# Patient Record
Sex: MALE | Race: WHITE | NOT HISPANIC OR LATINO | Employment: FULL TIME | URBAN - METROPOLITAN AREA
[De-identification: names, ages, dates, MRNs, and addresses within clinical notes are randomized per-mention and may not be internally consistent; named-entity substitution may affect disease eponyms.]

---

## 2021-08-10 ENCOUNTER — HOSPITAL ENCOUNTER (EMERGENCY)
Facility: HOSPITAL | Age: 66
Discharge: HOME | End: 2021-08-10
Attending: EMERGENCY MEDICINE | Admitting: EMERGENCY MEDICINE
Payer: OTHER GOVERNMENT

## 2021-08-10 VITALS
RESPIRATION RATE: 16 BRPM | HEART RATE: 76 BPM | OXYGEN SATURATION: 98 % | TEMPERATURE: 97.7 F | SYSTOLIC BLOOD PRESSURE: 122 MMHG | DIASTOLIC BLOOD PRESSURE: 76 MMHG | HEIGHT: 76 IN

## 2021-08-10 DIAGNOSIS — T25.219A: Primary | ICD-10-CM

## 2021-08-10 PROCEDURE — 90471 IMMUNIZATION ADMIN: CPT

## 2021-08-10 PROCEDURE — 90715 TDAP VACCINE 7 YRS/> IM: CPT | Performed by: PHYSICIAN ASSISTANT

## 2021-08-10 PROCEDURE — 99282 EMERGENCY DEPT VISIT SF MDM: CPT

## 2021-08-10 PROCEDURE — 99284 EMERGENCY DEPT VISIT MOD MDM: CPT | Performed by: PHYSICIAN ASSISTANT

## 2021-08-10 RX ORDER — GINSENG 100 MG
1 CAPSULE ORAL ONCE
Status: COMPLETED | OUTPATIENT
Start: 2021-08-10 | End: 2021-08-10

## 2021-08-10 RX ORDER — OXYCODONE HYDROCHLORIDE AND ACETAMINOPHEN 5; 325 MG/1; MG/1
1 TABLET ORAL EVERY 8 HOURS PRN
Qty: 9 TABLET | Refills: 0 | Status: SHIPPED | OUTPATIENT
Start: 2021-08-10 | End: 2021-08-11 | Stop reason: SDUPTHER

## 2021-08-10 RX ADMIN — TETANUS TOXOID, REDUCED DIPHTHERIA TOXOID AND ACELLULAR PERTUSSIS VACCINE, ADSORBED 0.5 ML: 5; 2.5; 8; 8; 2.5 SUSPENSION INTRAMUSCULAR at 22:39

## 2021-08-10 RX ADMIN — BACITRACIN ZINC 1 LARGE APPLICATION: 500 OINTMENT TOPICAL at 22:40

## 2021-08-11 RX ORDER — OXYCODONE HYDROCHLORIDE AND ACETAMINOPHEN 5; 325 MG/1; MG/1
1 TABLET ORAL EVERY 8 HOURS PRN
Qty: 9 TABLET | Refills: 0 | Status: SHIPPED | OUTPATIENT
Start: 2021-08-11 | End: 2021-08-21

## 2021-08-11 RX ORDER — OXYCODONE HYDROCHLORIDE AND ACETAMINOPHEN 5; 325 MG/1; MG/1
1 TABLET ORAL EVERY 8 HOURS PRN
Qty: 9 TABLET | Refills: 0 | Status: CANCELLED | OUTPATIENT
Start: 2021-08-11 | End: 2021-08-21

## 2021-08-11 NOTE — QUICK NOTE
Patient called emergency department  Unable to use paper prescription for percocet in [place] where he lives  Requested prescription be faxed  Unable to fax or send electronically to [facility] in [facility], hence new prescription electronically sent to [facility], [address]  Patient aware of new rx at alternate pharmacy
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
## 2021-09-27 ENCOUNTER — OFFICE VISIT (OUTPATIENT)
Dept: FAMILY MEDICINE CLINIC | Facility: CLINIC | Age: 66
End: 2021-09-27
Payer: OTHER GOVERNMENT

## 2021-09-27 VITALS
WEIGHT: 315 LBS | HEIGHT: 76 IN | OXYGEN SATURATION: 97 % | BODY MASS INDEX: 38.36 KG/M2 | TEMPERATURE: 98 F | RESPIRATION RATE: 20 BRPM | SYSTOLIC BLOOD PRESSURE: 122 MMHG | DIASTOLIC BLOOD PRESSURE: 72 MMHG | HEART RATE: 64 BPM

## 2021-09-27 DIAGNOSIS — G47.33 OSA ON CPAP: ICD-10-CM

## 2021-09-27 DIAGNOSIS — H52.4 PRESBYOPIA: ICD-10-CM

## 2021-09-27 DIAGNOSIS — Z99.89 OSA ON CPAP: ICD-10-CM

## 2021-09-27 DIAGNOSIS — H35.363 DRUSEN (DEGENERATIVE) OF MACULA, BILATERAL: ICD-10-CM

## 2021-09-27 DIAGNOSIS — Z12.12 SCREENING FOR COLORECTAL CANCER: ICD-10-CM

## 2021-09-27 DIAGNOSIS — T31.0 BURN (ANY DEGREE) INVOLVING LESS THAN 10% OF BODY SURFACE: ICD-10-CM

## 2021-09-27 DIAGNOSIS — Z86.010 HISTORY OF COLON POLYPS: ICD-10-CM

## 2021-09-27 DIAGNOSIS — E78.2 MIXED HYPERLIPIDEMIA: ICD-10-CM

## 2021-09-27 DIAGNOSIS — K21.9 GASTRO-ESOPHAGEAL REFLUX DISEASE WITHOUT ESOPHAGITIS: ICD-10-CM

## 2021-09-27 DIAGNOSIS — E66.01 MORBID OBESITY (HCC): ICD-10-CM

## 2021-09-27 DIAGNOSIS — I10 ESSENTIAL HYPERTENSION: Primary | ICD-10-CM

## 2021-09-27 DIAGNOSIS — Z11.59 NEED FOR HEPATITIS C SCREENING TEST: ICD-10-CM

## 2021-09-27 DIAGNOSIS — H11.153 PINGUECULA, BILATERAL: ICD-10-CM

## 2021-09-27 DIAGNOSIS — Z12.11 SCREENING FOR COLORECTAL CANCER: ICD-10-CM

## 2021-09-27 DIAGNOSIS — H52.223 REGULAR ASTIGMATISM, BILATERAL: ICD-10-CM

## 2021-09-27 DIAGNOSIS — H52.13 MYOPIA, BILATERAL: ICD-10-CM

## 2021-09-27 DIAGNOSIS — E78.1 PURE HYPERTRIGLYCERIDEMIA: ICD-10-CM

## 2021-09-27 DIAGNOSIS — T24.391A: ICD-10-CM

## 2021-09-27 DIAGNOSIS — T24.291A PARTIAL THICKNESS BURN OF MULTIPLE SITES OF RIGHT LOWER EXTREMITY, INITIAL ENCOUNTER: ICD-10-CM

## 2021-09-27 DIAGNOSIS — G60.9 HEREDITARY PERIPHERAL NEUROPATHY: ICD-10-CM

## 2021-09-27 DIAGNOSIS — Z11.4 SCREENING FOR HIV (HUMAN IMMUNODEFICIENCY VIRUS): ICD-10-CM

## 2021-09-27 PROBLEM — G62.9 PERIPHERAL NEUROPATHY: Status: ACTIVE | Noted: 2021-09-27

## 2021-09-27 PROCEDURE — 99203 OFFICE O/P NEW LOW 30 MIN: CPT | Performed by: FAMILY MEDICINE

## 2021-09-27 RX ORDER — FAMOTIDINE 20 MG/1
20 TABLET, FILM COATED ORAL 2 TIMES DAILY PRN
Qty: 60 TABLET | Refills: 1 | Status: SHIPPED | OUTPATIENT
Start: 2021-09-27 | End: 2021-11-11 | Stop reason: SDUPTHER

## 2021-09-27 RX ORDER — COLLAGENASE SANTYL 250 [ARB'U]/G
OINTMENT TOPICAL
COMMUNITY
Start: 2021-08-11 | End: 2021-09-27

## 2021-09-27 RX ORDER — DIPHENOXYLATE HYDROCHLORIDE AND ATROPINE SULFATE 2.5; .025 MG/1; MG/1
1 TABLET ORAL DAILY
COMMUNITY
Start: 2021-08-27 | End: 2022-08-27

## 2021-09-27 RX ORDER — LOSARTAN POTASSIUM 25 MG/1
25 TABLET ORAL DAILY
COMMUNITY
Start: 2021-05-22 | End: 2021-11-11

## 2021-09-27 RX ORDER — SIMVASTATIN 20 MG
20 TABLET ORAL
COMMUNITY
Start: 2021-05-22 | End: 2022-05-26 | Stop reason: SDUPTHER

## 2021-09-27 RX ORDER — IBUPROFEN 200 MG
400 TABLET ORAL EVERY 6 HOURS PRN
COMMUNITY

## 2021-09-27 RX ORDER — OXYCODONE HYDROCHLORIDE 10 MG/1
TABLET ORAL
COMMUNITY
Start: 2021-08-26 | End: 2021-09-27

## 2021-09-27 RX ORDER — ATENOLOL 25 MG/1
25 TABLET ORAL DAILY
COMMUNITY
Start: 2021-05-22 | End: 2021-11-11

## 2021-09-27 RX ORDER — ACETAMINOPHEN 500 MG
1000 TABLET ORAL EVERY 8 HOURS PRN
COMMUNITY
End: 2021-09-27

## 2021-09-27 RX ORDER — LOSARTAN POTASSIUM 25 MG/1
TABLET ORAL
COMMUNITY
Start: 2021-08-11 | End: 2021-09-27

## 2021-09-27 RX ORDER — PANTOPRAZOLE SODIUM 40 MG/1
40 GRANULE, DELAYED RELEASE ORAL DAILY
COMMUNITY
End: 2021-09-27

## 2021-09-27 RX ORDER — VITAMIN E 268 MG
400 CAPSULE ORAL DAILY
COMMUNITY
End: 2022-03-25

## 2021-09-27 RX ORDER — ASPIRIN 81 MG/1
81 TABLET, CHEWABLE ORAL DAILY
COMMUNITY
End: 2022-03-25

## 2021-09-27 RX ORDER — TAMSULOSIN HYDROCHLORIDE 0.4 MG/1
CAPSULE ORAL
COMMUNITY
Start: 2021-08-26 | End: 2021-09-27

## 2021-09-27 RX ORDER — GABAPENTIN 100 MG/1
600 CAPSULE ORAL
COMMUNITY
End: 2021-09-27

## 2021-09-27 RX ORDER — PANTOPRAZOLE SODIUM 20 MG/1
20 TABLET, DELAYED RELEASE ORAL
Qty: 30 TABLET | Refills: 0 | Status: SHIPPED | OUTPATIENT
Start: 2021-09-27 | End: 2021-11-11

## 2021-09-27 RX ORDER — ATENOLOL 25 MG/1
TABLET ORAL
COMMUNITY
Start: 2021-08-11 | End: 2021-09-27

## 2021-09-27 NOTE — ASSESSMENT & PLAN NOTE
PPI side effects discussed  D/C Protonix 40mg QD  Wean Protonix 20m pill by mouth daily x 1 week, then 1 pill by mouth every other day x 1 week  While weaning, start Pepcid 20mg twice daily as needed for heartburn  Watch GERD diet    To consider EGD per GI?

## 2021-09-27 NOTE — PATIENT INSTRUCTIONS
Wean Protonix 20m pill by mouth daily x 1 week, then 1 pill by mouth every other day x 1 week  While weaning, start Pepcid 20mg twice daily as needed for heartburn

## 2021-09-27 NOTE — ASSESSMENT & PLAN NOTE
Stable  May benefit from discontinuing beta-blocker in future as it is not a first-line agent and increasing Cozaar? Check blood pressure outside of office  Recommend lifestyle modifications

## 2021-10-01 ENCOUNTER — TELEPHONE (OUTPATIENT)
Dept: FAMILY MEDICINE CLINIC | Facility: CLINIC | Age: 66
End: 2021-10-01

## 2021-10-01 ENCOUNTER — LAB (OUTPATIENT)
Dept: LAB | Facility: CLINIC | Age: 66
End: 2021-10-01
Payer: OTHER GOVERNMENT

## 2021-10-01 DIAGNOSIS — R73.01 IFG (IMPAIRED FASTING GLUCOSE): Primary | ICD-10-CM

## 2021-10-01 DIAGNOSIS — R73.01 IFG (IMPAIRED FASTING GLUCOSE): ICD-10-CM

## 2021-10-01 DIAGNOSIS — G60.9 HEREDITARY PERIPHERAL NEUROPATHY: ICD-10-CM

## 2021-10-01 DIAGNOSIS — Z11.4 SCREENING FOR HIV (HUMAN IMMUNODEFICIENCY VIRUS): ICD-10-CM

## 2021-10-01 DIAGNOSIS — Z11.59 NEED FOR HEPATITIS C SCREENING TEST: ICD-10-CM

## 2021-10-01 DIAGNOSIS — I10 ESSENTIAL HYPERTENSION: ICD-10-CM

## 2021-10-01 DIAGNOSIS — E66.01 MORBID OBESITY (HCC): ICD-10-CM

## 2021-10-01 DIAGNOSIS — E78.1 PURE HYPERTRIGLYCERIDEMIA: ICD-10-CM

## 2021-10-01 DIAGNOSIS — E78.2 MIXED HYPERLIPIDEMIA: ICD-10-CM

## 2021-10-01 LAB
ALBUMIN SERPL BCP-MCNC: 3.6 G/DL (ref 3.5–5)
ALP SERPL-CCNC: 70 U/L (ref 46–116)
ALT SERPL W P-5'-P-CCNC: 19 U/L (ref 12–78)
ANION GAP SERPL CALCULATED.3IONS-SCNC: 8 MMOL/L (ref 4–13)
AST SERPL W P-5'-P-CCNC: 14 U/L (ref 5–45)
BASOPHILS # BLD AUTO: 0.05 THOUSANDS/ΜL (ref 0–0.1)
BASOPHILS NFR BLD AUTO: 1 % (ref 0–1)
BILIRUB SERPL-MCNC: 0.7 MG/DL (ref 0.2–1)
BUN SERPL-MCNC: 19 MG/DL (ref 5–25)
CALCIUM SERPL-MCNC: 8.5 MG/DL (ref 8.3–10.1)
CHLORIDE SERPL-SCNC: 105 MMOL/L (ref 100–108)
CHOLEST SERPL-MCNC: 125 MG/DL (ref 50–200)
CO2 SERPL-SCNC: 27 MMOL/L (ref 21–32)
CREAT SERPL-MCNC: 1.27 MG/DL (ref 0.6–1.3)
EOSINOPHIL # BLD AUTO: 0.23 THOUSAND/ΜL (ref 0–0.61)
EOSINOPHIL NFR BLD AUTO: 4 % (ref 0–6)
ERYTHROCYTE [DISTWIDTH] IN BLOOD BY AUTOMATED COUNT: 13.2 % (ref 11.6–15.1)
EST. AVERAGE GLUCOSE BLD GHB EST-MCNC: 103 MG/DL
GFR SERPL CREATININE-BSD FRML MDRD: 59 ML/MIN/1.73SQ M
GLUCOSE P FAST SERPL-MCNC: 96 MG/DL (ref 65–99)
HBA1C MFR BLD: 5.2 %
HCT VFR BLD AUTO: 41.5 % (ref 36.5–49.3)
HCV AB SER QL: NORMAL
HDLC SERPL-MCNC: 39 MG/DL
HGB BLD-MCNC: 13.6 G/DL (ref 12–17)
IMM GRANULOCYTES # BLD AUTO: 0.02 THOUSAND/UL (ref 0–0.2)
IMM GRANULOCYTES NFR BLD AUTO: 0 % (ref 0–2)
LDLC SERPL CALC-MCNC: 57 MG/DL (ref 0–100)
LDLC SERPL DIRECT ASSAY-MCNC: 64 MG/DL (ref 0–100)
LYMPHOCYTES # BLD AUTO: 2.41 THOUSANDS/ΜL (ref 0.6–4.47)
LYMPHOCYTES NFR BLD AUTO: 39 % (ref 14–44)
MCH RBC QN AUTO: 29.7 PG (ref 26.8–34.3)
MCHC RBC AUTO-ENTMCNC: 32.8 G/DL (ref 31.4–37.4)
MCV RBC AUTO: 91 FL (ref 82–98)
MONOCYTES # BLD AUTO: 0.47 THOUSAND/ΜL (ref 0.17–1.22)
MONOCYTES NFR BLD AUTO: 8 % (ref 4–12)
NEUTROPHILS # BLD AUTO: 3.06 THOUSANDS/ΜL (ref 1.85–7.62)
NEUTS SEG NFR BLD AUTO: 48 % (ref 43–75)
NONHDLC SERPL-MCNC: 86 MG/DL
NRBC BLD AUTO-RTO: 0 /100 WBCS
PLATELET # BLD AUTO: 231 THOUSANDS/UL (ref 149–390)
PMV BLD AUTO: 9.8 FL (ref 8.9–12.7)
POTASSIUM SERPL-SCNC: 4.7 MMOL/L (ref 3.5–5.3)
PROT SERPL-MCNC: 6.7 G/DL (ref 6.4–8.2)
RBC # BLD AUTO: 4.58 MILLION/UL (ref 3.88–5.62)
SODIUM SERPL-SCNC: 140 MMOL/L (ref 136–145)
TRIGL SERPL-MCNC: 144 MG/DL
TSH SERPL DL<=0.05 MIU/L-ACNC: 1.09 UIU/ML (ref 0.36–3.74)
WBC # BLD AUTO: 6.24 THOUSAND/UL (ref 4.31–10.16)

## 2021-10-01 PROCEDURE — 85025 COMPLETE CBC W/AUTO DIFF WBC: CPT

## 2021-10-01 PROCEDURE — 83036 HEMOGLOBIN GLYCOSYLATED A1C: CPT

## 2021-10-01 PROCEDURE — 84443 ASSAY THYROID STIM HORMONE: CPT

## 2021-10-01 PROCEDURE — 36415 COLL VENOUS BLD VENIPUNCTURE: CPT

## 2021-10-01 PROCEDURE — 80053 COMPREHEN METABOLIC PANEL: CPT

## 2021-10-01 PROCEDURE — 86803 HEPATITIS C AB TEST: CPT

## 2021-10-01 PROCEDURE — 83721 ASSAY OF BLOOD LIPOPROTEIN: CPT

## 2021-10-01 PROCEDURE — 87389 HIV-1 AG W/HIV-1&-2 AB AG IA: CPT

## 2021-10-01 PROCEDURE — 80061 LIPID PANEL: CPT

## 2021-10-03 LAB — HIV 1+2 AB+HIV1 P24 AG SERPL QL IA: NORMAL

## 2021-11-11 ENCOUNTER — OFFICE VISIT (OUTPATIENT)
Dept: FAMILY MEDICINE CLINIC | Facility: CLINIC | Age: 66
End: 2021-11-11
Payer: OTHER GOVERNMENT

## 2021-11-11 VITALS
RESPIRATION RATE: 18 BRPM | DIASTOLIC BLOOD PRESSURE: 76 MMHG | TEMPERATURE: 97.7 F | HEART RATE: 62 BPM | OXYGEN SATURATION: 99 % | SYSTOLIC BLOOD PRESSURE: 124 MMHG | BODY MASS INDEX: 38.36 KG/M2 | WEIGHT: 315 LBS | HEIGHT: 76 IN

## 2021-11-11 DIAGNOSIS — E66.01 MORBID OBESITY (HCC): ICD-10-CM

## 2021-11-11 DIAGNOSIS — Z99.89 OSA ON CPAP: ICD-10-CM

## 2021-11-11 DIAGNOSIS — E78.2 MIXED HYPERLIPIDEMIA: ICD-10-CM

## 2021-11-11 DIAGNOSIS — G47.33 OSA ON CPAP: ICD-10-CM

## 2021-11-11 DIAGNOSIS — K21.9 GASTRO-ESOPHAGEAL REFLUX DISEASE WITHOUT ESOPHAGITIS: ICD-10-CM

## 2021-11-11 DIAGNOSIS — I10 PRIMARY HYPERTENSION: ICD-10-CM

## 2021-11-11 DIAGNOSIS — E78.1 PURE HYPERTRIGLYCERIDEMIA: ICD-10-CM

## 2021-11-11 DIAGNOSIS — T24.291D PARTIAL THICKNESS BURN OF MULTIPLE SITES OF RIGHT LOWER EXTREMITY, SUBSEQUENT ENCOUNTER: ICD-10-CM

## 2021-11-11 DIAGNOSIS — Z86.010 HISTORY OF COLON POLYPS: ICD-10-CM

## 2021-11-11 DIAGNOSIS — R73.01 IFG (IMPAIRED FASTING GLUCOSE): Primary | ICD-10-CM

## 2021-11-11 DIAGNOSIS — G60.9 HEREDITARY PERIPHERAL NEUROPATHY: ICD-10-CM

## 2021-11-11 PROCEDURE — 99214 OFFICE O/P EST MOD 30 MIN: CPT | Performed by: FAMILY MEDICINE

## 2021-11-11 RX ORDER — FAMOTIDINE 20 MG/1
20 TABLET, FILM COATED ORAL 2 TIMES DAILY PRN
Qty: 180 TABLET | Refills: 2 | Status: SHIPPED | OUTPATIENT
Start: 2021-11-11

## 2021-11-11 RX ORDER — LOSARTAN POTASSIUM 50 MG/1
50 TABLET ORAL DAILY
Qty: 30 TABLET | Refills: 1 | Status: SHIPPED | OUTPATIENT
Start: 2021-11-11 | End: 2021-12-23 | Stop reason: SDUPTHER

## 2021-12-07 ENCOUNTER — PATIENT MESSAGE (OUTPATIENT)
Dept: FAMILY MEDICINE CLINIC | Facility: CLINIC | Age: 66
End: 2021-12-07

## 2021-12-10 ENCOUNTER — LAB (OUTPATIENT)
Dept: LAB | Facility: CLINIC | Age: 66
End: 2021-12-10
Payer: OTHER GOVERNMENT

## 2021-12-10 DIAGNOSIS — I10 PRIMARY HYPERTENSION: ICD-10-CM

## 2021-12-10 LAB
ALBUMIN SERPL BCP-MCNC: 4.1 G/DL (ref 3.5–5)
ALP SERPL-CCNC: 72 U/L (ref 46–116)
ALT SERPL W P-5'-P-CCNC: 26 U/L (ref 12–78)
ANION GAP SERPL CALCULATED.3IONS-SCNC: 10 MMOL/L (ref 4–13)
AST SERPL W P-5'-P-CCNC: 18 U/L (ref 5–45)
BILIRUB SERPL-MCNC: 0.82 MG/DL (ref 0.2–1)
BUN SERPL-MCNC: 19 MG/DL (ref 5–25)
CALCIUM SERPL-MCNC: 9 MG/DL (ref 8.3–10.1)
CHLORIDE SERPL-SCNC: 102 MMOL/L (ref 100–108)
CO2 SERPL-SCNC: 26 MMOL/L (ref 21–32)
CREAT SERPL-MCNC: 1.14 MG/DL (ref 0.6–1.3)
GFR SERPL CREATININE-BSD FRML MDRD: 68 ML/MIN/1.73SQ M
GLUCOSE SERPL-MCNC: 79 MG/DL (ref 65–140)
POTASSIUM SERPL-SCNC: 4.1 MMOL/L (ref 3.5–5.3)
PROT SERPL-MCNC: 7.5 G/DL (ref 6.4–8.2)
SODIUM SERPL-SCNC: 138 MMOL/L (ref 136–145)

## 2021-12-10 PROCEDURE — 36415 COLL VENOUS BLD VENIPUNCTURE: CPT

## 2021-12-10 PROCEDURE — 80053 COMPREHEN METABOLIC PANEL: CPT

## 2021-12-20 ENCOUNTER — PATIENT MESSAGE (OUTPATIENT)
Dept: FAMILY MEDICINE CLINIC | Facility: CLINIC | Age: 66
End: 2021-12-20

## 2021-12-20 ENCOUNTER — TELEMEDICINE (OUTPATIENT)
Dept: FAMILY MEDICINE CLINIC | Facility: CLINIC | Age: 66
End: 2021-12-20
Payer: OTHER GOVERNMENT

## 2021-12-20 VITALS — HEIGHT: 76 IN | BODY MASS INDEX: 38.36 KG/M2 | WEIGHT: 315 LBS | TEMPERATURE: 99.8 F

## 2021-12-20 DIAGNOSIS — E66.01 MORBID OBESITY (HCC): ICD-10-CM

## 2021-12-20 DIAGNOSIS — B34.9 VIRAL INFECTION, UNSPECIFIED: Primary | ICD-10-CM

## 2021-12-20 DIAGNOSIS — I10 PRIMARY HYPERTENSION: ICD-10-CM

## 2021-12-20 DIAGNOSIS — G47.33 OSA ON CPAP: ICD-10-CM

## 2021-12-20 DIAGNOSIS — Z99.89 OSA ON CPAP: ICD-10-CM

## 2021-12-20 PROCEDURE — 99214 OFFICE O/P EST MOD 30 MIN: CPT | Performed by: FAMILY MEDICINE

## 2021-12-20 RX ORDER — ASCORBIC ACID 250 MG
250 TABLET,CHEWABLE ORAL DAILY
COMMUNITY

## 2021-12-21 ENCOUNTER — HOSPITAL ENCOUNTER (EMERGENCY)
Facility: HOSPITAL | Age: 66
Discharge: HOME | End: 2021-12-21
Attending: EMERGENCY MEDICINE | Admitting: EMERGENCY MEDICINE
Payer: OTHER GOVERNMENT

## 2021-12-21 ENCOUNTER — APPOINTMENT (EMERGENCY)
Dept: RADIOLOGY | Facility: HOSPITAL | Age: 66
End: 2021-12-21
Payer: OTHER GOVERNMENT

## 2021-12-21 ENCOUNTER — APPOINTMENT (EMERGENCY)
Dept: CT IMAGING | Facility: HOSPITAL | Age: 66
End: 2021-12-21
Payer: OTHER GOVERNMENT

## 2021-12-21 VITALS
HEIGHT: 77 IN | OXYGEN SATURATION: 96 % | HEART RATE: 106 BPM | TEMPERATURE: 99 F | DIASTOLIC BLOOD PRESSURE: 62 MMHG | SYSTOLIC BLOOD PRESSURE: 134 MMHG | BODY MASS INDEX: 28.34 KG/M2 | WEIGHT: 240 LBS | RESPIRATION RATE: 20 BRPM

## 2021-12-21 DIAGNOSIS — R50.9 FEVER: Primary | ICD-10-CM

## 2021-12-21 LAB
ALBUMIN SERPL BCP-MCNC: 3.7 G/DL (ref 3.5–5)
ALP SERPL-CCNC: 69 U/L (ref 46–116)
ALT SERPL W P-5'-P-CCNC: 24 U/L (ref 12–78)
ANION GAP SERPL CALCULATED.3IONS-SCNC: 10 MMOL/L (ref 4–13)
APTT PPP: 32 SECONDS (ref 23–37)
AST SERPL W P-5'-P-CCNC: 19 U/L (ref 5–45)
BASOPHILS # BLD AUTO: 0.04 THOUSANDS/ΜL (ref 0–0.1)
BASOPHILS NFR BLD AUTO: 1 % (ref 0–1)
BILIRUB SERPL-MCNC: 0.72 MG/DL (ref 0.2–1)
BUN SERPL-MCNC: 16 MG/DL (ref 5–25)
CALCIUM SERPL-MCNC: 8.7 MG/DL (ref 8.3–10.1)
CARDIAC TROPONIN I PNL SERPL HS: 3 NG/L
CHLORIDE SERPL-SCNC: 104 MMOL/L (ref 100–108)
CO2 SERPL-SCNC: 26 MMOL/L (ref 21–32)
CREAT SERPL-MCNC: 1.23 MG/DL (ref 0.6–1.3)
EOSINOPHIL # BLD AUTO: 0.05 THOUSAND/ΜL (ref 0–0.61)
EOSINOPHIL NFR BLD AUTO: 1 % (ref 0–6)
ERYTHROCYTE [DISTWIDTH] IN BLOOD BY AUTOMATED COUNT: 13.1 % (ref 11.6–15.1)
GFR SERPL CREATININE-BSD FRML MDRD: 61 ML/MIN/1.73SQ M
GLUCOSE SERPL-MCNC: 101 MG/DL (ref 65–140)
HCT VFR BLD AUTO: 43.4 % (ref 36.5–49.3)
HGB BLD-MCNC: 14.7 G/DL (ref 12–17)
IMM GRANULOCYTES # BLD AUTO: 0.04 THOUSAND/UL (ref 0–0.2)
IMM GRANULOCYTES NFR BLD AUTO: 1 % (ref 0–2)
INR PPP: 1.13 (ref 0.84–1.19)
LACTATE SERPL-SCNC: 1.4 MMOL/L (ref 0.5–2)
LYMPHOCYTES # BLD AUTO: 0.85 THOUSANDS/ΜL (ref 0.6–4.47)
LYMPHOCYTES NFR BLD AUTO: 11 % (ref 14–44)
MAGNESIUM SERPL-MCNC: 2.1 MG/DL (ref 1.6–2.6)
MCH RBC QN AUTO: 30.1 PG (ref 26.8–34.3)
MCHC RBC AUTO-ENTMCNC: 33.9 G/DL (ref 31.4–37.4)
MCV RBC AUTO: 89 FL (ref 82–98)
MONOCYTES # BLD AUTO: 1.22 THOUSAND/ΜL (ref 0.17–1.22)
MONOCYTES NFR BLD AUTO: 15 % (ref 4–12)
NEUTROPHILS # BLD AUTO: 5.9 THOUSANDS/ΜL (ref 1.85–7.62)
NEUTS SEG NFR BLD AUTO: 71 % (ref 43–75)
NRBC BLD AUTO-RTO: 0 /100 WBCS
PLATELET # BLD AUTO: 191 THOUSANDS/UL (ref 149–390)
PMV BLD AUTO: 9.2 FL (ref 8.9–12.7)
POTASSIUM SERPL-SCNC: 4.4 MMOL/L (ref 3.5–5.3)
PROCALCITONIN SERPL-MCNC: 0.05 NG/ML
PROT SERPL-MCNC: 7.1 G/DL (ref 6.4–8.2)
PROTHROMBIN TIME: 14 SECONDS (ref 11.6–14.5)
RBC # BLD AUTO: 4.88 MILLION/UL (ref 3.88–5.62)
SODIUM SERPL-SCNC: 140 MMOL/L (ref 136–145)
WBC # BLD AUTO: 8.1 THOUSAND/UL (ref 4.31–10.16)

## 2021-12-21 PROCEDURE — 84484 ASSAY OF TROPONIN QUANT: CPT | Performed by: EMERGENCY MEDICINE

## 2021-12-21 PROCEDURE — 83605 ASSAY OF LACTIC ACID: CPT | Performed by: EMERGENCY MEDICINE

## 2021-12-21 PROCEDURE — 96361 HYDRATE IV INFUSION ADD-ON: CPT

## 2021-12-21 PROCEDURE — 71275 CT ANGIOGRAPHY CHEST: CPT

## 2021-12-21 PROCEDURE — 71045 X-RAY EXAM CHEST 1 VIEW: CPT

## 2021-12-21 PROCEDURE — 96374 THER/PROPH/DIAG INJ IV PUSH: CPT

## 2021-12-21 PROCEDURE — 99285 EMERGENCY DEPT VISIT HI MDM: CPT | Performed by: EMERGENCY MEDICINE

## 2021-12-21 PROCEDURE — 80053 COMPREHEN METABOLIC PANEL: CPT | Performed by: EMERGENCY MEDICINE

## 2021-12-21 PROCEDURE — 84145 PROCALCITONIN (PCT): CPT | Performed by: EMERGENCY MEDICINE

## 2021-12-21 PROCEDURE — 36415 COLL VENOUS BLD VENIPUNCTURE: CPT | Performed by: EMERGENCY MEDICINE

## 2021-12-21 PROCEDURE — 93005 ELECTROCARDIOGRAM TRACING: CPT

## 2021-12-21 PROCEDURE — 83735 ASSAY OF MAGNESIUM: CPT | Performed by: EMERGENCY MEDICINE

## 2021-12-21 PROCEDURE — 87040 BLOOD CULTURE FOR BACTERIA: CPT | Performed by: EMERGENCY MEDICINE

## 2021-12-21 PROCEDURE — 85025 COMPLETE CBC W/AUTO DIFF WBC: CPT | Performed by: EMERGENCY MEDICINE

## 2021-12-21 PROCEDURE — 85730 THROMBOPLASTIN TIME PARTIAL: CPT | Performed by: EMERGENCY MEDICINE

## 2021-12-21 PROCEDURE — 85610 PROTHROMBIN TIME: CPT | Performed by: EMERGENCY MEDICINE

## 2021-12-21 PROCEDURE — 99284 EMERGENCY DEPT VISIT MOD MDM: CPT

## 2021-12-21 RX ORDER — KETOROLAC TROMETHAMINE 30 MG/ML
15 INJECTION, SOLUTION INTRAMUSCULAR; INTRAVENOUS ONCE
Status: COMPLETED | OUTPATIENT
Start: 2021-12-21 | End: 2021-12-21

## 2021-12-21 RX ADMIN — KETOROLAC TROMETHAMINE 15 MG: 30 INJECTION, SOLUTION INTRAMUSCULAR at 08:57

## 2021-12-21 RX ADMIN — IOHEXOL 100 ML: 350 INJECTION, SOLUTION INTRAVENOUS at 10:05

## 2021-12-21 RX ADMIN — SODIUM CHLORIDE 1000 ML: 0.9 INJECTION, SOLUTION INTRAVENOUS at 08:57

## 2021-12-22 ENCOUNTER — TELEPHONE (OUTPATIENT)
Dept: FAMILY MEDICINE CLINIC | Facility: CLINIC | Age: 66
End: 2021-12-22

## 2021-12-22 LAB
ATRIAL RATE: 80 BPM
P AXIS: 55 DEGREES
PR INTERVAL: 152 MS
QRS AXIS: 57 DEGREES
QRSD INTERVAL: 102 MS
QT INTERVAL: 384 MS
QTC INTERVAL: 442 MS
T WAVE AXIS: 53 DEGREES
VENTRICULAR RATE: 80 BPM

## 2021-12-22 PROCEDURE — 93010 ELECTROCARDIOGRAM REPORT: CPT | Performed by: INTERNAL MEDICINE

## 2021-12-23 ENCOUNTER — TELEPHONE (OUTPATIENT)
Dept: FAMILY MEDICINE CLINIC | Facility: CLINIC | Age: 66
End: 2021-12-23

## 2021-12-23 ENCOUNTER — TELEMEDICINE (OUTPATIENT)
Dept: FAMILY MEDICINE CLINIC | Facility: CLINIC | Age: 66
End: 2021-12-23
Payer: OTHER GOVERNMENT

## 2021-12-23 VITALS — BODY MASS INDEX: 37.19 KG/M2 | HEIGHT: 77 IN | WEIGHT: 315 LBS | TEMPERATURE: 98.3 F

## 2021-12-23 DIAGNOSIS — I10 PRIMARY HYPERTENSION: ICD-10-CM

## 2021-12-23 DIAGNOSIS — J10.1 INFLUENZA A: Primary | ICD-10-CM

## 2021-12-23 DIAGNOSIS — T24.391A: ICD-10-CM

## 2021-12-23 DIAGNOSIS — T24.291D PARTIAL THICKNESS BURN OF MULTIPLE SITES OF RIGHT LOWER EXTREMITY, SUBSEQUENT ENCOUNTER: ICD-10-CM

## 2021-12-23 DIAGNOSIS — E66.01 CLASS 2 SEVERE OBESITY WITH SERIOUS COMORBIDITY AND BODY MASS INDEX (BMI) OF 39.0 TO 39.9 IN ADULT, UNSPECIFIED OBESITY TYPE (HCC): ICD-10-CM

## 2021-12-23 DIAGNOSIS — T31.0 BURN (ANY DEGREE) INVOLVING LESS THAN 10% OF BODY SURFACE: ICD-10-CM

## 2021-12-23 PROBLEM — E66.812 CLASS 2 SEVERE OBESITY WITH SERIOUS COMORBIDITY AND BODY MASS INDEX (BMI) OF 39.0 TO 39.9 IN ADULT (HCC): Status: ACTIVE | Noted: 2021-09-27

## 2021-12-23 PROCEDURE — 99214 OFFICE O/P EST MOD 30 MIN: CPT | Performed by: FAMILY MEDICINE

## 2021-12-23 RX ORDER — LOSARTAN POTASSIUM 50 MG/1
50 TABLET ORAL DAILY
Qty: 90 TABLET | Refills: 2 | Status: SHIPPED | OUTPATIENT
Start: 2021-12-23

## 2021-12-26 LAB
BACTERIA BLD CULT: NORMAL
BACTERIA BLD CULT: NORMAL

## 2022-01-03 ENCOUNTER — PATIENT MESSAGE (OUTPATIENT)
Dept: FAMILY MEDICINE CLINIC | Facility: CLINIC | Age: 67
End: 2022-01-03

## 2022-01-03 NOTE — TELEPHONE ENCOUNTER
From: Radha Marking  To: Myriam Barry DO  Sent: 1/3/2022 11:38 AM EST  Subject: Back to work letter after Flu    I thought you were doing a back to work letter for work?     [employer]

## 2022-01-04 ENCOUNTER — TELEMEDICINE (OUTPATIENT)
Dept: NEUROLOGY | Facility: CLINIC | Age: 67
End: 2022-01-04
Payer: OTHER GOVERNMENT

## 2022-01-04 ENCOUNTER — TELEPHONE (OUTPATIENT)
Dept: NEUROLOGY | Facility: CLINIC | Age: 67
End: 2022-01-04

## 2022-01-04 VITALS — TEMPERATURE: 98.6 F | WEIGHT: 240 LBS | BODY MASS INDEX: 28.34 KG/M2 | HEIGHT: 77 IN

## 2022-01-04 DIAGNOSIS — G60.9 HEREDITARY PERIPHERAL NEUROPATHY: ICD-10-CM

## 2022-01-04 PROCEDURE — 99203 OFFICE O/P NEW LOW 30 MIN: CPT | Performed by: PSYCHIATRY & NEUROLOGY
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
## 2022-01-04 NOTE — ASSESSMENT & PLAN NOTE
Dennis Jiménez is a 61 y o  male  was seen today as a new patient in a virtual visit for prior diagnosis of neuropathy  he states this first started 15 years ago and presentation includes bilateral numbness below the knees and bilateral tingling in his hands  Overall presentation has been stable for the last 15 years  He denies any new symptoms  He denies any recent falls or head injuries  Patient moved to this area about 2 years ago, and would like to establish care previously was following up with Neurology in [place]   Work up has included EMG in the past, reported lab workup checked   Previous treatments tried include previously has tried gabapentin unclear if this was helpful   Pt states symptoms have not changed since last visit    Workup:  Lab Results   Component Value Date    HGBA1C 5 2 10/01/2021    MCV 89 12/21/2021     Significant exam findings:   Unable to complete a neuro exam given that this was a virtual visit would like to see the patient in a clinical setting to further do a detailed neurological exam       Impression:  neuropathy in the setting of no clear underlying disease    Differential diagnoses include but not limited to Idiopathic peripheral neuropathy however this point would like to examine patient and get prior records  Plan:  - Differential diagnoses and next steps reviewed with the patient  - EMG/NCS Will defer at this time, await prior records  - No medications recommended at this time  - Follow up in 3 months   · Patient verbalized understanding all questions were addressed  · At this point will hold off on restarting gabapentin as patient denies any prior history of or current symptoms of burning pain    He was told to call our office if he is interested in restarting this medication

## 2022-01-04 NOTE — PROGRESS NOTES
Virtual Regular Visit    Verification of patient location:    Patient is located in the following state in which I hold an active license [place]      Assessment/Plan:    Problem List Items Addressed This Visit        Nervous and Auditory    Peripheral neuropathy     Radha Carver is a 61 y o  male  was seen today as a new patient in a virtual visit for prior diagnosis of neuropathy  he states this first started 15 years ago and presentation includes bilateral numbness below the knees and bilateral tingling in his hands  Overall presentation has been stable for the last 15 years  He denies any new symptoms  He denies any recent falls or head injuries  Patient moved to this area about 2 years ago, and would like to establish care previously was following up with Neurology in [place]   Work up has included EMG in the past, reported lab workup checked   Previous treatments tried include previously has tried gabapentin unclear if this was helpful   Pt states symptoms have not changed since last visit    Workup:  Lab Results   Component Value Date    HGBA1C 5 2 10/01/2021    MCV 89 12/21/2021     Significant exam findings:   Unable to complete a neuro exam given that this was a virtual visit would like to see the patient in a clinical setting to further do a detailed neurological exam       Impression:  neuropathy in the setting of no clear underlying disease    Differential diagnoses include but not limited to Idiopathic peripheral neuropathy however this point would like to examine patient and get prior records  Plan:  - Differential diagnoses and next steps reviewed with the patient  - EMG/NCS Will defer at this time, await prior records  - No medications recommended at this time  - Follow up in 3 months   · Patient verbalized understanding all questions were addressed  · At this point will hold off on restarting gabapentin as patient denies any prior history of or current symptoms of burning pain    He was told to call our office if he is interested in restarting this medication                            Reason for visit is   Chief Complaint   Patient presents with    Virtual Regular Visit        Encounter provider Meche Vasques MD    Provider located at [address] [place] [id number]      Recent Visits  No visits were found meeting these conditions  Showing recent visits within past 7 days and meeting all other requirements  Today's Visits  Date Type Provider Dept   01/04/22 Telephone Fouzia Hart [facility]   01/04/22 668 Gordy Rodriguez MD [facility]   Showing today's visits and meeting all other requirements  Future Appointments  No visits were found meeting these conditions  Showing future appointments within next 150 days and meeting all other requirements       The patient was identified by name and date of birth  Zev Han was informed that this is a telemedicine visit and that the visit is being conducted through [employer] was not connected  He agrees to proceed     My office door was closed  The patient was notified the following individuals were present in the room Dr Vikas Carter  He acknowledged consent and understanding of privacy and security of the video platform  The patient has agreed to participate and understands they can discontinue the visit at any time  Patient is aware this is a billable service  Subjective  Zev Han is a 61 y o  male seen today as a virtual visit as a new patient for further assessment and recommendation for neuropathy  History obtained from patient as well as available medical record review  Initial onset of symptoms was about 15 years ago  He describes symptoms of numbness affecting his bilateral lower extrmeities below the knee  He also reports symptoms in his upper extremities bilaterally in his hands   He denies any burning pain  HE reports numbness in the lower extremities and tingling in the upper ext  He reports symptoms have been the same for about 15 years  He is able walk without assistance  He denies any recent falls  He denies any falls  He feels like he has more trouble at night with his symptoms  Previously he was on Gabapentin he is unsure if it help  Last HBA1c: 5 2,   Previous treatment has included gabapentin, which did not  improve symptoms  Prior evaluation has included Dr Chela Moreno in [address] [place] [address]  in [place]  He has had a EMG which was done 10 years ago  The last time he was seen was 2 years ago by neurology in [place]  Pertinent history  [x] None of the below    []Diabetes   Cancer hx  [] Hx of chemotherapy use (cisplatin, oxaliplatin, taxanes, thalidomide, TNF inhibitors, vincristine)  [] Etoh use  [] HIV hx  [] Hx of critical or sustained illness  [] ESRD  [] Hx of amyloidosis  [] Hypothyroidism  [] Hx of vitamin deficiency  [] Hx of lyme disease  [] Hx of GBS/AIDP/CIDP  [] Heavy metal exposure  [] Medication exposure (amiodarone, cochicine, disulfiram, ethambutol, fluoroquinalones, INH, flagyl, phenytoin, statins)  [] Family history of CMT, Porphyria, Krabbe, metachromic leukodystrophy, mitochrondrial do  [] Environmental exposure such as vibration or cold or hypoxia      Previous workup:    Labs reviewed:  Pertinent labs:  TSH within normal limits  HbA1c within normal limits    Physical activity  - Falls? none  - General activity level?      Pertinent social hx  Illicit Drugs: denies  Alcohol/tobacco: Rare alcohol intake     The following portions of the patient's history were reviewed and updated as appropriate: allergies, current medications, past family history, past medical history, past social history, past surgical history and problem list     Review of systems as documented by the MA was reviewed in full by myself, Donta Muniz MD    HPI     Past Medical History:   Diagnosis Date    Closed fracture of right foot     As a child    Drusen (degenerative) of macula, bilateral 09/27/2021    Gastro-esophageal reflux disease without esophagitis 05/30/2019    History of colon polyps     Hypertension 09/27/2021    IFG (impaired fasting glucose) 10/1/2021    Mixed hyperlipidemia 05/30/2019    Morbid obesity ([facility] [address] ) 09/27/2021    Myopia, bilateral 09/27/2021    JOVANY on CPAP     Peripheral neuropathy 09/27/2021    Pinguecula, bilateral 09/27/2021    Presbyopia 10/11/2016    Pure hypertriglyceridemia 08/19/2019    Radial fracture 1975    Stress Fracture - Right Arm    Regular astigmatism, bilateral 09/27/2021       Past Surgical History:   Procedure Laterality Date    FIBULA FRACTURE SURGERY Right 2009    Hardware - Plate and screws in place    SKIN GRAFT  2021    Right foot     TONSILECTOMY AND ADNOIDECTOMY      TONSILLECTOMY         Current Outpatient Medications   Medication Sig Dispense Refill    Ascorbic Acid (Vitamin C) 250 MG CHEW Chew 250 mg daily Take 2 chews daily       aspirin 81 mg chewable tablet Chew 81 mg daily      Cholecalciferol 125 MCG (5000 UT) TABS Take 5,000 Units by mouth daily        cyanocobalamin (VITAMIN B-12) 1000 MCG tablet Take 1,000 mcg by mouth daily      famotidine (PEPCID) 20 mg tablet Take 1 tablet (20 mg total) by mouth 2 (two) times a day as needed for heartburn 180 tablet 2    ibuprofen (MOTRIN) 200 mg tablet Take 400 mg by mouth every 6 (six) hours as needed        losartan (COZAAR) 50 mg tablet Take 1 tablet (50 mg total) by mouth daily 90 tablet 2    multivitamin (THERAGRAN) TABS Take 1 tablet by mouth daily      simvastatin (ZOCOR) 20 mg tablet Take 20 mg by mouth daily at bedtime       vitamin E, tocopherol, 400 units capsule Take 400 Units by mouth daily         No current facility-administered medications for this visit  No Known Allergies    Review of Systems   Constitutional: Negative  Negative for appetite change and fever     HENT: Negative  Negative for hearing loss, tinnitus, trouble swallowing and voice change  Eyes: Negative  Negative for photophobia and pain  Respiratory: Negative  Negative for shortness of breath  Cardiovascular: Negative  Negative for palpitations  Gastrointestinal: Negative  Negative for nausea and vomiting  Endocrine: Negative  Negative for cold intolerance  Genitourinary: Negative  Negative for dysuria, frequency and urgency  Musculoskeletal: Negative  Negative for myalgias and neck pain  Skin: Negative  Negative for rash  Neurological: Positive for numbness  Negative for dizziness, tremors, seizures, syncope, facial asymmetry, speech difficulty, weakness, light-headedness and headaches  Tingling in both hands   From the knees down no feeling, had EMG done     Hematological: Negative  Does not bruise/bleed easily  Psychiatric/Behavioral: Positive for sleep disturbance  Negative for confusion and hallucinations  Uses a CPAP       Video Exam    Vitals:    01/04/22 1556   Temp: 98 6 °F (37 °C)   Weight: 109 kg (240 lb)   Height: 6' 5" (1 956 m)       Physical Exam  Constitutional:       Appearance: Normal appearance  HENT:      Head: Normocephalic and atraumatic  Nose: Nose normal    Eyes:      Extraocular Movements: Extraocular movements intact  Musculoskeletal:      Cervical back: Normal range of motion  Neurological:      Mental Status: He is alert  Psychiatric:         Mood and Affect: Mood normal           I spent 50 minutes minutes directly with the patient during this visit    [address] verbally agrees to participate in [employer]   Pt is aware that [employer] could be limited without vital signs or the ability to perform a full hands-on physical exam  Tobin Kerns understands he or the provider may request at any time to terminate the video visit and request the patient to seek care or treatment in person

## 2022-01-05 ENCOUNTER — OFFICE VISIT (OUTPATIENT)
Dept: GASTROENTEROLOGY | Facility: AMBULARY SURGERY CENTER | Age: 67
End: 2022-01-05
Payer: OTHER GOVERNMENT

## 2022-01-05 ENCOUNTER — TELEPHONE (OUTPATIENT)
Dept: NEUROLOGY | Facility: CLINIC | Age: 67
End: 2022-01-05

## 2022-01-05 VITALS
HEIGHT: 77 IN | DIASTOLIC BLOOD PRESSURE: 84 MMHG | SYSTOLIC BLOOD PRESSURE: 142 MMHG | WEIGHT: 315 LBS | BODY MASS INDEX: 37.19 KG/M2

## 2022-01-05 DIAGNOSIS — Z12.12 SCREENING FOR COLORECTAL CANCER: ICD-10-CM

## 2022-01-05 DIAGNOSIS — K21.9 GASTRO-ESOPHAGEAL REFLUX DISEASE WITHOUT ESOPHAGITIS: ICD-10-CM

## 2022-01-05 DIAGNOSIS — Z12.11 SCREENING FOR COLORECTAL CANCER: ICD-10-CM

## 2022-01-05 DIAGNOSIS — Z86.010 HISTORY OF COLON POLYPS: ICD-10-CM

## 2022-01-05 PROCEDURE — 99204 OFFICE O/P NEW MOD 45 MIN: CPT | Performed by: INTERNAL MEDICINE

## 2022-01-05 RX ORDER — PREDNISONE 20 MG/1
TABLET ORAL
COMMUNITY
Start: 2022-01-01 | End: 2022-03-25

## 2022-01-05 NOTE — TELEPHONE ENCOUNTER
Called patient to schedule his 3-4 month follow up with Dr Carolyn Jewell after his virtual appointment on 1/4/2022  Patient on the road and will back to schedule

## 2022-01-05 NOTE — LETTER
January 5, 2022     Jennifer Helm DO  [address]  [address] [facility]    Patient: Dennis Jiménez   YOB: 1955   Date of Visit: 1/5/2022       Dear Dr Armin Fish: Thank you for referring Red Grijalva to me for evaluation  Below are my notes for this consultation  If you have questions, please do not hesitate to call me  I look forward to following your patient along with you  Sincerely,        Kalani Caldera MD        CC: No Recipients  Kalani Caldera MD  1/5/2022  5:29 PM  Sign when Signing Visit  Consultation - [facility]  Marlena Kerns 61 y o  male MRN: [id number]  Unit/Bed#:  Encounter: [id number]        Consults    ASSESSMENT/PLAN:     1  Colon cancer screening-average risk, prior colonoscopy with possibly 1 polyp, no family history of colon cancer  No change in bowel habits or hematochezia  - Patient was explained about  the risks and benefits of the procedure  Risks including but not limited to bleeding, infection, perforation were explained in detail  Also explained about less than 100% sensitivity with the exam and other alternatives  -may continue aspirin 81 mg on daily basis  -will schedule average risk screening colonoscopy at this time  2  GERD-chronic symptoms, has been taking Pepcid 20 mg b i d  On regular basis for many years  No recent EGD  I suspect symptoms are likely aggravated in setting of hiatal hernia or decreased LES pressure  Given chronicity of symptoms, would recommend EGD at the same time as colonoscopy to assess for Reyes's esophagus  Avoid the use of NSAIDs  Follow anti-reflux measures        Recommend healthy weight loss, BMI today of 39 25             3  Labs are reviewed and are notable for normal liver enzymes, creatinine 1 23, hemoglobin 14 7, normal platelets of 969    ______________________________________________________________________    Reason for Consult / Sowmya Brown Problem: [unfilled]    HPI: Tena Reynolds is a 61y o  year old male with history of obstructive sleep apnea, on CPAP, hypertension, GERD, hypertriglyceridemia, impaired fasting glucose, hyperlipidemia, presents for colon cancer screening evaluation  Patient reports chronic symptoms of acid reflux for which he has been taking Pepcid 20 mg twice daily  Denies any dysphagia, odynophagia, loss of appetite or early satiety  No hematemesis, coffee-ground emesis or melena  No recent EGD  He does report occasional use of NSAIDs  Patient does report history of colon polyps-reports having had colonoscopy many years ago  No family history of colon cancer  A change in bowel habits, hematochezia, abdominal pain or unintentional weight loss  He does take aspirin 81 mg on daily basis  Review of Systems: The remainder of the review of systems was negative except for the pertinent positives noted in HPI       Historical Information   Past Medical History:   Diagnosis Date    Closed fracture of right foot     As a child    Colon polyp     Drusen (degenerative) of macula, bilateral 09/27/2021    Gastro-esophageal reflux disease without esophagitis 05/30/2019    History of colon polyps     Hypertension 09/27/2021    IFG (impaired fasting glucose) 10/1/2021    Mixed hyperlipidemia 05/30/2019    Morbid obesity ([address] ) 09/27/2021    Myopia, bilateral 09/27/2021    JOVANY on CPAP     Peripheral neuropathy 09/27/2021    Pinguecula, bilateral 09/27/2021    Presbyopia 10/11/2016    Pure hypertriglyceridemia 08/19/2019    Radial fracture 1975    Stress Fracture - Right Arm    Regular astigmatism, bilateral 09/27/2021     Past Surgical History:   Procedure Laterality Date    COLONOSCOPY      FIBULA FRACTURE SURGERY Right 2009    Hardware - Plate and screws in place    SKIN GRAFT  2021    Right foot     TONSILECTOMY AND ADNOIDECTOMY      TONSILLECTOMY       Social History   Social History Substance and Sexual Activity   Alcohol Use Yes    Alcohol/week: 1 0 standard drink    Types: 1 Cans of beer per week     Social History     Substance and Sexual Activity   Drug Use Not Currently    Types: Marijuana    Comment: Last Marijuana Use ; Other unknown drugs while in [facility]     Social History     Tobacco Use   Smoking Status Former Smoker    Packs/day: 1 00    Years: 15 00    Pack years: 15 00    Types: Cigarettes    Start date: 1980   [facility] Quit date: 2005    Years since quittin 0   Smokeless Tobacco Never Used     Family History   Problem Relation Age of Onset    Cancer Mother 76        Type Unknown    COPD Father         Previous smoker    Neuropathy Father     Neuropathy Sister     Neuropathy Sister     Neuropathy Brother     Neuropathy Brother        Meds/Allergies     (Not in a hospital admission)    No current facility-administered medications for this visit  No Known Allergies    Objective     Blood pressure 142/84, height 6' 5" (1 956 m), weight (!) 150 kg (331 lb)  [unfilled]    PHYSICAL EXAM     GEN: well nourished, well developed, no acute distress  HEENT: anicteric, MMM  CV: RRR, no m/r/g  CHEST: CTA b/l, no WRR  ABD: +BS, soft, NT/ND, no hepatosplenomegaly  EXT: no c/c/e  SKIN: no rashes,  NEURO: aaox3    Lab Results:   No visits with results within 1 Day(s) from this visit     Latest known visit with results is:   Admission on 2021, Discharged on 2021   Component Date Value    WBC 2021 8 10     RBC 2021 4 88     Hemoglobin 2021 14 7     Hematocrit 2021 43 4     MCV 2021 89     MCH 2021 30 1     MCHC 2021 33 9     RDW 2021 13 1     MPV 2021 9 2     Platelets  191     nRBC 2021 0     Neutrophils Relative 2021 71     Immat GRANS % 2021 1     Lymphocytes Relative 2021 11*    Monocytes Relative 2021 15*    Eosinophils Relative 12/21/2021 1     Basophils Relative 12/21/2021 1     Neutrophils Absolute 12/21/2021 5 90     Immature Grans Absolute 12/21/2021 0 04     Lymphocytes Absolute 12/21/2021 0 85     Monocytes Absolute 12/21/2021 1 22     Eosinophils Absolute 12/21/2021 0 05     Basophils Absolute 12/21/2021 0 04     Sodium 12/21/2021 140     Potassium 12/21/2021 4 4     Chloride 12/21/2021 104     CO2 12/21/2021 26     ANION GAP 12/21/2021 10     BUN 12/21/2021 16     Creatinine 12/21/2021 1 23     Glucose 12/21/2021 101     Calcium 12/21/2021 8 7     AST 12/21/2021 19     ALT 12/21/2021 24     Alkaline Phosphatase 12/21/2021 69     Total Protein 12/21/2021 7 1     Albumin 12/21/2021 3 7     Total Bilirubin 12/21/2021 0 72     eGFR 12/21/2021 61     LACTIC ACID 12/21/2021 1 4     Procalcitonin 12/21/2021 0 05     Protime 12/21/2021 14 0     INR 12/21/2021 1 13     PTT 12/21/2021 32     Blood Culture 12/21/2021 No Growth After 5 Days   Blood Culture 12/21/2021 No Growth After 5 Days       Magnesium 12/21/2021 2 1     hs TnI 0hr 12/21/2021 3     Ventricular Rate 12/21/2021 80     Atrial Rate 12/21/2021 80     LA Interval 12/21/2021 152     QRSD Interval 12/21/2021 102     QT Interval 12/21/2021 384     QTC Interval 12/21/2021 442     P Axis 12/21/2021 55     QRS Axis 12/21/2021 57     T Wave Axis 12/21/2021 53      Imaging Studies: I have personally reviewed pertinent films in PACS

## 2022-01-05 NOTE — PATIENT INSTRUCTIONS
Scheduled date of EGD/colonoscopy (as of today):4/14/2022  Physician performing EGD/colonoscopy:DR TOM  Location of EGD[facility]colonoscopy[facility]  Desired bowel prep reviewed with patient:MIRALAX/MAG CITRATE PER DR SHAHID  Instructions reviewed with patient by:DANIEL ROOT  Clearances:

## 2022-01-05 NOTE — PROGRESS NOTES
Consultation -  [facility]  Zion Kerns 61 y o  male MRN: [id number]  Unit/Bed#:  Encounter: [id number]        Consults    ASSESSMENT/PLAN:     1  Colon cancer screening-average risk, prior colonoscopy with possibly 1 polyp, no family history of colon cancer  No change in bowel habits or hematochezia  - Patient was explained about  the risks and benefits of the procedure  Risks including but not limited to bleeding, infection, perforation were explained in detail  Also explained about less than 100% sensitivity with the exam and other alternatives  -may continue aspirin 81 mg on daily basis  -will schedule average risk screening colonoscopy at this time  2  GERD-chronic symptoms, has been taking Pepcid 20 mg b i d  On regular basis for many years  No recent EGD  I suspect symptoms are likely aggravated in setting of hiatal hernia or decreased LES pressure  Given chronicity of symptoms, would recommend EGD at the same time as colonoscopy to assess for Reyes's esophagus  Avoid the use of NSAIDs  Follow anti-reflux measures  Recommend healthy weight loss, BMI today of 39 25             3  Labs are reviewed and are notable for normal liver enzymes, creatinine 1 23, hemoglobin 14 7, normal platelets of 591    ______________________________________________________________________    Reason for Consult / Principal Problem: [unfilled]    HPI: Rufino Guerrier is a 61y o  year old male with history of obstructive sleep apnea, on CPAP, hypertension, GERD, hypertriglyceridemia, impaired fasting glucose, hyperlipidemia, presents for colon cancer screening evaluation  Patient reports chronic symptoms of acid reflux for which he has been taking Pepcid 20 mg twice daily  Denies any dysphagia, odynophagia, loss of appetite or early satiety  No hematemesis, coffee-ground emesis or melena  No recent EGD  He does report occasional use of NSAIDs        Patient does report history of colon polyps-reports having had colonoscopy many years ago  No family history of colon cancer  A change in bowel habits, hematochezia, abdominal pain or unintentional weight loss  He does take aspirin 81 mg on daily basis  Review of Systems: The remainder of the review of systems was negative except for the pertinent positives noted in HPI  Historical Information   Past Medical History:   Diagnosis Date    Closed fracture of right foot     As a child    Colon polyp     Drusen (degenerative) of macula, bilateral 2021    Gastro-esophageal reflux disease without esophagitis 2019    History of colon polyps     Hypertension 2021    IFG (impaired fasting glucose) 10/1/2021    Mixed hyperlipidemia 2019    Morbid obesity ([address] ) 2021    Myopia, bilateral 2021    JOVANY on CPAP     Peripheral neuropathy 2021    Pinguecula, bilateral 2021    Presbyopia 10/11/2016    Pure hypertriglyceridemia 2019    Radial fracture 1975    Stress Fracture - Right Arm    Regular astigmatism, bilateral 2021     Past Surgical History:   Procedure Laterality Date    COLONOSCOPY      FIBULA FRACTURE SURGERY Right 2009    Hardware - Plate and screws in place    SKIN GRAFT      Right foot     TONSILECTOMY AND ADNOIDECTOMY      TONSILLECTOMY       Social History   Social History     Substance and Sexual Activity   Alcohol Use Yes    Alcohol/week: 1 0 standard drink    Types: 1 Cans of beer per week     Social History     Substance and Sexual Activity   Drug Use Not Currently    Types: Marijuana    Comment: Last Marijuana Use ;  Other unknown drugs while in [facility]     Social History     Tobacco Use   Smoking Status Former Smoker    Packs/day: 1 00    Years: 15 00    Pack years: 15 00    Types: Cigarettes    Start date: 1980   Mavis Cousin Quit date: 2005    Years since quittin 0   Smokeless Tobacco Never Used     Family History Problem Relation Age of Onset    Cancer Mother 76        Type Unknown    COPD Father         Previous smoker    Neuropathy Father     Neuropathy Sister     Neuropathy Sister     Neuropathy Brother     Neuropathy Brother        Meds/Allergies     (Not in a hospital admission)    No current facility-administered medications for this visit  No Known Allergies    Objective     Blood pressure 142/84, height 6' 5" (1 956 m), weight (!) 150 kg (331 lb)  [unfilled]    PHYSICAL EXAM     GEN: well nourished, well developed, no acute distress  HEENT: anicteric, MMM  CV: RRR, no m/r/g  CHEST: CTA b/l, no WRR  ABD: +BS, soft, NT/ND, no hepatosplenomegaly  EXT: no c/c/e  SKIN: no rashes,  NEURO: aaox3    Lab Results:   No visits with results within 1 Day(s) from this visit     Latest known visit with results is:   Admission on 12/21/2021, Discharged on 12/21/2021   Component Date Value    WBC 12/21/2021 8 10     RBC 12/21/2021 4 88     Hemoglobin 12/21/2021 14 7     Hematocrit 12/21/2021 43 4     MCV 12/21/2021 89     MCH 12/21/2021 30 1     MCHC 12/21/2021 33 9     RDW 12/21/2021 13 1     MPV 12/21/2021 9 2     Platelets 76/78/9035 191     nRBC 12/21/2021 0     Neutrophils Relative 12/21/2021 71     Immat GRANS % 12/21/2021 1     Lymphocytes Relative 12/21/2021 11*    Monocytes Relative 12/21/2021 15*    Eosinophils Relative 12/21/2021 1     Basophils Relative 12/21/2021 1     Neutrophils Absolute 12/21/2021 5 90     Immature Grans Absolute 12/21/2021 0 04     Lymphocytes Absolute 12/21/2021 0 85     Monocytes Absolute 12/21/2021 1 22     Eosinophils Absolute 12/21/2021 0 05     Basophils Absolute 12/21/2021 0 04     Sodium 12/21/2021 140     Potassium 12/21/2021 4 4     Chloride 12/21/2021 104     CO2 12/21/2021 26     ANION GAP 12/21/2021 10     BUN 12/21/2021 16     Creatinine 12/21/2021 1 23     Glucose 12/21/2021 101     Calcium 12/21/2021 8 7     AST 12/21/2021 19     ALT 12/21/2021 24     Alkaline Phosphatase 12/21/2021 69     Total Protein 12/21/2021 7 1     Albumin 12/21/2021 3 7     Total Bilirubin 12/21/2021 0 72     eGFR 12/21/2021 61     LACTIC ACID 12/21/2021 1 4     Procalcitonin 12/21/2021 0 05     Protime 12/21/2021 14 0     INR 12/21/2021 1 13     PTT 12/21/2021 32     Blood Culture 12/21/2021 No Growth After 5 Days   Blood Culture 12/21/2021 No Growth After 5 Days       Magnesium 12/21/2021 2 1     hs TnI 0hr 12/21/2021 3     Ventricular Rate 12/21/2021 80     Atrial Rate 12/21/2021 80     ID Interval 12/21/2021 152     QRSD Interval 12/21/2021 102     QT Interval 12/21/2021 384     QTC Interval 12/21/2021 442     P Axis 12/21/2021 55     QRS Axis 12/21/2021 57     T Wave Axis 12/21/2021 53      Imaging Studies: I have personally reviewed pertinent films in PACS

## 2022-01-26 ENCOUNTER — IMMUNIZATIONS (OUTPATIENT)
Dept: FAMILY MEDICINE CLINIC | Facility: HOSPITAL | Age: 67
End: 2022-01-26

## 2022-01-26 DIAGNOSIS — Z23 ENCOUNTER FOR IMMUNIZATION: Primary | ICD-10-CM

## 2022-01-26 PROCEDURE — 0064A COVID-19 MODERNA VACC 0.25 ML BOOSTER: CPT

## 2022-01-26 PROCEDURE — 91306 COVID-19 MODERNA VACC 0.25 ML BOOSTER: CPT

## 2022-02-09 ENCOUNTER — OFFICE VISIT (OUTPATIENT)
Dept: PODIATRY | Facility: CLINIC | Age: 67
End: 2022-02-09
Payer: OTHER GOVERNMENT

## 2022-02-09 VITALS
HEART RATE: 69 BPM | WEIGHT: 315 LBS | DIASTOLIC BLOOD PRESSURE: 74 MMHG | SYSTOLIC BLOOD PRESSURE: 116 MMHG | BODY MASS INDEX: 39.13 KG/M2

## 2022-02-09 DIAGNOSIS — E66.01 MORBID OBESITY (HCC): ICD-10-CM

## 2022-02-09 DIAGNOSIS — G60.9 HEREDITARY PERIPHERAL NEUROPATHY: ICD-10-CM

## 2022-02-09 DIAGNOSIS — L03.115 CELLULITIS OF FOOT, RIGHT: Primary | ICD-10-CM

## 2022-02-09 PROCEDURE — 99243 OFF/OP CNSLTJ NEW/EST LOW 30: CPT | Performed by: PODIATRIST

## 2022-02-09 RX ORDER — CEPHALEXIN 500 MG/1
500 CAPSULE ORAL EVERY 6 HOURS SCHEDULED
Qty: 28 CAPSULE | Refills: 0 | Status: SHIPPED | OUTPATIENT
Start: 2022-02-09 | End: 2022-02-16
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
## 2022-02-10 ENCOUNTER — TELEPHONE (OUTPATIENT)
Dept: PODIATRY | Facility: CLINIC | Age: 67
End: 2022-02-10

## 2022-02-15 ENCOUNTER — TELEPHONE (OUTPATIENT)
Dept: FAMILY MEDICINE CLINIC | Facility: CLINIC | Age: 67
End: 2022-02-15

## 2022-02-15 ENCOUNTER — TELEPHONE (OUTPATIENT)
Dept: PODIATRY | Facility: CLINIC | Age: 67
End: 2022-02-15

## 2022-02-15 NOTE — TELEPHONE ENCOUNTER
[id number] called to obtain clinicals for patient to support order for diabetic shoes      Clinicals can be faxed over to [phone]

## 2022-02-15 NOTE — TELEPHONE ENCOUNTER
Notes faxed  Pt is not diabetic, he is pre diabetic  Has neuropathy, highlighted section as he can still qualify for shoes for this reason per PCP

## 2022-02-15 NOTE — TELEPHONE ENCOUNTER
Allan Chavez is scheduled at [facility] today  He asked if I could fax over the diabetic shoes and inserts orders to them along with the treatment notes from 2/9/22  I faxed this to [phone]

## 2022-02-16 ENCOUNTER — TELEPHONE (OUTPATIENT)
Dept: GASTROENTEROLOGY | Facility: AMBULARY SURGERY CENTER | Age: 67
End: 2022-02-16

## 2022-02-16 ENCOUNTER — TELEPHONE (OUTPATIENT)
Dept: OTHER | Facility: OTHER | Age: 67
End: 2022-02-16

## 2022-02-17 ENCOUNTER — PATIENT MESSAGE (OUTPATIENT)
Dept: FAMILY MEDICINE CLINIC | Facility: CLINIC | Age: 67
End: 2022-02-17

## 2022-02-23 ENCOUNTER — OFFICE VISIT (OUTPATIENT)
Dept: PODIATRY | Facility: CLINIC | Age: 67
End: 2022-02-23
Payer: OTHER GOVERNMENT

## 2022-02-23 VITALS
BODY MASS INDEX: 37.19 KG/M2 | HEIGHT: 77 IN | SYSTOLIC BLOOD PRESSURE: 121 MMHG | DIASTOLIC BLOOD PRESSURE: 79 MMHG | WEIGHT: 315 LBS | HEART RATE: 89 BPM

## 2022-02-23 DIAGNOSIS — L97.522 ULCER OF LEFT FOOT, WITH FAT LAYER EXPOSED (HCC): Primary | ICD-10-CM

## 2022-02-23 PROCEDURE — 99213 OFFICE O/P EST LOW 20 MIN: CPT | Performed by: PODIATRIST

## 2022-02-23 NOTE — PROGRESS NOTES
This patient was seen on 2/23/2022    My role is Foot , Ankle, and Wound Specialist    SUBJECTIVE    Chief Complaint:  Foot ulcers Left     Patient ID: Cinthya Delatorre is a 61 y o  male  Ksenia Mascorro is here for management of his Left foot wounds  He's been keeping them covered  He is walking / working in a regular pair of sneakers  The following portions of the patient's history were reviewed and updated as appropriate: allergies, current medications, past family history, past medical history, past social history, past surgical history and problem list     Review of Systems   Constitutional: Negative  Respiratory: Negative  Skin: Positive for wound  Neurological: Positive for numbness  OBJECTIVE      /79   Pulse 89   Ht 6' 5" (1 956 m) Comment: verbal  Wt (!) 149 kg (329 lb 6 4 oz)   BMI 39 06 kg/m²     Foot/Ankle Musculoskeletal Exam    General      Neurological: alert    Neurovascular      Neurovascular - Right        Dorsalis pedis: 2+      Posterior tibial: 2+      Neurovascular - Left        Dorsalis pedis: 2+      Posterior tibial: 2+       Physical Exam  Vitals and nursing note reviewed  Constitutional:       General: He is not in acute distress  Appearance: He is obese  He is not ill-appearing, toxic-appearing or diaphoretic  Cardiovascular:      Pulses:           Dorsalis pedis pulses are 2+ on the right side and 2+ on the left side  Posterior tibial pulses are 2+ on the right side and 2+ on the left side  Pulmonary:      Effort: Pulmonary effort is normal       Breath sounds: Normal breath sounds  Feet:      Right foot:      Protective Sensation: 10 sites tested  0 sites sensed  Left foot:      Protective Sensation: 0 sites sensed  Neurological:      Mental Status: He is alert           Wound # 1  Location: left plantar foot  Length 3cmWidth 3 5cm: Depth 0 2cm:   Deepest Tissue Noted in Base: SQ  Probe to Bone?: no  Peripheral Skin Description: intact  Granulation:90 % Fibrotic Tissue: 10% Necrotic Tissue: 0%  Drainage Amount: minimal  Signs of Infection: no  Total debrided 0 square centimeters        Wound # 2  Location: left medial heel  Length 0 5cm: Width 0 5cm: Depth 0 2cm:   Deepest Tissue Noted in Base: SQ  Probe to Bone?: no  Peripheral Skin Description: intact  Granulation: 90% Fibrotic Tissue: 10% Necrotic Tissue: 0%  Drainage Amount: minimal  Signs of Infection: no  Total debrided 0square centimeters        Wound # 3  Location: right 2nd toe  Length 0 1cm: Width 0 1cm: Depth 0 1cm:   Deepest Tissue Noted in Base: dermis  Probe to Bone?: no  Peripheral Skin Description: intact  Granulation: 90% Fibrotic Tissue: 80% Necrotic Tissue: 0%  Drainage Amount: minimal  Signs of Infection: no  Total debrided 0square centimeters         ASSESSMENT     There are no diagnoses linked to this encounter  Problem List Items Addressed This Visit     None              PLAN  Triple abx ointment applied to open areas, covered with gauze and secured with tape  Pt to change dressing daily and return in 2 weeks       Wound dressing technique and frequency described to patient  I am to be called if any signs of infection develop such as erythema, increased wound size or significant change in appearance of wound, odor, pain, increased or change in color of drainage, swelling, fever, chills, nightsweats  I reviewed these signs with the patient  I recommended limiting WB to bathroom priveleges and meal table and to use any prescribed offloading devices in an effort to allow proper rest and healing of the wounded area  I explained that good wound care and compliance are necessary to allow healing and to prevent toe loss or limb loss  Wedge shoe dispensed for pt to wear with each step

## 2022-02-24 ENCOUNTER — PATIENT MESSAGE (OUTPATIENT)
Dept: FAMILY MEDICINE CLINIC | Facility: CLINIC | Age: 67
End: 2022-02-24

## 2022-02-25 NOTE — TELEPHONE ENCOUNTER
Placed call to pt, he will call back with date of appointments and NPI number for all four referrals

## 2022-03-02 ENCOUNTER — TELEPHONE (OUTPATIENT)
Dept: NEUROLOGY | Facility: CLINIC | Age: 67
End: 2022-03-02

## 2022-03-02 ENCOUNTER — TELEPHONE (OUTPATIENT)
Dept: OTHER | Facility: OTHER | Age: 67
End: 2022-03-02

## 2022-03-02 ENCOUNTER — TELEPHONE (OUTPATIENT)
Dept: GASTROENTEROLOGY | Facility: AMBULARY SURGERY CENTER | Age: 67
End: 2022-03-02

## 2022-03-02 NOTE — TELEPHONE ENCOUNTER
Patient called to get NPI number of physician for last office visit (1/4)as he said the PCP office is working on referral for that visit for him  I offered to call his PCP office to give them the info needed  Dr Jero Gonzalez office [phone]    I called PCP office, spoke to [facility] and gave NPI # for Dr Sally Bowers, office visit date of 1/4 and diagnosis code g60 [id number]  [facility] said usually can only backdate referrals 30 days but she will try to submit

## 2022-03-02 NOTE — TELEPHONE ENCOUNTER
Patient called back in following up on NPI  for his PCP  Phone number for PCP office is [phone]

## 2022-03-02 NOTE — TELEPHONE ENCOUNTER
Pilar from PCP office called back - their system does not have Dr Armin Watters in their system  I informed her that Dr Reese Ill cosigned and provided his NPI to her   She is appreciative and call us back with any further issues, etc

## 2022-03-02 NOTE — TELEPHONE ENCOUNTER
Patient called the office back   Podiatry NPI [id number]   First appt 02/09/2022    Sleep Doctor   NPI [id number]  First appt 05/23/2022    Neurology will call us Dr Win Martell     Patient states that he is having a hard time getting the Gastrology he will keep trying

## 2022-03-18 ENCOUNTER — LAB (OUTPATIENT)
Dept: LAB | Facility: CLINIC | Age: 67
End: 2022-03-18
Payer: OTHER GOVERNMENT

## 2022-03-18 DIAGNOSIS — I10 PRIMARY HYPERTENSION: ICD-10-CM

## 2022-03-18 DIAGNOSIS — E78.1 PURE HYPERTRIGLYCERIDEMIA: ICD-10-CM

## 2022-03-18 DIAGNOSIS — E78.2 MIXED HYPERLIPIDEMIA: ICD-10-CM

## 2022-03-18 DIAGNOSIS — E66.01 MORBID OBESITY (HCC): ICD-10-CM

## 2022-03-18 DIAGNOSIS — R73.01 IFG (IMPAIRED FASTING GLUCOSE): ICD-10-CM

## 2022-03-18 LAB
ALBUMIN SERPL BCP-MCNC: 3.9 G/DL (ref 3.5–5)
ALP SERPL-CCNC: 66 U/L (ref 46–116)
ALT SERPL W P-5'-P-CCNC: 25 U/L (ref 12–78)
ANION GAP SERPL CALCULATED.3IONS-SCNC: 11 MMOL/L (ref 4–13)
AST SERPL W P-5'-P-CCNC: 15 U/L (ref 5–45)
BILIRUB SERPL-MCNC: 0.91 MG/DL (ref 0.2–1)
BUN SERPL-MCNC: 18 MG/DL (ref 5–25)
CALCIUM SERPL-MCNC: 8.9 MG/DL (ref 8.3–10.1)
CHLORIDE SERPL-SCNC: 104 MMOL/L (ref 100–108)
CHOLEST SERPL-MCNC: 141 MG/DL
CO2 SERPL-SCNC: 25 MMOL/L (ref 21–32)
CREAT SERPL-MCNC: 1.08 MG/DL (ref 0.6–1.3)
EST. AVERAGE GLUCOSE BLD GHB EST-MCNC: 105 MG/DL
GFR SERPL CREATININE-BSD FRML MDRD: 72 ML/MIN/1.73SQ M
GLUCOSE P FAST SERPL-MCNC: 75 MG/DL (ref 65–99)
HBA1C MFR BLD: 5.3 %
HDLC SERPL-MCNC: 44 MG/DL
LDLC SERPL CALC-MCNC: 74 MG/DL (ref 0–100)
LDLC SERPL DIRECT ASSAY-MCNC: 79 MG/DL (ref 0–100)
NONHDLC SERPL-MCNC: 97 MG/DL
POTASSIUM SERPL-SCNC: 4 MMOL/L (ref 3.5–5.3)
PROT SERPL-MCNC: 7.5 G/DL (ref 6.4–8.2)
SODIUM SERPL-SCNC: 140 MMOL/L (ref 136–145)
TRIGL SERPL-MCNC: 114 MG/DL
TSH SERPL DL<=0.05 MIU/L-ACNC: 1.05 UIU/ML (ref 0.36–3.74)

## 2022-03-18 PROCEDURE — 80061 LIPID PANEL: CPT

## 2022-03-18 PROCEDURE — 36415 COLL VENOUS BLD VENIPUNCTURE: CPT

## 2022-03-18 PROCEDURE — 83036 HEMOGLOBIN GLYCOSYLATED A1C: CPT

## 2022-03-18 PROCEDURE — 83721 ASSAY OF BLOOD LIPOPROTEIN: CPT

## 2022-03-18 PROCEDURE — 84443 ASSAY THYROID STIM HORMONE: CPT

## 2022-03-18 PROCEDURE — 80053 COMPREHEN METABOLIC PANEL: CPT

## 2022-03-23 ENCOUNTER — PROCEDURE VISIT (OUTPATIENT)
Dept: PODIATRY | Facility: CLINIC | Age: 67
End: 2022-03-23
Payer: OTHER GOVERNMENT

## 2022-03-23 VITALS
HEART RATE: 78 BPM | SYSTOLIC BLOOD PRESSURE: 130 MMHG | DIASTOLIC BLOOD PRESSURE: 84 MMHG | BODY MASS INDEX: 38.9 KG/M2 | WEIGHT: 315 LBS

## 2022-03-23 DIAGNOSIS — R73.01 IFG (IMPAIRED FASTING GLUCOSE): Primary | ICD-10-CM

## 2022-03-23 DIAGNOSIS — L97.522 ULCER OF LEFT FOOT, WITH FAT LAYER EXPOSED (HCC): ICD-10-CM

## 2022-03-23 DIAGNOSIS — G60.9 HEREDITARY PERIPHERAL NEUROPATHY: ICD-10-CM

## 2022-03-23 PROCEDURE — 99213 OFFICE O/P EST LOW 20 MIN: CPT | Performed by: PODIATRIST

## 2022-03-23 PROCEDURE — 11042 DBRDMT SUBQ TIS 1ST 20SQCM/<: CPT | Performed by: PODIATRIST

## 2022-03-23 RX ORDER — ATENOLOL 25 MG/1
TABLET ORAL
COMMUNITY
Start: 2022-02-23 | End: 2022-03-25

## 2022-03-23 NOTE — PROGRESS NOTES
This patient was seen on 3/23/2022  My role is Foot , Ankle, and Wound Specialist    SUBJECTIVE    Chief Complaint:  Wound     Patient ID: Ronel Lindsey is a 61 y o  male  Pt arrives for wound care follow up  Presents with dressing intact and denies problems  Pt changing the dressing as ordered  Pt states he is unable to use the wedge shoe due to back pain  Pt reports the right toe is healed  The following portions of the patient's history were reviewed and updated as appropriate: allergies, current medications, past family history, past medical history, past social history, past surgical history and problem list     Review of Systems   Constitutional: Negative  Respiratory: Negative  Skin: Positive for wound  Neurological: Positive for numbness  OBJECTIVE      /84   Pulse 78   Wt (!) 149 kg (328 lb)   BMI 38 90 kg/m²     Foot/Ankle Musculoskeletal Exam    General      Neurological: alert    Neurovascular      Neurovascular - Right        Dorsalis pedis: 2+      Posterior tibial: 2+      Neurovascular - Left        Dorsalis pedis: 2+      Posterior tibial: 2+       Physical Exam  Vitals and nursing note reviewed  Constitutional:       General: He is not in acute distress  Appearance: He is obese  He is not ill-appearing, toxic-appearing or diaphoretic  Cardiovascular:      Pulses:           Dorsalis pedis pulses are 2+ on the right side and 2+ on the left side  Posterior tibial pulses are 2+ on the right side and 2+ on the left side  Pulmonary:      Effort: Pulmonary effort is normal       Breath sounds: Normal breath sounds  Feet:      Right foot:      Protective Sensation: 10 sites tested  0 sites sensed  Left foot:      Protective Sensation: 0 sites sensed  Neurological:      Mental Status: He is alert           Wound # 1  Location: left plantar foot  Length 2cmWidth 3cm: Depth 0 2cm:   Deepest Tissue Noted in Base: SQ  Probe to Bone?: no  Peripheral Skin Description: intact  Granulation:90 % Fibrotic Tissue: 10% Necrotic Tissue: 0%  Drainage Amount: minimal  Signs of Infection: no  Total debrided 6 square centimeters        Wound # 2  Location: left medial heel HEALED  Length 0m: Width 0cm: Depth 0cm:   Deepest Tissue Noted in Base:   Probe to Bone?: no  Peripheral Skin Description: intact  Granulation: 90% Fibrotic Tissue: 10% Necrotic Tissue: 0%  Drainage Amount: minimal  Signs of Infection: no  Total debrided 0square centimeters        Wound # 3  Location: right 2nd toe Pt reports healed  Length 0cm: Width 0cm: Depth 0cm:   Deepest Tissue Noted in Base:   Probe to Bone?: no  Peripheral Skin Description: intact  Granulation: 0% Fibrotic Tissue: 0% Necrotic Tissue: 0%  Drainage Amount: none  Signs of Infection: no  Total debrided 0square centimeters    ASSESSMENT     Diagnoses and all orders for this visit:    IFG (impaired fasting glucose)    Hereditary peripheral neuropathy    Ulcer of left foot, with fat layer exposed ([address] )    Other orders  -     Discontinue: atenolol (TENORMIN) 25 mg tablet;           Problem List Items Addressed This Visit        Endocrine    IFG (impaired fasting glucose) - Primary       Nervous and Auditory    Peripheral neuropathy       Musculoskeletal and Integument    Ulcer of left foot, with fat layer exposed ([address] )              PLAN    DEBRIDEMENT NOTE    A formal timeout including patient identification, laterality and existing allergies using [facility] was conducted  Procedure Performed: Debridement of subcutaneous tissue- >20 sq cm (13696)  Under aseptic technique, the wound was thoroughly explored and bluntly probed for undermining, deep sinus tracts and bone involvement  Sterile instrumentation including scalpel, forceps and curette used to excise the clearly delineated devitalized subcutaneous tissue (fibrotic tissue and necrotic non-viable portions of fat) exposing viable tissue   After debridement, bleeding in the wound bed base was noted indicated viable subcutaneous tissue had been exposed  Bleeding controlled with gentle pressure  Patient tolerated debridement without complications  The wound was dressed  Wound dressing technique and frequency described to patient  I am to be called if any signs of infection develop such as erythema, increased wound size or significant change in appearance of wound, odor, pain, increased or change in color of drainage, swelling, fever, chills, nightsweats  I reviewed these signs with the patient  I recommended limiting WB to bathroom priveleges and meal table and to use any prescribed offloading devices in an effort to allow proper rest and healing of the wounded area  I explained that good wound care and compliance are necessary to allow healing and to prevent toe loss or limb loss  Triple abx ointment applied, dsd using gauze and olga  To be changed daily  Return in 2 weeks
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
## 2022-03-25 ENCOUNTER — OFFICE VISIT (OUTPATIENT)
Dept: FAMILY MEDICINE CLINIC | Facility: CLINIC | Age: 67
End: 2022-03-25
Payer: OTHER GOVERNMENT

## 2022-03-25 ENCOUNTER — TELEPHONE (OUTPATIENT)
Dept: FAMILY MEDICINE CLINIC | Facility: CLINIC | Age: 67
End: 2022-03-25

## 2022-03-25 VITALS
OXYGEN SATURATION: 96 % | HEART RATE: 77 BPM | SYSTOLIC BLOOD PRESSURE: 136 MMHG | WEIGHT: 315 LBS | HEIGHT: 77 IN | DIASTOLIC BLOOD PRESSURE: 76 MMHG | RESPIRATION RATE: 18 BRPM | BODY MASS INDEX: 37.19 KG/M2 | TEMPERATURE: 98.1 F

## 2022-03-25 DIAGNOSIS — G47.33 OSA ON CPAP: ICD-10-CM

## 2022-03-25 DIAGNOSIS — E78.1 PURE HYPERTRIGLYCERIDEMIA: ICD-10-CM

## 2022-03-25 DIAGNOSIS — E78.2 MIXED HYPERLIPIDEMIA: ICD-10-CM

## 2022-03-25 DIAGNOSIS — Z99.89 OSA ON CPAP: ICD-10-CM

## 2022-03-25 DIAGNOSIS — K21.9 GASTRO-ESOPHAGEAL REFLUX DISEASE WITHOUT ESOPHAGITIS: ICD-10-CM

## 2022-03-25 DIAGNOSIS — Z86.010 HISTORY OF COLON POLYPS: ICD-10-CM

## 2022-03-25 DIAGNOSIS — I10 PRIMARY HYPERTENSION: ICD-10-CM

## 2022-03-25 DIAGNOSIS — L97.522 ULCER OF LEFT FOOT, WITH FAT LAYER EXPOSED (HCC): ICD-10-CM

## 2022-03-25 DIAGNOSIS — Z12.5 PROSTATE CANCER SCREENING: ICD-10-CM

## 2022-03-25 DIAGNOSIS — R73.01 IFG (IMPAIRED FASTING GLUCOSE): Primary | ICD-10-CM

## 2022-03-25 DIAGNOSIS — G60.9 HEREDITARY PERIPHERAL NEUROPATHY: ICD-10-CM

## 2022-03-25 DIAGNOSIS — E66.01 CLASS 2 SEVERE OBESITY WITH SERIOUS COMORBIDITY AND BODY MASS INDEX (BMI) OF 38.0 TO 38.9 IN ADULT, UNSPECIFIED OBESITY TYPE (HCC): ICD-10-CM

## 2022-03-25 PROCEDURE — 99214 OFFICE O/P EST MOD 30 MIN: CPT | Performed by: FAMILY MEDICINE

## 2022-03-25 RX ORDER — METFORMIN HYDROCHLORIDE 500 MG/1
1500 TABLET, EXTENDED RELEASE ORAL
Qty: 90 TABLET | Refills: 1 | Status: SHIPPED | OUTPATIENT
Start: 2022-03-25 | End: 2022-05-19 | Stop reason: SDUPTHER

## 2022-03-25 NOTE — ASSESSMENT & PLAN NOTE
Stable  Start and Increase Metformin XR 500mg every 3 days stomach tolerates:  1 pill in AM; 2 pills in AM; 3 pills in AM   Patient declined Nakia  Recommend lifestyle modifications

## 2022-03-25 NOTE — ASSESSMENT & PLAN NOTE
Borderline BP today  Continue increased Losartan 50mg daily  Check blood pressure outside of office  Recommend lifestyle modifications

## 2022-03-25 NOTE — PATIENT INSTRUCTIONS
Increase Metformin XR 500mg every 3 days stomach tolerates:  1 pill in AM; 2 pills in AM; 3 pills in AM

## 2022-04-05 ENCOUNTER — TELEPHONE (OUTPATIENT)
Dept: OTHER | Facility: OTHER | Age: 67
End: 2022-04-05

## 2022-04-05 NOTE — TELEPHONE ENCOUNTER
Patient called and canceled his appointment because conflict of appointment  Patient stated he does not want to reschedule his appointment because he has an appointment with  office in 2 week

## 2022-04-14 ENCOUNTER — ANESTHESIA EVENT (OUTPATIENT)
Dept: GASTROENTEROLOGY | Facility: HOSPITAL | Age: 67
End: 2022-04-14

## 2022-04-14 ENCOUNTER — HOSPITAL ENCOUNTER (OUTPATIENT)
Dept: GASTROENTEROLOGY | Facility: HOSPITAL | Age: 67
Setting detail: OUTPATIENT SURGERY
Discharge: HOME | End: 2022-04-14
Attending: INTERNAL MEDICINE | Admitting: INTERNAL MEDICINE
Payer: OTHER GOVERNMENT

## 2022-04-14 ENCOUNTER — ANESTHESIA (OUTPATIENT)
Dept: GASTROENTEROLOGY | Facility: HOSPITAL | Age: 67
End: 2022-04-14

## 2022-04-14 VITALS
OXYGEN SATURATION: 96 % | TEMPERATURE: 97 F | RESPIRATION RATE: 16 BRPM | DIASTOLIC BLOOD PRESSURE: 85 MMHG | SYSTOLIC BLOOD PRESSURE: 137 MMHG | HEART RATE: 89 BPM

## 2022-04-14 DIAGNOSIS — Z12.12 SCREENING FOR COLORECTAL CANCER: ICD-10-CM

## 2022-04-14 DIAGNOSIS — Z12.11 SCREENING FOR COLORECTAL CANCER: ICD-10-CM

## 2022-04-14 DIAGNOSIS — K21.9 GASTRO-ESOPHAGEAL REFLUX DISEASE WITHOUT ESOPHAGITIS: ICD-10-CM

## 2022-04-14 DIAGNOSIS — Z86.010 HISTORY OF COLON POLYPS: ICD-10-CM

## 2022-04-14 PROCEDURE — 43239 EGD BIOPSY SINGLE/MULTIPLE: CPT | Performed by: INTERNAL MEDICINE

## 2022-04-14 PROCEDURE — 88305 TISSUE EXAM BY PATHOLOGIST: CPT | Performed by: PATHOLOGY

## 2022-04-14 PROCEDURE — 45380 COLONOSCOPY AND BIOPSY: CPT | Performed by: INTERNAL MEDICINE

## 2022-04-14 RX ORDER — SODIUM CHLORIDE, SODIUM LACTATE, POTASSIUM CHLORIDE, CALCIUM CHLORIDE 600; 310; 30; 20 MG/100ML; MG/100ML; MG/100ML; MG/100ML
100 INJECTION, SOLUTION INTRAVENOUS CONTINUOUS
Status: CANCELLED | OUTPATIENT
Start: 2022-04-14

## 2022-04-14 RX ORDER — SODIUM CHLORIDE 9 MG/ML
INJECTION, SOLUTION INTRAVENOUS CONTINUOUS PRN
Status: DISCONTINUED | OUTPATIENT
Start: 2022-04-14 | End: 2022-04-14

## 2022-04-14 RX ORDER — SODIUM CHLORIDE, SODIUM LACTATE, POTASSIUM CHLORIDE, CALCIUM CHLORIDE 600; 310; 30; 20 MG/100ML; MG/100ML; MG/100ML; MG/100ML
INJECTION, SOLUTION INTRAVENOUS CONTINUOUS PRN
Status: DISCONTINUED | OUTPATIENT
Start: 2022-04-14 | End: 2022-04-14

## 2022-04-14 RX ORDER — LIDOCAINE HYDROCHLORIDE 10 MG/ML
INJECTION, SOLUTION EPIDURAL; INFILTRATION; INTRACAUDAL; PERINEURAL AS NEEDED
Status: DISCONTINUED | OUTPATIENT
Start: 2022-04-14 | End: 2022-04-14

## 2022-04-14 RX ORDER — PROPOFOL 10 MG/ML
INJECTION, EMULSION INTRAVENOUS AS NEEDED
Status: DISCONTINUED | OUTPATIENT
Start: 2022-04-14 | End: 2022-04-14

## 2022-04-14 RX ORDER — PROPOFOL 10 MG/ML
INJECTION, EMULSION INTRAVENOUS CONTINUOUS PRN
Status: DISCONTINUED | OUTPATIENT
Start: 2022-04-14 | End: 2022-04-14

## 2022-04-14 RX ADMIN — SODIUM CHLORIDE, SODIUM LACTATE, POTASSIUM CHLORIDE, AND CALCIUM CHLORIDE: .6; .31; .03; .02 INJECTION, SOLUTION INTRAVENOUS at 10:40

## 2022-04-14 RX ADMIN — LIDOCAINE HYDROCHLORIDE 50 MG: 10 INJECTION, SOLUTION EPIDURAL; INFILTRATION; INTRACAUDAL at 10:53

## 2022-04-14 RX ADMIN — PROPOFOL 100 MG: 10 INJECTION, EMULSION INTRAVENOUS at 10:53

## 2022-04-14 RX ADMIN — PROPOFOL 140 MCG/KG/MIN: 10 INJECTION, EMULSION INTRAVENOUS at 10:55

## 2022-04-14 RX ADMIN — Medication 40 MG: at 11:14

## 2022-04-14 NOTE — DISCHARGE INSTRUCTIONS
Esophagitis   WHAT YOU NEED TO KNOW:   Esophagitis is inflammation or irritation of the lining of the esophagus  DISCHARGE INSTRUCTIONS:   Call your local emergency number (911 in the [address] [facility]) for any of the following:   · You have any of the following signs of a heart attack:      ? Squeezing, pressure, or pain in your chest    ? You may  also have any of the following:     § Discomfort or pain in your back, neck, jaw, stomach, or arm    § Shortness of breath    § Nausea or vomiting    § Lightheadedness or a sudden cold sweat      Seek care immediately if:   · You feel like you have food stuck in your throat and you cannot cough it out  Call your doctor if:   · You have new or worsening symptoms, even after treatment  · You have questions or concerns about your condition or care  Medicines:   · Medicines  may be given to fight an infection or to control stomach acid  · Take your medicine as directed  Contact your healthcare provider if you think your medicine is not helping or if you have side effects  Tell him or her if you are allergic to any medicine  Keep a list of the medicines, vitamins, and herbs you take  Include the amounts, and when and why you take them  Bring the list or the pill bottles to follow-up visits  Carry your medicine list with you in case of an emergency  Manage or prevent esophagitis:   · Do not smoke  Nicotine and other chemicals in cigarettes and cigars can irritate and damage your esophagus  Ask your healthcare provider for information if you currently smoke and need help to quit  E-cigarettes or smokeless tobacco still contain nicotine  Talk to your healthcare provider before you use these products  · Do not drink alcohol  Alcohol can irritate your esophagus  Talk to your healthcare provider if you need help to stop drinking  · Limit or do not eat foods that can lead to esophagitis  Foods such as oranges and salsa can irritate your esophagus  Caffeine and chocolate can cause acid reflux  High-fat and fried foods make your stomach digest food more slowly  This increases the amount of stomach acid your esophagus is exposed to  Eat small meals, and drink water with your meals  Soft foods such as yogurt and applesauce may help soothe your throat  Do not eat for at least 3 hours before you go to bed  · Drink more liquid when you take pills  Drink a full glass of water when you take your pills  Ask your healthcare provider if you can take your pills at least an hour before you go to bed  · Prevent acid reflux  Do not bend over unless it is necessary  Acid may back up into your esophagus when you bend over  If possible, keep the head of your bed elevated while you sleep  This will help keep acid from backing up  Manage stress  Stress can make your symptoms worse or cause stomach acid to back up  · Keep batteries and similar objects out of the reach of children  Babies often put items in their mouths to explore them  Button batteries are easy to swallow and can cause serious damage  Keep the battery covers of electronic devices such as remote controls taped closed  Store all batteries and toxic materials where children cannot get to them  Use childproof locks to keep children away from dangerous materials  Follow up with your doctor as directed: Your doctor may refer you to a stomach specialist, allergist, or dietitian  You may need ongoing tests or treatment  Write down your questions so you remember to ask them during your visits  © Copyright [employer] 2022 Information is for End User's use only and may not be sold, redistributed or otherwise used for commercial purposes  All illustrations and images included in CareNotes® are the copyrighted property of [employer]  or Adelia Rowell   The above information is an  only  It is not intended as medical advice for individual conditions or treatments   Talk to your doctor, nurse or pharmacist before following any medical regimen to see if it is safe and effective for you  Colorectal Polyps   WHAT YOU NEED TO KNOW:   Colorectal polyps are small growths of tissue in the lining of the colon and rectum  Most polyps are hyperplastic polyps and are usually benign (noncancerous)  Certain types of polyps, called adenomatous polyps, may turn into cancer  DISCHARGE INSTRUCTIONS:   Follow up with your healthcare provider or gastroenterologist as directed: You may need to return for more tests, such as another colonoscopy  Write down your questions so you remember to ask them during your visits  Reduce your risk for colorectal polyps:   · Eat a variety of healthy foods:  Healthy foods include fruit, vegetables, whole-grain breads, low-fat dairy products, beans, lean meat, and fish  Ask if you need to be on a special diet  · Maintain a healthy weight:  Ask your healthcare provider if you need to lose weight and how much you need to lose  Ask for help with a weight loss program     · Exercise:  Begin to exercise slowly and do more as you get stronger  Talk with your healthcare provider before you start an exercise program      · Limit alcohol:  Your risk for polyps increases the more you drink  · Do not smoke: If you smoke, it is never too late to quit  Ask for information about how to stop  For support and more information:   · Garret Humphries ([facility]) [address], [place] [address]  Phone: [phone]  Web Address: www digestive  niddk nih gov    Contact your healthcare provider or gastroenterologist if:   · You have a fever  · You have chills, a cough, or feel weak and achy  · You have abdominal pain that does not go away or gets worse after you take medicine  · Your abdomen is swollen  · You are losing weight without trying  · You have questions or concerns about your condition or care      Seek care immediately or call 911 if:   · You have sudden shortness of breath  · You have a fast heart rate, fast breathing, or are too dizzy to stand up  · You have severe abdominal pain  · You see blood in your bowel movement  © Copyright [address] Information is for End User's use only and may not be sold, redistributed or otherwise used for commercial purposes  All illustrations and images included in CareNotes® are the copyrighted property of A D A M , Inc  or [facility] Issa Rowell   The above information is an  only  It is not intended as medical advice for individual conditions or treatments  Talk to your doctor, nurse or pharmacist before following any medical regimen to see if it is safe and effective for you  Hemorrhoids   WHAT YOU NEED TO KNOW:   Hemorrhoids are swollen blood vessels inside your rectum (internal hemorrhoids) or on your anus (external hemorrhoids)  Sometimes a hemorrhoid may prolapse  This means it extends out of your anus  DISCHARGE INSTRUCTIONS:   Seek care immediately if:   · You have severe pain in your rectum or around your anus  · You have severe pain in your abdomen and you are vomiting  · You have bleeding from your anus that soaks through your underwear  Contact your healthcare provider if:   · You have frequent and painful bowel movements  · Your hemorrhoid looks or feels more swollen than usual      · You do not have a bowel movement for 2 days or more  · You see or feel tissue coming through your anus  · You have questions or concerns about your condition or care  Medicines: You may  need any of the following:  · Medicine  may be given to decrease pain, swelling, and itching  The medicine may come as a pad, cream, or ointment  · Stool softeners  help treat or prevent constipation  · NSAIDs , such as ibuprofen, help decrease swelling, pain, and fever   NSAIDs can cause stomach bleeding or kidney problems in certain people  If you take blood thinner medicine, always ask your healthcare provider if NSAIDs are safe for you  Always read the medicine label and follow directions  · Take your medicine as directed  Contact your healthcare provider if you think your medicine is not helping or if you have side effects  Tell him or her if you are allergic to any medicine  Keep a list of the medicines, vitamins, and herbs you take  Include the amounts, and when and why you take them  Bring the list or the pill bottles to follow-up visits  Carry your medicine list with you in case of an emergency  Manage your symptoms:   · Apply ice on your anus for 15 to 20 minutes every hour or as directed  Use an ice pack, or put crushed ice in a plastic bag  Cover it with a towel before you apply it to your anus  Ice helps prevent tissue damage and decreases swelling and pain  · Take a sitz bath  Fill a bathtub with 4 to 6 inches of warm water  You may also use a sitz bath pan that fits inside a toilet bowl  Sit in the sitz bath for 15 minutes  Do this 3 times a day, and after each bowel movement  The warm water can help decrease pain and swelling  · Keep your anal area clean  Gently wash the area with warm water daily  Soap may irritate the area  After a bowel movement, wipe with moist towelettes or wet toilet paper  Dry toilet paper can irritate the area  Prevent hemorrhoids:   · Do not strain to have a bowel movement  Do not sit on the toilet too long  These actions can increase pressure on the tissues in your rectum and anus  · Drink plenty of liquids  Liquids can help prevent constipation  Ask how much liquid to drink each day and which liquids are best for you  · Eat a variety of high-fiber foods  Examples include fruits, vegetables, and whole grains  Ask your healthcare provider how much fiber you need each day  You may need to take a fiber supplement  · Exercise as directed    Exercise, such as walking, may make it easier to have a bowel movement  Ask your healthcare provider to help you create an exercise plan  · Do not have anal sex  Anal sex can weaken the skin around your rectum and anus  · Avoid heavy lifting  This can cause straining and increase your risk for another hemorrhoid  Follow up with your doctor as directed:  Write down your questions so you remember to ask them during your visits  © Copyright [address] 2022 Information is for End User's use only and may not be sold, redistributed or otherwise used for commercial purposes  All illustrations and images included in CareNotes® are the copyrighted property of A D A M , Inc  or [facility] [employer]   The above information is an  only  It is not intended as medical advice for individual conditions or treatments  Talk to your doctor, nurse or pharmacist before following any medical regimen to see if it is safe and effective for you  Diverticulosis   WHAT YOU NEED TO KNOW:   Diverticulosis is a condition that causes small pockets called diverticula to form in your intestine  These pockets make it difficult for bowel movements to pass through your digestive system  DISCHARGE INSTRUCTIONS:   Seek care immediately if:   · You have severe pain on the left side of your lower abdomen  · Your bowel movements are bright or dark red  Call your doctor if:   · You have a fever and chills  · You feel dizzy or lightheaded  · You have nausea, or you are vomiting  · You have a change in your bowel movements  · You have questions or concerns about your condition or care  Medicines:   · Medicines  to soften your bowel movements may be given  You may also need medicines to treat symptoms such as bloating and pain  · Take your medicine as directed  Contact your healthcare provider if you think your medicine is not helping or if you have side effects  Tell him or her if you are allergic to any medicine   Keep a list of the medicines, vitamins, and herbs you take  Include the amounts, and when and why you take them  Bring the list or the pill bottles to follow-up visits  Carry your medicine list with you in case of an emergency  Self-care: The goal of treatment is to manage any symptoms you have and prevent other problems such as diverticulitis  Diverticulitis is swelling or infection of the diverticula  Your healthcare provider may recommend any of the following:  · Eat a variety of high-fiber foods  High-fiber foods help you have regular bowel movements  High-fiber foods include cooked beans, fruits, vegetables, and some cereals  Most adults need 25 to 35 grams of fiber each day  Your healthcare provider may recommend that you have more  Ask your healthcare provider how much fiber you need  Increase fiber slowly  You may have abdominal discomfort, bloating, and gas if you add fiber to your diet too quickly  You may need to take a fiber supplement if you are not getting enough fiber from food  · Drink liquids as directed  You may need to drink 2 to 3 liters (8 to 12 cups) of liquids every day  Ask your healthcare provider how much liquid to drink each day and which liquids are best for you  · Apply heat  on your abdomen for 20 to 30 minutes every 2 hours for as many days as directed  Heat helps decrease pain and muscle spasms  Help prevent diverticulitis or other symptoms: The following may help decrease your risk for diverticulitis or symptoms, such as bleeding  Talk to your provider about these or other things you can do to prevent problems that may occur with diverticulosis  · Exercise regularly  Ask your healthcare provider about the best exercise plan for you  Exercise can help you have regular bowel movements  Get 30 minutes of exercise on most days of the week  · Maintain a healthy weight  Ask your healthcare provider what a healthy weight is for you   Ask him or her to help you create a weight loss plan if you are overweight  · Do not smoke  Nicotine and other chemicals in cigarettes increase your risk for diverticulitis  Ask your healthcare provider for information if you currently smoke and need help to quit  E-cigarettes or smokeless tobacco still contain nicotine  Talk to your healthcare provider before you use these products  · Ask your healthcare provider if it is safe to take NSAIDs  NSAIDs may increase your risk of diverticulitis  Follow up with your doctor as directed:  Write down your questions so you remember to ask them during your visits  © Copyright [employer] 2022 Information is for End User's use only and may not be sold, redistributed or otherwise used for commercial purposes  All illustrations and images included in CareNotes® are the copyrighted property of A D A M , Inc  or [facility] Issa Rowell   The above information is an  only  It is not intended as medical advice for individual conditions or treatments  Talk to your doctor, nurse or pharmacist before following any medical regimen to see if it is safe and effective for you

## 2022-04-14 NOTE — ANESTHESIA POSTPROCEDURE EVALUATION
Post-Op Assessment Note    CV Status:  Stable  Pain Score: 0    Pain management: adequate     Mental Status:  Alert and awake   Hydration Status:  Euvolemic   PONV Controlled:  Controlled   Airway Patency:  Patent      Post Op Vitals Reviewed: Yes      Staff: Anesthesiologist, CRNA   Comments: VSS        No complications documented      BP   126/78   Temp     Pulse  76   Resp   20   SpO2   98%

## 2022-04-14 NOTE — ANESTHESIA PREPROCEDURE EVALUATION
Procedure:  COLONOSCOPY  EGD    Relevant Problems   CARDIO   (+) Hypertension   (+) Mixed hyperlipidemia   (+) Pure hypertriglyceridemia      GI/HEPATIC   (+) Gastro-esophageal reflux disease without esophagitis      NEURO/PSYCH   (+) History of colon polyps      PULMONARY   (+) JOVANY on CPAP      Other   (+) Peripheral neuropathy      Class II obesity    Physical Exam    Airway  Comment: Full beard  Mallampati score: II  TM Distance: <3 FB  Neck ROM: full     Dental   Comment: Poor dentition, none loose,     Cardiovascular      Pulmonary      Other Findings        Anesthesia Plan  ASA Score- 2     Anesthesia Type- IV sedation with anesthesia with ASA Monitors  Additional Monitors:   Airway Plan:     Comment: NPO after: 07:30  Plan Factors-Exercise tolerance (METS): <4 METS  Chart reviewed  Patient summary reviewed  Patient is not a current smoker  Induction- intravenous  Postoperative Plan-     Informed Consent- Anesthetic plan and risks discussed with patient  I personally reviewed this patient with the CRNA  Discussed and agreed on the Anesthesia Plan with the CRNA  Alexi Bautista

## 2022-04-14 NOTE — H&P
History and Physical -  [facility]  Lidia Rodrigez 61 y o  male MRN: [id number]    HPI: Lidia Rodrigez is a 61y o  year old male who presents for colon cancer screening and evaluation of GERD  Review of Systems    Historical Information   Past Medical History:   Diagnosis Date    Closed fracture of right foot     As a child    Colon polyp     Drusen (degenerative) of macula, bilateral 2021    Gastro-esophageal reflux disease without esophagitis 2019    GERD (gastroesophageal reflux disease) 2010    History of colon polyps     Hypertension 2021    IFG (impaired fasting glucose) 10/1/2021    Kidney stone 2007    Mixed hyperlipidemia 2019    Morbid obesity ([address] ) 2021    Myopia, bilateral 2021    JOVANY on CPAP     Peripheral neuropathy 2021    Pinguecula, bilateral 2021    Presbyopia 10/11/2016    Pure hypertriglyceridemia 2019    Radial fracture 1975    Stress Fracture - Right Arm    Regular astigmatism, bilateral 2021     Past Surgical History:   Procedure Laterality Date    COLONOSCOPY      FIBULA FRACTURE SURGERY Right 2009    Hardware - Plate and screws in place    FRACTURE SURGERY  2005    SKIN GRAFT      Right foot     TONSILECTOMY AND ADNOIDECTOMY      TONSILLECTOMY       Social History   Social History     Substance and Sexual Activity   Alcohol Use Yes    Alcohol/week: 1 0 standard drink    Types: 1 Cans of beer per week     Social History     Substance and Sexual Activity   Drug Use Not Currently    Types: Marijuana    Comment: Last Marijuana Use ;  Other unknown drugs while in [facility]     Social History     Tobacco Use   Smoking Status Former Smoker    Packs/day: 1 00    Years: 15 00    Pack years: 15 00    Types: Cigarettes    Start date: 1980   Ling Salgado Quit date: 2005    Years since quittin 2   Smokeless Tobacco Never Used     Family History   Problem Relation Age of Onset  Cancer Mother 76        Type Unknown    COPD Father         Previous smoker    Neuropathy Father     Neuropathy Sister     Neuropathy Sister     Neuropathy Brother     Neuropathy Brother        Meds/Allergies     (Not in a hospital admission)      No Known Allergies    Objective     /75   Pulse 75   Temp (!) 96 9 °F (36 1 °C) (Temporal)   Resp 18   SpO2 95%       PHYSICAL EXAM    Gen: NAD  CV: RRR  CHEST: Clear  ABD: soft, NT/ND  EXT: no edema  Neuro: AAO      ASSESSMENT/PLAN:  This is a 61y o  year old male here for colon cancer screening and evaluation of GERD  PLAN:   Procedure:  EGD and colonoscopy

## 2022-04-20 ENCOUNTER — PROCEDURE VISIT (OUTPATIENT)
Dept: PODIATRY | Facility: CLINIC | Age: 67
End: 2022-04-20
Payer: OTHER GOVERNMENT

## 2022-04-20 VITALS
SYSTOLIC BLOOD PRESSURE: 126 MMHG | DIASTOLIC BLOOD PRESSURE: 85 MMHG | HEART RATE: 72 BPM | BODY MASS INDEX: 39.01 KG/M2 | WEIGHT: 315 LBS

## 2022-04-20 DIAGNOSIS — L97.522 ULCER OF LEFT FOOT, WITH FAT LAYER EXPOSED (HCC): Primary | ICD-10-CM

## 2022-04-20 PROCEDURE — 11042 DBRDMT SUBQ TIS 1ST 20SQCM/<: CPT | Performed by: PODIATRIST

## 2022-04-20 PROCEDURE — 99213 OFFICE O/P EST LOW 20 MIN: CPT | Performed by: PODIATRIST

## 2022-04-20 NOTE — PROGRESS NOTES
This patient was seen on 4/20/2022  My role is Foot , Ankle, and Wound Specialist    SUBJECTIVE    Chief Complaint:  Foot ulcer     Patient ID: Willy Whitlock is a 61 y o  male  Pt arrives for wound care follow up  Presents with dressing intact and states he is changing the dressing as ordered  Pt denies problems  The following portions of the patient's history were reviewed and updated as appropriate: allergies, current medications, past family history, past medical history, past social history, past surgical history and problem list     Review of Systems   Constitutional: Negative  Respiratory: Negative  Skin: Positive for wound  Neurological: Positive for numbness  OBJECTIVE      /85   Pulse 72   Wt (!) 149 kg (329 lb)   BMI 39 01 kg/m²     Foot/Ankle Musculoskeletal Exam    General      Neurological: alert    Neurovascular      Neurovascular - Right        Dorsalis pedis: 2+      Posterior tibial: 2+      Neurovascular - Left        Dorsalis pedis: 2+      Posterior tibial: 2+       Physical Exam  Vitals and nursing note reviewed  Constitutional:       General: He is not in acute distress  Appearance: He is obese  He is not ill-appearing, toxic-appearing or diaphoretic  Cardiovascular:      Pulses:           Dorsalis pedis pulses are 2+ on the right side and 2+ on the left side  Posterior tibial pulses are 2+ on the right side and 2+ on the left side  Pulmonary:      Effort: Pulmonary effort is normal       Breath sounds: Normal breath sounds  Feet:      Right foot:      Protective Sensation: 10 sites tested  0 sites sensed  Left foot:      Protective Sensation: 0 sites sensed  Neurological:      Mental Status: He is alert             Wound # 1  Location: left plantar foot  Length 1cm Width 0 5cm: Depth 0 2cm:   Deepest Tissue Noted in Base: SQ  Probe to Bone?: no  Peripheral Skin Description: intact  Granulation:90 % Fibrotic Tissue: 10% Necrotic Tissue: 0%  Drainage Amount: minimal  Signs of Infection: no  Total debrided 6 square centimeters    ASSESSMENT     Diagnoses and all orders for this visit:    Ulcer of left foot, with fat layer exposed ([facility] )         Problem List Items Addressed This Visit        Musculoskeletal and Integument    Ulcer of left foot, with fat layer exposed ([facility] ) - Primary              PLAN    DEBRIDEMENT NOTE    A formal timeout including patient identification, laterality and existing allergies using [facility] protocol was conducted  Procedure Performed: Debridement of subcutaneous tissue- >20 sq cm (55931)  Under aseptic technique, the wound was thoroughly explored and bluntly probed for undermining, deep sinus tracts and bone involvement  Sterile instrumentation including scalpel, forceps and curette used to excise the clearly delineated devitalized subcutaneous tissue (fibrotic tissue and necrotic non-viable portions of fat) exposing viable tissue  After debridement, bleeding in the wound bed base was noted indicated viable subcutaneous tissue had been exposed  Bleeding controlled with gentle pressure  Patient tolerated debridement without complications  The wound was dressed  Wound dressing technique and frequency described to patient  I am to be called if any signs of infection develop such as erythema, increased wound size or significant change in appearance of wound, odor, pain, increased or change in color of drainage, swelling, fever, chills, nightsweats  I reviewed these signs with the patient  I recommended limiting WB to bathroom priveleges and meal table and to use any prescribed offloading devices in an effort to allow proper rest and healing of the wounded area  I explained that good wound care and compliance are necessary to allow healing and to prevent toe loss or limb loss  Silver gel applied, dsd using gauze and tape  To be changed daily using triple abx ointment

## 2022-05-10 DIAGNOSIS — K25.9 GASTRIC EROSION, UNSPECIFIED CHRONICITY: ICD-10-CM

## 2022-05-10 DIAGNOSIS — K20.90 ESOPHAGITIS: Primary | ICD-10-CM

## 2022-05-10 RX ORDER — OMEPRAZOLE 40 MG/1
CAPSULE, DELAYED RELEASE ORAL
Qty: 120 CAPSULE | Refills: 0 | Status: SHIPPED | OUTPATIENT
Start: 2022-05-10 | End: 2022-08-08

## 2022-05-11 ENCOUNTER — TELEPHONE (OUTPATIENT)
Dept: OTHER | Facility: OTHER | Age: 67
End: 2022-05-11

## 2022-05-11 NOTE — TELEPHONE ENCOUNTER
Patient stated he missed a call from the office and is requesting a call back  Patient stated he does not know what this is regarding

## 2022-05-19 DIAGNOSIS — R73.01 IFG (IMPAIRED FASTING GLUCOSE): ICD-10-CM

## 2022-05-19 RX ORDER — METFORMIN HYDROCHLORIDE 500 MG/1
1500 TABLET, EXTENDED RELEASE ORAL
Qty: 90 TABLET | Refills: 0 | Status: SHIPPED | OUTPATIENT
Start: 2022-05-19 | End: 2022-06-23 | Stop reason: SDUPTHER

## 2022-05-19 NOTE — TELEPHONE ENCOUNTER
Medication refill requested: metformin  Last office visit: 03/25/22  Next office visit: 08/09/22  Last refilled: 03/25/22  Labs: Yes, describe: 03/18/22  Ordering Provider: [address] (select pharmacy send RX to): [address] [place] [address], [address] [phone]

## 2022-05-23 ENCOUNTER — OFFICE VISIT (OUTPATIENT)
Dept: SLEEP CENTER | Facility: CLINIC | Age: 67
End: 2022-05-23
Payer: OTHER GOVERNMENT

## 2022-05-23 VITALS
HEART RATE: 72 BPM | WEIGHT: 315 LBS | SYSTOLIC BLOOD PRESSURE: 132 MMHG | BODY MASS INDEX: 37.19 KG/M2 | DIASTOLIC BLOOD PRESSURE: 84 MMHG | HEIGHT: 77 IN | OXYGEN SATURATION: 94 %

## 2022-05-23 DIAGNOSIS — I10 ESSENTIAL HYPERTENSION: ICD-10-CM

## 2022-05-23 DIAGNOSIS — F45.8 BRUXISM: ICD-10-CM

## 2022-05-23 DIAGNOSIS — G47.33 OSA ON CPAP: Primary | ICD-10-CM

## 2022-05-23 DIAGNOSIS — K21.9 GASTROESOPHAGEAL REFLUX DISEASE, UNSPECIFIED WHETHER ESOPHAGITIS PRESENT: ICD-10-CM

## 2022-05-23 DIAGNOSIS — Z99.89 OSA ON CPAP: Primary | ICD-10-CM

## 2022-05-23 DIAGNOSIS — G62.9 PERIPHERAL POLYNEUROPATHY: ICD-10-CM

## 2022-05-23 DIAGNOSIS — E66.9 OBESITY (BMI 30-39.9): ICD-10-CM

## 2022-05-23 DIAGNOSIS — Z99.89 DEPENDENCE ON OTHER ENABLING MACHINES AND DEVICES: ICD-10-CM

## 2022-05-23 DIAGNOSIS — G47.33 OBSTRUCTIVE SLEEP APNEA (ADULT) (PEDIATRIC): ICD-10-CM

## 2022-05-23 PROCEDURE — 99244 OFF/OP CNSLTJ NEW/EST MOD 40: CPT | Performed by: INTERNAL MEDICINE

## 2022-05-23 NOTE — PROGRESS NOTES
Consultation - [address] H[address] Yadira Scisco  61 y o  male  JJL:[birth year]  TPI:[id number]  DOS:5/23/2022    Physician Requesting Consult: Maya Spencer DO             Reason for Consult : At your kind request I saw Kevin Lawton for initial sleep evaluation today  He was diagnosed with obstructive sleep apnea in the past and is using CPAP  He is here to establish care  His studies were done in [place] over 10 years ago and results were not available at the time of this encounter  Compliance data from his machine shows he has a ResMed machine and is using for more than 4 hours 97% of the time  Respiratory event index is 2 5 per hour at pressure of 14 cm H2O    PFSH, Problem List, Medications & Allergies were reviewed in EMR  Thanh Dyer  has a past medical history of Closed fracture of right foot, Diverticulosis, Drusen (degenerative) of macula, bilateral (09/27/2021), Gastro-esophageal reflux disease without esophagitis (05/30/2019), GERD (gastroesophageal reflux disease) (2010), History of colon polyps, Hypertension (09/27/2021), IFG (impaired fasting glucose) (10/01/2021), Internal hemorrhoids, Kidney stone (2007), Mixed hyperlipidemia (05/30/2019), Morbid obesity ([address] ) (09/27/2021), Myopia, bilateral (09/27/2021), JOVANY on CPAP, Peripheral neuropathy (09/27/2021), Pinguecula, bilateral (09/27/2021), Presbyopia (10/11/2016), Pure hypertriglyceridemia (08/19/2019), Radial fracture (1975), and Regular astigmatism, bilateral (09/27/2021)  He has a current medication list which includes the following prescription(s): vitamin c, cholecalciferol, cyanocobalamin, famotidine, ibuprofen, metformin, multivitamin, omeprazole, simvastatin, and losartan  HPI:  He is using CPAP regularly and  benefits from use  At times he may awaken with dry mouth  Patient reports has not been using [employer] to sanitize the machine  He has not noted any foreign particles in the airline    He believes he has machine is around 8years old  Other Complaints: none  Restless Leg Syndrome: reports no suggestive symptoms  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 but also upper extremity   Parasomnia: no features reported    Sleep Routine (on average):   Typical Bedtime:  8:00 p m  Gets OOB:  5:00 a m  TIB:9 hrs  Sleep latency:< 15 minutes Sleep Interruptions:1-2/nite Is unsure of the cause and able to fall back asleep  Awakens: before alarm most days  Upon awakening: feels refreshed   Lorena Agent denies Excessive Daytime Sleepiness    He rated himself at Total score: 2 /24 on the [place] Sleepiness Scale  Habits:  reports that he quit smoking about 17 years ago  His smoking use included cigarettes  He started smoking about 42 years ago  He has a 15 00 pack-year smoking history  He has never used smokeless tobacco  ;   E-Cigarette/Vaping    E-Cigarette Use Never User     ;  reports previous drug use  Drug: Marijuana  ;  reports current alcohol use of about 1 0 standard drink of alcohol per week  ; Caffeine use:limited ; Exercise routine: none   Family History: Negative for sleep disturbance  ROS - reviewed and as attached  Significant for approximately 10 lb intentional weight reduction in the past year  He reported no nasal, respiratory or cardiac symptoms  Andrew العلي EXAM:  /84   Pulse 72   Ht 6' 5" (1 956 m)   Wt (!) 150 kg (329 lb 12 8 oz)   SpO2 94%   BMI 39 11 kg/m²    General: Well groomed male, well appearing, in no apparent distress  Neurological: Alert and orientated;  cooperative; Cranial nerves intact;    Psychiatric: Speech:clear and coherent;  Normal mood, affect & thought   Skin: warm and dry; Color& Hydration good; no facial rashes or lesions   HEENT:  Craniofacial anatomy: slight overjet  Sinuses: non- tender   TMJ: Normal    Eyes: EOM's intact;  conjunctiva/corneas clear   Ears: Externallyappear normal     Nasal Airway: is patent Septum:intact; Mucosa: normal; Turbinates: normal; Rhinorrhea: None   Mouth: Lips: normal posture; Dentition: missing several and  ground down  Mucosa:moist  ; Hard Palate:normal    Oropharryx: crowdedTongue: Mallampati:Class IV, Mobile and MacroglossiaSoft Palate:  redundant  Tonsils: absent  Neck:is thick and excess fatty tissue; Neck Circumference: 21 "; Supple; no abnormal masses; Thyroid:normal  Trachea:central     Lymph: No Cervical or Submandibular Lymhadenopathy  Heart: S1,S2 normal; RRR; no gallop; no murmurs   Lungs: Respiratory Effort:normal  Air entry good bilaterally  No wheezes  No rales  Abdomen: Obese, Soft & non-tender    Extremities: No pedal edema  No clubbing or cyanosis  Musculoskeletal:  Motor normal; Gait:normal        IMPRESSION: Primary/Secondary Sleep Diagnoses (to Medical or Psych conditions) & Comorbidities   1  JOVANY on CPAP  Ambulatory referral to Sleep Medicine    PAP DME Resupply/Reorder   2  Bruxism     3  Essential hypertension     4  Gastroesophageal reflux disease, unspecified whether esophagitis present     5  Peripheral polyneuropathy     6  Obesity (BMI 30-39  9)          PLAN:  He will need to get results of prior studies  I reviewed physiological data with him  Care of equipment, methods to improve comfort using PAP and importance of compliance with therapy were discussed  Instructed not to use ozone based or other unapproved  cleaning devices  Pressure setting:  Continue at current settings  Rx provided to replace  supplies and Care coordinated with DME provider  Discussed strategies for weight reduction  Follow-up is advised in 2-3 months to monitor progress, compliance and to adjust therapy  If results of prior studies are no longer available, he will need re-evaluation to justify replacement of his machine  Thank you for allowing me to participate in the care of this patient  I will keep you apprised of developments           Sincerely,     Authenticated electronically by Alecia Escoto MD   on 49/74/27   Board Certified Specialist     Portions of the record may have been created with voice recognition software  Occasional wrong word or "sound a like" substitutions may have occurred due to the inherent limitations of voice recognition software  There may also be notations and random deletions of words or characters from malfunctioning software  Read the chart carefully and recognize, using context, where substitutions/deletions have occurred

## 2022-05-23 NOTE — PROGRESS NOTES
Review of Systems      Genitourinary none   Cardiology none   Gastrointestinal none   Neurology numbness/tingling of an extremity   Constitutional none   Integumentary none   Psychiatry none   Musculoskeletal leg cramps   Pulmonary none   ENT none   Endocrine none   Hematological none

## 2022-05-23 NOTE — PATIENT INSTRUCTIONS
What is JOVANY? Obstructive sleep apnea is a common and serious sleep disorder that causes you to stop breathing during sleep  The airway repeatedly becomes blocked, limiting the amount of air that reaches your lungs  When this happens, you may snore loudly or making choking noises as you try to breathe  Your brain and body becomes oxygen deprived and you may wake up  This may happen a few times a night, or in more severe cases, several hundred times a night  Sleep apnea can make you wake up in the morning feeling tired or unrefreshed even though you have had a full night of sleep  During the day, you may feel fatigued, have difficulty concentrating or you may even unintentionally fall asleep  This is because your body is waking up numerous times throughout the night, even though you might not be conscious of each awakening  The lack of oxygen your body receives can have negative long-term consequences for your health  This includes:  High blood pressure  Heart disease  Irregular heart rhythms  Stroke  Pre-diabetes and diabetes  Depression    Testing  An objective evaluation of your sleep may be needed before your board certified sleep physician can make a diagnosis  Options include:   In-lab overnight sleep study  This type of sleep study requires you to stay overnight at a sleep center, in a bed that may resemble a hotel room  You will sleep with sensors hooked up to various parts of your body  These sensors record your brain waves, heartbeat, breathing and movement  An overnight sleep study also provides your doctor with the most complete information about your sleep  Learn more about an overnight sleep study  Home sleep apnea test  Some patients with high risk factors for obstructive sleep apnea and no other medical disorders may be candidates for a home sleep apnea test  The testing equipment differs in that it is less complicated than what is used in an overnight sleep study   As such, does not give all the data an in-lab will and if negative, may not mean you do not have the problem  Treatment for sleep apnea  includes using a continuous positive airway pressure (CPAP) machine to keep your airway open during sleep  A mask is placed over your nose and mouth, or just your nose  The mask is hooked to the CPAP machine that blows a gentle stream of air into the mask when you breathe  This helps keep your airway open so you can breathe more regularly  Extra oxygen may be given to you through the machine  You may be given a mouth device  It looks like a mouth guard or dental retainer and stops your tongue and mouth tissues from blocking your throat while you sleep  Surgery may be needed to remove extra tissues that block your mouth, throat, or nose  Manage sleep apnea:   Do not smoke  Nicotine and other chemicals in cigarettes and cigars can cause lung damage  Ask your healthcare provider for information if you currently smoke and need help to quit  E-cigarettes or smokeless tobacco still contain nicotine  Talk to your healthcare provider before you use these products  Do not drink alcohol or take sedative medicine before you go to sleep  Alcohol and sedatives can relax the muscles and tissues around your throat  This can block the airflow to your lungs  Maintain a healthy weight  Excess tissue around your throat may restrict your breathing  Ask your healthcare provider for information if you need to lose weight  Sleep on your side or use pillows designed to prevent sleep apnea  This prevents your tongue or other tissues from blocking your throat  You can also raise the head of your bed  Driving Safety  Refrain from driving when drowsy  Follow up with your healthcare provider as directed:  Write down your questions so you remember to ask them during your visits  Go to AASM website for more information: Sleepeducation  org     What is OJVANY?    Obstructive sleep apnea is a common and serious sleep disorder that causes you to stop breathing during sleep  The airway repeatedly becomes blocked, limiting the amount of air that reaches your lungs  When this happens, you may snore loudly or making choking noises as you try to breathe  Your brain and body becomes oxygen deprived and you may wake up  This may happen a few times a night, or in more severe cases, several hundred times a night  Sleep apnea can make you wake up in the morning feeling tired or unrefreshed even though you have had a full night of sleep  During the day, you may feel fatigued, have difficulty concentrating or you may even unintentionally fall asleep  This is because your body is waking up numerous times throughout the night, even though you might not be conscious of each awakening  The lack of oxygen your body receives can have negative long-term consequences for your health  This includes:  High blood pressure  Heart disease  Irregular heart rhythms  Stroke  Pre-diabetes and diabetes  Depression    Testing  An objective evaluation of your sleep may be needed before your board certified sleep physician can make a diagnosis  Options include:   In-lab overnight sleep study  This type of sleep study requires you to stay overnight at a sleep center, in a bed that may resemble a hotel room  You will sleep with sensors hooked up to various parts of your body  These sensors record your brain waves, heartbeat, breathing and movement  An overnight sleep study also provides your doctor with the most complete information about your sleep  Learn more about an overnight sleep study  Home sleep apnea test  Some patients with high risk factors for obstructive sleep apnea and no other medical disorders may be candidates for a home sleep apnea test  The testing equipment differs in that it is less complicated than what is used in an overnight sleep study   As such, does not give all the data an in-lab will and if negative, may not mean you do not have the problem  Treatment for sleep apnea  includes using a continuous positive airway pressure (CPAP) machine to keep your airway open during sleep  A mask is placed over your nose and mouth, or just your nose  The mask is hooked to the CPAP machine that blows a gentle stream of air into the mask when you breathe  This helps keep your airway open so you can breathe more regularly  Extra oxygen may be given to you through the machine  You may be given a mouth device  It looks like a mouth guard or dental retainer and stops your tongue and mouth tissues from blocking your throat while you sleep  Surgery may be needed to remove extra tissues that block your mouth, throat, or nose  Manage sleep apnea:   Do not smoke  Nicotine and other chemicals in cigarettes and cigars can cause lung damage  Ask your healthcare provider for information if you currently smoke and need help to quit  E-cigarettes or smokeless tobacco still contain nicotine  Talk to your healthcare provider before you use these products  Do not drink alcohol or take sedative medicine before you go to sleep  Alcohol and sedatives can relax the muscles and tissues around your throat  This can block the airflow to your lungs  Maintain a healthy weight  Excess tissue around your throat may restrict your breathing  Ask your healthcare provider for information if you need to lose weight  Sleep on your side or use pillows designed to prevent sleep apnea  This prevents your tongue or other tissues from blocking your throat  You can also raise the head of your bed  Driving Safety  Refrain from driving when drowsy  Follow up with your healthcare provider as directed:  Write down your questions so you remember to ask them during your visits  Go to AAS website for more information: Sleepeducation  org           Nursing Support:  When: Monday through Friday 7A-5PM except holidays  Where: Our direct line is [phone]      If you are having a true emergency please call 911  In the event that the line is busy or it is after hours please leave a voice message and we will return your call  Please speak clearly, leaving your full name, birth date, best number to reach you and the reason for your call  Medication refills: We will need the name of the medication, the dosage, the ordering provider, whether you get a 30 or 90 day refill, and the pharmacy name and address  Medications will be ordered by the provider only  Nurses cannot call in prescriptions  Please allow 7 days for medication refills  Physician requested updates: If your provider requested that you call with an update after starting medication, please be ready to provide us the medication and dosage, what time you take your medication, the time you attempt to fall asleep, time you fall asleep, when you wake up, and what time you get out of bed  Sleep Study Results: We will contact you with sleep study results and/or next steps after the physician has reviewed your testing

## 2022-05-25 ENCOUNTER — PROCEDURE VISIT (OUTPATIENT)
Dept: PODIATRY | Facility: CLINIC | Age: 67
End: 2022-05-25
Payer: OTHER GOVERNMENT

## 2022-05-25 VITALS
HEART RATE: 70 BPM | BODY MASS INDEX: 39.01 KG/M2 | WEIGHT: 315 LBS | DIASTOLIC BLOOD PRESSURE: 82 MMHG | SYSTOLIC BLOOD PRESSURE: 149 MMHG

## 2022-05-25 DIAGNOSIS — L97.522 ULCER OF LEFT FOOT, WITH FAT LAYER EXPOSED (HCC): Primary | ICD-10-CM

## 2022-05-25 PROCEDURE — 99213 OFFICE O/P EST LOW 20 MIN: CPT | Performed by: PODIATRIST

## 2022-05-25 PROCEDURE — 11042 DBRDMT SUBQ TIS 1ST 20SQCM/<: CPT | Performed by: PODIATRIST

## 2022-05-25 NOTE — PROGRESS NOTES
This patient was seen on 5/25/2022  My role is Foot , Ankle, and Wound Specialist    SUBJECTIVE    Chief Complaint:  Foot ulcer     Patient ID: Svetlana Maya is a 74 y o  male  Pt arrives for wound care follow up  Presents with no dressing intact  Pt states he has not been applying a dressing because he believes the wound is healed  The following portions of the patient's history were reviewed and updated as appropriate: allergies, current medications, past family history, past medical history, past social history, past surgical history and problem list     Review of Systems   Constitutional: Negative  Respiratory: Negative  Skin: Positive for wound  Neurological: Positive for numbness  OBJECTIVE      /82   Pulse 70   Wt (!) 149 kg (329 lb) Comment: verbal  BMI 39 01 kg/m²     Foot/Ankle Musculoskeletal Exam    General      Neurological: alert    Neurovascular      Neurovascular - Right        Dorsalis pedis: 2+      Posterior tibial: 2+      Neurovascular - Left        Dorsalis pedis: 2+      Posterior tibial: 2+       Physical Exam  Vitals and nursing note reviewed  Constitutional:       General: He is not in acute distress  Appearance: He is obese  He is not ill-appearing, toxic-appearing or diaphoretic  Cardiovascular:      Pulses:           Dorsalis pedis pulses are 2+ on the right side and 2+ on the left side  Posterior tibial pulses are 2+ on the right side and 2+ on the left side  Pulmonary:      Effort: Pulmonary effort is normal       Breath sounds: Normal breath sounds  Feet:      Right foot:      Protective Sensation: 10 sites tested  0 sites sensed  Left foot:      Protective Sensation: 0 sites sensed  Neurological:      Mental Status: He is alert             Wound # 1  Location: left plantar foot  Length 0 4cm Width 0 2cm: Depth 0 2cm:   Deepest Tissue Noted in Base: SQ  Probe to Bone?: no  Peripheral Skin Description: intact  Granulation:90 % Fibrotic Tissue: 10% Necrotic Tissue: 0%  Drainage Amount: minimal  Signs of Infection: no  Total debrided 0 08 square centimeters  ASSESSMENT     Diagnoses and all orders for this visit:    Ulcer of left foot, with fat layer exposed ([facility] )         Problem List Items Addressed This Visit        Musculoskeletal and Integument    Ulcer of left foot, with fat layer exposed ([facility] ) - Primary              PLAN    DEBRIDEMENT NOTE     A formal timeout including patient identification, laterality and existing allergies using [facility] was conducted      Procedure Performed: Debridement of subcutaneous tissue- >20 sq cm (77047)  Under aseptic technique, the wound was thoroughly explored and bluntly probed for undermining, deep sinus tracts and bone involvement  Sterile instrumentation including scalpel, forceps and curette used to excise the clearly delineated devitalized subcutaneous tissue (fibrotic tissue and necrotic non-viable portions of fat) exposing viable tissue  After debridement, bleeding in the wound bed base was noted indicated viable subcutaneous tissue had been exposed  Bleeding controlled with gentle pressure  Patient tolerated debridement without complications  The wound was dressed          Wound dressing technique and frequency described to patient  I am to be called if any signs of infection develop such as erythema, increased wound size or significant change in appearance of wound, odor, pain, increased or change in color of drainage, swelling, fever, chills, nightsweats  I reviewed these signs with the patient  I recommended limiting WB to bathroom priveleges and meal table and to use any prescribed offloading devices in an effort to allow proper rest and healing of the wounded area   I explained that good wound care and compliance are necessary to allow healing and to prevent toe loss or limb loss       Silver gel applied, dsd using gauze and tape   To be changed daily using triple abx ointment

## 2022-05-26 DIAGNOSIS — E78.2 MIXED HYPERLIPIDEMIA: Primary | ICD-10-CM

## 2022-05-26 NOTE — TELEPHONE ENCOUNTER
Medication refill requested: simvastatin  Last office visit: 3/25/22  Next office visit: 08/09/2022  Last refilled: historical pcp    Labs: No  Ordering Provider: [address] (select pharmacy send RX to):   [facility] [address] [address] [facility] [address]  [address] [address]  Phone: [phone] Fax: [phone]

## 2022-05-27 RX ORDER — SIMVASTATIN 20 MG
20 TABLET ORAL
Qty: 90 TABLET | Refills: 0 | Status: SHIPPED | OUTPATIENT
Start: 2022-05-27

## 2022-05-31 ENCOUNTER — OFFICE VISIT (OUTPATIENT)
Dept: NEUROLOGY | Facility: CLINIC | Age: 67
End: 2022-05-31
Payer: OTHER GOVERNMENT

## 2022-05-31 VITALS
SYSTOLIC BLOOD PRESSURE: 120 MMHG | BODY MASS INDEX: 37.19 KG/M2 | HEART RATE: 67 BPM | WEIGHT: 315 LBS | DIASTOLIC BLOOD PRESSURE: 80 MMHG | HEIGHT: 77 IN

## 2022-05-31 DIAGNOSIS — G60.9 HEREDITARY PERIPHERAL NEUROPATHY: Primary | ICD-10-CM

## 2022-05-31 PROCEDURE — 99214 OFFICE O/P EST MOD 30 MIN: CPT | Performed by: STUDENT IN AN ORGANIZED HEALTH CARE EDUCATION/TRAINING PROGRAM

## 2022-05-31 NOTE — ASSESSMENT & PLAN NOTE
Marin Murry is a 61 y o  male is seen today in the neurology office as a follow up for known diagnosis of neuropathy  He was seen as a new patient in a virtual visit on 1/2022   to review  he states initial onset of symptoms was 15 years ago and presentation includes bilateral numbness below the knees and bilateral tingling in his hands  Overall presentation has been stable for the last 15 years  He denies any new symptoms  He denies any recent falls or head injuries  Patient moved to this area about 2 years ago, and would like to establish care previously was following up with Neurology in [place]   Work up has included EMG in the past, reported lab workup checked   Previous treatments tried include previously has tried gabapentin unclear if this was helpful   Pt states symptoms have not changed since last visit    Workup:  Lab Results   Component Value Date    HGBA1C 5 3 03/18/2022    MCV 89 12/21/2021     Impression:  neuropathy in the setting of no clear underlying disease    Differential diagnoses include but not limited to Idiopathic peripheral neuropathy however this point would like to examine patient and get prior records  Plan:  - Differential diagnoses and next steps reviewed with the patient  - EMG/NCS Will defer at this time, await prior records  - No medications recommended at this time  - Follow up in 1 year   · Patient verbalized understanding all questions were addressed  · At this point will hold off on restarting gabapentin as patient denies any prior history of or current symptoms of burning pain    He was told to call our office if he is interested in restarting this medication

## 2022-05-31 NOTE — PROGRESS NOTES
Patient ID: Sunil Merrill is a 61 y o  male  Assessment/Plan:    Peripheral neuropathy  Sunil Merrill is a 61 y o  male is seen today in the neurology office as a follow up for known diagnosis of neuropathy  He was seen as a new patient in a virtual visit on 1/2022   to review  he states initial onset of symptoms was 15 years ago and presentation includes bilateral numbness below the knees and bilateral tingling in his hands  Overall presentation has been stable for the last 15 years  He denies any new symptoms  He denies any recent falls or head injuries  Patient moved to this area about 2 years ago, and would like to establish care previously was following up with Neurology in [place]   Work up has included EMG in the past, reported lab workup checked   Previous treatments tried include previously has tried gabapentin unclear if this was helpful   Pt states symptoms have not changed since last visit    Workup:  Lab Results   Component Value Date    HGBA1C 5 3 03/18/2022    MCV 89 12/21/2021     Impression:  neuropathy in the setting of no clear underlying disease    Differential diagnoses include but not limited to Idiopathic peripheral neuropathy however this point would like to examine patient and get prior records  Plan:  - Differential diagnoses and next steps reviewed with the patient  - EMG/NCS Will defer at this time, await prior records  - No medications recommended at this time  - Follow up in 1 year   · Patient verbalized understanding all questions were addressed  · At this point will hold off on restarting gabapentin as patient denies any prior history of or current symptoms of burning pain    He was told to call our office if he is interested in restarting this medication           Diagnoses and all orders for this visit:    Hereditary peripheral neuropathy           Subjective:    HPI  Sunil Merrill is a 61 y o  male is seen today in the neurology office as a follow up for known diagnosis of neuropathy  He was seen as a new patient in a virtual visit on 1/2022  To review Initial onset of symptoms was about 15 years ago  He describes symptoms of numbness affecting his bilateral lower extrmeities below the knee  He also reports symptoms in his upper extremities bilaterally in his hands  He denies any burning pain  HE reports numbness in the lower extremities and tingling in the upper ext  He reports symptoms have been the same for about 15 years  He is able walk without assistance  He denies any recent falls  He denies any falls  He feels like he has more trouble at night with his symptoms  Previously he was on Gabapentin he is unsure if it help  Last HBA1c: 5 2,   Previous treatment has included gabapentin, which did not  improve symptoms      Prior evaluation has included Dr Bernice Gibbons in [address] [place] [address]  in [place]  He has had a EMG which was done 10 years ago  The last time he was seen was 2 years ago by neurology in [place]    On today's visit, patient continues to report his symptoms are stable and no new worsening or new symptoms  He continues to endorse mild numbness in his fingers occasionally  He denies any weakness  In his lower extremities, patient reports having numbness which occurs up to his mid legs  He denies any recent falls or trips  He denies any changes to his medical history or medications  Patient rpeorts multiple siblings and his father having similar symptoms  The following portions of the patient's history were reviewed and updated as appropriate: allergies, current medications, past family history, past medical history, past social history, past surgical history and problem list and review of systems reviewed        Objective:    Blood pressure 120/80, pulse 67, height 6' 5" (1 956 m), weight (!) 149 kg (329 lb)  Physical Exam  Eyes:      General: Lids are normal       Extraocular Movements: Extraocular movements intact        Pupils: Pupils are equal, round, and reactive to light  Neurological:      Coordination: Romberg sign positive  Deep Tendon Reflexes:      Reflex Scores:       Tricep reflexes are 1+ on the right side and 1+ on the left side  Bicep reflexes are 1+ on the right side and 1+ on the left side  Psychiatric:         Speech: Speech normal          Neurological Exam  Mental Status  Awake, alert and oriented to person, place and time  Oriented to person, place and time  Speech is normal  Language is fluent with no aphasia  Cranial Nerves  CN II: Visual fields full to confrontation  CN III, IV, VI: Extraocular movements intact bilaterally  Normal lids and orbits bilaterally  Pupils equal round and reactive to light bilaterally  CN V: Facial sensation is normal   CN VII: Full and symmetric facial movement  CN VIII: Hearing is normal   CN XI: Shoulder shrug strength is normal   CN XII: Tongue midline without atrophy or fasciculations  Motor  Normal muscle bulk throughout  No fasciculations present  Normal muscle tone  Strength is 5/5 in all four extremities except as noted  Sensory  Light touch is normal in upper and lower extremities  Lower Extremity:  Vibration: reduced below the knee bilaterally   Pin-Prick and Temperature: Reduced below mid calf   Proprioception: absent at the toes bilaterally      Upper Extremity:  Pin-Prick reduced below the wrists   Reflexes                                            Right                      Left  Biceps                                 1+                         1+  Triceps                                1+                         1+  Patellar                                Tr                         Tr  Achilles                                Tr                         Tr    Coordination  Right: Finger-to-nose normal Left: Finger-to-nose normal     Gait  Casual gait is normal including stance, stride, and arm swing  Romberg is present          ROS:  I reviewed the below ROS and what is mentioned in HPI, the remainder of ROS was negative  Review of Systems   Constitutional: Negative  Negative for appetite change and fever  HENT: Negative  Negative for hearing loss, tinnitus, trouble swallowing and voice change  Eyes: Negative  Negative for photophobia and pain  Respiratory: Negative  Negative for shortness of breath  Cardiovascular: Negative  Negative for palpitations  Gastrointestinal: Negative  Negative for nausea and vomiting  Endocrine: Negative  Negative for cold intolerance  Genitourinary: Negative  Negative for dysuria, frequency and urgency  Musculoskeletal: Negative  Negative for myalgias and neck pain  Skin: Negative  Negative for rash  Neurological: Positive for numbness  Negative for dizziness, tremors, seizures, syncope, facial asymmetry, speech difficulty, weakness, light-headedness and headaches  Hematological: Negative  Does not bruise/bleed easily  Psychiatric/Behavioral: Negative  Negative for confusion, hallucinations and sleep disturbance

## 2022-06-02 ENCOUNTER — TELEPHONE (OUTPATIENT)
Dept: SLEEP CENTER | Facility: CLINIC | Age: 67
End: 2022-06-02

## 2022-06-23 DIAGNOSIS — R73.01 IFG (IMPAIRED FASTING GLUCOSE): ICD-10-CM

## 2022-06-23 RX ORDER — METFORMIN HYDROCHLORIDE 500 MG/1
1500 TABLET, EXTENDED RELEASE ORAL
Qty: 180 TABLET | Refills: 1 | Status: SHIPPED | OUTPATIENT
Start: 2022-06-23

## 2022-06-23 NOTE — TELEPHONE ENCOUNTER
Justus [facility] called and wants to clarify that pt is still taking the metFORMIN (GLUCOPHAGE-XR) 500 mg 24 hr tablet 3x a day  Awaiting call back

## 2022-06-27 NOTE — TELEPHONE ENCOUNTER
[facility] questioning metformin directions "increase as directed" pt not directed to increase looks like pt has not got requested labs at this time

## 2022-07-18 ENCOUNTER — CLINICAL SUPPORT (OUTPATIENT)
Dept: NUTRITION | Facility: HOSPITAL | Age: 67
End: 2022-07-18
Payer: MEDICARE

## 2022-07-18 VITALS — BODY MASS INDEX: 37.19 KG/M2 | WEIGHT: 315 LBS | HEIGHT: 77 IN

## 2022-07-18 DIAGNOSIS — R73.01 IFG (IMPAIRED FASTING GLUCOSE): ICD-10-CM

## 2022-07-18 DIAGNOSIS — E78.1 PURE HYPERTRIGLYCERIDEMIA: ICD-10-CM

## 2022-07-18 DIAGNOSIS — K21.9 GASTRO-ESOPHAGEAL REFLUX DISEASE WITHOUT ESOPHAGITIS: ICD-10-CM

## 2022-07-18 DIAGNOSIS — I10 PRIMARY HYPERTENSION: ICD-10-CM

## 2022-07-18 DIAGNOSIS — E66.01 CLASS 2 SEVERE OBESITY WITH SERIOUS COMORBIDITY AND BODY MASS INDEX (BMI) OF 38.0 TO 38.9 IN ADULT, UNSPECIFIED OBESITY TYPE (HCC): Primary | ICD-10-CM

## 2022-07-18 DIAGNOSIS — E78.2 MIXED HYPERLIPIDEMIA: ICD-10-CM

## 2022-07-18 PROCEDURE — 97802 MEDICAL NUTRITION INDIV IN: CPT

## 2022-07-18 NOTE — PROGRESS NOTES
Initial Nutrition Assessment Form    Patient Name: Geraldine Mckeon    YOB: 1955    Sex: Male   Email: [email or web address]     Assessment Date: 7/18/2022  Start Time: 2:44 Stop Time: 3:44 Total Minutes: 61     Data:  Present at session: self   Parent Concerns: "My doctor is the reason I am here today"   Medical Dx/Reason for Referral: I10 (ICD-10-CM) - Primary hypertension   E78 2 (ICD-10-CM) - Mixed hyperlipidemia   K21 9 (ICD-10-CM) - Gastro-esophageal reflux disease without esophagitis   E66 01, Z68 38 (ICD-10-CM) - Class 2 severe obesity with serious comorbidity and body mass index (BMI) of 38 0 to 38 9 in adult, unspecified obesity type ([facility] )   R73 01 (ICD-10-CM) - IFG (impaired fasting glucose)   E78 1 (ICD-10-CM) - Pure hypertriglyceridemia      Past Medical History:   Diagnosis Date    Closed fracture of right foot     As a child    Diverticulosis     Drusen (degenerative) of macula, bilateral 09/27/2021    Gastro-esophageal reflux disease without esophagitis 05/30/2019    GERD (gastroesophageal reflux disease) 2010    History of colon polyps     Hypertension 09/27/2021    IFG (impaired fasting glucose) 10/01/2021    Internal hemorrhoids     Kidney stone 2007    Mixed hyperlipidemia 05/30/2019    Morbid obesity ([facility] ) 09/27/2021    Myopia, bilateral 09/27/2021    JOVANY on CPAP     Peripheral neuropathy 09/27/2021    Pinguecula, bilateral 09/27/2021    Presbyopia 10/11/2016    Pure hypertriglyceridemia 08/19/2019    Radial fracture 1975    Stress Fracture - Right Arm    Regular astigmatism, bilateral 09/27/2021       Current Outpatient Medications   Medication Sig Dispense Refill    Ascorbic Acid (Vitamin C) 250 MG CHEW Chew 250 mg daily Take 2 chews daily       Cholecalciferol 125 MCG (5000 UT) TABS Take 5,000 Units by mouth daily        cyanocobalamin (VITAMIN B-12) 1000 MCG tablet Take 1,000 mcg by mouth daily      famotidine (PEPCID) 20 mg tablet Take 1 tablet (20 mg total) by mouth 2 (two) times a day as needed for heartburn 180 tablet 2    ibuprofen (MOTRIN) 200 mg tablet Take 400 mg by mouth every 6 (six) hours as needed        losartan (COZAAR) 50 mg tablet Take 1 tablet (50 mg total) by mouth daily 90 tablet 2    metFORMIN (GLUCOPHAGE-XR) 500 mg 24 hr tablet Take 3 tablets (1,500 mg total) by mouth daily with dinner Increase as directed  180 tablet 1    multivitamin (THERAGRAN) TABS Take 1 tablet by mouth daily      omeprazole (PriLOSEC) 40 MG capsule Take 1 capsule (40 mg total) by mouth 2 (two) times a day for 30 days, THEN 1 capsule (40 mg total) daily  120 capsule 0    simvastatin (ZOCOR) 20 mg tablet Take 1 tablet (20 mg total) by mouth daily at bedtime 90 tablet 0     No current facility-administered medications for this visit  Additional Meds/Supplements: Vitamin B12, Vitamin C, MVI, Vitamin D3   Special Learning Needs: None   Height: HC Readings from Last 3 Encounters:   No data found for [facility]       Weight: Wt Readings from Last 3 Encounters:   22 (!) 148 kg (327 lb)   22 (!) 149 kg (329 lb)   22 (!) 149 kg (329 lb)     Body mass index is 38 78 kg/m²  Recent Weight Change: [x]Yes     []No  Amount: 2# weight loss x 2 months  Energy Needs: [place]- [place] Equation:  2395 kcals x 1 2 = 2874 - 500 kcals = 2300 kcals   No Known Allergies    Social History     Substance and Sexual Activity   Alcohol Use Yes    Alcohol/week: 1 0 standard drink    Types: 1 Cans of beer per week       Social History     Tobacco Use   Smoking Status Former Smoker    Packs/day: 1 00    Years: 15 00    Pack years: 15 00    Types: Cigarettes    Start date: 1980   Mavis Cousin Quit date: 2005    Years since quittin 5   Smokeless Tobacco Never Used       Who shops? patient   Who cooks? patient   Cooking Methods? Cooks on stove  Very rarely fries foods  Exercise: Not much, been healing foot up because of blister  Does enjoy walking;  Will walk 1x a week for around 1 mile  Prior Counseling? []Yes     [x]No  When: N/A   Why: N/A        Diet Hx:  Breakfast:    Cheese omlet or eggs with gritz or eggs with guevara and sausage  OR  Cherrios or honey nut cherrios with 1% milk    6:00 a m  Lunch:    Sometimes skip lunch  OR  Egg salad or tuna fish salad (regular villa) with saltines  OR   Soup of the day in the cafeteria  OR  Hot dog 11:30 a m  Dinner:    2 homemade Hamburgers (80-20%) with macaroni salad and baked beans  OR  2 Hot dogs with white buns potato salad and baked beans  OR  1 piece of Steak or pork chop or chicken with salad (hard boiled eggs, croutons, salad dressing and lettuce) or macaroni salad or potato salad  OR  1 5 cups of spaghetti with meat sauce  OR  Ground beef stroganoff  OR  1 5 cups of Chili with ground beef with beans with rice  After 5 p m  Snacks: AM - None  PM - None  HS - None   Fluids: 1-1 2 1 L bottles of water with powdered packets (unsure if sugar free), 1 cup of black coffee on weekends   Takeout/dineout: On weekends; Might go out for breakfast (egg, toast, potatoes, meat, and coffee)  Once in a while will get [employer] (chili or salad with chicken sandwich)        Nutrition Diagnosis:   Overweight/obesity  related to Excess energy intake as evidenced by  BMI more than normative standard for age and sex (obesity-grade II 35-39 9)  Medical Nutrition Therapy Intervention:  []Individualized Meal Plan []Understanding Lab Values   []Basic Pathophysiology of Disease []Food/Medication Interactions   []Food Diary [x]Exercise    Aim to engage in 30-45 minute walks 2x a week  [x]Lifestyle/Behavior Modification Techniques    Discussed the importance of consuming 3 meals a day without skipping meals  Explained the importance of fiber and fluids in a healthy balanced diet and weight management   Discussed food sources rich in fiber such as vegetables and fruits, legumes, whole grains, nuts/seeds and the importance of drinking fluids  Portion Control: Measure portions as often as possible for accurate calorie counting using measuring spoons/cups as able, use hands to estimate portions if kitchen utensils are not available; download [employer] [facility] for calorie tracking; Use Plate Method, half of plate should be non-starchy vegetables; reduce CHO servings to 1/3-1/4 cup  IFG: Reviewed different sources/serving sizes of CHO, and importance of pairing CHO with PRO; Focusing on protein source and vegetable when getting 2nd helping vs carbohydrate  Decreases sugar sweetened beverage consumption  Sodium: Discussed decreasing sodium intake  Don't add salt to food  Trying to consume more meals at home  Reducing processed food consumption  Choose low sodium/no salt added food products  Rinse canned foods to remove excess sodium  Use salt free seasonings to add flavors to foods  Read food labels  []Medication, Mechanism of Action   [x]Label Reading    Discussed importance reading sodium labels, 5-20% rule  []Self Blood Glucose Monitoring   [x]Weight/BMI Goals    Goals: No goal in mind  Would like to see some weight loss  []Other -    Other Notes: Farzana Davis reports mostly cooking at home  Tends to add a little bit of salt to meals  Makes homemade tomato sauce and hamburgers with 80-20% lean ground beef  Farzana Davis reports not reading food labels and was unsure if some of the items he buys is low sodium such as broth  Michellejudy Davis also notes consuming a lot of pasta weekly  Additionally, Farzana Davis consumes water with flavored juice packets but was unaware if they consumed sugar or not  RD discussing trying sugar free options if possible to help decreased amount of added sugars consumed daily  RD also discussed plate method and reviewed reading food labels  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  Stress: Sometimes has increased stress d/t life, not extreme  Sleep: Uses CPAP  Reports sleeping well  Comprehension: []Excellent  []Very Good  [x]Good  []Fair   []Poor    Receptivity: []Excellent  []Very Good  [x]Good  []Fair   []Poor    Expected Compliance: []Excellent  []Very Good  [x]Good  []Fair   []Poor        Goals:  1  Diya Morris will aim to engage in 30-45 minute walks 2x a week  2  Diya Morris will aim to follow the Plate Method to help decrease carbohydrate portion sizes and increase fruit and vegetable consumption  3  Diya Morris will aim to decrease sodium intake through reading food labels and choosing low sodium/no salt added food products  No follow-ups on file    Labs:  CMP  Lab Results   Component Value Date    K 4 0 03/18/2022     03/18/2022    CO2 25 03/18/2022    BUN 18 03/18/2022    CREATININE 1 08 03/18/2022    GLUF 75 03/18/2022    CALCIUM 8 9 03/18/2022    AST 15 03/18/2022    ALT 25 03/18/2022    ALKPHOS 66 03/18/2022    EGFR 72 03/18/2022       BMP  Lab Results   Component Value Date    CALCIUM 8 9 03/18/2022    K 4 0 03/18/2022    CO2 25 03/18/2022     03/18/2022    BUN 18 03/18/2022    CREATININE 1 08 03/18/2022       Lipids  No results found for: CHOL  Lab Results   Component Value Date    HDL 44 03/18/2022    HDL 39 (L) 10/01/2021     Lab Results   Component Value Date    LDLCALC 74 03/18/2022    LDLCALC 57 10/01/2021     Lab Results   Component Value Date    TRIG 114 03/18/2022    TRIG 144 10/01/2021     No results found for: CHOLHDL    Hemoglobin A1C  Lab Results   Component Value Date    HGBA1C 5 3 03/18/2022       Fasting Glucose  Lab Results   Component Value Date    GLUF 75 03/18/2022       Insulin     Thyroid  No results found for: TSH, R8RBKPH, N4MOWVG, THYROIDAB    Hepatic Function Panel  Lab Results   Component Value Date    ALT 25 03/18/2022    AST 15 03/18/2022    ALKPHOS 66 03/18/2022       Celiac Disease Antibody Panel  No results found for: ENDOMYSIAL IGA, GLIADIN IGA, GLIADIN IGG, IGA, TISSUE TRANSGLUT AB, TTG IGA   Iron  No results found for: IRON, TIBC, FERRITIN Vitamins  No results found for: VITAMIN B2   No results found for: NICOTINAMIDE, NICOTINIC ACID   No results found for: VITAMINB6  No results found for: [id number]  No results found for: VITB5  No results found for: [id number]  No results found for: THYROGLB  No results found for: VITAMIN K   No results found for: 25-HYDROXY VIT D   No components found for: [address]ip [address], [address], [address] [address]

## 2022-07-18 NOTE — PATIENT INSTRUCTIONS
Nutrition Goals:  1  Jered Mireles will aim to engage in 30-45 minute walks 2x a week  2  Jered Mireles will aim to follow the Plate Method to help decrease carbohydrate portion sizes and increase fruit and vegetable consumption  3  Jered Mireles will aim to decrease sodium intake through reading food labels and choosing low sodium/no salt added food products  - Try out low fat products/condiments/dressings  - Look for 5-20% rule when reading food labels    Ruben Parsons RD, KEELEYN  Email: [email or web address]

## 2022-08-01 ENCOUNTER — LAB (OUTPATIENT)
Dept: LAB | Facility: CLINIC | Age: 67
End: 2022-08-01
Payer: MEDICARE

## 2022-08-01 DIAGNOSIS — K21.9 GASTRO-ESOPHAGEAL REFLUX DISEASE WITHOUT ESOPHAGITIS: ICD-10-CM

## 2022-08-01 DIAGNOSIS — E66.01 CLASS 2 SEVERE OBESITY WITH SERIOUS COMORBIDITY AND BODY MASS INDEX (BMI) OF 38.0 TO 38.9 IN ADULT, UNSPECIFIED OBESITY TYPE (HCC): ICD-10-CM

## 2022-08-01 DIAGNOSIS — I10 PRIMARY HYPERTENSION: ICD-10-CM

## 2022-08-01 DIAGNOSIS — E78.1 PURE HYPERTRIGLYCERIDEMIA: ICD-10-CM

## 2022-08-01 DIAGNOSIS — R73.01 IFG (IMPAIRED FASTING GLUCOSE): ICD-10-CM

## 2022-08-01 DIAGNOSIS — Z12.5 PROSTATE CANCER SCREENING: ICD-10-CM

## 2022-08-01 DIAGNOSIS — E78.2 MIXED HYPERLIPIDEMIA: ICD-10-CM

## 2022-08-01 LAB
ALBUMIN SERPL BCP-MCNC: 4.3 G/DL (ref 3.5–5)
ALP SERPL-CCNC: 64 U/L (ref 34–104)
ALT SERPL W P-5'-P-CCNC: 16 U/L (ref 7–52)
ANION GAP SERPL CALCULATED.3IONS-SCNC: 8 MMOL/L (ref 4–13)
AST SERPL W P-5'-P-CCNC: 16 U/L (ref 13–39)
BILIRUB SERPL-MCNC: 0.79 MG/DL (ref 0.2–1)
BUN SERPL-MCNC: 22 MG/DL (ref 5–25)
CALCIUM SERPL-MCNC: 9.3 MG/DL (ref 8.4–10.2)
CHLORIDE SERPL-SCNC: 106 MMOL/L (ref 96–108)
CHOLEST SERPL-MCNC: 125 MG/DL
CO2 SERPL-SCNC: 25 MMOL/L (ref 21–32)
CREAT SERPL-MCNC: 1.24 MG/DL (ref 0.6–1.3)
EST. AVERAGE GLUCOSE BLD GHB EST-MCNC: 108 MG/DL
GFR SERPL CREATININE-BSD FRML MDRD: 60 ML/MIN/1.73SQ M
GLUCOSE P FAST SERPL-MCNC: 90 MG/DL (ref 65–99)
HBA1C MFR BLD: 5.4 %
HDLC SERPL-MCNC: 39 MG/DL
LDLC SERPL CALC-MCNC: 50 MG/DL (ref 0–100)
LDLC SERPL DIRECT ASSAY-MCNC: 66 MG/DL (ref 0–100)
MAGNESIUM SERPL-MCNC: 1.9 MG/DL (ref 1.9–2.7)
NONHDLC SERPL-MCNC: 86 MG/DL
POTASSIUM SERPL-SCNC: 4.2 MMOL/L (ref 3.5–5.3)
PROT SERPL-MCNC: 6.9 G/DL (ref 6.4–8.4)
SODIUM SERPL-SCNC: 139 MMOL/L (ref 135–147)
TRIGL SERPL-MCNC: 178 MG/DL
TSH SERPL DL<=0.05 MIU/L-ACNC: 1.55 UIU/ML (ref 0.45–4.5)

## 2022-08-01 PROCEDURE — 36415 COLL VENOUS BLD VENIPUNCTURE: CPT

## 2022-08-01 PROCEDURE — 80061 LIPID PANEL: CPT

## 2022-08-01 PROCEDURE — 82306 VITAMIN D 25 HYDROXY: CPT

## 2022-08-01 PROCEDURE — 83735 ASSAY OF MAGNESIUM: CPT

## 2022-08-01 PROCEDURE — 83721 ASSAY OF BLOOD LIPOPROTEIN: CPT

## 2022-08-01 PROCEDURE — 83036 HEMOGLOBIN GLYCOSYLATED A1C: CPT

## 2022-08-01 PROCEDURE — 80053 COMPREHEN METABOLIC PANEL: CPT

## 2022-08-01 PROCEDURE — G0103 PSA SCREENING: HCPCS

## 2022-08-01 PROCEDURE — 84443 ASSAY THYROID STIM HORMONE: CPT

## 2022-08-02 LAB
25(OH)D3 SERPL-MCNC: 81.4 NG/ML (ref 30–100)
DME PARACHUTE DELIVERY DATE REQUESTED: NORMAL
DME PARACHUTE SUPPLIER NAME: NORMAL
DME PARACHUTE SUPPLIER PHONE: NORMAL
PSA SERPL-MCNC: 0.8 NG/ML (ref 0–4)

## 2022-08-08 DIAGNOSIS — K25.9 GASTRIC EROSION, UNSPECIFIED CHRONICITY: ICD-10-CM

## 2022-08-08 DIAGNOSIS — K20.90 ESOPHAGITIS: ICD-10-CM

## 2022-08-08 RX ORDER — OMEPRAZOLE 40 MG/1
CAPSULE, DELAYED RELEASE ORAL
Qty: 120 CAPSULE | Refills: 0 | Status: CANCELLED | OUTPATIENT
Start: 2022-08-08 | End: 2022-11-06

## 2022-08-09 NOTE — TELEPHONE ENCOUNTER
Patient was last seen 04/14/2022 for a colon/egd procedure patient was told to no longer take the medication     Renny Al PA-C  to Darnell Chakraborty          1:48 PM  Bran Burgos,     I sent over the medication to the [facility] for you  If there is any problems with insurance coverage please let me know  Dr Vangie Neff would like you to take the medication as recommended below:     Omeprazole 40 milligrams 2 times a day for 1 month, THEN 40 milligrams 1 time a day for 2 months, then you can stop      Renny Al PA-C    Last read by Celi Lezama at 12:03 PM on 5/11/2022

## 2022-08-22 ENCOUNTER — OFFICE VISIT (OUTPATIENT)
Dept: FAMILY MEDICINE CLINIC | Facility: CLINIC | Age: 67
End: 2022-08-22
Payer: MEDICARE

## 2022-08-22 VITALS
OXYGEN SATURATION: 96 % | DIASTOLIC BLOOD PRESSURE: 80 MMHG | BODY MASS INDEX: 37.19 KG/M2 | HEART RATE: 69 BPM | TEMPERATURE: 97.1 F | SYSTOLIC BLOOD PRESSURE: 126 MMHG | HEIGHT: 77 IN | WEIGHT: 315 LBS

## 2022-08-22 DIAGNOSIS — Z99.89 OSA ON CPAP: ICD-10-CM

## 2022-08-22 DIAGNOSIS — R73.01 IFG (IMPAIRED FASTING GLUCOSE): ICD-10-CM

## 2022-08-22 DIAGNOSIS — E78.1 PURE HYPERTRIGLYCERIDEMIA: ICD-10-CM

## 2022-08-22 DIAGNOSIS — I10 PRIMARY HYPERTENSION: ICD-10-CM

## 2022-08-22 DIAGNOSIS — E78.2 MIXED HYPERLIPIDEMIA: ICD-10-CM

## 2022-08-22 DIAGNOSIS — E66.01 CLASS 2 SEVERE OBESITY WITH SERIOUS COMORBIDITY AND BODY MASS INDEX (BMI) OF 39.0 TO 39.9 IN ADULT, UNSPECIFIED OBESITY TYPE (HCC): ICD-10-CM

## 2022-08-22 DIAGNOSIS — G47.33 OSA ON CPAP: ICD-10-CM

## 2022-08-22 DIAGNOSIS — G62.9 PERIPHERAL POLYNEUROPATHY: ICD-10-CM

## 2022-08-22 DIAGNOSIS — Z86.010 HISTORY OF COLON POLYPS: ICD-10-CM

## 2022-08-22 DIAGNOSIS — Z23 ENCOUNTER FOR IMMUNIZATION: ICD-10-CM

## 2022-08-22 DIAGNOSIS — Z13.6 SCREENING FOR AAA (ABDOMINAL AORTIC ANEURYSM): ICD-10-CM

## 2022-08-22 DIAGNOSIS — Z00.00 WELCOME TO MEDICARE PREVENTIVE VISIT: Primary | ICD-10-CM

## 2022-08-22 DIAGNOSIS — K21.9 GASTRO-ESOPHAGEAL REFLUX DISEASE WITHOUT ESOPHAGITIS: ICD-10-CM

## 2022-08-22 PROBLEM — T31.0 BURN (ANY DEGREE) INVOLVING LESS THAN 10% OF BODY SURFACE: Status: RESOLVED | Noted: 2021-08-11 | Resolved: 2022-08-22

## 2022-08-22 PROBLEM — T24.391A: Status: RESOLVED | Noted: 2021-08-18 | Resolved: 2022-08-22

## 2022-08-22 PROBLEM — T24.291A: Status: RESOLVED | Noted: 2021-08-11 | Resolved: 2022-08-22

## 2022-08-22 PROCEDURE — G0402 INITIAL PREVENTIVE EXAM: HCPCS | Performed by: FAMILY MEDICINE

## 2022-08-22 PROCEDURE — 90677 PCV20 VACCINE IM: CPT

## 2022-08-22 PROCEDURE — 99214 OFFICE O/P EST MOD 30 MIN: CPT | Performed by: FAMILY MEDICINE

## 2022-08-22 PROCEDURE — G0009 ADMIN PNEUMOCOCCAL VACCINE: HCPCS

## 2022-08-22 RX ORDER — SIMVASTATIN 20 MG
20 TABLET ORAL
Qty: 90 TABLET | Refills: 2 | Status: SHIPPED | OUTPATIENT
Start: 2022-08-22

## 2022-08-22 RX ORDER — LOSARTAN POTASSIUM 50 MG/1
50 TABLET ORAL DAILY
Qty: 90 TABLET | Refills: 2 | Status: SHIPPED | OUTPATIENT
Start: 2022-08-22

## 2022-08-22 RX ORDER — METFORMIN HYDROCHLORIDE 500 MG/1
1500 TABLET, EXTENDED RELEASE ORAL
Qty: 180 TABLET | Refills: 2 | Status: SHIPPED | OUTPATIENT
Start: 2022-08-22

## 2022-08-22 NOTE — PROGRESS NOTES
Assessment/Plan:  Problem List Items Addressed This Visit        Digestive    Gastro-esophageal reflux disease without esophagitis     Stable  Management per GI  Continue Pepcid 20mg BID PRN  Watch GERD diet  Endocrine    IFG (impaired fasting glucose)     Stable  Continue Metformin XR 500mg 3 pills in AM   Patient previously declined [place]  Recommend lifestyle modifications  Relevant Medications    metFORMIN (GLUCOPHAGE-XR) 500 mg 24 hr tablet    Other Relevant Orders    Comprehensive metabolic panel    Hemoglobin A1C       Respiratory    JOVANY on CPAP     Management per Sleep Medicine  Continue CPAP  Cardiovascular and Mediastinum    Hypertension     Stable  Continue increased Losartan 50mg daily  Check blood pressure outside of office  Recommend lifestyle modifications  Relevant Medications    losartan (COZAAR) 50 mg tablet    Other Relevant Orders    Comprehensive metabolic panel       Nervous and Auditory    Peripheral neuropathy     Stables meds  Management per Neuro and Podiatry  Other    Mixed hyperlipidemia     Stable  Recommend lifestyle modifications  Relevant Medications    simvastatin (ZOCOR) 20 mg tablet    Other Relevant Orders    Comprehensive metabolic panel    TSH, 3rd generation with Free T4 reflex    LDL cholesterol, direct    Class 2 severe obesity with serious comorbidity and body mass index (BMI) of 39 0 to 39 9 in adult [facility])     Worsening  Recommend lifestyle modifications  To consider Weight Management in future PRN? History of colon polyps     Colonoscopy is up-to-date  Pure hypertriglyceridemia     Stable  Recommend lifestyle modifications             Relevant Medications    simvastatin (ZOCOR) 20 mg tablet      Other Visit Diagnoses     Welcome to Medicare preventive visit    -  Primary    Encounter for immunization        Relevant Orders    Pneumococcal Conjugate Vaccine 20-valent (Pcv20) (Completed)    Screening for AAA (abdominal aortic aneurysm)        Relevant Orders    US abdominal aorta screening aaa           Return in about 4 months (around 12/22/2022) for 4mo- IFG, HTN, HL, GERD, PN, JOVANY, Obesity, Labs         Future Appointments   Date Time Provider [place]   9/26/2022 12:00 PM [facility] [address]   11/21/2022  2:45 PM Kelli Snyder MD [facility]   1/9/2023  8:40 AM Justen Mart DO [facility]        Subjective:     Karen Garduno is a 74 y o  male who presents today for a follow-up on his chronic medical conditions  HPI:  Chief Complaint   Patient presents with    Medicare Wellness Visit     Vision test normal      Immunizations     PCV 20 okay  -- Above per clinical staff and reviewed  --      HPI      Today:      Return in about 4 months (around 7/25/2022) for WMC/4mo- IFG, HTN, HL, GERD, PN, JOVANY, M Obesity, Labs  Desires PSA and JENA per PCP (after colonoscopy 4/14/22)     Morbid Obesity - Watching diet   No Regular exercise - Previously Walking 20 minutes, 2 times per week  [employer] [employer] during his job        IFG -On Metformin XR 500mg 3 pills QD x 4 months        HTN - On increased Losartan 50mg QD   D/C Atenolol 25mg QD as not 1st line HTN Rx    No other HTN meds previously   No BP check outside office        Hyperlipidemia - On Zocor 20mg QHS   No higher dose or other statin previously        GERD - Management per GI Dr Constance Hamilton  Next appt PRN  s/p EGD 4/14/22  On Pepcid 20mg BID PRN  - using daily  D/C Protonix 40mg QD since 2015 (no lower dose previously), previously on Nexium, but D/C due to clinical formulary change       Peripheral Neuropathy - Management per Neuro Dr Moraima Zazueta - next appt 5/23 and Podiatry Dr Mahi Ho - next appt 4/22   Genetic   Has B/L LE Neuropathy from knees distally and hands   S/p EMG/NCS     Referred to Podiatry and Neurology 9/27/21        Left Foot Ulcer - Management per Podiatry   David Garduno  Next appt PRN?        JOVANY - On CPAP   Management per Sleep Medicine Dr Thomas Do appt 11/22   Last Sleep Study 2016?      H/O Colon Polyps - Management per GI Dr Natalie Amos  Next colonoscopy due 4/14/23      Had 2 Shingrix - med records requested - pharmacy lost records           Reviewed:  Labs 8/1/22, Burn Surgery 3/4/22, Neuro 5/31/22, Sleep Medicine 5/23/22, Podiatry 4/20/22     No Uro           PHQ-2/9 Depression Screening    Little interest or pleasure in doing things: 0 - not at all  Feeling down, depressed, or hopeless: 0 - not at all  PHQ-2 Score: 0  PHQ-2 Interpretation: Negative depression screen             The following portions of the patient's history were reviewed and updated as appropriate: allergies, current medications, past family history, past medical history, past social history, past surgical history and problem list       Review of Systems   Constitutional: Negative for appetite change, chills, diaphoresis, fatigue and fever  Respiratory: Negative for chest tightness and shortness of breath  Cardiovascular: Negative for chest pain  Gastrointestinal: Negative for abdominal pain, blood in stool, diarrhea, nausea and vomiting  Genitourinary: Negative for dysuria          Current Outpatient Medications   Medication Sig Dispense Refill    Ascorbic Acid (Vitamin C) 250 MG CHEW Chew 250 mg daily Take 2 chews daily       Cholecalciferol 125 MCG (5000 UT) TABS Take 5,000 Units by mouth daily        cyanocobalamin (VITAMIN B-12) 1000 MCG tablet Take 1,000 mcg by mouth daily      famotidine (PEPCID) 20 mg tablet Take 1 tablet (20 mg total) by mouth 2 (two) times a day as needed for heartburn 180 tablet 2    ibuprofen (MOTRIN) 200 mg tablet Take 400 mg by mouth every 6 (six) hours as needed        losartan (COZAAR) 50 mg tablet Take 1 tablet (50 mg total) by mouth daily 90 tablet 2    metFORMIN (GLUCOPHAGE-XR) 500 mg 24 hr tablet Take 3 tablets (1,500 mg total) by mouth daily with dinner Increase as directed  180 tablet 2    multivitamin (THERAGRAN) TABS Take 1 tablet by mouth daily      simvastatin (ZOCOR) 20 mg tablet Take 1 tablet (20 mg total) by mouth daily at bedtime 90 tablet 2     No current facility-administered medications for this visit  Objective:  /80   Pulse 69   Temp (!) 97 1 °F (36 2 °C)   Ht 6' 5" (1 956 m)   Wt (!) 150 kg (331 lb 3 2 oz)   SpO2 96%   BMI 39 27 kg/m²    Wt Readings from Last 3 Encounters:   08/22/22 (!) 150 kg (331 lb 3 2 oz)   07/18/22 (!) 148 kg (327 lb)   05/31/22 (!) 149 kg (329 lb)      BP Readings from Last 3 Encounters:   08/22/22 126/80   05/31/22 120/80   05/25/22 149/82          Physical Exam  Vitals and nursing note reviewed  Constitutional:       Appearance: Normal appearance  He is well-developed  He is obese  HENT:      Head: Normocephalic and atraumatic  Eyes:      Conjunctiva/sclera: Conjunctivae normal    Neck:      Thyroid: No thyromegaly  Vascular: No carotid bruit  Cardiovascular:      Rate and Rhythm: Normal rate and regular rhythm  Pulses: Normal pulses  Heart sounds: Normal heart sounds  Pulmonary:      Effort: Pulmonary effort is normal       Breath sounds: Normal breath sounds  Abdominal:      General: Bowel sounds are normal  There is no distension  Palpations: Abdomen is soft  There is no mass  Tenderness: There is no abdominal tenderness  There is no guarding or rebound  Musculoskeletal:         General: No swelling or tenderness  Cervical back: Neck supple  Right lower leg: No edema  Left lower leg: No edema  Neurological:      General: No focal deficit present  Mental Status: He is alert and oriented to person, place, and time  Psychiatric:         Mood and Affect: Mood normal          Behavior: Behavior normal          Thought Content:  Thought content normal          Judgment: Judgment normal          Lab Results:      Lab Results   Component Value Date    WBC 8 10 12/21/2021    HGB 14 7 12/21/2021    HCT 43 4 12/21/2021     12/21/2021    TRIG 178 (H) 08/01/2022    HDL 39 (L) 08/01/2022    LDLDIRECT 66 08/01/2022    ALT 16 08/01/2022    AST 16 08/01/2022    K 4 2 08/01/2022     08/01/2022    CREATININE 1 24 08/01/2022    BUN 22 08/01/2022    CO2 25 08/01/2022    PSA 0 8 08/01/2022    INR 1 13 12/21/2021    GLUF 90 08/01/2022    HGBA1C 5 4 08/01/2022     No results found for: URICACID  Invalid input(s): BASENAME Vitamin D    No results found       POCT Labs

## 2022-08-22 NOTE — PROGRESS NOTES
08/22/22 0910   Financial Resource Strain   How hard is it for you to pay for the very basics like food, housing, medical care, and heating? Not very   Transportation Needs   In the past 12 months, has lack of transportation kept you from medical appointments or from getting medications? no   In the past 12 months, has lack of transportation kept you from meetings, work, or from getting things needed for daily living?  No

## 2022-08-22 NOTE — ASSESSMENT & PLAN NOTE
Stable  Continue increased Losartan 50mg daily  Check blood pressure outside of office  Recommend lifestyle modifications

## 2022-08-22 NOTE — PATIENT INSTRUCTIONS
Medicare Preventive Visit Patient Instructions  Thank you for completing your Welcome to Medicare Visit or Medicare Annual Wellness Visit today  Your next wellness visit will be due in one year (8/23/2023)  The screening/preventive services that you may require over the next 5-10 years are detailed below  Some tests may not apply to you based off risk factors and/or age  Screening tests ordered at today's visit but not completed yet may show as past due  Also, please note that scanned in results may not display below  Preventive Screenings:  Service Recommendations Previous Testing/Comments   Colorectal Cancer Screening  · Colonoscopy    · Fecal Occult Blood Test (FOBT)/Fecal Immunochemical Test (FIT)  · Fecal DNA/Cologuard Test  · Flexible Sigmoidoscopy Age: 39-70 years old   Colonoscopy: every 10 years (May be performed more frequently if at higher risk)  OR  FOBT/FIT: every 1 year  OR  Cologuard: every 3 years  OR  Sigmoidoscopy: every 5 years  Screening may be recommended earlier than age 39 if at higher risk for colorectal cancer  Also, an individualized decision between you and your healthcare provider will decide whether screening between the ages of 74-80 would be appropriate  Colonoscopy: 04/14/2022  FOBT/FIT: Not on file  Cologuard: Not on file  Sigmoidoscopy: Not on file          Prostate Cancer Screening Individualized decision between patient and health care provider in men between ages of 53-78   Medicare will cover every 12 months beginning on the day after your 50th birthday PSA: 0 8 ng/mL           Hepatitis C Screening Once for adults born between 1945 and 1965  More frequently in patients at high risk for Hepatitis C Hep C Antibody: 10/01/2021        Diabetes Screening 1-2 times per year if you're at risk for diabetes or have pre-diabetes Fasting glucose: 90 mg/dL (8/1/2022)  A1C: 5 4 % (8/1/2022)      Cholesterol Screening Once every 5 years if you don't have a lipid disorder   May order more often based on risk factors  Lipid panel: 08/01/2022         Other Preventive Screenings Covered by Medicare:  1  Abdominal Aortic Aneurysm (AAA) Screening: covered once if your at risk  You're considered to be at risk if you have a family history of AAA or a male between the age of 73-68 who smoking at least 100 cigarettes in your lifetime  2  Lung Cancer Screening: covers low dose CT scan once per year if you meet all of the following conditions: (1) Age 50-69; (2) No signs or symptoms of lung cancer; (3) Current smoker or have quit smoking within the last 15 years; (4) You have a tobacco smoking history of at least 20 pack years (packs per day x number of years you smoked); (5) You get a written order from a healthcare provider  3  Glaucoma Screening: covered annually if you're considered high risk: (1) You have diabetes OR (2) Family history of glaucoma OR (3)  aged 48 and older OR (3)  American aged 72 and older  3  Osteoporosis Screening: covered every 2 years if you meet one of the following conditions: (1) Have a vertebral abnormality; (2) On glucocorticoid therapy for more than 3 months; (3) Have primary hyperparathyroidism; (4) On osteoporosis medications and need to assess response to drug therapy  5  HIV Screening: covered annually if you're between the age of 12-76  Also covered annually if you are younger than 13 and older than 72 with risk factors for HIV infection  For pregnant patients, it is covered up to 3 times per pregnancy      Immunizations:  Immunization Recommendations   Influenza Vaccine Annual influenza vaccination during flu season is recommended for all persons aged >= 6 months who do not have contraindications   Pneumococcal Vaccine   * Pneumococcal conjugate vaccine = PCV13 (Prevnar 13), PCV15 (Vaxneuvance), PCV20 (Prevnar 20)  * Pneumococcal polysaccharide vaccine = PPSV23 (Pneumovax) Adults 25-60 years old: 1-3 doses may be recommended based on certain risk factors  Adults 72 years old: 1-2 doses may be recommended based off what pneumonia vaccine you previously received   Hepatitis B Vaccine 3 dose series if at intermediate or high risk (ex: diabetes, end stage renal disease, liver disease)   Tetanus (Td) Vaccine - COST NOT COVERED BY MEDICARE PART B Following completion of primary series, a booster dose should be given every 10 years to maintain immunity against tetanus  Td may also be given as tetanus wound prophylaxis  Tdap Vaccine - COST NOT COVERED BY MEDICARE PART B Recommended at least once for all adults  For pregnant patients, recommended with each pregnancy  Shingles Vaccine (Shingrix) - COST NOT COVERED BY MEDICARE PART B  2 shot series recommended in those aged 48 and above     Health Maintenance Due:      Topic Date Due    Colorectal Cancer Screening  04/13/2025    HIV Screening  Completed    Hepatitis C Screening  Completed     Immunizations Due:      Topic Date Due    COVID-19 Vaccine (4 - Booster for Moderna series) 05/26/2022    Pneumococcal Vaccine: 65+ Years (1 - PCV) Never done    Influenza Vaccine (1) 09/01/2022     Advance Directives   What are advance directives? Advance directives are legal documents that state your wishes and plans for medical care  These plans are made ahead of time in case you lose your ability to make decisions for yourself  Advance directives can apply to any medical decision, such as the treatments you want, and if you want to donate organs  What are the types of advance directives? There are many types of advance directives, and each state has rules about how to use them  You may choose a combination of any of the following:  · Living will: This is a written record of the treatment you want  You can also choose which treatments you do not want, which to limit, and which to stop at a certain time  This includes surgery, medicine, IV fluid, and tube feedings     · Durable power of  for healthcare [place] [facility]): This is a written record that states who you want to make healthcare choices for you when you are unable to make them for yourself  This person, called a proxy, is usually a family member or a friend  You may choose more than 1 proxy  · Do not resuscitate (DNR) order:  A DNR order is used in case your heart stops beating or you stop breathing  It is a request not to have certain forms of treatment, such as CPR  A DNR order may be included in other types of advance directives  · Medical directive: This covers the care that you want if you are in a coma, near death, or unable to make decisions for yourself  You can list the treatments you want for each condition  Treatment may include pain medicine, surgery, blood transfusions, dialysis, IV or tube feedings, and a ventilator (breathing machine)  · Values history: This document has questions about your views, beliefs, and how you feel and think about life  This information can help others choose the care that you would choose  Why are advance directives important? An advance directive helps you control your care  Although spoken wishes may be used, it is better to have your wishes written down  Spoken wishes can be misunderstood, or not followed  Treatments may be given even if you do not want them  An advance directive may make it easier for your family to make difficult choices about your care  Weight Management   Why it is important to manage your weight:  Being overweight increases your risk of health conditions such as heart disease, high blood pressure, type 2 diabetes, and certain types of cancer  It can also increase your risk for osteoarthritis, sleep apnea, and other respiratory problems  Aim for a slow, steady weight loss  Even a small amount of weight loss can lower your risk of health problems  How to lose weight safely:  A safe and healthy way to lose weight is to eat fewer calories and get regular exercise   You can lose up about 1 pound a week by decreasing the number of calories you eat by 500 calories each day  Healthy meal plan for weight management:  A healthy meal plan includes a variety of foods, contains fewer calories, and helps you stay healthy  A healthy meal plan includes the following:  · Eat whole-grain foods more often  A healthy meal plan should contain fiber  Fiber is the part of grains, fruits, and vegetables that is not broken down by your body  Whole-grain foods are healthy and provide extra fiber in your diet  Some examples of whole-grain foods are whole-wheat breads and pastas, oatmeal, brown rice, and bulgur  · Eat a variety of vegetables every day  Include dark, leafy greens such as spinach, kale, camille greens, and mustard greens  Eat yellow and orange vegetables such as carrots, sweet potatoes, and winter squash  · Eat a variety of fruits every day  Choose fresh or canned fruit (canned in its own juice or light syrup) instead of juice  Fruit juice has very little or no fiber  · Eat low-fat dairy foods  Drink fat-free (skim) milk or 1% milk  Eat fat-free yogurt and low-fat cottage cheese  Try low-fat cheeses such as mozzarella and other reduced-fat cheeses  · Choose meat and other protein foods that are low in fat  Choose beans or other legumes such as split peas or lentils  Choose fish, skinless poultry (chicken or turkey), or lean cuts of red meat (beef or pork)  Before you cook meat or poultry, cut off any visible fat  · Use less fat and oil  Try baking foods instead of frying them  Add less fat, such as margarine, sour cream, regular salad dressing and mayonnaise to foods  Eat fewer high-fat foods  Some examples of high-fat foods include french fries, doughnuts, ice cream, and cakes  · Eat fewer sweets  Limit foods and drinks that are high in sugar  This includes candy, cookies, regular soda, and sweetened drinks  Exercise:  Exercise at least 30 minutes per day on most days of the week   Some examples of exercise include walking, biking, dancing, and swimming  You can also fit in more physical activity by taking the stairs instead of the elevator or parking farther away from stores  Ask your healthcare provider about the best exercise plan for you  © Copyright [employer] 2018 Information is for End User's use only and may not be sold, redistributed or otherwise used for commercial purposes  All illustrations and images included in CareNotes® are the copyrighted property of [employer]  or [facility] Preventive Visit Patient Instructions  Thank you for completing your Welcome to Medicare Visit or Medicare Annual Wellness Visit today  Your next wellness visit will be due in one year (8/23/2023)  The screening/preventive services that you may require over the next 5-10 years are detailed below  Some tests may not apply to you based off risk factors and/or age  Screening tests ordered at today's visit but not completed yet may show as past due  Also, please note that scanned in results may not display below  Preventive Screenings:  Service Recommendations Previous Testing/Comments   Colorectal Cancer Screening  · Colonoscopy    · Fecal Occult Blood Test (FOBT)/Fecal Immunochemical Test (FIT)  · Fecal DNA/Cologuard Test  · Flexible Sigmoidoscopy Age: 39-70 years old   Colonoscopy: every 10 years (May be performed more frequently if at higher risk)  OR  FOBT/FIT: every 1 year  OR  Cologuard: every 3 years  OR  Sigmoidoscopy: every 5 years  Screening may be recommended earlier than age 39 if at higher risk for colorectal cancer  Also, an individualized decision between you and your healthcare provider will decide whether screening between the ages of 74-80 would be appropriate   Colonoscopy: 04/14/2022  FOBT/FIT: Not on file  Cologuard: Not on file  Sigmoidoscopy: Not on file    Screening Current     Prostate Cancer Screening Individualized decision between patient and health care provider in men between ages of 53-78   Medicare will cover every 12 months beginning on the day after your 50th birthday PSA: 0 8 ng/mL     Screening Current     Hepatitis C Screening Once for adults born between 1945 and 1965  More frequently in patients at high risk for Hepatitis C Hep C Antibody: 10/01/2021    Screening Current   Diabetes Screening 1-2 times per year if you're at risk for diabetes or have pre-diabetes Fasting glucose: 90 mg/dL (8/1/2022)  A1C: 5 4 % (8/1/2022)  Screening Current   Cholesterol Screening Once every 5 years if you don't have a lipid disorder  May order more often based on risk factors  Lipid panel: 08/01/2022  Screening Not Indicated  History Lipid Disorder      Other Preventive Screenings Covered by Medicare:  6  Abdominal Aortic Aneurysm (AAA) Screening: covered once if your at risk  You're considered to be at risk if you have a family history of AAA or a male between the age of 73-68 who smoking at least 100 cigarettes in your lifetime  7  Lung Cancer Screening: covers low dose CT scan once per year if you meet all of the following conditions: (1) Age 50-69; (2) No signs or symptoms of lung cancer; (3) Current smoker or have quit smoking within the last 15 years; (4) You have a tobacco smoking history of at least 20 pack years (packs per day x number of years you smoked); (5) You get a written order from a healthcare provider  8  Glaucoma Screening: covered annually if you're considered high risk: (1) You have diabetes OR (2) Family history of glaucoma OR (3)  aged 48 and older OR (3)  American aged 72 and older  5  Osteoporosis Screening: covered every 2 years if you meet one of the following conditions: (1) Have a vertebral abnormality; (2) On glucocorticoid therapy for more than 3 months; (3) Have primary hyperparathyroidism; (4) On osteoporosis medications and need to assess response to drug therapy    10  HIV Screening: covered annually if you're between the age of 12-76  Also covered annually if you are younger than 13 and older than 72 with risk factors for HIV infection  For pregnant patients, it is covered up to 3 times per pregnancy  Immunizations:  Immunization Recommendations   Influenza Vaccine Annual influenza vaccination during flu season is recommended for all persons aged >= 6 months who do not have contraindications   Pneumococcal Vaccine   * Pneumococcal conjugate vaccine = PCV13 (Prevnar 13), PCV15 (Vaxneuvance), PCV20 (Prevnar 20)  * Pneumococcal polysaccharide vaccine = PPSV23 (Pneumovax) Adults 25-60 years old: 1-3 doses may be recommended based on certain risk factors  Adults 72 years old: 1-2 doses may be recommended based off what pneumonia vaccine you previously received   Hepatitis B Vaccine 3 dose series if at intermediate or high risk (ex: diabetes, end stage renal disease, liver disease)   Tetanus (Td) Vaccine - COST NOT COVERED BY MEDICARE PART B Following completion of primary series, a booster dose should be given every 10 years to maintain immunity against tetanus  Td may also be given as tetanus wound prophylaxis  Tdap Vaccine - COST NOT COVERED BY MEDICARE PART B Recommended at least once for all adults  For pregnant patients, recommended with each pregnancy  Shingles Vaccine (Shingrix) - COST NOT COVERED BY MEDICARE PART B  2 shot series recommended in those aged 48 and above     Health Maintenance Due:      Topic Date Due    Colorectal Cancer Screening  04/13/2025    HIV Screening  Completed    Hepatitis C Screening  Completed     Immunizations Due:      Topic Date Due    Pneumococcal Vaccine: 65+ Years (1 - PCV) Never done    Influenza Vaccine (1) 09/01/2022     Advance Directives   What are advance directives? Advance directives are legal documents that state your wishes and plans for medical care  These plans are made ahead of time in case you lose your ability to make decisions for yourself   Advance directives can apply to any medical decision, such as the treatments you want, and if you want to donate organs  What are the types of advance directives? There are many types of advance directives, and each state has rules about how to use them  You may choose a combination of any of the following:  · Living will: This is a written record of the treatment you want  You can also choose which treatments you do not want, which to limit, and which to stop at a certain time  This includes surgery, medicine, IV fluid, and tube feedings  · Durable power of  for healthcare [place] [facility]): This is a written record that states who you want to make healthcare choices for you when you are unable to make them for yourself  This person, called a proxy, is usually a family member or a friend  You may choose more than 1 proxy  · Do not resuscitate (DNR) order:  A DNR order is used in case your heart stops beating or you stop breathing  It is a request not to have certain forms of treatment, such as CPR  A DNR order may be included in other types of advance directives  · Medical directive: This covers the care that you want if you are in a coma, near death, or unable to make decisions for yourself  You can list the treatments you want for each condition  Treatment may include pain medicine, surgery, blood transfusions, dialysis, IV or tube feedings, and a ventilator (breathing machine)  · Values history: This document has questions about your views, beliefs, and how you feel and think about life  This information can help others choose the care that you would choose  Why are advance directives important? An advance directive helps you control your care  Although spoken wishes may be used, it is better to have your wishes written down  Spoken wishes can be misunderstood, or not followed  Treatments may be given even if you do not want them   An advance directive may make it easier for your family to make difficult choices about your care  Weight Management   Why it is important to manage your weight:  Being overweight increases your risk of health conditions such as heart disease, high blood pressure, type 2 diabetes, and certain types of cancer  It can also increase your risk for osteoarthritis, sleep apnea, and other respiratory problems  Aim for a slow, steady weight loss  Even a small amount of weight loss can lower your risk of health problems  How to lose weight safely:  A safe and healthy way to lose weight is to eat fewer calories and get regular exercise  You can lose up about 1 pound a week by decreasing the number of calories you eat by 500 calories each day  Healthy meal plan for weight management:  A healthy meal plan includes a variety of foods, contains fewer calories, and helps you stay healthy  A healthy meal plan includes the following:  · Eat whole-grain foods more often  A healthy meal plan should contain fiber  Fiber is the part of grains, fruits, and vegetables that is not broken down by your body  Whole-grain foods are healthy and provide extra fiber in your diet  Some examples of whole-grain foods are whole-wheat breads and pastas, oatmeal, brown rice, and bulgur  · Eat a variety of vegetables every day  Include dark, leafy greens such as spinach, kale, camille greens, and mustard greens  Eat yellow and orange vegetables such as carrots, sweet potatoes, and winter squash  · Eat a variety of fruits every day  Choose fresh or canned fruit (canned in its own juice or light syrup) instead of juice  Fruit juice has very little or no fiber  · Eat low-fat dairy foods  Drink fat-free (skim) milk or 1% milk  Eat fat-free yogurt and low-fat cottage cheese  Try low-fat cheeses such as mozzarella and other reduced-fat cheeses  · Choose meat and other protein foods that are low in fat  Choose beans or other legumes such as split peas or lentils   Choose fish, skinless poultry (chicken or turkey), or lean cuts of red meat (beef or pork)  Before you cook meat or poultry, cut off any visible fat  · Use less fat and oil  Try baking foods instead of frying them  Add less fat, such as margarine, sour cream, regular salad dressing and mayonnaise to foods  Eat fewer high-fat foods  Some examples of high-fat foods include french fries, doughnuts, ice cream, and cakes  · Eat fewer sweets  Limit foods and drinks that are high in sugar  This includes candy, cookies, regular soda, and sweetened drinks  Exercise:  Exercise at least 30 minutes per day on most days of the week  Some examples of exercise include walking, biking, dancing, and swimming  You can also fit in more physical activity by taking the stairs instead of the elevator or parking farther away from stores  Ask your healthcare provider about the best exercise plan for you  © Copyright [employer] 2018 Information is for End User's use only and may not be sold, redistributed or otherwise used for commercial purposes   All illustrations and images included in CareNotes® are the copyrighted property of [employer]  or [address] [place] [address]

## 2022-08-22 NOTE — ASSESSMENT & PLAN NOTE
Stable  Continue Metformin XR 500mg 3 pills in AM   Patient previously declined [address]  Recommend lifestyle modifications

## 2022-08-22 NOTE — PROGRESS NOTES
Patient declines [facility] screening EKG  Assessment and Plan:     Problem List Items Addressed This Visit        Digestive    Gastro-esophageal reflux disease without esophagitis     Stable  Management per GI  Continue Pepcid 20mg BID PRN  Watch GERD diet  Endocrine    IFG (impaired fasting glucose)     Stable  Continue Metformin XR 500mg 3 pills in AM   Patient previously declined [address]  Recommend lifestyle modifications  Relevant Medications    metFORMIN (GLUCOPHAGE-XR) 500 mg 24 hr tablet    Other Relevant Orders    Comprehensive metabolic panel    Hemoglobin A1C       Respiratory    JOVANY on CPAP     Management per Sleep Medicine  Continue CPAP  Cardiovascular and Mediastinum    Hypertension     Stable  Continue increased Losartan 50mg daily  Check blood pressure outside of office  Recommend lifestyle modifications  Relevant Medications    losartan (COZAAR) 50 mg tablet    Other Relevant Orders    Comprehensive metabolic panel       Nervous and Auditory    Peripheral neuropathy     Stables meds  Management per Neuro and Podiatry  Other    Mixed hyperlipidemia     Stable  Recommend lifestyle modifications  Relevant Medications    simvastatin (ZOCOR) 20 mg tablet    Other Relevant Orders    Comprehensive metabolic panel    TSH, 3rd generation with Free T4 reflex    LDL cholesterol, direct    Class 2 severe obesity with serious comorbidity and body mass index (BMI) of 39 0 to 39 9 in adult [facility])     Worsening  Recommend lifestyle modifications  To consider Weight Management in future PRN? History of colon polyps     Colonoscopy is up-to-date  Pure hypertriglyceridemia     Stable  Recommend lifestyle modifications             Relevant Medications    simvastatin (ZOCOR) 20 mg tablet      Other Visit Diagnoses     Welcome to Medicare preventive visit    -  Primary    Encounter for immunization        Relevant Orders Pneumococcal Conjugate Vaccine 20-valent (Pcv20) (Completed)    Screening for AAA (abdominal aortic aneurysm)        Relevant Orders    US abdominal aorta screening aaa           Preventive health issues were discussed with patient, and age appropriate screening tests were ordered as noted in patient's After Visit Summary  Personalized health advice and appropriate referrals for health education or preventive services given if needed, as noted in patient's After Visit Summary  History of Present Illness:     Patient presents for a Medicare Wellness Visit    HPI   Patient Care Team:  Eli Simon DO as PCP - General (Family Medicine)     Review of Systems:     Review of Systems     See other note           Problem List:     Patient Active Problem List   Diagnosis    Hypertension    Mixed hyperlipidemia    Gastro-esophageal reflux disease without esophagitis    Class 2 severe obesity with serious comorbidity and body mass index (BMI) of 39 0 to 39 9 in adult [facility])    Peripheral neuropathy    Drusen (degenerative) of macula, bilateral    Myopia, bilateral    Pinguecula, bilateral    Regular astigmatism, bilateral    Presbyopia    Pure hypertriglyceridemia    JOVANY on CPAP    History of colon polyps    IFG (impaired fasting glucose)    Ulcer of left foot, with fat layer exposed ([address] )      Past Medical and Surgical History:     Past Medical History:   Diagnosis Date    Closed fracture of right foot     As a child    Diverticulosis     Drusen (degenerative) of macula, bilateral 09/27/2021    Gastro-esophageal reflux disease without esophagitis 05/30/2019    GERD (gastroesophageal reflux disease) 2010    History of colon polyps     Hypertension 09/27/2021    IFG (impaired fasting glucose) 10/01/2021    Internal hemorrhoids     Kidney stone 2007    Mixed hyperlipidemia 05/30/2019    Morbid obesity ([address] ) 09/27/2021    Myopia, bilateral 09/27/2021    JOVANY on CPAP     Peripheral neuropathy 2021    Pinguecula, bilateral 2021    Presbyopia 10/11/2016    Pure hypertriglyceridemia 2019    Radial fracture 1975    Stress Fracture - Right Arm    Regular astigmatism, bilateral 2021    Tendonitis     Left shoulder     Past Surgical History:   Procedure Laterality Date    COLONOSCOPY      EGD  2022    Gastric Erosisons, Esophagitis    FIBULA FRACTURE SURGERY Right 2009    Hardware - Plate and screws in place    FRACTURE SURGERY  2005    SKIN GRAFT      Right foot     TONSILECTOMY AND ADNOIDECTOMY      TONSILLECTOMY        Family History:     Family History   Problem Relation Age of Onset    Cancer Mother 76        Type Unknown    COPD Father         Previous smoker    Neuropathy Father     Neuropathy Sister     Neuropathy Sister     Neuropathy Brother     Neuropathy Brother       Social History:     Social History     Socioeconomic History    Marital status: Single     Spouse name: None    Number of children: 0    Years of education: None    Highest education level: None   Occupational History    Occupation:  / [employer]   Tobacco Use    Smoking status: Former Smoker     Packs/day: 1 00     Years: 15 00     Pack years: 15 00     Types: Cigarettes     Start date: 1980     Quit date: 2005     Years since quittin 6    Smokeless tobacco: Never Used   Vaping Use    Vaping Use: Never used   Substance and Sexual Activity    Alcohol use: Yes     Alcohol/week: 1 0 standard drink     Types: 1 Cans of beer per week    Drug use: Not Currently     Types: Marijuana     Comment: Last Marijuana Use ;  Other unknown drugs while in [place] Sexual activity: Not Currently     Partners: Female     Birth control/protection: Condom Male   Other Topics Concern    None   Social History Narrative    Single    Lives alone     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     Social Determinants of Health     Financial Resource Strain: Low Risk  Difficulty of Paying Living Expenses: Not very hard   Food Insecurity: Not on file   Transportation Needs: No Transportation Needs    Lack of Transportation (Medical): No    Lack of Transportation (Non-Medical): No   Physical Activity: Not on file   Stress: Not on file   Social Connections: Not on file   Intimate Partner Violence: Not on file   Housing Stability: Not on file      Medications and Allergies:     Current Outpatient Medications   Medication Sig Dispense Refill    Ascorbic Acid (Vitamin C) 250 MG CHEW Chew 250 mg daily Take 2 chews daily       Cholecalciferol 125 MCG (5000 UT) TABS Take 5,000 Units by mouth daily        cyanocobalamin (VITAMIN B-12) 1000 MCG tablet Take 1,000 mcg by mouth daily      famotidine (PEPCID) 20 mg tablet Take 1 tablet (20 mg total) by mouth 2 (two) times a day as needed for heartburn 180 tablet 2    ibuprofen (MOTRIN) 200 mg tablet Take 400 mg by mouth every 6 (six) hours as needed        losartan (COZAAR) 50 mg tablet Take 1 tablet (50 mg total) by mouth daily 90 tablet 2    metFORMIN (GLUCOPHAGE-XR) 500 mg 24 hr tablet Take 3 tablets (1,500 mg total) by mouth daily with dinner Increase as directed  180 tablet 2    multivitamin (THERAGRAN) TABS Take 1 tablet by mouth daily      simvastatin (ZOCOR) 20 mg tablet Take 1 tablet (20 mg total) by mouth daily at bedtime 90 tablet 2     No current facility-administered medications for this visit       No Known Allergies   Immunizations:     Immunization History   Administered Date(s) Administered    COVID-19 MODERNA VACC 0 25 ML IM BOOSTER 01/26/2022    COVID-19 MODERNA VACC 0 5 ML IM 04/07/2021, 05/06/2021    INFLUENZA 10/22/2020, 10/11/2021    Influenza Quadrivalent 3 years and older 11/12/2013    Influenza Quadrivalent Preservative Free 3 years and older IM 10/10/2018, 11/26/2018, 11/20/2019    Influenza Split 03/03/2008, 10/24/2011    Influenza, seasonal, injectable, preservative free 10/09/2014, 11/29/2016  Pneumococcal Conjugate Vaccine 20-valent (Pcv20), Polysace 08/22/2022    Tdap 08/10/2021    Zoster Vaccine Recombinant 08/19/2019      Health Maintenance:         Topic Date Due    Colorectal Cancer Screening  04/13/2025    HIV Screening  Completed    Hepatitis C Screening  Completed         Topic Date Due    Influenza Vaccine (1) 09/01/2022      Medicare Screening Tests and Risk Assessments:     Farzana Davis is here for his Welcome to Medicare visit  Health Risk Assessment:   Patient rates overall health as good  Patient feels that their physical health rating is slightly better  Patient is very satisfied with their life  Eyesight was rated as same  Hearing was rated as same  Patient feels that their emotional and mental health rating is same  Patients states they are never, rarely angry  Patient states they are never, rarely unusually tired/fatigued  Pain experienced in the last 7 days has been none  Patient states that he has experienced no weight loss or gain in last 6 months  Depression Screening:   PHQ-2 Score: 0      Fall Risk Screening: In the past year, patient has experienced: no history of falling in past year      Home Safety:  Patient does not have trouble with stairs inside or outside of their home  Patient has working smoke alarms and has working carbon monoxide detector  Home safety hazards include: none  Nutrition:   Current diet is Regular  Medications:   Patient is currently taking over-the-counter supplements  OTC medications include: see medication list  Patient is able to manage medications  Activities of Daily Living (ADLs)/Instrumental Activities of Daily Living (IADLs):   Walk and transfer into and out of bed and chair?: Yes  Dress and groom yourself?: Yes    Bathe or shower yourself?: Yes    Feed yourself?  Yes  Do your laundry/housekeeping?: Yes  Manage your money, pay your bills and track your expenses?: Yes  Make your own meals?: Yes    Do your own shopping?: Yes    Previous Hospitalizations:   Any hospitalizations or ED visits within the last 12 months?: No      Advance Care Planning:   Living will: No    Durable POA for healthcare: No    Advanced directive counseling given: Yes    Five wishes given: Yes    Patient declined ACP directive: No      Comments: Living Will, POLST for [address], and Five Wishes given today  Cognitive Screening:   Provider or family/friend/caregiver concerned regarding cognition?: No    PREVENTIVE SCREENINGS      Cardiovascular Screening:    General: Screening Not Indicated and History Lipid Disorder      Diabetes Screening:     General: Screening Current      Colorectal Cancer Screening:     General: Screening Current      Prostate Cancer Screening:    General: Screening Current      Osteoporosis Screening:    General: Screening Not Indicated      Abdominal Aortic Aneurysm (AAA) Screening:    Risk factors include: age between 73-69 yo and tobacco use        General: Risks and Benefits Discussed    Due for: Screening AAA Ultrasound      Lung Cancer Screening:     General: Screening Not Indicated      Hepatitis C Screening:    General: Screening Current    Screening, Brief Intervention, and Referral to Treatment (SBIRT)    Screening  Typical number of drinks in a day: 0  Typical number of drinks in a week: 0  Interpretation: Low risk drinking behavior  Single Item Drug Screening:  How often have you used an illegal drug (including marijuana) or a prescription medication for non-medical reasons in the past year? never    Single Item Drug Screen Score: 0  Interpretation: Negative screen for possible drug use disorder    Other Counseling Topics:   Calcium and vitamin D intake and regular weightbearing exercise        Visual Acuity Screening    Right eye Left eye Both eyes   Without correction:      With correction: 20/30 20/30 20/25        Physical Exam:     /80   Pulse 69   Temp (!) 97 1 °F (36 2 °C)   Ht 6' 5" (1 956 m)   Wt (!) 150 kg (331 lb 3 2 oz)   SpO2 96%   BMI 39 27 kg/m²     Physical Exam     Vitals and nursing note reviewed  Constitutional:       Appearance: Normal appearance  He is well-developed  He is obese  HENT:      Head: Normocephalic and atraumatic  Eyes:      Conjunctiva/sclera: Conjunctivae normal    Neck:      Thyroid: No thyromegaly  Vascular: No carotid bruit  Cardiovascular:      Rate and Rhythm: Normal rate and regular rhythm  Pulses: Normal pulses  Heart sounds: Normal heart sounds  Pulmonary:      Effort: Pulmonary effort is normal       Breath sounds: Normal breath sounds  Abdominal:      General: Bowel sounds are normal  There is no distension  Palpations: Abdomen is soft  There is no mass  Tenderness: There is no abdominal tenderness  There is no guarding or rebound  Musculoskeletal:         General: No swelling or tenderness  Cervical back: Neck supple  Right lower leg: No edema  Left lower leg: No edema  Neurological:      General: No focal deficit present  Mental Status: He is alert and oriented to person, place, and time  Psychiatric:         Mood and Affect: Mood normal          Behavior: Behavior normal          Thought Content:  Thought content normal          Judgment: Judgment normal      Kristina Mckenzie, DO

## 2022-09-26 ENCOUNTER — CLINICAL SUPPORT (OUTPATIENT)
Dept: NUTRITION | Facility: HOSPITAL | Age: 67
End: 2022-09-26
Payer: MEDICARE

## 2022-09-26 VITALS — BODY MASS INDEX: 37.83 KG/M2 | WEIGHT: 315 LBS

## 2022-09-26 DIAGNOSIS — E66.01 CLASS 2 SEVERE OBESITY DUE TO EXCESS CALORIES WITH SERIOUS COMORBIDITY AND BODY MASS INDEX (BMI) OF 39.0 TO 39.9 IN ADULT (HCC): ICD-10-CM

## 2022-09-26 PROCEDURE — 97802 MEDICAL NUTRITION INDIV IN: CPT

## 2022-09-26 NOTE — PROGRESS NOTES
Follow-Up Nutrition Assessment Form    Patient Name: Lg Champagne    YOB: 1955    Sex:  Male      Follow Up Date: 9/26/2022  Start Time: 12:00 Stop Time: 12:30 Total Minutes: 30     Data:  Present at session: self   Parent/Patient Concerns: Desire to lose weight   Medical Dx/Reason for Referral: I10 (ICD-10-CM) - Primary hypertension   E78 2 (ICD-10-CM) - Mixed hyperlipidemia   K21 9 (ICD-10-CM) - Gastro-esophageal reflux disease without esophagitis   E66 01, Z68 38 (ICD-10-CM) - Class 2 severe obesity with serious comorbidity and body mass index (BMI) of 38 0 to 38 9 in adult, unspecified obesity type (HCC)   R73 01 (ICD-10-CM) - IFG (impaired fasting glucose)   E78 1 (ICD-10-CM) - Pure hypertriglyceridemia        Past Medical History:   Diagnosis Date    Closed fracture of right foot     As a child    Diverticulosis     Drusen (degenerative) of macula, bilateral 09/27/2021    Gastro-esophageal reflux disease without esophagitis 05/30/2019    GERD (gastroesophageal reflux disease) 2010    History of colon polyps     Hypertension 09/27/2021    IFG (impaired fasting glucose) 10/01/2021    Internal hemorrhoids     Kidney stone 2007    Mixed hyperlipidemia 05/30/2019    Morbid obesity ([address] ) 09/27/2021    Myopia, bilateral 09/27/2021    JOVANY on CPAP     Peripheral neuropathy 09/27/2021    Pinguecula, bilateral 09/27/2021    Presbyopia 10/11/2016    Pure hypertriglyceridemia 08/19/2019    Radial fracture 1975    Stress Fracture - Right Arm    Regular astigmatism, bilateral 09/27/2021    Tendonitis     Left shoulder       Current Outpatient Medications   Medication Sig Dispense Refill    Ascorbic Acid (Vitamin C) 250 MG CHEW Chew 250 mg daily Take 2 chews daily       Cholecalciferol 125 MCG (5000 UT) TABS Take 5,000 Units by mouth daily        cyanocobalamin (VITAMIN B-12) 1000 MCG tablet Take 1,000 mcg by mouth daily      famotidine (PEPCID) 20 mg tablet Take 1 tablet (20 mg total) by mouth 2 (two) times a day as needed for heartburn 180 tablet 2    ibuprofen (MOTRIN) 200 mg tablet Take 400 mg by mouth every 6 (six) hours as needed        losartan (COZAAR) 50 mg tablet Take 1 tablet (50 mg total) by mouth daily 90 tablet 2    metFORMIN (GLUCOPHAGE-XR) 500 mg 24 hr tablet Take 3 tablets (1,500 mg total) by mouth daily with dinner Increase as directed  180 tablet 2    multivitamin (THERAGRAN) TABS Take 1 tablet by mouth daily      simvastatin (ZOCOR) 20 mg tablet Take 1 tablet (20 mg total) by mouth daily at bedtime 90 tablet 2     No current facility-administered medications for this visit  Additional Meds/Supplements:    Barriers to Learning: None   Labs:    Height: Ht Readings from Last 3 Encounters:   08/22/22 6' 5" (1 956 m)   07/18/22 6' 5" (1 956 m)   05/31/22 6' 5" (1 956 m)      Weight: Wt Readings from Last 10 Encounters:   09/26/22 (!) 145 kg (319 lb)   08/22/22 (!) 150 kg (331 lb 3 2 oz)   07/18/22 (!) 148 kg (327 lb)   05/31/22 (!) 149 kg (329 lb)   05/25/22 (!) 149 kg (329 lb)   05/23/22 (!) 150 kg (329 lb 12 8 oz)   04/20/22 (!) 149 kg (329 lb)   03/25/22 (!) 148 kg (327 lb)   03/23/22 (!) 149 kg (328 lb)   02/23/22 (!) 149 kg (329 lb 6 4 oz)     Estimated body mass index is 37 83 kg/m² as calculated from the following:    Height as of 8/22/22: 6' 5" (1 956 m)  Weight as of this encounter: 145 kg (319 lb)  Wt  Change Since Last Visit: 8 lb loss since July appointment  [x]Yes     []No  Amount:       Energy Needs: 2300 kcal   Pain Screen: Are you having pain now? No         Previous Goals or Goals Achieved:  1  Anusha Garnica will aim to engage in 30-45 minute walks 2x a week  Gets lot of exercise on weekend working outside  Will continue to aim for 2 days a week with changing weather  2  Anusha Garnica will aim to follow the Plate Method to help decrease carbohydrate portion sizes and increase fruit and vegetable consumption  Continue to aim to increase vegetables with meals  More variety of fruit  3  Jemal Hernandez will aim to decrease sodium intake through reading food labels and choosing low sodium/no salt added food products  Ongoing  Initial PES: Overweight related to excess energy intake exceeding energy expenditure as evidenced by BMI more than normative standard for age and sex (obesity-grade II 35-39 9 BMI) Nutrition Diagnosis:          New PES: none No Change           Assessment:       Medical Nutrition Therapy Intervention:  []Individualized Meal Plan []Understanding Lab Values   []Basic Pathophysiology of Disease []Food/Medication Interactions   []Food Diary [x]Exercise   He is doing lots of outdoor work at home on weekends  Goal to add walk or maintain activity for 30-45 minutes twice a week continues  Lifestyle/Behavior Modification Techniques  Discussed positive changes Jemal Hernandez has made, such as, choosing whole wheat bread for all sandwiches, even when out at [employer]  Addition of apples for snack during the day  Increasing vegetables in soups he prepares  Portion control: He is not measuring but has decreased size of portion  Significant reduction in the amount/frequency of pasta meals  Occasionally, has salad before his chicken, gravy and rice, and at times omits the rice  Flavor packets he adds to water are all sugar free as desired  Will skip breakfast at times, continued encouragement to eat three meals daily  He has not tried low fat mayonaise with his sandwiches  Encouraged to try the low fat villa  Also discussed the high sodium content of luncheon meats  He does hard boil eggs for sandwiches which is a low sodium option  Discussed the additional nutrients in fruits and vegetables, more colorful choices, greater the nutrients added in daily intake  Strive to have half plate non starchy vegetables    []Medication, Mechanism of Action   []Label Reading []Self Blood Glucose Monitoring   []Weight/BMI Goals []Other -    Other Notes:        Comprehension: []Excellent  []Very Good  [x]Good  []Fair   []Poor    Receptivity: []Excellent  []Very Good  [x]Good  []Fair   []Poor    Expected Compliance: []Excellent  []Very Good  [x]Good  []Fair   []Poor      Labs:  CMP  Lab Results   Component Value Date    K 4 2 08/01/2022     08/01/2022    CO2 25 08/01/2022    BUN 22 08/01/2022    CREATININE 1 24 08/01/2022    GLUF 90 08/01/2022    CALCIUM 9 3 08/01/2022    AST 16 08/01/2022    ALT 16 08/01/2022    ALKPHOS 64 08/01/2022    EGFR 60 08/01/2022       BMP  Lab Results   Component Value Date    CALCIUM 9 3 08/01/2022    K 4 2 08/01/2022    CO2 25 08/01/2022     08/01/2022    BUN 22 08/01/2022    CREATININE 1 24 08/01/2022       Lipids  No results found for: CHOL  Lab Results   Component Value Date    HDL 39 (L) 08/01/2022    HDL 44 03/18/2022    HDL 39 (L) 10/01/2021     Lab Results   Component Value Date    LDLCALC 50 08/01/2022    LDLCALC 74 03/18/2022    LDLCALC 57 10/01/2021     Lab Results   Component Value Date    TRIG 178 (H) 08/01/2022    TRIG 114 03/18/2022    TRIG 144 10/01/2021     No results found for: CHOLHDL    Hemoglobin A1C  Lab Results   Component Value Date    HGBA1C 5 4 08/01/2022       Fasting Glucose  Lab Results   Component Value Date    GLUF 90 08/01/2022       Insulin     Thyroid  No results found for: TSH, [id number], Z4SOSFN, THYROIDAB    Hepatic Function Panel  Lab Results   Component Value Date    ALT 16 08/01/2022    AST 16 08/01/2022    ALKPHOS 64 08/01/2022       Celiac Disease Antibody Panel  No results found for: ENDOMYSIAL IGA, GLIADIN IGA, GLIADIN IGG, IGA, TISSUE TRANSGLUT AB, TTG IGA   Iron  No results found for: IRON, TIBC, FERRITIN    Vitamins  No results found for: VITAMIN B2   No results found for: NICOTINAMIDE, NICOTINIC ACID   No results found for: VITAMINB6  No results found for: [id number]  No results found for: VITB5  No results found for: F8DJQIOA  No results found for: THYROGLB  No results found for: VITAMIN K   No results found for: 25-HYDROXY VIT D   No components found for: VITAMINE     No follow-ups on file      Felice Luis, MS,RD,LDN  [address] [place] [address]  [address] [id number]

## 2022-11-21 ENCOUNTER — OFFICE VISIT (OUTPATIENT)
Dept: SLEEP CENTER | Facility: CLINIC | Age: 67
End: 2022-11-21

## 2022-11-21 VITALS
DIASTOLIC BLOOD PRESSURE: 90 MMHG | BODY MASS INDEX: 37.19 KG/M2 | WEIGHT: 315 LBS | HEIGHT: 77 IN | SYSTOLIC BLOOD PRESSURE: 120 MMHG

## 2022-11-21 DIAGNOSIS — Z99.89 OSA ON CPAP: Primary | ICD-10-CM

## 2022-11-21 DIAGNOSIS — G62.9 PERIPHERAL POLYNEUROPATHY: ICD-10-CM

## 2022-11-21 DIAGNOSIS — E66.9 OBESITY (BMI 30-39.9): ICD-10-CM

## 2022-11-21 DIAGNOSIS — G47.33 OSA ON CPAP: Primary | ICD-10-CM

## 2022-11-21 DIAGNOSIS — K21.9 GASTROESOPHAGEAL REFLUX DISEASE, UNSPECIFIED WHETHER ESOPHAGITIS PRESENT: ICD-10-CM

## 2022-11-21 DIAGNOSIS — F45.8 BRUXISM: ICD-10-CM

## 2022-11-21 DIAGNOSIS — R68.2 DRY MOUTH: ICD-10-CM

## 2022-11-21 DIAGNOSIS — I10 ESSENTIAL HYPERTENSION: ICD-10-CM

## 2022-11-21 NOTE — PROGRESS NOTES
Follow-Up Note - Sleep Center   Traci Kerns  74 y o  male  SKZ:[id number]  JCN:[id number]  DOS:11/21/2022    CC: I saw this patient for follow-up in clinic today for Sleep disordered breathing, Coexisting Sleep and Medical Problems  He is using a ResMed machine that he believes his around 8years old    His studies were done in [place] over 10 years ago and results were not available at the time of this encounter  PFSH, Problem List, Medications & Allergies were reviewed in EMR  Interval changes: none reported  He  has a past medical history of Closed fracture of right foot, Diverticulosis, Drusen (degenerative) of macula, bilateral (09/27/2021), Gastro-esophageal reflux disease without esophagitis (05/30/2019), GERD (gastroesophageal reflux disease) (2010), History of colon polyps, Hypertension (09/27/2021), IFG (impaired fasting glucose) (10/01/2021), Internal hemorrhoids, Kidney stone (2007), Mixed hyperlipidemia (05/30/2019), Morbid obesity ([address] ) (09/27/2021), Myopia, bilateral (09/27/2021), JOVANY on CPAP, Peripheral neuropathy (09/27/2021), Pinguecula, bilateral (09/27/2021), Presbyopia (10/11/2016), Pure hypertriglyceridemia (08/19/2019), Radial fracture (1975), Regular astigmatism, bilateral (09/27/2021), and Tendonitis  He has a current medication list which includes the following prescription(s): vitamin c, cholecalciferol, cyanocobalamin, famotidine, ibuprofen, losartan, metformin, simvastatin, and multivitamin  PHYSIOLOGICAL DATA REVIEW AND INTERPRETATION:    using PAP > 4 hours/night 97%  Estimated JORY 2 5/hour with pressure of 14cm H2O @90th/95th percentile; Patient has not been using ozone based devices to sanitize the machine  SUBJECTIVE: Regarding use of PAP, Carmell Leventhal reports:   · minimal adverse effects: dry mouth/throat; has not noticed any fibres or foreign material in air line  · He is benefiting from use: sleeping better   · He reports teeth grinding during sleep    Sleep Routine: Blair Gooden reports getting 7-8 hrs sleep  ; he has no difficulty initiating or maintaining sleep   He arises spontaneously and feels refreshed  Blair Gooden denies Excessive Daytime Sleepiness,   He rated himself at Total score: 0 /24 on the [place] Sleepiness Scale  Habits: reports that he quit smoking about 17 years ago  His smoking use included cigarettes  He started smoking about 42 years ago  He has a 15 00 pack-year smoking history  He has never used smokeless tobacco ,  reports current alcohol use of about 1 0 standard drink per week  ,  reports that he does not currently use drugs after having used the following drugs: Marijuana  , Caffeine use:very little ; Exercise routine: regular    ROS: reviewed & as attached  Significant for some intentional weight reduction  He reported no nasal, respiratory or cardiac symptoms  Maite Meza EXAM: /90   Ht 6' 5" (1 956 m)   Wt (!) 145 kg (319 lb)   BMI 37 83 kg/m²     Wt Readings from Last 3 Encounters:   11/21/22 (!) 145 kg (319 lb)   09/26/22 (!) 145 kg (319 lb)   08/22/22 (!) 150 kg (331 lb 3 2 oz)      Patient is well groomed; well appearing  CNS: Alert, orientated, clear & coherent speech  Psych: cooperativeand in no distress  Mental state:appears normal   H&N: EOMI; NC/AT:no facial pressure marks, no rashes  Skin/Extrem: col & hydration normal; no edema  Resp: Respiratory effort is normal  Physical findings otherwise essentially unchanged from previous  IMPRESSION: Problem List Items & Comorbidities Addressed this Visit    1  JOVANY on CPAP  Cpap DME      2  Bruxism        3  Dry mouth        4  Essential hypertension        5  Gastroesophageal reflux disease, unspecified whether esophagitis present        6  Peripheral polyneuropathy        7  Obesity (BMI 30-39  9)            PLAN:  1  I reviewed physiologic data with the patient  2  I discussed treatment options with risks and benefits    3  Treatment with  PAP is medically necessary and Blair Gooden is agreable to continue use  4  Care of equipment, methods to improve comfort using PAP and importance of compliance with therapy were discussed  5  Pressure setting: continue 14 cmH2O     6  The patient's current unit has now reached its 5 yr reasonable, useful life and needs to be replaced  7  Rx provided to replace machine, supplies and Care coordinated with DME provider  8  He will attempt to get results of his prior studies  If results of prior studies are no longer available, he will need a repeat diagnostic study  9  Discussed strategies for weight reduction  10  Follow-up is advised in 2 months after obtaining a replacement machine to monitor progress, compliance and to adjust therapy  Thank you for allowing me to participate in the care of this patient  Sincerely,     Authenticated electronically on 59/35/99   Board Certified Specialist     Portions of the record may have been created with voice recognition software  Occasional wrong word or "sound a like" substitutions may have occurred due to the inherent limitations of voice recognition software  There may also be notations and random deletions of words or characters from malfunctioning software  Read the chart carefully and recognize, using context, where substitutions/deletions have occurred

## 2022-11-21 NOTE — PATIENT INSTRUCTIONS

## 2022-12-06 ENCOUNTER — TELEPHONE (OUTPATIENT)
Dept: SLEEP CENTER | Facility: CLINIC | Age: 67
End: 2022-12-06

## 2022-12-06 NOTE — TELEPHONE ENCOUNTER
Left message for the patient to call back nursing for follow up on sleep study reports and order for machine  Patient seen by Dr Antonette Richardson on 11/21/2022 and Dr Antonette Richardson wrote Mitzy Nunez for replacement CPAP  Per Dr Lj Pablo note   1  He will attempt to get results of his prior studies  If results of prior studies are no longer available, he will need a repeat diagnostic study

## 2022-12-08 NOTE — TELEPHONE ENCOUNTER
Patient left message asking where sleep study results can be sent  Spoke to patient  States he had his sleep study done in [place]  That facility will email the sleep study results to our office  Provided patient with sleep center email address  Offered to schedule DME set up but patient declined  States he will call back to schedule once he completes the process of requesting his old study results  Will also need to reschedule compliance follow up appointment  Patient is currently scheduled 11/27/23

## 2023-01-09 ENCOUNTER — LAB (OUTPATIENT)
Dept: LAB | Facility: CLINIC | Age: 68
End: 2023-01-09

## 2023-01-09 DIAGNOSIS — E78.2 MIXED HYPERLIPIDEMIA: ICD-10-CM

## 2023-01-09 DIAGNOSIS — I10 PRIMARY HYPERTENSION: ICD-10-CM

## 2023-01-09 DIAGNOSIS — R73.01 IFG (IMPAIRED FASTING GLUCOSE): ICD-10-CM

## 2023-01-09 LAB
ALBUMIN SERPL BCP-MCNC: 4.4 G/DL (ref 3.5–5)
ALP SERPL-CCNC: 66 U/L (ref 34–104)
ALT SERPL W P-5'-P-CCNC: 14 U/L (ref 7–52)
ANION GAP SERPL CALCULATED.3IONS-SCNC: 10 MMOL/L (ref 4–13)
AST SERPL W P-5'-P-CCNC: 15 U/L (ref 13–39)
BILIRUB SERPL-MCNC: 1.15 MG/DL (ref 0.2–1)
BUN SERPL-MCNC: 20 MG/DL (ref 5–25)
CALCIUM SERPL-MCNC: 9.5 MG/DL (ref 8.4–10.2)
CHLORIDE SERPL-SCNC: 104 MMOL/L (ref 96–108)
CO2 SERPL-SCNC: 24 MMOL/L (ref 21–32)
CREAT SERPL-MCNC: 1.1 MG/DL (ref 0.6–1.3)
GFR SERPL CREATININE-BSD FRML MDRD: 70 ML/MIN/1.73SQ M
GLUCOSE P FAST SERPL-MCNC: 91 MG/DL (ref 65–99)
LDLC SERPL DIRECT ASSAY-MCNC: 72 MG/DL (ref 0–100)
POTASSIUM SERPL-SCNC: 4.4 MMOL/L (ref 3.5–5.3)
PROT SERPL-MCNC: 6.9 G/DL (ref 6.4–8.4)
SODIUM SERPL-SCNC: 138 MMOL/L (ref 135–147)
TSH SERPL DL<=0.05 MIU/L-ACNC: 1.43 UIU/ML (ref 0.45–4.5)

## 2023-01-10 LAB
EST. AVERAGE GLUCOSE BLD GHB EST-MCNC: 108 MG/DL
HBA1C MFR BLD: 5.4 %

## 2023-01-20 NOTE — PROGRESS NOTES
Assessment/Plan:  Problem List Items Addressed This Visit        Digestive    Gastro-esophageal reflux disease without esophagitis     Stable  Management per GI  Continue Pepcid 20mg BID PRN  Watch GERD diet  Relevant Medications    famotidine (PEPCID) 20 mg tablet       Endocrine    IFG (impaired fasting glucose) - Primary     Stable  Continue Metformin XR 500mg 3 pills in AM   Patient previously declined [address]  Recommend lifestyle modifications  Relevant Medications    metFORMIN (GLUCOPHAGE-XR) 500 mg 24 hr tablet    Other Relevant Orders    Hemoglobin A1C       Respiratory    JOVANY on CPAP     Management per Sleep Medicine  Continue CPAP  Cardiovascular and Mediastinum    Hypertension     Stable  Continue increased Losartan 50mg daily  Check blood pressure outside of office  Recommend lifestyle modifications  Relevant Medications    losartan (COZAAR) 50 mg tablet    Other Relevant Orders    CBC and differential    Comprehensive metabolic panel       Nervous and Auditory    Peripheral neuropathy     Stable s meds  Management per Neuro and Podiatry  Other    Mixed hyperlipidemia     Stable  Recommend lifestyle modifications  Relevant Medications    simvastatin (ZOCOR) 20 mg tablet    Other Relevant Orders    TSH, 3rd generation with Free T4 reflex    LDL cholesterol, direct    Class 2 severe obesity with serious comorbidity and body mass index (BMI) of 37 0 to 37 9 in adult (HCC)     Stable  Recommend lifestyle modifications  To consider Weight Management in future PRN? Relevant Orders    TSH, 3rd generation with Free T4 reflex    History of colon polyps     Colonoscopy is up-to-date  Pure hypertriglyceridemia     Stable  Recommend lifestyle modifications  Relevant Medications    simvastatin (ZOCOR) 20 mg tablet    Drusen (degenerative) of macula, bilateral     Management per Optho             Myopia, bilateral     Management per Optho  Pinguecula, bilateral     Management per Optho  Regular astigmatism, bilateral     Management per Optho  Presbyopia     Management per Optho  Other Visit Diagnoses     Ulcer of both feet, limited to breakdown of skin [facility])        Needs Podiatry appt - patient to call PCP office to help schedule if cannot be seen within 1 week  Continue to keep ulcers clean and dry  Return in about 4 months (around 5/23/2023) for 4mo- JENA, IFG, HTN, HL, GERD, PN, JOVANY, Obesity, Labs  Future Appointments   Date Time Provider [place]   5/30/2023  8:20 AM Sunita Perez DO [facility]   11/27/2023  2:30 PM Steve Crow MD [facility]        Subjective:     Jeannette Cam is a 74 y o  male who presents today for a follow-up on his chronic medical conditions  HPI:  Chief Complaint   Patient presents with   • Follow-up     4mo- IFG, HTN, HL, GERD, PN, JOVANY, Obesity, Labs  • Blister     Pt reports intermittent blisters on his feet for the last few years  Currently on the L foot  Pt keeps them clean and dry  Applies ANTONIO and wraps them gauze  -- Above per clinical staff and reviewed  --      HPI      Today:      Return in about 4 months (around 12/22/2022) for 4mo- IFG, HTN, HL, GERD, PN, JOVANY, Obesity, Labs       Desires PSA and JENA per PCP (after colonoscopy 4/14/22)        Left Great 1st Toe Plantar Ulceration - Symptoms x several weeks  Using hot water and dish soap soaks  He has been wrapping c guaze  No pain as patient has neuropathy           Morbid Obesity - Watching diet - cutting back on portions    No Regular exercise - Previously Walking 20 minutes, 2 times per week  [employer] during his job        IFG -On Metformin XR 500mg 3 pills QD      HTN - On increased Losartan 50mg QD   D/C Atenolol 25mg QD as not 1st line HTN Rx    No other HTN meds previously   No BP check outside office        Hyperlipidemia - On Zocor 20mg QHS   No higher dose or other statin previously        GERD - Management per GI Dr Cliff Parada   Next appt PRN  s/p EGD 4/14/22   On Pepcid 20mg BID PRN  - using once daily  D/C Protonix 40mg QD since 2015 (no lower dose previously), previously on Nexium, but D/C due to clinical formulary change  Not watching GERD diet       Peripheral Neuropathy - Management per Neuro Dr Papa Harris - next appt 5/23 and Podiatry Dr Augie Vergara - next appt 4/22   Genetic   Has B/L LE Neuropathy from knees distally and hands   S/p EMG/NCS      Left Foot Ulcer - Management per Podiatry Dr Augie Vergara   Next appt PRN?        JOVANY - On CPAP   Management per Sleep Medicine Dr Niurka Arnett appt 11/23   Last Sleep Study 2016?      H/O Colon Polyps - Management per GI Dr Cliff Parada  Next colonoscopy due 4/14/25      Had 2 Shingrix - med records requested - pharmacy lost records           Reviewed:  Labs 1/9/23, Burn Surgery 3/4/22, Neuro 5/31/22, Sleep Medicine 11/21/22, Podiatry 4/20/22     No Uro          The following portions of the patient's history were reviewed and updated as appropriate: allergies, current medications, past family history, past medical history, past social history, past surgical history and problem list       Review of Systems   Constitutional: Negative for appetite change, chills, diaphoresis, fatigue and fever  Respiratory: Negative for chest tightness and shortness of breath  Cardiovascular: Negative for chest pain  Gastrointestinal: Negative for abdominal pain, blood in stool, diarrhea, nausea and vomiting  Genitourinary: Negative for dysuria  Skin: Positive for wound (Left plantar great toe)          Current Outpatient Medications   Medication Sig Dispense Refill   • Ascorbic Acid (Vitamin C) 250 MG CHEW Chew 250 mg daily Take 2 chews daily      • Cholecalciferol 125 MCG (5000 UT) TABS Take 5,000 Units by mouth daily       • cyanocobalamin (VITAMIN B-12) 1000 MCG tablet Take 1,000 mcg by mouth daily     • famotidine (PEPCID) 20 mg tablet Take 1 tablet (20 mg total) by mouth 2 (two) times a day as needed for heartburn 180 tablet 2   • ibuprofen (MOTRIN) 200 mg tablet Take 400 mg by mouth every 6 (six) hours as needed       • losartan (COZAAR) 50 mg tablet Take 1 tablet (50 mg total) by mouth daily 90 tablet 2   • metFORMIN (GLUCOPHAGE-XR) 500 mg 24 hr tablet Take 3 tablets (1,500 mg total) by mouth daily with dinner Increase as directed  270 tablet 2   • multivitamin (THERAGRAN) TABS Take 1 tablet by mouth daily     • simvastatin (ZOCOR) 20 mg tablet Take 1 tablet (20 mg total) by mouth daily at bedtime 90 tablet 2     No current facility-administered medications for this visit  Objective:  /68   Pulse 81   Temp 98 8 °F (37 1 °C)   Resp 16   Ht 6' 5" (1 956 m)   Wt (!) 145 kg (320 lb 3 2 oz)   SpO2 97%   BMI 37 97 kg/m²    Wt Readings from Last 3 Encounters:   01/23/23 (!) 145 kg (320 lb 3 2 oz)   11/21/22 (!) 145 kg (319 lb)   09/26/22 (!) 145 kg (319 lb)      BP Readings from Last 3 Encounters:   01/23/23 118/68   11/21/22 120/90   08/22/22 126/80          Physical Exam  Vitals and nursing note reviewed  Constitutional:       Appearance: Normal appearance  He is well-developed  He is obese  HENT:      Head: Normocephalic and atraumatic  Eyes:      Conjunctiva/sclera: Conjunctivae normal    Neck:      Thyroid: No thyromegaly  Vascular: No carotid bruit  Cardiovascular:      Rate and Rhythm: Normal rate and regular rhythm  Pulses: Normal pulses  Dorsalis pedis pulses are 2+ on the right side and 2+ on the left side  Posterior tibial pulses are 2+ on the right side and 2+ on the left side  Heart sounds: Normal heart sounds  Pulmonary:      Effort: Pulmonary effort is normal       Breath sounds: Normal breath sounds  Abdominal:      General: Bowel sounds are normal  There is no distension  Palpations: Abdomen is soft  There is no mass  Tenderness: There is no abdominal tenderness  There is no guarding or rebound  Musculoskeletal:         General: No swelling or tenderness  Cervical back: Neck supple  Right lower leg: No edema  Left lower leg: No edema  Feet:      Right foot:      Skin integrity: Ulcer and skin breakdown present  Toenail Condition: Right toenails are abnormally thick and long  Fungal disease present  Left foot:      Skin integrity: Ulcer, skin breakdown and callus present  Toenail Condition: Left toenails are abnormally thick and long  Fungal disease present  Comments: Left great plantar toe, left 2nd plantar toe c shallow ulceration  No D/C  Occult blood blister left 3rd distal metatarsal       Right dorsal 2nd PIP c shallow ulceration  No discharge  Neurological:      General: No focal deficit present  Mental Status: He is alert and oriented to person, place, and time  Mental status is at baseline  Psychiatric:         Mood and Affect: Mood normal          Lab Results:      Lab Results   Component Value Date    WBC 8 10 12/21/2021    HGB 14 7 12/21/2021    HCT 43 4 12/21/2021     12/21/2021    TRIG 178 (H) 08/01/2022    HDL 39 (L) 08/01/2022    LDLDIRECT 72 01/09/2023    ALT 14 01/09/2023    AST 15 01/09/2023    K 4 4 01/09/2023     01/09/2023    CREATININE 1 10 01/09/2023    BUN 20 01/09/2023    CO2 24 01/09/2023    PSA 0 8 08/01/2022    INR 1 13 12/21/2021    GLUF 91 01/09/2023    HGBA1C 5 4 01/09/2023     No results found for: URICACID  Invalid input(s): BASENAME Vitamin D    No results found  POCT Labs      BMI Counseling: Body mass index is 37 97 kg/m²  The BMI is above normal  Nutrition recommendations include encouraging healthy choices of fruits and vegetables  Exercise recommendations include exercising 3-5 times per week  No pharmacotherapy was ordered  Rationale for BMI follow-up plan is due to patient being overweight or obese

## 2023-01-23 ENCOUNTER — OFFICE VISIT (OUTPATIENT)
Dept: FAMILY MEDICINE CLINIC | Facility: CLINIC | Age: 68
End: 2023-01-23

## 2023-01-23 VITALS
OXYGEN SATURATION: 97 % | TEMPERATURE: 98.8 F | HEART RATE: 81 BPM | BODY MASS INDEX: 37.19 KG/M2 | WEIGHT: 315 LBS | SYSTOLIC BLOOD PRESSURE: 118 MMHG | RESPIRATION RATE: 16 BRPM | DIASTOLIC BLOOD PRESSURE: 68 MMHG | HEIGHT: 77 IN

## 2023-01-23 DIAGNOSIS — G62.9 PERIPHERAL POLYNEUROPATHY: ICD-10-CM

## 2023-01-23 DIAGNOSIS — H52.4 PRESBYOPIA: ICD-10-CM

## 2023-01-23 DIAGNOSIS — L97.521 ULCER OF BOTH FEET, LIMITED TO BREAKDOWN OF SKIN (HCC): ICD-10-CM

## 2023-01-23 DIAGNOSIS — Z86.010 HISTORY OF COLON POLYPS: ICD-10-CM

## 2023-01-23 DIAGNOSIS — H52.223 REGULAR ASTIGMATISM, BILATERAL: ICD-10-CM

## 2023-01-23 DIAGNOSIS — Z99.89 OSA ON CPAP: ICD-10-CM

## 2023-01-23 DIAGNOSIS — H11.153 PINGUECULA, BILATERAL: ICD-10-CM

## 2023-01-23 DIAGNOSIS — E78.1 PURE HYPERTRIGLYCERIDEMIA: ICD-10-CM

## 2023-01-23 DIAGNOSIS — E78.2 MIXED HYPERLIPIDEMIA: ICD-10-CM

## 2023-01-23 DIAGNOSIS — H52.13 MYOPIA, BILATERAL: ICD-10-CM

## 2023-01-23 DIAGNOSIS — K21.9 GASTRO-ESOPHAGEAL REFLUX DISEASE WITHOUT ESOPHAGITIS: ICD-10-CM

## 2023-01-23 DIAGNOSIS — I10 PRIMARY HYPERTENSION: ICD-10-CM

## 2023-01-23 DIAGNOSIS — R73.01 IFG (IMPAIRED FASTING GLUCOSE): Primary | ICD-10-CM

## 2023-01-23 DIAGNOSIS — H35.363 DRUSEN (DEGENERATIVE) OF MACULA, BILATERAL: ICD-10-CM

## 2023-01-23 DIAGNOSIS — L97.511 ULCER OF BOTH FEET, LIMITED TO BREAKDOWN OF SKIN (HCC): ICD-10-CM

## 2023-01-23 DIAGNOSIS — G47.33 OSA ON CPAP: ICD-10-CM

## 2023-01-23 DIAGNOSIS — E66.01 CLASS 2 SEVERE OBESITY DUE TO EXCESS CALORIES WITH SERIOUS COMORBIDITY AND BODY MASS INDEX (BMI) OF 37.0 TO 37.9 IN ADULT (HCC): ICD-10-CM

## 2023-01-23 RX ORDER — FAMOTIDINE 20 MG/1
20 TABLET, FILM COATED ORAL 2 TIMES DAILY PRN
Qty: 180 TABLET | Refills: 2 | Status: SHIPPED | OUTPATIENT
Start: 2023-01-23

## 2023-01-23 RX ORDER — METFORMIN HYDROCHLORIDE 500 MG/1
1500 TABLET, EXTENDED RELEASE ORAL
Qty: 270 TABLET | Refills: 2 | Status: SHIPPED | OUTPATIENT
Start: 2023-01-23

## 2023-01-23 RX ORDER — SIMVASTATIN 20 MG
20 TABLET ORAL
Qty: 90 TABLET | Refills: 2 | Status: SHIPPED | OUTPATIENT
Start: 2023-01-23

## 2023-01-23 RX ORDER — LOSARTAN POTASSIUM 50 MG/1
50 TABLET ORAL DAILY
Qty: 90 TABLET | Refills: 2 | Status: SHIPPED | OUTPATIENT
Start: 2023-01-23

## 2023-01-23 NOTE — ASSESSMENT & PLAN NOTE
Stable  Continue Metformin XR 500mg 3 pills in AM   Patient previously declined Stacie Sandhoff  Recommend lifestyle modifications

## 2023-02-21 ENCOUNTER — TELEPHONE (OUTPATIENT)
Dept: SLEEP CENTER | Facility: CLINIC | Age: 68
End: 2023-02-21

## 2023-02-21 NOTE — TELEPHONE ENCOUNTER
Called the patient to confirm that sleep study was received   Left call back message to get the patient scheduled for DME set up and compliance appointments

## 2023-02-27 ENCOUNTER — OFFICE VISIT (OUTPATIENT)
Dept: PODIATRY | Facility: CLINIC | Age: 68
End: 2023-02-27

## 2023-02-27 VITALS
DIASTOLIC BLOOD PRESSURE: 77 MMHG | HEIGHT: 77 IN | BODY MASS INDEX: 37.19 KG/M2 | WEIGHT: 315 LBS | HEART RATE: 90 BPM | SYSTOLIC BLOOD PRESSURE: 119 MMHG

## 2023-02-27 DIAGNOSIS — E11.42 DIABETIC POLYNEUROPATHY ASSOCIATED WITH TYPE 2 DIABETES MELLITUS (HCC): ICD-10-CM

## 2023-02-27 DIAGNOSIS — L97.521 SKIN ULCER OF LEFT GREAT TOE, LIMITED TO BREAKDOWN OF SKIN (HCC): Primary | ICD-10-CM

## 2023-02-27 DIAGNOSIS — L97.511 SKIN ULCER OF RIGHT GREAT TOE, LIMITED TO BREAKDOWN OF SKIN (HCC): ICD-10-CM

## 2023-02-27 RX ORDER — AMLODIPINE BESYLATE 5 MG/1
TABLET ORAL
COMMUNITY

## 2023-02-27 RX ORDER — ASPIRIN 81 MG/1
TABLET ORAL
COMMUNITY

## 2023-02-27 RX ORDER — NEBIVOLOL 5 MG/1
TABLET ORAL
COMMUNITY

## 2023-02-27 NOTE — TELEPHONE ENCOUNTER
Recalled the patient left second message  to advise that previous sleep study was received and he can be scheduled for DME set up

## 2023-02-27 NOTE — PROGRESS NOTES
Patient, a 79-year-old type II diabetic with known neuropathy presents with ulcerations on each great toe  Patient states that he developed a blister on his left great toe approximately 1 month ago and it burst leading to his current issue today  More recently, he developed a blister on the tip of the right great toe  No pain related as patient is insensate  Patient notes that his blood sugar is under good control  He has been cleaning the sites with soap and water and applying a bandage  He thinks that the ulcers are improving  I personally reviewed an A1c dated 1/9/2023  It was 5 4  I personally reviewed head A1c dated 8/1/2022  It was 5 4  On exam, pedal pulses are palpable  Sensorium is absent on the soles of each foot  Ulceration on the plantar aspect of the left hallux encompasses the majority of the left hallux  It does not probe to bone  There is no evidence of cellulitis  Ulcer on the plantar aspect of the right hallux is much smaller and is at the tip of the toe  It measures 0 5 cm in diameter  No drainage or probing present  No evidence of cellulitis  Each ulcer is to the level of the dermis  Bacitracin dressing applied  Prescribed Bactroban ointment for twice daily application  This patient has been seen by Dr Jimy Martell in the past and he was again referred to Dr Jimy Martell      Diabetic Foot Exam    Diabetic Foot Exam

## 2023-03-22 ENCOUNTER — TELEPHONE (OUTPATIENT)
Dept: SLEEP CENTER | Facility: CLINIC | Age: 68
End: 2023-03-22

## 2023-04-17 PROBLEM — L97.512 RIGHT FOOT ULCER, WITH FAT LAYER EXPOSED (HCC): Status: ACTIVE | Noted: 2023-04-17

## 2023-05-15 ENCOUNTER — OFFICE VISIT (OUTPATIENT)
Dept: PODIATRY | Facility: CLINIC | Age: 68
End: 2023-05-15

## 2023-05-15 VITALS
DIASTOLIC BLOOD PRESSURE: 80 MMHG | SYSTOLIC BLOOD PRESSURE: 122 MMHG | WEIGHT: 315 LBS | HEIGHT: 77 IN | BODY MASS INDEX: 37.19 KG/M2 | HEART RATE: 80 BPM

## 2023-05-15 DIAGNOSIS — L97.522 ULCER OF LEFT FOOT, WITH FAT LAYER EXPOSED (HCC): Primary | ICD-10-CM

## 2023-05-15 NOTE — PROGRESS NOTES
"Date Patient Seen: 5/15/23       Subjective:     Chief Complaint:  Foot ulcers     Patient ID: Dav Frederick is a 74 y o  male  Renuka Hatfield is here with a cc of bilateral hallux ulcers  They had opened spontaneously without injury 3-4 months ago  He's been covering them with bacitracin ointment and DSD daily  He works at a computer daily  He's a borderline diabetic with neuropathy  The following portions of the patient's history were reviewed and updated as appropriate: allergies, current medications, past family history, past medical history, past social history, past surgical history and problem list     Review of Systems   Constitutional: Negative  Respiratory: Negative  Skin: Positive for wound  Neurological: Positive for numbness  Objective:      /80   Pulse 80   Ht 6' 5\" (1 956 m) Comment: verbal  Wt (!) 147 kg (323 lb)   BMI 38 30 kg/m²        Physical Exam  Vitals and nursing note reviewed  Constitutional:       General: He is not in acute distress  Appearance: He is obese  He is not ill-appearing, toxic-appearing or diaphoretic  HENT:      Head: Normocephalic and atraumatic  Pulmonary:      Effort: Pulmonary effort is normal    Skin:     Capillary Refill: Capillary refill takes less than 2 seconds        Comments: Wound # 1  Location: Right hallux plantar  Length 1 0cm: Width 0 8cm: Depth 0 1cm:   Deepest Tissue Noted in Base: SubQ  Probe to Bone?: No  Peripheral Skin Description: Callous  Granulation: 80% Fibrotic Tissue: 20% Necrotic Tissue: 0%  Drainage Amount: Scant  Signs of Infection: None  Total debrided 0 8 square centimeters    Wound # 2  Location: Left plantar hallux  Length 2 5cm: Width 2 0cm: Depth 0 1cm:   Deepest Tissue Noted in Base: SubQ  Probe to Bone?: No  Peripheral Skin Description: Normal Skin  Granulation: 100% Fibrotic Tissue: 0% Necrotic Tissue: 0%  Drainage Amount: Scant  Signs of Infection: None  Total debrided 5 square centimeters " Neurological:      General: No focal deficit present  Mental Status: He is alert  Diagnoses and all orders for this visit:    Ulcer of left foot, with fat layer exposed ([address] )         Assessment:  Diabetic foot ulcers    Plan:  DEBRIDEMENT NOTE    A formal timeout including patient identification, laterality and existing allergies using [facility] was conducted  Procedure Performed: Debridement of subcutaneous tissue- >20 sq cm (29178)  Under aseptic technique, the wound was thoroughly explored and bluntly probed for undermining, deep sinus tracts and bone involvement  Sterile instrumentation including scalpel, forceps and curette used to excise the clearly delineated devitalized subcutaneous tissue (fibrotic tissue and necrotic non-viable portions of fat) exposing viable tissue  After debridement, bleeding in the wound bed base was noted indicated viable subcutaneous tissue had been exposed  Bleeding controlled with gentle pressure  Patient tolerated debridement without complications  The wound was dressed  Wound dressing technique and frequency described to patient  I am to be called if any signs of infection develop such as erythema, increased wound size or significant change in appearance of wound, odor, pain, increased or change in color of drainage, swelling, fever, chills, nightsweats  I reviewed these signs with the patient  I recommended limiting WB to bathroom priveleges and meal table and to use any prescribed offloading devices in an effort to allow proper rest and healing of the wounded area  I explained that good wound care and compliance are necessary to allow healing and to prevent toe loss or limb loss

## 2023-06-02 ENCOUNTER — RA CDI HCC (OUTPATIENT)
Dept: OTHER | Facility: HOSPITAL | Age: 68
End: 2023-06-02

## 2023-06-05 ENCOUNTER — LAB (OUTPATIENT)
Dept: LAB | Facility: CLINIC | Age: 68
End: 2023-06-05
Payer: MEDICARE

## 2023-06-05 DIAGNOSIS — I10 PRIMARY HYPERTENSION: ICD-10-CM

## 2023-06-05 DIAGNOSIS — E78.2 MIXED HYPERLIPIDEMIA: ICD-10-CM

## 2023-06-05 DIAGNOSIS — E66.01 CLASS 2 SEVERE OBESITY DUE TO EXCESS CALORIES WITH SERIOUS COMORBIDITY AND BODY MASS INDEX (BMI) OF 37.0 TO 37.9 IN ADULT (HCC): ICD-10-CM

## 2023-06-05 DIAGNOSIS — R73.01 IFG (IMPAIRED FASTING GLUCOSE): ICD-10-CM

## 2023-06-05 LAB
ALBUMIN SERPL BCP-MCNC: 4.2 G/DL (ref 3.5–5)
ALP SERPL-CCNC: 54 U/L (ref 34–104)
ALT SERPL W P-5'-P-CCNC: 14 U/L (ref 7–52)
ANION GAP SERPL CALCULATED.3IONS-SCNC: 7 MMOL/L (ref 4–13)
AST SERPL W P-5'-P-CCNC: 14 U/L (ref 13–39)
BASOPHILS # BLD AUTO: 0.04 THOUSANDS/ÂΜL (ref 0–0.1)
BASOPHILS NFR BLD AUTO: 1 % (ref 0–1)
BILIRUB SERPL-MCNC: 0.65 MG/DL (ref 0.2–1)
BUN SERPL-MCNC: 19 MG/DL (ref 5–25)
CALCIUM SERPL-MCNC: 9.1 MG/DL (ref 8.4–10.2)
CHLORIDE SERPL-SCNC: 106 MMOL/L (ref 96–108)
CO2 SERPL-SCNC: 26 MMOL/L (ref 21–32)
CREAT SERPL-MCNC: 1.13 MG/DL (ref 0.6–1.3)
EOSINOPHIL # BLD AUTO: 0.2 THOUSAND/ÂΜL (ref 0–0.61)
EOSINOPHIL NFR BLD AUTO: 3 % (ref 0–6)
ERYTHROCYTE [DISTWIDTH] IN BLOOD BY AUTOMATED COUNT: 12.9 % (ref 11.6–15.1)
EST. AVERAGE GLUCOSE BLD GHB EST-MCNC: 105 MG/DL
GFR SERPL CREATININE-BSD FRML MDRD: 67 ML/MIN/1.73SQ M
GLUCOSE P FAST SERPL-MCNC: 96 MG/DL (ref 65–99)
HBA1C MFR BLD: 5.3 %
HCT VFR BLD AUTO: 42.7 % (ref 36.5–49.3)
HGB BLD-MCNC: 14.3 G/DL (ref 12–17)
IMM GRANULOCYTES # BLD AUTO: 0.03 THOUSAND/UL (ref 0–0.2)
IMM GRANULOCYTES NFR BLD AUTO: 0 % (ref 0–2)
LDLC SERPL DIRECT ASSAY-MCNC: 68 MG/DL (ref 0–100)
LYMPHOCYTES # BLD AUTO: 2.31 THOUSANDS/ÂΜL (ref 0.6–4.47)
LYMPHOCYTES NFR BLD AUTO: 33 % (ref 14–44)
MCH RBC QN AUTO: 29.8 PG (ref 26.8–34.3)
MCHC RBC AUTO-ENTMCNC: 33.5 G/DL (ref 31.4–37.4)
MCV RBC AUTO: 89 FL (ref 82–98)
MONOCYTES # BLD AUTO: 0.62 THOUSAND/ÂΜL (ref 0.17–1.22)
MONOCYTES NFR BLD AUTO: 9 % (ref 4–12)
NEUTROPHILS # BLD AUTO: 3.86 THOUSANDS/ÂΜL (ref 1.85–7.62)
NEUTS SEG NFR BLD AUTO: 54 % (ref 43–75)
NRBC BLD AUTO-RTO: 0 /100 WBCS
PLATELET # BLD AUTO: 232 THOUSANDS/UL (ref 149–390)
PMV BLD AUTO: 9.4 FL (ref 8.9–12.7)
POTASSIUM SERPL-SCNC: 4.5 MMOL/L (ref 3.5–5.3)
PROT SERPL-MCNC: 6.8 G/DL (ref 6.4–8.4)
RBC # BLD AUTO: 4.8 MILLION/UL (ref 3.88–5.62)
SODIUM SERPL-SCNC: 139 MMOL/L (ref 135–147)
TSH SERPL DL<=0.05 MIU/L-ACNC: 1.07 UIU/ML (ref 0.45–4.5)
WBC # BLD AUTO: 7.06 THOUSAND/UL (ref 4.31–10.16)

## 2023-06-05 PROCEDURE — 83721 ASSAY OF BLOOD LIPOPROTEIN: CPT

## 2023-06-05 PROCEDURE — 80053 COMPREHEN METABOLIC PANEL: CPT

## 2023-06-05 PROCEDURE — 83036 HEMOGLOBIN GLYCOSYLATED A1C: CPT

## 2023-06-05 PROCEDURE — 36415 COLL VENOUS BLD VENIPUNCTURE: CPT

## 2023-06-05 PROCEDURE — 84443 ASSAY THYROID STIM HORMONE: CPT

## 2023-06-05 PROCEDURE — 85025 COMPLETE CBC W/AUTO DIFF WBC: CPT

## 2023-06-12 ENCOUNTER — OFFICE VISIT (OUTPATIENT)
Dept: FAMILY MEDICINE CLINIC | Facility: CLINIC | Age: 68
End: 2023-06-12
Payer: MEDICARE

## 2023-06-12 VITALS
WEIGHT: 315 LBS | SYSTOLIC BLOOD PRESSURE: 126 MMHG | TEMPERATURE: 97.8 F | BODY MASS INDEX: 37.19 KG/M2 | OXYGEN SATURATION: 96 % | HEIGHT: 77 IN | HEART RATE: 68 BPM | DIASTOLIC BLOOD PRESSURE: 84 MMHG | RESPIRATION RATE: 18 BRPM

## 2023-06-12 DIAGNOSIS — E78.1 PURE HYPERTRIGLYCERIDEMIA: ICD-10-CM

## 2023-06-12 DIAGNOSIS — E78.2 MIXED HYPERLIPIDEMIA: ICD-10-CM

## 2023-06-12 DIAGNOSIS — G47.33 OSA ON CPAP: ICD-10-CM

## 2023-06-12 DIAGNOSIS — Z99.89 OSA ON CPAP: ICD-10-CM

## 2023-06-12 DIAGNOSIS — E66.01 CLASS 2 SEVERE OBESITY DUE TO EXCESS CALORIES WITH SERIOUS COMORBIDITY AND BODY MASS INDEX (BMI) OF 38.0 TO 38.9 IN ADULT (HCC): ICD-10-CM

## 2023-06-12 DIAGNOSIS — Z86.010 HISTORY OF COLON POLYPS: ICD-10-CM

## 2023-06-12 DIAGNOSIS — Z12.5 PROSTATE CANCER SCREENING: ICD-10-CM

## 2023-06-12 DIAGNOSIS — K21.9 GASTRO-ESOPHAGEAL REFLUX DISEASE WITHOUT ESOPHAGITIS: ICD-10-CM

## 2023-06-12 DIAGNOSIS — R73.01 IFG (IMPAIRED FASTING GLUCOSE): Primary | ICD-10-CM

## 2023-06-12 DIAGNOSIS — G62.9 PERIPHERAL POLYNEUROPATHY: ICD-10-CM

## 2023-06-12 DIAGNOSIS — I10 PRIMARY HYPERTENSION: ICD-10-CM

## 2023-06-12 PROCEDURE — 99214 OFFICE O/P EST MOD 30 MIN: CPT | Performed by: FAMILY MEDICINE

## 2023-06-12 NOTE — PATIENT INSTRUCTIONS
To Do:     Call for appt - Peripheral Neuropathy - Management per Neuro Dr Gale Rapp - next appt 5/23

## 2023-06-12 NOTE — PROGRESS NOTES
Assessment/Plan:  Problem List Items Addressed This Visit        Digestive    Gastro-esophageal reflux disease without esophagitis     Stable  Management per GI  Continue Pepcid 20mg BID PRN  Watch GERD diet  Endocrine    IFG (impaired fasting glucose) - Primary     Stable  Continue Metformin XR 500mg 3 pills in AM   Patient previously declined [address]  Recommend lifestyle modifications  Relevant Orders    Comprehensive metabolic panel    Hemoglobin A1C       Respiratory    JOVANY on CPAP     Management per Sleep Medicine  Continue CPAP  Cardiovascular and Mediastinum    Hypertension     Stable  Continue increased Losartan 50mg daily  Check blood pressure outside of office  Recommend lifestyle modifications  Relevant Orders    Comprehensive metabolic panel       Nervous and Auditory    Peripheral neuropathy     Stable s meds  Management per Neuro and Podiatry  Other    Mixed hyperlipidemia     Stable on statin  Recommend lifestyle modifications  Relevant Orders    Comprehensive metabolic panel    TSH, 3rd generation with Free T4 reflex    LDL cholesterol, direct    Class 2 severe obesity with serious comorbidity and body mass index (BMI) of 38 0 to 38 9 in adult (HCC)     Stable  Recommend lifestyle modifications  To consider Weight Management in future PRN? Relevant Orders    TSH, 3rd generation with Free T4 reflex    Vitamin D 25 hydroxy    History of colon polyps     Colonoscopy is up-to-date  Pure hypertriglyceridemia     Stable  Recommend lifestyle modifications  Other Visit Diagnoses     Prostate cancer screening        Relevant Orders    PSA, Total Screen           Return in about 4 months (around 10/12/2023) for AWV / 4mo- IFG, HTN, HL, GERD, PN, JOVANY, Obesity, Labs        Future Appointments   Date Time Provider [place]   7/17/2023  9:00 AM Scott Kumar MD SLEEP [facility] 10/16/2023  8:40 AM Rustam Ashraf, DO [facility]        Subjective:     Brenda Neff is a 79 y o  male who presents today for a follow-up on his chronic medical conditions  HPI:  Chief Complaint   Patient presents with   • Follow-up     4mo- JENA, IFG, HTN, HL, GERD, PN, JOVANY, Obesity, Labs  No new problems or concerns at this time  • HM     Pt had shingrix #2 in [place] at previous PCP, Yovani Santos  -- Above per clinical staff and reviewed  --      HPI      Today:      Return in about 4 months (around 5/23/2023) for 4mo- JENA, IFG, HTN, HL, GERD, PN, JOVANY, Obesity, Labs  4mo OV    Desires PSA and JENA per PCP (after colonoscopy 4/14/22)            Morbid Obesity - Watching diet - cutting back on portions  +Regular exercise - Walking for 20 minutes, 4-5 times per week  Sonya Giordano during his job        IFG -On Metformin XR 500mg 3 pills QD      HTN - On increased Losartan 50mg QD   D/C Atenolol 25mg QD as not 1st line HTN Rx    No other HTN meds previously   No BP check outside office        Hyperlipidemia - On Zocor 20mg QHS   No higher dose or other statin previously        GERD - Management per GI Dr Ana Corrigan appt PRN   s/p EGD 4/14/22   On Pepcid 20mg BID PRN  - using once daily   D/C Protonix 40mg QD since 2015 (no lower dose previously), previously on Nexium, but D/C due to clinical formulary change    Not watching GERD diet       Peripheral Neuropathy - Management per Neuro Dr Duane Guess - next appt 5/23 and Podiatry Dr Herber Morgan - next appt 6/23   Genetic   Has B/L LE Neuropathy from knees distally and hands   S/p EMG/NCS      Left Foot Ulcer - Management per Podiatry Dr Herber Morgan   Next appt PRN - 6/23         JOVANY - On CPAP   Management per Sleep Medicine Dr Karan Baker appt 7/23   Last Sleep Study 2016?      H/O Colon Polyps - Management per GI Dr Whitaker Kidney   Next colonoscopy due 4/14/25      Had 2 Shingrix - med records requested - pharmacy lost records           Reviewed:  Labs 6/5/23, Burn "Surgery 3/4/22, Neuro 5/31/22, Sleep Medicine 11/21/22, Podiatry 5/15/23     No Uro        The following portions of the patient's history were reviewed and updated as appropriate: allergies, current medications, past family history, past medical history, past social history, past surgical history and problem list       Review of Systems   Constitutional: Negative for appetite change, chills, diaphoresis, fatigue and fever  Respiratory: Negative for chest tightness and shortness of breath  Cardiovascular: Negative for chest pain  Gastrointestinal: Negative for abdominal pain, blood in stool, diarrhea, nausea and vomiting  Genitourinary: Negative for dysuria  Nocturia x 0-1        Current Outpatient Medications   Medication Sig Dispense Refill   • Ascorbic Acid (Vitamin C) 250 MG CHEW Chew 250 mg daily Take 2 chews daily      • Cholecalciferol 125 MCG (5000 UT) TABS Take 5,000 Units by mouth daily       • cyanocobalamin (VITAMIN B-12) 1000 MCG tablet Take 1,000 mcg by mouth daily     • famotidine (PEPCID) 20 mg tablet Take 1 tablet (20 mg total) by mouth 2 (two) times a day as needed for heartburn 180 tablet 2   • losartan (COZAAR) 50 mg tablet Take 1 tablet (50 mg total) by mouth daily 90 tablet 2   • metFORMIN (GLUCOPHAGE-XR) 500 mg 24 hr tablet Take 3 tablets (1,500 mg total) by mouth daily with dinner Increase as directed  270 tablet 2   • multivitamin (THERAGRAN) TABS Take 1 tablet by mouth daily     • simvastatin (ZOCOR) 20 mg tablet Take 1 tablet (20 mg total) by mouth daily at bedtime 90 tablet 2     No current facility-administered medications for this visit         Objective:  /84   Pulse 68   Temp 97 8 °F (36 6 °C)   Resp 18   Ht 6' 5\" (1 956 m)   Wt (!) 147 kg (323 lb 6 4 oz)   SpO2 96%   BMI 38 35 kg/m²    Wt Readings from Last 3 Encounters:   06/12/23 (!) 147 kg (323 lb 6 4 oz)   05/15/23 (!) 147 kg (323 lb)   04/17/23 (!) 149 kg (327 lb 9 6 oz)      BP Readings from Last 3 " "Encounters:   06/12/23 126/84   05/15/23 122/80   04/17/23 123/80          Physical Exam  Vitals and nursing note reviewed  Exam conducted with a chaperone present Meghana Farley LPN)  Constitutional:       Appearance: Normal appearance  He is well-developed  He is obese  HENT:      Head: Normocephalic and atraumatic  Eyes:      Conjunctiva/sclera: Conjunctivae normal    Neck:      Thyroid: No thyromegaly  Vascular: No carotid bruit  Cardiovascular:      Rate and Rhythm: Normal rate and regular rhythm  Pulses: Normal pulses  Heart sounds: Normal heart sounds  Pulmonary:      Effort: Pulmonary effort is normal       Breath sounds: Normal breath sounds  Abdominal:      General: Bowel sounds are normal  There is no distension  Palpations: Abdomen is soft  There is no mass  Tenderness: There is no abdominal tenderness  There is no guarding or rebound  Genitourinary:     Prostate: Enlarged (32-56cc)  Not tender and no nodules present  Rectum: Normal    Musculoskeletal:         General: No swelling or tenderness  Cervical back: Neck supple  Right lower leg: No edema  Left lower leg: No edema  Lymphadenopathy:      Cervical: No cervical adenopathy  Neurological:      General: No focal deficit present  Mental Status: He is alert and oriented to person, place, and time     Psychiatric:         Mood and Affect: Mood normal          Lab Results:      Lab Results   Component Value Date    ALT 14 06/05/2023    AST 14 06/05/2023    BUN 19 06/05/2023     06/05/2023    CO2 26 06/05/2023    CREATININE 1 13 06/05/2023    GLUF 96 06/05/2023    HCT 42 7 06/05/2023    HDL 39 (L) 08/01/2022    HGB 14 3 06/05/2023    HGBA1C 5 3 06/05/2023    INR 1 13 12/21/2021    K 4 5 06/05/2023    LDLDIRECT 68 06/05/2023     06/05/2023    PSA 0 8 08/01/2022    TRIG 178 (H) 08/01/2022    WBC 7 06 06/05/2023     No results found for: \"URICACID\"  Invalid input(s): " "\"BASENAME\" Vitamin D    No results found  POCT Labs        Depression Screening and Follow-up Plan: Patient was screened for depression during today's encounter   They screened negative with a PHQ-2 score of 0                     "

## 2023-07-17 ENCOUNTER — OFFICE VISIT (OUTPATIENT)
Dept: SLEEP CENTER | Facility: CLINIC | Age: 68
End: 2023-07-17
Payer: MEDICARE

## 2023-07-17 VITALS
SYSTOLIC BLOOD PRESSURE: 126 MMHG | WEIGHT: 315 LBS | HEART RATE: 69 BPM | HEIGHT: 77 IN | OXYGEN SATURATION: 97 % | DIASTOLIC BLOOD PRESSURE: 80 MMHG | BODY MASS INDEX: 37.19 KG/M2

## 2023-07-17 DIAGNOSIS — G62.9 PERIPHERAL POLYNEUROPATHY: ICD-10-CM

## 2023-07-17 DIAGNOSIS — K21.9 GASTROESOPHAGEAL REFLUX DISEASE, UNSPECIFIED WHETHER ESOPHAGITIS PRESENT: ICD-10-CM

## 2023-07-17 DIAGNOSIS — Z99.89 OSA ON CPAP: Primary | ICD-10-CM

## 2023-07-17 DIAGNOSIS — I10 ESSENTIAL HYPERTENSION: ICD-10-CM

## 2023-07-17 DIAGNOSIS — G47.33 OSA ON CPAP: Primary | ICD-10-CM

## 2023-07-17 DIAGNOSIS — E66.9 OBESITY (BMI 30-39.9): ICD-10-CM

## 2023-07-17 DIAGNOSIS — R68.2 DRY MOUTH: ICD-10-CM

## 2023-07-17 DIAGNOSIS — F45.8 BRUXISM: ICD-10-CM

## 2023-07-17 PROCEDURE — 99214 OFFICE O/P EST MOD 30 MIN: CPT | Performed by: INTERNAL MEDICINE

## 2023-07-17 NOTE — PROGRESS NOTES
Follow-Up Note - [facility]   Amparo Kirkland  79 y.o. male  BLE:[birth year]  JOCELIN:[id number]  DOS:7/17/2023    CC: I saw this patient for follow-up in clinic today for Sleep disordered breathing, Coexisting Sleep and Medical Problems. Interval changes: He got a replacement ResMed machine to 3 months ago. Results of a split study in 2017: The diagnostic portion demonstrated an AHI of 69.9/h. Minimum oxygen saturation was 78% and 27.6% of the study was spent with saturations below 90%. During the therapeutic portion of the study, sleep disordered breathing was adequately remediated with nasal CPAP at 12 cm H2O. PFSH, Problem List, Medications & Allergies were reviewed in EMR. He  has a past medical history of Closed fracture of right foot, Diverticulosis, Drusen (degenerative) of macula, bilateral (09/27/2021), Gastro-esophageal reflux disease without esophagitis (05/30/2019), GERD (gastroesophageal reflux disease) (2010), Hammer toe (15 years), History of colon polyps, Hypertension (09/27/2021), IFG (impaired fasting glucose) (10/01/2021), Internal hemorrhoids, Kidney stone (2007), Mixed hyperlipidemia (05/30/2019), Morbid obesity ([address]) (09/27/2021), Myopia, bilateral (09/27/2021), JOVANY on CPAP, Peripheral neuropathy (09/27/2021), Pinguecula, bilateral (09/27/2021), Presbyopia (10/11/2016), Pure hypertriglyceridemia (08/19/2019), Radial fracture (1975), Regular astigmatism, bilateral (09/27/2021), and Tendonitis. He has a current medication list which includes the following prescription(s): vitamin c, cholecalciferol, cyanocobalamin, famotidine, losartan, metformin, multivitamin, and simvastatin. PHYSIOLOGICAL DATA REVIEW : Using PAP > 4 hours/night 83%. Estimated JORY 3.3/hour with pressure of 14cm H2O ; patient has not been using non FDA approved devices to sanitize the machine.   INTERPRETATION: Compliance is Very good; Pressure setting is:adequate; ;   SUBJECTIVE: With respect to use of PAP, Juana Thornton is experiencing no adverse effects: Dry mouth is improved. Shona Modi He derives benefit. Is satisfied with sleep and daytime function. Sleep Routine: Beltran Madrigal reports getting 7 hrs sleep; he has no difficulty initiating or maintaining sleep . He arises spontaneously and feels refreshed. Beltran Madrigal denies daytime sleepiness,. He rated [himself] at Total score: 0 /24 on the [place] Sleepiness Scale. Other issues: Is not aware of teeth grinding during sleep. .     Habits:[ reports that he quit smoking about 18 years ago. His smoking use included cigarettes and cigars. He started smoking about 43 years ago. He has never used smokeless tobacco.], [ reports current alcohol use of about 1.0 standard drink of alcohol per week.], [ reports that he does not currently use drugs after having used the following drugs: Marijuana.], Caffeine use:limited; Exercise routine: regular. ROS: Significant for few pounds weight gain since his last visit. Acid reflux is controlled. A 10 point review of systems was otherwise negative. EXAM: /80 (BP Location: Left arm, Patient Position: Sitting, Cuff Size: Large)   Pulse 69   Ht 6' 5" (1.956 m)   Wt (!) 148 kg (327 lb)   SpO2 97%   BMI 38.78 kg/m²     Wt Readings from Last 3 Encounters:   07/17/23 (!) 148 kg (327 lb)   06/12/23 (!) 147 kg (323 lb 6.4 oz)   05/15/23 (!) 147 kg (323 lb)      Patient is well groomed; well appearing. CNS: Alert, orientated, clear & coherent speech. Psych: cooperative and in no distress. Mental state: Appears normal.  H&N: EOMI; NC/AT: No facial pressure marks, no rashes. Skin/Extrem: col & hydration normal; no edema  Resp: Respiratory effort is normal  Physical findings otherwise essentially unchanged from previous. IMPRESSION: Problem List Items & Comorbidities Addressed this Visit    1. JOVANY on CPAP  PAP DME Resupply/Reorder      2. Bruxism        3. Dry mouth        4. Essential hypertension        5.  Gastroesophageal reflux disease, unspecified whether esophagitis present        6. Peripheral polyneuropathy        7. Obesity (BMI 30-39. 9)            PLAN:  1. I reviewed results of prior studies and physiologic data with the patient. 2. I discussed treatment options with risks and benefits. 3. Treatment with  PAP is medically necessary and Toure Pleasure is agreable to continue use. 4. Care of equipment, methods to improve comfort using PAP and importance of compliance with therapy were discussed. 5. Pressure setting: continue 14 cmH2O.    6. Rx provided to replace supplies and Care coordinated with DME provider. 7. Discussed strategies for weight reduction. 8. Follow-up is advised in 1 year or sooner if needed to monitor progress, compliance and to adjust therapy. Thank you for allowing me to participate in the care of this patient. Sincerely,     Authenticated electronically on 44/49/38   Board Certified Specialist     Portions of the record may have been created with voice recognition software. Occasional wrong word or "sound a like" substitutions may have occurred due to the inherent limitations of voice recognition software. There may also be notations and random deletions of words or characters from malfunctioning software. Read the chart carefully and recognize, using context, where substitutions/deletions have occurred.

## 2023-07-17 NOTE — PATIENT INSTRUCTIONS

## 2023-07-18 ENCOUNTER — TELEPHONE (OUTPATIENT)
Dept: SLEEP CENTER | Facility: CLINIC | Age: 68
End: 2023-07-18

## 2023-07-20 LAB

## 2023-09-05 NOTE — RESULT ENCOUNTER NOTE
Stable labs - will review with patient at upcoming appointment  Adbry Counseling: I discussed with the patient the risks of tralokinumab including but not limited to eye infection and irritation, cold sores, injection site reactions, worsening of asthma, allergic reactions and increased risk of parasitic infection.  Live vaccines should be avoided while taking tralokinumab. The patient understands that monitoring is required and they must alert us or the primary physician if symptoms of infection or other concerning signs are noted.

## 2023-10-16 ENCOUNTER — OFFICE VISIT (OUTPATIENT)
Dept: FAMILY MEDICINE CLINIC | Facility: CLINIC | Age: 68
End: 2023-10-16
Payer: MEDICARE

## 2023-10-16 ENCOUNTER — LAB (OUTPATIENT)
Dept: LAB | Facility: CLINIC | Age: 68
End: 2023-10-16
Payer: MEDICARE

## 2023-10-16 VITALS
SYSTOLIC BLOOD PRESSURE: 124 MMHG | RESPIRATION RATE: 20 BRPM | HEART RATE: 62 BPM | HEIGHT: 77 IN | BODY MASS INDEX: 37.19 KG/M2 | OXYGEN SATURATION: 97 % | DIASTOLIC BLOOD PRESSURE: 80 MMHG | TEMPERATURE: 97.6 F | WEIGHT: 315 LBS

## 2023-10-16 DIAGNOSIS — Z12.5 PROSTATE CANCER SCREENING: ICD-10-CM

## 2023-10-16 DIAGNOSIS — G47.33 OSA ON CPAP: ICD-10-CM

## 2023-10-16 DIAGNOSIS — R73.01 IFG (IMPAIRED FASTING GLUCOSE): ICD-10-CM

## 2023-10-16 DIAGNOSIS — H52.4 PRESBYOPIA: ICD-10-CM

## 2023-10-16 DIAGNOSIS — H35.363 DRUSEN (DEGENERATIVE) OF MACULA, BILATERAL: ICD-10-CM

## 2023-10-16 DIAGNOSIS — G62.9 PERIPHERAL POLYNEUROPATHY: ICD-10-CM

## 2023-10-16 DIAGNOSIS — K21.9 GASTRO-ESOPHAGEAL REFLUX DISEASE WITHOUT ESOPHAGITIS: ICD-10-CM

## 2023-10-16 DIAGNOSIS — E78.2 MIXED HYPERLIPIDEMIA: ICD-10-CM

## 2023-10-16 DIAGNOSIS — H52.13 MYOPIA, BILATERAL: ICD-10-CM

## 2023-10-16 DIAGNOSIS — E66.01 SEVERE OBESITY (BMI 35.0-39.9) WITH COMORBIDITY (HCC): ICD-10-CM

## 2023-10-16 DIAGNOSIS — Z13.6 SCREENING FOR AAA (ABDOMINAL AORTIC ANEURYSM): ICD-10-CM

## 2023-10-16 DIAGNOSIS — H11.153 PINGUECULA, BILATERAL: ICD-10-CM

## 2023-10-16 DIAGNOSIS — E66.01 CLASS 2 SEVERE OBESITY DUE TO EXCESS CALORIES WITH SERIOUS COMORBIDITY AND BODY MASS INDEX (BMI) OF 38.0 TO 38.9 IN ADULT: ICD-10-CM

## 2023-10-16 DIAGNOSIS — E78.1 PURE HYPERTRIGLYCERIDEMIA: ICD-10-CM

## 2023-10-16 DIAGNOSIS — Z00.00 MEDICARE ANNUAL WELLNESS VISIT, SUBSEQUENT: Primary | ICD-10-CM

## 2023-10-16 DIAGNOSIS — H52.223 REGULAR ASTIGMATISM, BILATERAL: ICD-10-CM

## 2023-10-16 DIAGNOSIS — L97.529 CHRONIC ULCER OF GREAT TOE OF LEFT FOOT, UNSPECIFIED ULCER STAGE (HCC): ICD-10-CM

## 2023-10-16 DIAGNOSIS — I10 PRIMARY HYPERTENSION: ICD-10-CM

## 2023-10-16 DIAGNOSIS — T78.40XA ALLERGIC REACTION, INITIAL ENCOUNTER: ICD-10-CM

## 2023-10-16 DIAGNOSIS — Z86.010 HISTORY OF COLON POLYPS: ICD-10-CM

## 2023-10-16 DIAGNOSIS — E66.9 OBESITY (BMI 35.0-39.9 WITHOUT COMORBIDITY): ICD-10-CM

## 2023-10-16 LAB
25(OH)D3 SERPL-MCNC: 55.5 NG/ML (ref 30–100)
ALBUMIN SERPL BCP-MCNC: 4.3 G/DL (ref 3.5–5)
ALP SERPL-CCNC: 53 U/L (ref 34–104)
ALT SERPL W P-5'-P-CCNC: 12 U/L (ref 7–52)
ANION GAP SERPL CALCULATED.3IONS-SCNC: 7 MMOL/L
AST SERPL W P-5'-P-CCNC: 13 U/L (ref 13–39)
BILIRUB SERPL-MCNC: 0.7 MG/DL (ref 0.2–1)
BUN SERPL-MCNC: 20 MG/DL (ref 5–25)
CALCIUM SERPL-MCNC: 9.3 MG/DL (ref 8.4–10.2)
CHLORIDE SERPL-SCNC: 106 MMOL/L (ref 96–108)
CO2 SERPL-SCNC: 26 MMOL/L (ref 21–32)
CREAT SERPL-MCNC: 1.12 MG/DL (ref 0.6–1.3)
EST. AVERAGE GLUCOSE BLD GHB EST-MCNC: 111 MG/DL
GFR SERPL CREATININE-BSD FRML MDRD: 68 ML/MIN/1.73SQ M
GLUCOSE P FAST SERPL-MCNC: 93 MG/DL (ref 65–99)
HBA1C MFR BLD: 5.5 %
LDLC SERPL DIRECT ASSAY-MCNC: 61 MG/DL (ref 0–100)
POTASSIUM SERPL-SCNC: 4.6 MMOL/L (ref 3.5–5.3)
PROT SERPL-MCNC: 7 G/DL (ref 6.4–8.4)
PSA SERPL-MCNC: 0.59 NG/ML (ref 0–4)
SODIUM SERPL-SCNC: 139 MMOL/L (ref 135–147)
TSH SERPL DL<=0.05 MIU/L-ACNC: 1.24 UIU/ML (ref 0.45–4.5)

## 2023-10-16 PROCEDURE — 80053 COMPREHEN METABOLIC PANEL: CPT

## 2023-10-16 PROCEDURE — 99214 OFFICE O/P EST MOD 30 MIN: CPT | Performed by: FAMILY MEDICINE

## 2023-10-16 PROCEDURE — G0442 ANNUAL ALCOHOL SCREEN 15 MIN: HCPCS | Performed by: FAMILY MEDICINE

## 2023-10-16 PROCEDURE — 84443 ASSAY THYROID STIM HORMONE: CPT

## 2023-10-16 PROCEDURE — 36415 COLL VENOUS BLD VENIPUNCTURE: CPT

## 2023-10-16 PROCEDURE — G0103 PSA SCREENING: HCPCS

## 2023-10-16 PROCEDURE — 83036 HEMOGLOBIN GLYCOSYLATED A1C: CPT

## 2023-10-16 PROCEDURE — 82306 VITAMIN D 25 HYDROXY: CPT

## 2023-10-16 PROCEDURE — 83721 ASSAY OF BLOOD LIPOPROTEIN: CPT

## 2023-10-16 PROCEDURE — G0438 PPPS, INITIAL VISIT: HCPCS | Performed by: FAMILY MEDICINE

## 2023-10-16 RX ORDER — FAMOTIDINE 20 MG/1
20 TABLET, FILM COATED ORAL 2 TIMES DAILY PRN
Qty: 180 TABLET | Refills: 2 | Status: SHIPPED | OUTPATIENT
Start: 2023-10-16

## 2023-10-16 RX ORDER — LOSARTAN POTASSIUM 50 MG/1
50 TABLET ORAL DAILY
Qty: 90 TABLET | Refills: 2 | Status: SHIPPED | OUTPATIENT
Start: 2023-10-16

## 2023-10-16 RX ORDER — SIMVASTATIN 20 MG
20 TABLET ORAL
Qty: 90 TABLET | Refills: 2 | Status: SHIPPED | OUTPATIENT
Start: 2023-10-16

## 2023-10-16 RX ORDER — METFORMIN HYDROCHLORIDE 500 MG/1
1500 TABLET, EXTENDED RELEASE ORAL
Qty: 270 TABLET | Refills: 2 | Status: SHIPPED | OUTPATIENT
Start: 2023-10-16

## 2023-10-16 NOTE — ASSESSMENT & PLAN NOTE
Stable. Continue increased Losartan 50mg daily. Check blood pressure outside of office. Recommend lifestyle modifications.

## 2023-10-16 NOTE — PATIENT INSTRUCTIONS
Advise Claritin/Zyrtec/Allegra/Xyzal, Flonase / Nasacort nasal spray. Avoid decongestants if you have high blood pressure. To Do: Schedule appt - Neuro Dr. David De La Vega - next appt 5/23 - overdue  Schedule appt -  Podiatry Dr. Nishant Canada - next appt 6/23 - overdue. Medicare Preventive Visit Patient Instructions  Thank you for completing your Welcome to Medicare Visit or Medicare Annual Wellness Visit today. Your next wellness visit will be due in one year (10/16/2024). The screening/preventive services that you may require over the next 5-10 years are detailed below. Some tests may not apply to you based off risk factors and/or age. Screening tests ordered at today's visit but not completed yet may show as past due. Also, please note that scanned in results may not display below. Preventive Screenings:  Service Recommendations Previous Testing/Comments   Colorectal Cancer Screening  Colonoscopy    Fecal Occult Blood Test (FOBT)/Fecal Immunochemical Test (FIT)  Fecal DNA/Cologuard Test  Flexible Sigmoidoscopy Age: 43-73 years old   Colonoscopy: every 10 years (May be performed more frequently if at higher risk)  OR  FOBT/FIT: every 1 year  OR  Cologuard: every 3 years  OR  Sigmoidoscopy: every 5 years  Screening may be recommended earlier than age 39 if at higher risk for colorectal cancer. Also, an individualized decision between you and your healthcare provider will decide whether screening between the ages of 77-80 would be appropriate.  Colonoscopy: 04/14/2022  FOBT/FIT: Not on file  Cologuard: Not on file  Sigmoidoscopy: Not on file    Screening Current     Prostate Cancer Screening Individualized decision between patient and health care provider in men between ages of 53-66   Medicare will cover every 12 months beginning on the day after your 50th birthday PSA: 0.8 ng/mL           Hepatitis C Screening Once for adults born between [address] [place] [address]  More frequently in patients at high risk for Hepatitis C Hep C Antibody: 10/01/2021    Screening Current   Diabetes Screening 1-2 times per year if you're at risk for diabetes or have pre-diabetes Fasting glucose: 96 mg/dL (6/5/2023)  A1C: 5.3 % (6/5/2023)  Screening Not Indicated  History Diabetes   Cholesterol Screening Once every 5 years if you don't have a lipid disorder. May order more often based on risk factors. Lipid panel: 08/01/2022  Screening Not Indicated  History Lipid Disorder      Other Preventive Screenings Covered by Medicare:  Abdominal Aortic Aneurysm (AAA) Screening: covered once if your at risk. You're considered to be at risk if you have a family history of AAA or a male between the age of 70-76 who smoking at least 100 cigarettes in your lifetime. Lung Cancer Screening: covers low dose CT scan once per year if you meet all of the following conditions: (1) Age 48-67; (2) No signs or symptoms of lung cancer; (3) Current smoker or have quit smoking within the last 15 years; (4) You have a tobacco smoking history of at least 20 pack years (packs per day x number of years you smoked); (5) You get a written order from a healthcare provider. Glaucoma Screening: covered annually if you're considered high risk: (1) You have diabetes OR (2) Family history of glaucoma OR (3)  aged 48 and older OR (3)  American aged 72 and older  Osteoporosis Screening: covered every 2 years if you meet one of the following conditions: (1) Have a vertebral abnormality; (2) On glucocorticoid therapy for more than 3 months; (3) Have primary hyperparathyroidism; (4) On osteoporosis medications and need to assess response to drug therapy. HIV Screening: covered annually if you're between the age of 14-79. Also covered annually if you are younger than 13 and older than 72 with risk factors for HIV infection. For pregnant patients, it is covered up to 3 times per pregnancy.     Immunizations:  Immunization Recommendations   Influenza Vaccine Annual influenza vaccination during flu season is recommended for all persons aged >= 6 months who do not have contraindications   Pneumococcal Vaccine   * Pneumococcal conjugate vaccine = PCV13 (Prevnar 13), PCV15 (Vaxneuvance), PCV20 (Prevnar 20)  * Pneumococcal polysaccharide vaccine = PPSV23 (Pneumovax) Adults 65-69 yo with certain risk factors or if 69+ yo  If never received any pneumonia vaccine: recommend Prevnar 20 (PCV20)  Give PCV20 if previously received 1 dose of PCV13 or PPSV23   Hepatitis B Vaccine 3 dose series if at intermediate or high risk (ex: diabetes, end stage renal disease, liver disease)   Respiratory syncytial virus (RSV) Vaccine - COVERED BY MEDICARE PART D  * RSVPreF3 (Arexvy) CDC recommends that adults 61years of age and older may receive a single dose of RSV vaccine using shared clinical decision-making (SCDM)   Tetanus (Td) Vaccine - COST NOT COVERED BY MEDICARE PART B Following completion of primary series, a booster dose should be given every 10 years to maintain immunity against tetanus. Td may also be given as tetanus wound prophylaxis. Tdap Vaccine - COST NOT COVERED BY MEDICARE PART B Recommended at least once for all adults. For pregnant patients, recommended with each pregnancy. Shingles Vaccine (Shingrix) - COST NOT COVERED BY MEDICARE PART B  2 shot series recommended in those 19 years and older who have or will have weakened immune systems or those 50 years and older     Health Maintenance Due:      Topic Date Due    Colorectal Cancer Screening  04/13/2025    Hepatitis C Screening  Completed     Immunizations Due:      Topic Date Due    Influenza Vaccine (1) 09/01/2023     Advance Directives   What are advance directives? Advance directives are legal documents that state your wishes and plans for medical care. These plans are made ahead of time in case you lose your ability to make decisions for yourself.  Advance directives can apply to any medical decision, such as the treatments you want, and if you want to donate organs. What are the types of advance directives? There are many types of advance directives, and each state has rules about how to use them. You may choose a combination of any of the following:  Living will: This is a written record of the treatment you want. You can also choose which treatments you do not want, which to limit, and which to stop at a certain time. This includes surgery, medicine, IV fluid, and tube feedings. Durable power of  for healthcare [facility]): This is a written record that states who you want to make healthcare choices for you when you are unable to make them for yourself. This person, called a proxy, is usually a family member or a friend. You may choose more than 1 proxy. Do not resuscitate (DNR) order:  A DNR order is used in case your heart stops beating or you stop breathing. It is a request not to have certain forms of treatment, such as CPR. A DNR order may be included in other types of advance directives. Medical directive: This covers the care that you want if you are in a coma, near death, or unable to make decisions for yourself. You can list the treatments you want for each condition. Treatment may include pain medicine, surgery, blood transfusions, dialysis, IV or tube feedings, and a ventilator (breathing machine). Values history: This document has questions about your views, beliefs, and how you feel and think about life. This information can help others choose the care that you would choose. Why are advance directives important? An advance directive helps you control your care. Although spoken wishes may be used, it is better to have your wishes written down. Spoken wishes can be misunderstood, or not followed. Treatments may be given even if you do not want them. An advance directive may make it easier for your family to make difficult choices about your care.    Weight Management   Why it is important to manage your weight:  Being overweight increases your risk of health conditions such as heart disease, high blood pressure, type 2 diabetes, and certain types of cancer. It can also increase your risk for osteoarthritis, sleep apnea, and other respiratory problems. Aim for a slow, steady weight loss. Even a small amount of weight loss can lower your risk of health problems. How to lose weight safely:  A safe and healthy way to lose weight is to eat fewer calories and get regular exercise. You can lose up about 1 pound a week by decreasing the number of calories you eat by 500 calories each day. Healthy meal plan for weight management:  A healthy meal plan includes a variety of foods, contains fewer calories, and helps you stay healthy. A healthy meal plan includes the following:  Eat whole-grain foods more often. A healthy meal plan should contain fiber. Fiber is the part of grains, fruits, and vegetables that is not broken down by your body. Whole-grain foods are healthy and provide extra fiber in your diet. Some examples of whole-grain foods are whole-wheat breads and pastas, oatmeal, brown rice, and bulgur. Eat a variety of vegetables every day. Include dark, leafy greens such as spinach, kale, camille greens, and mustard greens. Eat yellow and orange vegetables such as carrots, sweet potatoes, and winter squash. Eat a variety of fruits every day. Choose fresh or canned fruit (canned in its own juice or light syrup) instead of juice. Fruit juice has very little or no fiber. Eat low-fat dairy foods. Drink fat-free (skim) milk or 1% milk. Eat fat-free yogurt and low-fat cottage cheese. Try low-fat cheeses such as mozzarella and other reduced-fat cheeses. Choose meat and other protein foods that are low in fat. Choose beans or other legumes such as split peas or lentils. Choose fish, skinless poultry (chicken or turkey), or lean cuts of red meat (beef or pork).  Before you cook meat or poultry, cut off any visible fat.   Use less fat and oil. Try baking foods instead of frying them. Add less fat, such as margarine, sour cream, regular salad dressing and mayonnaise to foods. Eat fewer high-fat foods. Some examples of high-fat foods include french fries, doughnuts, ice cream, and cakes. Eat fewer sweets. Limit foods and drinks that are high in sugar. This includes candy, cookies, regular soda, and sweetened drinks. Exercise:  Exercise at least 30 minutes per day on most days of the week. Some examples of exercise include walking, biking, dancing, and swimming. You can also fit in more physical activity by taking the stairs instead of the elevator or parking farther away from stores. Ask your healthcare provider about the best exercise plan for you. © Copyright [employer] 2018 Information is for End User's use only and may not be sold, redistributed or otherwise used for commercial purposes. All illustrations and images included in CareNotes® are the copyrighted property of [employer] or [address] [place] [address]    Obesity   AMBULATORY CARE:   Obesity  means your body mass index (BMI) is greater than 30. Your healthcare provider will use your age, height, and weight to measure your BMI. The risks of obesity include  many health problems, including injuries or physical disability. Diabetes (high blood sugar level)    High blood pressure or high cholesterol    Heart disease or heart failure    Stroke    Gallbladder or liver disease    Cancer of the colon, breast, prostate, liver, or kidney    Sleep apnea    Arthritis or gout    Screening  is done to check for health conditions before you have signs or symptoms. If you are 28to 79years old, your blood sugar level may be checked every 3 years for signs of prediabetes or diabetes. Your healthcare provider will check your blood pressure at each visit. High blood pressure can lead to a stroke or other problems.  Your provider may check for signs of heart disease, cancer, or other health problems. Seek care immediately if:   You have a severe headache, confusion, or difficulty speaking. You have weakness on one side of your body. You have chest pain, sweating, or shortness of breath. Call your doctor if:   You have symptoms of gallbladder or liver disease, such as pain in your upper abdomen. You have knee or hip pain and discomfort while walking. You have symptoms of diabetes, such as intense hunger and thirst, and frequent urination. You have symptoms of sleep apnea, such as snoring or daytime sleepiness. You have questions or concerns about your condition or care. Treatment for obesity  focuses on helping you lose weight to improve your health. Even a small decrease in BMI can reduce the risk for many health problems. Your healthcare provider will help you set a weight-loss goal.  Lifestyle changes  are the first step in treating obesity. These include making healthy food choices and getting regular physical activity. Your healthcare provider may suggest a weight-loss program that involves coaching, education, and therapy. Medicine  may help you lose weight when it is used with a healthy foods and physical activity. Surgery  can help you lose weight if you have obesity along with other health problems. Several types of weight-loss surgery are available. Ask your healthcare provider for more information. Tips for safe weight loss:   Set small, realistic goals. An example of a small goal is to walk for 20 minutes 5 days a week. Anther goal is to lose 5% of your body weight. Ask for support. Tell friends, family members, and coworkers about your goals. Ask someone to lose weight with you. You may also want to join a weight-loss support group. Identify foods or triggers that may cause you to overeat. Remove tempting high-calorie foods from your home and workplace. Place a bowl of fresh fruit on your kitchen counter.  If stress causes you to eat, find other ways to cope with stress. A counselor or therapist may be able to help you. Track your daily calories and activity. Write down what you eat and drink. Also write down how many minutes of physical activity you do each day. Track your weekly weight. Weigh yourself in the morning, before you eat or drink anything but after you use the bathroom. Use the same scale, in the same place, and in similar clothing each time. Only weigh yourself 1 to 2 times each week, or as directed. You may become discouraged if you weigh yourself every day. Eating changes: You will need to eat 500 to 1,000 fewer calories each day than you currently eat to lose 1 to 2 pounds a week. The following changes will help you cut calories:  Eat smaller portions. Use small plates, no larger than 9 inches in diameter. Fill your plate half full of fruits and vegetables. Measure your food using measuring cups until you know what a serving size looks like. Eat 3 meals and 1 or 2 snacks each day. Plan your meals in advance. Fayrene Callie and eat at home most of the time. Eat slowly. Do not skip meals. Skipping meals can lead to overeating later in the day. This can make it harder for you to lose weight. Talk with a dietitian to help you make a meal plan and schedule that is right for you. Eat fruits and vegetables at every meal.  They are low in calories and high in fiber, which makes you feel full. Do not add butter, margarine, or cream sauce to vegetables. Use herbs to season steamed vegetables. Eat less fat and fewer fried foods. Eat more baked or grilled chicken and fish. These protein sources are lower in calories and fat than red meat. Limit fast food. Dress your salads with olive oil and vinegar instead of bottled dressing. Limit the amount of sugar you eat. Do not drink sugary beverages. Limit alcohol. Activity changes:  Physical activity is good for your body in many ways.  It helps you burn calories and build strong muscles. It decreases stress and depression, and improves your mood. It can also help you sleep better. Talk to your healthcare provider before you begin an exercise program.  Exercise for at least 30 minutes 5 days a week. Start slowly. Set aside time each day for physical activity that you enjoy and that is convenient for you. It is best to do both weight training and an activity that increases your heart rate, such as walking, bicycling, or swimming. Find ways to be more active. Do yard work and housecleaning. Walk up the stairs instead of using elevators. Spend your leisure time going to events that require walking, such as outdoor festivals or fairs. This extra physical activity can help you lose weight and keep it off. Follow up with your doctor as directed: You may need to meet with a dietitian. Write down your questions so you remember to ask them during your visits. © Copyright [employer] 2023 Information is for End User's use only and may not be sold, redistributed or otherwise used for commercial purposes. The above information is an  only. It is not intended as medical advice for individual conditions or treatments. Talk to your doctor, nurse or pharmacist before following any medical regimen to see if it is safe and effective for you. Medicare Preventive Visit Patient Instructions  Thank you for completing your Welcome to Medicare Visit or Medicare Annual Wellness Visit today. Your next wellness visit will be due in one year (10/16/2024). The screening/preventive services that you may require over the next 5-10 years are detailed below. Some tests may not apply to you based off risk factors and/or age. Screening tests ordered at today's visit but not completed yet may show as past due. Also, please note that scanned in results may not display below.   Preventive Screenings:  Service Recommendations Previous Testing/Comments   Colorectal Cancer Screening  Colonoscopy    Fecal Occult Blood Test (FOBT)/Fecal Immunochemical Test (FIT)  Fecal DNA/Cologuard Test  Flexible Sigmoidoscopy Age: 43-73 years old   Colonoscopy: every 10 years (May be performed more frequently if at higher risk)  OR  FOBT/FIT: every 1 year  OR  Cologuard: every 3 years  OR  Sigmoidoscopy: every 5 years  Screening may be recommended earlier than age 39 if at higher risk for colorectal cancer. Also, an individualized decision between you and your healthcare provider will decide whether screening between the ages of 77-80 would be appropriate. Colonoscopy: 04/14/2022  FOBT/FIT: Not on file  Cologuard: Not on file  Sigmoidoscopy: Not on file    Screening Current     Prostate Cancer Screening Individualized decision between patient and health care provider in men between ages of 53-66   Medicare will cover every 12 months beginning on the day after your 50th birthday PSA: 0.8 ng/mL           Hepatitis C Screening Once for adults born between 1945 and 1965  More frequently in patients at high risk for Hepatitis C Hep C Antibody: 10/01/2021    Screening Current   Diabetes Screening 1-2 times per year if you're at risk for diabetes or have pre-diabetes Fasting glucose: 96 mg/dL (6/5/2023)  A1C: 5.3 % (6/5/2023)  Screening Not Indicated  History Diabetes   Cholesterol Screening Once every 5 years if you don't have a lipid disorder. May order more often based on risk factors. Lipid panel: 08/01/2022  Screening Not Indicated  History Lipid Disorder      Other Preventive Screenings Covered by Medicare:  Abdominal Aortic Aneurysm (AAA) Screening: covered once if your at risk. You're considered to be at risk if you have a family history of AAA or a male between the age of 70-76 who smoking at least 100 cigarettes in your lifetime.   Lung Cancer Screening: covers low dose CT scan once per year if you meet all of the following conditions: (1) Age 48-67; (2) No signs or symptoms of lung cancer; (3) Current smoker or have quit smoking within the last 15 years; (4) You have a tobacco smoking history of at least 20 pack years (packs per day x number of years you smoked); (5) You get a written order from a healthcare provider. Glaucoma Screening: covered annually if you're considered high risk: (1) You have diabetes OR (2) Family history of glaucoma OR (3)  aged 48 and older OR (3)  American aged 72 and older  Osteoporosis Screening: covered every 2 years if you meet one of the following conditions: (1) Have a vertebral abnormality; (2) On glucocorticoid therapy for more than 3 months; (3) Have primary hyperparathyroidism; (4) On osteoporosis medications and need to assess response to drug therapy. HIV Screening: covered annually if you're between the age of 14-79. Also covered annually if you are younger than 13 and older than 72 with risk factors for HIV infection. For pregnant patients, it is covered up to 3 times per pregnancy.     Immunizations:  Immunization Recommendations   Influenza Vaccine Annual influenza vaccination during flu season is recommended for all persons aged >= 6 months who do not have contraindications   Pneumococcal Vaccine   * Pneumococcal conjugate vaccine = PCV13 (Prevnar 13), PCV15 (Vaxneuvance), PCV20 (Prevnar 20)  * Pneumococcal polysaccharide vaccine = PPSV23 (Pneumovax) Adults 81-28 yo with certain risk factors or if 69+ yo  If never received any pneumonia vaccine: recommend Prevnar 20 (PCV20)  Give PCV20 if previously received 1 dose of PCV13 or PPSV23   Hepatitis B Vaccine 3 dose series if at intermediate or high risk (ex: diabetes, end stage renal disease, liver disease)   Respiratory syncytial virus (RSV) Vaccine - COVERED BY MEDICARE PART D  * RSVPreF3 (Arexvy) CDC recommends that adults 61years of age and older may receive a single dose of RSV vaccine using shared clinical decision-making (SCDM)   Tetanus (Td) Vaccine - COST NOT COVERED BY MEDICARE PART B Following completion of primary series, a booster dose should be given every 10 years to maintain immunity against tetanus. Td may also be given as tetanus wound prophylaxis. Tdap Vaccine - COST NOT COVERED BY MEDICARE PART B Recommended at least once for all adults. For pregnant patients, recommended with each pregnancy. Shingles Vaccine (Shingrix) - COST NOT COVERED BY MEDICARE PART B  2 shot series recommended in those 19 years and older who have or will have weakened immune systems or those 50 years and older     Health Maintenance Due:      Topic Date Due    Colorectal Cancer Screening  04/13/2025    Hepatitis C Screening  Completed     Immunizations Due:      Topic Date Due    Influenza Vaccine (1) 09/01/2023     Advance Directives   What are advance directives? Advance directives are legal documents that state your wishes and plans for medical care. These plans are made ahead of time in case you lose your ability to make decisions for yourself. Advance directives can apply to any medical decision, such as the treatments you want, and if you want to donate organs. What are the types of advance directives? There are many types of advance directives, and each state has rules about how to use them. You may choose a combination of any of the following:  Living will: This is a written record of the treatment you want. You can also choose which treatments you do not want, which to limit, and which to stop at a certain time. This includes surgery, medicine, IV fluid, and tube feedings. Durable power of  for healthcare [facility]): This is a written record that states who you want to make healthcare choices for you when you are unable to make them for yourself. This person, called a proxy, is usually a family member or a friend. You may choose more than 1 proxy. Do not resuscitate (DNR) order:  A DNR order is used in case your heart stops beating or you stop breathing.  It is a request not to have certain forms of treatment, such as CPR. A DNR order may be included in other types of advance directives. Medical directive: This covers the care that you want if you are in a coma, near death, or unable to make decisions for yourself. You can list the treatments you want for each condition. Treatment may include pain medicine, surgery, blood transfusions, dialysis, IV or tube feedings, and a ventilator (breathing machine). Values history: This document has questions about your views, beliefs, and how you feel and think about life. This information can help others choose the care that you would choose. Why are advance directives important? An advance directive helps you control your care. Although spoken wishes may be used, it is better to have your wishes written down. Spoken wishes can be misunderstood, or not followed. Treatments may be given even if you do not want them. An advance directive may make it easier for your family to make difficult choices about your care. Weight Management   Why it is important to manage your weight:  Being overweight increases your risk of health conditions such as heart disease, high blood pressure, type 2 diabetes, and certain types of cancer. It can also increase your risk for osteoarthritis, sleep apnea, and other respiratory problems. Aim for a slow, steady weight loss. Even a small amount of weight loss can lower your risk of health problems. How to lose weight safely:  A safe and healthy way to lose weight is to eat fewer calories and get regular exercise. You can lose up about 1 pound a week by decreasing the number of calories you eat by 500 calories each day. Healthy meal plan for weight management:  A healthy meal plan includes a variety of foods, contains fewer calories, and helps you stay healthy. A healthy meal plan includes the following:  Eat whole-grain foods more often. A healthy meal plan should contain fiber.  Fiber is the part of grains, fruits, and vegetables that is not broken down by your body. Whole-grain foods are healthy and provide extra fiber in your diet. Some examples of whole-grain foods are whole-wheat breads and pastas, oatmeal, brown rice, and bulgur. Eat a variety of vegetables every day. Include dark, leafy greens such as spinach, kale, camille greens, and mustard greens. Eat yellow and orange vegetables such as carrots, sweet potatoes, and winter squash. Eat a variety of fruits every day. Choose fresh or canned fruit (canned in its own juice or light syrup) instead of juice. Fruit juice has very little or no fiber. Eat low-fat dairy foods. Drink fat-free (skim) milk or 1% milk. Eat fat-free yogurt and low-fat cottage cheese. Try low-fat cheeses such as mozzarella and other reduced-fat cheeses. Choose meat and other protein foods that are low in fat. Choose beans or other legumes such as split peas or lentils. Choose fish, skinless poultry (chicken or turkey), or lean cuts of red meat (beef or pork). Before you cook meat or poultry, cut off any visible fat. Use less fat and oil. Try baking foods instead of frying them. Add less fat, such as margarine, sour cream, regular salad dressing and mayonnaise to foods. Eat fewer high-fat foods. Some examples of high-fat foods include french fries, doughnuts, ice cream, and cakes. Eat fewer sweets. Limit foods and drinks that are high in sugar. This includes candy, cookies, regular soda, and sweetened drinks. Exercise:  Exercise at least 30 minutes per day on most days of the week. Some examples of exercise include walking, biking, dancing, and swimming. You can also fit in more physical activity by taking the stairs instead of the elevator or parking farther away from stores. Ask your healthcare provider about the best exercise plan for you.       © Copyright [employer] 2018 Information is for End User's use only and may not be sold, redistributed or otherwise used for commercial purposes.  All illustrations and images included in CareNotes® are the copyrighted property of [employer] or [address] [place] [address]

## 2023-10-16 NOTE — ASSESSMENT & PLAN NOTE
Stable. Recommend lifestyle modifications. Patient previously declined [place]. To consider Weight Management in future PRN?

## 2023-10-16 NOTE — PROGRESS NOTES
Assessment/Plan:  Problem List Items Addressed This Visit          Digestive    Gastro-esophageal reflux disease without esophagitis     Stable. Management per GI. Continue Pepcid 20mg BID PRN. Watch GERD diet. Relevant Medications    famotidine (PEPCID) 20 mg tablet       Endocrine    IFG (impaired fasting glucose)     Pending labs. Continue Metformin XR 500mg 3 pills in AM.  Patient previously declined Melany Belling. Recommend lifestyle modifications. Relevant Medications    metFORMIN (GLUCOPHAGE-XR) 500 mg 24 hr tablet    Other Relevant Orders    Comprehensive metabolic panel    Hemoglobin A1C       Respiratory    JOVANY on CPAP     Management per Sleep Medicine. Continue CPAP. Cardiovascular and Mediastinum    Hypertension     Stable. Continue increased Losartan 50mg daily. Check blood pressure outside of office. Recommend lifestyle modifications. Relevant Medications    losartan (COZAAR) 50 mg tablet    Other Relevant Orders    Comprehensive metabolic panel       Nervous and Auditory    Peripheral neuropathy     Stable s meds. Management per Neuro and Podiatry - overdue for appts. Other    Mixed hyperlipidemia     Pending labs. Previously Stable on statin. Recommend lifestyle modifications. Relevant Medications    simvastatin (ZOCOR) 20 mg tablet    Other Relevant Orders    Comprehensive metabolic panel    Lipid panel    LDL cholesterol, direct    Class 2 severe obesity with serious comorbidity and body mass index (BMI) of 38.0 to 38.9 in adult      Stable. Recommend lifestyle modifications. Patient previously declined Melany Belling. To consider Weight Management in future PRN? Relevant Orders    TSH, 3rd generation with Free T4 reflex    History of colon polyps     Colonoscopy is up-to-date. Pure hypertriglyceridemia     Pending labs. Recommend lifestyle modifications.            Relevant Medications    simvastatin (ZOCOR) 20 mg tablet    Other Relevant Orders    Comprehensive metabolic panel    Lipid panel    TSH, 3rd generation with Free T4 reflex    LDL cholesterol, direct    Drusen (degenerative) of macula, bilateral     Management per Optho. Myopia, bilateral     Management per Optho. Pinguecula, bilateral     Management per Optho. Regular astigmatism, bilateral     Management per Optho. Presbyopia     Management per Optho. Other Visit Diagnoses       Medicare annual wellness visit, subsequent    -  Primary    Obesity (BMI 35.0-39.9 without comorbidity)        Relevant Orders    TSH, 3rd generation with Free T4 reflex    Severe obesity (BMI 35.0-39. 9) with comorbidity ([address])        Relevant Orders    TSH, 3rd generation with Free T4 reflex    BMI 38.0-38.9,adult        Relevant Orders    TSH, 3rd generation with Free T4 reflex    Screening for AAA (abdominal aortic aneurysm)        Relevant Orders    US abdominal aorta screening aaa    Chronic ulcer of great toe of left foot, unspecified ulcer stage ([address])        Stable. Continue home wound care. F/U Podiatry. Allergic reaction, initial encounter        Advise Claritin/Zyrtec/Allegra/Xyzal, Flonase / Nasacort nasal spray. Avoid decongestants if you have high blood pressure. Return in about 4 months (around 2/16/2024) for 4mo- IFG, HTN, HL, GERD, PN, JOVANY, Obesity, Labs. Future Appointments   Date Time Provider [facility] [address]   10/16/2023  9:55 AM [facility] CHAIR [facility]   2/19/2024  8:40 AM Ryan Tubbs DO [facility]   8/5/2024 10:30 AM Braulio Hernández MD SLEEP [facility]        Subjective:     Jaci Lux is a 79 y.o. male who presents today for a follow-up on his chronic medical conditions. HPI:  Chief Complaint   Patient presents with   • Medicare Wellness Visit     4mo- IFG, HTN, HL, GERD, PN, JOVANY, Obesity, Labs   • Eye Swelling     R eye, started this weekend. Denies pain, itching, discharge. -- Above per clinical staff and reviewed. --      HPI      Today:      Return in about 4 months (around 10/12/2023) for AWV / 4mo- IFG, HTN, HL, GERD, PN, JOVANY, Obesity, Labs     4mo OV    Patient did not complete labs 6/12/23. Plans to retire 12/24. Desires PSA and JENA per PCP (after colonoscopy 4/14/22). Last JENA 6/12/23. Right Inferior Eyelid Swelling - Symptoms x 3 days. It has increased and decreased in size. He thinks CPAP mask (not new) has been causing irritation. Denies itching, pain, D/C. Not OTC treatment. Denies trauma or vision changes. Obesity - Watching diet - cutting back on portions. +Regular exercise - Walking for 20 minutes, 4-5 times per week. Walks during his job. IFG -On Metformin XR 500mg 3 pills QD. HTN - On increased Losartan 50mg QD. D/C Atenolol 25mg QD as not 1st line HTN Rx. No other HTN meds previously. No BP check outside office. Hyperlipidemia - On Zocor 20mg QHS. No higher dose or other statin previously. GERD - Management per GI Dr. Andre Lozoya. Next appt PRN. s/p EGD 4/14/22. On Pepcid 20mg BID PRN  - using once daily. D/C Protonix 40mg QD since 2015 (no lower dose previously), previously on Nexium, but D/C due to clinical formulary change. Not watching GERD diet. Peripheral Neuropathy - Management per Neuro Dr. David De La Vega - next appt 5/23 - overdue and Podiatry Dr. Nishant Canada - next appt 6/23 - overdue. Genetic. Has B/L LE Neuropathy from knees distally and hands. S/p EMG/NCS. Left Foot Ulcer - Management per Podiatry Dr. Nishant Canada. Next appt PRN - 6/23. JOVANY - On CPAP. Management per Sleep Medicine Dr. Adalgisa Winters. Next appt 7/24. Last Sleep Study 2016? H/O Colon Polyps - Management per GI Dr. Andre Lozoya. Next colonoscopy due 4/14/25. Had 2 Shingrix - med records requested - pharmacy lost records.           Reviewed:  Labs 6/5/23, Burn Surgery 3/4/22, Neuro 5/31/22, Sleep Medicine 7/17/23, Podiatry 5/15/23     No Uro. PHQ-2/9 Depression Screening    Little interest or pleasure in doing things: 0 - not at all  Feeling down, depressed, or hopeless: 0 - not at all  PHQ-2 Score: 0  PHQ-2 Interpretation: Negative depression screen           The following portions of the patient's history were reviewed and updated as appropriate: allergies, current medications, past family history, past medical history, past social history, past surgical history and problem list.      Review of Systems   Constitutional:  Negative for appetite change, chills, diaphoresis, fatigue and fever. Respiratory:  Negative for chest tightness and shortness of breath. Cardiovascular:  Negative for chest pain. Gastrointestinal:  Negative for abdominal pain, blood in stool, diarrhea, nausea and vomiting. Genitourinary:  Negative for dysuria. Nocturia x 0-1        Current Outpatient Medications   Medication Sig Dispense Refill   • Ascorbic Acid (Vitamin C) 250 MG CHEW Chew 250 mg daily Take 2 chews daily      • Cholecalciferol 125 MCG (5000 UT) TABS Take 5,000 Units by mouth daily       • cyanocobalamin (VITAMIN B-12) 1000 MCG tablet Take 1,000 mcg by mouth daily     • famotidine (PEPCID) 20 mg tablet Take 1 tablet (20 mg total) by mouth 2 (two) times a day as needed for heartburn 180 tablet 2   • losartan (COZAAR) 50 mg tablet Take 1 tablet (50 mg total) by mouth daily 90 tablet 2   • metFORMIN (GLUCOPHAGE-XR) 500 mg 24 hr tablet Take 3 tablets (1,500 mg total) by mouth daily with dinner Increase as directed. 270 tablet 2   • multivitamin (THERAGRAN) TABS Take 1 tablet by mouth daily     • simvastatin (ZOCOR) 20 mg tablet Take 1 tablet (20 mg total) by mouth daily at bedtime 90 tablet 2     No current facility-administered medications for this visit.        Objective:  /80 (BP Location: Left arm, Patient Position: Sitting, Cuff Size: Standard)   Pulse 62   Temp 97.6 °F (36.4 °C) (Temporal) Resp 20   Ht 6' 5" (1.956 m)   Wt (!) 149 kg (328 lb)   SpO2 97%   BMI 38.90 kg/m²    Wt Readings from Last 3 Encounters:   10/16/23 (!) 149 kg (328 lb)   07/17/23 (!) 148 kg (327 lb)   06/12/23 (!) 147 kg (323 lb 6.4 oz)      BP Readings from Last 3 Encounters:   10/16/23 124/80   07/17/23 126/80   06/12/23 126/84          Physical Exam  Vitals and nursing note reviewed. Constitutional:       Appearance: Normal appearance. He is well-developed. He is obese. HENT:      Head: Normocephalic and atraumatic. Right Ear: Tympanic membrane, ear canal and external ear normal.      Left Ear: Tympanic membrane, ear canal and external ear normal.      Nose: Nose normal.      Right Sinus: No maxillary sinus tenderness or frontal sinus tenderness. Left Sinus: No maxillary sinus tenderness or frontal sinus tenderness. Mouth/Throat:      Mouth: Mucous membranes are moist.      Pharynx: Uvula midline. Tonsils: No tonsillar exudate. Eyes:      General: Lids are everted, no foreign bodies appreciated. Extraocular Movements: Extraocular movements intact. Conjunctiva/sclera: Conjunctivae normal.      Pupils: Pupils are equal, round, and reactive to light. Comments: Right inferior eyelid nontender c moderate edema. No rubor, calor, or dolor. Neck:      Vascular: No carotid bruit. Cardiovascular:      Rate and Rhythm: Normal rate and regular rhythm. Pulses: Normal pulses. Dorsalis pedis pulses are 2+ on the right side and 2+ on the left side. Posterior tibial pulses are 2+ on the right side and 2+ on the left side. Heart sounds: Normal heart sounds. Pulmonary:      Effort: Pulmonary effort is normal.      Breath sounds: Normal breath sounds. Abdominal:      General: Bowel sounds are normal. There is no distension. Palpations: Abdomen is soft. There is no mass. Tenderness: There is no abdominal tenderness. There is no guarding or rebound. Musculoskeletal:         General: No swelling or tenderness. Cervical back: Neck supple. Right lower leg: No edema. Left lower leg: No edema. Right foot: Deformity and prominent metatarsal heads present. Left foot: Deformity and prominent metatarsal heads present. Feet:      Right foot:      Toenail Condition: Right toenails are abnormally thick. Left foot:      Skin integrity: Ulcer (Left plantar great toe x 2, shallow, No D/C) present. Toenail Condition: Left toenails are abnormally thick and long. Lymphadenopathy:      Cervical: No cervical adenopathy. Skin:     Findings: No rash. Neurological:      Mental Status: He is alert and oriented to person, place, and time. Psychiatric:         Mood and Affect: Mood normal.         Behavior: Behavior normal.         Thought Content: Thought content normal.         Judgment: Judgment normal.         Lab Results:      Lab Results   Component Value Date    WBC 7.06 06/05/2023    HGB 14.3 06/05/2023    HCT 42.7 06/05/2023     06/05/2023    TRIG 178 (H) 08/01/2022    HDL 39 (L) 08/01/2022    LDLDIRECT 68 06/05/2023    ALT 14 06/05/2023    AST 14 06/05/2023    K 4.5 06/05/2023     06/05/2023    CREATININE 1.13 06/05/2023    BUN 19 06/05/2023    CO2 26 06/05/2023    PSA 0.8 08/01/2022    INR 1.13 12/21/2021    GLUF 96 06/05/2023    HGBA1C 5.3 06/05/2023     No results found for: "URICACID"  Invalid input(s): "BASENAME" Vitamin D    No results found. POCT Labs        Depression Screening and Follow-up Plan: Patient was screened for depression during today's encounter. They screened negative with a PHQ-2 score of 0.

## 2023-10-16 NOTE — PROGRESS NOTES
Assessment and Plan:     Problem List Items Addressed This Visit        Digestive    Gastro-esophageal reflux disease without esophagitis     Stable. Management per GI. Continue Pepcid 20mg BID PRN. Watch GERD diet. Relevant Medications    famotidine (PEPCID) 20 mg tablet       Endocrine    IFG (impaired fasting glucose)     Pending labs. Continue Metformin XR 500mg 3 pills in AM.  Patient previously declined [address] [facility]. Recommend lifestyle modifications. Relevant Medications    metFORMIN (GLUCOPHAGE-XR) 500 mg 24 hr tablet    Other Relevant Orders    Comprehensive metabolic panel    Hemoglobin A1C       Respiratory    JOVANY on CPAP     Management per Sleep Medicine. Continue CPAP. Cardiovascular and Mediastinum    Hypertension     Stable. Continue increased Losartan 50mg daily. Check blood pressure outside of office. Recommend lifestyle modifications. Relevant Medications    losartan (COZAAR) 50 mg tablet    Other Relevant Orders    Comprehensive metabolic panel       Nervous and Auditory    Peripheral neuropathy     Stable s meds. Management per Neuro and Podiatry - overdue for appts. Other    Mixed hyperlipidemia     Pending labs. Previously Stable on statin. Recommend lifestyle modifications. Relevant Medications    simvastatin (ZOCOR) 20 mg tablet    Other Relevant Orders    Comprehensive metabolic panel    Lipid panel    LDL cholesterol, direct    Class 2 severe obesity with serious comorbidity and body mass index (BMI) of 38.0 to 38.9 in adult      Stable. Recommend lifestyle modifications. Patient previously declined [address] [facility]. To consider Weight Management in future PRN? Relevant Orders    TSH, 3rd generation with Free T4 reflex    History of colon polyps     Colonoscopy is up-to-date. Pure hypertriglyceridemia     Pending labs. Recommend lifestyle modifications.            Relevant Medications    simvastatin (ZOCOR) 20 mg tablet    Other Relevant Orders    Comprehensive metabolic panel    Lipid panel    TSH, 3rd generation with Free T4 reflex    LDL cholesterol, direct    Drusen (degenerative) of macula, bilateral     Management per Optho. Myopia, bilateral     Management per Optho. Pinguecula, bilateral     Management per Optho. Regular astigmatism, bilateral     Management per Optho. Presbyopia     Management per Optho. Other Visit Diagnoses     Medicare annual wellness visit, subsequent    -  Primary    Obesity (BMI 35.0-39.9 without comorbidity)        Relevant Orders    TSH, 3rd generation with Free T4 reflex    Severe obesity (BMI 35.0-39. 9) with comorbidity ([address])        Relevant Orders    TSH, 3rd generation with Free T4 reflex    BMI 38.0-38.9,adult        Relevant Orders    TSH, 3rd generation with Free T4 reflex    Screening for AAA (abdominal aortic aneurysm)        Relevant Orders    US abdominal aorta screening aaa    Chronic ulcer of great toe of left foot, unspecified ulcer stage ([address])        Allergic reaction, initial encounter                Depression Screening and Follow-up Plan: Patient was screened for depression during today's encounter. They screened negative with a PHQ-2 score of 0. Preventive health issues were discussed with patient, and age appropriate screening tests were ordered as noted in patient's After Visit Summary. Personalized health advice and appropriate referrals for health education or preventive services given if needed, as noted in patient's After Visit Summary. History of Present Illness:     Patient presents for a Medicare Wellness Visit    HPI   Patient Care Team:  Nick Crystal DO as PCP - General (Family Medicine)     Review of Systems:     Review of Systems    See other note.          Problem List:     Patient Active Problem List   Diagnosis   • Hypertension   • Mixed hyperlipidemia   • Gastro-esophageal reflux disease without esophagitis   • Class 2 severe obesity with serious comorbidity and body mass index (BMI) of 38.0 to 38.9 in adult    • Peripheral neuropathy   • Drusen (degenerative) of macula, bilateral   • Myopia, bilateral   • Pinguecula, bilateral   • Regular astigmatism, bilateral   • Presbyopia   • Pure hypertriglyceridemia   • JOVANY on CPAP   • History of colon polyps   • IFG (impaired fasting glucose)   • Ulcer of left foot, with fat layer exposed ([address])   • Right foot ulcer, with fat layer exposed [facility])      Past Medical and Surgical History:     Past Medical History:   Diagnosis Date   • Closed fracture of right foot     As a child   • Diverticulosis    • Drusen (degenerative) of macula, bilateral 09/27/2021   • Gastro-esophageal reflux disease without esophagitis 05/30/2019   • GERD (gastroesophageal reflux disease) 2010   • Hammer toe 15 years   • History of colon polyps    • Hypertension 09/27/2021   • IFG (impaired fasting glucose) 10/01/2021   • Internal hemorrhoids    • Kidney stone 2007   • Mixed hyperlipidemia 05/30/2019   • Morbid obesity ([address]) 09/27/2021   • Myopia, bilateral 09/27/2021   • JOVANY on CPAP    • Peripheral neuropathy 09/27/2021   • Pinguecula, bilateral 09/27/2021   • Presbyopia 10/11/2016   • Pure hypertriglyceridemia 08/19/2019   • Radial fracture 1975    Stress Fracture - Right Arm   • Regular astigmatism, bilateral 09/27/2021   • Tendonitis     Left shoulder     Past Surgical History:   Procedure Laterality Date   • COLONOSCOPY     • EGD  04/14/2022    Gastric Erosisons, Esophagitis   • FIBULA FRACTURE SURGERY Right 2009    Hardware - Plate and screws in place   • FRACTURE SURGERY  2005   • SKIN GRAFT  2021    Right foot    • TONSILECTOMY AND ADNOIDECTOMY     • TONSILLECTOMY        Family History:     Family History   Problem Relation Age of Onset   • Cancer Mother 76        Type Unknown   • COPD Father         Previous smoker   • Neuropathy Father    • Neuropathy Sister    • Neuropathy Sister    • Neuropathy Brother    • Neuropathy Brother    • Arthritis Maternal Grandmother    • Arthritis Maternal Aunt       Social History:     Social History     Socioeconomic History   • Marital status: Single     Spouse name: None   • Number of children: 0   • Years of education: None   • Highest education level: None   Occupational History   • 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   Tobacco Use   • Smoking status: Former     Packs/day: 0.00     Years: 20.00     Total pack years: 0.00     Types: Cigarettes, Cigars     Start date: 1980     Quit date: 2005     Years since quittin.8   • Smokeless tobacco: Never   Vaping Use   • Vaping Use: Never used   Substance and Sexual Activity   • Alcohol use: Yes     Alcohol/week: 1.0 standard drink of alcohol     Types: 1 Cans of beer per week   • Drug use: Not Currently     Types: Marijuana     Comment: Last Marijuana Use ; Other unknown drugs while in [place]   • Sexual activity: Not Currently     Partners: Female     Birth control/protection: Condom Male   Other Topics Concern   • None   Social History Narrative    Single    Lives alone     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     Social Determinants of Health     Financial Resource Strain: Low Risk  (10/16/2023)    Overall Financial Resource Strain (CARDIA)    • Difficulty of Paying Living Expenses: Not hard at all   Food Insecurity: No Food Insecurity (10/16/2023)    Hunger Vital Sign    • Worried About Running Out of Food in the Last Year: Never true    • Ran Out of Food in the Last Year: Never true   Transportation Needs: No Transportation Needs (10/16/2023)    PRAPARE - Transportation    • Lack of Transportation (Medical): No    • Lack of Transportation (Non-Medical):  No   Physical Activity: Not on file   Stress: Not on file   Social Connections: Not on file   Intimate Partner Violence: Not on file   Housing Stability: Low Risk  (10/16/2023)    Housing Stability Vital Sign    • Unable to Pay for Housing in the Last Year: No    • Number of Places Lived in the Last Year: 1    • Unstable Housing in the Last Year: No      Medications and Allergies:     Current Outpatient Medications   Medication Sig Dispense Refill   • Ascorbic Acid (Vitamin C) 250 MG CHEW Chew 250 mg daily Take 2 chews daily      • Cholecalciferol 125 MCG (5000 UT) TABS Take 5,000 Units by mouth daily       • cyanocobalamin (VITAMIN B-12) 1000 MCG tablet Take 1,000 mcg by mouth daily     • famotidine (PEPCID) 20 mg tablet Take 1 tablet (20 mg total) by mouth 2 (two) times a day as needed for heartburn 180 tablet 2   • losartan (COZAAR) 50 mg tablet Take 1 tablet (50 mg total) by mouth daily 90 tablet 2   • metFORMIN (GLUCOPHAGE-XR) 500 mg 24 hr tablet Take 3 tablets (1,500 mg total) by mouth daily with dinner Increase as directed. 270 tablet 2   • multivitamin (THERAGRAN) TABS Take 1 tablet by mouth daily     • simvastatin (ZOCOR) 20 mg tablet Take 1 tablet (20 mg total) by mouth daily at bedtime 90 tablet 2     No current facility-administered medications for this visit.      No Known Allergies   Immunizations:     Immunization History   Administered Date(s) Administered   • COVID-19 MODERNA VACC 0.25 ML IM BOOSTER 01/26/2022   • COVID-19 MODERNA VACC 0.5 ML IM 04/07/2021, 05/06/2021, 10/18/2022   • INFLUENZA 10/22/2020, 10/11/2021, 10/18/2022   • Influenza Quadrivalent 3 years and older 11/12/2013   • Influenza Quadrivalent Preservative Free 3 years and older IM 10/10/2018, 11/26/2018, 11/20/2019   • Influenza Split 03/03/2008, 10/24/2011   • Influenza, seasonal, injectable, preservative free 10/09/2014, 11/29/2016   • Pneumococcal Conjugate Vaccine 20-valent (Pcv20), Polysace 08/22/2022   • Tdap 08/10/2021   • Zoster Vaccine Recombinant 08/19/2019      Health Maintenance:         Topic Date Due   • Colorectal Cancer Screening  04/13/2025   • Hepatitis C Screening  Completed         Topic Date Due   • Influenza Vaccine (1) 09/01/2023      Medicare Screening Tests and Risk Assessments:     Anatoliy Perez is here for his Subsequent Wellness visit. Health Risk Assessment:   Patient rates overall health as excellent. Patient feels that their physical health rating is same. Patient is very satisfied with their life. Eyesight was rated as same. Hearing was rated as same. Patient feels that their emotional and mental health rating is same. Patients states they are never, rarely angry. Patient states they are never, rarely unusually tired/fatigued. Pain experienced in the last 7 days has been none. Patient states that he has experienced no weight loss or gain in last 6 months. Depression Screening:   PHQ-2 Score: 0      Fall Risk Screening: In the past year, patient has experienced: no history of falling in past year      Home Safety:  Patient does not have trouble with stairs inside or outside of their home. Patient has working smoke alarms and has working carbon monoxide detector. Home safety hazards include: none. Nutrition:   Current diet is Regular. Medications:   Patient is currently taking over-the-counter supplements. OTC medications include: see medication list. Patient is able to manage medications. Activities of Daily Living (ADLs)/Instrumental Activities of Daily Living (IADLs):   Walk and transfer into and out of bed and chair?: Yes  Dress and groom yourself?: Yes    Bathe or shower yourself?: Yes    Feed yourself?  Yes  Do your laundry/housekeeping?: Yes  Manage your money, pay your bills and track your expenses?: Yes  Make your own meals?: Yes    Do your own shopping?: Yes    Previous Hospitalizations:   Any hospitalizations or ED visits within the last 12 months?: No      Advance Care Planning:   Living will: No    Durable POA for healthcare: No    Advanced directive: No      Cognitive Screening:   Provider or family/friend/caregiver concerned regarding cognition?: No    PREVENTIVE SCREENINGS      Cardiovascular Screening:    General: Screening Not Indicated and History Lipid Disorder    Due for: Lipid Panel      Diabetes Screening:     General: Risks and Benefits Discussed      Colorectal Cancer Screening:     General: Screening Current      Prostate Cancer Screening:    General: Risks and Benefits Discussed    Due for: PSA      Osteoporosis Screening:    General: Screening Not Indicated      Abdominal Aortic Aneurysm (AAA) Screening:    Risk factors include: age between 70-77 yo and tobacco use        General: Risks and Benefits Discussed    Due for: Screening AAA Ultrasound      Lung Cancer Screening:     General: Screening Not Indicated      Hepatitis C Screening:    General: Screening Current    Screening, Brief Intervention, and Referral to Treatment (SBIRT)    Screening  Typical number of drinks in a day: 0  Typical number of drinks in a week: 0  Interpretation: Low risk drinking behavior. AUDIT-C Screenin) How often did you have a drink containing alcohol in the past year? never  2) How many drinks did you have on a typical day when you were drinking in the past year? 0  3) How often did you have 6 or more drinks on one occasion in the past year? never    AUDIT-C Score: 0  Interpretation: Score 0-3 (male): Negative screen for alcohol misuse    Single Item Drug Screening:  How often have you used an illegal drug (including marijuana) or a prescription medication for non-medical reasons in the past year? never    Single Item Drug Screen Score: 0  Interpretation: Negative screen for possible drug use disorder    Time Spent  Time spent screening/evaluating the patient for alcohol misuse: 5 minutes. Annual Depression Screening  Time spent screening and evaluating the patient for depression during today's encounter was 5 minutes. Other Counseling Topics:   Calcium and vitamin D intake and regular weightbearing exercise.      PHQ-2/9 Depression Screening    Little interest or pleasure in doing things: 0 - not at all  Feeling down, depressed, or hopeless: 0 - not at all  PHQ-2 Score: 0  PHQ-2 Interpretation: Negative depression screen           No results found. Physical Exam:     /80 (BP Location: Left arm, Patient Position: Sitting, Cuff Size: Standard)   Pulse 62   Temp 97.6 °F (36.4 °C) (Temporal)   Resp 20   Ht 6' 5" (1.956 m)   Wt (!) 149 kg (328 lb)   SpO2 97%   BMI 38.90 kg/m²     Physical Exam     See other note. LALO Castaneda Counseling: Body mass index is 38.9 kg/m². The BMI is above normal. Nutrition recommendations include 3-5 servings of fruits/vegetables daily. Exercise recommendations include exercising 3-5 times per week.

## 2023-10-16 NOTE — ASSESSMENT & PLAN NOTE
Stable. Management per GI. Continue Pepcid 20mg BID PRN. Watch GERD diet. - Prophylactic Acyclovir, Fluconazole, Bactrim ppx  - Ethanol locks

## 2023-10-16 NOTE — RESULT ENCOUNTER NOTE
Resulted labs are stable. Continue plan of care.     Message sent to patient via [facility] patient portal.

## 2023-11-06 ENCOUNTER — HOSPITAL ENCOUNTER (OUTPATIENT)
Dept: ULTRASOUND IMAGING | Facility: HOSPITAL | Age: 68
Discharge: HOME | End: 2023-11-06
Payer: MEDICARE

## 2023-11-06 DIAGNOSIS — Z13.6 SCREENING FOR AAA (ABDOMINAL AORTIC ANEURYSM): ICD-10-CM

## 2023-11-06 PROCEDURE — 76706 US ABDL AORTA SCREEN AAA: CPT

## 2023-11-13 PROBLEM — I77.811 ABDOMINAL AORTIC ECTASIA (HCC): Status: ACTIVE | Noted: 2023-11-13

## 2023-11-13 NOTE — RESULT ENCOUNTER NOTE
Ultrasound of abdominal aorta is negative for abdominal aortic aneurysm. There mild ectasia or widening of the mid abdominal aorta of 2.6cm. Nurse to see orders to repeat test in 4 years. Message sent to patient via [facility] patient portal.    Nurse to also call patient.

## 2023-12-20 ENCOUNTER — OFFICE VISIT (OUTPATIENT)
Dept: URGENT CARE | Facility: CLINIC | Age: 68
End: 2023-12-20
Payer: MEDICARE

## 2023-12-20 VITALS
WEIGHT: 315 LBS | DIASTOLIC BLOOD PRESSURE: 88 MMHG | HEIGHT: 77 IN | RESPIRATION RATE: 17 BRPM | TEMPERATURE: 99.2 F | HEART RATE: 88 BPM | BODY MASS INDEX: 37.19 KG/M2 | SYSTOLIC BLOOD PRESSURE: 142 MMHG | OXYGEN SATURATION: 98 %

## 2023-12-20 DIAGNOSIS — U07.1 COVID: Primary | ICD-10-CM

## 2023-12-20 DIAGNOSIS — R05.1 ACUTE COUGH: ICD-10-CM

## 2023-12-20 DIAGNOSIS — H60.502 ACUTE OTITIS EXTERNA OF LEFT EAR, UNSPECIFIED TYPE: ICD-10-CM

## 2023-12-20 LAB
SARS-COV-2 AG UPPER RESP QL IA: POSITIVE
VALID CONTROL: ABNORMAL

## 2023-12-20 PROCEDURE — 99213 OFFICE O/P EST LOW 20 MIN: CPT | Performed by: PHYSICIAN ASSISTANT

## 2023-12-20 PROCEDURE — 87811 SARS-COV-2 COVID19 W/OPTIC: CPT | Performed by: PHYSICIAN ASSISTANT

## 2023-12-20 PROCEDURE — G0463 HOSPITAL OUTPT CLINIC VISIT: HCPCS | Performed by: PHYSICIAN ASSISTANT

## 2023-12-20 NOTE — LETTER
December 20, 2023     Patient: Tobin Kerns   YOB: 1955   Date of Visit: 12/20/2023       To Whom it May Concern:    Tobin Kerns was seen in my clinic on 12/20/2023. He tested positive for Covid. He is excused from work 12/20-12/22 and may return beginning 12/23.     If you have any questions or concerns, please don't hesitate to call.         Sincerely,          Hortencia Sanchez PA-C        CC: No Recipients

## 2023-12-20 NOTE — PROGRESS NOTES
[facility]        NAME: Tobin Kerns is a 68 y.o. male  : 1955    MRN: [id number]  DATE: 2023  TIME: 9:12 AM    Assessment and Plan   COVID [U07.1]  1. COVID        2. Acute otitis externa of left ear, unspecified type  neomycin-polymyxin-hydrocortisone (CORTISPORIN) otic solution      3. Acute cough  Poct Covid 19 Rapid Antigen Test          POCT Covid positive   Discussed symptoms consistent with viral illness and recommended supportive care  Ofloxacin for AOE     Patient Instructions       Follow up with PCP in 3-5 days.  Proceed to  ER if symptoms worsen.    Chief Complaint     Chief Complaint   Patient presents with   • Cold Like Symptoms     Pt c/o sore throat, right-sided earache, and runny nose since yesterday and coughing up bright red blood every few hours since last night. No OTC meds taken.          History of Present Illness       Patient is a 69 yo male who presents for evaluation of sore throat and right sided ear pain since yesterday as well as hemoptysis. He reports that since last night he had two episodes of coughing up a moderate amount of bright red blood. None since. He denies any CP or SOB.         Review of Systems   Review of Systems   HENT:  Positive for ear pain.    Respiratory:  Positive for cough. Negative for shortness of breath.    Cardiovascular:  Negative for chest pain.         Current Medications       Current Outpatient Medications:   •  Ascorbic Acid (Vitamin C) 250 MG CHEW, Chew 250 mg daily Take 2 chews daily , Disp: , Rfl:   •  Cholecalciferol 125 MCG (5000 UT) TABS, Take 5,000 Units by mouth daily  , Disp: , Rfl:   •  cyanocobalamin (VITAMIN B-12) 1000 MCG tablet, Take 1,000 mcg by mouth daily, Disp: , Rfl:   •  famotidine (PEPCID) 20 mg tablet, Take 1 tablet (20 mg total) by mouth 2 (two) times a day as needed for heartburn, Disp: 180 tablet, Rfl: 2  •  losartan (COZAAR) 50 mg tablet, Take 1 tablet (50 mg total) by mouth daily, Disp: 90  tablet, Rfl: 2  •  metFORMIN (GLUCOPHAGE-XR) 500 mg 24 hr tablet, Take 3 tablets (1,500 mg total) by mouth daily with dinner Increase as directed., Disp: 270 tablet, Rfl: 2  •  multivitamin (THERAGRAN) TABS, Take 1 tablet by mouth daily, Disp: , Rfl:   •  neomycin-polymyxin-hydrocortisone (CORTISPORIN) otic solution, Administer 4 drops into the left ear every 6 (six) hours for 7 days, Disp: 6 mL, Rfl: 0  •  simvastatin (ZOCOR) 20 mg tablet, Take 1 tablet (20 mg total) by mouth daily at bedtime, Disp: 90 tablet, Rfl: 2    Current Allergies     Allergies as of 12/20/2023   • (No Known Allergies)            The following portions of the patient's history were reviewed and updated as appropriate: allergies, current medications, past family history, past medical history, past social history, past surgical history and problem list.     Past Medical History:   Diagnosis Date   • Abdominal aortic ectasia (HCC) 11/13/2023    Next Abdominal Aortic U/S due 11/13/27   • Closed fracture of right foot     As a child   • Diverticulosis    • Drusen (degenerative) of macula, bilateral 09/27/2021   • Gastro-esophageal reflux disease without esophagitis 05/30/2019   • GERD (gastroesophageal reflux disease) 2010   • Hammer toe 15 years   • History of colon polyps    • Hypertension 09/27/2021   • IFG (impaired fasting glucose) 10/01/2021   • Internal hemorrhoids    • Kidney stone 2007   • Mixed hyperlipidemia 05/30/2019   • Morbid obesity (HCC) 09/27/2021   • Myopia, bilateral 09/27/2021   • JOVANY on CPAP    • Peripheral neuropathy 09/27/2021   • Pinguecula, bilateral 09/27/2021   • Presbyopia 10/11/2016   • Pure hypertriglyceridemia 08/19/2019   • Radial fracture 1975    Stress Fracture - Right Arm   • Regular astigmatism, bilateral 09/27/2021   • Tendonitis     Left shoulder       Past Surgical History:   Procedure Laterality Date   • COLONOSCOPY     • EGD  04/14/2022    Gastric Erosisons, Esophagitis   • FIBULA FRACTURE SURGERY Right  "2009    Hardware - Plate and screws in place   • FRACTURE SURGERY  2005   • SKIN GRAFT  2021    Right foot    • TONSILECTOMY AND ADNOIDECTOMY     • TONSILLECTOMY         Family History   Problem Relation Age of Onset   • Cancer Mother 68        Type Unknown   • COPD Father         Previous smoker   • Neuropathy Father    • Neuropathy Sister    • Neuropathy Sister    • Neuropathy Brother    • Neuropathy Brother    • Arthritis Maternal Grandmother    • Arthritis Maternal Aunt          Medications have been verified.        Objective   /88   Pulse 88   Temp 99.2 °F (37.3 °C)   Resp 17   Ht 6' 5\" (1.956 m)   Wt (!) 146 kg (322 lb 9.6 oz)   SpO2 98%   BMI 38.25 kg/m²        Physical Exam     Physical Exam  Constitutional:       General: He is not in acute distress.     Appearance: Normal appearance. He is not ill-appearing or diaphoretic.   HENT:      Right Ear: Tympanic membrane, ear canal and external ear normal.      Left Ear: Tympanic membrane and external ear normal.      Ears:      Comments: Left EAC swollen and erythematous     Nose: Nose normal.      Mouth/Throat:      Mouth: Mucous membranes are moist.      Pharynx: Oropharynx is clear.   Eyes:      Conjunctiva/sclera: Conjunctivae normal.   Cardiovascular:      Rate and Rhythm: Normal rate and regular rhythm.      Heart sounds: Normal heart sounds.   Pulmonary:      Effort: Pulmonary effort is normal.      Breath sounds: Normal breath sounds.   Skin:     General: Skin is warm and dry.   Neurological:      Mental Status: He is alert.   Psychiatric:         Mood and Affect: Mood normal.         Behavior: Behavior normal.                   "

## 2024-01-05 ENCOUNTER — OFFICE VISIT (OUTPATIENT)
Dept: URGENT CARE | Facility: CLINIC | Age: 69
End: 2024-01-05
Payer: MEDICARE

## 2024-01-05 VITALS
OXYGEN SATURATION: 97 % | TEMPERATURE: 98.5 F | HEART RATE: 62 BPM | RESPIRATION RATE: 18 BRPM | SYSTOLIC BLOOD PRESSURE: 139 MMHG | DIASTOLIC BLOOD PRESSURE: 85 MMHG

## 2024-01-05 DIAGNOSIS — U07.1 COVID: Primary | ICD-10-CM

## 2024-01-05 PROCEDURE — G0463 HOSPITAL OUTPT CLINIC VISIT: HCPCS | Performed by: NURSE PRACTITIONER

## 2024-01-05 PROCEDURE — 99213 OFFICE O/P EST LOW 20 MIN: CPT | Performed by: NURSE PRACTITIONER

## 2024-01-05 NOTE — LETTER
January 5, 2024     Patient: Tobin Kerns   YOB: 1955   Date of Visit: 1/5/2024       To Whom it May Concern:    Tobin Kerns was seen in my clinic on 1/5/2024. He may return to work on 1/5/2024 he tested negative for covid today .    If you have any questions or concerns, please don't hesitate to call.         Sincerely,          LUI Raymundo

## 2024-01-05 NOTE — LETTER
January 5, 2024     Patient: Tobin Kerns   YOB: 1955   Date of Visit: 1/5/2024       To Whom it May Concern:    Tobin Kerns was seen in my clinic on 1/5/2024. He may return to work on 1/5/2024 .    If you have any questions or concerns, please don't hesitate to call.         Sincerely,          LUI Raymundo

## 2024-01-05 NOTE — PROGRESS NOTES
[facility]        NAME: Tobin Kerns is a 68 y.o. male  : 1955    MRN: [id number]  DATE: 2024  TIME: 8:28 AM    Assessment and Plan   COVID [U07.1]  1. COVID          Patient had COVID approximately 3 weeks ago need to test to return to work-POCT rapid was negative in office provided note to return to work    Patient Instructions       Follow up with PCP in 3-5 days.  Proceed to  ER if symptoms worsen.    Chief Complaint     Chief Complaint   Patient presents with   • Covid test     Patient here for repeat covid swab so he can go back to work.          History of Present Illness       Patient is a 68-year-old male arrives for follow-up COVID test so he can return to work.  He was diagnosed in the urgent care setting with COVID a couple weeks ago he reports all symptoms have subsided at this time but his work is requiring a test.  POCT rapid COVID was negative in office        Review of Systems   Review of Systems   Constitutional:  Negative for activity change, appetite change, chills, fatigue and fever.   HENT:  Negative for congestion, rhinorrhea, sinus pressure, sinus pain and sore throat.    Respiratory:  Negative for cough, chest tightness, shortness of breath and wheezing.    Gastrointestinal:  Negative for constipation, diarrhea, nausea and vomiting.   Musculoskeletal:  Negative for myalgias.   Skin:  Negative for color change, pallor and rash.   Neurological:  Negative for dizziness, syncope, weakness, light-headedness and headaches.   Hematological:  Negative for adenopathy.   Psychiatric/Behavioral:  Negative for agitation and confusion.          Current Medications       Current Outpatient Medications:   •  Ascorbic Acid (Vitamin C) 250 MG CHEW, Chew 250 mg daily Take 2 chews daily , Disp: , Rfl:   •  Cholecalciferol 125 MCG (5000 UT) TABS, Take 5,000 Units by mouth daily  , Disp: , Rfl:   •  cyanocobalamin (VITAMIN B-12) 1000 MCG tablet, Take 1,000 mcg by mouth daily,  Patient discharged [facility] at [facility].  Discharge instructions and medications reviewed with patient and she  verbalized understanding.  Patient given wheelchair ride down to front entrance by staff.   Disp: , Rfl:   •  famotidine (PEPCID) 20 mg tablet, Take 1 tablet (20 mg total) by mouth 2 (two) times a day as needed for heartburn, Disp: 180 tablet, Rfl: 2  •  losartan (COZAAR) 50 mg tablet, Take 1 tablet (50 mg total) by mouth daily, Disp: 90 tablet, Rfl: 2  •  metFORMIN (GLUCOPHAGE-XR) 500 mg 24 hr tablet, Take 3 tablets (1,500 mg total) by mouth daily with dinner Increase as directed., Disp: 270 tablet, Rfl: 2  •  multivitamin (THERAGRAN) TABS, Take 1 tablet by mouth daily, Disp: , Rfl:   •  simvastatin (ZOCOR) 20 mg tablet, Take 1 tablet (20 mg total) by mouth daily at bedtime, Disp: 90 tablet, Rfl: 2  •  neomycin-polymyxin-hydrocortisone (CORTISPORIN) otic solution, Administer 4 drops into the left ear every 6 (six) hours for 7 days, Disp: 6 mL, Rfl: 0    Current Allergies     Allergies as of 01/05/2024   • (No Known Allergies)            The following portions of the patient's history were reviewed and updated as appropriate: allergies, current medications, past family history, past medical history, past social history, past surgical history and problem list.     Past Medical History:   Diagnosis Date   • Abdominal aortic ectasia (HCC) 11/13/2023    Next Abdominal Aortic U/S due 11/13/27   • Closed fracture of right foot     As a child   • Diverticulosis    • Drusen (degenerative) of macula, bilateral 09/27/2021   • Gastro-esophageal reflux disease without esophagitis 05/30/2019   • GERD (gastroesophageal reflux disease) 2010   • Hammer toe 15 years   • History of colon polyps    • Hypertension 09/27/2021   • IFG (impaired fasting glucose) 10/01/2021   • Internal hemorrhoids    • Kidney stone 2007   • Mixed hyperlipidemia 05/30/2019   • Morbid obesity (HCC) 09/27/2021   • Myopia, bilateral 09/27/2021   • JOVANY on CPAP    • Peripheral neuropathy 09/27/2021   • Pinguecula, bilateral 09/27/2021   • Presbyopia 10/11/2016   • Pure hypertriglyceridemia 08/19/2019   • Radial fracture 1975    Stress Fracture - Right  Arm   • Regular astigmatism, bilateral 09/27/2021   • Tendonitis     Left shoulder       Past Surgical History:   Procedure Laterality Date   • COLONOSCOPY     • EGD  04/14/2022    Gastric Erosisons, Esophagitis   • FIBULA FRACTURE SURGERY Right 2009    Hardware - Plate and screws in place   • FRACTURE SURGERY  2005   • SKIN GRAFT  2021    Right foot    • TONSILECTOMY AND ADNOIDECTOMY     • TONSILLECTOMY         Family History   Problem Relation Age of Onset   • Cancer Mother 68        Type Unknown   • COPD Father         Previous smoker   • Neuropathy Father    • Neuropathy Sister    • Neuropathy Sister    • Neuropathy Brother    • Neuropathy Brother    • Arthritis Maternal Grandmother    • Arthritis Maternal Aunt          Medications have been verified.        Objective   /85   Pulse 62   Temp 98.5 °F (36.9 °C)   Resp 18   SpO2 97%   No LMP for male patient.       Physical Exam     Physical Exam  Vitals and nursing note reviewed.   Constitutional:       General: He is not in acute distress.     Appearance: Normal appearance. He is not ill-appearing, toxic-appearing or diaphoretic.   HENT:      Head: Normocephalic and atraumatic.      Nose: No congestion or rhinorrhea.      Mouth/Throat:      Mouth: Mucous membranes are moist.   Eyes:      General: No scleral icterus.        Right eye: No discharge.         Left eye: No discharge.      Conjunctiva/sclera: Conjunctivae normal.   Cardiovascular:      Rate and Rhythm: Normal rate and regular rhythm.   Pulmonary:      Effort: Pulmonary effort is normal. No respiratory distress.      Breath sounds: Normal breath sounds.   Musculoskeletal:         General: Normal range of motion.      Cervical back: Normal range of motion.   Skin:     General: Skin is dry.   Neurological:      Mental Status: He is alert and oriented to person, place, and time.   Psychiatric:         Mood and Affect: Mood normal.         Behavior: Behavior normal.         Thought Content:  Thought content normal.         Judgment: Judgment normal.

## 2024-01-17 ENCOUNTER — HOSPITAL ENCOUNTER (EMERGENCY)
Facility: HOSPITAL | Age: 69
Discharge: HOME | End: 2024-01-18
Attending: EMERGENCY MEDICINE
Payer: MEDICARE

## 2024-01-17 ENCOUNTER — APPOINTMENT (EMERGENCY)
Dept: CT IMAGING | Facility: HOSPITAL | Age: 69
End: 2024-01-17
Payer: MEDICARE

## 2024-01-17 DIAGNOSIS — N12 PYELONEPHRITIS: Primary | ICD-10-CM

## 2024-01-17 LAB
ALBUMIN SERPL BCP-MCNC: 4.1 G/DL (ref 3.5–5)
ALP SERPL-CCNC: 62 U/L (ref 34–104)
ALT SERPL W P-5'-P-CCNC: 13 U/L (ref 7–52)
ANION GAP SERPL CALCULATED.3IONS-SCNC: 7 MMOL/L
AST SERPL W P-5'-P-CCNC: 14 U/L (ref 13–39)
BACTERIA UR QL AUTO: ABNORMAL /HPF
BASOPHILS # BLD AUTO: 0.02 THOUSANDS/ÂΜL (ref 0–0.1)
BASOPHILS NFR BLD AUTO: 0 % (ref 0–1)
BILIRUB SERPL-MCNC: 1.33 MG/DL (ref 0.2–1)
BILIRUB UR QL STRIP: NEGATIVE
BUN SERPL-MCNC: 16 MG/DL (ref 5–25)
CALCIUM SERPL-MCNC: 9.4 MG/DL (ref 8.4–10.2)
CHLORIDE SERPL-SCNC: 103 MMOL/L (ref 96–108)
CLARITY UR: ABNORMAL
CO2 SERPL-SCNC: 25 MMOL/L (ref 21–32)
COLOR UR: COLORLESS
CREAT SERPL-MCNC: 1.08 MG/DL (ref 0.6–1.3)
EOSINOPHIL # BLD AUTO: 0.08 THOUSAND/ÂΜL (ref 0–0.61)
EOSINOPHIL NFR BLD AUTO: 1 % (ref 0–6)
ERYTHROCYTE [DISTWIDTH] IN BLOOD BY AUTOMATED COUNT: 13.2 % (ref 11.6–15.1)
GFR SERPL CREATININE-BSD FRML MDRD: 71 ML/MIN/1.73SQ M
GLUCOSE SERPL-MCNC: 99 MG/DL (ref 65–140)
GLUCOSE UR STRIP-MCNC: NEGATIVE MG/DL
HCT VFR BLD AUTO: 38.6 % (ref 36.5–49.3)
HGB BLD-MCNC: 13.5 G/DL (ref 12–17)
HGB UR QL STRIP.AUTO: ABNORMAL
HYALINE CASTS #/AREA URNS LPF: ABNORMAL /LPF
IMM GRANULOCYTES # BLD AUTO: 0.04 THOUSAND/UL (ref 0–0.2)
IMM GRANULOCYTES NFR BLD AUTO: 0 % (ref 0–2)
KETONES UR STRIP-MCNC: NEGATIVE MG/DL
LEUKOCYTE ESTERASE UR QL STRIP: ABNORMAL
LIPASE SERPL-CCNC: 14 U/L (ref 11–82)
LYMPHOCYTES # BLD AUTO: 1.57 THOUSANDS/ÂΜL (ref 0.6–4.47)
LYMPHOCYTES NFR BLD AUTO: 13 % (ref 14–44)
MCH RBC QN AUTO: 30 PG (ref 26.8–34.3)
MCHC RBC AUTO-ENTMCNC: 35 G/DL (ref 31.4–37.4)
MCV RBC AUTO: 86 FL (ref 82–98)
MONOCYTES # BLD AUTO: 0.38 THOUSAND/ÂΜL (ref 0.17–1.22)
MONOCYTES NFR BLD AUTO: 3 % (ref 4–12)
NEUTROPHILS # BLD AUTO: 10 THOUSANDS/ÂΜL (ref 1.85–7.62)
NEUTS SEG NFR BLD AUTO: 83 % (ref 43–75)
NITRITE UR QL STRIP: NEGATIVE
NON-SQ EPI CELLS URNS QL MICRO: ABNORMAL /HPF
NRBC BLD AUTO-RTO: 0 /100 WBCS
PH UR STRIP.AUTO: 6 [PH]
PLATELET # BLD AUTO: 186 THOUSANDS/UL (ref 149–390)
PMV BLD AUTO: 8.7 FL (ref 8.9–12.7)
POTASSIUM SERPL-SCNC: 4.1 MMOL/L (ref 3.5–5.3)
PROT SERPL-MCNC: 6.9 G/DL (ref 6.4–8.4)
PROT UR STRIP-MCNC: NEGATIVE MG/DL
RBC # BLD AUTO: 4.5 MILLION/UL (ref 3.88–5.62)
RBC #/AREA URNS AUTO: ABNORMAL /HPF
SODIUM SERPL-SCNC: 135 MMOL/L (ref 135–147)
SP GR UR STRIP.AUTO: 1 (ref 1–1.03)
UROBILINOGEN UR STRIP-ACNC: <2 MG/DL
WBC # BLD AUTO: 12.09 THOUSAND/UL (ref 4.31–10.16)
WBC #/AREA URNS AUTO: ABNORMAL /HPF

## 2024-01-17 PROCEDURE — 81001 URINALYSIS AUTO W/SCOPE: CPT | Performed by: EMERGENCY MEDICINE

## 2024-01-17 PROCEDURE — 99285 EMERGENCY DEPT VISIT HI MDM: CPT | Performed by: EMERGENCY MEDICINE

## 2024-01-17 PROCEDURE — 83690 ASSAY OF LIPASE: CPT | Performed by: EMERGENCY MEDICINE

## 2024-01-17 PROCEDURE — 99284 EMERGENCY DEPT VISIT MOD MDM: CPT

## 2024-01-17 PROCEDURE — 87186 SC STD MICRODIL/AGAR DIL: CPT | Performed by: EMERGENCY MEDICINE

## 2024-01-17 PROCEDURE — 74177 CT ABD & PELVIS W/CONTRAST: CPT

## 2024-01-17 PROCEDURE — 36415 COLL VENOUS BLD VENIPUNCTURE: CPT | Performed by: EMERGENCY MEDICINE

## 2024-01-17 PROCEDURE — 80053 COMPREHEN METABOLIC PANEL: CPT | Performed by: EMERGENCY MEDICINE

## 2024-01-17 PROCEDURE — 87086 URINE CULTURE/COLONY COUNT: CPT | Performed by: EMERGENCY MEDICINE

## 2024-01-17 PROCEDURE — 87077 CULTURE AEROBIC IDENTIFY: CPT | Performed by: EMERGENCY MEDICINE

## 2024-01-17 PROCEDURE — 85025 COMPLETE CBC W/AUTO DIFF WBC: CPT | Performed by: EMERGENCY MEDICINE

## 2024-01-17 RX ADMIN — IOHEXOL 100 ML: 350 INJECTION, SOLUTION INTRAVENOUS at 22:50

## 2024-01-18 VITALS
DIASTOLIC BLOOD PRESSURE: 75 MMHG | OXYGEN SATURATION: 97 % | SYSTOLIC BLOOD PRESSURE: 131 MMHG | RESPIRATION RATE: 18 BRPM | HEART RATE: 77 BPM | TEMPERATURE: 98.3 F

## 2024-01-18 PROCEDURE — 87040 BLOOD CULTURE FOR BACTERIA: CPT | Performed by: EMERGENCY MEDICINE

## 2024-01-18 PROCEDURE — 96365 THER/PROPH/DIAG IV INF INIT: CPT

## 2024-01-18 PROCEDURE — 36415 COLL VENOUS BLD VENIPUNCTURE: CPT | Performed by: EMERGENCY MEDICINE

## 2024-01-18 RX ORDER — CEFPODOXIME PROXETIL 200 MG/1
200 TABLET, FILM COATED ORAL 2 TIMES DAILY
Qty: 20 TABLET | Refills: 0 | Status: SHIPPED | OUTPATIENT
Start: 2024-01-18 | End: 2024-01-28

## 2024-01-18 RX ORDER — CEFTRIAXONE 2 G/50ML
2000 INJECTION, SOLUTION INTRAVENOUS ONCE
Status: COMPLETED | OUTPATIENT
Start: 2024-01-18 | End: 2024-01-18

## 2024-01-18 RX ADMIN — CEFTRIAXONE 2000 MG: 2 INJECTION, SOLUTION INTRAVENOUS at 00:23

## 2024-01-18 NOTE — ED PROVIDER NOTES
"History  Chief Complaint   Patient presents with    Back Pain     \"I think my kidneys are infected\" c/o urinary freq and penile dc      68-year-old male presents to the emergency room with a chief complaint of \"I think I have a kidney infection.\"    Patient affirms intermittent right flank pain since this afternoon without inciting event.  Patient states it has been waxing and waning and is currently present though mild.     Patient affirms increased urination today but denies any dysuria or hematuria.  Patient states he has had a yellow discharge that he found in his undergarments today.  Patient denies any concerns for sexually transmitted diseases.    Patient affirms a subjective fever stating \"I felt hot\" but did not check his temperature.    Patient affirms a history of prior renal colic previously though he states this was remotely.    Patient denies vomiting.    Patient denies diarrhea.    Patient denies testicular pain or penile discharge.    Patient denies contacts with similar symptoms.      Patient denies any recent use of antibiotics, international travel, or trauma.    Patient notes history of no prior abdominal surgery.      Focused Objective Exam:  Abdomen:  Inspection of an obese abdomen without previous abdominal surgical incisions noted without erythema, rashes or ecchymosis noted.  No abdominal pulsations noted.  Normal bowel sounds with no bruit auscultated.  Soft abdomen.  Palpitation noted no anterior tenderness; tenderness not over McBurney's point.  No masses or pulsatile aorta noted on examination.  No rebound or guarding noted on examination, non-peritoneal exam.    Back:  No rash or signs of herpes zoster.  Right CVA tenderness noted.   Skin:  No ecchymosis of the umbilicus (negative Heath's sign) or flank (negative Mckinnon Mancera's sign). Warm and dry.    Medical Decision Making    Flank pain with associated urinary symptoms representing a broad differential.  Will establish IV access and " make patient NPO considering possibility of surgical intervention required.  Will initiate symptomatic management including intravenous fluids.    Differential is broad and includes significant likelihood of intra-abdominal pathology, including concerns for potential renal pathology.  Less likely biliary pathology based on history and examination.    Will obtain CBC to evaluate for anemia and leukocytosis.  Will obtain CMP to evaluate electrolytes, renal, biliary, and hepatic function.  Will obtain lipase to evaluate for potential pancreatitis.    Will obtain urinalysis to evaluate for possible urinary infectious sources and ketones to evaluate potential hydration state.    Based on patient's age, history, physical exam and presenting complaint, will obtain contrast enhanced CT imaging of patient's abdomen and pelvis to further evaluate for possible intraabdominal pathology.          Prior to Admission Medications   Prescriptions Last Dose Informant Patient Reported? Taking?   Ascorbic Acid (Vitamin C) 250 MG CHEW  Self Yes No   Sig: Chew 250 mg daily Take 2 chews daily    Cholecalciferol 125 MCG (5000 UT) TABS  Self Yes No   Sig: Take 5,000 Units by mouth daily     cyanocobalamin (VITAMIN B-12) 1000 MCG tablet  Self Yes No   Sig: Take 1,000 mcg by mouth daily   famotidine (PEPCID) 20 mg tablet   No No   Sig: Take 1 tablet (20 mg total) by mouth 2 (two) times a day as needed for heartburn   losartan (COZAAR) 50 mg tablet   No No   Sig: Take 1 tablet (50 mg total) by mouth daily   metFORMIN (GLUCOPHAGE-XR) 500 mg 24 hr tablet   No No   Sig: Take 3 tablets (1,500 mg total) by mouth daily with dinner Increase as directed.   multivitamin (THERAGRAN) TABS  Self Yes No   Sig: Take 1 tablet by mouth daily   neomycin-polymyxin-hydrocortisone (CORTISPORIN) otic solution   No No   Sig: Administer 4 drops into the left ear every 6 (six) hours for 7 days   simvastatin (ZOCOR) 20 mg tablet   No No   Sig: Take 1 tablet (20 mg  total) by mouth daily at bedtime      Facility-Administered Medications: None       Past Medical History:   Diagnosis Date    Abdominal aortic ectasia (HCC) 11/13/2023    Next Abdominal Aortic U/S due 11/13/27    Closed fracture of right foot     As a child    Diverticulosis     Drusen (degenerative) of macula, bilateral 09/27/2021    Gastro-esophageal reflux disease without esophagitis 05/30/2019    GERD (gastroesophageal reflux disease) 2010    Hammer toe 15 years    History of colon polyps     Hypertension 09/27/2021    IFG (impaired fasting glucose) 10/01/2021    Internal hemorrhoids     Kidney stone 2007    Mixed hyperlipidemia 05/30/2019    Morbid obesity (HCC) 09/27/2021    Myopia, bilateral 09/27/2021    JOVANY on CPAP     Peripheral neuropathy 09/27/2021    Pinguecula, bilateral 09/27/2021    Presbyopia 10/11/2016    Pure hypertriglyceridemia 08/19/2019    Radial fracture 1975    Stress Fracture - Right Arm    Regular astigmatism, bilateral 09/27/2021    Tendonitis     Left shoulder       Past Surgical History:   Procedure Laterality Date    COLONOSCOPY      EGD  04/14/2022    Gastric Erosisons, Esophagitis    FIBULA FRACTURE SURGERY Right 2009    Hardware - Plate and screws in place    FRACTURE SURGERY  2005    SKIN GRAFT  2021    Right foot     TONSILECTOMY AND ADNOIDECTOMY      TONSILLECTOMY         Family History   Problem Relation Age of Onset    Cancer Mother 68        Type Unknown    COPD Father         Previous smoker    Neuropathy Father     Neuropathy Sister     Neuropathy Sister     Neuropathy Brother     Neuropathy Brother     Arthritis Maternal Grandmother     Arthritis Maternal Aunt      I have reviewed and agree with the history as documented.    E-Cigarette/Vaping    E-Cigarette Use Never User      E-Cigarette/Vaping Substances    Nicotine No     THC No     CBD No     Flavoring No     Other No     Unknown No      Social History     Tobacco Use    Smoking status: Former     Current packs/day:  0.00     Types: Cigarettes, Cigars     Start date: 1980     Quit date: 2005     Years since quittin.0    Smokeless tobacco: Never   Vaping Use    Vaping status: Never Used   Substance Use Topics    Alcohol use: Yes     Alcohol/week: 1.0 standard drink of alcohol     Types: 1 Cans of beer per week    Drug use: Not Currently     Types: Marijuana     Comment: Last Marijuana Use ; Other unknown drugs while in [place]       Review of Systems    Physical Exam  Physical Exam    Vital Signs  ED Triage Vitals   Temperature Pulse Respirations Blood Pressure SpO2   24   98.3 °F (36.8 °C) 78 16 131/78 97 %      Temp Source Heart Rate Source Patient Position - Orthostatic VS BP Location FiO2 (%)   24 0012 24 --   Oral Monitor Lying Left arm       Pain Score       24       2           Vitals:    24   BP: 131/78 131/75   Pulse: 78 77   Patient Position - Orthostatic VS: Lying Lying         Visual Acuity  Visual Acuity      Flowsheet Row Most Recent Value   L Pupil Size (mm) 3   R Pupil Size (mm) 3            ED Medications  Medications   iohexol (OMNIPAQUE) 350 MG/ML injection (MULTI-DOSE) 100 mL (100 mL Intravenous Given 24)   cefTRIAXone (ROCEPHIN) IVPB (premix in dextrose) 2,000 mg 50 mL (0 mg Intravenous Stopped 24 0134)       Diagnostic Studies  Results Reviewed       Procedure Component Value Units Date/Time    Blood culture #1 [[id number]] Collected: 24    Lab Status: In process Specimen: Blood from Hand, Right Updated: 24    Blood culture #2 [[id number]] Collected: 24    Lab Status: In process Specimen: Blood from Hand, Left Updated: 24    Urine Microscopic [[id number]]  (Abnormal) Collected: 24    Lab Status: Final result Specimen: Urine, Clean Catch Updated: 24     RBC, UA 1-2 /hpf       WBC, UA Innumerable /hpf      Epithelial Cells None Seen /hpf      Bacteria, UA Innumerable /hpf      Hyaline Casts, UA 0-3 /lpf     Urine culture [[id number]] Collected: 01/17/24 2218    Lab Status: In process Specimen: Urine, Clean Catch Updated: 01/17/24 2255    UA w Reflex to Microscopic w Reflex to Culture [[id number]]  (Abnormal) Collected: 01/17/24 2218    Lab Status: Final result Specimen: Urine, Clean Catch Updated: 01/17/24 2249     Color, UA Colorless     Clarity, UA Turbid     Specific Gravity, UA 1.003     pH, UA 6.0     Leukocytes, UA Large     Nitrite, UA Negative     Protein, UA Negative mg/dl      Glucose, UA Negative mg/dl      Ketones, UA Negative mg/dl      Urobilinogen, UA <2.0 mg/dl      Bilirubin, UA Negative     Occult Blood, UA Large    CMP [[id number]]  (Abnormal) Collected: 01/17/24 2218    Lab Status: Final result Specimen: Blood from Arm, Right Updated: 01/17/24 2239     Sodium 135 mmol/L      Potassium 4.1 mmol/L      Chloride 103 mmol/L      CO2 25 mmol/L      ANION GAP 7 mmol/L      BUN 16 mg/dL      Creatinine 1.08 mg/dL      Glucose 99 mg/dL      Calcium 9.4 mg/dL      AST 14 U/L      ALT 13 U/L      Alkaline Phosphatase 62 U/L      Total Protein 6.9 g/dL      Albumin 4.1 g/dL      Total Bilirubin 1.33 mg/dL      eGFR 71 ml/min/1.73sq m     Narrative:      National Kidney Disease Foundation guidelines for Chronic Kidney Disease (CKD):     Stage 1 with normal or high GFR (GFR > 90 mL/min/1.73 square meters)    Stage 2 Mild CKD (GFR = 60-89 mL/min/1.73 square meters)    Stage 3A Moderate CKD (GFR = 45-59 mL/min/1.73 square meters)    Stage 3B Moderate CKD (GFR = 30-44 mL/min/1.73 square meters)    Stage 4 Severe CKD (GFR = 15-29 mL/min/1.73 square meters)    Stage 5 End Stage CKD (GFR <15 mL/min/1.73 square meters)  Note: GFR calculation is accurate only with a steady state creatinine    Lipase [[id number]]  (Normal) Collected: 01/17/24 2218    Lab Status: Final result Specimen: Blood  from Arm, Right Updated: 01/17/24 2239     Lipase 14 u/L     CBC and differential [[id number]]  (Abnormal) Collected: 01/17/24 2218    Lab Status: Final result Specimen: Blood from Arm, Right Updated: 01/17/24 2224     WBC 12.09 Thousand/uL      RBC 4.50 Million/uL      Hemoglobin 13.5 g/dL      Hematocrit 38.6 %      MCV 86 fL      MCH 30.0 pg      MCHC 35.0 g/dL      RDW 13.2 %      MPV 8.7 fL      Platelets 186 Thousands/uL      nRBC 0 /100 WBCs      Neutrophils Relative 83 %      Immat GRANS % 0 %      Lymphocytes Relative 13 %      Monocytes Relative 3 %      Eosinophils Relative 1 %      Basophils Relative 0 %      Neutrophils Absolute 10.00 Thousands/µL      Immature Grans Absolute 0.04 Thousand/uL      Lymphocytes Absolute 1.57 Thousands/µL      Monocytes Absolute 0.38 Thousand/µL      Eosinophils Absolute 0.08 Thousand/µL      Basophils Absolute 0.02 Thousands/µL                    CT Abdomen pelvis with contrast    (Results Pending)              Procedures  Procedures         ED Course  ED Course as of 01/18/24 0533   Thu Jan 18, 2024   0125 VRAD IMPRESSION:  1. Visualized lower thorax demonstrates small patchy airspace opacities, especially in the right lung  base, raising the possibility of early/developing pneumonia.  2. Possible/questionable acute segmental colitis.  3. Bladder wall thickening, a nonspecific finding.   0125 Patient CT demonstrating findings that do not demonstrate obstructive urinary process.    CT findings note airspace opacities though patient does not have any clinical symptoms or findings of pneumonia.  Patient will be treated with antibiotics for urinary tract infection which will cover community-acquired pneumonia and I have discussed this with him in detail.    CT findings noting possible segmental colitis though patient also does not have typical symptoms of this, it does note the bladder wall thickening which is consistent with patient's underlying likely urinary tract  infection.      Urine cultures and blood culture sent.    Clinically patient appears well and is tolerating oral intake without difficulty.  Patient feels comfortable with close outpatient follow-up.  I have discussed and emphasized return precautions in detail with the patient the need for continued management with antibiotics that I prescribed to the patient.  Discussed close follow-up with primary care to review results of urine culture and to return to the emergency with any progression or worsening of symptoms.                               SBIRT 22yo+      Flowsheet Row Most Recent Value   Initial Alcohol Screen: US AUDIT-C     1. How often do you have a drink containing alcohol? 0 Filed at: 01/17/2024 2127   2. How many drinks containing alcohol do you have on a typical day you are drinking?  0 Filed at: 01/17/2024 2127   3a. Male UNDER 65: How often do you have five or more drinks on one occasion? 0 Filed at: 01/17/2024 2127   3b. FEMALE Any Age, or MALE 65+: How often do you have 4 or more drinks on one occassion? 0 Filed at: 01/17/2024 2127   Audit-C Score 0 Filed at: 01/17/2024 2127   MAYKEL: How many times in the past year have you...    Used an illegal drug or used a prescription medication for non-medical reasons? Never Filed at: 01/17/2024 2127                      Medical Decision Making  Amount and/or Complexity of Data Reviewed  Labs: ordered.  Radiology: ordered.    Risk  Prescription drug management.             Disposition  Final diagnoses:   Pyelonephritis     Time reflects when diagnosis was documented in both MDM as applicable and the Disposition within this note       Time User Action Codes Description Comment    1/18/2024  1:28 AM Gilbert Chavira Add [N12] Pyelonephritis           ED Disposition       ED Disposition   Discharge    Condition   Stable    Date/Time   Thu Jan 18, 2024 0128    Comment   Tobin Kerns discharge to home/self care.                   Follow-up Information       Follow  up With Specialties Details Why Contact Info Additional Information    Deanne Tinajero DO Family Medicine Schedule an appointment as soon as possible for a visit in 2 days Follow-up and reassessment.  Review of the urine culture. [address] [facility]  [address]  [facility] [address]  [phone]       Critical access hospital Emergency Department Emergency Medicine Go to  If symptoms worsen [address] [facility] [id number] [phone] Critical access hospital Emergency Department, [address], [place], [address]            Discharge Medication List as of 1/18/2024  1:31 AM        START taking these medications    Details   cefpodoxime (VANTIN) 200 mg tablet Take 1 tablet (200 mg total) by mouth 2 (two) times a day for 10 days, Starting Thu 1/18/2024, Until Sun 1/28/2024, Print           CONTINUE these medications which have NOT CHANGED    Details   Ascorbic Acid (Vitamin C) 250 MG CHEW Chew 250 mg daily Take 2 chews daily , Historical Med      Cholecalciferol 125 MCG (5000 UT) TABS Take 5,000 Units by mouth daily  , Historical Med      cyanocobalamin (VITAMIN B-12) 1000 MCG tablet Take 1,000 mcg by mouth daily, Historical Med      famotidine (PEPCID) 20 mg tablet Take 1 tablet (20 mg total) by mouth 2 (two) times a day as needed for heartburn, Starting Mon 10/16/2023, Normal      losartan (COZAAR) 50 mg tablet Take 1 tablet (50 mg total) by mouth daily, Starting Mon 10/16/2023, Normal      metFORMIN (GLUCOPHAGE-XR) 500 mg 24 hr tablet Take 3 tablets (1,500 mg total) by mouth daily with dinner Increase as directed., Starting Mon 10/16/2023, Normal      multivitamin (THERAGRAN) TABS Take 1 tablet by mouth daily, Starting Fri 8/27/2021, Until Wed 1/1/2025, Historical Med      neomycin-polymyxin-hydrocortisone (CORTISPORIN) otic solution Administer 4 drops into the left ear every 6 (six) hours for 7 days, Starting Wed 12/20/2023, Until Wed 12/27/2023, Normal       simvastatin (ZOCOR) 20 mg tablet Take 1 tablet (20 mg total) by mouth daily at bedtime, Starting Mon 10/16/2023, Normal             No discharge procedures on file.    PDMP Review         Value Time User    PDMP Reviewed  Yes 9/27/2021  6:05 PM Deanne Tinajero DO            ED Provider  Electronically Signed by             Gilbert Chavira MD  01/18/24 0533

## 2024-01-20 LAB — BACTERIA UR CULT: ABNORMAL

## 2024-01-23 LAB
BACTERIA BLD CULT: NORMAL
BACTERIA BLD CULT: NORMAL

## 2024-02-05 ENCOUNTER — OFFICE VISIT (OUTPATIENT)
Dept: FAMILY MEDICINE CLINIC | Facility: CLINIC | Age: 69
End: 2024-02-05
Payer: MEDICARE

## 2024-02-05 VITALS
BODY MASS INDEX: 37.86 KG/M2 | SYSTOLIC BLOOD PRESSURE: 102 MMHG | TEMPERATURE: 97.8 F | DIASTOLIC BLOOD PRESSURE: 70 MMHG | HEIGHT: 75 IN | HEART RATE: 81 BPM | OXYGEN SATURATION: 98 % | WEIGHT: 304.5 LBS

## 2024-02-05 DIAGNOSIS — Z76.89 ENCOUNTER TO ESTABLISH CARE: ICD-10-CM

## 2024-02-05 DIAGNOSIS — N39.0 URINARY TRACT INFECTION WITHOUT HEMATURIA, SITE UNSPECIFIED: Primary | ICD-10-CM

## 2024-02-05 DIAGNOSIS — I10 PRIMARY HYPERTENSION: ICD-10-CM

## 2024-02-05 DIAGNOSIS — K21.9 GASTRO-ESOPHAGEAL REFLUX DISEASE WITHOUT ESOPHAGITIS: ICD-10-CM

## 2024-02-05 DIAGNOSIS — E78.2 MIXED HYPERLIPIDEMIA: ICD-10-CM

## 2024-02-05 DIAGNOSIS — E66.01 CLASS 2 SEVERE OBESITY DUE TO EXCESS CALORIES WITH SERIOUS COMORBIDITY AND BODY MASS INDEX (BMI) OF 38.0 TO 38.9 IN ADULT: ICD-10-CM

## 2024-02-05 DIAGNOSIS — R73.01 IFG (IMPAIRED FASTING GLUCOSE): ICD-10-CM

## 2024-02-05 PROCEDURE — 99213 OFFICE O/P EST LOW 20 MIN: CPT | Performed by: STUDENT IN AN ORGANIZED HEALTH CARE EDUCATION/TRAINING PROGRAM

## 2024-02-05 NOTE — PROGRESS NOTES
Name: Tobin Kerns      : 1955      MRN: [id number]  Encounter Provider: Ann Carmona DO  Encounter Date: 2024   Encounter department: [facility] PRIMARY CARE    Assessment & Plan     1. Urinary tract infection without hematuria, site unspecified  -     Ambulatory Referral to Urology; Future    2. Mixed hyperlipidemia  -     CBC and differential; Future; Expected date: 2024  -     Lipid panel; Future; Expected date: 2024    3. Primary hypertension  Assessment & Plan:  BP stable 102/70, continue Losartan 50mg daily    Orders:  -     CBC and differential; Future; Expected date: 2024  -     Comprehensive metabolic panel; Future; Expected date: 2024    4. Class 2 severe obesity due to excess calories with serious comorbidity and body mass index (BMI) of 38.0 to 38.9 in adult   -     CBC and differential; Future; Expected date: 2024  -     Hemoglobin A1C; Future; Expected date: 2024  -     Lipid panel; Future; Expected date: 2024    5. IFG (impaired fasting glucose)  Assessment & Plan:  Continue Metformin 500mg with dinner  Will recheck A1c in 3 mos    Orders:  -     Hemoglobin A1C; Future; Expected date: 2024    6. Gastro-esophageal reflux disease without esophagitis  Assessment & Plan:  Continue Pepcid prn      7. Encounter to establish care        Depression Screening and Follow-up Plan: Patient was screened for depression during today's encounter. They screened negative with a PHQ-2 score of 0.        Subjective      Patient is a 68-year-old male with past medical history of JOVANY on CPAP, hypertension, impaired fasting glucose, and hyperlipidemia.  Patient presents today to establish care as he recently moved closer to this office.  Patient concerns today include recent UTI, Patient report she had never had one before. At the time had urinary frequency without dysuria and hematuria. Seen in ED and patient reports that he completed a  "course of Abx but at this time would like to see a Urologist.    Quit smoking >15 years ago  Rare alcohol use  Lives at home by himself.  Has family in [place], still has a house there.       Review of Systems   Constitutional:  Negative for chills and fever.   HENT:  Negative for congestion and rhinorrhea.    Respiratory:  Negative for cough and shortness of breath.    Cardiovascular:  Negative for chest pain and palpitations.   Gastrointestinal:  Negative for abdominal pain, constipation, diarrhea, nausea and vomiting.   Musculoskeletal:  Negative for back pain.   Neurological:  Negative for dizziness and headaches.       Current Outpatient Medications on File Prior to Visit   Medication Sig    Ascorbic Acid (Vitamin C) 250 MG CHEW Chew 250 mg daily Take 2 chews daily     Cholecalciferol 125 MCG (5000 UT) TABS Take 5,000 Units by mouth daily      cyanocobalamin (VITAMIN B-12) 1000 MCG tablet Take 1,000 mcg by mouth daily    famotidine (PEPCID) 20 mg tablet Take 1 tablet (20 mg total) by mouth 2 (two) times a day as needed for heartburn    losartan (COZAAR) 50 mg tablet Take 1 tablet (50 mg total) by mouth daily    metFORMIN (GLUCOPHAGE-XR) 500 mg 24 hr tablet Take 3 tablets (1,500 mg total) by mouth daily with dinner Increase as directed.    multivitamin (THERAGRAN) TABS Take 1 tablet by mouth daily    neomycin-polymyxin-hydrocortisone (CORTISPORIN) otic solution Administer 4 drops into the left ear every 6 (six) hours for 7 days    simvastatin (ZOCOR) 20 mg tablet Take 1 tablet (20 mg total) by mouth daily at bedtime       Objective     /70 (BP Location: Left arm, Patient Position: Sitting, Cuff Size: Large)   Pulse 81   Temp 97.8 °F (36.6 °C) (Temporal)   Ht 6' 3.25\" (1.911 m)   Wt (!) 138 kg (304 lb 8 oz)   SpO2 98%   BMI 37.81 kg/m²     Physical Exam  Vitals reviewed.   Constitutional:       Appearance: He is obese.   HENT:      Head: Normocephalic and atraumatic.      Mouth/Throat:      Mouth: Mucous " membranes are moist.   Eyes:      Extraocular Movements: Extraocular movements intact.   Cardiovascular:      Rate and Rhythm: Normal rate and regular rhythm.      Heart sounds: Normal heart sounds.   Pulmonary:      Effort: Pulmonary effort is normal.      Breath sounds: Normal breath sounds.   Skin:     Comments: Punctate area of erythema and induration at left posterior scalp   Neurological:      General: No focal deficit present.      Mental Status: He is alert and oriented to person, place, and time.   Psychiatric:         Mood and Affect: Mood normal.         Behavior: Behavior normal.       Ann Carmona, DO

## 2024-03-08 NOTE — PROGRESS NOTES
3/11/2024      No chief complaint on file.        Assessment and Plan    68 y.o. male -- New patient    1. Urinary tract infection   2. BPH with LUTS  3. Prostate cancer screening  - UA today negative for infection or blood  - PVR today 15 mL  - JENA today declined  - Urine culture (1/17/24) showing E. Coli. Treated with antibiotics  - CT abdomen pelvis w contrast (1/17/24) no findings in kidneys, inflammation of bladder  - Discussed conservative measures with hydration, avoidance of bladder irritants, avoidance constipation, daily cranberry, daily probiotics  - Will continue routine prostate cancer screening with PCP. Follow up with urology on an as needed basis  - Call with any questions or concerns in the meantime  - All questions answered; patient understands and agrees with plan       History of Present Illness  Tobin Kerns is a 68 y.o. male new patient here for evaluation of UTI.     Denies seeing urology in the past. Patient was seen in the ER in January for UTI. Was discharged with antibiotics. CT at that time negative. Denies urinary symptoms at baseline. Denies prior UTI or prior  surgical manipulation. Prostate cancer screening up to date, continues with PCP. Denies family history of  malignancies.     Review of Systems   Constitutional:  Negative for activity change, appetite change, chills and fever.   HENT:  Negative for congestion and trouble swallowing.    Respiratory:  Negative for cough and shortness of breath.    Cardiovascular:  Negative for chest pain, palpitations and leg swelling.   Gastrointestinal:  Negative for abdominal pain, constipation, diarrhea, nausea and vomiting.   Genitourinary:  Negative for difficulty urinating, dysuria, flank pain, frequency, hematuria and urgency.   Musculoskeletal:  Negative for back pain and gait problem.   Skin:  Negative for wound.   Allergic/Immunologic: Negative for immunocompromised state.   Neurological:  Negative for dizziness and syncope.  "  Hematological:  Does not bruise/bleed easily.   Psychiatric/Behavioral:  Negative for confusion.    All other systems reviewed and are negative.          AUA SYMPTOM SCORE      Flowsheet Row Most Recent Value   AUA SYMPTOM SCORE    How often have you had a sensation of not emptying your bladder completely after you finished urinating? 0 (P)    How often have you had to urinate again less than two hours after you finished urinating? 0 (P)    How often have you found you stopped and started again several times when you urinate? 0 (P)    How often have you found it difficult to postpone urination? 0 (P)    How often have you had a weak urinary stream? 0 (P)    How often have you had to push or strain to begin urination? 0 (P)    How many times did you most typically get up to urinate from the time you went to bed at night until the time you got up in the morning? 1 (P)    Quality of Life: If you were to spend the rest of your life with your urinary condition just the way it is now, how would you feel about that? 3 (P)    AUA SYMPTOM SCORE 1 (P)              Vitals  Vitals:    03/11/24 0751   BP: 142/86   Pulse: 63   Temp: 97.8 °F (36.6 °C)   TempSrc: Temporal   SpO2: 99%   Weight: (!) 148 kg (327 lb)   Height: 6' 3.25\" (1.911 m)       Physical Exam  Constitutional:       General: He is not in acute distress.     Appearance: Normal appearance. He is not ill-appearing, toxic-appearing or diaphoretic.   HENT:      Head: Normocephalic.      Nose: No congestion.   Eyes:      General: No scleral icterus.        Right eye: No discharge.         Left eye: No discharge.      Conjunctiva/sclera: Conjunctivae normal.      Pupils: Pupils are equal, round, and reactive to light.   Pulmonary:      Effort: Pulmonary effort is normal.   Musculoskeletal:      Cervical back: Normal range of motion.   Skin:     General: Skin is warm and dry.      Coloration: Skin is not jaundiced or pale.      Findings: No bruising, erythema, lesion or " rash.   Neurological:      General: No focal deficit present.      Mental Status: He is alert and oriented to person, place, and time. Mental status is at baseline.      Gait: Gait normal.   Psychiatric:         Mood and Affect: Mood normal.         Behavior: Behavior normal.         Thought Content: Thought content normal.         Judgment: Judgment normal.           Past History  Past Medical History:   Diagnosis Date    Abdominal aortic ectasia (HCC) 2023    Next Abdominal Aortic U/S due 29    Closed fracture of right foot     As a child    Diverticulosis     Drusen (degenerative) of macula, bilateral 2021    Gastro-esophageal reflux disease without esophagitis 2019    GERD (gastroesophageal reflux disease) 2010    Hammer toe 15 years    History of colon polyps     Hypertension 2021    IFG (impaired fasting glucose) 10/01/2021    Internal hemorrhoids     Kidney stone     Mixed hyperlipidemia 2019    Morbid obesity (HCC) 2021    Myopia, bilateral 2021    JOVANY on CPAP     Peripheral neuropathy 2021    Pinguecula, bilateral 2021    Presbyopia 10/11/2016    Pure hypertriglyceridemia 2019    Radial fracture 1975    Stress Fracture - Right Arm    Regular astigmatism, bilateral 2021    Tendonitis     Left shoulder     Social History     Socioeconomic History    Marital status: Single     Spouse name: None    Number of children: 0    Years of education: None    Highest education level: None   Occupational History    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   Tobacco Use    Smoking status: Former     Current packs/day: 0.00     Average packs/day: 1 pack/day for 15.0 years (15.0 ttl pk-yrs)     Types: Cigarettes     Start date: 1980     Quit date: 2005     Years since quittin.2     Passive exposure: Never    Smokeless tobacco: Never   Vaping Use    Vaping status: Never Used   Substance and Sexual Activity    Alcohol use: Yes     Alcohol/week:  1.0 standard drink of alcohol     Types: 1 Cans of beer per week    Drug use: Not Currently     Types: Marijuana     Comment: Last Marijuana Use ; Other unknown drugs while in [place]    Sexual activity: Not Currently     Partners: Female     Birth control/protection: Condom Male   Other Topics Concern    None   Social History Narrative    Single    Lives alone     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     Social Determinants of Health     Financial Resource Strain: Low Risk  (10/16/2023)    Overall Financial Resource Strain (CARDIA)     Difficulty of Paying Living Expenses: Not hard at all   Food Insecurity: No Food Insecurity (10/16/2023)    Hunger Vital Sign     Worried About Running Out of Food in the Last Year: Never true     Ran Out of Food in the Last Year: Never true   Transportation Needs: No Transportation Needs (10/16/2023)    PRAPARE - Transportation     Lack of Transportation (Medical): No     Lack of Transportation (Non-Medical): No   Physical Activity: Not on file   Stress: Not on file   Social Connections: Not on file   Intimate Partner Violence: Not on file   Housing Stability: Low Risk  (10/16/2023)    Housing Stability Vital Sign     Unable to Pay for Housing in the Last Year: No     Number of Places Lived in the Last Year: 1     Unstable Housing in the Last Year: No     Social History     Tobacco Use   Smoking Status Former    Current packs/day: 0.00    Average packs/day: 1 pack/day for 15.0 years (15.0 ttl pk-yrs)    Types: Cigarettes    Start date: 1980    Quit date: 2005    Years since quittin.2    Passive exposure: Never   Smokeless Tobacco Never     Family History   Problem Relation Age of Onset    Cancer Mother 68        Type Unknown    COPD Father         Previous smoker    Neuropathy Father     Neuropathy Sister     Neuropathy Sister     Neuropathy Brother     Neuropathy Brother     Arthritis Maternal Grandmother     Arthritis Maternal Aunt        The following portions of  the patient's history were reviewed and updated as appropriate: allergies, current medications, past medical history, past social history, past surgical history and problem list.    Results  Recent Results (from the past 1 hour(s))   POCT urine dip    Collection Time: 03/11/24  7:48 AM   Result Value Ref Range    LEUKOCYTE ESTERASE,UA trace     NITRITE,UA -     SL AMB POCT UROBILINOGEN 0.2     POCT URINE PROTEIN -      PH,UA 5.0     BLOOD,UA -     SPECIFIC GRAVITY,UA 1.015     KETONES,UA -     BILIRUBIN,UA -     GLUCOSE, UA -      COLOR,UA Yellow     CLARITY,UA Clear    POCT Measure PVR    Collection Time: 03/11/24  7:53 AM   Result Value Ref Range    POST-VOID RESIDUAL VOLUME, ML POC 15 mL   ]  Lab Results   Component Value Date    PSA 0.59 10/16/2023    PSA 0.8 08/01/2022     Lab Results   Component Value Date    CALCIUM 9.4 01/17/2024    K 4.1 01/17/2024    CO2 25 01/17/2024     01/17/2024    BUN 16 01/17/2024    CREATININE 1.08 01/17/2024     Lab Results   Component Value Date    WBC 12.09 (H) 01/17/2024    HGB 13.5 01/17/2024    HCT 38.6 01/17/2024    MCV 86 01/17/2024     01/17/2024       Jennifer Lopez PA-C

## 2024-03-11 ENCOUNTER — CONSULT (OUTPATIENT)
Dept: UROLOGY | Facility: CLINIC | Age: 69
End: 2024-03-11
Payer: MEDICARE

## 2024-03-11 VITALS
OXYGEN SATURATION: 99 % | HEIGHT: 75 IN | HEART RATE: 63 BPM | WEIGHT: 315 LBS | DIASTOLIC BLOOD PRESSURE: 86 MMHG | SYSTOLIC BLOOD PRESSURE: 142 MMHG | TEMPERATURE: 97.8 F | BODY MASS INDEX: 39.17 KG/M2

## 2024-03-11 DIAGNOSIS — N39.0 URINARY TRACT INFECTION WITHOUT HEMATURIA, SITE UNSPECIFIED: Primary | ICD-10-CM

## 2024-03-11 LAB
POST-VOID RESIDUAL VOLUME, ML POC: 15 ML
SL AMB  POCT GLUCOSE, UA: NORMAL
SL AMB LEUKOCYTE ESTERASE,UA: NORMAL
SL AMB POCT BILIRUBIN,UA: NORMAL
SL AMB POCT BLOOD,UA: NORMAL
SL AMB POCT CLARITY,UA: CLEAR
SL AMB POCT COLOR,UA: YELLOW
SL AMB POCT KETONES,UA: NORMAL
SL AMB POCT NITRITE,UA: NORMAL
SL AMB POCT PH,UA: 5
SL AMB POCT SPECIFIC GRAVITY,UA: 1.01
SL AMB POCT URINE PROTEIN: NORMAL
SL AMB POCT UROBILINOGEN: 0.2

## 2024-03-11 PROCEDURE — 81002 URINALYSIS NONAUTO W/O SCOPE: CPT | Performed by: PHYSICIAN ASSISTANT

## 2024-03-11 PROCEDURE — 51798 US URINE CAPACITY MEASURE: CPT | Performed by: PHYSICIAN ASSISTANT

## 2024-03-11 PROCEDURE — 99204 OFFICE O/P NEW MOD 45 MIN: CPT | Performed by: PHYSICIAN ASSISTANT

## 2024-04-30 ENCOUNTER — LAB (OUTPATIENT)
Dept: LAB | Facility: CLINIC | Age: 69
End: 2024-04-30
Payer: MEDICARE

## 2024-04-30 DIAGNOSIS — R73.01 IFG (IMPAIRED FASTING GLUCOSE): ICD-10-CM

## 2024-04-30 DIAGNOSIS — I10 PRIMARY HYPERTENSION: ICD-10-CM

## 2024-04-30 DIAGNOSIS — E66.01 CLASS 2 SEVERE OBESITY DUE TO EXCESS CALORIES WITH SERIOUS COMORBIDITY AND BODY MASS INDEX (BMI) OF 38.0 TO 38.9 IN ADULT (HCC): ICD-10-CM

## 2024-04-30 DIAGNOSIS — E78.2 MIXED HYPERLIPIDEMIA: ICD-10-CM

## 2024-04-30 LAB
BASOPHILS # BLD AUTO: 0.06 THOUSANDS/ÂΜL (ref 0–0.1)
BASOPHILS NFR BLD AUTO: 1 % (ref 0–1)
CHOLEST SERPL-MCNC: 134 MG/DL
EOSINOPHIL # BLD AUTO: 0.17 THOUSAND/ÂΜL (ref 0–0.61)
EOSINOPHIL NFR BLD AUTO: 2 % (ref 0–6)
ERYTHROCYTE [DISTWIDTH] IN BLOOD BY AUTOMATED COUNT: 13.4 % (ref 11.6–15.1)
EST. AVERAGE GLUCOSE BLD GHB EST-MCNC: 105 MG/DL
HBA1C MFR BLD: 5.3 %
HCT VFR BLD AUTO: 45.8 % (ref 36.5–49.3)
HDLC SERPL-MCNC: 43 MG/DL
HGB BLD-MCNC: 14.7 G/DL (ref 12–17)
IMM GRANULOCYTES # BLD AUTO: 0.01 THOUSAND/UL (ref 0–0.2)
IMM GRANULOCYTES NFR BLD AUTO: 0 % (ref 0–2)
LDLC SERPL CALC-MCNC: 60 MG/DL (ref 0–100)
LYMPHOCYTES # BLD AUTO: 2.99 THOUSANDS/ÂΜL (ref 0.6–4.47)
LYMPHOCYTES NFR BLD AUTO: 39 % (ref 14–44)
MCH RBC QN AUTO: 29.3 PG (ref 26.8–34.3)
MCHC RBC AUTO-ENTMCNC: 32.1 G/DL (ref 31.4–37.4)
MCV RBC AUTO: 91 FL (ref 82–98)
MONOCYTES # BLD AUTO: 0.52 THOUSAND/ÂΜL (ref 0.17–1.22)
MONOCYTES NFR BLD AUTO: 7 % (ref 4–12)
NEUTROPHILS # BLD AUTO: 3.92 THOUSANDS/ÂΜL (ref 1.85–7.62)
NEUTS SEG NFR BLD AUTO: 51 % (ref 43–75)
NONHDLC SERPL-MCNC: 91 MG/DL
NRBC BLD AUTO-RTO: 0 /100 WBCS
PLATELET # BLD AUTO: 252 THOUSANDS/UL (ref 149–390)
PMV BLD AUTO: 10.3 FL (ref 8.9–12.7)
RBC # BLD AUTO: 5.01 MILLION/UL (ref 3.88–5.62)
TRIGL SERPL-MCNC: 157 MG/DL
WBC # BLD AUTO: 7.67 THOUSAND/UL (ref 4.31–10.16)

## 2024-04-30 PROCEDURE — 85025 COMPLETE CBC W/AUTO DIFF WBC: CPT

## 2024-04-30 PROCEDURE — 83036 HEMOGLOBIN GLYCOSYLATED A1C: CPT

## 2024-04-30 PROCEDURE — 80061 LIPID PANEL: CPT

## 2024-04-30 PROCEDURE — 80053 COMPREHEN METABOLIC PANEL: CPT

## 2024-04-30 PROCEDURE — 36415 COLL VENOUS BLD VENIPUNCTURE: CPT

## 2024-05-01 NOTE — RESULT ENCOUNTER NOTE
Addressed by PCP Dr. Carmona.      Message sent to patient via [facility] patient portal.     Patient presents for NPlate injection today.     I am an RN. Does the patient have an active anti-cancer treatment plan? (oral, injection, or IV) No.    Pre-Injection Information: Allergies reviewed as required for injection type., Pt states feeling well, no complaints. and Pt denies signs and symptoms of infection.    Refer to MAR (medication administration record) for type of injection and medication given.  Needle Size: 27 g. 1/2\"  Patient tolerated well: Stable and Follow up appointment scheduled    Dr. Nash Palomares is supervising clinician today.

## 2024-05-03 ENCOUNTER — TELEPHONE (OUTPATIENT)
Dept: ADMINISTRATIVE | Facility: OTHER | Age: 69
End: 2024-05-03

## 2024-05-03 NOTE — TELEPHONE ENCOUNTER
05/03/24 10:33 AM    Patient contacted (left message) to bring Advance Directive, POLST, or Living Will document to next scheduled pcp visit.    Thank you.  Tobin Sneed  [facility]

## 2024-05-06 ENCOUNTER — OFFICE VISIT (OUTPATIENT)
Dept: FAMILY MEDICINE CLINIC | Facility: CLINIC | Age: 69
End: 2024-05-06
Payer: MEDICARE

## 2024-05-06 VITALS
DIASTOLIC BLOOD PRESSURE: 70 MMHG | TEMPERATURE: 97.7 F | OXYGEN SATURATION: 98 % | WEIGHT: 315 LBS | SYSTOLIC BLOOD PRESSURE: 122 MMHG | HEIGHT: 75 IN | BODY MASS INDEX: 39.17 KG/M2 | HEART RATE: 68 BPM

## 2024-05-06 DIAGNOSIS — I10 PRIMARY HYPERTENSION: Primary | ICD-10-CM

## 2024-05-06 DIAGNOSIS — R73.01 IFG (IMPAIRED FASTING GLUCOSE): ICD-10-CM

## 2024-05-06 PROCEDURE — G2211 COMPLEX E/M VISIT ADD ON: HCPCS | Performed by: STUDENT IN AN ORGANIZED HEALTH CARE EDUCATION/TRAINING PROGRAM

## 2024-05-06 PROCEDURE — 99213 OFFICE O/P EST LOW 20 MIN: CPT | Performed by: STUDENT IN AN ORGANIZED HEALTH CARE EDUCATION/TRAINING PROGRAM

## 2024-05-06 NOTE — PROGRESS NOTES
"Name: Tobin Kerns      : 1955      MRN: [id number]  Encounter Provider: Ann Carmona DO  Encounter Date: 2024   Encounter department: [facility] PRIMARY CARE    Assessment & Plan     1. Primary hypertension  Assessment & Plan:  BP stable today 122/70, patient to continue losartan 50 mg daily      2. IFG (impaired fasting glucose)  Assessment & Plan:  Has lost 7 pounds since last visit.  Patient to continue metformin 1500 mg daily with dinner.             Subjective      Patient presents today to review labs and for routine follow-up.          Review of Systems   Constitutional:  Negative for chills and fever.   HENT:  Negative for congestion and rhinorrhea.    Respiratory:  Negative for cough and shortness of breath.    Cardiovascular:  Negative for chest pain and palpitations.   Gastrointestinal:  Negative for abdominal pain.   Neurological:  Negative for dizziness and headaches.       Current Outpatient Medications on File Prior to Visit   Medication Sig   • Ascorbic Acid (Vitamin C) 250 MG CHEW Chew 250 mg daily Take 2 chews daily    • Cholecalciferol 125 MCG (5000 UT) TABS Take 5,000 Units by mouth daily     • cyanocobalamin (VITAMIN B-12) 1000 MCG tablet Take 1,000 mcg by mouth daily   • famotidine (PEPCID) 20 mg tablet Take 1 tablet (20 mg total) by mouth 2 (two) times a day as needed for heartburn   • losartan (COZAAR) 50 mg tablet Take 1 tablet (50 mg total) by mouth daily   • metFORMIN (GLUCOPHAGE-XR) 500 mg 24 hr tablet Take 3 tablets (1,500 mg total) by mouth daily with dinner Increase as directed.   • multivitamin (THERAGRAN) TABS Take 1 tablet by mouth daily   • simvastatin (ZOCOR) 20 mg tablet Take 1 tablet (20 mg total) by mouth daily at bedtime       Objective     /70 (BP Location: Left arm, Patient Position: Sitting, Cuff Size: Large)   Pulse 68   Temp 97.7 °F (36.5 °C) (Temporal)   Ht 6' 3.25\" (1.911 m)   Wt (!) 145 kg (320 lb 8 oz)   SpO2 98%   " BMI 39.79 kg/m²     Physical Exam  Vitals reviewed.   Constitutional:       Appearance: He is obese.   HENT:      Head: Normocephalic and atraumatic.      Mouth/Throat:      Mouth: Mucous membranes are moist.      Pharynx: No oropharyngeal exudate or posterior oropharyngeal erythema.   Eyes:      Extraocular Movements: Extraocular movements intact.   Cardiovascular:      Rate and Rhythm: Normal rate and regular rhythm.      Heart sounds: Normal heart sounds.   Pulmonary:      Effort: Pulmonary effort is normal.      Breath sounds: Normal breath sounds.   Neurological:      General: No focal deficit present.      Mental Status: He is alert and oriented to person, place, and time.   Psychiatric:         Mood and Affect: Mood normal.         Behavior: Behavior normal.       Ann Carmona, DO

## 2024-05-08 LAB
ALBUMIN SERPL BCP-MCNC: 4.3 G/DL (ref 3.5–5)
ALP SERPL-CCNC: 52 U/L (ref 34–104)
ALT SERPL W P-5'-P-CCNC: 15 U/L (ref 7–52)
ANION GAP SERPL CALCULATED.3IONS-SCNC: 9 MMOL/L (ref 4–13)
AST SERPL W P-5'-P-CCNC: 20 U/L (ref 13–39)
BILIRUB SERPL-MCNC: 0.77 MG/DL (ref 0.2–1)
BUN SERPL-MCNC: 19 MG/DL (ref 5–25)
CALCIUM SERPL-MCNC: 9.2 MG/DL (ref 8.4–10.2)
CHLORIDE SERPL-SCNC: 104 MMOL/L (ref 96–108)
CO2 SERPL-SCNC: 25 MMOL/L (ref 21–32)
CREAT SERPL-MCNC: 1.06 MG/DL (ref 0.6–1.3)
GFR SERPL CREATININE-BSD FRML MDRD: 72 ML/MIN/1.73SQ M
GLUCOSE P FAST SERPL-MCNC: 87 MG/DL (ref 65–99)
POTASSIUM SERPL-SCNC: 4.6 MMOL/L (ref 3.5–5.3)
PROT SERPL-MCNC: 6.9 G/DL (ref 6.4–8.4)
SODIUM SERPL-SCNC: 138 MMOL/L (ref 135–147)

## 2024-06-28 NOTE — ASSESSMENT & PLAN NOTE
MD Ranjana Taylor, ELINOR; Rc Vázquez RN  Caller: Unspecified (3 days ago,  4:02 PM)  I did refill for 1 month I month am not sure what we are going to do going forward, maybe we can request the neuromuscular team to guide us on this   Pending labs. Continue Metformin XR 500mg 3 pills in AM.  Patient previously declined [id number] [facility]. Recommend lifestyle modifications.

## 2024-07-23 ENCOUNTER — APPOINTMENT (EMERGENCY)
Dept: RADIOLOGY | Facility: HOSPITAL | Age: 69
DRG: 227 | End: 2024-07-23
Payer: OTHER MISCELLANEOUS

## 2024-07-23 ENCOUNTER — HOSPITAL ENCOUNTER (INPATIENT)
Facility: HOSPITAL | Age: 69
LOS: 5 days | Discharge: NURSING, REHAB OR LONG-TERM CARE | DRG: 227 | End: 2024-07-29
Attending: EMERGENCY MEDICINE | Admitting: STUDENT IN AN ORGANIZED HEALTH CARE EDUCATION/TRAINING PROGRAM
Payer: OTHER MISCELLANEOUS

## 2024-07-23 DIAGNOSIS — S76.111A QUADRICEPS MUSCLE RUPTURE, RIGHT, INITIAL ENCOUNTER: ICD-10-CM

## 2024-07-23 DIAGNOSIS — R33.9 URINARY RETENTION: ICD-10-CM

## 2024-07-23 DIAGNOSIS — S83.91XA RIGHT KNEE SPRAIN: Primary | ICD-10-CM

## 2024-07-23 DIAGNOSIS — S76.111A RUPTURE OF RIGHT QUADRICEPS TENDON, INITIAL ENCOUNTER: ICD-10-CM

## 2024-07-23 PROBLEM — S89.90XA KNEE INJURY: Status: ACTIVE | Noted: 2024-07-23

## 2024-07-23 PROBLEM — R26.2 AMBULATORY DYSFUNCTION: Status: ACTIVE | Noted: 2024-07-23

## 2024-07-23 LAB
ANION GAP SERPL CALCULATED.3IONS-SCNC: 10 MMOL/L (ref 4–13)
BASOPHILS # BLD AUTO: 0.07 THOUSANDS/ÂΜL (ref 0–0.1)
BASOPHILS NFR BLD AUTO: 1 % (ref 0–1)
BUN SERPL-MCNC: 17 MG/DL (ref 5–25)
CALCIUM SERPL-MCNC: 9.2 MG/DL (ref 8.4–10.2)
CHLORIDE SERPL-SCNC: 104 MMOL/L (ref 96–108)
CO2 SERPL-SCNC: 21 MMOL/L (ref 21–32)
CREAT SERPL-MCNC: 1.08 MG/DL (ref 0.6–1.3)
EOSINOPHIL # BLD AUTO: 0.1 THOUSAND/ÂΜL (ref 0–0.61)
EOSINOPHIL NFR BLD AUTO: 1 % (ref 0–6)
ERYTHROCYTE [DISTWIDTH] IN BLOOD BY AUTOMATED COUNT: 13.1 % (ref 11.6–15.1)
GFR SERPL CREATININE-BSD FRML MDRD: 70 ML/MIN/1.73SQ M
GLUCOSE SERPL-MCNC: 98 MG/DL (ref 65–140)
HCT VFR BLD AUTO: 40.6 % (ref 36.5–49.3)
HGB BLD-MCNC: 14.2 G/DL (ref 12–17)
IMM GRANULOCYTES # BLD AUTO: 0.04 THOUSAND/UL (ref 0–0.2)
IMM GRANULOCYTES NFR BLD AUTO: 0 % (ref 0–2)
LYMPHOCYTES # BLD AUTO: 2.36 THOUSANDS/ÂΜL (ref 0.6–4.47)
LYMPHOCYTES NFR BLD AUTO: 21 % (ref 14–44)
MCH RBC QN AUTO: 30.2 PG (ref 26.8–34.3)
MCHC RBC AUTO-ENTMCNC: 35 G/DL (ref 31.4–37.4)
MCV RBC AUTO: 86 FL (ref 82–98)
MONOCYTES # BLD AUTO: 0.77 THOUSAND/ÂΜL (ref 0.17–1.22)
MONOCYTES NFR BLD AUTO: 7 % (ref 4–12)
NEUTROPHILS # BLD AUTO: 7.97 THOUSANDS/ÂΜL (ref 1.85–7.62)
NEUTS SEG NFR BLD AUTO: 70 % (ref 43–75)
NRBC BLD AUTO-RTO: 0 /100 WBCS
PLATELET # BLD AUTO: 228 THOUSANDS/UL (ref 149–390)
PMV BLD AUTO: 9 FL (ref 8.9–12.7)
POTASSIUM SERPL-SCNC: 4.6 MMOL/L (ref 3.5–5.3)
RBC # BLD AUTO: 4.7 MILLION/UL (ref 3.88–5.62)
SODIUM SERPL-SCNC: 135 MMOL/L (ref 135–147)
WBC # BLD AUTO: 11.31 THOUSAND/UL (ref 4.31–10.16)

## 2024-07-23 PROCEDURE — 99285 EMERGENCY DEPT VISIT HI MDM: CPT | Performed by: EMERGENCY MEDICINE

## 2024-07-23 PROCEDURE — 73564 X-RAY EXAM KNEE 4 OR MORE: CPT

## 2024-07-23 PROCEDURE — 99223 1ST HOSP IP/OBS HIGH 75: CPT | Performed by: INTERNAL MEDICINE

## 2024-07-23 PROCEDURE — 85025 COMPLETE CBC W/AUTO DIFF WBC: CPT | Performed by: EMERGENCY MEDICINE

## 2024-07-23 PROCEDURE — 73552 X-RAY EXAM OF FEMUR 2/>: CPT

## 2024-07-23 PROCEDURE — 80048 BASIC METABOLIC PNL TOTAL CA: CPT | Performed by: EMERGENCY MEDICINE

## 2024-07-23 PROCEDURE — 36415 COLL VENOUS BLD VENIPUNCTURE: CPT | Performed by: EMERGENCY MEDICINE

## 2024-07-23 PROCEDURE — 99283 EMERGENCY DEPT VISIT LOW MDM: CPT

## 2024-07-23 RX ORDER — LOSARTAN POTASSIUM 50 MG/1
50 TABLET ORAL DAILY
Status: DISCONTINUED | OUTPATIENT
Start: 2024-07-24 | End: 2024-07-29 | Stop reason: HOSPADM

## 2024-07-23 RX ORDER — OXYCODONE HYDROCHLORIDE 5 MG/1
5 TABLET ORAL EVERY 4 HOURS PRN
Status: DISCONTINUED | OUTPATIENT
Start: 2024-07-23 | End: 2024-07-29 | Stop reason: HOSPADM

## 2024-07-23 RX ORDER — FAMOTIDINE 20 MG/1
20 TABLET, FILM COATED ORAL 2 TIMES DAILY PRN
Status: DISCONTINUED | OUTPATIENT
Start: 2024-07-23 | End: 2024-07-29 | Stop reason: HOSPADM

## 2024-07-23 RX ORDER — ACETAMINOPHEN 325 MG/1
650 TABLET ORAL EVERY 6 HOURS PRN
Status: DISCONTINUED | OUTPATIENT
Start: 2024-07-23 | End: 2024-07-29 | Stop reason: HOSPADM

## 2024-07-23 RX ORDER — PRAVASTATIN SODIUM 40 MG
40 TABLET ORAL
Status: DISCONTINUED | OUTPATIENT
Start: 2024-07-24 | End: 2024-07-29 | Stop reason: HOSPADM

## 2024-07-23 RX ORDER — HEPARIN SODIUM 5000 [USP'U]/ML
5000 INJECTION, SOLUTION INTRAVENOUS; SUBCUTANEOUS EVERY 8 HOURS SCHEDULED
Status: DISCONTINUED | OUTPATIENT
Start: 2024-07-24 | End: 2024-07-24

## 2024-07-24 ENCOUNTER — APPOINTMENT (INPATIENT)
Dept: MRI IMAGING | Facility: HOSPITAL | Age: 69
DRG: 227 | End: 2024-07-24
Payer: OTHER MISCELLANEOUS

## 2024-07-24 LAB
ANION GAP SERPL CALCULATED.3IONS-SCNC: 9 MMOL/L (ref 4–13)
BASOPHILS # BLD AUTO: 0.05 THOUSANDS/ÂΜL (ref 0–0.1)
BASOPHILS NFR BLD AUTO: 1 % (ref 0–1)
BUN SERPL-MCNC: 17 MG/DL (ref 5–25)
CALCIUM SERPL-MCNC: 9.3 MG/DL (ref 8.4–10.2)
CHLORIDE SERPL-SCNC: 105 MMOL/L (ref 96–108)
CO2 SERPL-SCNC: 23 MMOL/L (ref 21–32)
CREAT SERPL-MCNC: 1.09 MG/DL (ref 0.6–1.3)
EOSINOPHIL # BLD AUTO: 0.12 THOUSAND/ÂΜL (ref 0–0.61)
EOSINOPHIL NFR BLD AUTO: 1 % (ref 0–6)
ERYTHROCYTE [DISTWIDTH] IN BLOOD BY AUTOMATED COUNT: 13.2 % (ref 11.6–15.1)
GFR SERPL CREATININE-BSD FRML MDRD: 69 ML/MIN/1.73SQ M
GLUCOSE SERPL-MCNC: 104 MG/DL (ref 65–140)
HCT VFR BLD AUTO: 41.1 % (ref 36.5–49.3)
HGB BLD-MCNC: 14.1 G/DL (ref 12–17)
IMM GRANULOCYTES # BLD AUTO: 0.03 THOUSAND/UL (ref 0–0.2)
IMM GRANULOCYTES NFR BLD AUTO: 0 % (ref 0–2)
LYMPHOCYTES # BLD AUTO: 2.56 THOUSANDS/ÂΜL (ref 0.6–4.47)
LYMPHOCYTES NFR BLD AUTO: 26 % (ref 14–44)
MCH RBC QN AUTO: 29.9 PG (ref 26.8–34.3)
MCHC RBC AUTO-ENTMCNC: 34.3 G/DL (ref 31.4–37.4)
MCV RBC AUTO: 87 FL (ref 82–98)
MONOCYTES # BLD AUTO: 1.06 THOUSAND/ÂΜL (ref 0.17–1.22)
MONOCYTES NFR BLD AUTO: 11 % (ref 4–12)
NEUTROPHILS # BLD AUTO: 6.23 THOUSANDS/ÂΜL (ref 1.85–7.62)
NEUTS SEG NFR BLD AUTO: 61 % (ref 43–75)
NRBC BLD AUTO-RTO: 0 /100 WBCS
PLATELET # BLD AUTO: 231 THOUSANDS/UL (ref 149–390)
PMV BLD AUTO: 9.1 FL (ref 8.9–12.7)
POTASSIUM SERPL-SCNC: 3.9 MMOL/L (ref 3.5–5.3)
RBC # BLD AUTO: 4.72 MILLION/UL (ref 3.88–5.62)
SODIUM SERPL-SCNC: 137 MMOL/L (ref 135–147)
WBC # BLD AUTO: 10.05 THOUSAND/UL (ref 4.31–10.16)

## 2024-07-24 PROCEDURE — 99222 1ST HOSP IP/OBS MODERATE 55: CPT | Performed by: ORTHOPAEDIC SURGERY

## 2024-07-24 PROCEDURE — 85025 COMPLETE CBC W/AUTO DIFF WBC: CPT | Performed by: INTERNAL MEDICINE

## 2024-07-24 PROCEDURE — 73721 MRI JNT OF LWR EXTRE W/O DYE: CPT

## 2024-07-24 PROCEDURE — 80048 BASIC METABOLIC PNL TOTAL CA: CPT | Performed by: INTERNAL MEDICINE

## 2024-07-24 PROCEDURE — 99233 SBSQ HOSP IP/OBS HIGH 50: CPT | Performed by: STUDENT IN AN ORGANIZED HEALTH CARE EDUCATION/TRAINING PROGRAM

## 2024-07-24 RX ORDER — NYSTATIN 100000 [USP'U]/G
POWDER TOPICAL 2 TIMES DAILY
Status: DISCONTINUED | OUTPATIENT
Start: 2024-07-24 | End: 2024-07-29 | Stop reason: HOSPADM

## 2024-07-24 RX ORDER — DOCUSATE SODIUM 100 MG/1
100 CAPSULE, LIQUID FILLED ORAL 2 TIMES DAILY
Status: DISCONTINUED | OUTPATIENT
Start: 2024-07-24 | End: 2024-07-29 | Stop reason: HOSPADM

## 2024-07-24 RX ADMIN — HEPARIN SODIUM 5000 UNITS: 5000 INJECTION INTRAVENOUS; SUBCUTANEOUS at 21:11

## 2024-07-24 RX ADMIN — DOCUSATE SODIUM 100 MG: 100 CAPSULE, LIQUID FILLED ORAL at 15:57

## 2024-07-24 RX ADMIN — HEPARIN SODIUM 5000 UNITS: 5000 INJECTION INTRAVENOUS; SUBCUTANEOUS at 05:24

## 2024-07-24 RX ADMIN — PRAVASTATIN SODIUM 40 MG: 20 TABLET ORAL at 16:23

## 2024-07-24 RX ADMIN — HEPARIN SODIUM 5000 UNITS: 5000 INJECTION INTRAVENOUS; SUBCUTANEOUS at 15:57

## 2024-07-24 RX ADMIN — LOSARTAN POTASSIUM 50 MG: 50 TABLET, FILM COATED ORAL at 08:31

## 2024-07-24 RX ADMIN — NYSTATIN: 100000 POWDER TOPICAL at 15:57

## 2024-07-24 NOTE — PLAN OF CARE
Problem: MUSCULOSKELETAL - ADULT  Goal: Maintain or return mobility to safest level of function  Description: INTERVENTIONS:  - Assess patient's ability to carry out ADLs; assess patient's baseline for ADL function and identify physical deficits which impact ability to perform ADLs (bathing, care of mouth/teeth, toileting, grooming, dressing, etc.)  - Assess/evaluate cause of self-care deficits   - Assess range of motion  - Assess patient's mobility  - Assess patient's need for assistive devices and provide as appropriate  - Encourage maximum independence but intervene and supervise when necessary  - Involve family in performance of ADLs  - Assess for home care needs following discharge   - Consider OT consult to assist with ADL evaluation and planning for discharge  - Provide patient education as appropriate  Outcome: Progressing  Goal: Maintain proper alignment of affected body part  Description: INTERVENTIONS:  - Support, maintain and protect limb and body alignment  - Provide patient/ family with appropriate education  Outcome: Progressing     Problem: PAIN - ADULT  Goal: Verbalizes/displays adequate comfort level or baseline comfort level  Description: Interventions:  - Encourage patient to monitor pain and request assistance  - Assess pain using appropriate pain scale  - Administer analgesics based on type and severity of pain and evaluate response  - Implement non-pharmacological measures as appropriate and evaluate response  - Consider cultural and social influences on pain and pain management  - Notify physician/advanced practitioner if interventions unsuccessful or patient reports new pain  Outcome: Progressing

## 2024-07-24 NOTE — CONSULTS
Orthopedics   Tobin Kerns 69 y.o. male MRN: [id number]  Unit/Bed#: -[facility]      Chief Complaint:   Right knee pain    HPI:  69 y.o. male with Right knee pain after a trip and fall downstairs at work. Patient felt the Right knee give away and was unable to ambulate afterwards. Patient arrived via EMS and was evaluated in the ED. Patient adtmis he fell again in the ED because the knee immobilizer was not functioning appropriately. Patient lives at home alone and does not use assistive device for ambulation. Denies history or diabetes, smoking or significant cardiovascular pathology.     Review Of Systems:   Skin: Normal  Neuro: See HPI  Musculoskeletal: See HPI  14 point review of systems negative except as stated above     Past Medical History:   Past Medical History:   Diagnosis Date    Abdominal aortic ectasia (HCC) 11/13/2023    Next Abdominal Aortic U/S due 11/13/27    Closed fracture of right foot     As a child    Diverticulosis     Drusen (degenerative) of macula, bilateral 09/27/2021    Gastro-esophageal reflux disease without esophagitis 05/30/2019    GERD (gastroesophageal reflux disease) 2010    Hammer toe 15 years    History of colon polyps     Hypertension 09/27/2021    IFG (impaired fasting glucose) 10/01/2021    Internal hemorrhoids     Kidney stone 2007    Mixed hyperlipidemia 05/30/2019    Morbid obesity (HCC) 09/27/2021    Myopia, bilateral 09/27/2021    JOVANY on CPAP     Peripheral neuropathy 09/27/2021    Pinguecula, bilateral 09/27/2021    Presbyopia 10/11/2016    Pure hypertriglyceridemia 08/19/2019    Radial fracture 1975    Stress Fracture - Right Arm    Regular astigmatism, bilateral 09/27/2021    Tendonitis     Left shoulder       Past Surgical History:   Past Surgical History:   Procedure Laterality Date    COLONOSCOPY      EGD  04/14/2022    Gastric Erosisons, Esophagitis    FIBULA FRACTURE SURGERY Right 2009    Hardware - Plate and screws in place    FRACTURE SURGERY  2005    SKIN  GRAFT      Right foot     TONSILECTOMY AND ADNOIDECTOMY      TONSILLECTOMY         Family History:  Family history reviewed and non-contributory  Family History   Problem Relation Age of Onset    Cancer Mother 68        Type Unknown    COPD Father         Previous smoker    Neuropathy Father     Neuropathy Sister     Neuropathy Sister     Neuropathy Brother     Neuropathy Brother     Arthritis Maternal Grandmother     Arthritis Maternal Aunt        Social History:  Social History     Socioeconomic History    Marital status: Single     Spouse name: None    Number of children: 0    Years of education: None    Highest education level: None   Occupational History    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   Tobacco Use    Smoking status: Former     Current packs/day: 0.00     Average packs/day: 1 pack/day for 15.0 years (15.0 ttl pk-yrs)     Types: Cigarettes     Start date: 1980     Quit date: 2005     Years since quittin.5     Passive exposure: Never    Smokeless tobacco: Never   Vaping Use    Vaping status: Never Used   Substance and Sexual Activity    Alcohol use: Yes     Alcohol/week: 1.0 standard drink of alcohol     Types: 1 Cans of beer per week    Drug use: Not Currently     Types: Marijuana     Comment: Last Marijuana Use ; Other unknown drugs while in [place]    Sexual activity: Not Currently     Partners: Female     Birth control/protection: Condom Male   Other Topics Concern    None   Social History Narrative    Single    Lives alone     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     Social Determinants of Health     Financial Resource Strain: Low Risk  (10/16/2023)    Overall Financial Resource Strain (CARDIA)     Difficulty of Paying Living Expenses: Not hard at all   Food Insecurity: No Food Insecurity (2024)    Hunger Vital Sign     Worried About Running Out of Food in the Last Year: Never true     Ran Out of Food in the Last Year: Never true   Transportation Needs: No Transportation Needs  (7/24/2024)    PRAPARE - Transportation     Lack of Transportation (Medical): No     Lack of Transportation (Non-Medical): No   Physical Activity: Not on file   Stress: Not on file   Social Connections: Unknown (6/18/2024)    Received from [employer]     How often do you feel lonely or isolated from those around you? (Adult - for ages 18 years and over): Not on file   Intimate Partner Violence: Not on file   Housing Stability: Low Risk  (7/24/2024)    Housing Stability Vital Sign     Unable to Pay for Housing in the Last Year: No     Number of Times Moved in the Last Year: 1     Homeless in the Last Year: No       Allergies:   No Known Allergies        Labs:  0   Lab Value Date/Time    HCT 41.1 07/24/2024 0518    HCT 40.6 07/23/2024 2017    HCT 45.8 04/30/2024 0702    HGB 14.1 07/24/2024 0518    HGB 14.2 07/23/2024 2017    HGB 14.7 04/30/2024 0702    PT 13.9 08/21/2021 0358    INR 1.13 12/21/2021 0857    INR 1.1 08/21/2021 0358    WBC 10.05 07/24/2024 0518    WBC 11.31 (H) 07/23/2024 2017    WBC 7.67 04/30/2024 0702       Meds:    Current Facility-Administered Medications:     acetaminophen (TYLENOL) tablet 650 mg, 650 mg, Oral, Q6H PRN, Emmanuel Graf MD    famotidine (PEPCID) tablet 20 mg, 20 mg, Oral, BID PRN, Emmanuel Graf MD    heparin (porcine) subcutaneous injection 5,000 Units, 5,000 Units, Subcutaneous, Q8H PEREZ, Emmanuel Graf MD, 5,000 Units at 07/24/24 0524    losartan (COZAAR) tablet 50 mg, 50 mg, Oral, Daily, Emmanuel Graf MD, 50 mg at 07/24/24 0831    oxyCODONE (ROXICODONE) IR tablet 5 mg, 5 mg, Oral, Q4H PRN, Emmanuel Graf MD    oxyCODONE (ROXICODONE) split tablet 2.5 mg, 2.5 mg, Oral, Q4H PRN, Emmanuel Graf MD    pravastatin (PRAVACHOL) tablet 40 mg, 40 mg, Oral, Daily With Dinner, Emmanuel Graf MD    Blood Culture:   Lab Results   Component Value Date    BLOODCX No Growth After 5 Days. 01/18/2024    BLOODCX No Growth After 5 Days. 01/18/2024       Wound Culture:   No results found  "for: \"WOUNDCULT\"    Ins and Outs:  I/O last 24 hours:  In: 240 [P.O.:240]  Out: -           Physical Exam:   /84   Pulse 84   Temp 98.6 °F (37 °C)   Resp 18   Ht 6' 5\" (1.956 m)   Wt (!) 145 kg (319 lb 10.7 oz)   SpO2 96%   BMI 37.91 kg/m²   Gen: No acute distress, resting comfortably in bed  HEENT: Eyes clear, moist mucus membranes, hearing intact  Respiratory: No audible wheezing or stridor  Cardiovascular: Well Perfused peripherally, 2+ distal pulse  Abdomen: nondistended, no peritoneal signs    Musculoskeletal:   Right knee  Skin without evidence of integrity disruption  Obvious visual and palpable defect at superior pole of patella at quad insertion.  Unable to perform straight leg raise   SILT s/s/sp/dp/t.   Motor intact ankle dorsi/plantar flexion, EHL/FHL  2+ DP/PT pulse  Musculature is soft and compressible, no pain with passive stretch  Leg lengths equal    Tertiary: no tenderness over all other joints/long bones as except already stated.    Radiology:   I personally reviewed the films.  X-rays taken 07/23/2024 of Right knee demonstrate possible patella baja. No obvious fracture.     MRI pending     _*_*_*_*_*_*_*_*_*_*_*_*_*_*_*_*_*_*_*_*_*_*_*_*_*_*_*_*_*_*_*_*_*_*_*_*_*_*_*_*_*    Assessment:  69 y.o.male Right quad tendon injury, MRI ordered for evaluation of tear      Plan:   WBAT RLE with knee immobilizer intact at all times. Avoid knee range of motion. Use of walker for ambulation.  OR tomorrow with Dr Foster for Right knee quad tendon repair. Informed consent obtained. Risks discussed including but not limited to infection, injury to surrounding structures, failure of repair, need for subsequent procedures, anesthesia complications, bleeding complications, DVT, PE, loss of life or limb, stiffness, persistent pain, weakness, numbness and tingling over incision.   NPO midnight tonight and hold all anticoagulation  PreOp clearance with SLIM  Ancef on hold OR a.m.  Post op PT/OT " eval  Pain control per primary team  DVT ppx per primary team  Dispo: Will follow post operatively.       America Ambrose PA-C

## 2024-07-24 NOTE — CASE MANAGEMENT
Case Management Assessment & Discharge Planning Note    Patient name Tobin Emmanuelsco  Location /-01 MRN [id number]  : 1955 Date 2024       Current Admission Date: 2024  Current Admission Diagnosis:Ambulatory dysfunction   Patient Active Problem List    Diagnosis Date Noted Date Diagnosed    Ambulatory dysfunction 2024     Abdominal aortic ectasia (HCC) 2023     Right foot ulcer, with fat layer exposed (HCC) 2023     Ulcer of left foot, with fat layer exposed (HCC) 2022     IFG (impaired fasting glucose) 10/01/2021     Hypertension 2021     Class 2 severe obesity with serious comorbidity and body mass index (BMI) of 38.0 to 38.9 in adult (HCC) 2021     Peripheral neuropathy 2021     Drusen (degenerative) of macula, bilateral 2021     Myopia, bilateral 2021     Pinguecula, bilateral 2021     Regular astigmatism, bilateral 2021     JOVANY on CPAP      History of colon polyps      Pure hypertriglyceridemia 2019     Mixed hyperlipidemia 2019     Gastro-esophageal reflux disease without esophagitis 2019     Presbyopia 10/11/2016       LOS (days): 0  Geometric Mean LOS (GMLOS) (days): 2.2  Days to GMLOS:2     OBJECTIVE:    Risk of Unplanned Readmission Score: 6.8         Current admission status: Inpatient       Preferred Pharmacy:   [id number] - [place], [place] - [address]  [address]  [facility] [address]  Phone: [phone] Fax: [phone]    [id number] - [place] - [address]  [address] [facility] [address]  Phone: [phone] Fax: [phone]    [id number] - [place] - [address]  [address]  [place] [address]  Phone: [phone] Fax: [phone]    Primary Care Provider: Ann Carmona DO    Primary Insurance: MEDICARE  Secondary Insurance:  FOR  LIFE    ASSESSMENT:  Active Health Care Proxies    There are no active Health Care Proxies on file.                 Readmission Root Cause  30 Day Readmission: No    Patient Information  Admitted from:: Home  Mental Status: Alert  During Assessment patient was accompanied by: Not accompanied during assessment  Assessment information provided by:: Patient  Primary Caregiver: Self  Support Systems: Family members  What city do you live in?: [place]  Home entry access options. Select all that apply.: Stairs  Number of steps to enter home.: 4  Type of Current Residence: Trailer Home  Living Arrangements: Lives Alone  Is patient a ?: No    Activities of Daily Living Prior to Admission  Functional Status: Independent  Completes ADLs independently?: Yes  Ambulates independently?: Yes  Does patient use assisted devices?: Yes  Assisted Devices (DME) used: CPAP, Walker  DME Company Name (respiratory supplies):  (pt could not remember name)  Does patient currently own DME?: Yes  What DME does the patient currently own?: Walker  Does patient have a history of Outpatient Therapy (PT/OT)?: No  Does the patient have a history of Short-Term Rehab?: No  Does patient have a history of HHC?: No  Does patient currently have HHC?: No         Patient Information Continued  Income Source: Employed  Does patient have prescription coverage?: Yes  Does patient receive dialysis treatments?: No  Does patient have a history of substance abuse?: No  Does patient have a history of Mental Health Diagnosis?: No    PHQ 2/9 Screening   Reviewed PHQ 2/9 Depression Screening Score?: No    Means of Transportation  Means of Transport to Appts:: Drives Self      Social Determinants of Health (SDOH)      Flowsheet Row Most Recent Value   Housing Stability    In the last 12 months, was there a time when you were not able to pay the mortgage or rent on time? N   In the past 12 months, how many times have you moved where you were living? 1  [pt has  home in [place]]   At any time in the past 12 months, were you homeless or living in a shelter (including now)? N   Transportation Needs    In the past 12 months, has lack of transportation kept you from medical appointments or from getting medications? no   In the past 12 months, has lack of transportation kept you from meetings, work, or from getting things needed for daily living? No   Food Insecurity    Within the past 12 months, you worried that your food would run out before you got the money to buy more. Never true   Within the past 12 months, the food you bought just didn't last and you didn't have money to get more. Never true   Utilities    In the past 12 months has the electric, gas, oil, or water company threatened to shut off services in your home? No            DISCHARGE DETAILS:    Discharge planning discussed with:: Patient at bedside  Freedom of Choice: Yes  Comments - Freedom of Choice: CM discussed freedom of choice as it pertains to dishcarge planning. Patient undecided on what he may need on discharge. CM will send referrals for hhc and rehab incase pt is agreeable to either after surgery.  CM contacted family/caregiver?: No- see comments (AxO)  Were Treatment Team discharge recommendations reviewed with patient/caregiver?: Yes  Did patient/caregiver verbalize understanding of patient care needs?: Yes  Were patient/caregiver advised of the risks associated with not following Treatment Team discharge recommendations?: Yes         Requested Home Health Care         Is the patient interested in HHC at discharge?: No    DME Referral Provided  Referral made for DME?: No    Other Referral/Resources/Interventions Provided:  Referral Comments: SNF and HHC sent    Would you like to participate in our [facility] service program?  : No - Declined    Treatment Team Recommendation: Other (TBD based on surgery)     Transport at Discharge : Other (Comment) (TBD based on surgery)

## 2024-07-24 NOTE — PLAN OF CARE
Problem: PAIN - ADULT  Goal: Verbalizes/displays adequate comfort level or baseline comfort level  Description: Interventions:  - Encourage patient to monitor pain and request assistance  - Assess pain using appropriate pain scale  - Administer analgesics based on type and severity of pain and evaluate response  - Implement non-pharmacological measures as appropriate and evaluate response  - Consider cultural and social influences on pain and pain management  - Notify physician/advanced practitioner if interventions unsuccessful or patient reports new pain  Outcome: Progressing     Problem: INFECTION - ADULT  Goal: Absence or prevention of progression during hospitalization  Description: INTERVENTIONS:  - Assess and monitor for signs and symptoms of infection  - Monitor lab/diagnostic results  - Monitor all insertion sites, i.e. indwelling lines, tubes, and drains  - Monitor endotracheal if appropriate and nasal secretions for changes in amount and color  - [place] appropriate cooling/warming therapies per order  - Administer medications as ordered  - Instruct and encourage patient and family to use good hand hygiene technique  - Identify and instruct in appropriate isolation precautions for identified infection/condition  Outcome: Progressing     Problem: SAFETY ADULT  Goal: Maintains/Returns to pre admission functional level  Description: INTERVENTIONS:  -  Assess patient's ability to carry out ADLs; assess patient's baseline for ADL function and identify physical deficits which impact ability to perform ADLs (bathing, care of mouth/teeth, toileting, grooming, dressing, etc.)  - Assess/evaluate cause of self-care deficits   - Assess range of motion  - Assess patient's mobility; develop plan if impaired  - Assess patient's need for assistive devices and provide as appropriate  - Encourage maximum independence but intervene and supervise when necessary  - Involve family in performance of ADLs  - Assess for home  care needs following discharge   - Consider OT consult to assist with ADL evaluation and planning for discharge  - Provide patient education as appropriate  Outcome: Progressing     Problem: SAFETY ADULT  Goal: Patient will remain free of falls  Description: INTERVENTIONS:  - Educate patient/family on patient safety including physical limitations  - Instruct patient to call for assistance with activity   - Consult OT/PT to assist with strengthening/mobility   - Keep Call bell within reach  - Keep bed low and locked with side rails adjusted as appropriate  - Keep care items and personal belongings within reach  - Initiate and maintain comfort rounds  - Make Fall Risk Sign visible to staff  - Offer Toileting every 2 Hours, in advance of need  - Initiate/Maintain bed alarm  - Obtain necessary fall risk management equipment:   - Apply yellow socks and bracelet for high fall risk patients  - Consider moving patient to room near nurses station  Outcome: Progressing

## 2024-07-24 NOTE — ED NOTES
Pt while trialling the crutches stood up from the bed unassisted but while trying to take a step his leg gave out and patient was assisted to the ground, pt only complaining of knee pain, no Loc, no head strike, pt not c/o of back pain only his right knee when he moves. Pt was assisted back to bed with staff using the colbert. Pt stable and c/o no pain at this time.     Teri Muniz RN  07/23/24 2002       Teri Muniz RN  07/23/24 [id number]

## 2024-07-24 NOTE — PHYSICAL THERAPY NOTE
Physical Therapy Cancellation Note    PT order received. Chart review performed. At this time, PT evaluation cancelled secondary to pt pending ortho consult. PT to continue to follow and evaluate when appropriate.     07/24/24 0700   PT Last Visit   PT Visit Date 07/24/24   Note Type   Note type Evaluation;Cancelled Session   Cancel Reasons Medical status       Amirah Stephens, PT, DPT

## 2024-07-24 NOTE — H&P
[facility]  H&P  Name: Tobin Kerns 69 y.o. male I MRN: [id number]  Unit/Bed#: ED 19 I Date of Admission: 7/23/2024   Date of Service: 7/23/2024 I Hospital Day: 0      Assessment & Plan   * Ambulatory dysfunction  Assessment & Plan  69-year-old male who presented with right knee injury.  Reports right knee gave out earlier today and fell on his right knee  X-ray without any acute abnormalities  Reports inability to ambulate secondary to knee pain  PT/OT eval  Will consult Ortho may need MRI to rule out quad tear  Follow-up with CM for possible placement  Pain control    Class 2 severe obesity with serious comorbidity and body mass index (BMI) of 38.0 to 38.9 in adult ([facility])  Assessment & Plan   Diet and exercise counseling provided    Gastro-esophageal reflux disease without esophagitis  Assessment & Plan  Continue Pepcid    Mixed hyperlipidemia  Assessment & Plan  Continue statin    Hypertension  Assessment & Plan  BP controlled  Continue losartan           VTE Prophylaxis: Heparin  / sequential compression device   Code Status: full code  POLST: There is no POLST form on file for this patient (pre-hospital)  Discussion with family: pt    Anticipated Length of Stay:  Patient will be admitted on an Observation basis with an anticipated length of stay of  < 2 midnights.   Justification for Hospital Stay: Ambulatory dysfunction    Total Time for Visit, including Counseling / Coordination of Care: 60 minutes.  Greater than 50% of this total time spent on direct patient counseling and coordination of care.    Chief Complaint:   Knee pain    History of Present Illness:    Tobin Kerns is a 69 y.o. male with past medical history significant hypertension initially presented with right knee pain.  Patient reports earlier today he was walking his right knee gave out subsequently falling on his right knee.  He reports inability to ambulate since.  He denies any other acute complaints.  Denies  fevers, chills, abdominal pain, nausea, vomiting, diarrhea, constipation or any other complaints.    Review of Systems:    Review of Systems   Constitutional:  Negative for appetite change, chills, diaphoresis, fatigue, fever and unexpected weight change.   HENT:  Negative for congestion, rhinorrhea and sore throat.    Eyes:  Negative for photophobia and visual disturbance.   Respiratory:  Negative for cough, shortness of breath and wheezing.    Cardiovascular:  Negative for chest pain, palpitations and leg swelling.   Gastrointestinal:  Negative for abdominal pain, anal bleeding, blood in stool, constipation, diarrhea, nausea and vomiting.   Genitourinary:  Negative for decreased urine volume, difficulty urinating, dysuria, flank pain, frequency, hematuria and urgency.   Musculoskeletal:  Positive for gait problem. Negative for arthralgias, back pain, joint swelling and myalgias.   Skin:  Negative for color change and rash.   Neurological:  Negative for dizziness, seizures, facial asymmetry, speech difficulty, numbness and headaches.   Psychiatric/Behavioral:  Negative for agitation, confusion and decreased concentration. The patient is not nervous/anxious.        Past Medical and Surgical History:     Past Medical History:   Diagnosis Date    Abdominal aortic ectasia (HCC) 11/13/2023    Next Abdominal Aortic U/S due 11/13/27    Closed fracture of right foot     As a child    Diverticulosis     Drusen (degenerative) of macula, bilateral 09/27/2021    Gastro-esophageal reflux disease without esophagitis 05/30/2019    GERD (gastroesophageal reflux disease) 2010    Hammer toe 15 years    History of colon polyps     Hypertension 09/27/2021    IFG (impaired fasting glucose) 10/01/2021    Internal hemorrhoids     Kidney stone 2007    Mixed hyperlipidemia 05/30/2019    Morbid obesity (HCC) 09/27/2021    Myopia, bilateral 09/27/2021    JOVANY on CPAP     Peripheral neuropathy 09/27/2021    Pinguecula, bilateral 09/27/2021     Presbyopia 10/11/2016    Pure hypertriglyceridemia 08/19/2019    Radial fracture 1975    Stress Fracture - Right Arm    Regular astigmatism, bilateral 09/27/2021    Tendonitis     Left shoulder       Past Surgical History:   Procedure Laterality Date    COLONOSCOPY      EGD  04/14/2022    Gastric Erosisons, Esophagitis    FIBULA FRACTURE SURGERY Right 2009    Hardware - Plate and screws in place    FRACTURE SURGERY  2005    SKIN GRAFT  2021    Right foot     TONSILECTOMY AND ADNOIDECTOMY      TONSILLECTOMY         Meds/Allergies:    Prior to Admission medications    Medication Sig Start Date End Date Taking? Authorizing Provider   Ascorbic Acid (Vitamin C) 250 MG CHEW Chew 250 mg daily Take 2 chews daily     Historical Provider, MD   Cholecalciferol 125 MCG (5000 UT) TABS Take 5,000 Units by mouth daily      Historical Provider, MD   cyanocobalamin (VITAMIN B-12) 1000 MCG tablet Take 1,000 mcg by mouth daily    Historical Provider, MD   famotidine (PEPCID) 20 mg tablet Take 1 tablet (20 mg total) by mouth 2 (two) times a day as needed for heartburn 10/16/23   Deanne Tinajero DO   losartan (COZAAR) 50 mg tablet Take 1 tablet (50 mg total) by mouth daily 10/16/23   Deanne Tinajero DO   metFORMIN (GLUCOPHAGE-XR) 500 mg 24 hr tablet Take 3 tablets (1,500 mg total) by mouth daily with dinner Increase as directed. 10/16/23   Deanne Tinajero DO   multivitamin (THERAGRAN) TABS Take 1 tablet by mouth daily 8/27/21 1/1/25  Historical Provider, MD   simvastatin (ZOCOR) 20 mg tablet Take 1 tablet (20 mg total) by mouth daily at bedtime 10/16/23   Deanne Tinajero DO     I have reviewed home medications with patient personally.    Allergies: No Known Allergies    Social History:     Marital Status: Single   O  Patient Pre-hospital Living Situation: home  Patient Pre-hospital Level of Mobility: independent  Patient Pre-hospital Diet Restrictions: none  Substance Use History:   Social History     Substance and Sexual  "Activity   Alcohol Use Yes    Alcohol/week: 1.0 standard drink of alcohol    Types: 1 Cans of beer per week     Social History     Tobacco Use   Smoking Status Former    Current packs/day: 0.00    Average packs/day: 1 pack/day for 15.0 years (15.0 ttl pk-yrs)    Types: Cigarettes    Start date: 1980    Quit date: 2005    Years since quittin.5    Passive exposure: Never   Smokeless Tobacco Never     Social History     Substance and Sexual Activity   Drug Use Not Currently    Types: Marijuana    Comment: Last Marijuana Use ; Other unknown drugs while in [place]       Family History:    Family History   Problem Relation Age of Onset    Cancer Mother 68        Type Unknown    COPD Father         Previous smoker    Neuropathy Father     Neuropathy Sister     Neuropathy Sister     Neuropathy Brother     Neuropathy Brother     Arthritis Maternal Grandmother     Arthritis Maternal Aunt        Physical Exam:     Vitals:   Blood Pressure: 135/79 (24)  Pulse: 67 (24)  Temperature: 97.5 °F (36.4 °C) (24)  Temp Source: Oral (24)  Respirations: 17 (24)  Height: 6' 4\" (193 cm) (24)  Weight - Scale: (!) 145 kg (319 lb 10.7 oz) (24)  SpO2: 94 % (24)    Physical Exam  Constitutional:       General: He is not in acute distress.     Appearance: He is well-developed. He is not diaphoretic.   HENT:      Head: Normocephalic and atraumatic.      Nose: Nose normal.      Mouth/Throat:      Pharynx: No oropharyngeal exudate.   Eyes:      General: No scleral icterus.     Conjunctiva/sclera: Conjunctivae normal.   Cardiovascular:      Rate and Rhythm: Normal rate and regular rhythm.      Heart sounds: Normal heart sounds. No murmur heard.     No friction rub. No gallop.   Pulmonary:      Effort: Pulmonary effort is normal. No respiratory distress.      Breath sounds: Normal breath sounds. No wheezing or rales.   Chest:      Chest wall: No " tenderness.   Abdominal:      General: Bowel sounds are normal. There is no distension.      Palpations: Abdomen is soft.      Tenderness: There is no abdominal tenderness. There is no guarding.   Musculoskeletal:         General: No tenderness, deformity or edema. Normal range of motion.      Cervical back: Normal range of motion and neck supple.   Skin:     General: Skin is warm and dry.      Findings: No erythema.   Neurological:      Mental Status: He is alert. Mental status is at baseline.   Psychiatric:         Mood and Affect: Mood and affect normal.         (   Additional Data:     Lab Results: I have personally reviewed pertinent reports.                                Imaging: I have personally reviewed pertinent reports.      XR knee 4+ vw right injury    (Results Pending)   XR femur 2 views RIGHT    (Results Pending)       EKG, Pathology, and Other Studies Reviewed on Admission:   EKG: reviewed    [facility] / [facility] Records Reviewed: Yes     ** Please Note: This note has been constructed using a voice recognition system. **

## 2024-07-24 NOTE — PROGRESS NOTES
[facility]  Progress Note  Name: Tobin Kerns I  MRN: [id number]  Unit/Bed#: MS 330Ben I Date of Admission: 7/23/2024   Date of Service: 7/24/2024 I Hospital Day: 0    Assessment & Plan   * Ambulatory dysfunction  Assessment & Plan  69-year-old male who presented with right knee injury.  Reports right knee gave out earlier today and fell on his right knee  X-ray does not appear to show any acute abnormalities however official read pending  Reports inability to ambulate secondary to knee pain  Concern for right quad tendon injury.  Ortho has evaluated patient and ordered right knee MRI and tentatively scheduled for OR tomorrow morning for right knee quad tendon repair.  RCRI 0   Patient is medically optimized.  Further care per ortho recommendations     Class 2 severe obesity with serious comorbidity and body mass index (BMI) of 38.0 to 38.9 in adult (HCC)  Assessment & Plan   Diet and exercise counseling provided    Gastro-esophageal reflux disease without esophagitis  Assessment & Plan  Continue Pepcid    Mixed hyperlipidemia  Assessment & Plan  Continue statin    Hypertension  Assessment & Plan  BP controlled  Continue losartan               VTE Pharmacologic Prophylaxis: VTE Score: 6 Moderate Risk (Score 3-4) - Pharmacological DVT Prophylaxis Ordered: heparin.    Mobility:   Basic Mobility Inpatient Raw Score: 12  -HLM Goal: 4: Move to chair/commode  -HLM Achieved: 4: Move to chair/commode      Patient Centered Rounds: I performed bedside rounds with nursing staff today.   Discussions with Specialists or Other Care Team Provider: Orthopedic team    Total Time Spent on Date of Encounter in care of patient: 35 mins. This time was spent on one or more of the following: performing physical exam; counseling and coordination of care; obtaining or reviewing history; documenting in the medical record; reviewing/ordering tests, medications or procedures; communicating with other healthcare  professionals and discussing with patient's family/caregivers.    Current Length of Stay: 0 day(s)  Current Patient Status: Inpatient   Certification Statement: The patient will continue to require additional inpatient hospital stay due to plan or OR tomorrow  Discharge Plan: Anticipate discharge in 24-48 hrs to discharge location to be determined pending rehab evaluations.    Code Status: Level 1 - Full Code    Subjective:   Seen during am rounds.  Patient noted with right knee immobilizer.  Reports that he is able to move his right lower extremity with hip and ankles however unable to bend his right knee due to feeling lack of support.  Currently denies any significant pain when resting.  No other events reported.    Objective:     Vitals:   Temp (24hrs), Av.2 °F (36.8 °C), Min:97.5 °F (36.4 °C), Max:98.6 °F (37 °C)    Temp:  [97.5 °F (36.4 °C)-98.6 °F (37 °C)] 98.6 °F (37 °C)  HR:  [66-93] 93  Resp:  [17-19] 19  BP: (115-145)/(78-93) 117/78  SpO2:  [94 %-98 %] 94 %  Body mass index is 37.91 kg/m².     Input and Output Summary (last 24 hours):     Intake/Output Summary (Last 24 hours) at 2024 1733  Last data filed at 2024 1710  Gross per 24 hour   Intake 240 ml   Output 1000 ml   Net -760 ml       Physical Exam:   Physical Exam  Constitutional:       General: He is not in acute distress.     Appearance: Normal appearance. He is not ill-appearing, toxic-appearing or diaphoretic.   HENT:      Head: Normocephalic and atraumatic.   Eyes:      Pupils: Pupils are equal, round, and reactive to light.   Cardiovascular:      Rate and Rhythm: Normal rate.      Pulses: Normal pulses.   Pulmonary:      Effort: Pulmonary effort is normal. No respiratory distress.      Breath sounds: Normal breath sounds. No wheezing.   Abdominal:      General: Bowel sounds are normal. There is no distension.      Palpations: Abdomen is soft.      Tenderness: There is no abdominal tenderness.   Musculoskeletal:      Right lower  leg: No edema.      Left lower leg: No edema.      Comments: Right lower extremity noted in a knee immobilizer.   Neurological:      Mental Status: He is alert and oriented to person, place, and time.   Psychiatric:         Mood and Affect: Mood normal.         Behavior: Behavior normal.          Additional Data:     Labs:  Results from last 7 days   Lab Units 07/24/24  0518   WBC Thousand/uL 10.05   HEMOGLOBIN g/dL 14.1   HEMATOCRIT % 41.1   PLATELETS Thousands/uL 231   SEGS PCT % 61   LYMPHO PCT % 26   MONO PCT % 11   EOS PCT % 1     Results from last 7 days   Lab Units 07/24/24  0518   SODIUM mmol/L 137   POTASSIUM mmol/L 3.9   CHLORIDE mmol/L 105   CO2 mmol/L 23   BUN mg/dL 17   CREATININE mg/dL 1.09   ANION GAP mmol/L 9   CALCIUM mg/dL 9.3   GLUCOSE RANDOM mg/dL 104                       Lines/Drains:  Invasive Devices       Peripheral Intravenous Line  Duration             Peripheral IV 07/23/24 Right Antecubital <1 day                          Imaging: Reviewed radiology reports from this admission including: xray(s)    Recent Cultures (last 7 days):         Last 24 Hours Medication List:   Current Facility-Administered Medications   Medication Dose Route Frequency Provider Last Rate    acetaminophen  650 mg Oral Q6H PRN Emmanuel Graf MD      docusate sodium  100 mg Oral BID Justin ADKINS MD      famotidine  20 mg Oral BID PRN Emmanuel Graf MD      heparin (porcine)  5,000 Units Subcutaneous Q8H [facility] Emmanuel Graf MD      losartan  50 mg Oral Daily Emmanuel Graf MD      nystatin   Topical BID Justin ADKINS MD      oxyCODONE  5 mg Oral Q4H PRN Emmanuel Graf MD      oxyCODONE  2.5 mg Oral Q4H PRN Emmanuel Graf MD      pravastatin  40 mg Oral Daily With Dinner Emmanuel Graf MD          Today, Patient Was Seen By: Justin Barr MD    **Please Note: This note may have been constructed using a voice recognition system.**

## 2024-07-24 NOTE — ASSESSMENT & PLAN NOTE
69-year-old male who presented with right knee injury.  Reports right knee gave out earlier today and fell on his right knee  X-ray does not appear to show any acute abnormalities however official read pending  Reports inability to ambulate secondary to knee pain  Concern for right quad tendon injury.  Ortho has evaluated patient and ordered right knee MRI and tentatively scheduled for OR tomorrow morning for right knee quad tendon repair.  RCRI 0   Patient is medically optimized.  Further care per ortho recommendations

## 2024-07-24 NOTE — ASSESSMENT & PLAN NOTE
69-year-old male who presented with right knee injury.  Reports right knee gave out earlier today and fell on his right knee  X-ray does not appear to show any acute abnormalities however official read pending  Reports inability to ambulate secondary to knee pain  PT/OT eval  Will consult Ortho may need MRI to rule out quad tear  Follow-up with CM for possible placement  Pain control

## 2024-07-24 NOTE — ED PROVIDER NOTES
History  Chief Complaint   Patient presents with    Knee Pain     Patient arrived via EMS from the [facility] with complaints of right knee. Per patient and EMS, Patient was walking down the stairs and at the last step his right knee gave out. Patient denies H/S, LOC. Patient is on B/T. Per Patient he caught himself on the rail and has a small cut to the right thumb bleeding controlled/ stopped prior to arrival. Patient reprots no pain to knee unless manipulated, no notable deformity. Patient reports having neuropathy bilaterally from the knee down.      70 y/o male presents to the ED for R knee pain s/p fall. He states that he was walking down stairs when his R knee gave out and he fell. States that he did catch himself with the railing and did not hit his head. Denies any thinners or aspirin. States that he fell onto his bilateral knees. C/o R knee pain since. No hx of injury to this area. Unable to ambulate since. Denies any other injury. No thinners.       History provided by:  Patient      Prior to Admission Medications   Prescriptions Last Dose Informant Patient Reported? Taking?   Ascorbic Acid (Vitamin C) 250 MG CHEW 7/22/2024 Self Yes No   Sig: Chew 250 mg daily Take 2 chews daily    Cholecalciferol 125 MCG (5000 UT) TABS 7/22/2024 Self Yes No   Sig: Take 5,000 Units by mouth daily     cyanocobalamin (VITAMIN B-12) 1000 MCG tablet 7/22/2024 Self Yes No   Sig: Take 1,000 mcg by mouth daily   famotidine (PEPCID) 20 mg tablet 7/22/2024 Self No Yes   Sig: Take 1 tablet (20 mg total) by mouth 2 (two) times a day as needed for heartburn   losartan (COZAAR) 50 mg tablet 7/22/2024 Self No Yes   Sig: Take 1 tablet (50 mg total) by mouth daily   metFORMIN (GLUCOPHAGE-XR) 500 mg 24 hr tablet 7/22/2024 Self No No   Sig: Take 3 tablets (1,500 mg total) by mouth daily with dinner Increase as directed.   multivitamin (THERAGRAN) TABS 7/22/2024 Self Yes No   Sig: Take 1 tablet by mouth daily   simvastatin (ZOCOR) 20 mg  tablet 7/22/2024 Self No No   Sig: Take 1 tablet (20 mg total) by mouth daily at bedtime      Facility-Administered Medications: None       Past Medical History:   Diagnosis Date    Abdominal aortic ectasia (HCC) 11/13/2023    Next Abdominal Aortic U/S due 11/13/27    Closed fracture of right foot     As a child    Diverticulosis     Drusen (degenerative) of macula, bilateral 09/27/2021    Gastro-esophageal reflux disease without esophagitis 05/30/2019    GERD (gastroesophageal reflux disease) 2010    Hammer toe 15 years    History of colon polyps     Hypertension 09/27/2021    IFG (impaired fasting glucose) 10/01/2021    Internal hemorrhoids     Kidney stone 2007    Mixed hyperlipidemia 05/30/2019    Morbid obesity (HCC) 09/27/2021    Myopia, bilateral 09/27/2021    JOVANY on CPAP     Peripheral neuropathy 09/27/2021    Pinguecula, bilateral 09/27/2021    Presbyopia 10/11/2016    Pure hypertriglyceridemia 08/19/2019    Radial fracture 1975    Stress Fracture - Right Arm    Regular astigmatism, bilateral 09/27/2021    Tendonitis     Left shoulder       Past Surgical History:   Procedure Laterality Date    COLONOSCOPY      EGD  04/14/2022    Gastric Erosisons, Esophagitis    FIBULA FRACTURE SURGERY Right 2009    Hardware - Plate and screws in place    FRACTURE SURGERY  2005    SKIN GRAFT  2021    Right foot     TONSILECTOMY AND ADNOIDECTOMY      TONSILLECTOMY         Family History   Problem Relation Age of Onset    Cancer Mother 68        Type Unknown    COPD Father         Previous smoker    Neuropathy Father     Neuropathy Sister     Neuropathy Sister     Neuropathy Brother     Neuropathy Brother     Arthritis Maternal Grandmother     Arthritis Maternal Aunt      I have reviewed and agree with the history as documented.    E-Cigarette/Vaping    E-Cigarette Use Never User      E-Cigarette/Vaping Substances    Nicotine No     THC No     CBD No     Flavoring No     Other No     Unknown No      Social History     Tobacco  Use    Smoking status: Former     Current packs/day: 0.00     Average packs/day: 1 pack/day for 15.0 years (15.0 ttl pk-yrs)     Types: Cigarettes     Start date: 1980     Quit date: 2005     Years since quittin.5     Passive exposure: Never    Smokeless tobacco: Never   Vaping Use    Vaping status: Never Used   Substance Use Topics    Alcohol use: Yes     Alcohol/week: 1.0 standard drink of alcohol     Types: 1 Cans of beer per week    Drug use: Not Currently     Types: Marijuana     Comment: Last Marijuana Use ; Other unknown drugs while in [place]       Review of Systems   Constitutional:  Negative for chills and fever.   HENT:  Negative for congestion, ear pain and sore throat.    Eyes:  Negative for pain and visual disturbance.   Respiratory:  Negative for cough, shortness of breath and wheezing.    Cardiovascular:  Negative for chest pain and leg swelling.   Gastrointestinal:  Negative for abdominal pain, diarrhea, nausea and vomiting.   Genitourinary:  Negative for dysuria, frequency, hematuria and urgency.   Musculoskeletal:  Negative for neck pain and neck stiffness.   Skin:  Negative for rash and wound.   Neurological:  Negative for weakness, numbness and headaches.   Psychiatric/Behavioral:  Negative for agitation and confusion.    All other systems reviewed and are negative.      Physical Exam  Physical Exam  Vitals and nursing note reviewed.   Constitutional:       Appearance: He is well-developed.   HENT:      Head: Normocephalic and atraumatic.   Eyes:      Pupils: Pupils are equal, round, and reactive to light.   Cardiovascular:      Rate and Rhythm: Normal rate and regular rhythm.   Pulmonary:      Effort: Pulmonary effort is normal.      Breath sounds: Normal breath sounds.   Abdominal:      General: Bowel sounds are normal.      Palpations: Abdomen is soft.   Musculoskeletal:      Cervical back: Normal range of motion and neck supple.      Comments: Tenderness to the superior R  knee with defect. No ecchymosis. NV itnact distally. Decreased ROM 2/2 pain. Unable to raise leg of the bed.    Skin:     General: Skin is warm and dry.   Neurological:      General: No focal deficit present.      Mental Status: He is alert and oriented to person, place, and time.      Comments: No focal deficits         Vital Signs  ED Triage Vitals [07/23/24 1816]   Temperature Pulse Respirations Blood Pressure SpO2   97.5 °F (36.4 °C) 71 18 141/93 94 %      Temp Source Heart Rate Source Patient Position - Orthostatic VS BP Location FiO2 (%)   Oral Monitor Sitting Right arm --      Pain Score       3           Vitals:    07/23/24 1900 07/24/24 0249 07/24/24 0719 07/24/24 1525   BP: 135/79 145/83 115/84 117/78   Pulse: 67 75 84 93   Patient Position - Orthostatic VS: Sitting Lying  Sitting         Visual Acuity  Visual Acuity      Flowsheet Row Most Recent Value   L Pupil Size (mm) 3   R Pupil Size (mm) 3   L Pupil Shape Round   R Pupil Shape Round            ED Medications  Medications   heparin (porcine) subcutaneous injection 5,000 Units (5,000 Units Subcutaneous Given 7/24/24 1557)   acetaminophen (TYLENOL) tablet 650 mg (has no administration in time range)   oxyCODONE (ROXICODONE) split tablet 2.5 mg (has no administration in time range)   oxyCODONE (ROXICODONE) IR tablet 5 mg (has no administration in time range)   famotidine (PEPCID) tablet 20 mg (has no administration in time range)   losartan (COZAAR) tablet 50 mg (50 mg Oral Given 7/24/24 0831)   pravastatin (PRAVACHOL) tablet 40 mg (has no administration in time range)   docusate sodium (COLACE) capsule 100 mg (100 mg Oral Given 7/24/24 1557)   nystatin (MYCOSTATIN) powder ( Topical Given 7/24/24 1557)       Diagnostic Studies  Results Reviewed       Procedure Component Value Units Date/Time    Basic metabolic panel [[id number]] Collected: 07/24/24 0518    Lab Status: Final result Specimen: Blood from Arm, Left Updated: 07/24/24 0538     Sodium 137  mmol/L      Potassium 3.9 mmol/L      Chloride 105 mmol/L      CO2 23 mmol/L      ANION GAP 9 mmol/L      BUN 17 mg/dL      Creatinine 1.09 mg/dL      Glucose 104 mg/dL      Calcium 9.3 mg/dL      eGFR 69 ml/min/1.73sq m     Narrative:      National Kidney Disease Foundation guidelines for Chronic Kidney Disease (CKD):     Stage 1 with normal or high GFR (GFR > 90 mL/min/1.73 square meters)    Stage 2 Mild CKD (GFR = 60-89 mL/min/1.73 square meters)    Stage 3A Moderate CKD (GFR = 45-59 mL/min/1.73 square meters)    Stage 3B Moderate CKD (GFR = 30-44 mL/min/1.73 square meters)    Stage 4 Severe CKD (GFR = 15-29 mL/min/1.73 square meters)    Stage 5 End Stage CKD (GFR <15 mL/min/1.73 square meters)  Note: GFR calculation is accurate only with a steady state creatinine    CBC and differential [[id number]] Collected: 07/24/24 0518    Lab Status: Final result Specimen: Blood from Arm, Left Updated: 07/24/24 0525     WBC 10.05 Thousand/uL      RBC 4.72 Million/uL      Hemoglobin 14.1 g/dL      Hematocrit 41.1 %      MCV 87 fL      MCH 29.9 pg      MCHC 34.3 g/dL      RDW 13.2 %      MPV 9.1 fL      Platelets 231 Thousands/uL      nRBC 0 /100 WBCs      Segmented % 61 %      Immature Grans % 0 %      Lymphocytes % 26 %      Monocytes % 11 %      Eosinophils Relative 1 %      Basophils Relative 1 %      Absolute Neutrophils 6.23 Thousands/µL      Absolute Immature Grans 0.03 Thousand/uL      Absolute Lymphocytes 2.56 Thousands/µL      Absolute Monocytes 1.06 Thousand/µL      Eosinophils Absolute 0.12 Thousand/µL      Basophils Absolute 0.05 Thousands/µL     Basic metabolic panel [[id number]] Collected: 07/23/24 2017    Lab Status: Final result Specimen: Blood from Arm, Right Updated: 07/23/24 2041     Sodium 135 mmol/L      Potassium 4.6 mmol/L      Chloride 104 mmol/L      CO2 21 mmol/L      ANION GAP 10 mmol/L      BUN 17 mg/dL      Creatinine 1.08 mg/dL      Glucose 98 mg/dL      Calcium 9.2 mg/dL      eGFR 70  ml/min/1.73sq m     Narrative:      National Kidney Disease Foundation guidelines for Chronic Kidney Disease (CKD):     Stage 1 with normal or high GFR (GFR > 90 mL/min/1.73 square meters)    Stage 2 Mild CKD (GFR = 60-89 mL/min/1.73 square meters)    Stage 3A Moderate CKD (GFR = 45-59 mL/min/1.73 square meters)    Stage 3B Moderate CKD (GFR = 30-44 mL/min/1.73 square meters)    Stage 4 Severe CKD (GFR = 15-29 mL/min/1.73 square meters)    Stage 5 End Stage CKD (GFR <15 mL/min/1.73 square meters)  Note: GFR calculation is accurate only with a steady state creatinine    CBC and differential [[id number]]  (Abnormal) Collected: 07/23/24 2017    Lab Status: Final result Specimen: Blood from Arm, Right Updated: 07/23/24 2025     WBC 11.31 Thousand/uL      RBC 4.70 Million/uL      Hemoglobin 14.2 g/dL      Hematocrit 40.6 %      MCV 86 fL      MCH 30.2 pg      MCHC 35.0 g/dL      RDW 13.1 %      MPV 9.0 fL      Platelets 228 Thousands/uL      nRBC 0 /100 WBCs      Segmented % 70 %      Immature Grans % 0 %      Lymphocytes % 21 %      Monocytes % 7 %      Eosinophils Relative 1 %      Basophils Relative 1 %      Absolute Neutrophils 7.97 Thousands/µL      Absolute Immature Grans 0.04 Thousand/uL      Absolute Lymphocytes 2.36 Thousands/µL      Absolute Monocytes 0.77 Thousand/µL      Eosinophils Absolute 0.10 Thousand/µL      Basophils Absolute 0.07 Thousands/µL                    XR knee 4+ vw right injury   Final Result by Cyndee Grover MD (07/24 0414)      No acute osseous abnormality.         Computerized Assisted Algorithm (CAA) may have been used to analyze all applicable images.         Workstation performed: [id number]         XR femur 2 views RIGHT   Final Result by Cyndee Grover MD (07/24 0414)      No acute osseous abnormality.         Computerized Assisted Algorithm (CAA) may have been used to analyze all applicable images.         Workstation performed: [id number]         MRI inpatient order    (Results  Pending)              Procedures  Procedures         ED Course  ED Course as of 07/24/24 1607   Tue Jul 23, 2024 2009 Attempted to place knee immobilizer and ambulate with crutches but patient fell. Witnessed by staff, did not hit head or loc. Only c/o R knee pain. Will admit.                  Identification of Seniors at Risk      Flowsheet Row Most Recent Value   (ISAR) Identification of Seniors at Risk    Before the illness or injury that brought you to the Emergency, did you need someone to help you on a regular basis? 0 Filed at: 07/23/2024 1818   In the last 24 hours, have you needed more help than usual? 0 Filed at: 07/23/2024 1818   Have you been hospitalized for one or more nights during the past 6 months? 0 Filed at: 07/23/2024 1818   In general, do you see well? 0 Filed at: 07/23/2024 1818   In general, do you have serious problems with your memory? 0 Filed at: 07/23/2024 1818   Do you take more than three different medications every day? 1 Filed at: 07/23/2024 1818   ISAR Score 1 Filed at: 07/23/2024 1818                        SBIRT 22yo+      Flowsheet Row Most Recent Value   Initial Alcohol Screen: US AUDIT-C     1. How often do you have a drink containing alcohol? 1 Filed at: 07/23/2024 1819   2. How many drinks containing alcohol do you have on a typical day you are drinking?  1 Filed at: 07/23/2024 1819   3b. FEMALE Any Age, or MALE 65+: How often do you have 4 or more drinks on one occassion? 0 Filed at: 07/23/2024 1819   Audit-C Score 2 Filed at: 07/23/2024 1819   MAYKEL: How many times in the past year have you...    Used an illegal drug or used a prescription medication for non-medical reasons? Never Filed at: 07/23/2024 1819                      Medical Decision Making  68 y/o male with R knee pain- will get xrays, give crutches and knee immobilizer. Attempted ambulation with crutches but patient fell. No new injury on re-eval. No other complaints. No head strike or LOC. Will admit .    Amount  and/or Complexity of Data Reviewed  Labs: ordered.  Radiology: ordered.    Risk  Decision regarding hospitalization.                 Disposition  Final diagnoses:   Right knee sprain   Quadriceps muscle rupture, right, initial encounter     Time reflects when diagnosis was documented in both MDM as applicable and the Disposition within this note       Time User Action Codes Description Comment    7/23/2024  8:08 PM Anastasiia Maza Add [S83.91XA] Right knee sprain     7/23/2024  8:09 PM Anastasiia Maza Add [S76.111A] Quadriceps muscle rupture, right, initial encounter     7/24/2024 11:28 AM [employer] sow Add [S76.111A] Rupture of right quadriceps tendon, initial encounter           ED Disposition       ED Disposition   Admit    Condition   Stable    Date/Time   Tue Jul 23, 2024 2008    Comment   Case was discussed with ARNULFO and the patient's admission status was agreed to be Admission Status: observation status to the service of Dr. Graf .               Follow-up Information    None         Current Discharge Medication List        CONTINUE these medications which have NOT CHANGED    Details   famotidine (PEPCID) 20 mg tablet Take 1 tablet (20 mg total) by mouth 2 (two) times a day as needed for heartburn  Qty: 180 tablet, Refills: 2    Associated Diagnoses: Gastro-esophageal reflux disease without esophagitis      losartan (COZAAR) 50 mg tablet Take 1 tablet (50 mg total) by mouth daily  Qty: 90 tablet, Refills: 2    Associated Diagnoses: Primary hypertension      Ascorbic Acid (Vitamin C) 250 MG CHEW Chew 250 mg daily Take 2 chews daily       Cholecalciferol 125 MCG (5000 UT) TABS Take 5,000 Units by mouth daily        cyanocobalamin (VITAMIN B-12) 1000 MCG tablet Take 1,000 mcg by mouth daily      metFORMIN (GLUCOPHAGE-XR) 500 mg 24 hr tablet Take 3 tablets (1,500 mg total) by mouth daily with dinner Increase as directed.  Qty: 270 tablet, Refills: 2    Associated Diagnoses: IFG (impaired fasting glucose)       multivitamin (THERAGRAN) TABS Take 1 tablet by mouth daily      simvastatin (ZOCOR) 20 mg tablet Take 1 tablet (20 mg total) by mouth daily at bedtime  Qty: 90 tablet, Refills: 2    Associated Diagnoses: Mixed hyperlipidemia             No discharge procedures on file.    PDMP Review         Value Time User    PDMP Reviewed  Yes 9/27/2021  6:05 PM Deanne Tinajero DO            ED Provider  Electronically Signed by             Anastasiia Maza DO  07/24/24 [id number]

## 2024-07-25 ENCOUNTER — ANESTHESIA EVENT (INPATIENT)
Dept: PERIOP | Facility: HOSPITAL | Age: 69
DRG: 227 | End: 2024-07-25
Payer: OTHER MISCELLANEOUS

## 2024-07-25 ENCOUNTER — ANESTHESIA (INPATIENT)
Dept: PERIOP | Facility: HOSPITAL | Age: 69
DRG: 227 | End: 2024-07-25
Payer: OTHER MISCELLANEOUS

## 2024-07-25 PROBLEM — S76.111A QUADRICEPS MUSCLE RUPTURE, RIGHT, INITIAL ENCOUNTER: Status: ACTIVE | Noted: 2024-07-25

## 2024-07-25 LAB — GLUCOSE SERPL-MCNC: 102 MG/DL (ref 65–140)

## 2024-07-25 PROCEDURE — C1713 ANCHOR/SCREW BN/BN,TIS/BN: HCPCS | Performed by: ORTHOPAEDIC SURGERY

## 2024-07-25 PROCEDURE — 0LQL0ZZ REPAIR RIGHT UPPER LEG TENDON, OPEN APPROACH: ICD-10-PCS | Performed by: ORTHOPAEDIC SURGERY

## 2024-07-25 PROCEDURE — 27385 REPAIR OF THIGH MUSCLE: CPT | Performed by: ORTHOPAEDIC SURGERY

## 2024-07-25 PROCEDURE — 99233 SBSQ HOSP IP/OBS HIGH 50: CPT | Performed by: STUDENT IN AN ORGANIZED HEALTH CARE EDUCATION/TRAINING PROGRAM

## 2024-07-25 PROCEDURE — 82948 REAGENT STRIP/BLOOD GLUCOSE: CPT

## 2024-07-25 PROCEDURE — 27385 REPAIR OF THIGH MUSCLE: CPT | Performed by: PHYSICIAN ASSISTANT

## 2024-07-25 DEVICE — CORKSCREW FT, BC, SUTURETAPE, 4.75MM
Type: IMPLANTABLE DEVICE | Site: KNEE | Status: FUNCTIONAL
Brand: ARTHREX®

## 2024-07-25 DEVICE — IMPLSYS 2NDRY FIXATN BIOSWVLK 4.75X19.1
Type: IMPLANTABLE DEVICE | Site: KNEE | Status: FUNCTIONAL
Brand: ARTHREX®

## 2024-07-25 RX ORDER — KETOROLAC TROMETHAMINE 30 MG/ML
INJECTION, SOLUTION INTRAMUSCULAR; INTRAVENOUS AS NEEDED
Status: DISCONTINUED | OUTPATIENT
Start: 2024-07-25 | End: 2024-07-25

## 2024-07-25 RX ORDER — HYDROMORPHONE HCL/PF 1 MG/ML
0.5 SYRINGE (ML) INJECTION
Status: DISCONTINUED | OUTPATIENT
Start: 2024-07-25 | End: 2024-07-25 | Stop reason: HOSPADM

## 2024-07-25 RX ORDER — GLYCOPYRROLATE 0.2 MG/ML
INJECTION INTRAMUSCULAR; INTRAVENOUS AS NEEDED
Status: DISCONTINUED | OUTPATIENT
Start: 2024-07-25 | End: 2024-07-25

## 2024-07-25 RX ORDER — FENTANYL CITRATE/PF 50 MCG/ML
50 SYRINGE (ML) INJECTION
Status: DISCONTINUED | OUTPATIENT
Start: 2024-07-25 | End: 2024-07-25 | Stop reason: HOSPADM

## 2024-07-25 RX ORDER — FENTANYL CITRATE 50 UG/ML
INJECTION, SOLUTION INTRAMUSCULAR; INTRAVENOUS AS NEEDED
Status: DISCONTINUED | OUTPATIENT
Start: 2024-07-25 | End: 2024-07-25

## 2024-07-25 RX ORDER — SODIUM CHLORIDE, SODIUM LACTATE, POTASSIUM CHLORIDE, CALCIUM CHLORIDE 600; 310; 30; 20 MG/100ML; MG/100ML; MG/100ML; MG/100ML
INJECTION, SOLUTION INTRAVENOUS CONTINUOUS PRN
Status: DISCONTINUED | OUTPATIENT
Start: 2024-07-25 | End: 2024-07-25

## 2024-07-25 RX ORDER — MIDAZOLAM HYDROCHLORIDE 2 MG/2ML
INJECTION, SOLUTION INTRAMUSCULAR; INTRAVENOUS AS NEEDED
Status: DISCONTINUED | OUTPATIENT
Start: 2024-07-25 | End: 2024-07-25

## 2024-07-25 RX ORDER — NEOSTIGMINE METHYLSULFATE 1 MG/ML
INJECTION INTRAVENOUS AS NEEDED
Status: DISCONTINUED | OUTPATIENT
Start: 2024-07-25 | End: 2024-07-25

## 2024-07-25 RX ORDER — ROCURONIUM BROMIDE 10 MG/ML
INJECTION, SOLUTION INTRAVENOUS AS NEEDED
Status: DISCONTINUED | OUTPATIENT
Start: 2024-07-25 | End: 2024-07-25

## 2024-07-25 RX ORDER — ONDANSETRON 2 MG/ML
4 INJECTION INTRAMUSCULAR; INTRAVENOUS ONCE AS NEEDED
Status: DISCONTINUED | OUTPATIENT
Start: 2024-07-25 | End: 2024-07-25 | Stop reason: HOSPADM

## 2024-07-25 RX ORDER — BUPIVACAINE HYDROCHLORIDE 5 MG/ML
INJECTION, SOLUTION EPIDURAL; INTRACAUDAL
Status: COMPLETED | OUTPATIENT
Start: 2024-07-25 | End: 2024-07-25

## 2024-07-25 RX ORDER — PROPOFOL 10 MG/ML
INJECTION, EMULSION INTRAVENOUS CONTINUOUS PRN
Status: DISCONTINUED | OUTPATIENT
Start: 2024-07-25 | End: 2024-07-25

## 2024-07-25 RX ORDER — ONDANSETRON 2 MG/ML
INJECTION INTRAMUSCULAR; INTRAVENOUS AS NEEDED
Status: DISCONTINUED | OUTPATIENT
Start: 2024-07-25 | End: 2024-07-25

## 2024-07-25 RX ORDER — MAGNESIUM HYDROXIDE 1200 MG/15ML
LIQUID ORAL AS NEEDED
Status: DISCONTINUED | OUTPATIENT
Start: 2024-07-25 | End: 2024-07-25 | Stop reason: HOSPADM

## 2024-07-25 RX ORDER — PROPOFOL 10 MG/ML
INJECTION, EMULSION INTRAVENOUS AS NEEDED
Status: DISCONTINUED | OUTPATIENT
Start: 2024-07-25 | End: 2024-07-25

## 2024-07-25 RX ADMIN — BUPIVACAINE HYDROCHLORIDE 30 ML: 5 INJECTION, SOLUTION EPIDURAL; INTRACAUDAL at 14:55

## 2024-07-25 RX ADMIN — FENTANYL CITRATE 100 MCG: 0.05 INJECTION, SOLUTION INTRAMUSCULAR; INTRAVENOUS at 15:47

## 2024-07-25 RX ADMIN — KETOROLAC TROMETHAMINE 30 MG: 30 INJECTION, SOLUTION INTRAMUSCULAR; INTRAVENOUS at 17:32

## 2024-07-25 RX ADMIN — FENTANYL CITRATE 50 MCG: 0.05 INJECTION, SOLUTION INTRAMUSCULAR; INTRAVENOUS at 17:30

## 2024-07-25 RX ADMIN — CEFAZOLIN 3000 MG: 1 INJECTION, POWDER, FOR SOLUTION INTRAMUSCULAR; INTRAVENOUS at 15:53

## 2024-07-25 RX ADMIN — FENTANYL CITRATE 25 MCG: 0.05 INJECTION, SOLUTION INTRAMUSCULAR; INTRAVENOUS at 17:06

## 2024-07-25 RX ADMIN — CEFAZOLIN 3000 MG: 1 INJECTION, POWDER, FOR SOLUTION INTRAMUSCULAR; INTRAVENOUS at 21:46

## 2024-07-25 RX ADMIN — ROCURONIUM BROMIDE 20 MG: 10 INJECTION, SOLUTION INTRAVENOUS at 16:46

## 2024-07-25 RX ADMIN — ONDANSETRON 4 MG: 2 INJECTION INTRAMUSCULAR; INTRAVENOUS at 17:34

## 2024-07-25 RX ADMIN — PROPOFOL 200 MG: 10 INJECTION, EMULSION INTRAVENOUS at 15:47

## 2024-07-25 RX ADMIN — SODIUM CHLORIDE, SODIUM LACTATE, POTASSIUM CHLORIDE, AND CALCIUM CHLORIDE: .6; .31; .03; .02 INJECTION, SOLUTION INTRAVENOUS at 14:30

## 2024-07-25 RX ADMIN — NEOSTIGMINE METHYLSULFATE 5 MG: 1 INJECTION INTRAVENOUS at 17:34

## 2024-07-25 RX ADMIN — ASPIRIN 81 MG: 81 TABLET, COATED ORAL at 21:45

## 2024-07-25 RX ADMIN — SODIUM CHLORIDE, SODIUM LACTATE, POTASSIUM CHLORIDE, AND CALCIUM CHLORIDE: .6; .31; .03; .02 INJECTION, SOLUTION INTRAVENOUS at 16:46

## 2024-07-25 RX ADMIN — GLYCOPYRROLATE 0.8 MCG: 0.2 INJECTION INTRAMUSCULAR; INTRAVENOUS at 17:34

## 2024-07-25 RX ADMIN — PROPOFOL 30 MCG/KG/MIN: 10 INJECTION, EMULSION INTRAVENOUS at 15:55

## 2024-07-25 RX ADMIN — FENTANYL CITRATE 25 MCG: 0.05 INJECTION, SOLUTION INTRAMUSCULAR; INTRAVENOUS at 17:15

## 2024-07-25 RX ADMIN — MIDAZOLAM HYDROCHLORIDE 1 MG: 1 INJECTION, SOLUTION INTRAMUSCULAR; INTRAVENOUS at 15:45

## 2024-07-25 RX ADMIN — ROCURONIUM BROMIDE 50 MG: 10 INJECTION, SOLUTION INTRAVENOUS at 15:49

## 2024-07-25 NOTE — ANESTHESIA PROCEDURE NOTES
Peripheral Block    Patient location during procedure: holding area  Start time: 7/25/2024 2:53 PM  Reason for block: at surgeon's request and post-op pain management  Staffing  Performed by: Efrem Heard DO  Authorized by: Efrem Heard DO    Preanesthetic Checklist  Completed: patient identified, IV checked, site marked, risks and benefits discussed, surgical consent, monitors and equipment checked, pre-op evaluation and timeout performed  Peripheral Block  Patient position: supine  Prep: ChloraPrep  Patient monitoring: frequent blood pressure checks, continuous pulse oximetry and heart rate  Laterality: right  Injection technique: single-shot  Procedures: ultrasound guided, Ultrasound guidance required for the procedure to increase accuracy and safety of medication placement and decrease risk of complications.  Ultrasound permanent image saved  bupivacaine (PF) (MARCAINE) 0.5 % injection 20 mL - Perineural   30 mL - 7/25/2024 2:55:00 PM  Needle  Needle type: Stimuplex   Needle gauge: 22 G  Needle length: 4 in  Needle localization: anatomical landmarks, ultrasound guidance and nerve stimulator  Assessment  Injection assessment: incremental injection, frequent aspiration, injected with ease, negative aspiration, negative for heart rate change, no paresthesia on injection, no symptoms of intraneural/intravenous injection and needle tip visualized at all times  Paresthesia pain: none  Post-procedure:  site cleaned  patient tolerated the procedure well with no immediate complications  Additional Notes  US guided femoral nerve block.  Obtained quadriceps response at > 0.5mA and 0.1 ms.  Local injected without complication, 30 mL 0.5% bupi with 1:400k epi  [id number]  Efrem Heard DO

## 2024-07-25 NOTE — ASSESSMENT & PLAN NOTE
69-year-old male who presented with right knee injury.  Reports right knee gave out earlier today and fell on his right knee  X-ray does not appear to show any acute abnormalities however official read pending  Reports inability to ambulate secondary to knee pain  Right knee MRI report noted for complete tear of quadriceps insertion.  Plan for OR today as per Orthopedic team.   RCRI 0   Patient is medically optimized.  Further care per ortho recommendations

## 2024-07-25 NOTE — ASSESSMENT & PLAN NOTE
MRI report noted.  Plan for OR today per Orthopedic team  Further care per Ortho recommendations.

## 2024-07-25 NOTE — PROGRESS NOTES
[facility]  Progress Note  Name: Tobin Kerns I  MRN: [id number]  Unit/Bed#: -[facility] I Date of Admission: 7/23/2024   Date of Service: 7/25/2024 I Hospital Day: 1    Assessment & Plan   * Ambulatory dysfunction  Assessment & Plan  69-year-old male who presented with right knee injury.  Reports right knee gave out earlier today and fell on his right knee  X-ray does not appear to show any acute abnormalities however official read pending  Reports inability to ambulate secondary to knee pain  Right knee MRI report noted for complete tear of quadriceps insertion.  Plan for OR today as per Orthopedic team.   RCRI 0   Patient is medically optimized.  Further care per ortho recommendations     Quadriceps muscle rupture, right, initial encounter  Assessment & Plan  MRI report noted.  Plan for OR today per Orthopedic team  Further care per Ortho recommendations.     Class 2 severe obesity with serious comorbidity and body mass index (BMI) of 38.0 to 38.9 in adult (HCC)  Assessment & Plan   Diet and exercise counseling provided    Gastro-esophageal reflux disease without esophagitis  Assessment & Plan  Continue Pepcid    Mixed hyperlipidemia  Assessment & Plan  Continue statin    Hypertension  Assessment & Plan  BP controlled  Continue losartan               VTE Pharmacologic Prophylaxis: VTE Score: 6 Moderate Risk (Score 3-4) - Pharmacological DVT Prophylaxis Ordered: heparin. Heparin on hold  for OR today.    Mobility:   Basic Mobility Inpatient Raw Score: 12  -HLM Goal: 4: Move to chair/commode  -HLM Achieved: 4: Move to chair/commode      Patient Centered Rounds: I performed bedside rounds with nursing staff today.   Discussions with Specialists or Other Care Team Provider: Orthopedic team recommendations appreciated.     Education and Discussions with Family / Patient: Patient declined call to .     Total Time Spent on Date of Encounter in care of patient: 25 mins. This  time was spent on one or more of the following: performing physical exam; counseling and coordination of care; obtaining or reviewing history; documenting in the medical record; reviewing/ordering tests, medications or procedures; communicating with other healthcare professionals and discussing with patient's family/caregivers.    Current Length of Stay: 1 day(s)  Current Patient Status: Inpatient   Certification Statement: The patient will continue to require additional inpatient hospital stay due to plan for OR today per orthopedics team.  Discharge Plan: Anticipate discharge in 48 hrs to discharge location to be determined pending rehab evaluations.    Code Status: Level 1 - Full Code    Subjective:   Seen during a.m. rounds.  Patient appears comfortable not in distress.  Awaiting for OR.  Denies any new complaints.    Objective:     Vitals:   Temp (24hrs), Av.6 °F (37 °C), Min:98.5 °F (36.9 °C), Max:98.8 °F (37.1 °C)    Temp:  [98.5 °F (36.9 °C)-98.8 °F (37.1 °C)] 98.8 °F (37.1 °C)  HR:  [89-93] 89  Resp:  [18-19] 18  BP: (117-130)/(78-85) 130/85  SpO2:  [94 %] 94 %  Body mass index is 37.91 kg/m².     Input and Output Summary (last 24 hours):     Intake/Output Summary (Last 24 hours) at 2024 1153  Last data filed at 2024 1148  Gross per 24 hour   Intake 240 ml   Output 2300 ml   Net -2060 ml       Physical Exam:   Physical Exam  Constitutional:       General: He is not in acute distress.     Appearance: Normal appearance. He is not ill-appearing, toxic-appearing or diaphoretic.   HENT:      Head: Normocephalic and atraumatic.   Eyes:      Pupils: Pupils are equal, round, and reactive to light.   Cardiovascular:      Rate and Rhythm: Normal rate.      Pulses: Normal pulses.   Pulmonary:      Effort: Pulmonary effort is normal. No respiratory distress.      Breath sounds: Normal breath sounds. No wheezing.   Abdominal:      General: Bowel sounds are normal. There is no distension.      Palpations:  Abdomen is soft.      Tenderness: There is no abdominal tenderness.   Musculoskeletal:      Right lower leg: No edema.      Left lower leg: No edema.      Comments: Right lower extremity noted in a knee immobilizer.   Neurological:      Mental Status: He is alert and oriented to person, place, and time.   Psychiatric:         Mood and Affect: Mood normal.         Behavior: Behavior normal.          Additional Data:     Labs:  Results from last 7 days   Lab Units 07/24/24  0518   WBC Thousand/uL 10.05   HEMOGLOBIN g/dL 14.1   HEMATOCRIT % 41.1   PLATELETS Thousands/uL 231   SEGS PCT % 61   LYMPHO PCT % 26   MONO PCT % 11   EOS PCT % 1     Results from last 7 days   Lab Units 07/24/24  0518   SODIUM mmol/L 137   POTASSIUM mmol/L 3.9   CHLORIDE mmol/L 105   CO2 mmol/L 23   BUN mg/dL 17   CREATININE mg/dL 1.09   ANION GAP mmol/L 9   CALCIUM mg/dL 9.3   GLUCOSE RANDOM mg/dL 104                       Lines/Drains:  Invasive Devices       Peripheral Intravenous Line  Duration             Peripheral IV 07/23/24 Right Antecubital 1 day                          Imaging: Reviewed radiology reports from this admission including: Right LE Mri report noted.     Recent Cultures (last 7 days):         Last 24 Hours Medication List:   Current Facility-Administered Medications   Medication Dose Route Frequency Provider Last Rate    acetaminophen  650 mg Oral Q6H PRN Emmanuel Graf MD      docusate sodium  100 mg Oral BID Justin ADKINS MD      famotidine  20 mg Oral BID PRN Emmanuel Graf MD      losartan  50 mg Oral Daily Emmanuel Graf MD      nystatin   Topical BID Justin ADKINS MD      oxyCODONE  5 mg Oral Q4H PRN Emmanuel Graf MD      oxyCODONE  2.5 mg Oral Q4H PRN Emmanuel Graf MD      pravastatin  40 mg Oral Daily With Dinner Emmanuel Graf MD          Today, Patient Was Seen By: Justin Barr MD    **Please Note: This note may have been constructed using a voice recognition system.**

## 2024-07-25 NOTE — OP NOTE
OPERATIVE REPORT  PATIENT NAME: Tobin Kerns    :  1955  MRN: [id number]  Pt Location: [facility] OR ROOM 04    SURGERY DATE: 2024    Surgeons and Role:     * Fletcher Foster,  - Primary     * Deny Leiva PA-C - Assisting     * America Ambrose PA-C - Assisting    Preop Diagnosis:  Rupture of right quadriceps tendon, initial encounter [S76.111A]    Post-Op Diagnosis Codes:     * Rupture of right quadriceps tendon, initial encounter [S76.111A]    Procedure(s):  Right - REPAIR TENDON QUADRICEPS- Right    Specimen(s):  * No specimens in log *    Estimated Blood Loss:   Minimal, tourniquet used    Drains:  * No LDAs found *    Anesthesia Type:   General w/ femoral nerve block    Operative Indications:  Rupture of right quadriceps tendon, initial encounter [S76.111A]      Operative Findings:  Complete rupture of right quadriceps tendon      Complications:   None    Procedure and Technique:    Antibiotics: 3 g Ancef  Implants: Arthrex 4.75 mm double loaded BioComposite corkscrew anchor x 3  Tourniquet time: 83 minutes    The patient was seen in the preoperative holding area and the appropriate site of surgery was confirmed the patient was marked.  Upon bringing patient back to Room he was placed supine on the operating table.  General anesthesia was provided.  Right lower extremity was prepped and draped in usual sterile fashion.  Preoperative timeout once again confirmed the site of surgery and procedure.    10 blade was used to make a longitudinal midline incision extending just proximal to the palpable quadriceps defect to the distal aspect of the patella with the knee in flexion.  Full-thickness skin flaps were developed and hematoma was immediately encountered upon entering the quadriceps defect.  Surgical site was copiously irrigated.  A fresh 15 blade was used to sharply debride degenerative distal 5 mm quadriceps tendon.  Remaining degenerative soft tissue from the superior patella was also debrided.   Full-thickness medial and lateral retinacular tears were noted and freshened with a 15 blade.  Curette and rongeur were used to remove remaining soft tissue from the superior patella and lightly decorticate the superior wound for appropriate healing.  Separate 1 Vicryl sutures were placed in figure-of-eight fashion both the medial and lateral retinacula but were not yet tied.  Appropriate site of suture anchor fixation points were marked with the Bovie.  Central anchor was drilled for a 2.6 mm double loaded all suture fiber tack anchor.  Due to hard bone, upon impacting the anchor, anchor did not reina and hold in place.  A second triple loaded all suture anchor was attempted to be inserted with the same results.  At this time decision was made to place a 4.75 mm corkscrew anchor.  A starting hole was made with a spade tip drill followed by appropriate tapping of the hole.  [place] was inserted with appropriate bite.  This process was repeated with 2 more anchors, 1 medial, and 1 lateral in the superior patella.  Upon fixation of all anchors sutures were passed to the quadricep tendon.  This began with the lateral anchor.  1 suture limb was placed in running locking [place] fashion proximally for 4 throws, across, and distally along the lateral border of the quadriceps tendon.  The second suture from this anchor was placed in Maxwell-Fei fashion in the distal quadriceps tendon with the corresponding suture limb being placed in simple fashion.  This process was repeated with the remaining 2 anchors, using 1 and running locking [place] fashion up and down the tendon with the other being placed in a Maxwell-Fei fashion with his corresponding suture in simple fashion.  With the knee in full extension all 3 [place] sutures were tied to secure fixation to the patella.  This was followed by tying of Maxwell-Fei sutures to further secure the repair.  Knee was then flexed to approximately 20 degrees without any gapping at the  repair site.  Previously placed retinacular sutures were appropriately tied.  Further retinacular sutures were placed with 1 Vicryl suture.  Surgical site was once again copiously irrigated.  Skin was closed in a layered fashion with 2-0 Vicryl and staples.  A sterile dressing was applied and tourniquet was released.  The patient was placed into a T ROM brace locked in full extension.  He tolerated the procedure well and was transferred to PACU without complication.       I was present for the entire procedure. A qualified resident physician was not available, and A physician assistant was required during the procedure for tissue retraction and handling, dissection and suturing.    Postoperatively the patient will be weightbearing as tolerated right lower extremity with knee brace locked in full extension and walker assistance.  Patient is instructed to take aspirin 81 mg twice daily for DVT prophylaxis for 4 weeks postoperatively.  He will receive Ancef 3 g every 8 hours x 2 doses postoperatively.  He will work with physical occupational therapy while in the hospital.  I will see him in the office in 2 weeks for staple removal and reevaluation.    Patient Disposition:  PACU         SIGNATURE: Fletcher Foster DO  DATE: July 25, 2024  TIME: 5:35 PM

## 2024-07-25 NOTE — ANESTHESIA PREPROCEDURE EVALUATION
Procedure:  REPAIR TENDON QUADRICEPS- Right (Right: Thigh)    Relevant Problems   CARDIO   (+) Abdominal aortic ectasia (HCC)   (+) Hypertension   (+) Mixed hyperlipidemia   (+) Pure hypertriglyceridemia      GI/HEPATIC   (+) Gastro-esophageal reflux disease without esophagitis      MUSCULOSKELETAL   (+) Quadriceps muscle rupture, right, initial encounter      PULMONARY   (+) JOVANY on CPAP      Neurology/Sleep   (+) Peripheral neuropathy      Care Coordination   (+) Ambulatory dysfunction      Eye   (+) Drusen (degenerative) of macula, bilateral   (+) Myopia, bilateral   (+) Pinguecula, bilateral   (+) Presbyopia   (+) Regular astigmatism, bilateral      Other   (+) Class 2 severe obesity with serious comorbidity and body mass index (BMI) of 38.0 to 38.9 in adult (HCC)    Radiographic hepatic steatosis    BMI 37.91    Physical Exam    Airway    Mallampati score: II  TM Distance: >3 FB  Neck ROM: full     Dental       Cardiovascular  Cardiovascular exam normal    Pulmonary  Pulmonary exam normal     Other Findings        Anesthesia Plan  ASA Score- 3     Anesthesia Type- general with ASA Monitors.         Additional Monitors:     Airway Plan: ETT.    Comment: Block requested by surgeon, recommend femoral nerve block. .       Plan Factors-Exercise tolerance (METS): >4 METS.    Chart reviewed. EKG reviewed. Imaging results reviewed. Existing labs reviewed. Patient summary reviewed.    Patient is not a current smoker.              Induction- intravenous.    Postoperative Plan-     Perioperative Resuscitation Plan - Level 1 - Full Code.       Informed Consent- Anesthetic plan and risks discussed with patient.  I personally reviewed this patient with the CRNA. Discussed and agreed on the Anesthesia Plan with the CRNA..

## 2024-07-25 NOTE — ANESTHESIA POSTPROCEDURE EVALUATION
Post-Op Assessment Note    CV Status:  Stable  Pain Score: 0    Pain management: adequate       Mental Status:  Alert and awake   Hydration Status:  Euvolemic   PONV Controlled:  Controlled   Airway Patency:  Patent and adequate  Two or more mitigation strategies used for obstructive sleep apnea   Post Op Vitals Reviewed: Yes    No anethesia notable event occurred.    Staff: JOVANNY               /88 (07/25/24 1748)    Temp 97.8 °F (36.6 °C) (07/25/24 1748)    Pulse 85 (07/25/24 1748)   Resp (!) 26 (07/25/24 1748)    SpO2 (!) 89 % (07/25/24 1748)

## 2024-07-26 LAB
BACTERIA UR QL AUTO: ABNORMAL /HPF
BILIRUB UR QL STRIP: NEGATIVE
CK SERPL-CCNC: 226 U/L (ref 39–308)
CLARITY UR: ABNORMAL
COLOR UR: YELLOW
GLUCOSE UR STRIP-MCNC: NEGATIVE MG/DL
HGB UR QL STRIP.AUTO: ABNORMAL
KETONES UR STRIP-MCNC: ABNORMAL MG/DL
LEUKOCYTE ESTERASE UR QL STRIP: ABNORMAL
NITRITE UR QL STRIP: POSITIVE
NON-SQ EPI CELLS URNS QL MICRO: ABNORMAL /HPF
PH UR STRIP.AUTO: 6.5 [PH]
PROT UR STRIP-MCNC: ABNORMAL MG/DL
RBC #/AREA URNS AUTO: ABNORMAL /HPF
SP GR UR STRIP.AUTO: 1.02 (ref 1–1.03)
UROBILINOGEN UR STRIP-ACNC: <2 MG/DL
WBC #/AREA URNS AUTO: ABNORMAL /HPF

## 2024-07-26 PROCEDURE — 97163 PT EVAL HIGH COMPLEX 45 MIN: CPT

## 2024-07-26 PROCEDURE — 99232 SBSQ HOSP IP/OBS MODERATE 35: CPT | Performed by: STUDENT IN AN ORGANIZED HEALTH CARE EDUCATION/TRAINING PROGRAM

## 2024-07-26 PROCEDURE — 87086 URINE CULTURE/COLONY COUNT: CPT

## 2024-07-26 PROCEDURE — 99024 POSTOP FOLLOW-UP VISIT: CPT | Performed by: ORTHOPAEDIC SURGERY

## 2024-07-26 PROCEDURE — 82550 ASSAY OF CK (CPK): CPT

## 2024-07-26 PROCEDURE — 97167 OT EVAL HIGH COMPLEX 60 MIN: CPT

## 2024-07-26 PROCEDURE — 81001 URINALYSIS AUTO W/SCOPE: CPT

## 2024-07-26 RX ADMIN — ASPIRIN 81 MG: 81 TABLET, COATED ORAL at 10:42

## 2024-07-26 RX ADMIN — NYSTATIN: 100000 POWDER TOPICAL at 10:42

## 2024-07-26 RX ADMIN — OXYCODONE HYDROCHLORIDE 5 MG: 5 TABLET ORAL at 16:51

## 2024-07-26 RX ADMIN — LOSARTAN POTASSIUM 50 MG: 50 TABLET, FILM COATED ORAL at 10:42

## 2024-07-26 RX ADMIN — PRAVASTATIN SODIUM 40 MG: 20 TABLET ORAL at 16:53

## 2024-07-26 RX ADMIN — DOCUSATE SODIUM 100 MG: 100 CAPSULE, LIQUID FILLED ORAL at 17:40

## 2024-07-26 RX ADMIN — ASPIRIN 81 MG: 81 TABLET, COATED ORAL at 21:25

## 2024-07-26 RX ADMIN — DOCUSATE SODIUM 100 MG: 100 CAPSULE, LIQUID FILLED ORAL at 10:42

## 2024-07-26 RX ADMIN — SODIUM CHLORIDE 1000 ML: 0.9 INJECTION, SOLUTION INTRAVENOUS at 01:55

## 2024-07-26 RX ADMIN — NYSTATIN: 100000 POWDER TOPICAL at 21:27

## 2024-07-26 RX ADMIN — CEFAZOLIN 3000 MG: 1 INJECTION, POWDER, FOR SOLUTION INTRAMUSCULAR; INTRAVENOUS at 05:27

## 2024-07-26 NOTE — PROGRESS NOTES
[facility]  Progress Note  Name: Tobin Kerns I  MRN: [id number]  Unit/Bed#: [facility] 330[facility] I Date of Admission: 7/23/2024   Date of Service: 7/26/2024 I Hospital Day: 2    Assessment & Plan   * Ambulatory dysfunction  Assessment & Plan  69-year-old male who presented with right knee injury.  Reports right knee gave out earlier today and fell on his right knee  X-ray does not appear to show any acute abnormalities however official read pending  Reports inability to ambulate secondary to knee pain  Right knee MRI report noted for complete tear of quadriceps insertion.    POD #1 s/p right quadricep tendon repair.  Orthopedics recommended weightbearing as tolerated right lower extremity with right knee in a brace locked in full extension.  Orthopedics recommended discharge on aspirin 81 mg twice daily for 4 weeks for DVT prophylaxis.  Further care per orthopedics team's recommendation.  Awaiting PT OT eval.    Quadriceps muscle rupture, right, initial encounter  Assessment & Plan  POD #1 status post right quad tendon repair.  Orthopedics recommended weightbearing as tolerated right lower extremity with right knee in a brace locked in full extension.  Orthopedics recommended discharge on aspirin 81 mg twice daily for 4 weeks for DVT prophylaxis.  Further care per orthopedics team's recommendation.  Awaiting PT OT eval.    Class 2 severe obesity with serious comorbidity and body mass index (BMI) of 38.0 to 38.9 in adult (HCC)  Assessment & Plan   Diet and exercise counseling provided    Gastro-esophageal reflux disease without esophagitis  Assessment & Plan  Continue Pepcid    Mixed hyperlipidemia  Assessment & Plan  Continue statin    Hypertension  Assessment & Plan  BP controlled  Continue losartan               VTE Pharmacologic Prophylaxis: VTE Score: 6  on ASA 81 mg bid    Mobility:   Basic Mobility Inpatient Raw Score: 10  -HLM Goal: 4: Move to chair/commode  -HLM Achieved: 6: Walk 10  steps or more      Patient Centered Rounds: I performed bedside rounds with nursing staff today.   Discussions with Specialists or Other Care Team Provider: Orthopedic    Total Time Spent on Date of Encounter in care of patient: 25 mins. This time was spent on one or more of the following: performing physical exam; counseling and coordination of care; obtaining or reviewing history; documenting in the medical record; reviewing/ordering tests, medications or procedures; communicating with other healthcare professionals and discussing with patient's family/caregivers.    Current Length of Stay: 2 day(s)  Current Patient Status: Inpatient   Certification Statement: The patient will continue to require additional inpatient hospital stay due to awaiting further input from PT OT eval  Discharge Plan: Anticipate discharge in 24-48 hrs to discharge location to be determined pending rehab evaluations.    Code Status: Level 1 - Full Code    Subjective:   Seen during a.m. rounds.  Patient appears comfortable on discharge.  Currently denies chest pain, fever, chills, nausea, vomiting, denies any other new complaints.  No other events reported.    Objective:     Vitals:   Temp (24hrs), Av.4 °F (36.9 °C), Min:97.8 °F (36.6 °C), Max:99.5 °F (37.5 °C)    Temp:  [97.8 °F (36.6 °C)-99.5 °F (37.5 °C)] 99.5 °F (37.5 °C)  HR:  [] 90  Resp:  [18-20] 18  BP: (136-163)/(72-92) 144/83  SpO2:  [91 %-98 %] 97 %  Body mass index is 37.91 kg/m².     Input and Output Summary (last 24 hours):     Intake/Output Summary (Last 24 hours) at 2024 1729  Last data filed at 2024 1333  Gross per 24 hour   Intake 780 ml   Output 300 ml   Net 480 ml       Physical Exam:   Physical Exam  Constitutional:       General: He is not in acute distress.     Appearance: Normal appearance. He is not ill-appearing, toxic-appearing or diaphoretic.   HENT:      Head: Normocephalic and atraumatic.   Eyes:      Pupils: Pupils are equal, round, and  reactive to light.   Cardiovascular:      Rate and Rhythm: Normal rate.      Pulses: Normal pulses.   Pulmonary:      Effort: Pulmonary effort is normal. No respiratory distress.      Breath sounds: Normal breath sounds. No wheezing.   Abdominal:      General: Bowel sounds are normal. There is no distension.      Palpations: Abdomen is soft.      Tenderness: There is no abdominal tenderness.   Musculoskeletal:      Right lower leg: No edema.      Left lower leg: No edema.      Comments: Right lower extremity noted in a knee brace   Neurological:      Mental Status: He is alert and oriented to person, place, and time.   Psychiatric:         Mood and Affect: Mood normal.         Behavior: Behavior normal.          Additional Data:     Labs:  Results from last 7 days   Lab Units 07/24/24  0518   WBC Thousand/uL 10.05   HEMOGLOBIN g/dL 14.1   HEMATOCRIT % 41.1   PLATELETS Thousands/uL 231   SEGS PCT % 61   LYMPHO PCT % 26   MONO PCT % 11   EOS PCT % 1     Results from last 7 days   Lab Units 07/24/24  0518   SODIUM mmol/L 137   POTASSIUM mmol/L 3.9   CHLORIDE mmol/L 105   CO2 mmol/L 23   BUN mg/dL 17   CREATININE mg/dL 1.09   ANION GAP mmol/L 9   CALCIUM mg/dL 9.3   GLUCOSE RANDOM mg/dL 104         Results from last 7 days   Lab Units 07/25/24  1320   POC GLUCOSE mg/dl 102               Lines/Drains:  Invasive Devices       Peripheral Intravenous Line  Duration             Peripheral IV 07/25/24 Left Hand 1 day                            Recent Cultures (last 7 days):         Last 24 Hours Medication List:   Current Facility-Administered Medications   Medication Dose Route Frequency Provider Last Rate    acetaminophen  650 mg Oral Q6H PRN Deny Leiva PA-C      aspirin  81 mg Oral BID Deny Leiva PA-C      docusate sodium  100 mg Oral BID Deny Leiva PA-C      famotidine  20 mg Oral BID PRN Deny Leiva PA-C      losartan  50 mg Oral Daily Deny Leiva PA-C      nystatin   Topical BID Deny MATTHEWS  RYLAND Leiva      oxyCODONE  5 mg Oral Q4H PRN Deny Leiva PA-C      oxyCODONE  2.5 mg Oral Q4H PRN Deny Leiva PA-C      pravastatin  40 mg Oral Daily With Dinner Deny Leiva PA-C          Today, Patient Was Seen By: Justin Barr MD    **Please Note: This note may have been constructed using a voice recognition system.**

## 2024-07-26 NOTE — ASSESSMENT & PLAN NOTE
POD #1 status post right quad tendon repair.  Orthopedics recommended weightbearing as tolerated right lower extremity with right knee in a brace locked in full extension.  Orthopedics recommended discharge on aspirin 81 mg twice daily for 4 weeks for DVT prophylaxis.  Further care per orthopedics team's recommendation.  Awaiting PT OT eval.

## 2024-07-26 NOTE — PLAN OF CARE
Problem: OCCUPATIONAL THERAPY ADULT  Goal: Performs self-care activities at highest level of function for planned discharge setting.  See evaluation for individualized goals.  Description: Treatment Interventions: ADL retraining, Functional transfer training, UE strengthening/ROM, Endurance training, Patient/family training, Equipment evaluation/education, Compensatory technique education, Continued evaluation, Cardiac education, Energy conservation, Activityengagement          See flowsheet documentation for full assessment, interventions and recommendations.   Note: Limitation: Decreased ADL status, Decreased high-level ADLs, Decreased self-care trans, Decreased UE strength, Decreased Safe judgement during ADL, Decreased endurance  Prognosis: Good  Assessment: Pt is a 69 y.o. male seen for OT evaluation s/p admit to [facility] on 7/23/2024 w/ Ambulatory dysfunction. Comorbidities affecting pt's functional performance at time of assessment include:HTN, neuropathy, obesity, and GERD, R quadriceps muscle rupture, cathy Drusen of macula, JOVANY, ulcer of left foot, R foot ulcer, abd aortic ectasia, and presbyopia  . Orders placed for OT evaluation and treatment.  Performed at least two patient identifiers during session including name and wristband. Personal factors affecting pt at time of IE include:steps to enter environment, limited home support, difficulty performing ADLS, difficulty performing IADLS , limited insight into deficits, compliance, decreased initiation and engagement , financial barriers, health management , and environment. Prior to admission, pt reports Ind with ADLs, Ind with IADLs, and (+) driving.  Upon evaluation: Pt requires min A with UB ADLs, max A with LB ADLs, min A of 2 with xfers and mod A of 2 with functional mobility 2* the following deficits impacting occupational performance: weakness, decreased strength, decreased dynamic sit/ stand balance, decreased activity tolerance,  decreased standing tolerance time for self care and functional mobility, decreased safety awareness, increased pain, environmental deficits, decreased coping skills, decreased mobilty, and requiring external assistance to complete transitional movements. Pt to benefit from continued skilled OT tx while in the hospital to address deficits as defined above and maximize level of functional independence w ADL's and functional mobility. Occupational Performance areas to address include: bathing/shower, toilet hygiene, dressing, socialization, health maintenance, functional mobility, community mobility, clothing management, cleaning, meal prep, money management, and household maintenance. From OT standpoint, recommendation at time of d/c would be Level 1 (Max Resource Intensity).     Rehab Resource Intensity Level, OT: I (Maximum Resource Intensity)

## 2024-07-26 NOTE — ASSESSMENT & PLAN NOTE
69-year-old male who presented with right knee injury.  Reports right knee gave out earlier today and fell on his right knee  X-ray does not appear to show any acute abnormalities however official read pending  Reports inability to ambulate secondary to knee pain  Right knee MRI report noted for complete tear of quadriceps insertion.    POD #1 s/p right quadricep tendon repair.  Orthopedics recommended weightbearing as tolerated right lower extremity with right knee in a brace locked in full extension.  Orthopedics recommended discharge on aspirin 81 mg twice daily for 4 weeks for DVT prophylaxis.  Further care per orthopedics team's recommendation.  Awaiting PT OT eval.

## 2024-07-26 NOTE — PLAN OF CARE
Problem: Potential for Falls  Goal: Patient will remain free of falls  Description: INTERVENTIONS:  - Educate patient/family on patient safety including physical limitations  - Instruct patient to call for assistance with activity   - Consult OT/PT to assist with strengthening/mobility   - Keep Call bell within reach  - Keep bed low and locked with side rails adjusted as appropriate  - Keep care items and personal belongings within reach  - Initiate and maintain comfort rounds  - Make Fall Risk Sign visible to staff  - Offer Toileting every  Hours, in advance of need  - Initiate/Maintain alarm  - Obtain necessary fall risk management equipment:   - Apply yellow socks and bracelet for high fall risk patients  - Consider moving patient to room near nurses station  Outcome: Progressing     Problem: PAIN - ADULT  Goal: Verbalizes/displays adequate comfort level or baseline comfort level  Description: Interventions:  - Encourage patient to monitor pain and request assistance  - Assess pain using appropriate pain scale  - Administer analgesics based on type and severity of pain and evaluate response  - Implement non-pharmacological measures as appropriate and evaluate response  - Consider cultural and social influences on pain and pain management  - Notify physician/advanced practitioner if interventions unsuccessful or patient reports new pain  Outcome: Progressing     Problem: INFECTION - ADULT  Goal: Absence or prevention of progression during hospitalization  Description: INTERVENTIONS:  - Assess and monitor for signs and symptoms of infection  - Monitor lab/diagnostic results  - Monitor all insertion sites, i.e. indwelling lines, tubes, and drains  - Monitor endotracheal if appropriate and nasal secretions for changes in amount and color  - [place] appropriate cooling/warming therapies per order  - Administer medications as ordered  - Instruct and encourage patient and family to use good hand hygiene technique  -  Identify and instruct in appropriate isolation precautions for identified infection/condition  Outcome: Progressing  Goal: Absence of fever/infection during neutropenic period  Description: INTERVENTIONS:  - Monitor WBC    Outcome: Progressing     Problem: SAFETY ADULT  Goal: Patient will remain free of falls  Description: INTERVENTIONS:  - Educate patient/family on patient safety including physical limitations  - Instruct patient to call for assistance with activity   - Consult OT/PT to assist with strengthening/mobility   - Keep Call bell within reach  - Keep bed low and locked with side rails adjusted as appropriate  - Keep care items and personal belongings within reach  - Initiate and maintain comfort rounds  - Make Fall Risk Sign visible to staff  - Offer Toileting every  Hours, in advance of need  - Initiate/Maintain alarm  - Obtain necessary fall risk management equipment:   - Apply yellow socks and bracelet for high fall risk patients  - Consider moving patient to room near nurses station  Outcome: Progressing  Goal: Maintain or return to baseline ADL function  Description: INTERVENTIONS:  -  Assess patient's ability to carry out ADLs; assess patient's baseline for ADL function and identify physical deficits which impact ability to perform ADLs (bathing, care of mouth/teeth, toileting, grooming, dressing, etc.)  - Assess/evaluate cause of self-care deficits   - Assess range of motion  - Assess patient's mobility; develop plan if impaired  - Assess patient's need for assistive devices and provide as appropriate  - Encourage maximum independence but intervene and supervise when necessary  - Involve family in performance of ADLs  - Assess for home care needs following discharge   - Consider OT consult to assist with ADL evaluation and planning for discharge  - Provide patient education as appropriate  Outcome: Progressing  Goal: Maintains/Returns to pre admission functional level  Description:  INTERVENTIONS:  - Perform AM-PAC 6 Click Basic Mobility/ Daily Activity assessment daily.  - Set and communicate daily mobility goal to care team and patient/family/caregiver.   - Collaborate with rehabilitation services on mobility goals if consulted  - Perform Range of Motion  times a day.  - Reposition patient every  hours.  - Dangle patient  times a day  - Stand patient  times a day  - Ambulate patient  times a day  - Out of bed to chair  times a day   - Out of bed for meals  times a day  - Out of bed for toileting  - Record patient progress and toleration of activity level   Outcome: Progressing     Problem: MUSCULOSKELETAL - ADULT  Goal: Maintain or return mobility to safest level of function  Description: INTERVENTIONS:  - Assess patient's ability to carry out ADLs; assess patient's baseline for ADL function and identify physical deficits which impact ability to perform ADLs (bathing, care of mouth/teeth, toileting, grooming, dressing, etc.)  - Assess/evaluate cause of self-care deficits   - Assess range of motion  - Assess patient's mobility  - Assess patient's need for assistive devices and provide as appropriate  - Encourage maximum independence but intervene and supervise when necessary  - Involve family in performance of ADLs  - Assess for home care needs following discharge   - Consider OT consult to assist with ADL evaluation and planning for discharge  - Provide patient education as appropriate  Outcome: Progressing  Goal: Maintain proper alignment of affected body part  Description: INTERVENTIONS:  - Support, maintain and protect limb and body alignment  - Provide patient/ family with appropriate education  Outcome: Progressing     Problem: Prexisting or High Potential for Compromised Skin Integrity  Goal: Skin integrity is maintained or improved  Description: INTERVENTIONS:  - Identify patients at risk for skin breakdown  - Assess and monitor skin integrity  - Assess and monitor nutrition and  hydration status  - Monitor labs   - Assess for incontinence   - Turn and reposition patient  - Assist with mobility/ambulation  - Relieve pressure over bony prominences  - Avoid friction and shearing  - Provide appropriate hygiene as needed including keeping skin clean and dry  - Evaluate need for skin moisturizer/barrier cream  - Collaborate with interdisciplinary team   - Patient/family teaching  - Consider wound care consult   Outcome: Progressing

## 2024-07-26 NOTE — PROGRESS NOTES
"Progress Note - Orthopedics   Tobin PATE Scisco 69 y.o. male MRN: [id number]  Unit/Bed#: -01      Subjective:    69 y.o.male POD#1 s/p Right knee quad tendon repair, resting in bed and in no acute distress. Today, patient reports his pain is well controlled. He has maintained the TROM brace without complications. He offers no additional questions or complaints.     Labs:  0   Lab Value Date/Time    HCT 41.1 07/24/2024 0518    HCT 40.6 07/23/2024 2017    HCT 45.8 04/30/2024 0702    HGB 14.1 07/24/2024 0518    HGB 14.2 07/23/2024 2017    HGB 14.7 04/30/2024 0702    PT 13.9 08/21/2021 0358    INR 1.13 12/21/2021 0857    INR 1.1 08/21/2021 0358    WBC 10.05 07/24/2024 0518    WBC 11.31 (H) 07/23/2024 2017    WBC 7.67 04/30/2024 0702       Meds:    Current Facility-Administered Medications:     acetaminophen (TYLENOL) tablet 650 mg, 650 mg, Oral, Q6H PRN, Deny Leiva PA-C    aspirin (ECOTRIN LOW STRENGTH) EC tablet 81 mg, 81 mg, Oral, BID, Deny Leiva PA-C, 81 mg at 07/26/24 1042    docusate sodium (COLACE) capsule 100 mg, 100 mg, Oral, BID, Deny Leiva PA-C, 100 mg at 07/26/24 1042    famotidine (PEPCID) tablet 20 mg, 20 mg, Oral, BID PRN, Deny Leiva PA-C    losartan (COZAAR) tablet 50 mg, 50 mg, Oral, Daily, Deny Leiva PA-C, 50 mg at 07/26/24 1042    nystatin (MYCOSTATIN) powder, , Topical, BID, Deny Leiva PA-C, Given at 07/26/24 1042    oxyCODONE (ROXICODONE) IR tablet 5 mg, 5 mg, Oral, Q4H PRN, Deny Leiva PA-C    oxyCODONE (ROXICODONE) split tablet 2.5 mg, 2.5 mg, Oral, Q4H PRN, Deny Leiva PA-C    pravastatin (PRAVACHOL) tablet 40 mg, 40 mg, Oral, Daily With Dinner, Deny Leiva PA-C, 40 mg at 07/24/24 1623    Blood Culture:   Lab Results   Component Value Date    BLOODCX No Growth After 5 Days. 01/18/2024    BLOODCX No Growth After 5 Days. 01/18/2024       Wound Culture:   No results found for: \"WOUNDCULT\"    Ins and Outs:  I/O last 24 hours:  In: 1540 [P.O.:240; " I.V.:1300]  Out: 900 [Urine:850; Blood:50]          Physical:  Vitals:    07/26/24 1041   BP: 151/76   Pulse: 83   Resp:    Temp:    SpO2: 94%     Musculoskeletal:  Right knee  Dressing clean, dry and intact without evidence of strike through  TROM brace intact  Calf soft, compressible and nontender  Sensation intact to saphenous, sural, tibial, superficial peroneal nerve, and deep peroneal  Motor intact to +FHL/EHL, +ankle dorsi/plantar flexion  2+ DP pulse  Digits warm and well perfused  Capillary refill < 2 seconds    Assessment:    69 y.o.male POD#1 s/p Right knee quad tendon repair      Plan:  WBAT RLE, TROM brace intact at all times and locked in full extension. Use of walker for safety  PT/OT- up and out of bed. Gait training.   Pain control  DVT ppx- recommendation of 4 weeks of DVT prophylaxis, ASA 81mg twice daily  Dispo: Will follow     America Ambrose PA-C

## 2024-07-26 NOTE — OCCUPATIONAL THERAPY NOTE
Occupational Therapy Evaluation      Tobin RIO Saumya    7/26/2024    Principal Problem:    Ambulatory dysfunction  Active Problems:    Hypertension    Mixed hyperlipidemia    Gastro-esophageal reflux disease without esophagitis    Class 2 severe obesity with serious comorbidity and body mass index (BMI) of 38.0 to 38.9 in adult (HCC)    Quadriceps muscle rupture, right, initial encounter      Past Medical History:   Diagnosis Date    Abdominal aortic ectasia (HCC) 11/13/2023    Next Abdominal Aortic U/S due 11/13/27    Closed fracture of right foot     As a child    Diverticulosis     Drusen (degenerative) of macula, bilateral 09/27/2021    Gastro-esophageal reflux disease without esophagitis 05/30/2019    GERD (gastroesophageal reflux disease) 2010    Hammer toe 15 years    History of colon polyps     Hypertension 09/27/2021    IFG (impaired fasting glucose) 10/01/2021    Internal hemorrhoids     Kidney stone 2007    Mixed hyperlipidemia 05/30/2019    Morbid obesity (HCC) 09/27/2021    Myopia, bilateral 09/27/2021    JOVANY on CPAP     Peripheral neuropathy 09/27/2021    Pinguecula, bilateral 09/27/2021    Presbyopia 10/11/2016    Pure hypertriglyceridemia 08/19/2019    Radial fracture 1975    Stress Fracture - Right Arm    Regular astigmatism, bilateral 09/27/2021    Tendonitis     Left shoulder       Past Surgical History:   Procedure Laterality Date    COLONOSCOPY      EGD  04/14/2022    Gastric Erosisons, Esophagitis    FIBULA FRACTURE SURGERY Right 2009    Hardware - Plate and screws in place    FRACTURE SURGERY  2005    QUADRICEPS TENDON REPAIR Right 7/25/2024    Procedure: REPAIR TENDON QUADRICEPS- Right;  Surgeon: Fletcher Foster DO;  Location: [facility];  Service: Orthopedics    SKIN GRAFT  2021    Right foot     TONSILECTOMY AND ADNOIDECTOMY      TONSILLECTOMY         07/26/24 0943   OT Last Visit   OT Visit Date 07/26/24   Note Type   Note type Evaluation   Pain Assessment   Pain Assessment Tool 0-10  "  Pain Score 1   Pain Location/Orientation Orientation: Right;Location: Knee   Pain Onset/Description Onset: Ongoing   Hospital Pain Intervention(s) Repositioned;Ambulation/increased activity   Restrictions/Precautions   Weight Bearing Precautions Per Order Yes   RLE Weight Bearing Per Order WBAT   Braces or Orthoses Knee immobilizer  (\"IN TROM BRACE AT ALL TIMES.    \")   Other Precautions Chair Alarm;Bed Alarm;WBS;Fall Risk;Pain;O2  (1L via O2)   Home Living   Type of Home Mobile home   Home Layout One level;Performs ADLs on one level;Able to live on main level with bedroom/bathroom;Stairs to enter with rails  (3+1 CRISTAL)   Bathroom Shower/Tub Tub/shower unit   Bathroom Toilet Standard   Bathroom Equipment Other (Comment);Hand-held shower  (suction grab bar)   Bathroom Accessibility Accessible   Home Equipment Walker   Additional Comments Pt ambulates without an AD.   Prior Function   Level of [place] Independent with ADLs;Independent with functional mobility;Independent with IADLS   Lives With Alone   Receives Help From Family  (sister and LEYDA (both); brother ([place]))   IADLs Independent with driving;Independent with meal prep;Independent with medication management  (cleaning and laundry with Ind)   Falls in the last 6 months 1 to 4  (2 falls)   Vocational Full time employment  ([employer] - )   Lifestyle   Autonomy Pt reports PLOF was Ind with ADLs, IADLs, and (+) driving   Reciprocal Relationships sister and LEYDA (both); brother ([place])   ADL   Eating Assistance 6  Modified independent   Eating Deficit Increased time to complete   Grooming Assistance 6  Modified Independent   Grooming Deficit Increased time to complete   UB Bathing Assistance 4  Minimal Assistance   UB Bathing Deficit Increased time to complete;Supervision/safety;Verbal cueing;Steadying;Setup   LB Bathing Assistance 2  Maximal Assistance   LB Bathing Deficit Increased time to complete;Supervision/safety;Verbal cueing;Steadying;Setup   UB " Dressing Assistance 4  Minimal Assistance   UB Dressing Deficit Increased time to complete;Supervision/safety;Verbal cueing;Steadying;Setup   LB Dressing Assistance 2  Maximal Assistance   LB Dressing Deficit Increased time to complete;Supervision/safety;Verbal cueing;Steadying;Setup   Toileting Assistance  4  Minimal Assistance   Toileting Deficit Increased time to complete;Supervison/safety;Verbal cueing;Steadying;Setup   Functional Assistance 2  Maximal Assistance   Functional Deficit Increased time to complete;Supervision/safety;Verbal cueing;Steadying;Setup   Bed Mobility   Supine to Sit 4  Minimal assistance   Additional items Assist x 1;HOB elevated;Bedrails;Increased time required;Verbal cues;LE management   Transfers   Sit to Stand 3  Moderate assistance   Additional items Assist x 2;Increased time required;Verbal cues   Stand to Sit 3  Moderate assistance   Additional items Assist x 2;Increased time required;Verbal cues   Functional Mobility   Functional Mobility 3  Moderate assistance   Additional Comments A of 2   Additional items Rolling walker   Balance   Static Sitting Good   Dynamic Sitting Fair +   Static Standing Poor   Dynamic Standing Poor -   Ambulatory Poor -   Activity Tolerance   Activity Tolerance Patient limited by fatigue   Medical Staff Made Aware Co-evaluation performed with PT Amirah secondary to complex medical condition of patient, possible A of 2 required to achieve and maintain transitional movements, and pt's regression of functional status from baseline. PT/OT goals were addressed separately.   Nurse Made Aware LA Rudolph made aware of session outcomes   RUE Assessment   RUE Assessment   (ROM WFL/ MMT 4/5)   LUE Assessment   LUE Assessment   (ROM WFL/ MMT 4/5)   Hand Function   Gross Motor Coordination Functional   Fine Motor Coordination Functional   Sensation   Light Touch No apparent deficits   Sharp/Dull No apparent deficits   Stereognosis No apparent deficits    Proprioception   Proprioception No apparent deficits   Vision-Basic Assessment   Current Vision No visual deficits   Vision - Complex Assessment   Ocular Range of Motion Intact   Psychosocial   Psychosocial (WDL) WDL   Perception   Inattention/Neglect Appears intact   Cognition   Overall Cognitive Status WFL   Arousal/Participation Alert;Cooperative   Attention Within functional limits   Orientation Level Oriented X4   Memory Within functional limits   Following Commands Follows all commands and directions without difficulty   Comments Pt is agreeable to OT Eval   Assessment   Limitation Decreased ADL status;Decreased high-level ADLs;Decreased self-care trans;Decreased UE strength;Decreased Safe judgement during ADL;Decreased endurance   Prognosis Good   Assessment Pt is a 69 y.o. male seen for OT evaluation s/p admit to [facility] on 7/23/2024 w/ Ambulatory dysfunction. Comorbidities affecting pt's functional performance at time of assessment include:HTN, neuropathy, obesity, and GERD, R quadriceps muscle rupture, cathy Drusen of macula, JOVANY, ulcer of left foot, R foot ulcer, abd aortic ectasia, and presbyopia  . Orders placed for OT evaluation and treatment.  Performed at least two patient identifiers during session including name and wristband. Personal factors affecting pt at time of IE include:steps to enter environment, limited home support, difficulty performing ADLS, difficulty performing IADLS , limited insight into deficits, compliance, decreased initiation and engagement , financial barriers, health management , and environment. Prior to admission, pt reports Ind with ADLs, Ind with IADLs, and (+) driving.  Upon evaluation: Pt requires min A with UB ADLs, max A with LB ADLs, min A of 2 with xfers and mod A of 2 with functional mobility 2* the following deficits impacting occupational performance: weakness, decreased strength, decreased dynamic sit/ stand balance, decreased activity tolerance,  decreased standing tolerance time for self care and functional mobility, decreased safety awareness, increased pain, environmental deficits, decreased coping skills, decreased mobilty, and requiring external assistance to complete transitional movements. Pt to benefit from continued skilled OT tx while in the hospital to address deficits as defined above and maximize level of functional independence w ADL's and functional mobility. Occupational Performance areas to address include: bathing/shower, toilet hygiene, dressing, socialization, health maintenance, functional mobility, community mobility, clothing management, cleaning, meal prep, money management, and household maintenance. From OT standpoint, recommendation at time of d/c would be Level 1 (Maximum Resource Intensity).   Plan   Treatment Interventions ADL retraining;Functional transfer training;UE strengthening/ROM;Endurance training;Patient/family training;Equipment evaluation/education;Compensatory technique education;Continued evaluation;Cardiac education;Energy conservation;Activityengagement   Goal Expiration Date 07/26/24   OT Frequency 3-5x/wk   Discharge Recommendation   Rehab Resource Intensity Level, OT I (Maximum Resource Intensity)   AM-PAC Daily Activity Inpatient   Lower Body Dressing 2   Bathing 2   Toileting 3   Upper Body Dressing 3   Grooming 4   Eating 4   Daily Activity Raw Score 18   Daily Activity Standardized Score (Calc for Raw Score >=11) 38.66   AM-PAC Applied Cognition Inpatient   Following a Speech/Presentation 4   Understanding Ordinary Conversation 4   Taking Medications 4   Remembering Where Things Are Placed or Put Away 4   Remembering List of 4-5 Errands 4   Taking Care of Complicated Tasks 4   Applied Cognition Raw Score 24   Applied Cognition Standardized Score 62.21     Occupational therapy Goals: In 5-7 days    Patient will verbalize and demonstrate use of energy conservation/ deep breathing technique and work  simplification skills during functional activity with no verbal cues.    Patient will verbalize and demonstrate good body mechanics and joint protection techniques during ADLs/ IADLs with no verbal cues     Patient will increase OOB/ sitting tolerance to 4-6 hours per day for increased participation in self care and leisure tasks with no s/s of exertion.    Patient will identify s/s of exertion during ADL and functional mobility with no verbal cues.     Patient will verbalize/ demonstrate compensatory strategies to recover from exertion with no verbal cues.     Patient will increase standing tolerance time to 13-15 minutes with No UE support to complete sink level ADLs at modified independent level.    Patient will increase sitting tolerance at edge of bed to 30-45 minutes to complete UB ADLs at modified independent level.     Patient will demonstrate ability to safely perform LB ADLS with MI with long handled adaptive dressing equipment.    Patient/ Family will demonstrate competency with UE Home Exercise Program.       Luann Jose MS OTR/L

## 2024-07-26 NOTE — [facility] ADMISSION
[facility]  met with patient at bedside: introduced self, explained role, reviewed [facility] program, services offered, acute rehab criteria, review of referral by [facility] Medical Director, insurance authorization process, three [facility] locations and anticipated rehab length of stay. [facility] Rehab folder left with patient; all questions answered. Patient's only first choice is [facility]. Patient  made aware ARC Reviewer will communicate with their Care Manager who will keep patient updated on referral status.

## 2024-07-26 NOTE — PHYSICAL THERAPY NOTE
Physical Therapy Evaluation     Patient's Name: Tobin Emmanuelbeatriz    Admitting Diagnosis  Knee pain [M25.569]  Right knee sprain [S83.91XA]  Quadriceps muscle rupture, right, initial encounter [S76.111A]    Problem List  Patient Active Problem List   Diagnosis    Hypertension    Mixed hyperlipidemia    Gastro-esophageal reflux disease without esophagitis    Class 2 severe obesity with serious comorbidity and body mass index (BMI) of 38.0 to 38.9 in adult (HCC)    Peripheral neuropathy    Drusen (degenerative) of macula, bilateral    Myopia, bilateral    Pinguecula, bilateral    Regular astigmatism, bilateral    Presbyopia    Pure hypertriglyceridemia    JOVANY on CPAP    History of colon polyps    IFG (impaired fasting glucose)    Ulcer of left foot, with fat layer exposed (HCC)    Right foot ulcer, with fat layer exposed (formerly [facility])    Abdominal aortic ectasia (formerly [facility])    Ambulatory dysfunction    Quadriceps muscle rupture, right, initial encounter     Past Medical History  Past Medical History:   Diagnosis Date    Abdominal aortic ectasia (HCC) 11/13/2023    Next Abdominal Aortic U/S due 11/13/27    Closed fracture of right foot     As a child    Diverticulosis     Drusen (degenerative) of macula, bilateral 09/27/2021    Gastro-esophageal reflux disease without esophagitis 05/30/2019    GERD (gastroesophageal reflux disease) 2010    Hammer toe 15 years    History of colon polyps     Hypertension 09/27/2021    IFG (impaired fasting glucose) 10/01/2021    Internal hemorrhoids     Kidney stone 2007    Mixed hyperlipidemia 05/30/2019    Morbid obesity (HCC) 09/27/2021    Myopia, bilateral 09/27/2021    JOVANY on CPAP     Peripheral neuropathy 09/27/2021    Pinguecula, bilateral 09/27/2021    Presbyopia 10/11/2016    Pure hypertriglyceridemia 08/19/2019    Radial fracture 1975    Stress Fracture - Right Arm    Regular astigmatism, bilateral 09/27/2021    Tendonitis     Left shoulder     Past Surgical History  Past Surgical History:    Procedure Laterality Date    COLONOSCOPY      EGD  04/14/2022    Gastric Erosisons, Esophagitis    FIBULA FRACTURE SURGERY Right 2009    Hardware - Plate and screws in place    FRACTURE SURGERY  2005    QUADRICEPS TENDON REPAIR Right 7/25/2024    Procedure: REPAIR TENDON QUADRICEPS- Right;  Surgeon: Fletcher Foster DO;  Location: [facility];  Service: Orthopedics    SKIN GRAFT  2021    Right foot     TONSILECTOMY AND ADNOIDECTOMY      TONSILLECTOMY        07/26/24 1002   PT Last Visit   PT Visit Date 07/26/24   Note Type   Note type Evaluation   Pain Assessment   Pain Assessment Tool 0-10   Pain Score 1   Pain Location/Orientation Orientation: Right;Location: Knee   Pain Onset/Description Onset: Ongoing   Hospital Pain Intervention(s) Repositioned;Ambulation/increased activity   Restrictions/Precautions   Weight Bearing Precautions Per Order Yes   RLE Weight Bearing Per Order WBAT  (per ortho)   Braces or Orthoses Knee brace  (TROM-locked in extension at all times per ortho)   Other Precautions Chair Alarm;Bed Alarm;Fall Risk;Pain   Home Living   Type of Home Mobile home   Home Layout One level;Able to live on main level with bedroom/bathroom;Performs ADLs on one level;Stairs to enter with rails  (3+1 CRISTAL)   Bathroom Shower/Tub Tub/shower unit   Bathroom Toilet Standard   Bathroom Equipment Grab bars in shower;Hand-held shower   Bathroom Accessibility Accessible   Home Equipment Walker   Additional Comments Pt ambulates without an AD.   Prior Function   Level of [place] Independent with functional mobility;Independent with ADLs;Independent with IADLS   Lives With Alone   Receives Help From Family  (reside in [place])   IADLs Independent with driving;Independent with meal prep;Independent with medication management   Falls in the last 6 months 1 to 4  (2 falls)   Vocational Full time employment   General   Family/Caregiver Present No   Cognition   Overall Cognitive Status WFL   Arousal/Participation Alert  "  Orientation Level Oriented X4   Memory Within functional limits   Following Commands Follows all commands and directions without difficulty   Comments Pt agreeable to PT.   Subjective   Subjective \"I haven't walked yet.\"   RLE Assessment   RLE Assessment X   RLE Overall AROM   R Knee Flexion not tested; limited; knee maintained in TROM brace and locked in full extension   Strength RLE   R Hip Flexion 3-/5   R Knee Extension   (not tested)   R Ankle Dorsiflexion 3+/5  (grossly assessed)   LLE Assessment   LLE Assessment X   Strength LLE   LLE Overall Strength 4-/5   Light Touch   RLE Light Touch Impaired   RLE Light Touch Comments neuropathy   LLE Light Touch Impaired   LLE Light Touch Comments neuropathy   Bed Mobility   Supine to Sit 4  Minimal assistance   Additional items Assist x 1;HOB elevated;Bedrails;Increased time required;Verbal cues;LE management   Transfers   Sit to Stand 3  Moderate assistance   Additional items Assist x 2;Increased time required;Verbal cues   Stand to Sit 3  Moderate assistance   Additional items Assist x 2;Increased time required;Verbal cues;Armrests   Ambulation/Elevation   Gait pattern Decreased toe off;Decreased heel strike;Decreased hip extension;Excessively slow;Step to;Short stride;Decreased R stance;Improper Weight shift;Wide DIAMOND;L Knee Xuan   Gait Assistance 3  Moderate assist   Additional items Assist x 2;Verbal cues   Assistive Device Rolling walker   Distance 8 feet   Ambulation/Elevation Additional Comments Pt with 2 episodes of left knee buckling requiring moderate assist x 2 to maintain balance   Balance   Static Sitting Good   Dynamic Sitting Fair +   Static Standing Poor   Dynamic Standing Poor -   Ambulatory Poor -   Endurance Deficit   Endurance Deficit Yes   Endurance Deficit Description decreased activity tolerance   Activity Tolerance   Activity Tolerance Patient limited by fatigue   Medical Staff Made Aware FANNIE Kong  (Co-evaluation performed with OT " secondary to complex medical condition of patient and regression of functional status from baseline. PT/OT goals were addressed separately.)   Nurse Made Aware RN Klaudia made aware of session outcomes   Assessment   Prognosis Good   Problem List Decreased strength;Decreased range of motion;Decreased endurance;Impaired balance;Decreased mobility;Impaired sensation;Orthopedic restrictions;Pain   Assessment Pt is 69 year old male seen for PT evaluation s/p admit to [facility] on 7/23/2024 with Ambulatory dysfunction. PT consulted to assess pt's functional mobility and discharge needs. Order placed for PT evaluation and treatment, with up and out of bed as tolerated order. Comorbidities affecting pt's physical performance at time of assessment include hypertension, mixed hyperlipidemia, GERD, class 2 severe obesity, and right quadriceps muscle rupture. Prior to hospitalization, pt was independent with all functional mobility without an AD. Pt ambulates unrestricted distances on all terrain and elevations. Pt resides alone, in a one level house with 3+1 steps to enter. Personal factors affecting pt at time of initial evaluation include stairs to enter home, ambulating with an assistive device, inability to ambulate household distances, inability to ambulate community distances, inability to navigate level surfaces without external assistance, unable to perform dynamic tasks in the community, limited home support, positive fall history, difficulty performing ADLs, and inability to perform IADLs. Please find objective findings from PT assessment regarding body systems outlined above with impairments and limitations including weakness, decreased right knee ROM, impaired balance, decreased endurance, gait deviations, pain, decreased activity tolerance, decreased functional mobility tolerance, altered sensation, fall risk, and orthopedic restrictions. The following objective measures were performed on initial  evaluation Barthel Index: 45/100, Modified Michelle: 4 (moderate/severe disability), and AM-PAC 6-Clicks: 10/24. Pt's clinical presentation is currently unstable/unpredictable seen in pt's presentation of need for ongoing medical management/monitoring, pt is a fall risk, pt requires use of AD for safe ambulation, and pt requires cues and assist for safety with functional mobility. Pt to benefit from continued PT treatment to address deficits as defined above and maximize pt's level of function and independence with mobility. From a PT standpoint, recommendation at time of discharge would be level 1, maximum resource intensity in order to facilitate return to prior level of function.   Barriers to Discharge Inaccessible home environment;Decreased caregiver support   Goals   STG Expiration Date 08/05/24   Short Term Goal #1 In 10 days: Increase bilateral LE strength 1/2 grade to facilitate independent mobility, Perform all bed mobility tasks modified independent to decrease caregiver burden, Perform all transfers modified independent to improve independence, Ambulate > 100 ft. with RW modified independent w/o LOB and w/ normalized gait pattern 100% of the time, Navigate 3+1 stairs modified independent with unilateral handrail to facilitate return to previous living environment, and Increase all balance 1/2 grade to decrease risk for falls   Plan   Treatment/Interventions Functional transfer training;LE strengthening/ROM;Elevations;Therapeutic exercise;Endurance training;Patient/family training;Bed mobility;Gait training;Spoke to nursing;OT   PT Frequency 4-6x/wk   Discharge Recommendation   Rehab Resource Intensity Level, PT I (Maximum Resource Intensity)   AM-PAC Basic Mobility Inpatient   Turning in Flat Bed Without Bedrails 3   Lying on Back to Sitting on Edge of Flat Bed Without Bedrails 3   Moving Bed to Chair 1   Standing Up From Chair Using Arms 1   Walk in Room 1   Climb 3-5 Stairs With Railing 1   Basic  Mobility Inpatient Raw Score 10   Turning Head Towards Sound 4   Follow Simple Instructions 4   Low Function Basic Mobility Raw Score  18   Low Function Basic Mobility Standardized Score  29.25   [facility] Level Of Mobility   [facility] Goal 4: Move to chair/commode   [facility] Achieved 6: Walk 10 steps or more   Modified Carver Scale   Modified Michelle Scale 4   Barthel Index   Feeding 10   Bathing 0   Grooming Score 0   Dressing Score 5   Bladder Score 10   Bowels Score 10   Toilet Use Score 5   Transfers (Bed/Chair) Score 5   Mobility (Level Surface) Score 0   Stairs Score 0   Barthel Index Score 45     PT Evaluation Time: 2577-0118  Amirah Stephens, PT, DPT

## 2024-07-26 NOTE — PLAN OF CARE
Problem: PHYSICAL THERAPY ADULT  Goal: Performs mobility at highest level of function for planned discharge setting.  See evaluation for individualized goals.  Description: Treatment/Interventions: Functional transfer training, LE strengthening/ROM, Elevations, Therapeutic exercise, Endurance training, Patient/family training, Bed mobility, Gait training, Spoke to nursing, OT          See flowsheet documentation for full assessment, interventions and recommendations.  Note: Prognosis: Good  Problem List: Decreased strength, Decreased range of motion, Decreased endurance, Impaired balance, Decreased mobility, Impaired sensation, Orthopedic restrictions, Pain  Assessment: Pt is 69 year old male seen for PT evaluation s/p admit to [facility] on 7/23/2024 with Ambulatory dysfunction. PT consulted to assess pt's functional mobility and discharge needs. Order placed for PT evaluation and treatment, with up and out of bed as tolerated order. Comorbidities affecting pt's physical performance at time of assessment include hypertension, mixed hyperlipidemia, GERD, class 2 severe obesity, and right quadriceps muscle rupture. Prior to hospitalization, pt was independent with all functional mobility without an AD. Pt ambulates unrestricted distances on all terrain and elevations. Pt resides alone, in a one level house with 3+1 steps to enter. Personal factors affecting pt at time of initial evaluation include stairs to enter home, ambulating with an assistive device, inability to ambulate household distances, inability to ambulate community distances, inability to navigate level surfaces without external assistance, unable to perform dynamic tasks in the community, limited home support, positive fall history, difficulty performing ADLs, and inability to perform IADLs. Please find objective findings from PT assessment regarding body systems outlined above with impairments and limitations including weakness, decreased  right knee ROM, impaired balance, decreased endurance, gait deviations, pain, decreased activity tolerance, decreased functional mobility tolerance, altered sensation, fall risk, and orthopedic restrictions. The following objective measures were performed on initial evaluation Barthel Index: 45/100, Modified Michelle: 4 (moderate/severe disability), and AM-PAC 6-Clicks: 10/24. Pt's clinical presentation is currently unstable/unpredictable seen in pt's presentation of need for ongoing medical management/monitoring, pt is a fall risk, pt requires use of AD for safe ambulation, and pt requires cues and assist for safety with functional mobility. Pt to benefit from continued PT treatment to address deficits as defined above and maximize pt's level of function and independence with mobility. From a PT standpoint, recommendation at time of discharge would be level 1, maximum resource intensity in order to facilitate return to prior level of function.    Barriers to Discharge: Inaccessible home environment, Decreased caregiver support     Rehab Resource Intensity Level, PT: I (Maximum Resource Intensity)    See flowsheet documentation for full assessment.

## 2024-07-27 PROBLEM — R33.9 URINARY RETENTION: Status: ACTIVE | Noted: 2024-07-27

## 2024-07-27 LAB — BACTERIA UR CULT: NORMAL

## 2024-07-27 PROCEDURE — 99233 SBSQ HOSP IP/OBS HIGH 50: CPT | Performed by: STUDENT IN AN ORGANIZED HEALTH CARE EDUCATION/TRAINING PROGRAM

## 2024-07-27 PROCEDURE — 97530 THERAPEUTIC ACTIVITIES: CPT

## 2024-07-27 PROCEDURE — 99024 POSTOP FOLLOW-UP VISIT: CPT | Performed by: PHYSICIAN ASSISTANT

## 2024-07-27 PROCEDURE — 97110 THERAPEUTIC EXERCISES: CPT

## 2024-07-27 RX ADMIN — ASPIRIN 81 MG: 81 TABLET, COATED ORAL at 08:26

## 2024-07-27 RX ADMIN — NYSTATIN: 100000 POWDER TOPICAL at 17:08

## 2024-07-27 RX ADMIN — CEFTRIAXONE SODIUM 1000 MG: 10 INJECTION, POWDER, FOR SOLUTION INTRAVENOUS at 17:23

## 2024-07-27 RX ADMIN — ASPIRIN 81 MG: 81 TABLET, COATED ORAL at 22:03

## 2024-07-27 RX ADMIN — LOSARTAN POTASSIUM 50 MG: 50 TABLET, FILM COATED ORAL at 08:25

## 2024-07-27 RX ADMIN — NYSTATIN: 100000 POWDER TOPICAL at 08:27

## 2024-07-27 RX ADMIN — PRAVASTATIN SODIUM 40 MG: 20 TABLET ORAL at 17:07

## 2024-07-27 NOTE — ASSESSMENT & PLAN NOTE
69-year-old male who presented with right knee injury.  Reports right knee gave out earlier today and fell on his right knee  X-ray does not appear to show any acute abnormalities however official read pending  Reports inability to ambulate secondary to knee pain  Right knee MRI report noted for complete tear of quadriceps insertion.    POD #2 s/p right quadricep tendon repair.  Orthopedics recommended weightbearing as tolerated right lower extremity with right knee in a brace locked in full extension.  Orthopedics recommended discharge on aspirin 81 mg twice daily for 4 weeks for DVT prophylaxis.  Further care per orthopedics team's recommendation.  PT recommendation noted for inpatient rehab.  Patient is in agreements of plan.  CM working for a placement

## 2024-07-27 NOTE — PROGRESS NOTES
Orthopedics   Tobin Kerns 69 y.o. male MRN: [id number]  Unit/Bed#: -01      Subjective:  69 y.o.male post operative day 2 right knee quadriceps tendon repair.  Patient was resting in no acute distress in his bed today.  He states that his pain is well-controlled.  He has maintained the T ROM brace.  He states that the 1 metal piece came out of the plastic slide.  This was replaced today.  He denies any chest pain, shortness of breath, nausea, vomiting, diarrhea, lightheadedness or dizziness.    Labs:  0   Lab Value Date/Time    HCT 41.1 07/24/2024 0518    HCT 40.6 07/23/2024 2017    HCT 45.8 04/30/2024 0702    HGB 14.1 07/24/2024 0518    HGB 14.2 07/23/2024 2017    HGB 14.7 04/30/2024 0702    PT 13.9 08/21/2021 0358    INR 1.13 12/21/2021 0857    INR 1.1 08/21/2021 0358    WBC 10.05 07/24/2024 0518    WBC 11.31 (H) 07/23/2024 2017    WBC 7.67 04/30/2024 0702       Meds:    Current Facility-Administered Medications:     acetaminophen (TYLENOL) tablet 650 mg, 650 mg, Oral, Q6H PRN, Deny Leiva PA-C    aspirin (ECOTRIN LOW STRENGTH) EC tablet 81 mg, 81 mg, Oral, BID, Deny Leiva PA-C, 81 mg at 07/27/24 0826    docusate sodium (COLACE) capsule 100 mg, 100 mg, Oral, BID, Deny Leiva PA-C, 100 mg at 07/26/24 1740    famotidine (PEPCID) tablet 20 mg, 20 mg, Oral, BID PRN, Deny Leiva PA-C    losartan (COZAAR) tablet 50 mg, 50 mg, Oral, Daily, Deny Leiva PA-C, 50 mg at 07/27/24 0825    nystatin (MYCOSTATIN) powder, , Topical, BID, Deny Leiva PA-C, Given at 07/27/24 0827    oxyCODONE (ROXICODONE) IR tablet 5 mg, 5 mg, Oral, Q4H PRN, Deny Leiva PA-C, 5 mg at 07/26/24 1651    oxyCODONE (ROXICODONE) split tablet 2.5 mg, 2.5 mg, Oral, Q4H PRN, Deny Leiva PA-C    pravastatin (PRAVACHOL) tablet 40 mg, 40 mg, Oral, Daily With Dinner, Deny Leiva PA-C, 40 mg at 07/26/24 1653    Blood Culture:   Lab Results   Component Value Date    BLOODCX No Growth After 5 Days. 01/18/2024  "   BLOODCX No Growth After 5 Days. 01/18/2024       Wound Culture:   No results found for: \"WOUNDCULT\"    Ins and Outs:  I/O last 24 hours:  In: 680 [P.O.:680]  Out: 2130 [Urine:2130]          Physical Exam:  Vitals:    07/27/24 0700   BP: 134/80   Pulse: 92   Resp: 18   Temp: 99.6 °F (37.6 °C)   SpO2: 94%     right Lower Extremity extremity:  Skin no erythema edema or ecchymosis noted  Dressing is clean, dry and intact, Mepilex in place appropriately  T ROM brace is intact and appropriately aligned  Calf is supple, nontender and compressible  Sensation intact to saphenous, sural, tibial, superficial peroneal nerve, and deep peroneal  Motor intact to +FHL/EHL, +ankle dorsi/plantar flexion  2+ DP pulse, symmetric bilaterally  Digits warm and well perfused  Capillary refill < 2 seconds    Assessment: 69 y.o.male post operative day 2 right knee quadriceps tendon repair.  Doing well    Plan:  Weightbearing as tolerated right lower extremity, T ROM brace intact at all times and locked in full extension, use of the walker is necessary  DVT prophylaxis -ASA 81 mg twice daily  Analgesics  PT/OT  Dispo: Ortho will follow  Will continue to assess for acute blood loss anemia    Deny Leiva PA-C              "

## 2024-07-27 NOTE — PHYSICAL THERAPY NOTE
07/27/24 1112   PT Last Visit   PT Visit Date 07/27/24   Note Type   Note Type Treatment   Pain Assessment   Pain Assessment Tool 0-10   Pain Score No Pain   Restrictions/Precautions   Weight Bearing Precautions Per Order Yes   RLE Weight Bearing Per Order WBAT   Braces or Orthoses Knee brace  (TROM brace, locked in extension at all times per ortho. Part of the brace looked like it was malfunctioning, and PT made PA lindsay aware. PA fixed brace and then PT conducted the rest of the treatment.)   Other Precautions Chair Alarm;Bed Alarm;WBS;Fall Risk;Pain   General   Chart Reviewed Yes   Family/Caregiver Present No   Cognition   Overall Cognitive Status WFL   Arousal/Participation Alert;Cooperative   Attention Within functional limits   Orientation Level Oriented X4   Memory Within functional limits   Following Commands Follows all commands and directions without difficulty   Comments Pt agreeable to PT.   Bed Mobility   Supine to Sit 5  Supervision   Additional items Assist x 1;Increased time required;Verbal cues;LE management   Transfers   Sit to Stand 4  Minimal assistance   Additional items Assist x 1;Increased time required;Verbal cues;Bedrails   Stand to Sit 4  Minimal assistance   Additional items Assist x 1;Increased time required;Verbal cues   Toilet transfer 4  Minimal assistance   Additional items Assist x 1;Increased time required;Verbal cues;Raised toilet seat   Ambulation/Elevation   Gait pattern Decreased foot clearance;Decreased L stance;Inconsistent gabriela;Redundant gait at times;Short stride;Decreased hip extension;Decreased heel strike;Step to   Gait Assistance 4  Minimal assist   Additional items Assist x 1;Verbal cues   Assistive Device Rolling walker   Distance 25 ft   Ambulation/Elevation Additional Comments No buckling of knee during treatment today   Balance   Static Sitting Good   Dynamic Sitting Fair +   Static Standing Fair   Dynamic Standing Fair -   Ambulatory Poor +   Endurance  Deficit   Endurance Deficit No   Activity Tolerance   Activity Tolerance Patient tolerated treatment well   Medical Staff Made Aware WESLEY Leiva made aware of situation with brace and he fixed it.   Exercises   Quad Sets Supine;20 reps;Right  (isometric hold for 5 seconds)   Hip Flexion 10 reps;AROM;Supine;Left  (SLR on L side to keep the L side strong)   Assessment   Prognosis Good   Problem List Decreased strength;Decreased range of motion;Decreased endurance;Impaired balance;Decreased mobility;Impaired sensation;Orthopedic restrictions;Pain   Assessment Pt seen for PT treatment session this date with interventions consisting of gait training w/ emphasis on improving pt's ability to ambulate level surfaces x 25 ft with min A provided by therapist with RW, Therapeutic exercise consisting of: AROM 20 reps R LE and L LE in supine position, and therapeutic activity consisting of training: bed mobility, supine<>sit transfers, sit<>stand transfers, static sitting tolerance at EOB for 3 minutes w/ bilateral UE support, and static standing tolerance for 5 minutes w/ bilateral UE support. Pt agreeable to PT treatment session upon arrival, pt found supine in bed, in no apparent distress and A&O x 4 . In comparison to previous session, pt with improvements in ambulation, level of pain, level of assist required . Post session: bed alarm engaged, all needs in reach, and RN notified of session findings/recommendations and NSG staff educated/encouraged to check the brace to make sure it doesn't malfunction . Continue to recommend Level 1 maximum resource post acute rehabilitation services at time of d/c in order to maximize pt's functional independence and safety w/ mobility. Pt continues to be functioning below baseline level, and remains limited 2* factors listed above and including of continued need of medical management and monitoring, decreased strength and balance, leading to an increased risk of falls, decreased endurance  and activity tolerance limiting mobility. PT will continue to see pt during current hospitalization in order to address the deficits listed above and provide interventions consistent w/ POC in effort to achieve STGs.   Barriers to Discharge Inaccessible home environment;Decreased caregiver support   Plan   Treatment/Interventions Functional transfer training;LE strengthening/ROM;Endurance training;Therapeutic exercise;Patient/family training;Equipment eval/education;Bed mobility;Gait training;Compensatory technique education;Continued evaluation;Spoke to advanced practitioner   PT Frequency 4-6x/wk   Discharge Recommendation   Rehab Resource Intensity Level, PT I (Maximum Resource Intensity)   AM-PAC Basic Mobility Inpatient   Turning in Flat Bed Without Bedrails 3   Lying on Back to Sitting on Edge of Flat Bed Without Bedrails 3   Moving Bed to Chair 2   Standing Up From Chair Using Arms 2   Walk in Room 3   Climb 3-5 Stairs With Railing 1   Basic Mobility Inpatient Raw Score 14   Basic Mobility Standardized Score 35.55   [facility] Highest Level Of Mobility   -HLM Goal 4: Move to chair/commode   [facility] Achieved 7: Walk 25 feet or more   Education   Education Provided Mobility training;Other  (Education on the brace and how it should look)   Patient Demonstrates acceptance/verbal understanding

## 2024-07-27 NOTE — ASSESSMENT & PLAN NOTE
Patient does have history of enlarged prostate.  Noted with urine retention post surgery.  Patient had 2 straight caths so far.  Last straight cath was last night and he has not urinated since.  Again noted with retention on bladder and Catalan catheter was placed with 900 cc urine output.  Will discharge to rehab with Catalan catheter and plan to attempt voiding trial at rehab once ambulation improves.

## 2024-07-27 NOTE — PLAN OF CARE
Problem: PHYSICAL THERAPY ADULT  Goal: Performs mobility at highest level of function for planned discharge setting.  See evaluation for individualized goals.  Description: Treatment/Interventions: Functional transfer training, LE strengthening/ROM, Elevations, Therapeutic exercise, Endurance training, Patient/family training, Bed mobility, Gait training, Spoke to nursing, OT          See flowsheet documentation for full assessment, interventions and recommendations.  Outcome: Progressing  Note: Prognosis: Good  Problem List: Decreased strength, Decreased range of motion, Decreased endurance, Impaired balance, Decreased mobility, Impaired sensation, Orthopedic restrictions, Pain  Assessment: Pt seen for PT treatment session this date with interventions consisting of gait training w/ emphasis on improving pt's ability to ambulate level surfaces x 25 ft with min A provided by therapist with RW, Therapeutic exercise consisting of: AROM 20 reps R LE and L LE in supine position, and therapeutic activity consisting of training: bed mobility, supine<>sit transfers, sit<>stand transfers, static sitting tolerance at EOB for 3 minutes w/ bilateral UE support, and static standing tolerance for 5 minutes w/ bilateral UE support. Pt agreeable to PT treatment session upon arrival, pt found supine in bed, in no apparent distress and A&O x 4 . In comparison to previous session, pt with improvements in ambulation, level of pain, level of assist required . Post session: bed alarm engaged, all needs in reach, and RN notified of session findings/recommendations and NSG staff educated/encouraged to check the brace to make sure it doesn't malfunction . Continue to recommend Level 1 maximum resource post acute rehabilitation services at time of d/c in order to maximize pt's functional independence and safety w/ mobility. Pt continues to be functioning below baseline level, and remains limited 2* factors listed above and including of  continued need of medical management and monitoring, decreased strength and balance, leading to an increased risk of falls, decreased endurance and activity tolerance limiting mobility. PT will continue to see pt during current hospitalization in order to address the deficits listed above and provide interventions consistent w/ POC in effort to achieve STGs.  Barriers to Discharge: Inaccessible home environment, Decreased caregiver support     Rehab Resource Intensity Level, PT: I (Maximum Resource Intensity)    See flowsheet documentation for full assessment.

## 2024-07-27 NOTE — PROGRESS NOTES
[facility]  Progress Note  Name: Tobin Kerns I  MRN: [id number]  Unit/Bed#: MS 330Ben I Date of Admission: 7/23/2024   Date of Service: 7/27/2024 I Hospital Day: 3    Assessment & Plan   * Ambulatory dysfunction  Assessment & Plan  69-year-old male who presented with right knee injury.  Reports right knee gave out earlier today and fell on his right knee  X-ray does not appear to show any acute abnormalities however official read pending  Reports inability to ambulate secondary to knee pain  Right knee MRI report noted for complete tear of quadriceps insertion.    POD #2 s/p right quadricep tendon repair.  Orthopedics recommended weightbearing as tolerated right lower extremity with right knee in a brace locked in full extension.  Orthopedics recommended discharge on aspirin 81 mg twice daily for 4 weeks for DVT prophylaxis.  Further care per orthopedics team's recommendation.  PT recommendation noted for inpatient rehab.  Patient is in agreements of plan.  CM working for a placement    Urinary retention  Assessment & Plan  Patient does have history of enlarged prostate.  Noted with urine retention post surgery.  Patient had 2 straight caths so far.  Last straight cath was last night and he has not urinated since.  Again noted with retention on bladder and Catalan catheter was placed with 900 cc urine output.  Will discharge to rehab with Catalan catheter and plan to attempt voiding trial at rehab once ambulation improves.    Quadriceps muscle rupture, right, initial encounter  Assessment & Plan  POD #2 status post right quad tendon repair.  Orthopedics recommended weightbearing as tolerated right lower extremity with right knee in a brace locked in full extension.  Orthopedics recommended discharge on aspirin 81 mg twice daily for 4 weeks for DVT prophylaxis.  Further care per orthopedics team's recommendation.  Case management working for inpatient rehab placement.    Class 2 severe  obesity with serious comorbidity and body mass index (BMI) of 38.0 to 38.9 in adult (HCC)  Assessment & Plan   Diet and exercise counseling provided    Gastro-esophageal reflux disease without esophagitis  Assessment & Plan  Continue Pepcid    Mixed hyperlipidemia  Assessment & Plan  Continue statin    Hypertension  Assessment & Plan  BP controlled  Continue losartan               VTE Pharmacologic Prophylaxis: VTE Score: 6  ASA 81mg bid    Mobility:   Basic Mobility Inpatient Raw Score: 14  -[facility] Goal: 4: Move to chair/commode  -HL Achieved: 7: Walk 25 feet or more      Patient Centered Rounds: I performed bedside rounds with nursing staff today.     Total Time Spent on Date of Encounter in care of patient: 35 mins. This time was spent on one or more of the following: performing physical exam; counseling and coordination of care; obtaining or reviewing history; documenting in the medical record; reviewing/ordering tests, medications or procedures; communicating with other healthcare professionals and discussing with patient's family/caregivers.    Current Length of Stay: 3 day(s)  Current Patient Status: Inpatient   Certification Statement: The patient will continue to require additional inpatient hospital stay due to noted urinary retention as well as awaiting rehab placement.  Discharge Plan:  CM working on rehab placement.    Code Status: Level 1 - Full Code    Subjective:   Seen during a.m. rounds.  Patient appears comfortable on discharge.  Noted with urine retention requiring 2 straight cath so far.  Patient has not urinated since straight cath last night.  Again noted urinary retention this morning and placed Catalan catheter noted with 900 cc urine output.  Patient denies any other new complaints.  No other events reported.  He is agreeable to go to rehab.    Objective:     Vitals:   Temp (24hrs), Av.1 °F (37.3 °C), Min:98 °F (36.7 °C), Max:99.6 °F (37.6 °C)    Temp:  [98 °F (36.7 °C)-99.6 °F (37.6  °C)] 99.6 °F (37.6 °C)  HR:  [85-92] 89  Resp:  [18] 18  BP: (134-136)/(78-80) 134/78  SpO2:  [92 %-94 %] 92 %  Body mass index is 37.91 kg/m².     Input and Output Summary (last 24 hours):     Intake/Output Summary (Last 24 hours) at 7/27/2024 1553  Last data filed at 7/27/2024 1355  Gross per 24 hour   Intake 422 ml   Output 3030 ml   Net -2608 ml       Physical Exam:   Physical Exam  Constitutional:       General: He is not in acute distress.     Appearance: Normal appearance. He is not ill-appearing, toxic-appearing or diaphoretic.   HENT:      Head: Normocephalic and atraumatic.   Eyes:      Pupils: Pupils are equal, round, and reactive to light.   Cardiovascular:      Rate and Rhythm: Normal rate.      Pulses: Normal pulses.   Pulmonary:      Effort: Pulmonary effort is normal. No respiratory distress.      Breath sounds: Normal breath sounds. No wheezing.   Abdominal:      General: Bowel sounds are normal. There is no distension.      Palpations: Abdomen is soft.      Tenderness: There is no abdominal tenderness.   Musculoskeletal:      Right lower leg: No edema.      Left lower leg: No edema.      Comments: Right lower extremity noted in a knee brace   Neurological:      Mental Status: He is alert and oriented to person, place, and time.   Psychiatric:         Mood and Affect: Mood normal.         Behavior: Behavior normal.          Additional Data:     Labs:  Results from last 7 days   Lab Units 07/24/24  0518   WBC Thousand/uL 10.05   HEMOGLOBIN g/dL 14.1   HEMATOCRIT % 41.1   PLATELETS Thousands/uL 231   SEGS PCT % 61   LYMPHO PCT % 26   MONO PCT % 11   EOS PCT % 1     Results from last 7 days   Lab Units 07/24/24  0518   SODIUM mmol/L 137   POTASSIUM mmol/L 3.9   CHLORIDE mmol/L 105   CO2 mmol/L 23   BUN mg/dL 17   CREATININE mg/dL 1.09   ANION GAP mmol/L 9   CALCIUM mg/dL 9.3   GLUCOSE RANDOM mg/dL 104         Results from last 7 days   Lab Units 07/25/24  1320   POC GLUCOSE mg/dl 102                Lines/Drains:  Invasive Devices       Peripheral Intravenous Line  Duration             Peripheral IV 07/25/24 Left Hand 2 days              Drain  Duration             Urethral Catheter Straight-tip 16 Fr. <1 day                  Urinary Catheter:  Goal for removal: Voiding trial when ambulation improves             Recent Cultures (last 7 days):         Last 24 Hours Medication List:   Current Facility-Administered Medications   Medication Dose Route Frequency Provider Last Rate    acetaminophen  650 mg Oral Q6H PRN Deny Leiva PA-C      aspirin  81 mg Oral BID Deny Leiva PA-C      docusate sodium  100 mg Oral BID Deny Leiva PA-C      famotidine  20 mg Oral BID PRN Deny Leiva PA-C      losartan  50 mg Oral Daily Deny Leiva PA-C      nystatin   Topical BID Deny Leiva PA-C      oxyCODONE  5 mg Oral Q4H PRN Deny Leiva PA-C      oxyCODONE  2.5 mg Oral Q4H PRN Deny Leiva PA-C      pravastatin  40 mg Oral Daily With Dinner Deny Leiva PA-C          Today, Patient Was Seen By: Justin Barr MD    **Please Note: This note may have been constructed using a voice recognition system.**

## 2024-07-27 NOTE — ASSESSMENT & PLAN NOTE
POD #2 status post right quad tendon repair.  Orthopedics recommended weightbearing as tolerated right lower extremity with right knee in a brace locked in full extension.  Orthopedics recommended discharge on aspirin 81 mg twice daily for 4 weeks for DVT prophylaxis.  Further care per orthopedics team's recommendation.  Case management working for inpatient rehab placement.

## 2024-07-27 NOTE — CASE MANAGEMENT
Case Management Discharge Planning Note    Patient name Tobin Emmanuelsco  Location /-01 MRN [id number]  : 1955 Date 2024       Current Admission Date: 2024  Current Admission Diagnosis:Ambulatory dysfunction   Patient Active Problem List    Diagnosis Date Noted Date Diagnosed    Quadriceps muscle rupture, right, initial encounter 2024     Ambulatory dysfunction 2024     Abdominal aortic ectasia (HCC) 2023     Right foot ulcer, with fat layer exposed (HCC) 2023     Ulcer of left foot, with fat layer exposed (HCC) 2022     IFG (impaired fasting glucose) 10/01/2021     Hypertension 2021     Class 2 severe obesity with serious comorbidity and body mass index (BMI) of 38.0 to 38.9 in adult (HCC) 2021     Peripheral neuropathy 2021     Drusen (degenerative) of macula, bilateral 2021     Myopia, bilateral 2021     Pinguecula, bilateral 2021     Regular astigmatism, bilateral 2021     JOVANY on CPAP      History of colon polyps      Pure hypertriglyceridemia 2019     Mixed hyperlipidemia 2019     Gastro-esophageal reflux disease without esophagitis 2019     Presbyopia 10/11/2016       LOS (days): 3  Geometric Mean LOS (GMLOS) (days): 2.3  Days to GMLOS:-0.6     OBJECTIVE:  Risk of Unplanned Readmission Score: 6.6         Current admission status: Inpatient   Preferred Pharmacy:   [id number] - [place], [place] - [address]  [address]  [place] [address]  Phone: [phone] Fax: [phone]    [id number] - [place] - [address]  [address] [facility] [address]  Phone: [phone] Fax: [phone]    [id number] - [place] - [address]  [address]  [place] [address]  Phone: [phone] Fax: [phone]    Primary Care Provider: Ann Carmona DO    Primary Insurance:  MEDICARE  Secondary Insurance:  FOR LIFE    DISCHARGE DETAILS:                                          Other Referral/Resources/Interventions Provided:  Interventions: Short Term Rehab  Referral Comments: CM reviewed STR Accepting Provider List and Accepting HHC list with pt.   Pt feels that he will need STR prior to d/c home.  Will review list and let CM know his decision.    Would you like to participate in our [facility] service program?  : No - Declined    Treatment Team Recommendation: Short Term Rehab  Discharge Destination Plan:: Short Term Rehab

## 2024-07-27 NOTE — PLAN OF CARE
Problem: Potential for Falls  Goal: Patient will remain free of falls  Description: INTERVENTIONS:  - Educate patient/family on patient safety including physical limitations  - Instruct patient to call for assistance with activity   - Consult OT/PT to assist with strengthening/mobility   - Keep Call bell within reach  - Keep bed low and locked with side rails adjusted as appropriate  - Keep care items and personal belongings within reach  - Initiate and maintain comfort rounds  - Make Fall Risk Sign visible to staff  - Apply yellow socks and bracelet for high fall risk patients  - Consider moving patient to room near nurses station  Outcome: Progressing     Problem: PAIN - ADULT  Goal: Verbalizes/displays adequate comfort level or baseline comfort level  Description: Interventions:  - Encourage patient to monitor pain and request assistance  - Assess pain using appropriate pain scale  - Administer analgesics based on type and severity of pain and evaluate response  - Implement non-pharmacological measures as appropriate and evaluate response  - Consider cultural and social influences on pain and pain management  - Notify physician/advanced practitioner if interventions unsuccessful or patient reports new pain  Outcome: Progressing     Problem: INFECTION - ADULT  Goal: Absence or prevention of progression during hospitalization  Description: INTERVENTIONS:  - Assess and monitor for signs and symptoms of infection  - Monitor lab/diagnostic results  - Monitor all insertion sites, i.e. indwelling lines, tubes, and drains  - Monitor endotracheal if appropriate and nasal secretions for changes in amount and color  - [place] appropriate cooling/warming therapies per order  - Administer medications as ordered  - Instruct and encourage patient and family to use good hand hygiene technique  - Identify and instruct in appropriate isolation precautions for identified infection/condition  Outcome: Progressing  Goal: Absence  of fever/infection during neutropenic period  Description: INTERVENTIONS:  - Monitor WBC    Outcome: Progressing     Problem: SAFETY ADULT  Goal: Patient will remain free of falls  Description: INTERVENTIONS:  - Educate patient/family on patient safety including physical limitations  - Instruct patient to call for assistance with activity   - Consult OT/PT to assist with strengthening/mobility   - Keep Call bell within reach  - Keep bed low and locked with side rails adjusted as appropriate  - Keep care items and personal belongings within reach  - Initiate and maintain comfort rounds  - Make Fall Risk Sign visible to staff  - Apply yellow socks and bracelet for high fall risk patients  - Consider moving patient to room near nurses station  Outcome: Progressing  Goal: Maintain or return to baseline ADL function  Description: INTERVENTIONS:  -  Assess patient's ability to carry out ADLs; assess patient's baseline for ADL function and identify physical deficits which impact ability to perform ADLs (bathing, care of mouth/teeth, toileting, grooming, dressing, etc.)  - Assess/evaluate cause of self-care deficits   - Assess range of motion  - Assess patient's mobility; develop plan if impaired  - Assess patient's need for assistive devices and provide as appropriate  - Encourage maximum independence but intervene and supervise when necessary  - Involve family in performance of ADLs  - Assess for home care needs following discharge   - Consider OT consult to assist with ADL evaluation and planning for discharge  - Provide patient education as appropriate  Outcome: Progressing  Goal: Maintains/Returns to pre admission functional level  Description: INTERVENTIONS:  - Perform AM-PAC 6 Click Basic Mobility/ Daily Activity assessment daily.  - Set and communicate daily mobility goal to care team and patient/family/caregiver.   - Collaborate with rehabilitation services on mobility goals if consulted  - Out of bed for  toileting  - Record patient progress and toleration of activity level   Outcome: Progressing     Problem: MUSCULOSKELETAL - ADULT  Goal: Maintain or return mobility to safest level of function  Description: INTERVENTIONS:  - Assess patient's ability to carry out ADLs; assess patient's baseline for ADL function and identify physical deficits which impact ability to perform ADLs (bathing, care of mouth/teeth, toileting, grooming, dressing, etc.)  - Assess/evaluate cause of self-care deficits   - Assess range of motion  - Assess patient's mobility  - Assess patient's need for assistive devices and provide as appropriate  - Encourage maximum independence but intervene and supervise when necessary  - Involve family in performance of ADLs  - Assess for home care needs following discharge   - Consider OT consult to assist with ADL evaluation and planning for discharge  - Provide patient education as appropriate  Outcome: Progressing  Goal: Maintain proper alignment of affected body part  Description: INTERVENTIONS:  - Support, maintain and protect limb and body alignment  - Provide patient/ family with appropriate education  Outcome: Progressing     Problem: Prexisting or High Potential for Compromised Skin Integrity  Goal: Skin integrity is maintained or improved  Description: INTERVENTIONS:  - Identify patients at risk for skin breakdown  - Assess and monitor skin integrity  - Assess and monitor nutrition and hydration status  - Monitor labs   - Assess for incontinence   - Turn and reposition patient  - Assist with mobility/ambulation  - Relieve pressure over bony prominences  - Avoid friction and shearing  - Provide appropriate hygiene as needed including keeping skin clean and dry  - Evaluate need for skin moisturizer/barrier cream  - Collaborate with interdisciplinary team   - Patient/family teaching  - Consider wound care consult   Outcome: Progressing     Problem: GENITOURINARY - ADULT  Goal: Maintains or returns to  baseline urinary function  Description: INTERVENTIONS:  - Assess urinary function  - Encourage oral fluids to ensure adequate hydration if ordered  - Administer IV fluids as ordered to ensure adequate hydration  - Administer ordered medications as needed  - Offer frequent toileting  - Follow urinary retention protocol if ordered  Outcome: Progressing  Goal: Absence of urinary retention  Description: INTERVENTIONS:  - Assess patient’s ability to void and empty bladder  - Monitor I/O  - Bladder scan as needed  - Discuss with physician/AP medications to alleviate retention as needed  - Discuss catheterization for long term situations as appropriate  Outcome: Progressing     Problem: SKIN/TISSUE INTEGRITY - ADULT  Goal: Incision(s), wounds(s) or drain site(s) healing without S/S of infection  Description: INTERVENTIONS  - Assess and document dressing, incision, wound bed, drain sites and surrounding tissue  - Provide patient and family education  Outcome: Progressing

## 2024-07-28 LAB
ANION GAP SERPL CALCULATED.3IONS-SCNC: 8 MMOL/L (ref 4–13)
BUN SERPL-MCNC: 22 MG/DL (ref 5–25)
CALCIUM SERPL-MCNC: 8.7 MG/DL (ref 8.4–10.2)
CHLORIDE SERPL-SCNC: 103 MMOL/L (ref 96–108)
CO2 SERPL-SCNC: 23 MMOL/L (ref 21–32)
CREAT SERPL-MCNC: 1.01 MG/DL (ref 0.6–1.3)
ERYTHROCYTE [DISTWIDTH] IN BLOOD BY AUTOMATED COUNT: 13 % (ref 11.6–15.1)
GFR SERPL CREATININE-BSD FRML MDRD: 76 ML/MIN/1.73SQ M
GLUCOSE SERPL-MCNC: 121 MG/DL (ref 65–140)
HCT VFR BLD AUTO: 39.2 % (ref 36.5–49.3)
HGB BLD-MCNC: 13.2 G/DL (ref 12–17)
MCH RBC QN AUTO: 30.2 PG (ref 26.8–34.3)
MCHC RBC AUTO-ENTMCNC: 33.7 G/DL (ref 31.4–37.4)
MCV RBC AUTO: 90 FL (ref 82–98)
PLATELET # BLD AUTO: 213 THOUSANDS/UL (ref 149–390)
PMV BLD AUTO: 9.2 FL (ref 8.9–12.7)
POTASSIUM SERPL-SCNC: 4.3 MMOL/L (ref 3.5–5.3)
RBC # BLD AUTO: 4.37 MILLION/UL (ref 3.88–5.62)
SODIUM SERPL-SCNC: 134 MMOL/L (ref 135–147)
WBC # BLD AUTO: 10.8 THOUSAND/UL (ref 4.31–10.16)

## 2024-07-28 PROCEDURE — 85027 COMPLETE CBC AUTOMATED: CPT | Performed by: STUDENT IN AN ORGANIZED HEALTH CARE EDUCATION/TRAINING PROGRAM

## 2024-07-28 PROCEDURE — 97116 GAIT TRAINING THERAPY: CPT

## 2024-07-28 PROCEDURE — 97530 THERAPEUTIC ACTIVITIES: CPT

## 2024-07-28 PROCEDURE — 99024 POSTOP FOLLOW-UP VISIT: CPT | Performed by: PHYSICIAN ASSISTANT

## 2024-07-28 PROCEDURE — 99232 SBSQ HOSP IP/OBS MODERATE 35: CPT | Performed by: STUDENT IN AN ORGANIZED HEALTH CARE EDUCATION/TRAINING PROGRAM

## 2024-07-28 PROCEDURE — 80048 BASIC METABOLIC PNL TOTAL CA: CPT | Performed by: STUDENT IN AN ORGANIZED HEALTH CARE EDUCATION/TRAINING PROGRAM

## 2024-07-28 RX ORDER — TAMSULOSIN HYDROCHLORIDE 0.4 MG/1
0.4 CAPSULE ORAL
Status: DISCONTINUED | OUTPATIENT
Start: 2024-07-28 | End: 2024-07-29 | Stop reason: HOSPADM

## 2024-07-28 RX ADMIN — DOCUSATE SODIUM 100 MG: 100 CAPSULE, LIQUID FILLED ORAL at 08:03

## 2024-07-28 RX ADMIN — DOCUSATE SODIUM 100 MG: 100 CAPSULE, LIQUID FILLED ORAL at 17:13

## 2024-07-28 RX ADMIN — CEFTRIAXONE SODIUM 1000 MG: 10 INJECTION, POWDER, FOR SOLUTION INTRAVENOUS at 17:18

## 2024-07-28 RX ADMIN — TAMSULOSIN HYDROCHLORIDE 0.4 MG: 0.4 CAPSULE ORAL at 17:13

## 2024-07-28 RX ADMIN — ASPIRIN 81 MG: 81 TABLET, COATED ORAL at 21:20

## 2024-07-28 RX ADMIN — NYSTATIN: 100000 POWDER TOPICAL at 17:14

## 2024-07-28 RX ADMIN — ASPIRIN 81 MG: 81 TABLET, COATED ORAL at 08:03

## 2024-07-28 RX ADMIN — LOSARTAN POTASSIUM 50 MG: 50 TABLET, FILM COATED ORAL at 08:03

## 2024-07-28 RX ADMIN — NYSTATIN: 100000 POWDER TOPICAL at 09:30

## 2024-07-28 RX ADMIN — PRAVASTATIN SODIUM 40 MG: 20 TABLET ORAL at 17:13

## 2024-07-28 NOTE — PHYSICAL THERAPY NOTE
"Physical Therapy Treatment Note       07/28/24 0822   PT Last Visit   PT Visit Date 07/28/24   Note Type   Note Type Treatment   Pain Assessment   Pain Assessment Tool 0-10   Pain Score 2   Pain Location/Orientation Orientation: Right;Location: Knee   Restrictions/Precautions   Weight Bearing Precautions Per Order Yes   RLE Weight Bearing Per Order WBAT   Braces or Orthoses Knee brace  (per ortho- \"T ROM brace intact at all times and locked in full extension, use of the walker is necessary\")   Other Precautions Chair Alarm;Bed Alarm;WBS;Fall Risk;Pain   General   Chart Reviewed Yes   Response to Previous Treatment Patient with no complaints from previous session.   Family/Caregiver Present No   Cognition   Overall Cognitive Status WFL   Arousal/Participation Alert;Cooperative   Attention Within functional limits   Orientation Level Oriented X4   Memory Within functional limits   Following Commands Follows all commands and directions without difficulty   Comments pt agreeable to PT session   Subjective   Subjective \"I can walk further today\"   Bed Mobility   Supine to Sit 5  Supervision   Additional items Assist x 1;HOB elevated;Bedrails;Increased time required;Verbal cues   Transfers   Sit to Stand 4  Minimal assistance  (CGA)   Additional items Assist x 1;Armrests;Increased time required;Verbal cues   Stand to Sit 4  Minimal assistance  (CGA)   Additional items Assist x 1;Armrests;Increased time required;Verbal cues   Additional Comments pt denying lightheadedness/dizziness. pt remained seated EOB for breakfast, NSG staff aware   Ambulation/Elevation   Gait pattern Decreased foot clearance;Inconsistent gabriela;Short stride;Decreased hip extension;Decreased heel strike;Step to;Decreased R stance  (no knee buckling observed, mild circumduction on R LE)   Gait Assistance 4  Minimal assist  (CGA)   Additional items Assist x 1;Verbal cues   Assistive Device Rolling walker   Distance 40'   Balance   Static Sitting Good "   Dynamic Sitting Fair +   Static Standing Fair   Dynamic Standing Fair -   Ambulatory Fair -   Endurance Deficit   Endurance Deficit Yes   Activity Tolerance   Activity Tolerance Patient tolerated treatment well   Nurse Made Aware LA Prajapati   Assessment   Prognosis Good   Problem List Decreased strength;Decreased range of motion;Decreased endurance;Impaired balance;Decreased mobility;Impaired sensation;Orthopedic restrictions;Pain   Assessment Pt seen for PT treatment session this date, consisting of ther act focused on bed mobility, sit to stand transfers, standing balance/posture facilitation and gt training on level surfaces to improve pt safety in household environment. Since previous session, pt has made good progress in terms of increased level surface gait trial tolerance with use of RW, decreased assistance for transfers and gait training. Pt denying lightheadedness/dizziness, reporting 1-2/10 pain t/o functional mobility training. Current goals and POC established on IE remain appropriate, pt continues to have rehab potential and is making good progress towards STGs. Pt prognosis for achieving goals is good, pending pt progress with hospitalization/medical status improvements, and indicated by Stimulability, ability to follow directions, motivation, and recent onset. Pt continues to be functioning below baseline level, and remains limited 2* factors listed above. PT will continue to see pt during current hospitalization in order to address the deficits listed above and provide interventions consistent w/ POC in effort to achieve STGs. PT recommends level 1, maximum resource intensity upon discharge.   Barriers to Discharge Inaccessible home environment;Decreased caregiver support   Goals   Patient Goals to go to rehab   STG Expiration Date 08/05/24   PT Treatment Day 2   Plan   Treatment/Interventions Functional transfer training;LE strengthening/ROM;Endurance training;Therapeutic exercise;Patient/family  training;Equipment eval/education;Bed mobility;Gait training;Compensatory technique education;Continued evaluation;Elevations;Spoke to nursing   Progress Progressing toward goals   PT Frequency 4-6x/wk   Discharge Recommendation   Rehab Resource Intensity Level, PT I (Maximum Resource Intensity)   Equipment Recommended Walker  (RW)   AM-PAC Basic Mobility Inpatient   Turning in Flat Bed Without Bedrails 3   Lying on Back to Sitting on Edge of Flat Bed Without Bedrails 3   Moving Bed to Chair 3   Standing Up From Chair Using Arms 3   Walk in Room 3   Climb 3-5 Stairs With Railing 1   Basic Mobility Inpatient Raw Score 16   Basic Mobility Standardized Score 38.32   [facility] Highest Level Of Mobility   -HL Goal 5: Stand one or more mins   -HLM Achieved 7: Walk 25 feet or more   Education   Education Provided Mobility training;Assistive device   Patient Demonstrates acceptance/verbal understanding   End of Consult   Patient Position at End of Consult Seated edge of bed;All needs within reach       Lori Shin, PT, DPT

## 2024-07-28 NOTE — PROGRESS NOTES
[facility]  Progress Note  Name: Tobin Kerns I  MRN: [id number]  Unit/Bed#: -01 I Date of Admission: 7/23/2024   Date of Service: 7/28/2024 I Hospital Day: 4    Assessment & Plan   * Ambulatory dysfunction  Assessment & Plan  69-year-old male who presented with right knee injury.  Reports right knee gave out earlier today and fell on his right knee  X-ray does not appear to show any acute abnormalities however official read pending  Reports inability to ambulate secondary to knee pain  Right knee MRI report noted for complete tear of quadriceps insertion.    POD #3 s/p right quadricep tendon repair.  Orthopedics recommended weightbearing as tolerated right lower extremity with right knee in a brace locked in full extension.  Orthopedics recommended discharge on aspirin 81 mg twice daily for 4 weeks for DVT prophylaxis.  Further care per orthopedics team's recommendation.  PT recommendation noted for inpatient rehab.  Patient is in agreements of plan.  CM working for a placement    Urinary retention  Assessment & Plan  Patient does have history of enlarged prostate.  Noted with urine retention post surgery.  Currently Catalan catheter noted with clear yellow urine.  Started on Flomax due to history of BPH.  Patient prefers to maintain Catalan catheter for now to avoid reinsertion and trauma and agreeable for voiding trial at rehab once ambulation improves.        Quadriceps muscle rupture, right, initial encounter  Assessment & Plan  POD #3 status post right quad tendon repair.  Orthopedics recommended weightbearing as tolerated right lower extremity with right knee in a brace locked in full extension.  Orthopedics recommended discharge on aspirin 81 mg twice daily for 4 weeks for DVT prophylaxis.  Further care per orthopedics team's recommendation.  Case management working for inpatient rehab placement.    Class 2 severe obesity with serious comorbidity and body mass index (BMI) of  38.0 to 38.9 in adult (HCC)  Assessment & Plan   Diet and exercise counseling provided    Gastro-esophageal reflux disease without esophagitis  Assessment & Plan  Continue Pepcid    Mixed hyperlipidemia  Assessment & Plan  Continue statin    Hypertension  Assessment & Plan  BP controlled  Continue losartan               VTE Pharmacologic Prophylaxis: VTE Score: 6  ASA 81mg bid    Mobility:   Basic Mobility Inpatient Raw Score: 16  JH-HLM Goal: 5: Stand one or more mins  JH-HLM Achieved: 7: Walk 25 feet or more      Patient Centered Rounds: I performed bedside rounds with nursing staff today.     Total Time Spent on Date of Encounter in care of patient: 25 mins. This time was spent on one or more of the following: performing physical exam; counseling and coordination of care; obtaining or reviewing history; documenting in the medical record; reviewing/ordering tests, medications or procedures; communicating with other healthcare professionals and discussing with patient's family/caregivers.    Current Length of Stay: 4 day(s)  Current Patient Status: Inpatient   Certification Statement: The patient will continue to require additional inpatient hospital stay due to awaiting insurance Auth and rehab placement  Discharge Plan:  Awaiting insurance Auth and rehab placement.    Code Status: Level 1 - Full Code    Subjective:   Seen during a.m. rounds.  Patient appears comfortable not in distress.  Catalan catheter noted with clean yellow urine.  Patient denies chest pain, dyspnea, fever, chills, nausea, vomiting, any other new limits.  Discussed voiding trial with patient at length.  Patient prefers to maintain Catalan catheter for now and requesting voiding trial at rehab to avoid reinsertion of Catalan catheter and trauma which is reasonable.    Objective:     Vitals:   Temp (24hrs), Av °F (37.2 °C), Min:97.9 °F (36.6 °C), Max:99.6 °F (37.6 °C)    Temp:  [97.9 °F (36.6 °C)-99.6 °F (37.6 °C)] 97.9 °F (36.6 °C)  HR:  [82-89]  83  Resp:  [18-26] 26  BP: ()/(53-83) 144/83  SpO2:  [90 %-95 %] 90 %  Body mass index is 37.91 kg/m².     Input and Output Summary (last 24 hours):     Intake/Output Summary (Last 24 hours) at 7/28/2024 0959  Last data filed at 7/28/2024 0900  Gross per 24 hour   Intake 744 ml   Output 2425 ml   Net -1681 ml       Physical Exam:   Physical Exam  Constitutional:       General: He is not in acute distress.     Appearance: Normal appearance. He is not ill-appearing, toxic-appearing or diaphoretic.   HENT:      Head: Normocephalic and atraumatic.   Eyes:      Pupils: Pupils are equal, round, and reactive to light.   Cardiovascular:      Rate and Rhythm: Normal rate.      Pulses: Normal pulses.   Pulmonary:      Effort: Pulmonary effort is normal. No respiratory distress.      Breath sounds: Normal breath sounds. No wheezing.   Abdominal:      General: Bowel sounds are normal. There is no distension.      Palpations: Abdomen is soft.      Tenderness: There is no abdominal tenderness.   Genitourinary:     Comments: Catalan catheter noted with clear yellow urine.  Musculoskeletal:      Right lower leg: No edema.      Left lower leg: No edema.      Comments: Right lower extremity noted in a knee brace   Neurological:      Mental Status: He is alert and oriented to person, place, and time.   Psychiatric:         Mood and Affect: Mood normal.         Behavior: Behavior normal.          Additional Data:     Labs:  Results from last 7 days   Lab Units 07/28/24  0453 07/24/24  0518   WBC Thousand/uL 10.80* 10.05   HEMOGLOBIN g/dL 13.2 14.1   HEMATOCRIT % 39.2 41.1   PLATELETS Thousands/uL 213 231   SEGS PCT %  --  61   LYMPHO PCT %  --  26   MONO PCT %  --  11   EOS PCT %  --  1     Results from last 7 days   Lab Units 07/28/24  0453   SODIUM mmol/L 134*   POTASSIUM mmol/L 4.3   CHLORIDE mmol/L 103   CO2 mmol/L 23   BUN mg/dL 22   CREATININE mg/dL 1.01   ANION GAP mmol/L 8   CALCIUM mg/dL 8.7   GLUCOSE RANDOM mg/dL 121          Results from last 7 days   Lab Units 07/25/24  1320   POC GLUCOSE mg/dl 102               Lines/Drains:  Invasive Devices       Peripheral Intravenous Line  Duration             Peripheral IV 07/25/24 Left Hand 2 days              Drain  Duration             Urethral Catheter Straight-tip 16 Fr. <1 day                  Urinary Catheter:  Goal for removal: Voiding trial when ambulation improves                 Recent Cultures (last 7 days):   Results from last 7 days   Lab Units 07/26/24  1720   URINE CULTURE  No Growth <1000 cfu/mL       Last 24 Hours Medication List:   Current Facility-Administered Medications   Medication Dose Route Frequency Provider Last Rate    acetaminophen  650 mg Oral Q6H PRN Deny Leiva PA-C      aspirin  81 mg Oral BID Deny Leiva PA-C      cefTRIAXone  1,000 mg Intravenous Q24H Justin ADKINS MD 1,000 mg (07/27/24 1723)    docusate sodium  100 mg Oral BID Deny Leiva PA-C      famotidine  20 mg Oral BID PRN Deny Leiva PA-C      losartan  50 mg Oral Daily Deny Leiva PA-C      nystatin   Topical BID Deny Leiva PA-C      oxyCODONE  5 mg Oral Q4H PRN Deny Leiva PA-C      oxyCODONE  2.5 mg Oral Q4H PRN Deny Leiva PA-C      pravastatin  40 mg Oral Daily With Dinner Deny Leiva PA-C      tamsulosin  0.4 mg Oral Daily With Dinner Justin ADKINS MD          Today, Patient Was Seen By: Justin Barr MD    **Please Note: This note may have been constructed using a voice recognition system.**

## 2024-07-28 NOTE — PLAN OF CARE
Problem: Potential for Falls  Goal: Patient will remain free of falls  Description: INTERVENTIONS:  - Educate patient/family on patient safety including physical limitations  - Instruct patient to call for assistance with activity   - Consult OT/PT to assist with strengthening/mobility   - Keep Call bell within reach  - Keep bed low and locked with side rails adjusted as appropriate  - Keep care items and personal belongings within reach  - Initiate and maintain comfort rounds  - Make Fall Risk Sign visible to staff  - Apply yellow socks and bracelet for high fall risk patients  - Consider moving patient to room near nurses station  Outcome: Progressing     Problem: PAIN - ADULT  Goal: Verbalizes/displays adequate comfort level or baseline comfort level  Description: Interventions:  - Encourage patient to monitor pain and request assistance  - Assess pain using appropriate pain scale  - Administer analgesics based on type and severity of pain and evaluate response  - Implement non-pharmacological measures as appropriate and evaluate response  - Consider cultural and social influences on pain and pain management  - Notify physician/advanced practitioner if interventions unsuccessful or patient reports new pain  Outcome: Progressing     Problem: INFECTION - ADULT  Goal: Absence or prevention of progression during hospitalization  Description: INTERVENTIONS:  - Assess and monitor for signs and symptoms of infection  - Monitor lab/diagnostic results  - Monitor all insertion sites, i.e. indwelling lines, tubes, and drains  - Monitor endotracheal if appropriate and nasal secretions for changes in amount and color  - [place] appropriate cooling/warming therapies per order  - Administer medications as ordered  - Instruct and encourage patient and family to use good hand hygiene technique  - Identify and instruct in appropriate isolation precautions for identified infection/condition  Outcome: Progressing  Goal: Absence  of fever/infection during neutropenic period  Description: INTERVENTIONS:  - Monitor WBC    Outcome: Progressing     Problem: SAFETY ADULT  Goal: Patient will remain free of falls  Description: INTERVENTIONS:  - Educate patient/family on patient safety including physical limitations  - Instruct patient to call for assistance with activity   - Consult OT/PT to assist with strengthening/mobility   - Keep Call bell within reach  - Keep bed low and locked with side rails adjusted as appropriate  - Keep care items and personal belongings within reach  - Initiate and maintain comfort rounds  - Make Fall Risk Sign visible to staff  - Consider moving patient to room near nurses station  Outcome: Progressing  Goal: Maintain or return to baseline ADL function  Description: INTERVENTIONS:  -  Assess patient's ability to carry out ADLs; assess patient's baseline for ADL function and identify physical deficits which impact ability to perform ADLs (bathing, care of mouth/teeth, toileting, grooming, dressing, etc.)  - Assess/evaluate cause of self-care deficits   - Assess range of motion  - Assess patient's mobility; develop plan if impaired  - Assess patient's need for assistive devices and provide as appropriate  - Encourage maximum independence but intervene and supervise when necessary  - Involve family in performance of ADLs  - Assess for home care needs following discharge   - Consider OT consult to assist with ADL evaluation and planning for discharge  - Provide patient education as appropriate  Outcome: Progressing  Goal: Maintains/Returns to pre admission functional level  Description: INTERVENTIONS:  - Perform AM-PAC 6 Click Basic Mobility/ Daily Activity assessment daily.  - Set and communicate daily mobility goal to care team and patient/family/caregiver.   - Collaborate with rehabilitation services on mobility goals if consulted  - Out of bed for toileting  - Record patient progress and toleration of activity level    Outcome: Progressing     Problem: MUSCULOSKELETAL - ADULT  Goal: Maintain or return mobility to safest level of function  Description: INTERVENTIONS:  - Assess patient's ability to carry out ADLs; assess patient's baseline for ADL function and identify physical deficits which impact ability to perform ADLs (bathing, care of mouth/teeth, toileting, grooming, dressing, etc.)  - Assess/evaluate cause of self-care deficits   - Assess range of motion  - Assess patient's mobility  - Assess patient's need for assistive devices and provide as appropriate  - Encourage maximum independence but intervene and supervise when necessary  - Involve family in performance of ADLs  - Assess for home care needs following discharge   - Consider OT consult to assist with ADL evaluation and planning for discharge  - Provide patient education as appropriate  Outcome: Progressing  Goal: Maintain proper alignment of affected body part  Description: INTERVENTIONS:  - Support, maintain and protect limb and body alignment  - Provide patient/ family with appropriate education  Outcome: Progressing     Problem: Prexisting or High Potential for Compromised Skin Integrity  Goal: Skin integrity is maintained or improved  Description: INTERVENTIONS:  - Identify patients at risk for skin breakdown  - Assess and monitor skin integrity  - Assess and monitor nutrition and hydration status  - Monitor labs   - Assess for incontinence   - Turn and reposition patient  - Assist with mobility/ambulation  - Relieve pressure over bony prominences  - Avoid friction and shearing  - Provide appropriate hygiene as needed including keeping skin clean and dry  - Evaluate need for skin moisturizer/barrier cream  - Collaborate with interdisciplinary team   - Patient/family teaching  - Consider wound care consult   Outcome: Progressing     Problem: GENITOURINARY - ADULT  Goal: Maintains or returns to baseline urinary function  Description: INTERVENTIONS:  - Assess  urinary function  - Encourage oral fluids to ensure adequate hydration if ordered  - Administer IV fluids as ordered to ensure adequate hydration  - Administer ordered medications as needed  - Offer frequent toileting  - Follow urinary retention protocol if ordered  Outcome: Progressing  Goal: Absence of urinary retention  Description: INTERVENTIONS:  - Assess patient’s ability to void and empty bladder  - Monitor I/O  - Bladder scan as needed  - Discuss with physician/AP medications to alleviate retention as needed  - Discuss catheterization for long term situations as appropriate  Outcome: Progressing     Problem: SKIN/TISSUE INTEGRITY - ADULT  Goal: Incision(s), wounds(s) or drain site(s) healing without S/S of infection  Description: INTERVENTIONS  - Assess and document dressing, incision, wound bed, drain sites and surrounding tissue  - Provide patient and family education  Outcome: Progressing

## 2024-07-28 NOTE — PLAN OF CARE
Problem: PHYSICAL THERAPY ADULT  Goal: Performs mobility at highest level of function for planned discharge setting.  See evaluation for individualized goals.  Description: Treatment/Interventions: Functional transfer training, LE strengthening/ROM, Elevations, Therapeutic exercise, Endurance training, Patient/family training, Bed mobility, Gait training, Spoke to nursing, OT          See flowsheet documentation for full assessment, interventions and recommendations.  Outcome: Progressing  Note: Prognosis: Good  Problem List: Decreased strength, Decreased range of motion, Decreased endurance, Impaired balance, Decreased mobility, Impaired sensation, Orthopedic restrictions, Pain  Assessment: Pt seen for PT treatment session this date, consisting of ther act focused on bed mobility, sit to stand transfers, standing balance/posture facilitation and gt training on level surfaces to improve pt safety in household environment. Since previous session, pt has made good progress in terms of increased level surface gait trial tolerance with use of RW, decreased assistance for transfers and gait training. Pt denying lightheadedness/dizziness, reporting 1-2/10 pain t/o functional mobility training. Current goals and POC established on IE remain appropriate, pt continues to have rehab potential and is making good progress towards STGs. Pt prognosis for achieving goals is good, pending pt progress with hospitalization/medical status improvements, and indicated by Stimulability, ability to follow directions, motivation, and recent onset. Pt continues to be functioning below baseline level, and remains limited 2* factors listed above. PT will continue to see pt during current hospitalization in order to address the deficits listed above and provide interventions consistent w/ POC in effort to achieve STGs. PT recommends level 1, maximum resource intensity upon discharge.  Barriers to Discharge: Inaccessible home environment,  Decreased caregiver support     Rehab Resource Intensity Level, PT: I (Maximum Resource Intensity)    See flowsheet documentation for full assessment.

## 2024-07-28 NOTE — ASSESSMENT & PLAN NOTE
POD #3 status post right quad tendon repair.  Orthopedics recommended weightbearing as tolerated right lower extremity with right knee in a brace locked in full extension.  Orthopedics recommended discharge on aspirin 81 mg twice daily for 4 weeks for DVT prophylaxis.  Further care per orthopedics team's recommendation.  Case management working for inpatient rehab placement.

## 2024-07-28 NOTE — PROGRESS NOTES
Orthopedics   Tobin Kerns 69 y.o. male MRN: [id number]  Unit/Bed#: -01      Subjective:  69 y.o.male post operative day 3 right knee quadriceps tendon repair.  Patient was resting in no acute distress in his bed today.  He states that his pain is well-controlled.  He has maintained the TROM brace.  He was able to work with therapy and walked out of his room and back to his been with minimal discomfort today.  He denies any chest pain, shortness of breath, nausea, vomiting, diarrhea, lightheadedness or dizziness.    Labs:  0   Lab Value Date/Time    HCT 39.2 07/28/2024 0453    HCT 41.1 07/24/2024 0518    HCT 40.6 07/23/2024 2017    HGB 13.2 07/28/2024 0453    HGB 14.1 07/24/2024 0518    HGB 14.2 07/23/2024 2017    PT 13.9 08/21/2021 0358    INR 1.13 12/21/2021 0857    INR 1.1 08/21/2021 0358    WBC 10.80 (H) 07/28/2024 0453    WBC 10.05 07/24/2024 0518    WBC 11.31 (H) 07/23/2024 2017       Meds:    Current Facility-Administered Medications:     acetaminophen (TYLENOL) tablet 650 mg, 650 mg, Oral, Q6H PRN, Deny Leiva PA-C    aspirin (ECOTRIN LOW STRENGTH) EC tablet 81 mg, 81 mg, Oral, BID, Deny Leiva PA-C, 81 mg at 07/28/24 0803    cefTRIAXone (ROCEPHIN) 1,000 mg in dextrose 5 % 50 mL IVPB, 1,000 mg, Intravenous, Q24H, Justin ADKINS MD, Last Rate: 100 mL/hr at 07/27/24 1723, 1,000 mg at 07/27/24 1723    docusate sodium (COLACE) capsule 100 mg, 100 mg, Oral, BID, Deny Leiva PA-C, 100 mg at 07/28/24 0803    famotidine (PEPCID) tablet 20 mg, 20 mg, Oral, BID PRN, Deny Leiva PA-C    losartan (COZAAR) tablet 50 mg, 50 mg, Oral, Daily, Deny Leiva PA-C, 50 mg at 07/28/24 0803    nystatin (MYCOSTATIN) powder, , Topical, BID, Deny Leiva PA-C, Given at 07/28/24 0930    oxyCODONE (ROXICODONE) IR tablet 5 mg, 5 mg, Oral, Q4H PRN, Deny Leiva PA-C, 5 mg at 07/26/24 1651    oxyCODONE (ROXICODONE) split tablet 2.5 mg, 2.5 mg, Oral, Q4H PRN, Deny Leiva PA-C    pravastatin  "(PRAVACHOL) tablet 40 mg, 40 mg, Oral, Daily With Dinner, Deny Leiva PA-C, 40 mg at 07/27/24 1707    tamsulosin (FLOMAX) capsule 0.4 mg, 0.4 mg, Oral, Daily With Dinner, Justin ADKINS MD    Blood Culture:   Lab Results   Component Value Date    BLOODCX No Growth After 5 Days. 01/18/2024    BLOODCX No Growth After 5 Days. 01/18/2024       Wound Culture:   No results found for: \"WOUNDCULT\"    Ins and Outs:  I/O last 24 hours:  In: 944 [P.O.:944]  Out: 2425 [Urine:2425]          Physical Exam:  Vitals:    07/28/24 0807   BP: 144/83   Pulse: 83   Resp:    Temp:    SpO2: 90%     right Lower Extremity extremity:  Skin no erythema edema or ecchymosis noted  Mepilex in place, small amount of drainage noted on the Mepilex in the middle  TROM brace is intact and appropriately aligned  Calf is supple, nontender and compressible  Sensation intact to saphenous, sural, tibial, superficial peroneal nerve, and deep peroneal  Motor intact to +FHL/EHL, +ankle dorsi/plantar flexion  2+ DP pulse, symmetric bilaterally  Digits warm and well perfused  Capillary refill < 2 seconds    Assessment: 69 y.o.male post operative day 3 right knee quadriceps tendon repair.  Doing well    Plan:  Weightbearing as tolerated right lower extremity, T ROM brace intact at all times and locked in full extension, use of the walker is necessary  DVT prophylaxis -ASA 81 mg twice daily  Analgesics  PT/OT  Dispo: Ortho will follow  Will continue to assess for acute blood loss anemia    Deny Leiva PA-C              "

## 2024-07-28 NOTE — CASE MANAGEMENT
Case Management Discharge Planning Note    Patient name Tobin Emmanuelsco  Location /-01 MRN [id number]  : 1955 Date 2024       Current Admission Date: 2024  Current Admission Diagnosis:Ambulatory dysfunction   Patient Active Problem List    Diagnosis Date Noted Date Diagnosed    Urinary retention 2024     Quadriceps muscle rupture, right, initial encounter 2024     Ambulatory dysfunction 2024     Abdominal aortic ectasia (HCC) 2023     Right foot ulcer, with fat layer exposed (HCC) 2023     Ulcer of left foot, with fat layer exposed (HCC) 2022     IFG (impaired fasting glucose) 10/01/2021     Hypertension 2021     Class 2 severe obesity with serious comorbidity and body mass index (BMI) of 38.0 to 38.9 in adult (HCC) 2021     Peripheral neuropathy 2021     Drusen (degenerative) of macula, bilateral 2021     Myopia, bilateral 2021     Pinguecula, bilateral 2021     Regular astigmatism, bilateral 2021     JOVANY on CPAP      History of colon polyps      Pure hypertriglyceridemia 2019     Mixed hyperlipidemia 2019     Gastro-esophageal reflux disease without esophagitis 2019     Presbyopia 10/11/2016       LOS (days): 4  Geometric Mean LOS (GMLOS) (days): 2.3  Days to GMLOS:-1.6     OBJECTIVE:  Risk of Unplanned Readmission Score: 6.87         Current admission status: Inpatient   Preferred Pharmacy:   [id number] - [place], [place] - [address]  [address]  [facility] [address]  Phone: [phone] Fax: [phone]    [id number] [address]  [address] [facility] [address]  Phone: [phone] Fax: [phone]    [id number] - [place] - [address]  [address]  [place] [address]  Phone: [phone] Fax: [phone]    Primary Care Provider: Ann Carmona,  DO    Primary Insurance: MEDICARE  Secondary Insurance:  FOR LIFE    DISCHARGE DETAILS:    Discharge planning discussed with:: patient  Freedom of Choice: Yes  Comments - Freedom of Choice: CM reviewed accepting provider list with pt and he has chosen [facility] at [place].  Galina from [facility] can accept today, but [facility] wants to wait for urine culture, so is asking for CM to arrange transport for tomorrow am.  [facility] Melara requested for 11:00.  Pt will bring his own C-pap machine with him to [facility].  IMM reviewed and signec by pt.  Copy to pt and copy to MR for scanning  CM contacted family/caregiver?: No- see comments (pt will update his family himself)  Were Treatment Team discharge recommendations reviewed with patient/caregiver?: Yes  Did patient/caregiver verbalize understanding of patient care needs?: Yes  Were patient/caregiver advised of the risks associated with not following Treatment Team discharge recommendations?: Yes         Requested Home Health Care         Is the patient interested in HHC at discharge?: No    DME Referral Provided  Referral made for DME?: No    Other Referral/Resources/Interventions Provided:  Interventions: Short Term Rehab  Referral Comments: [facility] accepted and reserved per pt request    Would you like to participate in our [facility] service program?  : No - Declined    Treatment Team Recommendation: Short Term Rehab  Discharge Destination Plan:: Short Term Rehab  Transport at Discharge : Fernando melara  Dispatcher Contacted: Yes  Number/Name of Dispatcher: Roundtrip     ETA of Transport (Date): 07/29/24  ETA of Transport (Time): 1100              IMM Given (Date):: 07/28/24  IMM Given to:: Patient

## 2024-07-28 NOTE — PLAN OF CARE
Problem: Potential for Falls  Goal: Patient will remain free of falls  Description: INTERVENTIONS:  - Educate patient/family on patient safety including physical limitations  - Instruct patient to call for assistance with activity   - Consult OT/PT to assist with strengthening/mobility   - Keep Call bell within reach  - Keep bed low and locked with side rails adjusted as appropriate  - Keep care items and personal belongings within reach  - Initiate and maintain comfort rounds  - Make Fall Risk Sign visible to staff  - Offer Toileting every 2 Hours, in advance of need  - Initiate/Maintain bed and chair alarm  - Obtain necessary fall risk management equipment: non skid socks   - Apply yellow socks and bracelet for high fall risk patients  - Consider moving patient to room near nurses station  Outcome: Progressing     Problem: PAIN - ADULT  Goal: Verbalizes/displays adequate comfort level or baseline comfort level  Description: Interventions:  - Encourage patient to monitor pain and request assistance  - Assess pain using appropriate pain scale  - Administer analgesics based on type and severity of pain and evaluate response  - Implement non-pharmacological measures as appropriate and evaluate response  - Consider cultural and social influences on pain and pain management  - Notify physician/advanced practitioner if interventions unsuccessful or patient reports new pain  Outcome: Progressing     Problem: INFECTION - ADULT  Goal: Absence or prevention of progression during hospitalization  Description: INTERVENTIONS:  - Assess and monitor for signs and symptoms of infection  - Monitor lab/diagnostic results  - Monitor all insertion sites, i.e. indwelling lines, tubes, and drains  - Monitor endotracheal if appropriate and nasal secretions for changes in amount and color  - [place] appropriate cooling/warming therapies per order  - Administer medications as ordered  - Instruct and encourage patient and family to use  good hand hygiene technique  - Identify and instruct in appropriate isolation precautions for identified infection/condition  Outcome: Progressing  Goal: Absence of fever/infection during neutropenic period  Description: INTERVENTIONS:  - Monitor WBC    Outcome: Progressing

## 2024-07-28 NOTE — ASSESSMENT & PLAN NOTE
Patient does have history of enlarged prostate.  Noted with urine retention post surgery.  Currently Catalan catheter noted with clear yellow urine.  Started on Flomax due to history of BPH.  Patient prefers to maintain Catalan catheter for now to avoid reinsertion and trauma and agreeable for voiding trial at rehab once ambulation improves.

## 2024-07-28 NOTE — ASSESSMENT & PLAN NOTE
69-year-old male who presented with right knee injury.  Reports right knee gave out earlier today and fell on his right knee  X-ray does not appear to show any acute abnormalities however official read pending  Reports inability to ambulate secondary to knee pain  Right knee MRI report noted for complete tear of quadriceps insertion.    POD #3 s/p right quadricep tendon repair.  Orthopedics recommended weightbearing as tolerated right lower extremity with right knee in a brace locked in full extension.  Orthopedics recommended discharge on aspirin 81 mg twice daily for 4 weeks for DVT prophylaxis.  Further care per orthopedics team's recommendation.  PT recommendation noted for inpatient rehab.  Patient is in agreements of plan.  CM working for a placement

## 2024-07-28 NOTE — ASSESSMENT & PLAN NOTE
BP controlled  Continue losartan   normal/clear to auscultation bilaterally/no wheezes/no rales/no rhonchi

## 2024-07-29 VITALS
DIASTOLIC BLOOD PRESSURE: 77 MMHG | HEIGHT: 77 IN | SYSTOLIC BLOOD PRESSURE: 130 MMHG | HEART RATE: 76 BPM | BODY MASS INDEX: 37.19 KG/M2 | TEMPERATURE: 97.9 F | OXYGEN SATURATION: 96 % | WEIGHT: 315 LBS | RESPIRATION RATE: 18 BRPM

## 2024-07-29 PROCEDURE — 97530 THERAPEUTIC ACTIVITIES: CPT

## 2024-07-29 PROCEDURE — 97116 GAIT TRAINING THERAPY: CPT

## 2024-07-29 PROCEDURE — 99239 HOSP IP/OBS DSCHRG MGMT >30: CPT | Performed by: STUDENT IN AN ORGANIZED HEALTH CARE EDUCATION/TRAINING PROGRAM

## 2024-07-29 PROCEDURE — 99024 POSTOP FOLLOW-UP VISIT: CPT | Performed by: PHYSICIAN ASSISTANT

## 2024-07-29 RX ORDER — OXYCODONE HYDROCHLORIDE 5 MG/1
2.5 TABLET ORAL EVERY 4 HOURS PRN
Qty: 10 TABLET | Refills: 0 | Status: SHIPPED | OUTPATIENT
Start: 2024-07-29 | End: 2024-08-08

## 2024-07-29 RX ORDER — TAMSULOSIN HYDROCHLORIDE 0.4 MG/1
0.4 CAPSULE ORAL
Start: 2024-07-29

## 2024-07-29 RX ORDER — DOCUSATE SODIUM 100 MG/1
100 CAPSULE, LIQUID FILLED ORAL 2 TIMES DAILY PRN
Start: 2024-07-29

## 2024-07-29 RX ORDER — CEFPODOXIME PROXETIL 200 MG/1
200 TABLET, FILM COATED ORAL 2 TIMES DAILY
Start: 2024-07-29 | End: 2024-07-31

## 2024-07-29 RX ORDER — NYSTATIN 100000 [USP'U]/G
POWDER TOPICAL 2 TIMES DAILY
Start: 2024-07-29

## 2024-07-29 RX ORDER — ACETAMINOPHEN 325 MG/1
650 TABLET ORAL EVERY 6 HOURS PRN
Start: 2024-07-29

## 2024-07-29 RX ORDER — OXYCODONE HYDROCHLORIDE 5 MG/1
2.5 TABLET ORAL EVERY 4 HOURS PRN
Qty: 10 TABLET | Refills: 0 | Status: SHIPPED | OUTPATIENT
Start: 2024-07-29 | End: 2024-07-29

## 2024-07-29 RX ADMIN — NYSTATIN: 100000 POWDER TOPICAL at 09:38

## 2024-07-29 RX ADMIN — LOSARTAN POTASSIUM 50 MG: 50 TABLET, FILM COATED ORAL at 09:38

## 2024-07-29 RX ADMIN — ASPIRIN 81 MG: 81 TABLET, COATED ORAL at 09:38

## 2024-07-29 NOTE — PLAN OF CARE
Problem: PHYSICAL THERAPY ADULT  Goal: Performs mobility at highest level of function for planned discharge setting.  See evaluation for individualized goals.  Description: Treatment/Interventions: Functional transfer training, LE strengthening/ROM, Elevations, Therapeutic exercise, Endurance training, Patient/family training, Bed mobility, Gait training, OT  Equipment Recommended: Walker (COREY)       See flowsheet documentation for full assessment, interventions and recommendations.  Outcome: Progressing  Note: Prognosis: Good  Problem List: Decreased strength, Decreased range of motion, Decreased endurance, Impaired balance, Decreased mobility, Orthopedic restrictions, Pain  Assessment: Chart reviewed. Patient was received supine in bed in NAD and agreeable to PT session. Today's PT treatment session consisted of therapeutic activity for facilitation of transitional movements and safe performance of correct technique for bed mobility and sit to stand transfers, therapeutic exercise to increase lower extremity muscle strength, and gait training to promote safe and functional ambulation on level surfaces. In comparison to the previous session the patient has made progress as evident by ability to ambulate an increased distance. When ambulating pt exhibits decreased gabriela, step to pattern, and decreased right stance time. No evidence of knee buckling noted during ambulation. Pt tolerated therapeutic exercise well without complaints. Pt educated on skin checks of his right lower extremity where TROM brace is located; pt verbalized understanding. Overall, patient tolerated today's session well and continues to be making progress towards achieving his STG's. Patient's prognosis for achieving their STG's is good as evident by pt's motivation. PT intervention continues to be appropriate as the patient continues to be limited by pain, decreased right knee ROM, decreased lower extremity strength, impaired balance,  decreased endurance, gait deviations, and decreased functional mobility. Continue to recommend level 1, maximum resource intensity. PT to continue to see patient in order to address the deficits listed above and provide interventions consistent with the POC in order to achieve STG's and optimize the patient's independence with functional mobility.    Barriers to Discharge: Inaccessible home environment, Decreased caregiver support     Rehab Resource Intensity Level, PT: I (Maximum Resource Intensity)    See flowsheet documentation for full assessment.

## 2024-07-29 NOTE — PROGRESS NOTES
Progress Note - Orthopedics   Tobin Emmanuelsco 69 y.o. male MRN: [id number]  Unit/Bed#: -01      Subjective:    69 y.o.male POD#4 s/p Right knee quad tendon repair, resting comfortably in bed and in no acute distress. Today, patient reports he is feeling good since surgery. He has maintained the TROM brace since surgery. He offers no complaints or concerns at this time. Denies any complications.     Labs:  0   Lab Value Date/Time    HCT 39.2 07/28/2024 0453    HCT 41.1 07/24/2024 0518    HCT 40.6 07/23/2024 2017    HGB 13.2 07/28/2024 0453    HGB 14.1 07/24/2024 0518    HGB 14.2 07/23/2024 2017    PT 13.9 08/21/2021 0358    INR 1.13 12/21/2021 0857    INR 1.1 08/21/2021 0358    WBC 10.80 (H) 07/28/2024 0453    WBC 10.05 07/24/2024 0518    WBC 11.31 (H) 07/23/2024 2017       Meds:    Current Facility-Administered Medications:     acetaminophen (TYLENOL) tablet 650 mg, 650 mg, Oral, Q6H PRN, Deny Leiva PA-C    aspirin (ECOTRIN LOW STRENGTH) EC tablet 81 mg, 81 mg, Oral, BID, Deny Leiva PA-C, 81 mg at 07/29/24 0938    cefTRIAXone (ROCEPHIN) 1,000 mg in dextrose 5 % 50 mL IVPB, 1,000 mg, Intravenous, Q24H, Justin ADKINS MD, Last Rate: 100 mL/hr at 07/28/24 1718, 1,000 mg at 07/28/24 1718    docusate sodium (COLACE) capsule 100 mg, 100 mg, Oral, BID, Deny Leiva PA-C, 100 mg at 07/28/24 1713    famotidine (PEPCID) tablet 20 mg, 20 mg, Oral, BID PRN, Deny Leiva PA-C    losartan (COZAAR) tablet 50 mg, 50 mg, Oral, Daily, Deny Leiva PA-C, 50 mg at 07/29/24 0938    nystatin (MYCOSTATIN) powder, , Topical, BID, Deny Leiva PA-C, Given at 07/29/24 0938    oxyCODONE (ROXICODONE) IR tablet 5 mg, 5 mg, Oral, Q4H PRN, Deny Leiva PA-C, 5 mg at 07/26/24 1651    oxyCODONE (ROXICODONE) split tablet 2.5 mg, 2.5 mg, Oral, Q4H PRN, Deny Leiva PA-C    pravastatin (PRAVACHOL) tablet 40 mg, 40 mg, Oral, Daily With Dinner, Deny Leiva PA-C, 40 mg at 07/28/24 1713    tamsulosin  "(FLOMAX) capsule 0.4 mg, 0.4 mg, Oral, Daily With Dinner, Justin ADKINS MD, 0.4 mg at 07/28/24 1713    Blood Culture:   Lab Results   Component Value Date    BLOODCX No Growth After 5 Days. 01/18/2024    BLOODCX No Growth After 5 Days. 01/18/2024       Wound Culture:   No results found for: \"WOUNDCULT\"    Ins and Outs:  I/O last 24 hours:  In: 840 [P.O.:840]  Out: 1975 [Urine:1975]          Physical:  Vitals:    07/29/24 0726   BP: 130/77   Pulse: 76   Resp: 18   Temp: 97.9 °F (36.6 °C)   SpO2: 96%     Musculoskeletal:  Right knee  Dressing dry and intact with [id number] area of serosanguinous strike through. No active drainage noted  TTP over surgical incision  Calf compressible and soft,   Sensation intact to pressure saphenous, sural, tibial, superficial peroneal nerve, and deep peroneal  Motor intact to +FHL/EHL, +ankle dorsi/plantar flexion  2+ DP pulse  Digits warm and well perfused  Capillary refill < 2 seconds    Assessment:    69 y.o.male POD#4 s/p Right knee quad tendon repair.     Plan:  WBAT RLE, TROM intact at all times and locked in full extension. Use of walker for ambulation.  PT/OT- up and out of bed  Pain control  DVT ppx- Aspirin 81mg twice daily for 4 weeks post operatively  Dispo: Once patient medically stable, he will follow in office with Dr Foster 2 weeks post operatively. Will assess surgical incision and consider staple removal at that time. Maintain TROM at all times and locked in extension. No range of motion of the knee until seen in office. No further orthopaedic intervention at this time.     America Ambrose PA-C      "

## 2024-07-29 NOTE — DISCHARGE INSTR - AVS FIRST PAGE
Discharge Instructions - Orthopedics  Tobin RIO Kerns 69 y.o. male MRN: [id number]  Unit/Bed#: -Sahra    Weight Bearing Status:                                           Weightbearing as tolerated Right lower extremity with TROM brace intact at all times and locked in full extension  NO RANGE OF MOTION OF THE KNEE    DVT prophylaxis:  Recommend at least 4 weeks of DVT prophylaxis. Aspirin 81 mg twice daily    Pain:  Continue analgesics as directed    Dressing Instructions:   Please keep clean, dry and intact for 7 days post operatively. When showering, recommend using shower chair in order to maintain knee extension. When sitting in the shower, may remove the brace and allow warm water to run over the surgical dressing as it is waterproof. If dressing becomes loose or saturated, please remove immediately. Once dressing is removed, can allow warm soapy water to run over surgical incision. PLACE BRACE ON KNEE BEFORE STANDING UP FROM SHOWER CHAIR. No scrubbing, no soaking the knee. Do not remove sutures/staples or steri strips. No application of ointments or creams over or near the incision.       Appt Instructions:   If you do not have your appointment, please call the clinic at [phone]  Otherwise follow up as scheduled.    Contact the office sooner if you experience any increased numbness/tingling in the extremities.

## 2024-07-29 NOTE — PHYSICAL THERAPY NOTE
"Physical Therapy Treatment Note    Patient Name: Tobin Kerns    Diagnosis: Knee pain [M25.569]  Right knee sprain [S83.91XA]  Quadriceps muscle rupture, right, initial encounter [S76.111A]     07/29/24 0744   PT Last Visit   PT Visit Date 07/29/24   Note Type   Note Type Treatment   Pain Assessment   Pain Assessment Tool 0-10   Pain Score 1   Pain Location/Orientation Orientation: Right;Location: Knee   Pain Onset/Description Onset: Ongoing   Hospital Pain Intervention(s) Repositioned;Ambulation/increased activity   Restrictions/Precautions   Weight Bearing Precautions Per Order Yes   RLE Weight Bearing Per Order WBAT  (per ortho)   Braces or Orthoses Knee brace  (TROM; locked in extension at all times)   Other Precautions Chair Alarm;Bed Alarm;Fall Risk;Pain   General   Chart Reviewed Yes   Response to Previous Treatment Patient with no complaints from previous session.   Family/Caregiver Present No   Cognition   Overall Cognitive Status WFL   Arousal/Participation Alert;Responsive;Cooperative   Attention Within functional limits   Orientation Level Oriented X4   Memory Within functional limits   Following Commands Follows all commands and directions without difficulty   Comments Pt agreeable to PT.   Subjective   Subjective \"I was able to walk a little more yesterday.\"   Bed Mobility   Supine to Sit 5  Supervision   Additional items Assist x 1;HOB elevated;Bedrails;Increased time required;Verbal cues   Transfers   Sit to Stand 4  Minimal assistance  (CG assist)   Additional items Assist x 1;Increased time required;Verbal cues   Stand to Sit 4  Minimal assistance  (CG assist)   Additional items Armrests;Increased time required;Verbal cues   Ambulation/Elevation   Gait pattern Decreased toe off;Decreased heel strike;Decreased hip extension;Excessively slow;Step to;Decreased R stance   Gait Assistance 4  Minimal assist   Additional items Assist x 1;Verbal cues   Assistive Device Rolling walker   Distance 60 feet "   Ambulation/Elevation Additional Comments No evidence of knee buckling this session   Balance   Static Sitting Good   Dynamic Sitting Fair +   Static Standing Fair -   Dynamic Standing Poor +   Ambulatory Poor +   Endurance Deficit   Endurance Deficit Yes   Endurance Deficit Description decreased activity tolerance   Activity Tolerance   Activity Tolerance Patient tolerated treatment well   Exercises   Quad Sets Sitting;20 reps;AROM;Left  (L LE only)   Hip Abduction Sitting;20 reps;AAROM;Right;AROM;Left  (long sitting)   Ankle Pumps Sitting;20 reps;AROM;Bilateral   Assessment   Prognosis Good   Problem List Decreased strength;Decreased range of motion;Decreased endurance;Impaired balance;Decreased mobility;Orthopedic restrictions;Pain   Assessment Chart reviewed. Patient was received supine in bed in NAD and agreeable to PT session. Today's PT treatment session consisted of therapeutic activity for facilitation of transitional movements and safe performance of correct technique for bed mobility and sit to stand transfers, therapeutic exercise to increase lower extremity muscle strength, and gait training to promote safe and functional ambulation on level surfaces. In comparison to the previous session the patient has made progress as evident by ability to ambulate an increased distance. When ambulating pt exhibits decreased gabriela, step to pattern, and decreased right stance time. No evidence of knee buckling noted during ambulation. Pt tolerated therapeutic exercise well without complaints. Pt educated on skin checks of his right lower extremity where TROM brace is located; pt verbalized understanding. Overall, patient tolerated today's session well and continues to be making progress towards achieving his STG's. Patient's prognosis for achieving their STG's is good as evident by pt's motivation. PT intervention continues to be appropriate as the patient continues to be limited by pain, decreased right knee ROM,  decreased lower extremity strength, impaired balance, decreased endurance, gait deviations, and decreased functional mobility. Continue to recommend level 1, maximum resource intensity. PT to continue to see patient in order to address the deficits listed above and provide interventions consistent with the POC in order to achieve STG's and optimize the patient's independence with functional mobility.   Barriers to Discharge Inaccessible home environment;Decreased caregiver support   Goals   STG Expiration Date 08/05/24   PT Treatment Day 3   Plan   Treatment/Interventions Functional transfer training;LE strengthening/ROM;Elevations;Therapeutic exercise;Endurance training;Patient/family training;Bed mobility;Gait training;OT   Progress Progressing toward goals   PT Frequency 4-6x/wk   Discharge Recommendation   Rehab Resource Intensity Level, PT I (Maximum Resource Intensity)   AM-PAC Basic Mobility Inpatient   Turning in Flat Bed Without Bedrails 3   Lying on Back to Sitting on Edge of Flat Bed Without Bedrails 3   Moving Bed to Chair 3   Standing Up From Chair Using Arms 3   Walk in Room 3   Climb 3-5 Stairs With Railing 1   Basic Mobility Inpatient Raw Score 16   Basic Mobility Standardized Score 38.32   [facility] Highest Level Of Mobility   -HL Goal 5: Stand one or more mins   -HLM Achieved 7: Walk 25 feet or more   Education   Education Provided Mobility training;Home exercise program;Assistive device;Other  (TROM brace)   Patient Demonstrates acceptance/verbal understanding;Reinforcement needed   End of Consult   Patient Position at End of Consult Bedside chair;Bed/Chair alarm activated;All needs within reach     Amirah Stephens, PT, DPT    Time of PT treatment session: 0744-0808  24 minutes

## 2024-07-29 NOTE — PROGRESS NOTES
PHYSICAL MEDICINE AND REHABILITATION   PREADMISSION ASSESSMENT     Projected IGC and Rehabilitation Diagnoses:  Impairment of mobility, safety and Activities of Daily Living (ADLs) due to Other Disabling Impairments:  13  Other Disabling Impairments  Etiologic: Rupture of right quadriceps tendon  Date of Onset: 7/25/24   Date of surgery: 7/25/24 - Right quadriceps tendon repair    PATIENT INFORMATION  Name: Tobin Kerns Phone #: [phone] (home) [phone] (work)  Address: [address] [place] [address] [address] [place] [address]  YOB: 1955 Age: 69 y.o. #   Marital Status: Single  Ethnicity:   Employment Status: currently employed  Extended Emergency Contact Information  Primary Emergency Contact: Pinky Amanda  Mobile Phone: [phone]  Relation: Sister  Advance Directive: Level 1 - Full Code (no advanced directives on file)    INSURANCE/COVERAGE:     Primary Payor: MEDICARE / Plan: MEDICARE A AND B / Product Type: Medicare A & B Fee for Service /   Secondary Payer:  for Life  Member ID: [id number]    Payer Contact:  Payer Contact:   Contact Phone:  Contact Phone: [phone]        MEDICARE #: [id number]   Medicare Days: 60/30/60  Medical Record #: [id number]    REFERRAL SOURCE:   Referring provider: Justin ADKINS MD  Referring facility: [facility]  Room: /MS [facility]  PCP: Ann Carmona DO PCP phone number: [phone]    MEDICAL INFORMATION  HPI: ***        Past Medical History:   Past Surgical History:   Allergies:     Past Medical History:   Diagnosis Date    Abdominal aortic ectasia (HCC) 11/13/2023    Next Abdominal Aortic U/S due 11/13/27    Closed fracture of right foot     As a child    Diverticulosis     Drusen (degenerative) of macula, bilateral 09/27/2021    Gastro-esophageal reflux disease without esophagitis 05/30/2019    GERD (gastroesophageal reflux disease) 2010    Hammer toe 15 years    History of colon polyps      Hypertension 09/27/2021    IFG (impaired fasting glucose) 10/01/2021    Internal hemorrhoids     Kidney stone 2007    Mixed hyperlipidemia 05/30/2019    Morbid obesity (HCC) 09/27/2021    Myopia, bilateral 09/27/2021    JOVANY on CPAP     Peripheral neuropathy 09/27/2021    Pinguecula, bilateral 09/27/2021    Presbyopia 10/11/2016    Pure hypertriglyceridemia 08/19/2019    Radial fracture 1975    Stress Fracture - Right Arm    Regular astigmatism, bilateral 09/27/2021    Tendonitis     Left shoulder    Past Surgical History:   Procedure Laterality Date    COLONOSCOPY      EGD  04/14/2022    Gastric Erosisons, Esophagitis    FIBULA FRACTURE SURGERY Right 2009    Hardware - Plate and screws in place    FRACTURE SURGERY  2005    QUADRICEPS TENDON REPAIR Right 7/25/2024    Procedure: REPAIR TENDON QUADRICEPS- Right;  Surgeon: Fletcher Foster DO;  Location: [facility];  Service: Orthopedics    SKIN GRAFT  2021    Right foot     TONSILECTOMY AND ADNOIDECTOMY      TONSILLECTOMY       No Known Allergies      Medical/functional conditions requiring inpatient rehabilitation: ***    Risk for medical/clinical complications: ***    Comorbidities/Surgeries in the last 100 days: {ARC Comorbidities:82811}    CURRENT VITAL SIGNS:   Temp:  [97.9 °F (36.6 °C)-99.6 °F (37.6 °C)] 97.9 °F (36.6 °C)  HR:  [76-83] 76  Resp:  [18-30] 18  BP: (129-133)/(74-80) 130/77   Intake/Output Summary (Last 24 hours) at 7/29/2024 1143  Last data filed at 7/29/2024 0821  Gross per 24 hour   Intake 540 ml   Output 1150 ml   Net -610 ml        LABORATORY RESULTS:      Lab Results   Component Value Date    HGB 13.2 07/28/2024    HCT 39.2 07/28/2024    WBC 10.80 (H) 07/28/2024     Lab Results   Component Value Date    BUN 22 07/28/2024    BUN 22 08/24/2021    K 4.3 07/28/2024    K 4.2 08/24/2021     07/28/2024     08/24/2021    CREATININE 1.01 07/28/2024    CREATININE 1.05 08/24/2021     Lab Results   Component Value Date    PROTIME 14.0  12/21/2021    INR 1.13 12/21/2021    INR 1.1 08/21/2021        DIAGNOSTIC STUDIES:  MRI knee right  wo contrast    Result Date: 7/24/2024  Impression: Complete tear quadriceps insertion retracted by 2 cm. Grade 1 strain vastus medialis and lateralis. Intrasubstance degeneration posterior horn medial meniscus without discrete tear by MRI criteria Grade 4 chondrosis patellar apex extending to the medial patellar facet. Workstation performed: [id number]     XR femur 2 views RIGHT    Result Date: 7/24/2024  Impression: No acute osseous abnormality. Computerized Assisted Algorithm (CAA) may have been used to analyze all applicable images. Workstation performed: [id number]     XR knee 4+ vw right injury    Result Date: 7/24/2024  Impression: No acute osseous abnormality. Computerized Assisted Algorithm (CAA) may have been used to analyze all applicable images. Workstation performed: [id number]       PRECAUTIONS/SPECIAL NEEDS:  {ARC PRE Precautions:30143}    MEDICATIONS:     Current Facility-Administered Medications:     acetaminophen (TYLENOL) tablet 650 mg, 650 mg, Oral, Q6H PRN, Deny Leiva PA-C    aspirin (ECOTRIN LOW STRENGTH) EC tablet 81 mg, 81 mg, Oral, BID, Deny Leiva PA-C, 81 mg at 07/29/24 0938    cefTRIAXone (ROCEPHIN) 1,000 mg in dextrose 5 % 50 mL IVPB, 1,000 mg, Intravenous, Q24H, Justin ADKINS MD, Last Rate: 100 mL/hr at 07/28/24 1718, 1,000 mg at 07/28/24 1718    docusate sodium (COLACE) capsule 100 mg, 100 mg, Oral, BID, Deny Leiva PA-C, 100 mg at 07/28/24 1713    famotidine (PEPCID) tablet 20 mg, 20 mg, Oral, BID PRN, Deny Leiva PA-C    losartan (COZAAR) tablet 50 mg, 50 mg, Oral, Daily, Deny Leiva PA-C, 50 mg at 07/29/24 0938    nystatin (MYCOSTATIN) powder, , Topical, BID, Deny Leiva PA-C, Given at 07/29/24 0938    oxyCODONE (ROXICODONE) IR tablet 5 mg, 5 mg, Oral, Q4H PRN, Deny Leiva PA-C, 5 mg at 07/26/24 1651    oxyCODONE (ROXICODONE) split tablet 2.5 mg,  "2.5 mg, Oral, Q4H PRN, Deny Leiva PA-C    pravastatin (PRAVACHOL) tablet 40 mg, 40 mg, Oral, Daily With Dinner, Deny Leiva PA-C, 40 mg at 24 1713    tamsulosin (FLOMAX) capsule 0.4 mg, 0.4 mg, Oral, Daily With Dinner, Justin ADKINS MD, 0.4 mg at 24 1713    SKIN INTEGRITY:   {Exam; skin:66220::\"no rashes\",\"no erythema\",\"no peripheral edema\"}    PRIOR LEVEL OF FUNCTION:  He lives in a(n) {[facility] Home Type:99281}  Tobin Kerns is {ARC Marital Status:53195} and {places; lives with:5711::\"lives with their family\"}.  {ARC PRE PRIOR FUNCTION:20564}    FALLS IN THE LAST 6 MONTHS: ***    HOME ENVIRONMENT:  The living area: {Rehab home environment / accessibility:61087}  There are {STEPS:} to enter the home.    The patient {JORY WILL_WILL NOT:02448} have 24 hour {[facility] Supervision/physical assistance:93012::\"supervision\"} available upon discharge.    PREVIOUS DME:  Equipment in home (previous DME): {ARC Home Equipment:33809}    FUNCTIONAL STATUS:***  Physical Therapy Occupational Therapy Speech Therapy          CARE SCORES:  Self Care:  Eating: {ARC Pre Admission Screenin}  Oral hygiene: {ARC Pre Admission Screenin}  Toilet hygiene: {ARC Pre Admission Screenin}  Shower/bathing self: {ARC Pre Admission Screenin}  Upper body dressing: {ARC Pre Admission Screenin}  Lower body dressing: {ARC Pre Admission Screenin}  Putting on/taking off footwear: {ARC Pre Admission Screenin}  Transfers:  Roll left and right: {ARC Pre Admission Screenin}  Sit to lying: {ARC Pre Admission Screenin}  Lying to sitting on side of bed: {ARC Pre Admission Screenin}  Sit to stand: {ARC Pre Admission Screenin}  Chair/bed to chair transfer: {ARC Pre Admission Screenin}  Toilet transfer: {ARC Pre Admission Screenin}  Mobility:  Walk 10 ft: {ARC Pre Admission Screenin}  Walk 50 ft with two turns: {ARC Pre Admission " Screenin}  Walk 150ft: {ARC Pre Admission Screenin}    CURRENT GAP IN FUNCTION  Prior to Admission: {Functional Status:05727:::1}    Expected functional outcomes: It is expected that with skilled acute rehabilitation services the patient will progress to {Assistance levels:06510} for self care and {Assistance levels:71650} for mobility     Estimated length of stay: {ARC Length Of Stay:10558}    Anticipated Post-Discharge Disposition/Treatment  Disposition: {ARC Disposition:44301}  Outpatient Services: {ARC Post-Discharge Rehab:60987}    BARRIERS TO DISCHARGE  {ARC Barrier:73466}    INTERVENTIONS FOR DISCHARGE  {ARC Intervention:09669}    REQUIRED THERAPY:  {ARC Required Therapy:64883}    REQUIRED FUNCTIONAL AND MEDICAL MANAGEMENT FOR INPATIENT REHABILITATION:  {ARC Current Medical Problems:01397}    RECOMMENDED LEVEL OF CARE: ***

## 2024-07-29 NOTE — DISCHARGE SUMMARY
[facility]  Discharge- Tobin PATE Saumya 1955, 69 y.o. male MRN: [id number]  Unit/Bed#: [facility] Encounter: [id number]  Primary Care Provider: Ann Carmona DO   Date and time admitted to hospital: 7/23/2024  6:12 PM    * Ambulatory dysfunction  Assessment & Plan  69-year-old male who presented with right knee injury.  Reports right knee gave out earlier today and fell on his right knee  X-ray does not appear to show any acute abnormalities however official read pending  Reports inability to ambulate secondary to knee pain  Right knee MRI report noted for complete tear of quadriceps insertion.    POD #4 s/p right quadricep tendon repair.  Orthopedics recommended weightbearing as tolerated right lower extremity with right knee in a brace locked in full extension.  Keep TROM at all times and davin in extension.   No range of motion of right knee until seen in the office by orthopedics.  Orthopedics recommended discharge on aspirin 81 mg twice daily for 4 weeks for DVT prophylaxis.  Further care per orthopedics team's recommendation.  PT recommendation noted for inpatient rehab.  Follow-up with Dr. Foster in 2 weeks.   Patient is in agreements of plan.  Current hemodynamic stable for discharge to [facility]    Urinary retention  Assessment & Plan  Patient does have history of enlarged prostate.  Noted with urine retention post surgery.  Currently Catalan catheter noted with clear yellow urine.  Started on Flomax due to history of BPH.  UA was positive for UTI however urine culture without growth.   Continue with IV IV ceftriaxone , discharged with 2 days of p.o. Vantin.  Patient prefers to maintain Catalan catheter for now to avoid reinsertion and trauma and agreeable for voiding trial at rehab once ambulation improves.        Quadriceps muscle rupture, right, initial encounter  Assessment & Plan  POD #4 status post right quad tendon repair.  Orthopedics recommended  weightbearing as tolerated right lower extremity with right knee in a brace locked in full extension.  Orthopedics recommended discharge on aspirin 81 mg twice daily for 4 weeks for DVT prophylaxis.  Further care per orthopedics team's recommendation.  Case management working for inpatient rehab placement.    Class 2 severe obesity with serious comorbidity and body mass index (BMI) of 38.0 to 38.9 in adult (HCC)  Assessment & Plan   Diet and exercise counseling provided    Gastro-esophageal reflux disease without esophagitis  Assessment & Plan  Continue Pepcid    Mixed hyperlipidemia  Assessment & Plan  Continue statin    Hypertension  Assessment & Plan  BP controlled  Continue losartan      Medical Problems       Resolved Problems  Date Reviewed: 7/29/2024   None       Discharging Physician / Practitioner: Justin Barr MD  PCP: Ann Carmona DO  Admission Date:   Admission Orders (From admission, onward)       Ordered        07/24/24 1317  INPATIENT ADMISSION  Once            07/23/24 2008  Place in Observation  Once                          Discharge Date: 07/29/24    Consultations During Hospital Stay:  Orthopedics    Procedures Performed:   Right knee quad tendon repair.    Reason for Admission: Right knee quad tendon tear.    Hospital Course:   Tobin Kerns is a 69 y.o. male patient with past medical history of obesity, GERD, hyperlipidemia, hypertension, who originally presented to the hospital on 7/23/2024 due to complaining of right knee pain.  Patient reported earlier he was walking and suddenly felt right knee giving out and subsequently end up having a fall on his right knee patient reports that post fall he was not able to flex or ambulate right knee.  On further evaluation noted with complete tear of right quadricep tendon  as noted on MRI report as above.  Patient had right quad tendon repair on  07/25/2024.  Orthopedics recommending to keep right knee in brace in full extension locked  "locked in full extension.  Weightbearing as tolerated to right lower extremity.  Not to flex right knee until seen by orthopedics in the office 2 weeks postoperatively.  Continue aspirin 81 mg twice daily for a month for DVT prophylaxis.  Course was complicated by urinary retention in the settings of BPH and was treated with insertion of Catalan catheter and started on Flomax.  He was noted with finding concerning of urinary tract infection was started on IV ceftriaxone however urine culture without growth.  Being discharged on p.o. Vantin to complete antibiotic course for UTI.  Patient preferred to keep Catalan catheter for now and requesting to try voiding trial at rehab once ambulation improves.  Currently he is hemodynamically stable for discharge to [facility].  No other events reported.  Refer to earlier notes for further clarification.      Please see above list of diagnoses and related plan for additional information.     Condition at Discharge: good    Discharge Day Visit / Exam:   Subjective: Seen during a.m. rounds.  Patient appears comfortable on discharge.  Currently denies chest pain, dyspnea, fever, chills, nausea, vomiting, any other new complaints.  Tolerated antibiotics well.  Eager to be discharged from the hospital.    Vitals: Blood Pressure: 130/77 (07/29/24 0726)  Pulse: 76 (07/29/24 0726)  Temperature: 97.9 °F (36.6 °C) (07/29/24 0726)  Temp Source: Oral (07/28/24 2119)  Respirations: 18 (07/29/24 0726)  Height: 6' 5\" (195.6 cm) (07/24/24 0249)  Weight - Scale: (!) 145 kg (319 lb 10.7 oz) (07/24/24 0249)  SpO2: 96 % (07/29/24 0726)  Exam:   Physical Exam  Constitutional:       General: He is not in acute distress.     Appearance: Normal appearance. He is not ill-appearing, toxic-appearing or diaphoretic.   HENT:      Head: Normocephalic and atraumatic.   Eyes:      Pupils: Pupils are equal, round, and reactive to light.   Cardiovascular:      Rate and Rhythm: Normal rate.      Pulses: " Normal pulses.   Pulmonary:      Effort: Pulmonary effort is normal. No respiratory distress.      Breath sounds: Normal breath sounds. No wheezing.   Abdominal:      General: Bowel sounds are normal. There is no distension.      Palpations: Abdomen is soft.      Tenderness: There is no abdominal tenderness.   Genitourinary:     Comments: Catalan catheter noted with clear yellow urine.  Musculoskeletal:      Right lower leg: No edema.      Left lower leg: No edema.      Comments: Right lower extremity noted in a knee brace   Neurological:      Mental Status: He is alert and oriented to person, place, and time.   Psychiatric:         Mood and Affect: Mood normal.         Behavior: Behavior normal.          Discharge instructions/Information to patient and family:   See after visit summary for information provided to patient and family.      Provisions for Follow-Up Care:  See after visit summary for information related to follow-up care and any pertinent home health orders.      Mobility at time of Discharge:   Basic Mobility Inpatient Raw Score: 16  JH-HLM Goal: 5: Stand one or more mins  JH-HLM Achieved: 2: Bed activities/Dependent transfer       Disposition:   Rehab at [facility]    Planned Readmission:      Discharge Statement:  I spent 35 minutes discharging the patient. This time was spent on the day of discharge. I had direct contact with the patient on the day of discharge. Greater than 50% of the total time was spent examining patient, answering all patient questions, arranging and discussing plan of care with patient as well as directly providing post-discharge instructions.  Additional time then spent on discharge activities.    Discharge Medications:  See after visit summary for reconciled discharge medications provided to patient and/or family.      **Please Note: This note may have been constructed using a voice recognition system**

## 2024-07-31 ENCOUNTER — TRANSITIONAL CARE MANAGEMENT (OUTPATIENT)
Dept: FAMILY MEDICINE CLINIC | Facility: CLINIC | Age: 69
End: 2024-07-31

## 2024-07-31 ENCOUNTER — TELEPHONE (OUTPATIENT)
Age: 69
End: 2024-07-31

## 2024-07-31 NOTE — TELEPHONE ENCOUNTER
Chey with [facility] calling for follow/tov they can do am catheter removal and have pt seen office for pm bladder scan,orders can be faxed [phone] attn:Chey  her cell# [phone]

## 2024-08-01 NOTE — TELEPHONE ENCOUNTER
Received incoming call from Chey who reports patient is ambulating with a walker.  Patient scheduled 8/15/2024 at 230 pm for PVR at the [place] office.  Facility will remove guzman around 900 am the same day.

## 2024-08-01 NOTE — TELEPHONE ENCOUNTER
Voicemail left for Chey stating patient is s/p right quad tendon repair on 7/25/2024 would like to arrange void trial when patient's ambulation improves.  Asked Chey to contact the office to assess patient's mobility.

## 2024-08-09 ENCOUNTER — OFFICE VISIT (OUTPATIENT)
Dept: OBGYN CLINIC | Facility: CLINIC | Age: 69
End: 2024-08-09

## 2024-08-09 VITALS
WEIGHT: 303 LBS | SYSTOLIC BLOOD PRESSURE: 99 MMHG | DIASTOLIC BLOOD PRESSURE: 59 MMHG | HEIGHT: 77 IN | HEART RATE: 111 BPM | BODY MASS INDEX: 35.78 KG/M2

## 2024-08-09 DIAGNOSIS — S76.111D QUADRICEPS TENDON RUPTURE, RIGHT, SUBSEQUENT ENCOUNTER: ICD-10-CM

## 2024-08-09 DIAGNOSIS — Z98.890 S/P MUSCULOSKELETAL SYSTEM SURGERY: Primary | ICD-10-CM

## 2024-08-09 PROCEDURE — 99024 POSTOP FOLLOW-UP VISIT: CPT | Performed by: ORTHOPAEDIC SURGERY

## 2024-08-09 NOTE — PROGRESS NOTES
Patient Name:  Tobin Kerns  MRN:  [id number]    Assessment & Plan     1. S/P musculoskeletal system surgery  2. Quadriceps tendon rupture, right, subsequent encounter      Approximately 2 weeks s/p Right quadriceps tendon repair performed on 7/25/24.   The patient's staples were removed today. They were advised the steri-strips can remain in place until they fall off and was instructed not to soak or vigorously scrub right lower extremity.   The patient is advised to continue walking with the TROM locked in extension with the use of the walker.   The patient may begin physical therapy with max of 30 degrees over the next 2 weeks and max of 60 degrees for 2 weeks.    They were advised to continue with baby aspirin for 2 more weeks for DVT prophylaxis.   The patient may resume working on 8/19/24. A note was provided.  I will see the patient back in approximately 4 weeks for re-evaluation.       History of the Present Illness   Tobin Kerns is a 69 y.o. male approximately 2 weeks s/p Right quadriceps tendon repair performed on 7/25/24. The patient is doing well overall. He is not having pain in the operative leg. He is experiencing sciatica symptoms into the left side. He is using Tylenol as needed for symptom management. He is getting around ok with the use of a walker for ambulation. The patient continues to use low dose aspirin twice per day for DVT prophylaxis.   The patient currently resides at [facility] and plans to be discharged on the 13th.       Review of Systems     Review of Systems   Constitutional:  Negative for appetite change and unexpected weight change.   HENT:  Negative for congestion and trouble swallowing.    Eyes:  Negative for visual disturbance.   Respiratory:  Negative for cough and shortness of breath.    Cardiovascular:  Negative for chest pain and palpitations.   Gastrointestinal:  Negative for nausea and vomiting.   Endocrine: Negative for cold intolerance and heat  "intolerance.   Musculoskeletal:  Negative for joint swelling and myalgias.   Skin:  Negative for rash.   Neurological:  Negative for numbness.       Physical Exam     BP 99/59   Pulse (!) 111   Ht 6' 5\" (1.956 m)   Wt (!) 137 kg (303 lb)   BMI 35.93 kg/m²     Right Knee  Surgical incision is intact well healing, without signs of infection  Range of motion not assessed   There is normal postoperative  effusion.   The patient is able to actively fire his quadriceps.   The patient is able to perform a straight leg raise.  Calf soft, compressible and nontender   The patient is neurovascular intact distally.      Data Review     I have personally reviewed pertinent films in PACS, and my interpretation follows.    No new images    Social History     Tobacco Use    Smoking status: Former     Current packs/day: 0.00     Average packs/day: 1 pack/day for 15.0 years (15.0 ttl pk-yrs)     Types: Cigarettes     Start date: 1980     Quit date: 2005     Years since quittin.6     Passive exposure: Never    Smokeless tobacco: Never   Vaping Use    Vaping status: Never Used   Substance Use Topics    Alcohol use: Yes     Alcohol/week: 1.0 standard drink of alcohol     Types: 1 Cans of beer per week    Drug use: Not Currently     Types: Marijuana     Comment: Last Marijuana Use ; Other unknown drugs while in [place]           Procedures  None performed.     Fletcher Foster DO   Scribe Attestation      I,:  Zuleyka Fleming am acting as a scribe while in the presence of the attending physician.:       I,:  Fletcher Foster DO personally performed the services described in this documentation    as scribed in my presence.:             "

## 2024-08-09 NOTE — LETTER
August 9, 2024     Patient: Tobin Kerns  YOB: 1955  Date of Visit: 8/9/2024      To Whom it May Concern:    Tobin Kerns is under my professional care. Tobin was seen in my office on 8/9/2024. Tobin may return to work on 8/19/24. Please allow to use walker and brace on right leg. Please allow to sit while working.     If you have any questions or concerns, please don't hesitate to call.         Sincerely,          Fletcher Foster,         CC: No Recipients

## 2024-08-13 ENCOUNTER — TELEPHONE (OUTPATIENT)
Age: 69
End: 2024-08-13

## 2024-08-13 NOTE — TELEPHONE ENCOUNTER
Caller: Self    Doctor: Cristian    Reason for call: Patient wants last work note to also state approximately how long until he is able to drive again. Please let patient know when ready      Call back#: [phone]

## 2024-08-14 NOTE — TELEPHONE ENCOUNTER
Caller: Self    Doctor: Cristian    Reason for call: Patient will be out of town during next postop (9/9), wants to know if that can be a virtual visit so he does not have to cancel. Also, requesting a referral for physical therapy so he can do while he is away    Call back#: [phone]

## 2024-08-15 ENCOUNTER — PROCEDURE VISIT (OUTPATIENT)
Dept: UROLOGY | Facility: CLINIC | Age: 69
End: 2024-08-15
Payer: MEDICARE

## 2024-08-15 VITALS
HEIGHT: 77 IN | DIASTOLIC BLOOD PRESSURE: 62 MMHG | TEMPERATURE: 98 F | OXYGEN SATURATION: 92 % | HEART RATE: 87 BPM | SYSTOLIC BLOOD PRESSURE: 112 MMHG | BODY MASS INDEX: 35.78 KG/M2 | WEIGHT: 303 LBS

## 2024-08-15 DIAGNOSIS — R33.9 URINARY RETENTION: ICD-10-CM

## 2024-08-15 DIAGNOSIS — N39.0 URINARY TRACT INFECTION WITHOUT HEMATURIA, SITE UNSPECIFIED: Primary | ICD-10-CM

## 2024-08-15 LAB — POST-VOID RESIDUAL VOLUME, ML POC: 203 ML

## 2024-08-15 PROCEDURE — 51798 US URINE CAPACITY MEASURE: CPT

## 2024-08-15 PROCEDURE — 99024 POSTOP FOLLOW-UP VISIT: CPT

## 2024-08-15 RX ORDER — TAMSULOSIN HYDROCHLORIDE 0.4 MG/1
0.4 CAPSULE ORAL
Qty: 90 CAPSULE | Refills: 3 | Status: SHIPPED | OUTPATIENT
Start: 2024-08-15

## 2024-08-16 NOTE — PROGRESS NOTES
"8/15/2024    Tobin PATE [facility]  1955  [id number]    Diagnosis  Chief Complaint    Urinary Retention         Patient presents for TOV managed by our office    Plan  Patient to have PVR measured this afternoon post guzman cath removal at facility earlier this morning.     Procedure Guzman removal/voiding trial    Guzman catheter removed at facility earlier this morning. Patient denies any pain/discomfort.     Patient presented this afternoon, states he maintained adequate hydration throughout the day. Patient states able to void. Patient voided while in office. Bladder ultrasound performed and PVR measured 203 ml.    No results found for this or any previous visit (from the past 4 hour(s)).        Vitals:    08/15/24 1441   BP: 112/62   Pulse: 87   Temp: 98 °F (36.7 °C)   TempSrc: Temporal   SpO2: 92%   Weight: (!) 137 kg (303 lb)   Height: 6' 5\" (1.956 m)     Although patient is noted to have 203 ml of urine within bladder he denies experiencing abd pain/discomfort, no abd distention noted upon palpation. Patient states he is going to [place] to stay with his sister until he is able to completely get around independently. Therefore he is totally against receiving guzman cath reinsertion. This nurse made Jennifer MARCELINO aware of conversation had with patient. Jennifer MARCELINO has placed orders for patient to continue taking Flomax and follow up with urologist as needed while in [place]. Patient is strongly educated on ER precautions, immediately getting in contact with office or local urologist for any s/s of urinary retention, maintaining adequate hydration, and double voiding each time he uses the bathroom. Patient verbalizes full understanding to teachings.         Frieda Hung LPN        "

## 2024-08-19 NOTE — TELEPHONE ENCOUNTER
Caller: Patient     Doctor: Cristian    Reason for call: Please fax PT referral to [phone]    Call back#: [phone]

## 2024-08-20 DIAGNOSIS — Z98.890 S/P MUSCULOSKELETAL SYSTEM SURGERY: Primary | ICD-10-CM

## 2024-08-20 NOTE — TELEPHONE ENCOUNTER
Patient gave incorrect Fax number please re fax to this number [phone]  Thank you  Please advise patient when completed

## 2024-08-21 ENCOUNTER — TELEPHONE (OUTPATIENT)
Dept: ADMINISTRATIVE | Facility: OTHER | Age: 69
End: 2024-08-21

## 2024-08-21 NOTE — TELEPHONE ENCOUNTER
08/21/24 10:21 AM    Patient contacted to bring Advance Directive, POLST, or Living Will document to next scheduled pcp visit.[facility] Department spoke with patient/ caregiver.    Thank you.  Erick Andrews MA  [facility]

## 2024-09-09 ENCOUNTER — OFFICE VISIT (OUTPATIENT)
Dept: OBGYN CLINIC | Facility: CLINIC | Age: 69
End: 2024-09-09

## 2024-09-09 VITALS
HEIGHT: 77 IN | BODY MASS INDEX: 35.93 KG/M2 | DIASTOLIC BLOOD PRESSURE: 80 MMHG | HEART RATE: 70 BPM | SYSTOLIC BLOOD PRESSURE: 123 MMHG

## 2024-09-09 DIAGNOSIS — Z98.890 S/P MUSCULOSKELETAL SYSTEM SURGERY: Primary | ICD-10-CM

## 2024-09-09 PROCEDURE — 99024 POSTOP FOLLOW-UP VISIT: CPT | Performed by: ORTHOPAEDIC SURGERY

## 2024-09-09 NOTE — PROGRESS NOTES
"Patient Name:  Tobin Kerns  MRN:  [id number]    Assessment & Plan     1. S/P musculoskeletal system surgery        Approximately 6 weeks s/p Right quadriceps tendon repair performed on 7/25/24.   Overall, the patient is doing well today  Strongly advised the patient to begin physical therapy  Continue TROM brace locked in extension while ambulating  Avoid active leg extension exercises, work on quad sets and straight leg raises at home  Limit to 90 degrees flexion for the next 2 weeks. Progress as tolerated after 2 weeks  Patient was provided written physical therapy protocol in the office today  Follow up  6 weeks      History of the Present Illness   Tobin Kerns is a 69 y.o. male approximately 6 weeks s/p Right quadriceps tendon repair performed on 7/25/24. The patient has not begun physical therapy yet. He is ambulating with a walker. He admits he has not bent his knee at all, and has been complaint wearing his brace.       Review of Systems     Review of Systems   Constitutional:  Negative for appetite change and unexpected weight change.   HENT:  Negative for congestion and trouble swallowing.    Eyes:  Negative for visual disturbance.   Respiratory:  Negative for cough and shortness of breath.    Cardiovascular:  Negative for chest pain and palpitations.   Gastrointestinal:  Negative for nausea and vomiting.   Endocrine: Negative for cold intolerance and heat intolerance.   Musculoskeletal:  Negative for joint swelling and myalgias.   Skin:  Negative for rash.   Neurological:  Negative for numbness.       Physical Exam     /80   Pulse 70   Ht 6' 5\" (1.956 m)   BMI 35.93 kg/m²     Right Knee  Surgical incision is intact well healing, without signs of infection  Range of motion 0-75 degrees  There is normal postoperative  effusion.   The patient is minimally able to actively fire his quadriceps.   The patient is able to perform a straight leg raise with brace in place.  Calf soft, compressible " and nontender   The patient is neurovascular intact distally.      Data Review     I have personally reviewed pertinent films in PACS, and my interpretation follows.    No new images    Social History     Tobacco Use    Smoking status: Former     Current packs/day: 0.00     Average packs/day: 1 pack/day for 15.0 years (15.0 ttl pk-yrs)     Types: Cigarettes     Start date: 1980     Quit date: 2005     Years since quittin.7     Passive exposure: Never    Smokeless tobacco: Never   Vaping Use    Vaping status: Never Used   Substance Use Topics    Alcohol use: Yes     Alcohol/week: 1.0 standard drink of alcohol     Types: 1 Cans of beer per week    Drug use: Not Currently     Types: Marijuana     Comment: Last Marijuana Use ; Other unknown drugs while in [place]           Procedures  None performed.     Amirah Chavez   Scribe Attestation      I,:  Amirah Chavez am acting as a scribe while in the presence of the attending physician.:       I,:  Fletcher Foster, DO personally performed the services described in this documentation    as scribed in my presence.:

## 2024-09-19 DIAGNOSIS — K21.9 GASTRO-ESOPHAGEAL REFLUX DISEASE WITHOUT ESOPHAGITIS: ICD-10-CM

## 2024-09-20 RX ORDER — FAMOTIDINE 20 MG/1
20 TABLET, FILM COATED ORAL 2 TIMES DAILY PRN
Qty: 180 TABLET | Refills: 1 | Status: SHIPPED | OUTPATIENT
Start: 2024-09-20

## 2024-09-25 ENCOUNTER — TELEPHONE (OUTPATIENT)
Dept: OBGYN CLINIC | Facility: HOSPITAL | Age: 69
End: 2024-09-25

## 2024-09-25 NOTE — TELEPHONE ENCOUNTER
Cristian     Pt is requesting an update on the [employer] message. Paperwork was uploaded by the pt for WC/Doctor approval for visits and PT over 45 days.     Please advise when completed so pt can attend PT     Phone:   [phone]

## 2024-09-26 NOTE — TELEPHONE ENCOUNTER
I tried calling patient yesterday had to leave a message that we need the actual form for the doctor to fill out patient didn't upload the right form to his chart

## 2024-09-27 DIAGNOSIS — E78.2 MIXED HYPERLIPIDEMIA: ICD-10-CM

## 2024-09-30 RX ORDER — SIMVASTATIN 20 MG
20 TABLET ORAL
Qty: 90 TABLET | Refills: 0 | Status: SHIPPED | OUTPATIENT
Start: 2024-09-30

## 2024-10-10 ENCOUNTER — EVALUATION (OUTPATIENT)
Dept: PHYSICAL THERAPY | Facility: CLINIC | Age: 69
End: 2024-10-10
Payer: OTHER MISCELLANEOUS

## 2024-10-10 DIAGNOSIS — M25.561 ACUTE PAIN OF RIGHT KNEE: ICD-10-CM

## 2024-10-10 DIAGNOSIS — Z98.890 S/P MUSCULOSKELETAL SYSTEM SURGERY: Primary | ICD-10-CM

## 2024-10-10 PROCEDURE — 97112 NEUROMUSCULAR REEDUCATION: CPT | Performed by: PHYSICAL THERAPIST

## 2024-10-10 PROCEDURE — 97140 MANUAL THERAPY 1/> REGIONS: CPT | Performed by: PHYSICAL THERAPIST

## 2024-10-10 PROCEDURE — 97110 THERAPEUTIC EXERCISES: CPT | Performed by: PHYSICAL THERAPIST

## 2024-10-10 PROCEDURE — 97161 PT EVAL LOW COMPLEX 20 MIN: CPT | Performed by: PHYSICAL THERAPIST

## 2024-10-10 NOTE — PROGRESS NOTES
PT Evaluation     Today's date: 10/10/2024  Patient name: Tobin Kerns  : 1955  MRN: [id number]  Referring provider: America Ambrose PA-C  Dx:   Encounter Diagnosis     ICD-10-CM    1. S/P musculoskeletal system surgery  Z98.890 Ambulatory Referral to Physical Therapy      2. Acute pain of right knee  M25.561           Start Time: 1800  Stop Time: 1845  Total time in clinic (min): 45 minutes    Assessment  Impairments: abnormal or restricted ROM, activity intolerance, impaired physical strength, lacks appropriate home exercise program, pain with function and weight-bearing intolerance  Functional limitations: self-care/ADLs, household activities, ambulation, and stair negotiation.    Assessment details: Pt is a 68 y/o male presenting to physical therapy s/p R quad tendon repair on . Upon examination, pt presents with impairments of decreased strength, decreased ROM, and increased pain of pt's R knee resulting in limitations of self-care/ADLs, household activities, ambulation, and stair negotiation. Pt plan of care will focus on improving strength and ROM of pt's R knee and hip as well as decreasing pain and improving tolerance to functional activities. Pt would benefit from skilled physical therapy to facilitate a return to his prior level of function.   Understanding of Dx/Px/POC: good     Prognosis: good    Goals  STG - 4 weeks  1. Pt will be able to ambulate with no AD and brace to facilitate a return to his prior level of function  2. Pt's FOTO score will improve by 15 points or better to facilitate a return to pt's prior level of function.     LTG - 8 weeks  1. Pt will demonstrate WNL AROM of his R knee in all planes to facilitate a return to his prior level of function  2. Pt will demonstrate 4+/5 gross strength or better of his R knee and hip in all planes to facilitate a return to his prior level of function      Plan  Patient would benefit from: PT eval and skilled physical therapy  Planned  modality interventions: cryotherapy, thermotherapy: hydrocollator packs and unattended electrical stimulation    Planned therapy interventions: activity modification, ADL training, balance, behavior modification, balance/weight bearing training, flexibility, functional ROM exercises, gait training, graded exercise, home exercise program, IASTM, joint mobilization, kinesiology taping, manual therapy, massage, Miller taping, nerve gliding, neuromuscular re-education, self care, patient/caregiver education, stretching, therapeutic activities, strengthening and therapeutic exercise    Frequency: 2x week  Duration in weeks: 8  Plan of Care beginning date: 10/10/2024  Plan of Care expiration date: 2024        Subjective Evaluation    History of Present Illness  Mechanism of injury: Pt is a 68 y/o male presenting to physical therapy s/p R quad tendon repair on . On , pt reports he walking down stairs when his R knee quad tendon tore and caused his R knee buckle. Pt reports going to the hospital that day where it was revealed her tore his R quad and had surgery on the . Pt reports going to inpatient rehab for a little two weeks. Pt reports he is still in the TROM c a walker and was advised to walk with the brace locked in extension. Pt reports he will have buckling of his R knee at times when walking. Pt reports he is doing stairs one step at a time. Pt reports he has been bending his R knee a little bit over this time period.   Patient Goals  Patient goals for therapy: decreased pain, increased motion, return to work and increased strength    Pain  Current pain ratin  At best pain ratin  At worst pain rating: 3  Location: R knee  Quality: dull ache  Relieving factors: rest  Aggravating factors: stair climbing, walking and standing  Progression: improved    Treatments  Previous treatment: medication  Current treatment: medication and physical therapy        Objective     Tenderness     Right Knee  "  Tenderness in the lateral joint line and medial joint line.     Active Range of Motion     Right Knee   Flexion: 100 degrees   Extension: -3 degrees     Mobility   Patellar Mobility:     Right Knee   Hypomobile: medial, lateral, superior and inferior     Strength/Myotome Testing     Right Hip   Planes of Motion   Flexion: 4-  Extension: 4-  Abduction: 4-  Adduction: 4-  External rotation: 4-  Internal rotation: 4-    Right Knee   Flexion: 3  Extension: 3    General Comments:      Knee Comments  Pt currently ambulating with brace locked in extension and using RW             Precautions: R quad tendon repair on 7/25    POC expires Unit limit Auth Expiration date PT/OT + Visit Limit?   12/5 N/A N/A BOMN                 Visit/Unit Tracking  AUTH Status:  Date 10/10              N/A Used 1               Remaining                  FOTO      Manuals 10/10            PROM R knee LAYTON                                                   Neuro Re-Ed             Education on POC, diagnosis, and HEP 5'            Quad set 1x10 5\"                                                                             Ther Ex             SLR c brace 4x5            Sidelying hip abduction c brace 4x5            Prone hip extension c brace 4x5                                                                             Ther Activity                                       Gait Training                                       Modalities                                            "

## 2024-10-15 ENCOUNTER — OFFICE VISIT (OUTPATIENT)
Dept: PHYSICAL THERAPY | Facility: CLINIC | Age: 69
End: 2024-10-15
Payer: OTHER MISCELLANEOUS

## 2024-10-15 DIAGNOSIS — Z98.890 S/P MUSCULOSKELETAL SYSTEM SURGERY: Primary | ICD-10-CM

## 2024-10-15 DIAGNOSIS — M25.561 ACUTE PAIN OF RIGHT KNEE: ICD-10-CM

## 2024-10-15 PROCEDURE — 97110 THERAPEUTIC EXERCISES: CPT | Performed by: PHYSICAL THERAPIST

## 2024-10-15 PROCEDURE — 97140 MANUAL THERAPY 1/> REGIONS: CPT | Performed by: PHYSICAL THERAPIST

## 2024-10-15 PROCEDURE — 97112 NEUROMUSCULAR REEDUCATION: CPT | Performed by: PHYSICAL THERAPIST

## 2024-10-15 PROCEDURE — 97530 THERAPEUTIC ACTIVITIES: CPT | Performed by: PHYSICAL THERAPIST

## 2024-10-15 NOTE — PROGRESS NOTES
"Daily Note     Today's date: 10/15/2024  Patient name: Tobin Kerns  : 1955  MRN: [id number]  Referring provider: America Ambrose PA-C  Dx:   Encounter Diagnosis     ICD-10-CM    1. S/P musculoskeletal system surgery  Z98.890       2. Acute pain of right knee  M25.561           Start Time: 1715  Stop Time: 1800  Total time in clinic (min): 45 minutes    Subjective: Pt reports doing well after his initial evaluation with improved motion of his R knee.       Objective: See treatment diary below      Assessment: Tolerated treatment well. Patient demonstrated fatigue post treatment, exhibited good technique with therapeutic exercises, and would benefit from continued PT. Pt was able to progress today with inclusion of step ups and mini squats into pt's exercise program. Pt required min verbal cues to facilitate performance of proper technique. Assess pt response to treatment at next visit.      Plan: Continue per plan of care.      Precautions: R quad tendon repair on     POC expires Unit limit Auth Expiration date PT/OT + Visit Limit?    N/A N/A BOMN                 Visit/Unit Tracking  AUTH Status:  Date 10/10 10/15             N/A Used 1 2              Remaining                  FOTO      Manuals 10/10 10/15           PROM R knee LAYTON LAYTON                                                  Neuro Re-Ed             Pt education 5' 5'           Quad set 1x10 5\" 2x10 5\"           Hvy bnd TKE  2x10 5\" purple           SLR c brace  3x10                                                  Ther Ex                          Sidelying hip abduction c brace 4x5 2x10           Prone hip extension c brace 4x5 2x10           Upright bike for LE ROM  5'           Heel slides  2x10 5\"           Heel raises  2x10                                     Ther Activity             Step ups  2x5 6\"           Mini squats  2x10           Gait Training                                       Modalities                                     "

## 2024-10-17 ENCOUNTER — OFFICE VISIT (OUTPATIENT)
Dept: PHYSICAL THERAPY | Facility: CLINIC | Age: 69
End: 2024-10-17
Payer: OTHER MISCELLANEOUS

## 2024-10-17 DIAGNOSIS — M25.561 ACUTE PAIN OF RIGHT KNEE: ICD-10-CM

## 2024-10-17 DIAGNOSIS — Z98.890 S/P MUSCULOSKELETAL SYSTEM SURGERY: Primary | ICD-10-CM

## 2024-10-17 PROCEDURE — 97112 NEUROMUSCULAR REEDUCATION: CPT | Performed by: PHYSICAL THERAPIST

## 2024-10-17 PROCEDURE — 97110 THERAPEUTIC EXERCISES: CPT | Performed by: PHYSICAL THERAPIST

## 2024-10-17 PROCEDURE — 97140 MANUAL THERAPY 1/> REGIONS: CPT | Performed by: PHYSICAL THERAPIST

## 2024-10-17 NOTE — PROGRESS NOTES
"Daily Note     Today's date: 10/17/2024  Patient name: Tobin Kerns  : 1955  MRN: [id number]  Referring provider: America Ambrose PA-C  Dx:   Encounter Diagnosis     ICD-10-CM    1. S/P musculoskeletal system surgery  Z98.890       2. Acute pain of right knee  M25.561                      Subjective: Patient stated no significant pain prior to treatment session.       Objective: See treatment diary below      Assessment: Patient demonstrated quad lag with SLR flexion exercise; demonstrated one loss of stability with step up activity but able to regain stability independently.       Plan: Continue per plan of care.      Precautions: R quad tendon repair on     POC expires Unit limit Auth Expiration date PT/OT + Visit Limit?    N/A N/A BOMN                 Visit/Unit Tracking  AUTH Status:  Date 10/10 10/15 10/17            N/A Used 1 2 3             Remaining                  FOTO      Manuals 10/10 10/15 10/17          PROM R knee LAYTON LAYTON KK                                                 Neuro Re-Ed             Pt education 5' 5' 5'          Quad set 1x10 5\" 2x10 5\" 2x10 5\"          Hvy bnd TKE  2x10 5\" purple 2x10 5\" purple          SLR c brace  3x10 2x10                                                 Ther Ex                          Sidelying hip abduction c brace 4x5 2x10 2x10          Prone hip extension c brace 4x5 2x10 2x10          Upright bike for LE ROM  5' 5'          Heel slides  2x10 5\" 2x10 5\"          Heel raises  2x10 2x10                                    Ther Activity             Step ups  2x5 6\" 6\" 2x10          Mini squats  2x10 2x10          Gait Training                                       Modalities                                            "

## 2024-10-21 ENCOUNTER — OFFICE VISIT (OUTPATIENT)
Dept: OBGYN CLINIC | Facility: CLINIC | Age: 69
End: 2024-10-21

## 2024-10-21 VITALS
SYSTOLIC BLOOD PRESSURE: 162 MMHG | HEART RATE: 66 BPM | HEIGHT: 77 IN | WEIGHT: 313.4 LBS | DIASTOLIC BLOOD PRESSURE: 97 MMHG | BODY MASS INDEX: 37.01 KG/M2

## 2024-10-21 DIAGNOSIS — Z98.890 S/P MUSCULOSKELETAL SYSTEM SURGERY: Primary | ICD-10-CM

## 2024-10-21 PROCEDURE — 99024 POSTOP FOLLOW-UP VISIT: CPT | Performed by: ORTHOPAEDIC SURGERY

## 2024-10-21 NOTE — PROGRESS NOTES
"Patient Name:  Tobin Kerns  MRN:  [id number]    Assessment & Plan     1. S/P musculoskeletal system surgery          Approximately 3 months s/p Right quadriceps tendon repair performed on 7/25/24.   Overall, the patient is doing well today though he has residual weakness  Continue physical therapy and home exercises  May unlock and wean from brace as strength and motion allow with physical therapy  OTC analgesics as needed for symptom management  Follow up 6-8 weeks for reevaluation        History of the Present Illness   Tobin Kerns is a 69 y.o. male approximately 3 months s/p Right quadriceps tendon repair performed on 7/25/24. Today the patient reports he is doing well overall. He presents in his knee brace today. The patient continues physical therapy as prescribed and does his home exercises. He is not having any pain today. Tylenol is used for symptom management.       Review of Systems     Review of Systems   Constitutional:  Negative for appetite change and unexpected weight change.   HENT:  Negative for congestion and trouble swallowing.    Eyes:  Negative for visual disturbance.   Respiratory:  Negative for cough and shortness of breath.    Cardiovascular:  Negative for chest pain and palpitations.   Gastrointestinal:  Negative for nausea and vomiting.   Endocrine: Negative for cold intolerance and heat intolerance.   Musculoskeletal:  Negative for joint swelling and myalgias.   Skin:  Negative for rash.   Neurological:  Negative for numbness.       Physical Exam     /97   Pulse 66   Ht 6' 5\" (1.956 m)   Wt (!) 142 kg (313 lb 6.4 oz)   BMI 37.16 kg/m²     Right Knee  Surgical incision is intact well healing, without signs of infection  Range of motion 0 to 105 degrees   There is normal postoperative  effusion.   The patient is able to perform a straight leg raise with brace in place, unable to perform straight leg raise without brace.  Calf soft, compressible and nontender   The patient is " neurovascular intact distally.      Data Review     I have personally reviewed pertinent films in PACS, and my interpretation follows.    No new images    Social History     Tobacco Use    Smoking status: Former     Current packs/day: 0.00     Average packs/day: 1 pack/day for 15.0 years (15.0 ttl pk-yrs)     Types: Cigarettes     Start date: 1980     Quit date: 2005     Years since quittin.8     Passive exposure: Never    Smokeless tobacco: Never   Vaping Use    Vaping status: Never Used   Substance Use Topics    Alcohol use: Yes     Alcohol/week: 1.0 standard drink of alcohol     Types: 1 Cans of beer per week    Drug use: Not Currently     Types: Marijuana     Comment: Last Marijuana Use ; Other unknown drugs while in [place]           Procedures  None performed.     Amirah Chavez   Scribe Attestation      I,:  Amirah Chavez am acting as a scribe while in the presence of the attending physician.:       I,:  Fletcher Foster, DO personally performed the services described in this documentation    as scribed in my presence.:

## 2024-10-22 ENCOUNTER — OFFICE VISIT (OUTPATIENT)
Dept: PHYSICAL THERAPY | Facility: CLINIC | Age: 69
End: 2024-10-22
Payer: OTHER MISCELLANEOUS

## 2024-10-22 DIAGNOSIS — Z98.890 S/P MUSCULOSKELETAL SYSTEM SURGERY: Primary | ICD-10-CM

## 2024-10-22 DIAGNOSIS — M25.561 ACUTE PAIN OF RIGHT KNEE: ICD-10-CM

## 2024-10-22 PROCEDURE — 97530 THERAPEUTIC ACTIVITIES: CPT

## 2024-10-22 PROCEDURE — 97110 THERAPEUTIC EXERCISES: CPT

## 2024-10-22 PROCEDURE — 97112 NEUROMUSCULAR REEDUCATION: CPT

## 2024-10-22 NOTE — PROGRESS NOTES
"Daily Note     Today's date: 10/22/2024  Patient name: Tobin Kerns  : 1955  MRN: [id number]  Referring provider: America Ambrose PA-C  Dx:   Encounter Diagnosis     ICD-10-CM    1. S/P musculoskeletal system surgery  Z98.890       2. Acute pain of right knee  M25.561                      Subjective: Pt reports he is still doing well, he saw Dr. Foster who is pleased with his progress thus far.       Objective: See treatment diary below      Assessment: Tolerated treatment well. Pt demonstrates good effort throughout session. Minimal cueing given to facilitate proper exercise performance and technique. He demonstrates quad lag with brace on when performing SLRs into flexion. As fatigue increases in his R quads his knee bounces into extension with step ups. Increased resistance for TKE this visit. Patient demonstrated fatigue post treatment, exhibited good technique with therapeutic exercises, and would benefit from continued PT      Plan: Continue per plan of care.      Precautions: R quad tendon repair on     POC expires Unit limit Auth Expiration date PT/OT + Visit Limit?    N/A N/A BOMN                 Visit/Unit Tracking  AUTH Status:  Date 10/10 10/15 10/17 10/22           N/A Used 1 2 3 4            Remaining                  FOTO      Manuals 10/10 10/15 10/17 10/22         PROM R knee LAYTON LAYTON KK                                                 Neuro Re-Ed             Pt education 5' 5' 5' 5'         Quad set 1x10 5\" 2x10 5\" 2x10 5\" 2x10 5\"         Hvy bnd TKE  2x10 5\" purple 2x10 5\" purple 2x10 5\" grn         SLR c brace  3x10 2x10 2x10                                                Ther Ex                          Sidelying hip abduction c brace 4x5 2x10 2x10 2x10         Prone hip extension c brace 4x5 2x10 2x10 2x10         Upright bike for LE ROM  5' 5' 5'         Heel slides  2x10 5\" 2x10 5\" 2x10 5\"         Heel raises  2x10 2x10 2x10                                   Ther Activity     " "        Step ups  2x5 6\" 6\" 2x10 6\" 2x10         Mini squats  2x10 2x10 2x10          Gait Training                                       Modalities                                              "

## 2024-10-23 ENCOUNTER — OFFICE VISIT (OUTPATIENT)
Dept: URGENT CARE | Facility: CLINIC | Age: 69
End: 2024-10-23
Payer: MEDICARE

## 2024-10-23 VITALS
RESPIRATION RATE: 18 BRPM | TEMPERATURE: 98.1 F | SYSTOLIC BLOOD PRESSURE: 134 MMHG | OXYGEN SATURATION: 97 % | HEART RATE: 51 BPM | DIASTOLIC BLOOD PRESSURE: 82 MMHG

## 2024-10-23 DIAGNOSIS — L97.512 RIGHT FOOT ULCER, WITH FAT LAYER EXPOSED (HCC): Primary | ICD-10-CM

## 2024-10-23 PROCEDURE — G0463 HOSPITAL OUTPT CLINIC VISIT: HCPCS | Performed by: PHYSICIAN ASSISTANT

## 2024-10-23 PROCEDURE — 99214 OFFICE O/P EST MOD 30 MIN: CPT | Performed by: PHYSICIAN ASSISTANT

## 2024-10-23 RX ORDER — CEPHALEXIN 500 MG/1
500 CAPSULE ORAL EVERY 8 HOURS SCHEDULED
Qty: 21 CAPSULE | Refills: 0 | Status: SHIPPED | OUTPATIENT
Start: 2024-10-23 | End: 2024-10-30

## 2024-10-23 RX ORDER — CEPHALEXIN 500 MG/1
500 CAPSULE ORAL EVERY 8 HOURS SCHEDULED
Qty: 21 CAPSULE | Refills: 0 | Status: SHIPPED | OUTPATIENT
Start: 2024-10-23 | End: 2024-10-23

## 2024-10-23 NOTE — PROGRESS NOTES
[facility]        NAME: Tobin Kerns is a 69 y.o. male  : 1955    MRN: [id number]  DATE: 2024  TIME: 6:21 PM      Assessment and Plan     Right foot ulcer, with fat layer exposed (HCC) [L97.512]  1. Right foot ulcer, with fat layer exposed (HCC)  cephalexin (KEFLEX) 500 mg capsule    DISCONTINUED: cephalexin (KEFLEX) 500 mg capsule        Note:   Patient to continue his wound care regimen   Only slight SQ tissue exposed in each ulcer   Rx Keflex in case there is an infection/cellulitis starting since the second tow was red this morning   Bandaged wounds in office with bacitracin and bandages   Patient to follow up with wound care, podiatry, or the ER if any worsening/persisting wounds     Patient Instructions   There are no Patient Instructions on file for this visit.     Follow up with primary care provider.   Go to ER if symptoms worsen.    Chief Complaint     Chief Complaint   Patient presents with    Blister     Pt states he has a blister on his right foot 2nd and 3rd toe for 1 week. Pt has been applying betadine, gauze, antispetic and bandaid          History of Present Illness     Patient presents with ulcers on his right second and third toes x 1 week. He states he was wearing new shoes and he has neuropathy, so he didn't feel them forming. He has been cleaning them with betadine, antiseptic spray, applying bacitracin, and bandages. He is concerned because the second toe was red this morning, but states the color improved throughout the day.         Review of Systems     Review of Systems   Constitutional:  Negative for chills, fatigue and fever.   HENT:  Negative for congestion, ear pain, postnasal drip, rhinorrhea, sinus pressure, sinus pain, sneezing and sore throat.    Eyes:  Negative for pain and visual disturbance.   Respiratory:  Negative for cough and shortness of breath.    Cardiovascular:  Negative for chest pain and palpitations.   Gastrointestinal:  Negative for  abdominal pain, diarrhea, nausea and vomiting.   Genitourinary:  Negative for dysuria and hematuria.   Musculoskeletal:  Negative for arthralgias, back pain and myalgias.   Skin:  Positive for wound. Negative for rash.   Neurological:  Negative for dizziness, seizures, syncope, numbness and headaches.   All other systems reviewed and are negative.        Current Medications       Current Outpatient Medications:     Ascorbic Acid (Vitamin C) 250 MG CHEW, Chew 250 mg daily Take 2 chews daily , Disp: , Rfl:     aspirin (ECOTRIN LOW STRENGTH) 81 mg EC tablet, Take 1 tablet (81 mg total) by mouth 2 (two) times a day, Disp: , Rfl:     cephalexin (KEFLEX) 500 mg capsule, Take 1 capsule (500 mg total) by mouth every 8 (eight) hours for 7 days, Disp: 21 capsule, Rfl: 0    Cholecalciferol 125 MCG (5000 UT) TABS, Take 5,000 Units by mouth daily  , Disp: , Rfl:     cyanocobalamin (VITAMIN B-12) 1000 MCG tablet, Take 1,000 mcg by mouth daily, Disp: , Rfl:     famotidine (PEPCID) 20 mg tablet, Take 1 tablet (20 mg total) by mouth 2 (two) times a day as needed for heartburn, Disp: 180 tablet, Rfl: 1    losartan (COZAAR) 50 mg tablet, Take 1 tablet (50 mg total) by mouth daily, Disp: 90 tablet, Rfl: 2    metFORMIN (GLUCOPHAGE-XR) 500 mg 24 hr tablet, Take 3 tablets (1,500 mg total) by mouth daily with dinner Increase as directed., Disp: 270 tablet, Rfl: 2    multivitamin (THERAGRAN) TABS, Take 1 tablet by mouth daily, Disp: , Rfl:     simvastatin (ZOCOR) 20 mg tablet, TAKE 1 TABLET BY MOUTH DAILY AT BEDTIME, Disp: 90 tablet, Rfl: 0    tamsulosin (FLOMAX) 0.4 mg, Take 1 capsule (0.4 mg total) by mouth daily with dinner, Disp: 90 capsule, Rfl: 3    acetaminophen (TYLENOL) 325 mg tablet, Take 2 tablets (650 mg total) by mouth every 6 (six) hours as needed for mild pain, headaches or fever (Patient not taking: Reported on 10/23/2024), Disp: , Rfl:     Current Allergies     Allergies as of 10/23/2024    (No Known Allergies)               The following portions of the patient's history were reviewed and updated as appropriate: allergies, current medications, past family history, past medical history, past social history, past surgical history, and problem list.     Past Medical History:   Diagnosis Date    Abdominal aortic ectasia (HCC) 11/13/2023    Next Abdominal Aortic U/S due 11/13/27    Closed fracture of right foot     As a child    Diverticulosis     Drusen (degenerative) of macula, bilateral 09/27/2021    Gastro-esophageal reflux disease without esophagitis 05/30/2019    GERD (gastroesophageal reflux disease) 2010    Hammer toe 15 years    History of colon polyps     Hypertension 09/27/2021    IFG (impaired fasting glucose) 10/01/2021    Internal hemorrhoids     Kidney stone 2007    Mixed hyperlipidemia 05/30/2019    Morbid obesity (HCC) 09/27/2021    Myopia, bilateral 09/27/2021    JOVANY on CPAP     Peripheral neuropathy 09/27/2021    Pinguecula, bilateral 09/27/2021    Presbyopia 10/11/2016    Pure hypertriglyceridemia 08/19/2019    Radial fracture 1975    Stress Fracture - Right Arm    Regular astigmatism, bilateral 09/27/2021    Tendonitis     Left shoulder       Past Surgical History:   Procedure Laterality Date    COLONOSCOPY      EGD  04/14/2022    Gastric Erosisons, Esophagitis    FIBULA FRACTURE SURGERY Right 2009    Hardware - Plate and screws in place    FRACTURE SURGERY  2005    KNEE SURGERY      QUADRICEPS TENDON REPAIR Right 07/25/2024    Procedure: REPAIR TENDON QUADRICEPS- Right;  Surgeon: Fletcher Foster DO;  Location: [facility];  Service: Orthopedics    SKIN GRAFT  2021    Right foot     TONSILECTOMY AND ADNOIDECTOMY      TONSILLECTOMY         Family History   Problem Relation Age of Onset    Cancer Mother 68        Type Unknown    COPD Father         Previous smoker    Neuropathy Father     Neuropathy Sister     Neuropathy Sister     Neuropathy Brother     Neuropathy Brother     Arthritis Maternal Grandmother      Arthritis Maternal Aunt          Medications have been verified.        Objective     /82   Pulse (!) 51   Temp 98.1 °F (36.7 °C)   Resp 18   SpO2 97%   No LMP for male patient.         Physical Exam     Physical Exam  Vitals and nursing note reviewed.   Constitutional:       Appearance: Normal appearance. He is obese.   HENT:      Head: Normocephalic and atraumatic.   Cardiovascular:      Rate and Rhythm: Normal rate and regular rhythm.      Heart sounds: Normal heart sounds.   Pulmonary:      Effort: Pulmonary effort is normal.      Breath sounds: Normal breath sounds.   Skin:     General: Skin is warm.      Findings: Lesion present.      Comments: 1cm diameter ulcer on DIP of second and third right toes. Depth is a few millimeters each with slight SQ tissue exposed at the base. Minimal erythema directly around the wounds. Very scant drainage present.    Neurological:      General: No focal deficit present.      Mental Status: He is alert and oriented to person, place, and time.   Psychiatric:         Mood and Affect: Mood normal.         Behavior: Behavior normal.

## 2024-10-24 ENCOUNTER — OFFICE VISIT (OUTPATIENT)
Dept: PHYSICAL THERAPY | Facility: CLINIC | Age: 69
End: 2024-10-24
Payer: OTHER MISCELLANEOUS

## 2024-10-24 DIAGNOSIS — M25.561 ACUTE PAIN OF RIGHT KNEE: ICD-10-CM

## 2024-10-24 DIAGNOSIS — Z98.890 S/P MUSCULOSKELETAL SYSTEM SURGERY: Primary | ICD-10-CM

## 2024-10-24 PROCEDURE — 97110 THERAPEUTIC EXERCISES: CPT | Performed by: PHYSICAL THERAPIST

## 2024-10-24 PROCEDURE — 97530 THERAPEUTIC ACTIVITIES: CPT | Performed by: PHYSICAL THERAPIST

## 2024-10-24 PROCEDURE — 97140 MANUAL THERAPY 1/> REGIONS: CPT | Performed by: PHYSICAL THERAPIST

## 2024-10-24 PROCEDURE — 97112 NEUROMUSCULAR REEDUCATION: CPT | Performed by: PHYSICAL THERAPIST

## 2024-10-24 NOTE — PROGRESS NOTES
"Daily Note     Today's date: 10/24/2024  Patient name: Tobin Kerns  : 1955  MRN: [id number]  Referring provider: America Ambrose PA-C  Dx:   Encounter Diagnosis     ICD-10-CM    1. S/P musculoskeletal system surgery  Z98.890       2. Acute pain of right knee  M25.561           Start Time: 1716  Stop Time: 1800  Total time in clinic (min): 44 minutes    Subjective: Pt reports he continues to have improvements in his motion and strength of his R knee.       Objective: See treatment diary below      Assessment: Tolerated treatment well. Patient demonstrated fatigue post treatment, exhibited good technique with therapeutic exercises, and would benefit from continued PT. Pt was able to progress today by increasing sets for his SLR exercises. Pt required min verbal cues to facilitate performance of proper technique. Assess pt response to treatment at next visit.      Plan: Continue per plan of care.      Precautions: R quad tendon repair on     POC expires Unit limit Auth Expiration date PT/OT + Visit Limit?    N/A N/A BOMN                 Visit/Unit Tracking  AUTH Status:  Date 10/10 10/15 10/17 10/22 10/24          N/A Used 1 2 3 4 5           Remaining                  FOTO, do NV      Manuals 10/10 10/15 10/17 10/22 10/24        PROM R knee LAYTON LAYTON KK  LAYTON                                               Neuro Re-Ed             Pt education 5' 5' 5' 5' 5'        Quad set 1x10 5\" 2x10 5\" 2x10 5\" 2x10 5\" 2x10 5\"        Hvy bnd TKE  2x10 5\" purple 2x10 5\" purple 2x10 5\" grn 2x10 5\" grn        SLR c brace  3x10 2x10 2x10 3x10                                               Ther Ex                          Sidelying hip abduction c brace 4x5 2x10 2x10 2x10 3x10        Prone hip extension c brace 4x5 2x10 2x10 2x10 3x10        Upright bike for LE ROM  5' 5' 5' 5'        Heel slides  2x10 5\" 2x10 5\" 2x10 5\" 2x10 5\"        Heel raises  2x10 2x10 2x10 3x10                                  Ther Activity           " "  Step ups  2x5 6\" 6\" 2x10 6\" 2x10 6\" 2x10        Mini squats  2x10 2x10 2x10  3x10        Gait Training                                       Modalities                                                "

## 2024-10-29 ENCOUNTER — OFFICE VISIT (OUTPATIENT)
Dept: PHYSICAL THERAPY | Facility: CLINIC | Age: 69
End: 2024-10-29
Payer: OTHER MISCELLANEOUS

## 2024-10-29 DIAGNOSIS — Z98.890 S/P MUSCULOSKELETAL SYSTEM SURGERY: Primary | ICD-10-CM

## 2024-10-29 DIAGNOSIS — M25.561 ACUTE PAIN OF RIGHT KNEE: ICD-10-CM

## 2024-10-29 PROCEDURE — 97112 NEUROMUSCULAR REEDUCATION: CPT

## 2024-10-29 PROCEDURE — 97110 THERAPEUTIC EXERCISES: CPT

## 2024-10-29 PROCEDURE — 97530 THERAPEUTIC ACTIVITIES: CPT

## 2024-10-29 NOTE — PROGRESS NOTES
"Daily Note     Today's date: 10/29/2024  Patient name: Tobin Kerns  : 1955  MRN: [id number]  Referring provider: America Ambrose PA-C  Dx:   Encounter Diagnosis     ICD-10-CM    1. S/P musculoskeletal system surgery  Z98.890       2. Acute pain of right knee  M25.561           Start Time: 1715  Stop Time: 1804  Total time in clinic (min): 49 minutes    Subjective: Pt reports he is doing alright, no new complaints.       Objective: See treatment diary below      Assessment: Tolerated treatment well. Pt demonstrates good effort throughout session. Minimal cueing given to facilitate proper exercise performance and technique. He demonstrates good tolerance for progressions made this session. He still lacks control with TKE going from about 15 degrees of knee flexion to 0 degrees of extension. Added leg press today without bouncing into hyperextension. Continue to progress. Patient demonstrated fatigue post treatment, exhibited good technique with therapeutic exercises, and would benefit from continued PT      Plan: Continue per plan of care.      Precautions: R quad tendon repair on     POC expires Unit limit Auth Expiration date PT/OT + Visit Limit?    N/A N/A BOMN                 Visit/Unit Tracking  AUTH Status:  Date 10/10 10/15 10/17 10/22 10/24 10/29         N/A Used 1 2 3 4 5 6          Remaining                  FOTO, do NV      Manuals 10/10 10/15 10/17 10/22 10/24 10/29       PROM R knee LAYTON LAYTON KK  LAYTON                                               Neuro Re-Ed             Pt education 5' 5' 5' 5' 5' 5'       Quad set 1x10 5\" 2x10 5\" 2x10 5\" 2x10 5\" 2x10 5\" 3x10 5\"       Hvy bnd TKE  2x10 5\" purple 2x10 5\" purple 2x10 5\" grn 2x10 5\" grn 2x10 5\" grn       SLR c brace  3x10 2x10 2x10 3x10 3x10       LAQ      2x10                                 Ther Ex                          Sidelying hip abduction c brace 4x5 2x10 2x10 2x10 3x10 3x10       Prone hip extension c brace 4x5 2x10 2x10 2x10 3x10 " "3x10       Upright bike for LE ROM  5' 5' 5' 5' 7' L6       Heel slides  2x10 5\" 2x10 5\" 2x10 5\" 2x10 5\" 3x10 5\"       Heel raises  2x10 2x10 2x10 3x10 3x10       SL leg press      1x10 65#, 2x10 75#                    Ther Activity             Step ups  2x5 6\" 6\" 2x10 6\" 2x10 6\" 2x10 6\" 2x10       Mini squats  2x10 2x10 2x10  3x10 3x10       Gait Training                                       Modalities                                                  "

## 2024-10-31 ENCOUNTER — OFFICE VISIT (OUTPATIENT)
Dept: PHYSICAL THERAPY | Facility: CLINIC | Age: 69
End: 2024-10-31
Payer: OTHER MISCELLANEOUS

## 2024-10-31 DIAGNOSIS — M25.561 ACUTE PAIN OF RIGHT KNEE: ICD-10-CM

## 2024-10-31 DIAGNOSIS — Z98.890 S/P MUSCULOSKELETAL SYSTEM SURGERY: Primary | ICD-10-CM

## 2024-10-31 PROCEDURE — 97530 THERAPEUTIC ACTIVITIES: CPT

## 2024-10-31 PROCEDURE — 97112 NEUROMUSCULAR REEDUCATION: CPT

## 2024-10-31 PROCEDURE — 97110 THERAPEUTIC EXERCISES: CPT

## 2024-10-31 NOTE — PROGRESS NOTES
"Daily Note     Today's date: 10/31/2024  Patient name: Tobin Kerns  : 1955  MRN: [id number]  Referring provider: America Ambrose PA-C  Dx:   Encounter Diagnosis     ICD-10-CM    1. S/P musculoskeletal system surgery  Z98.890       2. Acute pain of right knee  M25.561           Start Time: 1700  Stop Time: 1745  Total time in clinic (min): 45 minutes    Subjective: Pt reports he is still doing good with his leg but it still has limited control in extension.       Objective: See treatment diary below      Assessment: Tolerated treatment well. Pt demonstrates good effort throughout session. Minimal cueing given to facilitate proper exercise performance and technique. He tolerates increased reps and increased weight for leg press today. He continues with lack of control in the last 10-0 degrees of CKC knee extension. Patient demonstrated fatigue post treatment, exhibited good technique with therapeutic exercises, and would benefit from continued PT      Plan: Continue per plan of care.      Precautions: R quad tendon repair on     POC expires Unit limit Auth Expiration date PT/OT + Visit Limit?   / N/A N/A BOMN                 Visit/Unit Tracking  AUTH Status:  Date 10/10 10/15 10/17 10/22 10/24 10/29 10/31        N/A Used 1 2 3 4 5 6 7         Remaining                  FOTO, done 10/31      Manuals 10/10 10/15 10/17 10/22 10/24 10/29 10/31      PROM R knee LAYTON LAYTON KK  LAYTON                                               Neuro Re-Ed             Pt education 5' 5' 5' 5' 5' 5' 5'      Quad set 1x10 5\" 2x10 5\" 2x10 5\" 2x10 5\" 2x10 5\" 3x10 5\" 3x10 5\"      Hvy bnd TKE  2x10 5\" purple 2x10 5\" purple 2x10 5\" grn 2x10 5\" grn 2x10 5\" grn 3x10 5\" grn      SLR c brace  3x10 2x10 2x10 3x10 3x10 3x10      LAQ      2x10 3x10                                Ther Ex                          Sidelying hip abduction c brace 4x5 2x10 2x10 2x10 3x10 3x10 3x10      Prone hip extension c brace 4x5 2x10 2x10 2x10 3x10 3x10 3x10   " "   Upright bike for LE ROM  5' 5' 5' 5' 7' L6 8' L7      Heel slides  2x10 5\" 2x10 5\" 2x10 5\" 2x10 5\" 3x10 5\" 3x10 5\"      Heel raises  2x10 2x10 2x10 3x10 3x10 3x10      SL leg press      1x10 65#, 2x10 75# 1x10 75#, 2x10 85#                   Ther Activity             Step ups  2x5 6\" 6\" 2x10 6\" 2x10 6\" 2x10 6\" 2x10 6\" 3x10      Mini squats  2x10 2x10 2x10  3x10 3x10 3x10      Gait Training                                       Modalities                                                    "

## 2024-11-05 ENCOUNTER — OFFICE VISIT (OUTPATIENT)
Dept: PHYSICAL THERAPY | Facility: CLINIC | Age: 69
End: 2024-11-05
Payer: MEDICARE

## 2024-11-05 DIAGNOSIS — M25.561 ACUTE PAIN OF RIGHT KNEE: ICD-10-CM

## 2024-11-05 DIAGNOSIS — Z98.890 S/P MUSCULOSKELETAL SYSTEM SURGERY: Primary | ICD-10-CM

## 2024-11-05 PROCEDURE — 97530 THERAPEUTIC ACTIVITIES: CPT

## 2024-11-05 PROCEDURE — 97110 THERAPEUTIC EXERCISES: CPT

## 2024-11-05 PROCEDURE — 97112 NEUROMUSCULAR REEDUCATION: CPT

## 2024-11-05 NOTE — PROGRESS NOTES
"Daily Note     Today's date: 2024  Patient name: Tobin Kerns  : 1955  MRN: [id number]  Referring provider: America Ambrose PA-C  Dx:   Encounter Diagnosis     ICD-10-CM    1. S/P musculoskeletal system surgery  Z98.890       2. Acute pain of right knee  M25.561           Start Time: 1715  Stop Time: 1800  Total time in clinic (min): 45 minutes    Subjective: Pt reports no new complaints.       Objective: See treatment diary below      Assessment: Tolerated treatment well. Pt demonstrates good effort throughout session. Minimal cueing given to facilitate proper exercise performance and technique. Significant quad lag still present with supine SLRs with brace being used. Cueing for TKE emphasized throughout whole session. May attempt russian stim with quad sets NV to improve R quad strength further.  Swelling in his knee persists and is more apparent when he takes his brace off, it leaves indents in his leg and swelling collects in his foot. He reports it improves every night when he lays down. Patient demonstrated fatigue post treatment, exhibited good technique with therapeutic exercises, and would benefit from continued PT      Plan: Continue per plan of care.      Precautions: R quad tendon repair on     POC expires Unit limit Auth Expiration date PT/OT + Visit Limit?    N/A N/A BOMN                 Visit/Unit Tracking  AUTH Status:  Date 10/10 10/15 10/17 10/22 10/24 10/29 10/31 11/5       N/A Used 1 2 3 4 5 6 7 8        Remaining                  FOTO, done 10/31      Manuals 10/10 10/15 10/17 10/22 10/24 10/29 10/31 11     PROM R knee LAYTON LAYTON KK  LAYTON                                               Neuro Re-Ed             Pt education 5' 5' 5' 5' 5' 5' 5' 5'     Quad set 1x10 5\" 2x10 5\" 2x10 5\" 2x10 5\" 2x10 5\" 3x10 5\" 3x10 5\" 3x10 5\" C russain nv    Hvy bnd TKE  2x10 5\" purple 2x10 5\" purple 2x10 5\" grn 2x10 5\" grn 2x10 5\" grn 3x10 5\" grn 3x10 5\" grn     SLR c brace  3x10 2x10 2x10 3x10 " "3x10 3x10 3x10     LAQ      2x10 3x10 2x10 AA end range                               Ther Ex                          Sidelying hip abduction c brace 4x5 2x10 2x10 2x10 3x10 3x10 3x10 3x10     Prone hip extension c brace 4x5 2x10 2x10 2x10 3x10 3x10 3x10 3x10     Upright bike for LE ROM  5' 5' 5' 5' 7' L6 8' L7 8' L8-9     Heel slides  2x10 5\" 2x10 5\" 2x10 5\" 2x10 5\" 3x10 5\" 3x10 5\" 3x10 5\"     Heel raises  2x10 2x10 2x10 3x10 3x10 3x10 3x10     SL leg press      1x10 65#, 2x10 75# 1x10 75#, 2x10 85# 3x10 85#                  Ther Activity             Step ups  2x5 6\" 6\" 2x10 6\" 2x10 6\" 2x10 6\" 2x10 6\" 3x10 6\" 3x10     Mini squats  2x10 2x10 2x10  3x10 3x10 3x10 3x10     Gait Training                                       Modalities             [place] citizen stim c quad sets                                         "

## 2024-11-07 ENCOUNTER — OFFICE VISIT (OUTPATIENT)
Dept: PHYSICAL THERAPY | Facility: CLINIC | Age: 69
End: 2024-11-07
Payer: MEDICARE

## 2024-11-07 DIAGNOSIS — M25.561 ACUTE PAIN OF RIGHT KNEE: ICD-10-CM

## 2024-11-07 DIAGNOSIS — Z98.890 S/P MUSCULOSKELETAL SYSTEM SURGERY: Primary | ICD-10-CM

## 2024-11-07 PROCEDURE — 97112 NEUROMUSCULAR REEDUCATION: CPT

## 2024-11-07 PROCEDURE — 97530 THERAPEUTIC ACTIVITIES: CPT

## 2024-11-07 PROCEDURE — 97110 THERAPEUTIC EXERCISES: CPT

## 2024-11-07 NOTE — PROGRESS NOTES
"Daily Note     Today's date: 2024  Patient name: Tobin Kerns  : 1955  MRN: [id number]  Referring provider: America Ambrose PA-C  Dx:   Encounter Diagnosis     ICD-10-CM    1. S/P musculoskeletal system surgery  Z98.890       2. Acute pain of right knee  M25.561           Start Time: 1715  Stop Time: 1800  Total time in clinic (min): 45 minutes    Subjective: Pt reports no new complaints.       Objective: See treatment diary below      Assessment: Tolerated treatment well. Pt demonstrates good effort throughout session. Minimal cueing given to facilitate proper exercise performance and technique. He tolerates russain stim for his R quads well without complaints. Improvement in quad recruitment with AA LAQ today. Patient demonstrated fatigue post treatment, exhibited good technique with therapeutic exercises, and would benefit from continued PT      Plan: Continue per plan of care.      Precautions: R quad tendon repair on     POC expires Unit limit Auth Expiration date PT/OT + Visit Limit?    N/A N/A BOMN                 Visit/Unit Tracking  AUTH Status:  Date 10/10 10/15 10/17 10/22 10/24 10/29 10/31 11/5 11/7      N/A Used 1 2 3 4 5 6 7 8 9       Remaining                  FOTO, done 10/31      Manuals 10/10 10/15 10/17 10/22 10/24 10/29 10/31 11/5 11/7    PROM R knee LAYTON LAYTON KK  LAYTON                                               Neuro Re-Ed             Pt education 5' 5' 5' 5' 5' 5' 5' 5' 3'    Quad set 1x10 5\" 2x10 5\" 2x10 5\" 2x10 5\" 2x10 5\" 3x10 5\" 3x10 5\" 3x10 5\" C russain towel under knee    Hvy bnd TKE  2x10 5\" purple 2x10 5\" purple 2x10 5\" grn 2x10 5\" grn 2x10 5\" grn 3x10 5\" grn 3x10 5\" grn 3x10 5\" grn    SLR c brace  3x10 2x10 2x10 3x10 3x10 3x10 3x10 3x10    LAQ      2x10 3x10 2x10 AA end range 2x10 AA end range                              Ther Ex                          Sidelying hip abduction c brace 4x5 2x10 2x10 2x10 3x10 3x10 3x10 3x10 3x10    Prone hip extension c brace 4x5 " "2x10 2x10 2x10 3x10 3x10 3x10 3x10 3x10    Upright bike for LE ROM  5' 5' 5' 5' 7' L6 8' L7 8' L8-9 8' L8-9    Heel slides  2x10 5\" 2x10 5\" 2x10 5\" 2x10 5\" 3x10 5\" 3x10 5\" 3x10 5\" 3x10 5\"    Heel raises  2x10 2x10 2x10 3x10 3x10 3x10 3x10 3x10    SL leg press      1x10 65#, 2x10 75# 1x10 75#, 2x10 85# 3x10 85# 3x10 85#                 Ther Activity             Step ups  2x5 6\" 6\" 2x10 6\" 2x10 6\" 2x10 6\" 2x10 6\" 3x10 6\" 3x10 6\" 3x10    Mini squats  2x10 2x10 2x10  3x10 3x10 3x10 3x10 3x10    Gait Training                                       Modalities             [place]ian stim c quad sets         10' 10/30, intensity 86                                  "

## 2024-11-12 ENCOUNTER — APPOINTMENT (OUTPATIENT)
Dept: PHYSICAL THERAPY | Facility: CLINIC | Age: 69
End: 2024-11-12
Payer: OTHER MISCELLANEOUS

## 2024-11-14 ENCOUNTER — APPOINTMENT (OUTPATIENT)
Dept: PHYSICAL THERAPY | Facility: CLINIC | Age: 69
End: 2024-11-14
Payer: OTHER MISCELLANEOUS

## 2024-11-19 ENCOUNTER — EVALUATION (OUTPATIENT)
Dept: PHYSICAL THERAPY | Facility: CLINIC | Age: 69
End: 2024-11-19
Payer: OTHER MISCELLANEOUS

## 2024-11-19 DIAGNOSIS — S76.111A RUPTURE OF QUADRICEPS TENDON, RIGHT, INITIAL ENCOUNTER: Primary | ICD-10-CM

## 2024-11-19 PROCEDURE — 97530 THERAPEUTIC ACTIVITIES: CPT | Performed by: PHYSICAL THERAPIST

## 2024-11-19 PROCEDURE — 97112 NEUROMUSCULAR REEDUCATION: CPT | Performed by: PHYSICAL THERAPIST

## 2024-11-19 PROCEDURE — 97110 THERAPEUTIC EXERCISES: CPT | Performed by: PHYSICAL THERAPIST

## 2024-11-21 ENCOUNTER — OFFICE VISIT (OUTPATIENT)
Dept: PHYSICAL THERAPY | Facility: CLINIC | Age: 69
End: 2024-11-21
Payer: OTHER MISCELLANEOUS

## 2024-11-21 DIAGNOSIS — S76.111A RUPTURE OF QUADRICEPS TENDON, RIGHT, INITIAL ENCOUNTER: Primary | ICD-10-CM

## 2024-11-21 PROCEDURE — 97110 THERAPEUTIC EXERCISES: CPT

## 2024-11-21 PROCEDURE — 97112 NEUROMUSCULAR REEDUCATION: CPT

## 2024-11-21 PROCEDURE — 97530 THERAPEUTIC ACTIVITIES: CPT

## 2024-11-21 NOTE — PROGRESS NOTES
"Daily Note     Today's date: 2024  Patient name: Tobin Kerns  : 1955  MRN: [id number]  Referring provider: America Ambrose PA-C  Dx:   Encounter Diagnosis     ICD-10-CM    1. Rupture of quadriceps tendon, right, initial encounter  S76.111A           Start Time: 1755  Stop Time: 1845  Total time in clinic (min): 50 minutes    Subjective: Pt reports no new complaints.       Objective: See treatment diary below      Assessment: Tolerated treatment well. Pt demonstrates good effort throughout session. Minimal cueing given to facilitate proper exercise performance and technique. He demonstrates continued tolerance for program including NMES for R quad strengthening. Slightly more knee control observed today. Patient demonstrated fatigue post treatment, exhibited good technique with therapeutic exercises, and would benefit from continued PT      Plan: Continue per plan of care.      Precautions: R quad tendon repair on     POC expires 1 Month Re-Eval Auth Expiration date PT/OT + Visit Limit?    N/A BOMN                 Visit/Unit Tracking  AUTH Status:  Date 10/10 10/15 10/17 10/22 10/24 10/29 10/31 11/5 11/7 11/19 11/21    N/A Used 1 2 3 4 5 6 7 8 9 10 11     Remaining                  FOTO, done 10/31      Manuals 11/21   10/22 10/24 10/29 10/31 11/5 11/7 11/19   PROM R knee     LAYTON                                               Neuro Re-Ed             Pt education    5' 5' 5' 5' 5' 3' 3'   Quad set C russain towel under knee   2x10 5\" 2x10 5\" 3x10 5\" 3x10 5\" 3x10 5\" C russain towel under knee C russain towel under knee   Hvy bnd TKE 3x10 5\" grn   2x10 5\" grn 2x10 5\" grn 2x10 5\" grn 3x10 5\" grn 3x10 5\" grn 3x10 5\" grn 3x10 5\" grn   SLR c brace 3x10   2x10 3x10 3x10 3x10 3x10 3x10 3x10   LAQ      2x10 3x10 2x10 AA end range 2x10 AA end range NV                             Ther Ex                          Sidelying hip abduction c brace 3x10   2x10 3x10 3x10 3x10 3x10 3x10 3x10   Prone hip " "extension c brace 3x10   2x10 3x10 3x10 3x10 3x10 3x10 3x10   Upright bike for LE ROM 8' L8-9   5' 5' 7' L6 8' L7 8' L8-9 8' L8-9 8' L8-9   Heel slides 3x10 5\"   2x10 5\" 2x10 5\" 3x10 5\" 3x10 5\" 3x10 5\" 3x10 5\" 3x10 5\"   Heel raises 3x10   2x10 3x10 3x10 3x10 3x10 3x10 3x10   SL leg press 3x10 115#     1x10 65#, 2x10 75# 1x10 75#, 2x10 85# 3x10 85# 3x10 85# 3x10 115#                Ther Activity             Step ups 2x10 6\"   6\" 2x10 6\" 2x10 6\" 2x10 6\" 3x10 6\" 3x10 6\" 3x10 2x10 6\"   Mini squats 3x10   2x10  3x10 3x10 3x10 3x10 3x10 3x10   Gait Training                                       Modalities             [place] stim c quad sets 8' 10/10 intensity 89        10' 10/30, intensity 86 8' 10/10 intensity 72                       "

## 2024-11-26 ENCOUNTER — OFFICE VISIT (OUTPATIENT)
Dept: PHYSICAL THERAPY | Facility: CLINIC | Age: 69
End: 2024-11-26
Payer: OTHER MISCELLANEOUS

## 2024-11-26 DIAGNOSIS — S76.111A RUPTURE OF QUADRICEPS TENDON, RIGHT, INITIAL ENCOUNTER: Primary | ICD-10-CM

## 2024-11-26 DIAGNOSIS — Z98.890 S/P MUSCULOSKELETAL SYSTEM SURGERY: ICD-10-CM

## 2024-11-26 DIAGNOSIS — M25.561 ACUTE PAIN OF RIGHT KNEE: ICD-10-CM

## 2024-11-26 PROCEDURE — 97110 THERAPEUTIC EXERCISES: CPT

## 2024-11-26 PROCEDURE — 97112 NEUROMUSCULAR REEDUCATION: CPT

## 2024-11-26 PROCEDURE — 97530 THERAPEUTIC ACTIVITIES: CPT

## 2024-11-26 NOTE — PROGRESS NOTES
"Daily Note     Today's date: 2024  Patient name: Tobin Kerns  : 1955  MRN: [id number]  Referring provider: America Ambrose PA-C  Dx:   Encounter Diagnosis     ICD-10-CM    1. Rupture of quadriceps tendon, right, initial encounter  S76.111A       2. S/P musculoskeletal system surgery  Z98.890       3. Acute pain of right knee  M25.561           Start Time: 1720  Stop Time: 1810  Total time in clinic (min): 50 minutes    Subjective: Pt reports no new complaints today.       Objective: See treatment diary below      Assessment: Tolerated treatment well. Pt demonstrates good effort throughout session. Minimal cueing given to facilitate proper exercise performance and technique. No complaints verbalized during session today. Continue to progress as quad strength and control improves. Patient demonstrated fatigue post treatment, exhibited good technique with therapeutic exercises, and would benefit from continued PT      Plan: Continue per plan of care.      Precautions: R quad tendon repair on     POC expires 1 Month Re-Eval Auth Expiration date PT/OT + Visit Limit?    N/A BOMN                 Visit/Unit Tracking  AUTH Status:  Date 10/10 10/15 10/17 10/22 10/24 10/29 10/31 11/5 11/7 11/19 11/21 11/26   N/A Used 1 2 3 4 5 6 7 8 9 10 11 12    Remaining                  FOTO, done 10/31      Manuals 11/21 11/26  10/22 10/24 10/29 10/31 11/5 11/7 11/19   PROM R knee     LAYTON                                               Neuro Re-Ed             Pt education    5' 5' 5' 5' 5' 3' 3'   Quad set C russain towel under knee C russain towel under knee  2x10 5\" 2x10 5\" 3x10 5\" 3x10 5\" 3x10 5\" C russain towel under knee C russain towel under knee   Hvy bnd TKE 3x10 5\" grn 3x10 5\" grn  2x10 5\" grn 2x10 5\" grn 2x10 5\" grn 3x10 5\" grn 3x10 5\" grn 3x10 5\" grn 3x10 5\" grn   SLR c brace 3x10 3x10  2x10 3x10 3x10 3x10 3x10 3x10 3x10   LAQ      2x10 3x10 2x10 AA end range 2x10 AA end range NV                     " "        Ther Ex                          Sidelying hip abduction c brace 3x10 3x10  2x10 3x10 3x10 3x10 3x10 3x10 3x10   Prone hip extension c brace 3x10 3x10  2x10 3x10 3x10 3x10 3x10 3x10 3x10   Upright bike for LE ROM 8' L8-9 10' L8-9  5' 5' 7' L6 8' L7 8' L8-9 8' L8-9 8' L8-9   Heel slides 3x10 5\" 3x10 5\"  2x10 5\" 2x10 5\" 3x10 5\" 3x10 5\" 3x10 5\" 3x10 5\" 3x10 5\"   Heel raises 3x10 3x10  2x10 3x10 3x10 3x10 3x10 3x10 3x10   SL leg press 3x10 115# 3x10 115#     1x10 65#, 2x10 75# 1x10 75#, 2x10 85# 3x10 85# 3x10 85# 3x10 115#                Ther Activity             Lateral step ups  nv           Step ups 2x10 6\" 2x10 6\"  6\" 2x10 6\" 2x10 6\" 2x10 6\" 3x10 6\" 3x10 6\" 3x10 2x10 6\"   Mini squats 3x10 3x10  2x10  3x10 3x10 3x10 3x10 3x10 3x10   Gait Training                                       Modalities             [place] stim c quad sets 8' 10/10 intensity 89 8' 10/10 intensity 89       10' 10/30, intensity 86 8' 10/10 intensity 72                         "

## 2024-11-29 DIAGNOSIS — R73.01 IFG (IMPAIRED FASTING GLUCOSE): ICD-10-CM

## 2024-11-29 RX ORDER — METFORMIN HYDROCHLORIDE 500 MG/1
1500 TABLET, EXTENDED RELEASE ORAL
Qty: 270 TABLET | Refills: 1 | Status: SHIPPED | OUTPATIENT
Start: 2024-11-29 | End: 2024-12-02 | Stop reason: SDUPTHER

## 2024-12-02 ENCOUNTER — EVALUATION (OUTPATIENT)
Dept: PHYSICAL THERAPY | Facility: CLINIC | Age: 69
End: 2024-12-02
Payer: OTHER MISCELLANEOUS

## 2024-12-02 DIAGNOSIS — R73.01 IFG (IMPAIRED FASTING GLUCOSE): ICD-10-CM

## 2024-12-02 DIAGNOSIS — S76.111A RUPTURE OF QUADRICEPS TENDON, RIGHT, INITIAL ENCOUNTER: Primary | ICD-10-CM

## 2024-12-02 PROCEDURE — 97530 THERAPEUTIC ACTIVITIES: CPT | Performed by: PHYSICAL THERAPIST

## 2024-12-02 PROCEDURE — 97110 THERAPEUTIC EXERCISES: CPT | Performed by: PHYSICAL THERAPIST

## 2024-12-02 PROCEDURE — 97112 NEUROMUSCULAR REEDUCATION: CPT | Performed by: PHYSICAL THERAPIST

## 2024-12-02 RX ORDER — METFORMIN HYDROCHLORIDE 500 MG/1
1500 TABLET, EXTENDED RELEASE ORAL
Qty: 270 TABLET | Refills: 1 | Status: SHIPPED | OUTPATIENT
Start: 2024-12-02 | End: 2024-12-04 | Stop reason: SDUPTHER

## 2024-12-02 NOTE — PROGRESS NOTES
PT Re-Evaluation     Today's date: 2024  Patient name: Tobin Kerns  : 1955  MRN: [id number]  Referring provider: America Ambrose PA-C  Dx:   Encounter Diagnosis     ICD-10-CM    1. Rupture of quadriceps tendon, right, initial encounter  S76.111A             Start Time: 1645  Stop Time: 1730  Total time in clinic (min): 45 minutes    Assessment  Impairments: abnormal or restricted ROM, activity intolerance, impaired physical strength, lacks appropriate home exercise program, pain with function and weight-bearing intolerance  Functional limitations: self-care/ADLs, household activities, ambulation, and stair negotiation.    Assessment details: Pt is a 70 y/o male presenting to physical therapy s/p R quad tendon repair on . Upon re-examination, pt has improved strength and ROM of pt's R knee and hip as well as decreased pain of the same regions. Pt's FOTO score has improved, demonstrating an improved ability to perform functional activities. However, pt continues to have deficits in strength and ROM of pt's R knee and hip as well as pain and difficulty with functional activities. Pt plan of care will focus on improving the previously mentioned deficits and limitations. Pt would benefit from skilled physical therapy to facilitate a return to his prior level of function.           Understanding of Dx/Px/POC: good     Prognosis: good    Goals  STG - 4 weeks  1. Pt will be able to ambulate with no AD and brace to facilitate a return to his prior level of function Ongoing  2. Pt's FOTO score will improve by 15 points or better to facilitate a return to pt's prior level of function. Ongoing     LTG - 8 weeks  1. Pt will demonstrate WNL AROM of his R knee in all planes to facilitate a return to his prior level of function MET  2. Pt will demonstrate 4+/5 gross strength or better of his R knee and hip in all planes to facilitate a return to his prior level of function Ongoing      Plan  Patient would  benefit from: PT eval and skilled physical therapy  Planned modality interventions: cryotherapy, thermotherapy: hydrocollator packs and unattended electrical stimulation    Planned therapy interventions: activity modification, ADL training, balance, behavior modification, balance/weight bearing training, flexibility, functional ROM exercises, gait training, graded exercise, home exercise program, IASTM, joint mobilization, kinesiology taping, manual therapy, massage, Miller taping, nerve gliding, neuromuscular re-education, self care, patient/caregiver education, stretching, therapeutic activities, strengthening and therapeutic exercise    Frequency: 2x week  Duration in weeks: 8  Plan of Care beginning date: 2024  Plan of Care expiration date: 2025        Subjective Evaluation    History of Present Illness  Mechanism of injury: Pt is a 68 y/o male presenting to physical therapy s/p R quad tendon repair on . Pt reports having improvements in strength of his R quad since starting therapy. Pt reports also having improvements in the bending of his R knee. Pt reports he is also now walking better with less use of his RW. Pt reports he also has been walking with his brace unlocked. However, pt reports he continues to have significant weakness of his R quad due to him being unable to fully extend out his R knee. Pt reports he also has some hitching still going on in his R knee when he is walking. Pt reports he continues to have difficulty going up and down steps due to the weakness of his R quad. Pt reports he has overall improved since starting therapy.   Patient Goals  Patient goals for therapy: decreased pain, increased motion, return to work and increased strength    Pain  Current pain ratin  At best pain ratin  At worst pain ratin  Location: R knee  Quality: dull ache  Relieving factors: rest  Aggravating factors: stair climbing, walking and standing  Progression:  "improved    Treatments  Previous treatment: medication  Current treatment: medication and physical therapy        Objective     Palpation     Additional Palpation Details  Significant indentation of pt's L quad when relaxed just superior to the R patella    Tenderness     Right Knee   Tenderness in the lateral joint line and medial joint line.     Active Range of Motion     Right Knee   Flexion: 130 degrees   Extension: 0 degrees     Mobility   Patellar Mobility:     Right Knee   WFL: medial, lateral, superior and inferior    Strength/Myotome Testing     Right Hip   Planes of Motion   Flexion: 4  Extension: 4  Abduction: 4  Adduction: 4  External rotation: 4  Internal rotation: 4    Right Knee   Flexion: 3+  Extension: 3+  Quadriceps contraction: fair    Additional Strength Details  Significant quad leg in LAQ and SLR positions    General Comments:      Knee Comments  Pt currently ambulating with brace and using RW             Precautions: R quad tendon repair on 7/25    POC expires 1 Month Re-Eval Auth Expiration date PT/OT + Visit Limit?   1/27 1/2 N/A BOMN                 Visit/Unit Tracking  AUTH Status:  Date 12/2    10/24 10/29 10/31 11/5 11/7 11/19 11/21 11/26   N/A Used 13    5 6 7 8 9 10 11 12    Remaining                  FOTO, done 10/31      Manuals 11/21 11/26 12/2 11/19   PROM R knee                                                    Neuro Re-Ed             Pt education   4'       3'   Quad set C russain towel under knee C russain towel under knee C russain towel under knee       C russain towel under knee   Hvy bnd TKE 3x10 5\" grn 3x10 5\" grn 3x10 5\" grn       3x10 5\" grn   SLR c brace 3x10 3x10 3x10       3x10   LAQ   3x10       NV                             Ther Ex                          Sidelying hip abduction c brace 3x10 3x10 3x10       3x10   Prone hip extension c brace 3x10 3x10 3x10       3x10   Upright bike for LE ROM 8' L8-9 10' L8-9 10' L8-9       8' L8-9   Heel slides 3x10 5\" 3x10 " "5\"        3x10 5\"   Heel raises 3x10 3x10 3x10       3x10   SL leg press 3x10 115# 3x10 115#  3x10 125#       3x10 115#                Ther Activity             Lateral step ups  nv           Step ups 2x10 6\" 2x10 6\" 2x10 6\"       2x10 6\"   Mini squats 3x10 3x10 3x10       3x10   Gait Training                                       Modalities             [place] stim c quad sets 8' 10/10 intensity 89 8' 10/10 intensity 89 8' 10/10 intensity 83.0       8' 10/10 intensity 72                         "

## 2024-12-02 NOTE — TELEPHONE ENCOUNTER
I have not seen this patient since 10/16/23.  Medications have been filled in my name 9/19/24, 9/27/24, 11/29/24.      He has a new PCP Dr. Carmona.      Forwarded to PCP for medical management and to office management for trouble shooting .

## 2024-12-02 NOTE — TELEPHONE ENCOUNTER
Pharmacy called about metFORMIN (GLUCOPHAGE-XR) 500 mg 24 hr tablet . They need more specifics about the directions  Take 3 tablets (1,500 mg total) by mouth daily with dinner Increase as directed.

## 2024-12-04 RX ORDER — METFORMIN HYDROCHLORIDE 500 MG/1
1500 TABLET, EXTENDED RELEASE ORAL
Qty: 270 TABLET | Refills: 0 | Status: SHIPPED | OUTPATIENT
Start: 2024-12-04

## 2024-12-04 NOTE — TELEPHONE ENCOUNTER
[facility] pharmacy in [place]  called requesting a new prescription     Reason for call:   [x] Refill   [] Prior Auth  [] Other:     Office:   [x] PCP/Provider -   [] Specialty/Provider -     Medication: metFORMIN (GLUCOPHAGE-XR) 500 mg 24 hr tablet    Dose/Frequency: Take 3 tablets (1,500 mg total) by mouth daily with dinner,     Quantity: 270 tablet    Pharmacy: [id number] - [address]     Does the patient have enough for 3 days?   [x] Yes   [] No - Send as HP to POD

## 2024-12-05 ENCOUNTER — OFFICE VISIT (OUTPATIENT)
Dept: PHYSICAL THERAPY | Facility: CLINIC | Age: 69
End: 2024-12-05
Payer: OTHER MISCELLANEOUS

## 2024-12-05 DIAGNOSIS — S76.111A RUPTURE OF QUADRICEPS TENDON, RIGHT, INITIAL ENCOUNTER: Primary | ICD-10-CM

## 2024-12-05 PROCEDURE — 97530 THERAPEUTIC ACTIVITIES: CPT

## 2024-12-05 PROCEDURE — 97112 NEUROMUSCULAR REEDUCATION: CPT

## 2024-12-05 PROCEDURE — 97110 THERAPEUTIC EXERCISES: CPT

## 2024-12-05 NOTE — PROGRESS NOTES
"Daily Note     Today's date: 2024  Patient name: Tobin Kerns  : 1955  MRN: [id number]  Referring provider: America Ambrose PA-C  Dx:   Encounter Diagnosis     ICD-10-CM    1. Rupture of quadriceps tendon, right, initial encounter  S76.111A           Start Time: 1715  Stop Time: 1805  Total time in clinic (min): 50 minutes    Subjective: Pt reports no new complaints this session.       Objective: See treatment diary below      Assessment: Tolerated treatment well. Pt demonstrates good effort throughout session. Minimal cueing given to facilitate proper exercise performance and technique. He still presents with a quad lag when performing SLRs in his brace locked. Continue to progress strengthening as tolerated. Patient demonstrated fatigue post treatment, exhibited good technique with therapeutic exercises, and would benefit from continued PT      Plan: Continue per plan of care.      Precautions: R quad tendon repair on     POC expires 1 Month Re-Eval Auth Expiration date PT/OT + Visit Limit?    N/A BOMN                 Visit/Unit Tracking  AUTH Status:  Date 12/2 12/5   10/24 10/29 10/31 11/5 11/7 11/19 11/21 11/26   N/A Used 13 14   5 6 7 8 9 10 11 12    Remaining                  FOTO, done 10/31      Manuals    PROM R knee                                                    Neuro Re-Ed             Pt education   4'       3'   Quad set C russain towel under knee C russain towel under knee C russain towel under knee C russain towel under knee      C russain towel under knee   Hvy bnd TKE 3x10 5\" grn 3x10 5\" grn 3x10 5\" grn 3x10 5\" grn      3x10 5\" grn   SLR c brace 3x10 3x10 3x10 3x10      3x10   LAQ   3x10 3x10      NV                             Ther Ex                          Sidelying hip abduction c brace 3x10 3x10 3x10 3x10      3x10   Prone hip extension c brace 3x10 3x10 3x10 3x10      3x10   Upright bike for LE ROM 8' L8-9 10' L8-9 10' L8-9 10' " "L8-9      8' L8-9   Heel slides 3x10 5\" 3x10 5\"  3x10 5\"      3x10 5\"   Heel raises 3x10 3x10 3x10 3x10      3x10   SL leg press 3x10 115# 3x10 115#  3x10 125# 3x10 125#      3x10 115#                Ther Activity             Lateral step ups  nv           Step ups 2x10 6\" 2x10 6\" 2x10 6\" 2x10 6\"      2x10 6\"   Mini squats 3x10 3x10 3x10 3x10      3x10   Gait Training                                       Modalities             [place] stim c quad sets 8' 10/10 intensity 89 8' 10/10 intensity 89 8' 10/10 intensity 83.0 8' 10/10 intensity 83.0      8' 10/10 intensity 72                         "

## 2024-12-06 DIAGNOSIS — S76.111D QUADRICEPS TENDON RUPTURE, RIGHT, SUBSEQUENT ENCOUNTER: Primary | ICD-10-CM

## 2024-12-10 ENCOUNTER — OFFICE VISIT (OUTPATIENT)
Dept: PHYSICAL THERAPY | Facility: CLINIC | Age: 69
End: 2024-12-10
Payer: OTHER MISCELLANEOUS

## 2024-12-10 DIAGNOSIS — S76.111A RUPTURE OF QUADRICEPS TENDON, RIGHT, INITIAL ENCOUNTER: Primary | ICD-10-CM

## 2024-12-10 PROCEDURE — 97112 NEUROMUSCULAR REEDUCATION: CPT | Performed by: PHYSICAL THERAPIST

## 2024-12-10 PROCEDURE — 97530 THERAPEUTIC ACTIVITIES: CPT | Performed by: PHYSICAL THERAPIST

## 2024-12-10 PROCEDURE — 97110 THERAPEUTIC EXERCISES: CPT | Performed by: PHYSICAL THERAPIST

## 2024-12-10 NOTE — PROGRESS NOTES
"Daily Note     Today's date: 12/10/2024  Patient name: Tobin Kerns  : 1955  MRN: [id number]  Referring provider: America Ambrose PA-C  Dx:   Encounter Diagnosis     ICD-10-CM    1. Rupture of quadriceps tendon, right, initial encounter  S76.111A           Start Time: 1715  Stop Time: 1802  Total time in clinic (min): 47 minutes    Subjective: Pt reports he suffered a fall last week when his R knee buckled when he was shoveling snow.       Objective: See treatment diary below      Assessment: Tolerated treatment well. Patient demonstrated fatigue post treatment, exhibited good technique with therapeutic exercises, and would benefit from continued PT. Pt required use of his contralateral LE during performance of his LAQs secondary to weakness of his R quad. Pt required min verbal cues to facilitate performance of proper technique. Assess pt response to treatment at next visit.      Plan: Continue per plan of care.      Precautions: R quad tendon repair on     POC expires 1 Month Re-Eval Auth Expiration date PT/OT + Visit Limit?    N/A BOMN                 Visit/Unit Tracking  AUTH Status:  Date 12/2 12/5   10/24 10/29 10/31 11/5 11/7 11/19 11/21 11/26   N/A Used 13 14   5 6 7 8 9 10 11 12    Remaining                  FOTO, done 10/31 do NV      Manuals 11/21 11/26 12/2 12/5 12/10     11/19   PROM R knee                                                    Neuro Re-Ed             Pt education   4'  4'     3'   Quad set C russain towel under knee C russain towel under knee C russain towel under knee C russain towel under knee C russain towel under knee     C russain towel under knee   Hvy bnd TKE 3x10 5\" grn 3x10 5\" grn 3x10 5\" grn 3x10 5\" grn 3x10 5\" grn     3x10 5\" grn   SLR c brace 3x10 3x10 3x10 3x10 3x10     3x10   LAQ AA   3x10 3x10 3x10      NV                             Ther Ex                          Sidelying hip abduction c brace 3x10 3x10 3x10 3x10 3x10     3x10   Prone hip extension " "c brace 3x10 3x10 3x10 3x10 3x10     3x10   Upright bike for LE ROM 8' L8-9 10' L8-9 10' L8-9 10' L8-9 10' L8-9     8' L8-9   Heel slides 3x10 5\" 3x10 5\"  3x10 5\"      3x10 5\"   Heel raises 3x10 3x10 3x10 3x10 3x10     3x10   SL leg press 3x10 115# 3x10 115#  3x10 125# 3x10 125# 3x10 125#     3x10 115#                Ther Activity             Lateral step ups  nv           Step ups 2x10 6\" 2x10 6\" 2x10 6\" 2x10 6\" 2x10 6\"     2x10 6\"   Mini squats 3x10 3x10 3x10 3x10 3x10     3x10   Gait Training                                       Modalities             [place] stim c quad sets 8' 10/10 intensity 89 8' 10/10 intensity 89 8' 10/10 intensity 83.0 8' 10/10 intensity 83.0 8' 10/10 intensity 100.0     8' 10/10 intensity 72                           "

## 2024-12-12 ENCOUNTER — OFFICE VISIT (OUTPATIENT)
Dept: PHYSICAL THERAPY | Facility: CLINIC | Age: 69
End: 2024-12-12
Payer: OTHER MISCELLANEOUS

## 2024-12-12 DIAGNOSIS — S76.111A RUPTURE OF QUADRICEPS TENDON, RIGHT, INITIAL ENCOUNTER: Primary | ICD-10-CM

## 2024-12-12 PROCEDURE — 97112 NEUROMUSCULAR REEDUCATION: CPT

## 2024-12-12 PROCEDURE — 97530 THERAPEUTIC ACTIVITIES: CPT

## 2024-12-12 PROCEDURE — 97110 THERAPEUTIC EXERCISES: CPT

## 2024-12-12 NOTE — PROGRESS NOTES
"Daily Note     Today's date: 2024  Patient name: Tobin Kerns  : 1955  MRN: [id number]  Referring provider: America Ambrose PA-C  Dx:   Encounter Diagnosis     ICD-10-CM    1. Rupture of quadriceps tendon, right, initial encounter  S76.111A           Start Time: 1715  Stop Time: 1804  Total time in clinic (min): 49 minutes    Subjective: Pt reports no new complaints.       Objective: See treatment diary below      Assessment: Tolerated treatment well. Pt demonstrates good effort throughout session. Minimal cueing given to facilitate proper exercise performance and technique. He tolerates AA LAQ without complaints. Increased weight on leg press this session. Patient demonstrated fatigue post treatment, exhibited good technique with therapeutic exercises, and would benefit from continued PT      Plan: Continue per plan of care.      Precautions: R quad tendon repair on     POC expires 1 Month Re-Eval Auth Expiration date PT/OT + Visit Limit?    N/A BOMN                 Visit/Unit Tracking  AUTH Status:  Date 12/2 12/5 12/12  10/24 10/29 10/31 11/5 11/7 11/19 11/21 11/26   N/A Used 13 14 15  5 6 7 8 9 10 11 12    Remaining                  FOTO, done 10/31 do NV      Manuals 11/21 11/26 12/2 12/5 12/10 12/12    11/19   PROM R knee                                                    Neuro Re-Ed             Pt education   4'  4' 4'    3'   Quad set C russain towel under knee C russain towel under knee C russain towel under knee C russain towel under knee C russain towel under knee C russain towel under knee    C russain towel under knee   Hvy bnd TKE 3x10 5\" grn 3x10 5\" grn 3x10 5\" grn 3x10 5\" grn 3x10 5\" grn 3x10 5\" grn    3x10 5\" grn   SLR c brace 3x10 3x10 3x10 3x10 3x10 3x10    3x10   LAQ AA   3x10 3x10 3x10  3x10    NV                             Ther Ex                          Sidelying hip abduction c brace 3x10 3x10 3x10 3x10 3x10 3x10    3x10   Prone hip extension c brace 3x10 3x10 " "3x10 3x10 3x10 3x10    3x10   Upright bike for LE ROM 8' L8-9 10' L8-9 10' L8-9 10' L8-9 10' L8-9 10' L8-9    8' L8-9   Heel slides 3x10 5\" 3x10 5\"  3x10 5\"      3x10 5\"   Heel raises 3x10 3x10 3x10 3x10 3x10 3x10    3x10   SL leg press 3x10 115# 3x10 115#  3x10 125# 3x10 125# 3x10 125# 3x10 135#    3x10 115#                Ther Activity             Lateral step ups  nv           Step ups 2x10 6\" 2x10 6\" 2x10 6\" 2x10 6\" 2x10 6\" 2x10 6\"    2x10 6\"   Mini squats 3x10 3x10 3x10 3x10 3x10 3x10    3x10   Gait Training                                       Modalities             Citizen of [place] stim c quad sets 8' 10/10 intensity 89 8' 10/10 intensity 89 8' 10/10 intensity 83.0 8' 10/10 intensity 83.0 8' 10/10 intensity 100.0     8' 10/10 intensity 72                             "

## 2024-12-13 DIAGNOSIS — S76.111D TENDON RUPTURE, TRAUMATIC. QUADRICEPS, RIGHT, SUBSEQUENT ENCOUNTER: Primary | ICD-10-CM

## 2024-12-16 ENCOUNTER — OFFICE VISIT (OUTPATIENT)
Dept: OBGYN CLINIC | Facility: CLINIC | Age: 69
End: 2024-12-16
Payer: OTHER MISCELLANEOUS

## 2024-12-16 VITALS
HEART RATE: 70 BPM | SYSTOLIC BLOOD PRESSURE: 143 MMHG | HEIGHT: 77 IN | WEIGHT: 313 LBS | DIASTOLIC BLOOD PRESSURE: 89 MMHG | BODY MASS INDEX: 36.96 KG/M2

## 2024-12-16 DIAGNOSIS — Z98.890 S/P MUSCULOSKELETAL SYSTEM SURGERY: Primary | ICD-10-CM

## 2024-12-16 DIAGNOSIS — S76.111D TENDON RUPTURE, TRAUMATIC. QUADRICEPS, RIGHT, SUBSEQUENT ENCOUNTER: ICD-10-CM

## 2024-12-16 PROCEDURE — 99213 OFFICE O/P EST LOW 20 MIN: CPT | Performed by: ORTHOPAEDIC SURGERY

## 2024-12-16 NOTE — PROGRESS NOTES
Patient Name:  Tobin Kerns  MRN:  [id number]    Assessment & Plan     1. S/P musculoskeletal system surgery  -     MRI knee right  wo contrast; Future; Expected date: 12/16/2024  2. Tendon rupture, traumatic. quadriceps, right, subsequent encounter  -     MRI knee right  wo contrast; Future; Expected date: 12/16/2024        Approximately 4.5 months s/p Right quadriceps tendon repair performed on 7/25/24, concern for re-tear.   Patient to be weightbearing as tolerated to RLE (Right Lower Extremity)  ROM as tolerated to RLE (Right Lower Extremity)  Discussed with patient an MRI is warranted due to the patient's continued inability to perform straight leg raise as well as palpable defect at quadriceps insertion which are concerning for retear of quadriceps tendon.  We discussed the possibility of need for revision surgery including graft augmentation pending results of MRI.  Patient met with ERIKA Waters, to see if there is something that can be done about his broken brace.  Patient to continue physical therapy for strength and mobility training  Patient to continue at home analgesic regimen with Tylenol   Patient to follow up upon completion of MRI          History of the Present Illness   Tobin Kerns is a 69 y.o. male approximately 4.5 months s/p Right quadriceps tendon repair performed on 7/25/24. The patient is doing well overall. He continues to attend physical therapy twice per wee. He is making appropriate progress. He is able to flex to 130 degrees. They are working on terminal knee extension. He continues to use his TROM brace. One of the clips on his TROM brace did break. He continues to walk with a walker. He is using baby aspirin for DVT prophylaxis. He is using acetaminophen as needed for symptoms. He has concerns about not being able to perform terminal knee extension from a seated position.       Review of Systems     Review of Systems   Constitutional:  Negative for appetite change and  "unexpected weight change.   HENT:  Negative for congestion and trouble swallowing.    Eyes:  Negative for visual disturbance.   Respiratory:  Negative for cough and shortness of breath.    Cardiovascular:  Negative for chest pain and palpitations.   Gastrointestinal:  Negative for nausea and vomiting.   Endocrine: Negative for cold intolerance and heat intolerance.   Musculoskeletal:  Negative for joint swelling and myalgias.   Skin:  Negative for rash.   Neurological:  Negative for numbness.       Physical Exam     /89   Pulse 70   Ht 6' 5\" (1.956 m)   Wt (!) 142 kg (313 lb)   BMI 37.12 kg/m²     Right Knee  Surgical incision is intact, well healed without signs of infection  Palpable defect at superior patella and quadriceps tendon insertion  Range of motion 0 to 120 degrees   There is trace effusion.   The patient is unable to perform a straight leg raise without brace with approximately 20 degree lag.  Calf soft, compressible and nontender   Pitting edema present in bilateral lower extremities.   The patient is neurovascular intact distally.      Data Review     I have personally reviewed pertinent films in PACS, and my interpretation follows.    No new images    Social History     Tobacco Use    Smoking status: Former     Current packs/day: 0.00     Average packs/day: 1 pack/day for 15.0 years (15.0 ttl pk-yrs)     Types: Cigarettes     Start date: 1980     Quit date: 2005     Years since quittin.9     Passive exposure: Never    Smokeless tobacco: Never   Vaping Use    Vaping status: Never Used   Substance Use Topics    Alcohol use: Yes     Alcohol/week: 1.0 standard drink of alcohol     Types: 1 Cans of beer per week    Drug use: Not Currently     Types: Marijuana     Comment: Last Marijuana Use ; Other unknown drugs while in [place]           Procedures  None performed.     Fletcher Foster, DO   Scribe Attestation      I,:  Zuleyka Fleming am acting as a scribe while in the presence of " the attending physician.:       I,:  Fletcher Foster, DO personally performed the services described in this documentation    as scribed in my presence.:

## 2024-12-16 NOTE — PATIENT INSTRUCTIONS
Patient was instructed to call their  to schedule MRI study. Call the office once you have the MRI date so you can schedule a follow-up appointment to review the results with your doctor.  If study is performed anywhere outside of [facility], ask for a copy of the MRI study on a disc and the report for the doctors review.

## 2024-12-17 ENCOUNTER — OFFICE VISIT (OUTPATIENT)
Dept: PHYSICAL THERAPY | Facility: CLINIC | Age: 69
End: 2024-12-17
Payer: OTHER MISCELLANEOUS

## 2024-12-17 DIAGNOSIS — S76.111A RUPTURE OF QUADRICEPS TENDON, RIGHT, INITIAL ENCOUNTER: Primary | ICD-10-CM

## 2024-12-17 PROCEDURE — 97530 THERAPEUTIC ACTIVITIES: CPT

## 2024-12-17 PROCEDURE — 97110 THERAPEUTIC EXERCISES: CPT

## 2024-12-17 PROCEDURE — 97112 NEUROMUSCULAR REEDUCATION: CPT

## 2024-12-17 NOTE — PROGRESS NOTES
"Daily Note     Today's date: 2024  Patient name: Tobin Kerns  : 1955  MRN: [id number]  Referring provider: America Ambrose PA-C  Dx:   Encounter Diagnosis     ICD-10-CM    1. Rupture of quadriceps tendon, right, initial encounter  S76.111A           Start Time: 1715  Stop Time: 1805  Total time in clinic (min): 50 minutes    Subjective: Pt reports Dr. Foster ordered an MRI to see if his tendon is intact      Objective: See treatment diary below      Assessment: Tolerated treatment well. Pt demonstrates good effort throughout session. Minimal cueing given to facilitate proper exercise performance and technique. He continues to tolerate program as outlined below.  Patient demonstrated fatigue post treatment, exhibited good technique with therapeutic exercises, and would benefit from continued PT      Plan: Continue per plan of care.      Precautions: R quad tendon repair on     POC expires 1 Month Re-Eval Auth Expiration date PT/OT + Visit Limit?    N/A BOMN                 Visit/Unit Tracking  AUTH Status:  Date            N/A Used 13 14 15 16            Remaining                  FOTO, done 10/31 do NV      Manuals 11/21 11/26 12/2 12/5 12/10 12/12 12/17   11/19   PROM R knee                                                    Neuro Re-Ed             Pt education   4'  4' 4' 4'   3'   Quad set C russain towel under knee C russain towel under knee C russain towel under knee C russain towel under knee C russain towel under knee C russain towel under knee C russain towel under knee   C russain towel under knee   Hvy bnd TKE 3x10 5\" grn 3x10 5\" grn 3x10 5\" grn 3x10 5\" grn 3x10 5\" grn 3x10 5\" grn 3x10 5\" grn   3x10 5\" grn   SLR c brace 3x10 3x10 3x10 3x10 3x10 3x10 3x10   3x10   LAQ AA   3x10 3x10 3x10  3x10 3x10   NV                             Ther Ex                          Sidelying hip abduction c brace 3x10 3x10 3x10 3x10 3x10 3x10 3x10   3x10   Prone hip " "extension c brace 3x10 3x10 3x10 3x10 3x10 3x10 3x10   3x10   Upright bike for LE ROM 8' L8-9 10' L8-9 10' L8-9 10' L8-9 10' L8-9 10' L8-9 10' L8-9   8' L8-9   Heel slides 3x10 5\" 3x10 5\"  3x10 5\"      3x10 5\"   Heel raises 3x10 3x10 3x10 3x10 3x10 3x10 3x10   3x10   SL leg press 3x10 115# 3x10 115#  3x10 125# 3x10 125# 3x10 125# 3x10 135# 3x10 135#    3x10 115#                Ther Activity             Lateral step ups  nv           Step ups 2x10 6\" 2x10 6\" 2x10 6\" 2x10 6\" 2x10 6\" 2x10 6\" 2x10 6\"   2x10 6\"   Mini squats 3x10 3x10 3x10 3x10 3x10 3x10 3x10   3x10   Gait Training                                       Modalities             [place] stim c quad sets 8' 10/10 intensity 89 8' 10/10 intensity 89 8' 10/10 intensity 83.0 8' 10/10 intensity 83.0 8' 10/10 intensity 100.0  8' 10/10 intensity 100.0   8' 10/10 intensity 72                               "

## 2024-12-18 DIAGNOSIS — S76.111D QUADRICEPS TENDON RUPTURE, RIGHT, SUBSEQUENT ENCOUNTER: Primary | ICD-10-CM

## 2024-12-19 ENCOUNTER — OFFICE VISIT (OUTPATIENT)
Dept: PHYSICAL THERAPY | Facility: CLINIC | Age: 69
End: 2024-12-19
Payer: OTHER MISCELLANEOUS

## 2024-12-19 DIAGNOSIS — S76.111A RUPTURE OF QUADRICEPS TENDON, RIGHT, INITIAL ENCOUNTER: Primary | ICD-10-CM

## 2024-12-19 PROCEDURE — 97112 NEUROMUSCULAR REEDUCATION: CPT

## 2024-12-19 PROCEDURE — 97530 THERAPEUTIC ACTIVITIES: CPT

## 2024-12-19 PROCEDURE — 97110 THERAPEUTIC EXERCISES: CPT

## 2024-12-19 NOTE — PROGRESS NOTES
"Daily Note     Today's date: 2024  Patient name: Tobin Kerns  : 1955  MRN: [id number]  Referring provider: America Ambrose PA-C  Dx:   Encounter Diagnosis     ICD-10-CM    1. Rupture of quadriceps tendon, right, initial encounter  S76.111A           Start Time: 1700  Stop Time: 1750  Total time in clinic (min): 50 minutes    Subjective: Pt reports feeling about the same.      Objective: See treatment diary below      Assessment: Tolerated treatment well. Patient demonstrated fatigue post treatment, exhibited good technique with therapeutic exercises, and would benefit from continued PT. Continued good effort with exercises but unable to maintain terminal knee extension. Increased repetitions for leg raising exercises and pt continued to increase repetitions for leg raises and other exercises.      Plan: Continue per plan of care.  Progress treatment as tolerated.       Precautions: R quad tendon repair on     POC expires 1 Month Re-Eval Auth Expiration date PT/OT + Visit Limit?    N/A BOMN                 Visit/Unit Tracking  AUTH Status:  Date           N/A Used 13 14 15 16 17           Remaining                  FOTO, done 10/31 do NV      Manuals 11/21 11/26 12/2 12/5 12/10 12/12 12/17 12/19  11/19   PROM R knee                                                    Neuro Re-Ed             Pt education   4'  4' 4' 4' 4'  3'   Quad set C russain towel under knee C russain towel under knee C russain towel under knee C russain towel under knee C russain towel under knee C russain towel under knee C russain towel under knee C russain towel under knee  C russain towel under knee   Hvy bnd TKE 3x10 5\" grn 3x10 5\" grn 3x10 5\" grn 3x10 5\" grn 3x10 5\" grn 3x10 5\" grn 3x10 5\" grn 4x10 5\" grn  3x10 5\" grn   SLR c brace 3x10 3x10 3x10 3x10 3x10 3x10 3x10 4x10  3x10   LAQ AA   3x10 3x10 3x10  3x10 3x10 3x10  NV                             Ther Ex                        " "  Sidelying hip abduction c brace 3x10 3x10 3x10 3x10 3x10 3x10 3x10 4x10  3x10   Prone hip extension c brace 3x10 3x10 3x10 3x10 3x10 3x10 3x10 4x10  3x10   Upright bike for LE ROM 8' L8-9 10' L8-9 10' L8-9 10' L8-9 10' L8-9 10' L8-9 10' L8-9 10' L8-9  8' L8-9   Heel slides 3x10 5\" 3x10 5\"  3x10 5\"      3x10 5\"   Heel raises 3x10 3x10 3x10 3x10 3x10 3x10 3x10 4x10  3x10   SL leg press 3x10 115# 3x10 115#  3x10 125# 3x10 125# 3x10 125# 3x10 135# 3x10 135#  4x10 135#  3x10 115#                Ther Activity             Lateral step ups  nv           Step ups 2x10 6\" 2x10 6\" 2x10 6\" 2x10 6\" 2x10 6\" 2x10 6\" 2x10 6\" 4x10 6\"  2x10 6\"   Mini squats 3x10 3x10 3x10 3x10 3x10 3x10 3x10 4x10  3x10   Gait Training                                       Modalities             [place]cu[place] stim c quad sets 8' 10/10 intensity 89 8' 10/10 intensity 89 8' 10/10 intensity 83.0 8' 10/10 intensity 83.0 8' 10/10 intensity 100.0  8' 10/10 intensity 100.0 8' 10/10 intensity 100.0  8' 10/10 intensity 72                                 "

## 2024-12-23 ENCOUNTER — OFFICE VISIT (OUTPATIENT)
Dept: PHYSICAL THERAPY | Facility: CLINIC | Age: 69
End: 2024-12-23
Payer: OTHER MISCELLANEOUS

## 2024-12-23 DIAGNOSIS — S76.111A RUPTURE OF QUADRICEPS TENDON, RIGHT, INITIAL ENCOUNTER: Primary | ICD-10-CM

## 2024-12-23 PROCEDURE — 97110 THERAPEUTIC EXERCISES: CPT

## 2024-12-23 PROCEDURE — 97530 THERAPEUTIC ACTIVITIES: CPT

## 2024-12-23 PROCEDURE — 97112 NEUROMUSCULAR REEDUCATION: CPT

## 2024-12-23 NOTE — PROGRESS NOTES
"Daily Note     Today's date: 2024  Patient name: Tobin Kerns  : 1955  MRN: [id number]  Referring provider: America Ambrose PA-C  Dx:   Encounter Diagnosis     ICD-10-CM    1. Rupture of quadriceps tendon, right, initial encounter  S76.111A                      Subjective: Pt offers no new complaints to start session today.       Objective: See treatment diary below      Assessment: Tolerated treatment well. Patient demonstrated fatigue post treatment, exhibited good technique with therapeutic exercises, and would benefit from continued PT. Began on the bike as an active warmup. He was able to perform all exercises as charted with no complaints. Pt demonstrated a good understanding of his current program being appropriately challenged.       Plan: Continue per plan of care.  Progress treatment as tolerated.       Precautions: R quad tendon repair on     POC expires 1 Month Re-Eval Auth Expiration date PT/OT + Visit Limit?    N/A BOMN                 Visit/Unit Tracking  AUTH Status:  Date          N/A Used 13 14 15 16 17 18          Remaining                  FOTO, done 10/31 do NV      Manuals 11/21 11/26 12/2 12/5 12/10 12/12 12/17 12/19 12/23    PROM R knee                                                    Neuro Re-Ed             Pt education   4'  4' 4' 4' 4' 4'    Quad set C russain towel under knee C russain towel under knee C russain towel under knee C russain towel under knee C russain towel under knee C russain towel under knee C russain towel under knee C russain towel under knee C russain towel under knee    Hvy bnd TKE 3x10 5\" grn 3x10 5\" grn 3x10 5\" grn 3x10 5\" grn 3x10 5\" grn 3x10 5\" grn 3x10 5\" grn 4x10 5\" grn 4x10 5\" grn    SLR c brace 3x10 3x10 3x10 3x10 3x10 3x10 3x10 4x10 4x10    LAQ AA   3x10 3x10 3x10  3x10 3x10 3x10 3x10                              Ther Ex                          Sidelying hip abduction c brace 3x10 3x10 3x10 3x10 " "3x10 3x10 3x10 4x10 4x10    Prone hip extension c brace 3x10 3x10 3x10 3x10 3x10 3x10 3x10 4x10 4x10    Upright bike for LE ROM 8' L8-9 10' L8-9 10' L8-9 10' L8-9 10' L8-9 10' L8-9 10' L8-9 10' L8-9 10' L8-9    Heel slides 3x10 5\" 3x10 5\"  3x10 5\"         Heel raises 3x10 3x10 3x10 3x10 3x10 3x10 3x10 4x10 4x10    SL leg press 3x10 115# 3x10 115#  3x10 125# 3x10 125# 3x10 125# 3x10 135# 3x10 135#  4x10 135# 4x10 135#                 Ther Activity             Lateral step ups  nv           Step ups 2x10 6\" 2x10 6\" 2x10 6\" 2x10 6\" 2x10 6\" 2x10 6\" 2x10 6\" 4x10 6\" 4x10 6\"    Mini squats 3x10 3x10 3x10 3x10 3x10 3x10 3x10 4x10 4x10    Gait Training                                       Modalities             [place] stim c quad sets 8' 10/10 intensity 89 8' 10/10 intensity 89 8' 10/10 intensity 83.0 8' 10/10 intensity 83.0 8' 10/10 intensity 100.0  8' 10/10 intensity 100.0 8' 10/10 intensity 100.0 8' 10/10 intensity 100.0                                  "

## 2024-12-24 ENCOUNTER — APPOINTMENT (OUTPATIENT)
Dept: PHYSICAL THERAPY | Facility: CLINIC | Age: 69
End: 2024-12-24
Payer: OTHER MISCELLANEOUS

## 2024-12-26 ENCOUNTER — OFFICE VISIT (OUTPATIENT)
Dept: PHYSICAL THERAPY | Facility: CLINIC | Age: 69
End: 2024-12-26
Payer: OTHER MISCELLANEOUS

## 2024-12-26 DIAGNOSIS — S76.111A RUPTURE OF QUADRICEPS TENDON, RIGHT, INITIAL ENCOUNTER: Primary | ICD-10-CM

## 2024-12-26 PROCEDURE — 97112 NEUROMUSCULAR REEDUCATION: CPT

## 2024-12-26 PROCEDURE — 97110 THERAPEUTIC EXERCISES: CPT

## 2024-12-26 PROCEDURE — 97530 THERAPEUTIC ACTIVITIES: CPT

## 2024-12-26 NOTE — PROGRESS NOTES
"Daily Note     Today's date: 2024  Patient name: Tobin Kerns  : 1955  MRN: [id number]  Referring provider: America Ambrose PA-C  Dx:   Encounter Diagnosis     ICD-10-CM    1. Rupture of quadriceps tendon, right, initial encounter  S76.111A                      Subjective: Patient offers no new complaints.       Objective: See treatment diary below      Assessment: Cont to demonstrate quad weakness and sig quad lag. Patient would benefit from cont PT.       Plan: Continue per plan of care.      Precautions: R quad tendon repair on     POC expires 1 Month Re-Eval Auth Expiration date PT/OT + Visit Limit?    N/A BOMN                 Visit/Unit Tracking  AUTH Status:  Date         N/A Used 13 14 15 16 17 18 19         Remaining                  FOTO, done 10/31 do NV      Manuals 11/21 11/26 12/2 12/5 12/10 12/12 12/17 12/19 12/23 12/26   PROM R knee                                                    Neuro Re-Ed             Pt education   4'  4' 4' 4' 4' 4' 4'   Quad set C russain towel under knee C russain towel under knee C russain towel under knee C russain towel under knee C russain towel under knee C russain towel under knee C russain towel under knee C russain towel under knee C russain towel under knee C russain towel under knee   Hvy bnd TKE 3x10 5\" grn 3x10 5\" grn 3x10 5\" grn 3x10 5\" grn 3x10 5\" grn 3x10 5\" grn 3x10 5\" grn 4x10 5\" grn 4x10 5\" grn 4x10 5\" grn   SLR c brace 3x10 3x10 3x10 3x10 3x10 3x10 3x10 4x10 4x10 4x10   LAQ AA   3x10 3x10 3x10  3x10 3x10 3x10 3x10 4x10                             Ther Ex                          Sidelying hip abduction c brace 3x10 3x10 3x10 3x10 3x10 3x10 3x10 4x10 4x10 4x10   Prone hip extension c brace 3x10 3x10 3x10 3x10 3x10 3x10 3x10 4x10 4x10 4x10   Upright bike for LE ROM 8' L8-9 10' L8-9 10' L8-9 10' L8-9 10' L8-9 10' L8-9 10' L8-9 10' L8-9 10' L8-9 10' L8-9   Heel slides 3x10 5\" 3x10 5\"  3x10 5\"   " "      Heel raises 3x10 3x10 3x10 3x10 3x10 3x10 3x10 4x10 4x10 4x10   SL leg press 3x10 115# 3x10 115#  3x10 125# 3x10 125# 3x10 125# 3x10 135# 3x10 135#  4x10 135# 4x10 135# 4x10 135#                 Ther Activity             Lateral step ups  nv           Step ups 2x10 6\" 2x10 6\" 2x10 6\" 2x10 6\" 2x10 6\" 2x10 6\" 2x10 6\" 4x10 6\" 4x10 6\" 4x10 6\"    Mini squats 3x10 3x10 3x10 3x10 3x10 3x10 3x10 4x10 4x10 4x10   Gait Training                                       Modalities             Swed[place] stim c quad sets 8' 10/10 intensity 89 8' 10/10 intensity 89 8' 10/10 intensity 83.0 8' 10/10 intensity 83.0 8' 10/10 intensity 100.0  8' 10/10 intensity 100.0 8' 10/10 intensity 100.0 8' 10/10 intensity 100.0 8' 10/10 intensity 100.0                                   "

## 2024-12-30 ENCOUNTER — OFFICE VISIT (OUTPATIENT)
Dept: PHYSICAL THERAPY | Facility: CLINIC | Age: 69
End: 2024-12-30
Payer: OTHER MISCELLANEOUS

## 2024-12-30 ENCOUNTER — OFFICE VISIT (OUTPATIENT)
Dept: FAMILY MEDICINE CLINIC | Facility: CLINIC | Age: 69
End: 2024-12-30
Payer: MEDICARE

## 2024-12-30 VITALS
DIASTOLIC BLOOD PRESSURE: 78 MMHG | SYSTOLIC BLOOD PRESSURE: 128 MMHG | WEIGHT: 307 LBS | HEART RATE: 60 BPM | RESPIRATION RATE: 18 BRPM | HEIGHT: 77 IN | OXYGEN SATURATION: 99 % | BODY MASS INDEX: 36.25 KG/M2

## 2024-12-30 DIAGNOSIS — Z13.29 THYROID DISORDER SCREEN: ICD-10-CM

## 2024-12-30 DIAGNOSIS — Z12.5 PROSTATE CANCER SCREENING: ICD-10-CM

## 2024-12-30 DIAGNOSIS — Z00.00 MEDICARE ANNUAL WELLNESS VISIT, SUBSEQUENT: ICD-10-CM

## 2024-12-30 DIAGNOSIS — I10 PRIMARY HYPERTENSION: ICD-10-CM

## 2024-12-30 DIAGNOSIS — S76.111A RUPTURE OF QUADRICEPS TENDON, RIGHT, INITIAL ENCOUNTER: Primary | ICD-10-CM

## 2024-12-30 DIAGNOSIS — I77.811 ABDOMINAL AORTIC ECTASIA (HCC): Primary | ICD-10-CM

## 2024-12-30 DIAGNOSIS — R73.01 IFG (IMPAIRED FASTING GLUCOSE): ICD-10-CM

## 2024-12-30 DIAGNOSIS — B35.1 ONYCHOMYCOSIS: ICD-10-CM

## 2024-12-30 DIAGNOSIS — E78.2 MIXED HYPERLIPIDEMIA: ICD-10-CM

## 2024-12-30 PROBLEM — E11.42 DIABETIC POLYNEUROPATHY ASSOCIATED WITH TYPE 2 DIABETES MELLITUS (HCC): Status: RESOLVED | Noted: 2024-12-30 | Resolved: 2024-12-30

## 2024-12-30 PROBLEM — E11.42 DIABETIC POLYNEUROPATHY ASSOCIATED WITH TYPE 2 DIABETES MELLITUS (HCC): Status: ACTIVE | Noted: 2024-12-30

## 2024-12-30 PROCEDURE — G0439 PPPS, SUBSEQ VISIT: HCPCS | Performed by: STUDENT IN AN ORGANIZED HEALTH CARE EDUCATION/TRAINING PROGRAM

## 2024-12-30 PROCEDURE — 97110 THERAPEUTIC EXERCISES: CPT

## 2024-12-30 PROCEDURE — 99214 OFFICE O/P EST MOD 30 MIN: CPT | Performed by: STUDENT IN AN ORGANIZED HEALTH CARE EDUCATION/TRAINING PROGRAM

## 2024-12-30 PROCEDURE — 97112 NEUROMUSCULAR REEDUCATION: CPT

## 2024-12-30 PROCEDURE — 97530 THERAPEUTIC ACTIVITIES: CPT

## 2024-12-30 RX ORDER — TERBINAFINE HYDROCHLORIDE 250 MG/1
250 TABLET ORAL DAILY
Qty: 30 TABLET | Refills: 2 | Status: SHIPPED | OUTPATIENT
Start: 2024-12-30 | End: 2025-03-30

## 2024-12-30 NOTE — ASSESSMENT & PLAN NOTE
Medicare annual wellness visit completed, patient up-to-date on colorectal cancer screening, prostate cancer screening has been ordered we will follow-up pending results.

## 2024-12-30 NOTE — ASSESSMENT & PLAN NOTE
Stable, patient to continue metformin 1500 mg daily.  Orders:  •  Hemoglobin A1C; Future  •  Comprehensive metabolic panel; Future  •  CBC and Platelet; Future

## 2024-12-30 NOTE — ASSESSMENT & PLAN NOTE
Right great toe fungal infection, will refer to podiatry.  Will start terbinafine 250 mg daily, will check liver enzymes monthly.  Orders:  •  Ambulatory Referral to Podiatry; Future  •  terbinafine (LamISIL) 250 mg tablet; Take 1 tablet (250 mg total) by mouth daily

## 2024-12-30 NOTE — PROGRESS NOTES
"Daily Note     Today's date: 2024  Patient name: Tobin Kerns  : 1955  MRN: [id number]  Referring provider: America Ambrose PA-C  Dx:   Encounter Diagnosis     ICD-10-CM    1. Rupture of quadriceps tendon, right, initial encounter  S76.111A           Start Time: 0800  Stop Time: 0900  Total time in clinic (min): 60 minutes    Subjective: Pt reports he is doing ok today.      Objective: See treatment diary below      Assessment: Tolerated treatment well. Pt demonstrates good effort throughout session. Minimal cueing given to facilitate proper exercise performance and technique. He continues to tolerate progressed reps and increased weight for leg press. One instance of instability noted when stepping up onto the 6\" step in the first couple reps. Patient demonstrated fatigue post treatment, exhibited good technique with therapeutic exercises, and would benefit from continued PT      Plan: Continue per plan of care.      Precautions: R quad tendon repair on     POC expires 1 Month Re-Eval Auth Expiration date PT/OT + Visit Limit?    N/A BOMN                 Visit/Unit Tracking  AUTH Status:  Date        N/A Used 13 14 15 16 17 18 19 20        Remaining                  FOTO, done 10/31 do NV      Manuals 12/30    12/10 12/12 12/17 12/19 12/23 12/26   PROM R knee                                                    Neuro Re-Ed             Pt education     4' 4' 4' 4' 4' 4'   Quad set C russain towel under knee    C russain towel under knee C russain towel under knee C russain towel under knee C russain towel under knee C russain towel under knee C russain towel under knee   Hvy bnd TKE 3x10 5\" grn    3x10 5\" grn 3x10 5\" grn 3x10 5\" grn 4x10 5\" grn 4x10 5\" grn 4x10 5\" grn   SLR c brace 4x10    3x10 3x10 3x10 4x10 4x10 4x10   LAQ AA 4x10    3x10  3x10 3x10 3x10 3x10 4x10                             Ther Ex                          Sidelying hip " "abduction c brace 4x10    3x10 3x10 3x10 4x10 4x10 4x10   Prone hip extension c brace 4x10    3x10 3x10 3x10 4x10 4x10 4x10   Upright bike for LE ROM 8' L8-9    10' L8-9 10' L8-9 10' L8-9 10' L8-9 10' L8-9 10' L8-9   Heel slides             Heel raises 4x10    3x10 3x10 3x10 4x10 4x10 4x10   SL leg press 3x10 135#    3x10 125# 3x10 135# 3x10 135#  4x10 135# 4x10 135# 4x10 135#                 Ther Activity             Lateral step ups             Step ups 4x10 6\"    2x10 6\" 2x10 6\" 2x10 6\" 4x10 6\" 4x10 6\" 4x10 6\"    Mini squats 4x10    3x10 3x10 3x10 4x10 4x10 4x10   Gait Training                                       Modalities             Spanish stim c quad sets 8' 10/10 intensity 100    8' 10/10 intensity 100.0  8' 10/10 intensity 100.0 8' 10/10 intensity 100.0 8' 10/10 intensity 100.0 8' 10/10 intensity 100.0                                     "

## 2024-12-30 NOTE — ASSESSMENT & PLAN NOTE
Stable, patient will continue Zocor 20 mg daily.  Orders:  •  Lipid panel; Future  •  CBC and Platelet; Future

## 2024-12-30 NOTE — PROGRESS NOTES
Name: Tobin Kerns      : 1955      MRN: [id number]  Encounter Provider: Ann Carmona DO  Encounter Date: 2024   Encounter department: [facility] PR[facility]    Assessment & Plan  Abdominal aortic ectasia (HCC)  Stable, patient to continue baby aspirin and Zocor.       Onychomycosis  Right great toe fungal infection, will refer to podiatry.  Will start terbinafine 250 mg daily, will check liver enzymes monthly.  Orders:  •  Ambulatory Referral to Podiatry; Future  •  terbinafine (LamISIL) 250 mg tablet; Take 1 tablet (250 mg total) by mouth daily    Medicare annual wellness visit, subsequent  Medicare annual wellness visit completed, patient up-to-date on colorectal cancer screening, prostate cancer screening has been ordered we will follow-up pending results.       IFG (impaired fasting glucose)  Stable, patient to continue metformin 1500 mg daily.  Orders:  •  Hemoglobin A1C; Future  •  Comprehensive metabolic panel; Future  •  CBC and Platelet; Future    Mixed hyperlipidemia  Stable, patient will continue Zocor 20 mg daily.  Orders:  •  Lipid panel; Future  •  CBC and Platelet; Future    Primary hypertension    Orders:  •  Comprehensive metabolic panel; Future  •  CBC and Platelet; Future    Thyroid disorder screen    Orders:  •  TSH, 3rd generation with Free T4 reflex; Future    Prostate cancer screening    Orders:  •  PSA, Total Screen; Future       Preventive health issues were discussed with patient, and age appropriate screening tests were ordered as noted in patient's After Visit Summary. Personalized health advice and appropriate referrals for health education or preventive services given if needed, as noted in patient's After Visit Summary.    History of Present Illness     Patient presents today for AWV  States that he has nail fungus right toes  States that he is unsure how long his toenails have been this way but he thought they were normal  Reports that he  doesn't have great feeling in feet so is unsure if he bumped his toes           Patient Care Team:  Ann Carmona DO as PCP - General (Family Medicine)  Ann Carmona DO (Family Medicine)    Review of Systems   Constitutional:  Negative for chills and fever.   HENT:  Negative for congestion and rhinorrhea.    Respiratory:  Negative for cough and shortness of breath.    Cardiovascular:  Positive for leg swelling. Negative for chest pain and palpitations.   Gastrointestinal:  Negative for abdominal pain.   Skin:         Toenail discoloration    Neurological:  Negative for dizziness and headaches.         Medical History Reviewed by provider this encounter:  Tobacco  Allergies  Meds  Problems  Med Hx  Surg Hx  Fam Hx       Annual Wellness Visit Questionnaire   Tobin is here for his Subsequent Wellness visit.     Health Risk Assessment:   Patient rates overall health as excellent. Patient feels that their physical health rating is much better. Patient is very satisfied with their life. Eyesight was rated as same. Hearing was rated as same. Patient feels that their emotional and mental health rating is much better. Patients states they are never, rarely angry. Patient states they are never, rarely unusually tired/fatigued. Pain experienced in the last 7 days has been some. Patient's pain rating has been 1/10. Patient states that he has experienced weight loss or gain in last 6 months.     Depression Screening:   PHQ-2 Score: 0      Fall Risk Screening:   In the past year, patient has experienced: history of falling in past year    Number of falls: 1  Injured during fall?: No    Feels unsteady when standing or walking?: No    Worried about falling?: No      Home Safety:  Patient does not have trouble with stairs inside or outside of their home. Patient has working smoke alarms and has no working carbon monoxide detector. Home safety hazards include: none.     Nutrition:   Current diet is Regular.      Medications:   Patient is not currently taking any over-the-counter supplements. Patient is able to manage medications.     Activities of Daily Living (ADLs)/Instrumental Activities of Daily Living (IADLs):   Walk and transfer into and out of bed and chair?: Yes  Dress and groom yourself?: Yes    Bathe or shower yourself?: Yes    Feed yourself? Yes  Do your laundry/housekeeping?: Yes  Manage your money, pay your bills and track your expenses?: Yes  Make your own meals?: Yes    Do your own shopping?: Yes    Previous Hospitalizations:   Any hospitalizations or ED visits within the last 12 months?: Yes    How many hospitalizations have you had in the last year?: 1-2    Advance Care Planning:   Living will: No      PREVENTIVE SCREENINGS      Cardiovascular Screening:    General: History Lipid Disorder and Screening Current      Diabetes Screening:     General: Screening Current      Colorectal Cancer Screening:     General: Screening Current      Prostate Cancer Screening:    General: Risks and Benefits Discussed    Due for: PSA      Osteoporosis Screening:    General: Screening Not Indicated      Abdominal Aortic Aneurysm (AAA) Screening:    Risk factors include: age between 65-76 yo and tobacco use        Lung Cancer Screening:     General: Screening Not Indicated      Hepatitis C Screening:    General: Screening Current    Screening, Brief Intervention, and Referral to Treatment (SBIRT)    Screening  Typical number of drinks in a day: 0  Typical number of drinks in a week: 0  Interpretation: Low risk drinking behavior.    Single Item Drug Screening:  How often have you used an illegal drug (including marijuana) or a prescription medication for non-medical reasons in the past year? never    Single Item Drug Screen Score: 0  Interpretation: Negative screen for possible drug use disorder    Social Drivers of Health     Financial Resource Strain: Low Risk  (10/16/2023)    Overall Financial Resource Strain ([facility])     "• Difficulty of Paying Living Expenses: Not hard at all   Food Insecurity: No Food Insecurity (12/30/2024)    Hunger Vital Sign    • Worried About Running Out of Food in the Last Year: Never true    • Ran Out of Food in the Last Year: Never true   Transportation Needs: No Transportation Needs (12/30/2024)    PRAPARE - Transportation    • Lack of Transportation (Medical): No    • Lack of Transportation (Non-Medical): No   Housing Stability: Low Risk  (12/30/2024)    Housing Stability Vital Sign    • Unable to Pay for Housing in the Last Year: No    • Number of Times Moved in the Last Year: 1    • Homeless in the Last Year: No   Utilities: Not At Risk (12/30/2024)    [facility] Utilities    • Threatened with loss of utilities: No     No results found.    Objective   /78 (BP Location: Left arm, Patient Position: Sitting, Cuff Size: Large)   Pulse 60   Resp 18   Ht 6' 5\" (1.956 m)   Wt (!) 139 kg (307 lb)   SpO2 99%   BMI 36.40 kg/m²     Physical Exam  Vitals reviewed.   Constitutional:       Appearance: He is obese.   HENT:      Head: Normocephalic and atraumatic.   Eyes:      Extraocular Movements: Extraocular movements intact.   Cardiovascular:      Rate and Rhythm: Normal rate and regular rhythm.      Heart sounds: Normal heart sounds.   Pulmonary:      Effort: Pulmonary effort is normal.      Breath sounds: Normal breath sounds.   Musculoskeletal:      Right lower leg: Edema present.      Left lower leg: Edema present.   Skin:     Comments: Thickened yellow toenails on right foot   Neurological:      General: No focal deficit present.      Mental Status: He is alert and oriented to person, place, and time.   Psychiatric:         Mood and Affect: Mood normal.         Behavior: Behavior normal.             "

## 2024-12-30 NOTE — PATIENT INSTRUCTIONS
Medicare Preventive Visit Patient Instructions  Thank you for completing your Welcome to Medicare Visit or Medicare Annual Wellness Visit today. Your next wellness visit will be due in one year (12/31/2025).  The screening/preventive services that you may require over the next 5-10 years are detailed below. Some tests may not apply to you based off risk factors and/or age. Screening tests ordered at today's visit but not completed yet may show as past due. Also, please note that scanned in results may not display below.  Preventive Screenings:  Service Recommendations Previous Testing/Comments   Colorectal Cancer Screening  Colonoscopy    Fecal Occult Blood Test (FOBT)/Fecal Immunochemical Test (FIT)  Fecal DNA/Cologuard Test  Flexible Sigmoidoscopy Age: 45-75 years old   Colonoscopy: every 10 years (May be performed more frequently if at higher risk)  OR  FOBT/FIT: every 1 year  OR  Cologuard: every 3 years  OR  Sigmoidoscopy: every 5 years  Screening may be recommended earlier than age 45 if at higher risk for colorectal cancer. Also, an individualized decision between you and your healthcare provider will decide whether screening between the ages of 76-85 would be appropriate. Colonoscopy: 04/14/2022  FOBT/FIT: Not on file  Cologuard: Not on file  Sigmoidoscopy: Not on file    Screening Current     Prostate Cancer Screening Individualized decision between patient and health care provider in men between ages of 55-69   Medicare will cover every 12 months beginning on the day after your 50th birthday PSA: 0.59 ng/mL           Hepatitis C Screening Once for adults born between 1945 and 1965  More frequently in patients at high risk for Hepatitis C Hep C Antibody: 10/01/2021    Screening Current   Diabetes Screening 1-2 times per year if you're at risk for diabetes or have pre-diabetes Fasting glucose: 87 mg/dL (4/30/2024)  A1C: 5.3 % (4/30/2024)  Screening Current   Cholesterol Screening Once every 5 years if you  don't have a lipid disorder. May order more often based on risk factors. Lipid panel: 04/30/2024  Screening Not Indicated  History Lipid Disorder      Other Preventive Screenings Covered by Medicare:  Abdominal Aortic Aneurysm (AAA) Screening: covered once if your at risk. You're considered to be at risk if you have a family history of AAA or a male between the age of 65-75 who smoking at least 100 cigarettes in your lifetime.  Lung Cancer Screening: covers low dose CT scan once per year if you meet all of the following conditions: (1) Age 55-77; (2) No signs or symptoms of lung cancer; (3) Current smoker or have quit smoking within the last 15 years; (4) You have a tobacco smoking history of at least 20 pack years (packs per day x number of years you smoked); (5) You get a written order from a healthcare provider.  Glaucoma Screening: covered annually if you're considered high risk: (1) You have diabetes OR (2) Family history of glaucoma OR (3)  aged 50 and older OR (4)  American aged 65 and older  Osteoporosis Screening: covered every 2 years if you meet one of the following conditions: (1) Have a vertebral abnormality; (2) On glucocorticoid therapy for more than 3 months; (3) Have primary hyperparathyroidism; (4) On osteoporosis medications and need to assess response to drug therapy.  HIV Screening: covered annually if you're between the age of 15-65. Also covered annually if you are younger than 15 and older than 65 with risk factors for HIV infection. For pregnant patients, it is covered up to 3 times per pregnancy.    Immunizations:  Immunization Recommendations   Influenza Vaccine Annual influenza vaccination during flu season is recommended for all persons aged >= 6 months who do not have contraindications   Pneumococcal Vaccine   * Pneumococcal conjugate vaccine = PCV13 (Prevnar 13), PCV15 (Vaxneuvance), PCV20 (Prevnar 20)  * Pneumococcal polysaccharide vaccine = PPSV23 (Pneumovax)  Adults 19-65 yo with certain risk factors or if 65+ yo  If never received any pneumonia vaccine: recommend Prevnar 20 (PCV20)  Give PCV20 if previously received 1 dose of PCV13 or PPSV23   Hepatitis B Vaccine 3 dose series if at intermediate or high risk (ex: diabetes, end stage renal disease, liver disease)   Respiratory syncytial virus (RSV) Vaccine - COVERED BY MEDICARE PART D  * RSVPreF3 (Arexvy) CDC recommends that adults 60 years of age and older may receive a single dose of RSV vaccine using shared clinical decision-making (SCDM)   Tetanus (Td) Vaccine - COST NOT COVERED BY MEDICARE PART B Following completion of primary series, a booster dose should be given every 10 years to maintain immunity against tetanus. Td may also be given as tetanus wound prophylaxis.   Tdap Vaccine - COST NOT COVERED BY MEDICARE PART B Recommended at least once for all adults. For pregnant patients, recommended with each pregnancy.   Shingles Vaccine (Shingrix) - COST NOT COVERED BY MEDICARE PART B  2 shot series recommended in those 19 years and older who have or will have weakened immune systems or those 50 years and older     Health Maintenance Due:      Topic Date Due   • Colorectal Cancer Screening  04/13/2025   • Hepatitis C Screening  Completed     Immunizations Due:      Topic Date Due   • Influenza Vaccine (1) 09/01/2024   • COVID-19 Vaccine (6 - 2024-25 season) 09/01/2024     Advance Directives   What are advance directives?  Advance directives are legal documents that state your wishes and plans for medical care. These plans are made ahead of time in case you lose your ability to make decisions for yourself. Advance directives can apply to any medical decision, such as the treatments you want, and if you want to donate organs.   What are the types of advance directives?  There are many types of advance directives, and each state has rules about how to use them. You may choose a combination of any of the  following:  Living will:  This is a written record of the treatment you want. You can also choose which treatments you do not want, which to limit, and which to stop at a certain time. This includes surgery, medicine, IV fluid, and tube feedings.   Durable power of  for healthcare (DPAHC):  This is a written record that states who you want to make healthcare choices for you when you are unable to make them for yourself. This person, called a proxy, is usually a family member or a friend. You may choose more than 1 proxy.  Do not resuscitate (DNR) order:  A DNR order is used in case your heart stops beating or you stop breathing. It is a request not to have certain forms of treatment, such as CPR. A DNR order may be included in other types of advance directives.  Medical directive:  This covers the care that you want if you are in a coma, near death, or unable to make decisions for yourself. You can list the treatments you want for each condition. Treatment may include pain medicine, surgery, blood transfusions, dialysis, IV or tube feedings, and a ventilator (breathing machine).  Values history:  This document has questions about your views, beliefs, and how you feel and think about life. This information can help others choose the care that you would choose.  Why are advance directives important?  An advance directive helps you control your care. Although spoken wishes may be used, it is better to have your wishes written down. Spoken wishes can be misunderstood, or not followed. Treatments may be given even if you do not want them. An advance directive may make it easier for your family to make difficult choices about your care.   Fall Prevention    Fall prevention  includes ways to make your home and other areas safer. It also includes ways you can move more carefully to prevent a fall. Health conditions that cause changes in your blood pressure, vision, or muscle strength and coordination may increase  your risk for falls. Medicines may also increase your risk for falls if they make you dizzy, weak, or sleepy.   Fall prevention tips:   Stand or sit up slowly.    Use assistive devices as directed.    Wear shoes that fit well and have soles that .    Wear a personal alarm.    Stay active.    Manage your medical conditions.    Home Safety Tips:  Add items to prevent falls in the bathroom.    Keep paths clear.    Install bright lights in your home.    Keep items you use often on shelves within reach.    Paint or place reflective tape on the edges of your stairs.    Weight Management   Why it is important to manage your weight:  Being overweight increases your risk of health conditions such as heart disease, high blood pressure, type 2 diabetes, and certain types of cancer. It can also increase your risk for osteoarthritis, sleep apnea, and other respiratory problems. Aim for a slow, steady weight loss. Even a small amount of weight loss can lower your risk of health problems.  How to lose weight safely:  A safe and healthy way to lose weight is to eat fewer calories and get regular exercise. You can lose up about 1 pound a week by decreasing the number of calories you eat by 500 calories each day.   Healthy meal plan for weight management:  A healthy meal plan includes a variety of foods, contains fewer calories, and helps you stay healthy. A healthy meal plan includes the following:  Eat whole-grain foods more often.  A healthy meal plan should contain fiber. Fiber is the part of grains, fruits, and vegetables that is not broken down by your body. Whole-grain foods are healthy and provide extra fiber in your diet. Some examples of whole-grain foods are whole-wheat breads and pastas, oatmeal, brown rice, and bulgur.  Eat a variety of vegetables every day.  Include dark, leafy greens such as spinach, kale, camille greens, and mustard greens. Eat yellow and orange vegetables such as carrots, sweet potatoes, and  winter squash.   Eat a variety of fruits every day.  Choose fresh or canned fruit (canned in its own juice or light syrup) instead of juice. Fruit juice has very little or no fiber.  Eat low-fat dairy foods.  Drink fat-free (skim) milk or 1% milk. Eat fat-free yogurt and low-fat cottage cheese. Try low-fat cheeses such as mozzarella and other reduced-fat cheeses.  Choose meat and other protein foods that are low in fat.  Choose beans or other legumes such as split peas or lentils. Choose fish, skinless poultry (chicken or turkey), or lean cuts of red meat (beef or pork). Before you cook meat or poultry, cut off any visible fat.   Use less fat and oil.  Try baking foods instead of frying them. Add less fat, such as margarine, sour cream, regular salad dressing and mayonnaise to foods. Eat fewer high-fat foods. Some examples of high-fat foods include french fries, doughnuts, ice cream, and cakes.  Eat fewer sweets.  Limit foods and drinks that are high in sugar. This includes candy, cookies, regular soda, and sweetened drinks.  Exercise:  Exercise at least 30 minutes per day on most days of the week. Some examples of exercise include walking, biking, dancing, and swimming. You can also fit in more physical activity by taking the stairs instead of the elevator or parking farther away from stores. Ask your healthcare provider about the best exercise plan for you.      © Copyright [employer] 2018 Information is for End User's use only and may not be sold, redistributed or otherwise used for commercial purposes. All illustrations and images included in CareNotes® are the copyrighted property of [employer] Inc[employer] or [employer]

## 2024-12-31 ENCOUNTER — APPOINTMENT (OUTPATIENT)
Dept: PHYSICAL THERAPY | Facility: CLINIC | Age: 69
End: 2024-12-31
Payer: OTHER MISCELLANEOUS

## 2025-01-02 ENCOUNTER — EVALUATION (OUTPATIENT)
Dept: PHYSICAL THERAPY | Facility: CLINIC | Age: 70
End: 2025-01-02
Payer: OTHER MISCELLANEOUS

## 2025-01-02 DIAGNOSIS — S76.111A RUPTURE OF QUADRICEPS TENDON, RIGHT, INITIAL ENCOUNTER: Primary | ICD-10-CM

## 2025-01-02 PROCEDURE — 97112 NEUROMUSCULAR REEDUCATION: CPT | Performed by: PHYSICAL THERAPIST

## 2025-01-02 PROCEDURE — 97110 THERAPEUTIC EXERCISES: CPT | Performed by: PHYSICAL THERAPIST

## 2025-01-02 PROCEDURE — 97530 THERAPEUTIC ACTIVITIES: CPT | Performed by: PHYSICAL THERAPIST

## 2025-01-02 NOTE — PROGRESS NOTES
PT Re-Evaluation     Today's date: 2025  Patient name: Tobin Kerns  : 1955  MRN: [id number]  Referring provider: America Ambrose PA-C  Dx:   Encounter Diagnosis     ICD-10-CM    1. Rupture of quadriceps tendon, right, initial encounter  S76.111A             Start Time: 1715  Stop Time: 1800  Total time in clinic (min): 45 minutes    Assessment  Impairments: abnormal or restricted ROM, activity intolerance, impaired physical strength, lacks appropriate home exercise program, pain with function and weight-bearing intolerance  Functional limitations: self-care/ADLs, household activities, ambulation, and stair negotiation.    Assessment details: Pt is a 68 y/o male presenting to physical therapy s/p R quad tendon repair on . Upon re-examination, pt has improved strength and ROM of pt's R knee and hip as well as decreased pain of the same regions. Pt's FOTO score has improved, demonstrating an improved ability to perform functional activities. However, pt continues to have deficits in strength and ROM of pt's R knee and hip as well as pain and difficulty with functional activities. Pt plan of care will focus on improving the previously mentioned deficits and limitations. Pt would benefit from skilled physical therapy to facilitate a return to his prior level of function.           Understanding of Dx/Px/POC: good     Prognosis: good    Goals  STG - 4 weeks  1. Pt will be able to ambulate with no AD and brace to facilitate a return to his prior level of function Ongoing  2. Pt's FOTO score will improve by 15 points or better to facilitate a return to pt's prior level of function. Ongoing     LTG - 8 weeks  1. Pt will demonstrate WNL AROM of his R knee in all planes to facilitate a return to his prior level of function MET  2. Pt will demonstrate 4+/5 gross strength or better of his R knee and hip in all planes to facilitate a return to his prior level of function Ongoing      Plan  Patient would  benefit from: PT eval and skilled physical therapy  Planned modality interventions: cryotherapy, thermotherapy: hydrocollator packs and unattended electrical stimulation    Planned therapy interventions: activity modification, ADL training, balance, behavior modification, balance/weight bearing training, flexibility, functional ROM exercises, gait training, graded exercise, home exercise program, IASTM, joint mobilization, kinesiology taping, manual therapy, massage, Miller taping, nerve gliding, neuromuscular re-education, self care, patient/caregiver education, stretching, therapeutic activities, strengthening and therapeutic exercise    Frequency: 2x week  Duration in weeks: 8  Plan of Care beginning date: 2025  Plan of Care expiration date: 2025        Subjective Evaluation    History of Present Illness  Mechanism of injury: Pt is a 70 y/o male presenting to physical therapy s/p R quad tendon repair on . Pt reports having improvements in strength of his R quad since starting therapy. Pt reports also having improvements in the bending of his R knee and squatting. Pt reports he continues to be able to walk with the brace unlocked and less of his walker. However, pt reports he continues to have significant weakness of his R quad due to him being unable to fully extend out his R knee or perform a SLR without a quad lag. Pt reports he will still have pain in his R knee with twisting. Pt reports he also has some hitching still going on in his R knee when he is walking. Pt reports he continues to have difficulty going up and down steps due to the weakness of his R quad. Pt reports he has overall improved since starting therapy.   Patient Goals  Patient goals for therapy: decreased pain, increased motion, return to work and increased strength    Pain  Current pain ratin  At best pain ratin  At worst pain ratin  Location: R knee  Quality: dull ache  Relieving factors: rest  Aggravating  "factors: stair climbing, walking and standing  Progression: improved    Treatments  Previous treatment: medication  Current treatment: medication and physical therapy        Objective     Palpation     Additional Palpation Details  Significant indentation of pt's L quad when relaxed just superior to the R patella    Tenderness     Right Knee   Tenderness in the lateral joint line and medial joint line.     Active Range of Motion     Right Knee   Flexion: 133 degrees   Extension: 0 degrees     Mobility   Patellar Mobility:     Right Knee   WFL: medial, lateral, superior and inferior    Strength/Myotome Testing     Right Hip   Planes of Motion   Flexion: 4  Extension: 4  Abduction: 4  Adduction: 4  External rotation: 4  Internal rotation: 4    Right Knee   Flexion: 4-  Extension: 3+  Quadriceps contraction: fair    Additional Strength Details  Significant quad leg in LAQ and SLR positions of his RLE    General Comments:      Knee Comments  Pt currently ambulating with brace and using RW             Precautions: R quad tendon repair on 7/25    POC expires 1 Month Re-Eval Auth Expiration date PT/OT + Visit Limit?   2/27 2/2 N/A BOMN                 Visit/Unit Tracking  AUTH Status:  Date 1/2 FOTO              N/A Used 22               Remaining                  FOTO, done 1/2      Manuals 12/30 1/2   12/10 12/12 12/17 12/19 12/23 12/26   PROM R knee                                                    Neuro Re-Ed             Pt education     4' 4' 4' 4' 4' 4'   Quad set C russain towel under knee C russain towel under knee   C russain towel under knee C russain towel under knee C russain towel under knee C russain towel under knee C russain towel under knee C russain towel under knee   Hvy bnd TKE 3x10 5\" grn 3x10 5\" blue   3x10 5\" grn 3x10 5\" grn 3x10 5\" grn 4x10 5\" grn 4x10 5\" grn 4x10 5\" grn   SLR c brace 4x10 4x10   3x10 3x10 3x10 4x10 4x10 4x10   LAQ AA 4x10 4x10   3x10  3x10 3x10 3x10 3x10 4x10                       " "      Ther Ex                          Sidelying hip abduction c brace 4x10 4x10   3x10 3x10 3x10 4x10 4x10 4x10   Prone hip extension c brace 4x10 4x10   3x10 3x10 3x10 4x10 4x10 4x10   Upright bike for LE ROM 8' L8-9 8' L8-9   10' L8-9 10' L8-9 10' L8-9 10' L8-9 10' L8-9 10' L8-9   Heel slides             Heel raises 4x10 4x10   3x10 3x10 3x10 4x10 4x10 4x10   SL leg press 3x10 135# 3x10 165#   3x10 125# 3x10 135# 3x10 135#  4x10 135# 4x10 135# 4x10 135#                 Ther Activity             Lateral step ups             Step ups 4x10 6\" 4x10 6\"   2x10 6\" 2x10 6\" 2x10 6\" 4x10 6\" 4x10 6\" 4x10 6\"    Mini squats 4x10 4x10   3x10 3x10 3x10 4x10 4x10 4x10   Gait Training                                       Modalities             Vincentian stim c quad sets 8' 10/10 intensity 100    8' 10/10 intensity 100.0  8' 10/10 intensity 100.0 8' 10/10 intensity 100.0 8' 10/10 intensity 100.0 8' 10/10 intensity 100.0                           "

## 2025-01-04 NOTE — PATIENT INSTRUCTIONS
To Do:     Call for appt - Peripheral Neuropathy - Neuro Dr Zay Fuller - next appt 5/23   Call for appt - Foot Blisters, Ulcers, and Peripheral Neuropathy - Podiatry Dr Robins Bending
mild

## 2025-01-07 ENCOUNTER — OFFICE VISIT (OUTPATIENT)
Dept: PHYSICAL THERAPY | Facility: CLINIC | Age: 70
End: 2025-01-07
Payer: OTHER MISCELLANEOUS

## 2025-01-07 DIAGNOSIS — S76.111A RUPTURE OF QUADRICEPS TENDON, RIGHT, INITIAL ENCOUNTER: Primary | ICD-10-CM

## 2025-01-07 PROCEDURE — 97530 THERAPEUTIC ACTIVITIES: CPT

## 2025-01-07 PROCEDURE — 97110 THERAPEUTIC EXERCISES: CPT

## 2025-01-07 PROCEDURE — 97112 NEUROMUSCULAR REEDUCATION: CPT

## 2025-01-07 NOTE — PROGRESS NOTES
"Daily Note     Today's date: 2025  Patient name: Tobin Kerns  : 1955  MRN: [id number]  Referring provider: America Ambrose PA-C  Dx:   Encounter Diagnosis     ICD-10-CM    1. Rupture of quadriceps tendon, right, initial encounter  S76.111A           Start Time: 0800  Stop Time: 0850  Total time in clinic (min): 50 minutes    Subjective: Pt reports no new complaints.       Objective: See treatment diary below      Assessment: Tolerated treatment well. Pt demonstrates good effort throughout session. Minimal cueing given to facilitate proper exercise performance and technique. Visible muscular fasciculations present with NMES. He demonstrates good recall of current program this session. Patient demonstrated fatigue post treatment, exhibited good technique with therapeutic exercises, and would benefit from continued PT      Plan: Continue per plan of care.      Precautions: R quad tendon repair on     POC expires 1 Month Re-Eval Auth Expiration date PT/OT + Visit Limit?    N/A BOMN                 Visit/Unit Tracking  AUTH Status:  Date  FOTO              N/A Used               Remaining                  FOTO, done       Manuals 12/30 1/2 1/7  12/10 12/12 12/17 12/19 12/23 12/26   PROM R knee                                                    Neuro Re-Ed             Pt education     4' 4' 4' 4' 4' 4'   Quad set C russain towel under knee C russain towel under knee C russain towel under knee  C russain towel under knee C russain towel under knee C russain towel under knee C russain towel under knee C russain towel under knee C russain towel under knee   Hvy bnd TKE 3x10 5\" grn 3x10 5\" blue 3x10 5\" blue  3x10 5\" grn 3x10 5\" grn 3x10 5\" grn 4x10 5\" grn 4x10 5\" grn 4x10 5\" grn   SLR c brace 4x10 4x10 4x10  3x10 3x10 3x10 4x10 4x10 4x10   LAQ AA 4x10 4x10 4x10  3x10  3x10 3x10 3x10 3x10 4x10                             Ther Ex                          Sidelying hip abduction c brace " "4x10 4x10 4x10  3x10 3x10 3x10 4x10 4x10 4x10   Prone hip extension c brace 4x10 4x10 4x10  3x10 3x10 3x10 4x10 4x10 4x10   Upright bike for LE ROM 8' L8-9 8' L8-9 8' L8-9  10' L8-9 10' L8-9 10' L8-9 10' L8-9 10' L8-9 10' L8-9   Heel slides             Heel raises 4x10 4x10 4x10  3x10 3x10 3x10 4x10 4x10 4x10   SL leg press 3x10 135# 3x10 165# 4x10 165#  3x10 125# 3x10 135# 3x10 135#  4x10 135# 4x10 135# 4x10 135#                 Ther Activity             Lateral step ups             Step ups 4x10 6\" 4x10 6\" 4x10 6\"  2x10 6\" 2x10 6\" 2x10 6\" 4x10 6\" 4x10 6\" 4x10 6\"    Mini squats 4x10 4x10 4x10  3x10 3x10 3x10 4x10 4x10 4x10   Gait Training                                       Modalities             Lebanese stim c quad sets 8' 10/10 intensity 100 8' 10/10 intensity 100 8' 10/10 intensity 100  8' 10/10 intensity 100.0  8' 10/10 intensity 100.0 8' 10/10 intensity 100.0 8' 10/10 intensity 100.0 8' 10/10 intensity 100.0                           "

## 2025-01-09 ENCOUNTER — APPOINTMENT (OUTPATIENT)
Dept: PHYSICAL THERAPY | Facility: CLINIC | Age: 70
End: 2025-01-09
Payer: OTHER MISCELLANEOUS

## 2025-01-10 ENCOUNTER — OFFICE VISIT (OUTPATIENT)
Dept: PHYSICAL THERAPY | Facility: CLINIC | Age: 70
End: 2025-01-10
Payer: OTHER MISCELLANEOUS

## 2025-01-10 DIAGNOSIS — S76.111A RUPTURE OF QUADRICEPS TENDON, RIGHT, INITIAL ENCOUNTER: Primary | ICD-10-CM

## 2025-01-10 PROCEDURE — 97112 NEUROMUSCULAR REEDUCATION: CPT

## 2025-01-10 PROCEDURE — 97110 THERAPEUTIC EXERCISES: CPT

## 2025-01-10 PROCEDURE — 97530 THERAPEUTIC ACTIVITIES: CPT

## 2025-01-10 NOTE — PROGRESS NOTES
"Daily Note     Today's date: 1/10/2025  Patient name: Tobin Kerns  : 1955  MRN: [id number]  Referring provider: America Ambrose PA-C  Dx:   Encounter Diagnosis     ICD-10-CM    1. Rupture of quadriceps tendon, right, initial encounter  S76.111A           Start Time: 0800  Stop Time: 0850  Total time in clinic (min): 50 minutes    Subjective: Pt reports no new complaints.       Objective: See treatment diary below      Assessment: Tolerated treatment well. Pt demonstrates good effort throughout session. Minimal cueing given to facilitate proper exercise performance and technique. Added an extra strap for support on his brace since the plastic clasp has broken and wont hold to assist proper form for SLRs this session. He continues to tolerate strengthening program with challenge reported during step ups. Patient demonstrated fatigue post treatment, exhibited good technique with therapeutic exercises, and would benefit from continued PT      Plan: Continue per plan of care.      Precautions: R quad tendon repair on     POC expires 1 Month Re-Eval Auth Expiration date PT/OT + Visit Limit?    N/A BOMN                 Visit/Unit Tracking  AUTH Status:  Date  FOTO 1/7 1/10            N/A Used  24             Remaining                  FOTO, done       Manuals 12/30 1/2 1/7 1/10     12/23 12/26   PROM R knee                                                    Neuro Re-Ed             Pt education         4' 4'   Quad set C russain towel under knee C russain towel under knee C russain towel under knee C russain towel under knee     C russain towel under knee C russain towel under knee   Hvy bnd TKE 3x10 5\" grn 3x10 5\" blue 3x10 5\" blue 3x10 5\" blue     4x10 5\" grn 4x10 5\" grn   SLR c brace 4x10 4x10 4x10 4x10 c extra strap on brace     4x10 4x10   LAQ AA 4x10 4x10 4x10 4x10     3x10 4x10                             Ther Ex                          Sidelying hip abduction c brace 4x10 4x10 " "4x10 4x10     4x10 4x10   Prone hip extension c brace 4x10 4x10 4x10 4x10     4x10 4x10   Upright bike for LE ROM 8' L8-9 8' L8-9 8' L8-9 8' L8-9     10' L8-9 10' L8-9   Heel slides             Heel raises 4x10 4x10 4x10 4x10     4x10 4x10   SL leg press 3x10 135# 3x10 165# 4x10 165# 4x10 165#     4x10 135# 4x10 135#                 Ther Activity             Lateral step ups             Step ups 4x10 6\" 4x10 6\" 4x10 6\" 4x10 6\"     4x10 6\" 4x10 6\"    Mini squats 4x10 4x10 4x10 4x10     4x10 4x10   Gait Training                                       Modalities             Salvadorean stim c quad sets 8' 10/10 intensity 100 8' 10/10 intensity 100 8' 10/10 intensity 100 8' 10/10 intensity 92     8' 10/10 intensity 100.0 8' 10/10 intensity 100.0                             "

## 2025-01-13 ENCOUNTER — HOSPITAL ENCOUNTER (OUTPATIENT)
Dept: MRI IMAGING | Facility: CLINIC | Age: 70
Discharge: HOME | End: 2025-01-13
Payer: OTHER MISCELLANEOUS

## 2025-01-13 DIAGNOSIS — Z98.890 S/P MUSCULOSKELETAL SYSTEM SURGERY: ICD-10-CM

## 2025-01-13 DIAGNOSIS — S76.111D TENDON RUPTURE, TRAUMATIC. QUADRICEPS, RIGHT, SUBSEQUENT ENCOUNTER: ICD-10-CM

## 2025-01-13 PROCEDURE — 73721 MRI JNT OF LWR EXTRE W/O DYE: CPT

## 2025-01-14 ENCOUNTER — APPOINTMENT (OUTPATIENT)
Dept: PHYSICAL THERAPY | Facility: CLINIC | Age: 70
End: 2025-01-14
Payer: OTHER MISCELLANEOUS

## 2025-01-15 ENCOUNTER — OFFICE VISIT (OUTPATIENT)
Dept: PHYSICAL THERAPY | Facility: CLINIC | Age: 70
End: 2025-01-15
Payer: OTHER MISCELLANEOUS

## 2025-01-15 ENCOUNTER — IMMUNIZATIONS (OUTPATIENT)
Dept: FAMILY MEDICINE CLINIC | Facility: CLINIC | Age: 70
End: 2025-01-15
Payer: MEDICARE

## 2025-01-15 DIAGNOSIS — S76.111A RUPTURE OF QUADRICEPS TENDON, RIGHT, INITIAL ENCOUNTER: Primary | ICD-10-CM

## 2025-01-15 DIAGNOSIS — Z23 NEED FOR COVID-19 VACCINE: ICD-10-CM

## 2025-01-15 DIAGNOSIS — Z23 ENCOUNTER FOR IMMUNIZATION: Primary | ICD-10-CM

## 2025-01-15 PROCEDURE — 97110 THERAPEUTIC EXERCISES: CPT

## 2025-01-15 PROCEDURE — 90480 ADMN SARSCOV2 VAC 1/ONLY CMP: CPT

## 2025-01-15 PROCEDURE — G0008 ADMIN INFLUENZA VIRUS VAC: HCPCS

## 2025-01-15 PROCEDURE — 97112 NEUROMUSCULAR REEDUCATION: CPT

## 2025-01-15 PROCEDURE — 90662 IIV NO PRSV INCREASED AG IM: CPT

## 2025-01-15 PROCEDURE — 97530 THERAPEUTIC ACTIVITIES: CPT

## 2025-01-15 PROCEDURE — 91320 SARSCV2 VAC 30MCG TRS-SUC IM: CPT

## 2025-01-15 NOTE — PROGRESS NOTES
"Daily Note     Today's date: 1/15/2025  Patient name: Tobin Kerns  : 1955  MRN: [id number]  Referring provider: America Ambrose PA-C  Dx:   Encounter Diagnosis     ICD-10-CM    1. Rupture of quadriceps tendon, right, initial encounter  S76.111A           Start Time: 0800  Stop Time: 0850  Total time in clinic (min): 50 minutes    Subjective: Pt reports if his quad tendon re-tore he doesn't want to start over from the beginning again.       Objective: See treatment diary below      Assessment: Tolerated treatment well. Pt demonstrates good effort throughout session. Minimal cueing given to facilitate proper exercise performance and technique. He continues to tolerate current program with good challenge. Some instability noted with step ups continues. Continue to progress as tolerated. Patient demonstrated fatigue post treatment, exhibited good technique with therapeutic exercises, and would benefit from continued PT      Plan: Continue per plan of care.      Precautions: R quad tendon repair on     POC expires 1 Month Re-Eval Auth Expiration date PT/OT + Visit Limit?    N/A BOMN                 Visit/Unit Tracking  AUTH Status:  Date  FOTO 1/7 1/10 1/15           N/A Used 22 23 24 25            Remaining                  FOTO, done       Manuals 12/30 1/2 1/7 1/10 1/15    12/23 12/26   PROM R knee                                                    Neuro Re-Ed             Pt education         4' 4'   Quad set C russain towel under knee C russain towel under knee C russain towel under knee C russain towel under knee C russain towel under knee    C russain towel under knee C russain towel under knee   Hvy bnd TKE 3x10 5\" grn 3x10 5\" blue 3x10 5\" blue 3x10 5\" blue 3x10 5\" blue    4x10 5\" grn 4x10 5\" grn   SLR c brace 4x10 4x10 4x10 4x10 c extra strap on brace 4x10 c extra strap on brace    4x10 4x10   LAQ AA 4x10 4x10 4x10 4x10 4x10    3x10 4x10                             Ther Ex           " "               Sidelying hip abduction c brace 4x10 4x10 4x10 4x10 4x10    4x10 4x10   Prone hip extension c brace 4x10 4x10 4x10 4x10 4x10    4x10 4x10   Upright bike for LE ROM 8' L8-9 8' L8-9 8' L8-9 8' L8-9 8' L8-9    10' L8-9 10' L8-9   Heel slides             Heel raises 4x10 4x10 4x10 4x10 4x10    4x10 4x10   SL leg press 3x10 135# 3x10 165# 4x10 165# 4x10 165# 4x10 165#    4x10 135# 4x10 135#                 Ther Activity             Lateral step ups             Step ups 4x10 6\" 4x10 6\" 4x10 6\" 4x10 6\" 4x10 6\"    4x10 6\" 4x10 6\"    Mini squats 4x10 4x10 4x10 4x10 4x10    4x10 4x10   Gait Training                                       Modalities             Latvian stim c quad sets 8' 10/10 intensity 100 8' 10/10 intensity 100 8' 10/10 intensity 100 8' 10/10 intensity 92 8' 10/10 intensity 100    8' 10/10 intensity 100.0 8' 10/10 intensity 100.0                               "

## 2025-01-16 ENCOUNTER — APPOINTMENT (OUTPATIENT)
Dept: PHYSICAL THERAPY | Facility: CLINIC | Age: 70
End: 2025-01-16
Payer: OTHER MISCELLANEOUS

## 2025-01-17 ENCOUNTER — OFFICE VISIT (OUTPATIENT)
Dept: PHYSICAL THERAPY | Facility: CLINIC | Age: 70
End: 2025-01-17
Payer: OTHER MISCELLANEOUS

## 2025-01-17 DIAGNOSIS — S76.111A RUPTURE OF QUADRICEPS TENDON, RIGHT, INITIAL ENCOUNTER: Primary | ICD-10-CM

## 2025-01-17 PROCEDURE — 97110 THERAPEUTIC EXERCISES: CPT

## 2025-01-17 PROCEDURE — 97530 THERAPEUTIC ACTIVITIES: CPT

## 2025-01-17 PROCEDURE — 97112 NEUROMUSCULAR REEDUCATION: CPT

## 2025-01-17 NOTE — PROGRESS NOTES
"Daily Note     Today's date: 2025  Patient name: Tobin Kerns  : 1955  MRN: [id number]  Referring provider: America Ambrose PA-C  Dx:   Encounter Diagnosis     ICD-10-CM    1. Rupture of quadriceps tendon, right, initial encounter  S76.111A           Start Time: 0800  Stop Time: 0850  Total time in clinic (min): 50 minutes    Subjective: Pt reports no new complaints.       Objective: See treatment diary below      Assessment: Tolerated treatment well. Pt demonstrates good effort throughout session. Minimal cueing given to facilitate proper exercise performance and technique. He continues to tolerate program as outlined below. Limited quad control with end range extension in CKC. Patient demonstrated fatigue post treatment, exhibited good technique with therapeutic exercises, and would benefit from continued PT      Plan: Continue per plan of care.      Precautions: R quad tendon repair on     POC expires 1 Month Re-Eval Auth Expiration date PT/OT + Visit Limit?    N/A BOMN                 Visit/Unit Tracking  AUTH Status:  Date  FOTO 1/7 1/10 1/15 1/17          N/A Used            Remaining                  FOTO, done       Manuals 12/30 1/2 1/7 1/10 1/15 1/17   12/23 12/26   PROM R knee                                                    Neuro Re-Ed             Pt education         4' 4'   Quad set C russain towel under knee C russain towel under knee C russain towel under knee C russain towel under knee C russain towel under knee C russain towel under knee   C russain towel under knee C russain towel under knee   Hvy bnd TKE 3x10 5\" grn 3x10 5\" blue 3x10 5\" blue 3x10 5\" blue 3x10 5\" blue 3x10 5\" blue   4x10 5\" grn 4x10 5\" grn   SLR c brace 4x10 4x10 4x10 4x10 c extra strap on brace 4x10 c extra strap on brace 4x10 c extra strap on brace   4x10 4x10   LAQ AA 4x10 4x10 4x10 4x10 4x10 4x10   3x10 4x10                             Ther Ex                          Sidelying " "hip abduction c brace 4x10 4x10 4x10 4x10 4x10 4x10   4x10 4x10   Prone hip extension c brace 4x10 4x10 4x10 4x10 4x10 4x10   4x10 4x10   Upright bike for LE ROM 8' L8-9 8' L8-9 8' L8-9 8' L8-9 8' L8-9 8' L8-9   10' L8-9 10' L8-9   Heel slides             Heel raises 4x10 4x10 4x10 4x10 4x10 4x10   4x10 4x10   SL leg press 3x10 135# 3x10 165# 4x10 165# 4x10 165# 4x10 165# 4x10 165#   4x10 135# 4x10 135#                 Ther Activity             Lateral step ups             Step ups 4x10 6\" 4x10 6\" 4x10 6\" 4x10 6\" 4x10 6\" 4x10 6\"   4x10 6\" 4x10 6\"    Mini squats 4x10 4x10 4x10 4x10 4x10 4x10   4x10 4x10   Gait Training                                       Modalities             Burmese stim c quad sets 8' 10/10 intensity 100 8' 10/10 intensity 100 8' 10/10 intensity 100 8' 10/10 intensity 92 8' 10/10 intensity 100 8' 10/10 intensity 100   8' 10/10 intensity 100.0 8' 10/10 intensity 100.0                                 "

## 2025-01-20 ENCOUNTER — OFFICE VISIT (OUTPATIENT)
Dept: PHYSICAL THERAPY | Facility: CLINIC | Age: 70
End: 2025-01-20
Payer: OTHER MISCELLANEOUS

## 2025-01-20 DIAGNOSIS — S76.111A RUPTURE OF QUADRICEPS TENDON, RIGHT, INITIAL ENCOUNTER: Primary | ICD-10-CM

## 2025-01-20 PROCEDURE — 97530 THERAPEUTIC ACTIVITIES: CPT

## 2025-01-20 PROCEDURE — 97112 NEUROMUSCULAR REEDUCATION: CPT

## 2025-01-20 PROCEDURE — 97110 THERAPEUTIC EXERCISES: CPT

## 2025-01-20 NOTE — PROGRESS NOTES
"Daily Note     Today's date: 2025  Patient name: Tobin Kerns  : 1955  MRN: [id number]  Referring provider: America Ambrose PA-C  Dx:   Encounter Diagnosis     ICD-10-CM    1. Rupture of quadriceps tendon, right, initial encounter  S76.111A           Start Time: 1120  Stop Time: 1220  Total time in clinic (min): 60 minutes    Subjective: Pt reports no new complaints. He is wondering about his MRI results.       Objective: See treatment diary below      Assessment: Tolerated treatment well. Pt demonstrates good effort throughout session. Minimal cueing given to facilitate proper exercise performance and technique. He tolerates current program with good challenge due to persisting quad weakness and control. Encouraged call out to Dr. Foster today to follow up regarding MRI results. Patient demonstrated fatigue post treatment, exhibited good technique with therapeutic exercises, and would benefit from continued PT      Plan: Continue per plan of care.      Precautions: R quad tendon repair on     POC expires 1 Month Re-Eval Auth Expiration date PT/OT + Visit Limit?    N/A BOMN                 Visit/Unit Tracking  AUTH Status:  Date  FOTO 1/7 1/10 1/15 1/17 1/20         N/A Used 22 23 24 25 26 27          Remaining                  FOTO, done       Manuals 12/30 1/2 1/7 1/10 1/15 1/17 1/20  12/23 12/26   PROM R knee                                                    Neuro Re-Ed             Pt education         4' 4'   Quad set C russain towel under knee C russain towel under knee C russain towel under knee C russain towel under knee C russain towel under knee C russain towel under knee C russain towel under knee  C russain towel under knee C russain towel under knee   Hvy bnd TKE 3x10 5\" grn 3x10 5\" blue 3x10 5\" blue 3x10 5\" blue 3x10 5\" blue 3x10 5\" blue 3x10 5\" blue  4x10 5\" grn 4x10 5\" grn   SLR c brace 4x10 4x10 4x10 4x10 c extra strap on brace 4x10 c extra strap on brace 4x10 c " "extra strap on brace 4x10 c extra strap on brace  4x10 4x10   LAQ AA 4x10 4x10 4x10 4x10 4x10 4x10 4x10  3x10 4x10                             Ther Ex                          Sidelying hip abduction c brace 4x10 4x10 4x10 4x10 4x10 4x10 4x10  4x10 4x10   Prone hip extension c brace 4x10 4x10 4x10 4x10 4x10 4x10 4x10  4x10 4x10   Upright bike for LE ROM 8' L8-9 8' L8-9 8' L8-9 8' L8-9 8' L8-9 8' L8-9 8' L8-9  10' L8-9 10' L8-9   Heel slides             Heel raises 4x10 4x10 4x10 4x10 4x10 4x10 4x10  4x10 4x10   SL leg press 3x10 135# 3x10 165# 4x10 165# 4x10 165# 4x10 165# 4x10 165# 4x10 165#  4x10 135# 4x10 135#                 Ther Activity             Lateral step ups             Step ups 4x10 6\" 4x10 6\" 4x10 6\" 4x10 6\" 4x10 6\" 4x10 6\" 4x10 6\"  4x10 6\" 4x10 6\"    Mini squats 4x10 4x10 4x10 4x10 4x10 4x10 4x10  4x10 4x10   Gait Training                                       Modalities             Guyanese stim c quad sets 8' 10/10 intensity 100 8' 10/10 intensity 100 8' 10/10 intensity 100 8' 10/10 intensity 92 8' 10/10 intensity 100 8' 10/10 intensity 100 8' 10/10 intensity 100  8' 10/10 intensity 100.0 8' 10/10 intensity 100.0                                   "

## 2025-01-21 ENCOUNTER — APPOINTMENT (OUTPATIENT)
Dept: PHYSICAL THERAPY | Facility: CLINIC | Age: 70
End: 2025-01-21
Payer: OTHER MISCELLANEOUS

## 2025-01-22 ENCOUNTER — APPOINTMENT (OUTPATIENT)
Dept: PHYSICAL THERAPY | Facility: CLINIC | Age: 70
End: 2025-01-22
Payer: OTHER MISCELLANEOUS

## 2025-01-23 ENCOUNTER — APPOINTMENT (OUTPATIENT)
Dept: PHYSICAL THERAPY | Facility: CLINIC | Age: 70
End: 2025-01-23
Payer: OTHER MISCELLANEOUS

## 2025-01-24 ENCOUNTER — OFFICE VISIT (OUTPATIENT)
Dept: PHYSICAL THERAPY | Facility: CLINIC | Age: 70
End: 2025-01-24
Payer: OTHER MISCELLANEOUS

## 2025-01-24 ENCOUNTER — TELEPHONE (OUTPATIENT)
Age: 70
End: 2025-01-24

## 2025-01-24 ENCOUNTER — APPOINTMENT (OUTPATIENT)
Dept: PHYSICAL THERAPY | Facility: CLINIC | Age: 70
End: 2025-01-24
Payer: OTHER MISCELLANEOUS

## 2025-01-24 DIAGNOSIS — S76.111A RUPTURE OF QUADRICEPS TENDON, RIGHT, INITIAL ENCOUNTER: Primary | ICD-10-CM

## 2025-01-24 PROCEDURE — 97110 THERAPEUTIC EXERCISES: CPT

## 2025-01-24 PROCEDURE — 97112 NEUROMUSCULAR REEDUCATION: CPT

## 2025-01-24 PROCEDURE — 97530 THERAPEUTIC ACTIVITIES: CPT

## 2025-01-24 NOTE — TELEPHONE ENCOUNTER
Caller: Patient    Doctor: Cristian    Reason for call: Patient is asking if his parts for his brace for right leg are in. Please advise patient.     Patient is calling asking about MRI results and he stated if he does not need sx he does not want a follow up appt. Please advise.      Call back#: [phone]

## 2025-01-24 NOTE — PROGRESS NOTES
"Daily Note     Today's date: 2025  Patient name: Tobin Kerns  : 1955  MRN: [id number]  Referring provider: America Ambrose PA-C  Dx:   Encounter Diagnosis     ICD-10-CM    1. Rupture of quadriceps tendon, right, initial encounter  S76.111A           Start Time: 0800  Stop Time: 0850  Total time in clinic (min): 50 minutes    Subjective: Pt reports no new complaints.       Objective: See treatment diary below      Assessment: Tolerated treatment well. Pt demonstrates good effort throughout session. Minimal cueing given to facilitate proper exercise performance and technique. He reports he is walking more without his walker at home, denies having a SPC. Instability due to inadequate quad control still present. Patient demonstrated fatigue post treatment, exhibited good technique with therapeutic exercises, and would benefit from continued PT      Plan: Continue per plan of care.      Precautions: R quad tendon repair on     POC expires 1 Month Re-Eval Auth Expiration date PT/OT + Visit Limit?    22 N/A BOMN                 Visit/Unit Tracking  AUTH Status:  Date  FOTO 1/7 1/10 1/15 1/17 1/20 1/24        N/A Used 22 23 24 25 26 27 28         Remaining                  FOTO, done       Manuals 12/30 1/2 1/7 1/10 1/15 1/17 1/20 1/24     PROM R knee                                                    Neuro Re-Ed             Pt education             Quad set C russain towel under knee C russain towel under knee C russain towel under knee C russain towel under knee C russain towel under knee C russain towel under knee C russain towel under knee C russain towel under knee     Hvy bnd TKE 3x10 5\" grn 3x10 5\" blue 3x10 5\" blue 3x10 5\" blue 3x10 5\" blue 3x10 5\" blue 3x10 5\" blue 3x10 5\" blue     SLR c brace 4x10 4x10 4x10 4x10 c extra strap on brace 4x10 c extra strap on brace 4x10 c extra strap on brace 4x10 c extra strap on brace 4x10 c extra strap on brace     LAQ AA 4x10 4x10 4x10 4x10 4x10 " "4x10 4x10 4x10                               Ther Ex                          Sidelying hip abduction c brace 4x10 4x10 4x10 4x10 4x10 4x10 4x10 4x10     Prone hip extension c brace 4x10 4x10 4x10 4x10 4x10 4x10 4x10 4x10     Upright bike for LE ROM 8' L8-9 8' L8-9 8' L8-9 8' L8-9 8' L8-9 8' L8-9 8' L8-9 8' L8-9     Heel slides             Heel raises 4x10 4x10 4x10 4x10 4x10 4x10 4x10 4x10     SL leg press 3x10 135# 3x10 165# 4x10 165# 4x10 165# 4x10 165# 4x10 165# 4x10 165# 4x10 165#                  Ther Activity             Lateral step ups             Step ups 4x10 6\" 4x10 6\" 4x10 6\" 4x10 6\" 4x10 6\" 4x10 6\" 4x10 6\" 4x10 6\"     Mini squats 4x10 4x10 4x10 4x10 4x10 4x10 4x10 4x10     Gait Training                                       Modalities             [place]oran stim c quad sets 8' 10/10 intensity 100 8' 10/10 intensity 100 8' 10/10 intensity 100 8' 10/10 intensity 92 8' 10/10 intensity 100 8' 10/10 intensity 100 8' 10/10 intensity 100 8' 10/10 intensity 100                                       "

## 2025-01-27 ENCOUNTER — OFFICE VISIT (OUTPATIENT)
Dept: PHYSICAL THERAPY | Facility: CLINIC | Age: 70
End: 2025-01-27
Payer: OTHER MISCELLANEOUS

## 2025-01-27 DIAGNOSIS — S76.111A RUPTURE OF QUADRICEPS TENDON, RIGHT, INITIAL ENCOUNTER: Primary | ICD-10-CM

## 2025-01-27 PROCEDURE — 97110 THERAPEUTIC EXERCISES: CPT

## 2025-01-27 PROCEDURE — 97112 NEUROMUSCULAR REEDUCATION: CPT

## 2025-01-27 PROCEDURE — 97014 ELECTRIC STIMULATION THERAPY: CPT

## 2025-01-27 PROCEDURE — 97530 THERAPEUTIC ACTIVITIES: CPT

## 2025-01-27 NOTE — PROGRESS NOTES
"Daily Note     Today's date: 2025  Patient name: Tobin Kerns  : 1955  MRN: [id number]  Referring provider: America Ambrose PA-C  Dx:   Encounter Diagnosis     ICD-10-CM    1. Rupture of quadriceps tendon, right, initial encounter  S76.111A                      Subjective: Patient offers no new complaints.       Objective: See treatment diary below      Assessment: Cont quad lag present. Utilizes forward flexion during step ups. Difficulty with quad control at end range ext. Performs all exercises with min cues and no pain. Patient would benefit from cont PT to maximize function and strength.       Plan: Continue per plan of care.      Precautions: R quad tendon repair on     POC expires 1 Month Re-Eval Auth Expiration date PT/OT + Visit Limit?    N/A BOMN                 Visit/Unit Tracking  AUTH Status:  Date  FOTO 1/7 1/10 1/15 1/17 1/20 1/24        N/A Used 22 23 24 25 26 27 28         Remaining                  FOTO, done       Manuals 12/30 1/2 1/7 1/10 1/15 1/17 1/20 1/24     PROM R knee                                                    Neuro Re-Ed             Pt education             Quad set C russain towel under knee C russain towel under knee C russain towel under knee C russain towel under knee C russain towel under knee C russain towel under knee C russain towel under knee C russain towel under knee     Hvy bnd TKE 3x10 5\" grn 3x10 5\" blue 3x10 5\" blue 3x10 5\" blue 3x10 5\" blue 3x10 5\" blue 3x10 5\" blue 3x10 5\" blue     SLR c brace 4x10 4x10 4x10 4x10 c extra strap on brace 4x10 c extra strap on brace 4x10 c extra strap on brace 4x10 c extra strap on brace 4x10 c extra strap on brace     LAQ AA 4x10 4x10 4x10 4x10 4x10 4x10 4x10 4x10                               Ther Ex                          Sidelying hip abduction c brace 4x10 4x10 4x10 4x10 4x10 4x10 4x10 4x10     Prone hip extension c brace 4x10 4x10 4x10 4x10 4x10 4x10 4x10 4x10     Upright bike for LE ROM 8' " "L8-9 8' L8-9 8' L8-9 8' L8-9 8' L8-9 8' L8-9 8' L8-9 8' L8-9     Heel slides             Heel raises 4x10 4x10 4x10 4x10 4x10 4x10 4x10 4x10     SL leg press 3x10 135# 3x10 165# 4x10 165# 4x10 165# 4x10 165# 4x10 165# 4x10 165# 4x10 165#                  Ther Activity             Lateral step ups             Step ups 4x10 6\" 4x10 6\" 4x10 6\" 4x10 6\" 4x10 6\" 4x10 6\" 4x10 6\" 4x10 6\"     Mini squats 4x10 4x10 4x10 4x10 4x10 4x10 4x10 4x10     Gait Training                                       Modalities             Kosovan stim c quad sets 8' 10/10 intensity 100 8' 10/10 intensity 100 8' 10/10 intensity 100 8' 10/10 intensity 92 8' 10/10 intensity 100 8' 10/10 intensity 100 8' 10/10 intensity 100 8' 10/10 intensity 100                                         "

## 2025-01-28 ENCOUNTER — APPOINTMENT (OUTPATIENT)
Dept: PHYSICAL THERAPY | Facility: CLINIC | Age: 70
End: 2025-01-28
Payer: OTHER MISCELLANEOUS

## 2025-01-29 ENCOUNTER — OFFICE VISIT (OUTPATIENT)
Dept: PHYSICAL THERAPY | Facility: CLINIC | Age: 70
End: 2025-01-29
Payer: OTHER MISCELLANEOUS

## 2025-01-29 ENCOUNTER — APPOINTMENT (OUTPATIENT)
Dept: PHYSICAL THERAPY | Facility: CLINIC | Age: 70
End: 2025-01-29
Payer: OTHER MISCELLANEOUS

## 2025-01-29 DIAGNOSIS — S76.111A RUPTURE OF QUADRICEPS TENDON, RIGHT, INITIAL ENCOUNTER: Primary | ICD-10-CM

## 2025-01-29 PROBLEM — Z00.00 MEDICARE ANNUAL WELLNESS VISIT, SUBSEQUENT: Status: RESOLVED | Noted: 2024-12-30 | Resolved: 2025-01-29

## 2025-01-29 PROCEDURE — 97110 THERAPEUTIC EXERCISES: CPT

## 2025-01-29 PROCEDURE — 97530 THERAPEUTIC ACTIVITIES: CPT

## 2025-01-29 PROCEDURE — 97112 NEUROMUSCULAR REEDUCATION: CPT

## 2025-01-29 NOTE — PROGRESS NOTES
"Daily Note     Today's date: 2025  Patient name: Tobin Kerns  : 1955  MRN: [id number]  Referring provider: America Ambrose PA-C  Dx:   Encounter Diagnosis     ICD-10-CM    1. Rupture of quadriceps tendon, right, initial encounter  S76.111A           Start Time: 0800  Stop Time: 0900  Total time in clinic (min): 60 minutes    Subjective: Pt reports no new complaints.       Objective: See treatment diary below      Assessment: Tolerated treatment well. Pt demonstrates good effort throughout session. Minimal cueing given to facilitate proper exercise performance and technique. He reports no increase in sx during session today. Quad lag continues to be present, and he uses his walker and a hand on the bar for step ups today. Patient demonstrated fatigue post treatment, exhibited good technique with therapeutic exercises, and would benefit from continued PT      Plan: Continue per plan of care.      Precautions: R quad tendon repair on     POC expires 1 Month Re-Eval Auth Expiration date PT/OT + Visit Limit?    N/A BOMN                 Visit/Unit Tracking  AUTH Status:  Date  FOTO 1/7 1/10 1/15 1/17 1/20 1/24 1/29       N/A Used 22 23 24 25 26 27 28 29        Remaining                  FOTO, done       Manuals 12/30 1/2 1/7 1/10 1/15 1/17 1/20 1/24 1/29    PROM R knee                                                    Neuro Re-Ed             Pt education             Quad set C russain towel under knee C russain towel under knee C russain towel under knee C russain towel under knee C russain towel under knee C russain towel under knee C russain towel under knee C russain towel under knee C russain towel under knee    Hvy bnd TKE 3x10 5\" grn 3x10 5\" blue 3x10 5\" blue 3x10 5\" blue 3x10 5\" blue 3x10 5\" blue 3x10 5\" blue 3x10 5\" blue 3x10 5\" blue    SLR c brace 4x10 4x10 4x10 4x10 c extra strap on brace 4x10 c extra strap on brace 4x10 c extra strap on brace 4x10 c extra strap on brace 4x10 " "c extra strap on brace 4x10 c extra strap on brace    LAQ AA 4x10 4x10 4x10 4x10 4x10 4x10 4x10 4x10 4x10                              Ther Ex                          Sidelying hip abduction c brace 4x10 4x10 4x10 4x10 4x10 4x10 4x10 4x10 4x10    Prone hip extension c brace 4x10 4x10 4x10 4x10 4x10 4x10 4x10 4x10 4x10    Upright bike for LE ROM 8' L8-9 8' L8-9 8' L8-9 8' L8-9 8' L8-9 8' L8-9 8' L8-9 8' L8-9 8' L8-9    Heel slides             Heel raises 4x10 4x10 4x10 4x10 4x10 4x10 4x10 4x10 4x10    SL leg press 3x10 135# 3x10 165# 4x10 165# 4x10 165# 4x10 165# 4x10 165# 4x10 165# 4x10 165# 4x10 165#                 Ther Activity             Lateral step ups             Step ups 4x10 6\" 4x10 6\" 4x10 6\" 4x10 6\" 4x10 6\" 4x10 6\" 4x10 6\" 4x10 6\" 4x10 6\"    Mini squats 4x10 4x10 4x10 4x10 4x10 4x10 4x10 4x10 4x10    Gait Training                                       Modalities             English stim c quad sets 8' 10/10 intensity 100 8' 10/10 intensity 100 8' 10/10 intensity 100 8' 10/10 intensity 92 8' 10/10 intensity 100 8' 10/10 intensity 100 8' 10/10 intensity 100 8' 10/10 intensity 100 8' 10/10 intensity 100                                          "

## 2025-01-30 ENCOUNTER — APPOINTMENT (OUTPATIENT)
Dept: PHYSICAL THERAPY | Facility: CLINIC | Age: 70
End: 2025-01-30
Payer: OTHER MISCELLANEOUS

## 2025-01-31 ENCOUNTER — APPOINTMENT (OUTPATIENT)
Dept: PHYSICAL THERAPY | Facility: CLINIC | Age: 70
End: 2025-01-31
Payer: OTHER MISCELLANEOUS

## 2025-02-03 ENCOUNTER — EVALUATION (OUTPATIENT)
Dept: PHYSICAL THERAPY | Facility: CLINIC | Age: 70
End: 2025-02-03
Payer: OTHER MISCELLANEOUS

## 2025-02-03 DIAGNOSIS — S76.111A RUPTURE OF QUADRICEPS TENDON, RIGHT, INITIAL ENCOUNTER: Primary | ICD-10-CM

## 2025-02-03 PROCEDURE — 97112 NEUROMUSCULAR REEDUCATION: CPT | Performed by: PHYSICAL THERAPIST

## 2025-02-03 PROCEDURE — 97530 THERAPEUTIC ACTIVITIES: CPT | Performed by: PHYSICAL THERAPIST

## 2025-02-03 PROCEDURE — 97110 THERAPEUTIC EXERCISES: CPT | Performed by: PHYSICAL THERAPIST

## 2025-02-03 NOTE — PROGRESS NOTES
PT Re-Evaluation     Today's date: 2/3/2025  Patient name: Tobin Kerns  : 1955  MRN: [id number]  Referring provider: America Ambrose PA-C  Dx:   Encounter Diagnosis     ICD-10-CM    1. Rupture of quadriceps tendon, right, initial encounter  S76.111A             Start Time: 0800  Stop Time: 0845  Total time in clinic (min): 45 minutes    Assessment  Impairments: abnormal or restricted ROM, activity intolerance, impaired physical strength, lacks appropriate home exercise program, pain with function and weight-bearing intolerance  Functional limitations: self-care/ADLs, household activities, ambulation, and stair negotiation.    Assessment details: Pt is a 70 y/o male presenting to physical therapy s/p R quad tendon repair on . Upon re-examination, pt has improved strength and ROM of pt's R knee and hip as well as decreased pain of the same regions. Pt's FOTO score has improved, demonstrating an improved ability to perform functional activities. However, pt continues to have deficits in strength and ROM of pt's R knee and hip as well as pain and difficulty with functional activities. Pt plan of care will focus on improving the previously mentioned deficits and limitations. Pt would benefit from skilled physical therapy to facilitate a return to his prior level of function.           Understanding of Dx/Px/POC: good     Prognosis: good    Goals  STG - 4 weeks  1. Pt will be able to ambulate with no AD and brace to facilitate a return to his prior level of function Ongoing  2. Pt's FOTO score will improve by 15 points or better to facilitate a return to pt's prior level of function. MET     LTG - 8 weeks  1. Pt will demonstrate WNL AROM of his R knee in all planes to facilitate a return to his prior level of function MET  2. Pt will demonstrate 4+/5 gross strength or better of his R knee and hip in all planes to facilitate a return to his prior level of function Ongoing      Plan  Patient would benefit  from: PT eval and skilled physical therapy  Planned modality interventions: cryotherapy, thermotherapy: hydrocollator packs and unattended electrical stimulation    Planned therapy interventions: activity modification, ADL training, balance, behavior modification, balance/weight bearing training, flexibility, functional ROM exercises, gait training, graded exercise, home exercise program, IASTM, joint mobilization, kinesiology taping, manual therapy, massage, Miller taping, nerve gliding, neuromuscular re-education, self care, patient/caregiver education, stretching, therapeutic activities, strengthening and therapeutic exercise    Frequency: 2x week  Duration in weeks: 8  Plan of Care beginning date: 2/3/2025  Plan of Care expiration date: 3/31/2025        Subjective Evaluation    History of Present Illness  Mechanism of injury: Pt is a 70 y/o male presenting to physical therapy s/p R quad tendon repair on . Pt reports he did receive his MRI and is awaiting to discuss the results with Dr. Foster. Pt reports he overall continues to have improved motion of his R knee as well as improved hip and knee strength. Pt reports he has been able to walk around his home with no AD. Pt reports also trying to wean the brace when he is at home. However, pt reports he continues to be unable to kick his RLE out straight as well as maintain a SLR even with use of the brace. Pt reports continues to have weakness in his RLE when going up and down stairs. Pt reports he has overall improved since starting therapy.   Patient Goals  Patient goals for therapy: decreased pain, increased motion, return to work and increased strength    Pain  Current pain ratin  At best pain ratin  At worst pain ratin  Location: R knee  Quality: dull ache  Relieving factors: rest  Aggravating factors: stair climbing, walking and standing  Progression: improved    Treatments  Previous treatment: medication  Current treatment: medication  "and physical therapy        Objective     Palpation     Additional Palpation Details  Significant indentation of pt's L quad when relaxed just superior to the R patella    Tenderness     Right Knee   Tenderness in the lateral joint line and medial joint line.     Active Range of Motion     Right Knee   Flexion: 133 degrees   Extension: 0 degrees     Mobility   Patellar Mobility:     Right Knee   WFL: medial, lateral, superior and inferior    Strength/Myotome Testing     Right Hip   Planes of Motion   Flexion: 4  Extension: 4  Abduction: 4  Adduction: 4+  External rotation: 4  Internal rotation: 4+    Right Knee   Flexion: 4  Extension: 3+  Quadriceps contraction: fair    Additional Strength Details  Significant quad leg in LAQ and SLR positions of his RLE    General Comments:      Knee Comments  Pt currently ambulating with brace and using RW             Precautions: R quad tendon repair on 7/25    POC expires 1 Month Re-Eval Auth Expiration date PT/OT + Visit Limit?   3/31 3/2 N/A BOMN                 Visit/Unit Tracking  AUTH Status:  Date 1/2 FOTO 1/7 1/10 1/15 1/17 1/20 1/24 1/27 1/29 2/3     N/A Used 22 23 24 25 26 27 28 29 30 31      Remaining                  FOTO, done 1/2      Manuals 12/30 1/2 1/7 1/10 1/15 1/17 1/20 1/24 1/29 2/3   PROM R knee                                                    Neuro Re-Ed             Pt education             Quad set C russain towel under knee C russain towel under knee C russain towel under knee C russain towel under knee C russain towel under knee C russain towel under knee C russain towel under knee C russain towel under knee C russain towel under knee C russain towel under knee   Hvy bnd TKE 3x10 5\" grn 3x10 5\" blue 3x10 5\" blue 3x10 5\" blue 3x10 5\" blue 3x10 5\" blue 3x10 5\" blue 3x10 5\" blue 3x10 5\" blue 3x10 5\" blue   SLR c brace 4x10 4x10 4x10 4x10 c extra strap on brace 4x10 c extra strap on brace 4x10 c extra strap on brace 4x10 c extra strap on brace 4x10 c " "extra strap on brace 4x10 c extra strap on brace 4x10 c extra strap on brace   LAQ AA 4x10 4x10 4x10 4x10 4x10 4x10 4x10 4x10 4x10 4x10                             Ther Ex                          Sidelying hip abduction c brace 4x10 4x10 4x10 4x10 4x10 4x10 4x10 4x10 4x10 4x10   Prone hip extension c brace 4x10 4x10 4x10 4x10 4x10 4x10 4x10 4x10 4x10 4x10   Upright bike for LE ROM 8' L8-9 8' L8-9 8' L8-9 8' L8-9 8' L8-9 8' L8-9 8' L8-9 8' L8-9 8' L8-9 8' L8-9   Heel slides             Heel raises 4x10 4x10 4x10 4x10 4x10 4x10 4x10 4x10 4x10 4x10   SL leg press 3x10 135# 3x10 165# 4x10 165# 4x10 165# 4x10 165# 4x10 165# 4x10 165# 4x10 165# 4x10 165# 4x10 165#                Ther Activity             Lateral step ups             Step ups 4x10 6\" 4x10 6\" 4x10 6\" 4x10 6\" 4x10 6\" 4x10 6\" 4x10 6\" 4x10 6\" 4x10 6\" 4x10 6\"   Mini squats 4x10 4x10 4x10 4x10 4x10 4x10 4x10 4x10 4x10 4x10   Gait Training                                       Modalities             Indian stim c quad sets 8' 10/10 intensity 100 8' 10/10 intensity 100 8' 10/10 intensity 100 8' 10/10 intensity 92 8' 10/10 intensity 100 8' 10/10 intensity 100 8' 10/10 intensity 100 8' 10/10 intensity 100 8' 10/10 intensity 100 8' 10/10 intensity 100                             "

## 2025-02-05 ENCOUNTER — OFFICE VISIT (OUTPATIENT)
Dept: PHYSICAL THERAPY | Facility: CLINIC | Age: 70
End: 2025-02-05
Payer: OTHER MISCELLANEOUS

## 2025-02-05 DIAGNOSIS — S76.111A RUPTURE OF QUADRICEPS TENDON, RIGHT, INITIAL ENCOUNTER: Primary | ICD-10-CM

## 2025-02-05 PROCEDURE — 97110 THERAPEUTIC EXERCISES: CPT

## 2025-02-05 PROCEDURE — 97530 THERAPEUTIC ACTIVITIES: CPT

## 2025-02-05 PROCEDURE — 97112 NEUROMUSCULAR REEDUCATION: CPT

## 2025-02-05 NOTE — PROGRESS NOTES
"Daily Note     Today's date: 2025  Patient name: Tobin Kerns  : 1955  MRN: [id number]  Referring provider: America Ambrose PA-C  Dx:   Encounter Diagnosis     ICD-10-CM    1. Rupture of quadriceps tendon, right, initial encounter  S76.111A                      Subjective: Patient offers no new complaints.       Objective: See treatment diary below      Assessment: Patient performs charted program with no pain. Quad lag remains. Scheduled f/u with surgeon in march. Patient would benefit from cont PT.       Plan: Continue per plan of care.      Precautions: R quad tendon repair on     POC expires 1 Month Re-Eval Auth Expiration date PT/OT + Visit Limit?   3/31 3 N/A BOMN                 Visit/Unit Tracking  AUTH Status:  Date  FOTO 1/7 1/10 1/15 1/17 1/20 1/24 1/27 1/29 2/3 2/5    N/A Used 22 23 24 25 26 27 28 29 30 31 32     Remaining                  FOTO, done       Manuals 2/5  1/7 1/10 1/15 1/17 1/20 1/24 1/29 2/3   PROM R knee                                                    Neuro Re-Ed             Pt education             Quad set C russain towel under knee  C russain towel under knee C russain towel under knee C russain towel under knee C russain towel under knee C russain towel under knee C russain towel under knee C russain towel under knee C russain towel under knee   Hvy bnd TKE 3x10 5\" blue  3x10 5\" blue 3x10 5\" blue 3x10 5\" blue 3x10 5\" blue 3x10 5\" blue 3x10 5\" blue 3x10 5\" blue 3x10 5\" blue   SLR c brace 4x10 c extra strap on brace  4x10 4x10 c extra strap on brace 4x10 c extra strap on brace 4x10 c extra strap on brace 4x10 c extra strap on brace 4x10 c extra strap on brace 4x10 c extra strap on brace 4x10 c extra strap on brace   LAQ AA 4x10  4x10 4x10 4x10 4x10 4x10 4x10 4x10 4x10                             Ther Ex                          Sidelying hip abduction c brace 4x10  4x10 4x10 4x10 4x10 4x10 4x10 4x10 4x10   Prone hip extension c brace 4x10   4x10 4x10 4x10 " "4x10 4x10 4x10 4x10 4x10   Upright bike for LE ROM 8' L8-9  8' L8-9 8' L8-9 8' L8-9 8' L8-9 8' L8-9 8' L8-9 8' L8-9 8' L8-9   Heel slides             Heel raises 4x10  4x10 4x10 4x10 4x10 4x10 4x10 4x10 4x10   SL leg press 4x10 165#  4x10 165# 4x10 165# 4x10 165# 4x10 165# 4x10 165# 4x10 165# 4x10 165# 4x10 165#                Ther Activity             Lateral step ups             Step ups 4x10 6\"   4x10 6\" 4x10 6\" 4x10 6\" 4x10 6\" 4x10 6\" 4x10 6\" 4x10 6\" 4x10 6\"   Mini squats 4x10  4x10 4x10 4x10 4x10 4x10 4x10 4x10 4x10   Gait Training                                       Modalities             [place] stim c quad sets 8' 10/10 intensity 94  8' 10/10 intensity 100 8' 10/10 intensity 92 8' 10/10 intensity 100 8' 10/10 intensity 100 8' 10/10 intensity 100 8' 10/10 intensity 100 8' 10/10 intensity 100 8' 10/10 intensity 100                             "

## 2025-02-10 ENCOUNTER — OFFICE VISIT (OUTPATIENT)
Dept: PHYSICAL THERAPY | Facility: CLINIC | Age: 70
End: 2025-02-10
Payer: OTHER MISCELLANEOUS

## 2025-02-10 DIAGNOSIS — S76.111A RUPTURE OF QUADRICEPS TENDON, RIGHT, INITIAL ENCOUNTER: Primary | ICD-10-CM

## 2025-02-10 PROCEDURE — 97530 THERAPEUTIC ACTIVITIES: CPT

## 2025-02-10 PROCEDURE — 97112 NEUROMUSCULAR REEDUCATION: CPT

## 2025-02-10 PROCEDURE — 97110 THERAPEUTIC EXERCISES: CPT

## 2025-02-10 NOTE — PROGRESS NOTES
"Daily Note     Today's date: 2/10/2025  Patient name: Tobin Kerns  : 1955  MRN: [id number]  Referring provider: America Ambrose PA-C  Dx:   Encounter Diagnosis     ICD-10-CM    1. Rupture of quadriceps tendon, right, initial encounter  S76.111A                      Subjective: Patient offers no new complaints.       Objective: See treatment diary below      Assessment: Tolerated treatment well. Patient cont to require brace to reduce quad lag. Sig increase in quad lag with no brace even with e-stim. Good recall of outlined program, challenged with form due to lack of quad strength and control but performs with no pain. Patient demonstrated fatigue post treatment and would benefit from continued PT      Plan: Progress treatment as tolerated.       Precautions: R quad tendon repair on     POC expires 1 Month Re-Eval Auth Expiration date PT/OT + Visit Limit?   3/31 3 N/A BOMN                 Visit/Unit Tracking  AUTH Status:  Date  FOTO 1/7 1/10 1/15 1/17 1/20 1/24 1/27 1/29 2/3 2/5 2/10   N/A Used 22 23 24 25 26 27 28 29 30 31 32 33    Remaining                  FOTO, done       Manuals 2/5 2/10  1/10 1/15 1/17 1/20 1/24 1/29 2/3   PROM R knee                                                    Neuro Re-Ed             Pt education             Quad set C russain towel under knee C russain towel under knee  C russain towel under knee C russain towel under knee C russain towel under knee C russain towel under knee C russain towel under knee C russain towel under knee C russain towel under knee   Hvy bnd TKE 3x10 5\" blue 3x10 5\" blue  3x10 5\" blue 3x10 5\" blue 3x10 5\" blue 3x10 5\" blue 3x10 5\" blue 3x10 5\" blue 3x10 5\" blue   SLR c brace 4x10 c extra strap on brace 4x10 c extra strap on brace  4x10 c extra strap on brace 4x10 c extra strap on brace 4x10 c extra strap on brace 4x10 c extra strap on brace 4x10 c extra strap on brace 4x10 c extra strap on brace 4x10 c extra strap on brace   LAQ AA 4x10 " "4x10  4x10 4x10 4x10 4x10 4x10 4x10 4x10                             Ther Ex                          Sidelying hip abduction c brace 4x10 4x10  4x10 4x10 4x10 4x10 4x10 4x10 4x10   Prone hip extension c brace 4x10  4x10  4x10 4x10 4x10 4x10 4x10 4x10 4x10   Upright bike for LE ROM 8' L8-9 8'  L8-9  8' L8-9 8' L8-9 8' L8-9 8' L8-9 8' L8-9 8' L8-9 8' L8-9   Heel slides             Heel raises 4x10 4x10  4x10 4x10 4x10 4x10 4x10 4x10 4x10   SL leg press 4x10 165# 4x10  165#  4x10 165# 4x10 165# 4x10 165# 4x10 165# 4x10 165# 4x10 165# 4x10 165#                Ther Activity             Lateral step ups             Step ups 4x10 6\"  4x10 6\"  4x10 6\" 4x10 6\" 4x10 6\" 4x10 6\" 4x10 6\" 4x10 6\" 4x10 6\"   Mini squats 4x10 4x10  4x10 4x10 4x10 4x10 4x10 4x10 4x10   Gait Training                                       Modalities             [place] stim c quad sets 8' 10/10 intensity 94 8' 10/10  Intensity 100  8' 10/10 intensity 92 8' 10/10 intensity 100 8' 10/10 intensity 100 8' 10/10 intensity 100 8' 10/10 intensity 100 8' 10/10 intensity 100 8' 10/10 intensity 100                               "

## 2025-02-12 ENCOUNTER — OFFICE VISIT (OUTPATIENT)
Dept: PHYSICAL THERAPY | Facility: CLINIC | Age: 70
End: 2025-02-12
Payer: OTHER MISCELLANEOUS

## 2025-02-12 DIAGNOSIS — S76.111A RUPTURE OF QUADRICEPS TENDON, RIGHT, INITIAL ENCOUNTER: Primary | ICD-10-CM

## 2025-02-12 PROCEDURE — 97530 THERAPEUTIC ACTIVITIES: CPT | Performed by: PHYSICAL THERAPIST

## 2025-02-12 PROCEDURE — 97112 NEUROMUSCULAR REEDUCATION: CPT | Performed by: PHYSICAL THERAPIST

## 2025-02-12 PROCEDURE — 97110 THERAPEUTIC EXERCISES: CPT | Performed by: PHYSICAL THERAPIST

## 2025-02-12 NOTE — PROGRESS NOTES
"Daily Note     Today's date: 2025  Patient name: Tobin Kerns  : 1955  MRN: [id number]  Referring provider: America Ambrose PA-C  Dx:   Encounter Diagnosis     ICD-10-CM    1. Rupture of quadriceps tendon, right, initial encounter  S76.111A           Start Time: 0800  Stop Time: 0845  Total time in clinic (min): 45 minutes    Subjective: Pt reports doing well with on pain of his R knee, however, he continues to be unable to straighten his R knee.       Objective: See treatment diary below      Assessment: Tolerated treatment well. Patient demonstrated fatigue post treatment, exhibited good technique with therapeutic exercises, and would benefit from continued PT. Pt was able to progress today with inclusion of AA SAQ into pt's exercise program. Pt required min verbal cues to facilitate performance of proper technique. Assess pt response to treatment at next visit.      Plan: Continue per plan of care.      Precautions: R quad tendon repair on     POC expires 1 Month Re-Eval Auth Expiration date PT/OT + Visit Limit?   3/31 3/ N/A BOMN                 Visit/Unit Tracking  AUTH Status:  Date 2/12       1/27 1/29 2/3 2/5 2/10   N/A Used 34       29 30 31 32 33    Remaining                  FOTO, done 1      Manuals 2/5 2/10 2/12      1/29 2/3   PROM R knee                                                    Neuro Re-Ed             Pt education             Quad set C russain towel under knee C russain towel under knee C russain towel under knee      C russain towel under knee C russain towel under knee   Hvy bnd TKE 3x10 5\" blue 3x10 5\" blue 3x10 5\" blue      3x10 5\" blue 3x10 5\" blue   SLR c brace 4x10 c extra strap on brace 4x10 c extra strap on brace 4x10 c extra strap on brace      4x10 c extra strap on brace 4x10 c extra strap on brace   LAQ AA 4x10 4x10 4x10      4x10 4x10   SAQ AA   2x10                       Ther Ex                          Sidelying hip abduction c brace 4x10 4x10 4x10      " "4x10 4x10   Prone hip extension c brace 4x10  4x10 4x10      4x10 4x10   Upright bike for LE ROM 8' L8-9 8'  L8-9 8' L8-9      8' L8-9 8' L8-9   Heel slides             Heel raises 4x10 4x10 4x10      4x10 4x10   SL leg press 4x10 165# 4x10  165# 4x10 165#      4x10 165# 4x10 165#                Ther Activity             Lateral step ups             Step ups 4x10 6\"  4x10 6\" 4x10 6\"      4x10 6\" 4x10 6\"   Mini squats 4x10 4x10 4x10      4x10 4x10   Gait Training                                       Modalities             Thai stim c quad sets 8' 10/10 intensity 94 8' 10/10  Intensity 100 8' 10/10  Intensity 100      8' 10/10 intensity 100 8' 10/10 intensity 100                                 "

## 2025-02-17 ENCOUNTER — OFFICE VISIT (OUTPATIENT)
Dept: PHYSICAL THERAPY | Facility: CLINIC | Age: 70
End: 2025-02-17
Payer: OTHER MISCELLANEOUS

## 2025-02-17 ENCOUNTER — APPOINTMENT (OUTPATIENT)
Dept: LAB | Facility: CLINIC | Age: 70
End: 2025-02-17
Payer: MEDICARE

## 2025-02-17 DIAGNOSIS — R73.01 IFG (IMPAIRED FASTING GLUCOSE): ICD-10-CM

## 2025-02-17 DIAGNOSIS — I10 PRIMARY HYPERTENSION: ICD-10-CM

## 2025-02-17 DIAGNOSIS — E78.2 MIXED HYPERLIPIDEMIA: ICD-10-CM

## 2025-02-17 DIAGNOSIS — Z12.5 PROSTATE CANCER SCREENING: ICD-10-CM

## 2025-02-17 DIAGNOSIS — S76.111A RUPTURE OF QUADRICEPS TENDON, RIGHT, INITIAL ENCOUNTER: Primary | ICD-10-CM

## 2025-02-17 DIAGNOSIS — Z13.29 THYROID DISORDER SCREEN: ICD-10-CM

## 2025-02-17 LAB
ALBUMIN SERPL BCG-MCNC: 4.3 G/DL (ref 3.5–5)
ALP SERPL-CCNC: 61 U/L (ref 34–104)
ALT SERPL W P-5'-P-CCNC: 11 U/L (ref 7–52)
ANION GAP SERPL CALCULATED.3IONS-SCNC: 8 MMOL/L (ref 4–13)
AST SERPL W P-5'-P-CCNC: 13 U/L (ref 13–39)
BILIRUB SERPL-MCNC: 0.69 MG/DL (ref 0.2–1)
BUN SERPL-MCNC: 18 MG/DL (ref 5–25)
CALCIUM SERPL-MCNC: 9.2 MG/DL (ref 8.4–10.2)
CHLORIDE SERPL-SCNC: 108 MMOL/L (ref 96–108)
CHOLEST SERPL-MCNC: 131 MG/DL (ref ?–200)
CO2 SERPL-SCNC: 24 MMOL/L (ref 21–32)
CREAT SERPL-MCNC: 1.11 MG/DL (ref 0.6–1.3)
ERYTHROCYTE [DISTWIDTH] IN BLOOD BY AUTOMATED COUNT: 13.5 % (ref 11.6–15.1)
GFR SERPL CREATININE-BSD FRML MDRD: 68 ML/MIN/1.73SQ M
GLUCOSE P FAST SERPL-MCNC: 81 MG/DL (ref 65–99)
HCT VFR BLD AUTO: 42.6 % (ref 36.5–49.3)
HDLC SERPL-MCNC: 49 MG/DL
HGB BLD-MCNC: 13.7 G/DL (ref 12–17)
LDLC SERPL CALC-MCNC: 61 MG/DL (ref 0–100)
MCH RBC QN AUTO: 29.5 PG (ref 26.8–34.3)
MCHC RBC AUTO-ENTMCNC: 32.2 G/DL (ref 31.4–37.4)
MCV RBC AUTO: 92 FL (ref 82–98)
NONHDLC SERPL-MCNC: 82 MG/DL
PLATELET # BLD AUTO: 257 THOUSANDS/UL (ref 149–390)
PMV BLD AUTO: 10.3 FL (ref 8.9–12.7)
POTASSIUM SERPL-SCNC: 4.4 MMOL/L (ref 3.5–5.3)
PROT SERPL-MCNC: 7 G/DL (ref 6.4–8.4)
PSA SERPL-MCNC: 0.69 NG/ML (ref 0–4)
RBC # BLD AUTO: 4.65 MILLION/UL (ref 3.88–5.62)
SODIUM SERPL-SCNC: 140 MMOL/L (ref 135–147)
TRIGL SERPL-MCNC: 105 MG/DL (ref ?–150)
TSH SERPL DL<=0.05 MIU/L-ACNC: 1.03 UIU/ML (ref 0.45–4.5)
WBC # BLD AUTO: 8.63 THOUSAND/UL (ref 4.31–10.16)

## 2025-02-17 PROCEDURE — 84443 ASSAY THYROID STIM HORMONE: CPT

## 2025-02-17 PROCEDURE — G0103 PSA SCREENING: HCPCS

## 2025-02-17 PROCEDURE — 85027 COMPLETE CBC AUTOMATED: CPT

## 2025-02-17 PROCEDURE — 97110 THERAPEUTIC EXERCISES: CPT

## 2025-02-17 PROCEDURE — 97530 THERAPEUTIC ACTIVITIES: CPT

## 2025-02-17 PROCEDURE — 80061 LIPID PANEL: CPT

## 2025-02-17 PROCEDURE — 83036 HEMOGLOBIN GLYCOSYLATED A1C: CPT

## 2025-02-17 PROCEDURE — 36415 COLL VENOUS BLD VENIPUNCTURE: CPT

## 2025-02-17 PROCEDURE — 80053 COMPREHEN METABOLIC PANEL: CPT

## 2025-02-17 PROCEDURE — 97112 NEUROMUSCULAR REEDUCATION: CPT

## 2025-02-17 NOTE — PROGRESS NOTES
"Daily Note     Today's date: 2025  Patient name: Tobin Kerns  : 1955  MRN: [id number]  Referring provider: America Ambrose PA-C  Dx:   Encounter Diagnosis     ICD-10-CM    1. Rupture of quadriceps tendon, right, initial encounter  S76.111A           Start Time: 0754  Stop Time: 0854  Total time in clinic (min): 60 minutes    Subjective: Presents to therapy noting no changes since previous session, stating he feels he is able to move around pretty well around the house without his brace on.       Objective: See treatment diary below      Assessment: Patient able to complete full program without adverse response, continuing to focus on quad activation with the use of russian current. Good effort put forth throughout with appropriate muscle fatigue upon leaving clinic.  Patient would benefit from continued PT      Plan: Continue per plan of care.      Precautions: R quad tendon repair on     POC expires 1 Month Re-Eval Auth Expiration date PT/OT + Visit Limit?   3/31 3/ N/A BOMN                 Visit/Unit Tracking  AUTH Status:  Date  2/3 2/5 2/10   N/A Used 34 35      29 30 31 32 33    Remaining                  FOTO, done       Manuals 2/5 2/10 2/12 2/17     1/29 2/3   PROM R knee                                                    Neuro Re-Ed             Pt education             Quad set C russain towel under knee C russain towel under knee C russain towel under knee C russain towel under knee     C russain towel under knee C russain towel under knee   Hvy bnd TKE 3x10 5\" blue 3x10 5\" blue 3x10 5\" blue 3x10  5''  blue     3x10 5\" blue 3x10 5\" blue   SLR c brace 4x10 c extra strap on brace 4x10 c extra strap on brace 4x10 c extra strap on brace 4x10 c extra strap on brace     4x10 c extra strap on brace 4x10 c extra strap on brace   LAQ AA 4x10 4x10 4x10 4x10     4x10 4x10   SAQ AA   2x10 2x10                      Ther Ex                          Sidelying hip abduction " "c brace 4x10 4x10 4x10 4x10     4x10 4x10   Prone hip extension c brace 4x10  4x10 4x10 4x10     4x10 4x10   Upright bike for LE ROM 8' L8-9 8'  L8-9 8' L8-9 8'  L8-9     8' L8-9 8' L8-9   Heel slides             Heel raises 4x10 4x10 4x10 4x10     4x10 4x10   SL leg press 4x10 165# 4x10  165# 4x10 165# 4x10  165#     4x10 165# 4x10 165#                Ther Activity             Lateral step ups             Step ups 4x10 6\"  4x10 6\" 4x10 6\" 4x10  6''     4x10 6\" 4x10 6\"   Mini squats 4x10 4x10 4x10 4x10     4x10 4x10   Gait Training                                       Modalities             [place] stim c quad sets 8' 10/10 intensity 94 8' 10/10  Intensity 100 8' 10/10  Intensity 100 8'  10/10  Intensity 100     8' 10/10 intensity 100 8' 10/10 intensity 100                                   "

## 2025-02-18 ENCOUNTER — RESULTS FOLLOW-UP (OUTPATIENT)
Dept: FAMILY MEDICINE CLINIC | Facility: CLINIC | Age: 70
End: 2025-02-18

## 2025-02-18 LAB
EST. AVERAGE GLUCOSE BLD GHB EST-MCNC: 108 MG/DL
HBA1C MFR BLD: 5.4 %

## 2025-02-18 RX ORDER — LOSARTAN POTASSIUM 50 MG/1
50 TABLET ORAL DAILY
Qty: 90 TABLET | Refills: 1 | Status: SHIPPED | OUTPATIENT
Start: 2025-02-18

## 2025-02-18 RX ORDER — SIMVASTATIN 20 MG
20 TABLET ORAL
Qty: 90 TABLET | Refills: 1 | Status: SHIPPED | OUTPATIENT
Start: 2025-02-18

## 2025-02-19 ENCOUNTER — OFFICE VISIT (OUTPATIENT)
Dept: PHYSICAL THERAPY | Facility: CLINIC | Age: 70
End: 2025-02-19
Payer: OTHER MISCELLANEOUS

## 2025-02-19 DIAGNOSIS — S76.111A RUPTURE OF QUADRICEPS TENDON, RIGHT, INITIAL ENCOUNTER: Primary | ICD-10-CM

## 2025-02-19 PROCEDURE — 97112 NEUROMUSCULAR REEDUCATION: CPT

## 2025-02-19 PROCEDURE — 97110 THERAPEUTIC EXERCISES: CPT

## 2025-02-19 PROCEDURE — 97530 THERAPEUTIC ACTIVITIES: CPT

## 2025-02-19 NOTE — PROGRESS NOTES
"Daily Note     Today's date: 2025  Patient name: Tobin Kerns  : 1955  MRN: [id number]  Referring provider: America Ambrose PA-C  Dx:   Encounter Diagnosis     ICD-10-CM    1. Rupture of quadriceps tendon, right, initial encounter  S76.111A                      Subjective: Presents to therapy noting no new complaints.       Objective: See treatment diary below      Assessment: Patient able to complete full program without adverse response, continuing to focus on quad activation with the use of russian current. Pt demonstrated good compliance with his exercises but still needed A in order to perform SAQ/LAQ.  Patient would benefit from continued PT      Plan: Continue per plan of care.      Precautions: R quad tendon repair on     POC expires 1 Month Re-Eval Auth Expiration date PT/OT + Visit Limit?   3/31 3/2 N/A BOMN                 Visit/Unit Tracking  AUTH Status:  Date 2/12 2/17 2/19     1/27 1/29 2/3 2/5 2/10   N/A Used 34 35 36     29 30 31 32 33    Remaining                  FOTO, done       Manuals 2/5 2/10 2/12 2/17 2/19    1/29 2/3   PROM R knee                                                    Neuro Re-Ed             Pt education             Quad set C russain towel under knee C russain towel under knee C russain towel under knee C russain towel under knee C russain towel under knee    C russain towel under knee C russain towel under knee   Hvy bnd TKE 3x10 5\" blue 3x10 5\" blue 3x10 5\" blue 3x10  5''  blue 3x10  5''  blue    3x10 5\" blue 3x10 5\" blue   SLR c brace 4x10 c extra strap on brace 4x10 c extra strap on brace 4x10 c extra strap on brace 4x10 c extra strap on brace 4x10 c extra strap on brace    4x10 c extra strap on brace 4x10 c extra strap on brace   LAQ AA 4x10 4x10 4x10 4x10 4x10    4x10 4x10   SAQ AA   2x10 2x10 2x10                     Ther Ex                          Sidelying hip abduction c brace 4x10 4x10 4x10 4x10 4x10    4x10 4x10   Prone hip extension c brace " "4x10  4x10 4x10 4x10 4x10    4x10 4x10   Upright bike for LE ROM 8' L8-9 8'  L8-9 8' L8-9 8'  L8-9 8' L 8-9    8' L8-9 8' L8-9   Heel slides             Heel raises 4x10 4x10 4x10 4x10 4x10    4x10 4x10   SL leg press 4x10 165# 4x10  165# 4x10 165# 4x10  165# 4x10 165#    4x10 165# 4x10 165#                Ther Activity             Lateral step ups             Step ups 4x10 6\"  4x10 6\" 4x10 6\" 4x10  6'' 4x10 6\"    4x10 6\" 4x10 6\"   Mini squats 4x10 4x10 4x10 4x10 4x10    4x10 4x10   Gait Training                                       Modalities             [place] stim c quad sets 8' 10/10 intensity 94 8' 10/10  Intensity 100 8' 10/10  Intensity 100 8'  10/10  Intensity 100 8'  10/10  Intensity 100    8' 10/10 intensity 100 8' 10/10 intensity 100                                   "

## 2025-02-24 ENCOUNTER — OFFICE VISIT (OUTPATIENT)
Dept: PHYSICAL THERAPY | Facility: CLINIC | Age: 70
End: 2025-02-24
Payer: OTHER MISCELLANEOUS

## 2025-02-24 DIAGNOSIS — S76.111A RUPTURE OF QUADRICEPS TENDON, RIGHT, INITIAL ENCOUNTER: Primary | ICD-10-CM

## 2025-02-24 PROCEDURE — 97110 THERAPEUTIC EXERCISES: CPT

## 2025-02-24 PROCEDURE — 97530 THERAPEUTIC ACTIVITIES: CPT

## 2025-02-24 PROCEDURE — 97112 NEUROMUSCULAR REEDUCATION: CPT

## 2025-02-24 NOTE — PROGRESS NOTES
"Daily Note     Today's date: 2025  Patient name: Tobin Kerns  : 1955  MRN: [id number]  Referring provider: America Ambrose PA-C  Dx:   Encounter Diagnosis     ICD-10-CM    1. Rupture of quadriceps tendon, right, initial encounter  S76.111A                      Subjective: Patient offers no new complaints.       Objective: See treatment diary below      Assessment: Patient cont with quad lag. Good activation of vastus lateralis and medialis with E-stim. Cues for equal weight distribution with squats. B UE support for step ups to reduce compensations. Patient would benefit from cont to PT to improve functional mobility.       Plan: Progress treatment as tolerated.       Precautions: R quad tendon repair on     POC expires 1 Month Re-Eval Auth Expiration date PT/OT + Visit Limit?   3/31 3/2 N/A BOMN                 Visit/Unit Tracking  AUTH Status:  Date 2/12 2/17 2/19 2/24    1/27 1/29 2/3 2/5 2/10   N/A Used 34 35 36 37    29 30 31 32 33    Remaining                  FOTO, done       Manuals 2/5 2/10 2/12 2/17 2/19 2/24   1/29 2/3   PROM R knee                                                    Neuro Re-Ed             Pt education             Quad set C russain towel under knee C russain towel under knee C russain towel under knee C russain towel under knee C russain towel under knee C russian towel under knee   C russain towel under knee C russain towel under knee   Hvy bnd TKE 3x10 5\" blue 3x10 5\" blue 3x10 5\" blue 3x10  5''  blue 3x10  5''  blue 3x10 5\" blue   3x10 5\" blue 3x10 5\" blue   SLR c brace 4x10 c extra strap on brace 4x10 c extra strap on brace 4x10 c extra strap on brace 4x10 c extra strap on brace 4x10 c extra strap on brace 4x10 c extra strap on brace   4x10 c extra strap on brace 4x10 c extra strap on brace   LAQ AA 4x10 4x10 4x10 4x10 4x10 4x10   4x10 4x10   SAQ AA   2x10 2x10 2x10 2x10                    Ther Ex                          Sidelying hip abduction c brace 4x10 " "4x10 4x10 4x10 4x10 4x10   4x10 4x10   Prone hip extension c brace 4x10  4x10 4x10 4x10 4x10 4x10   4x10 4x10   Upright bike for LE ROM 8' L8-9 8'  L8-9 8' L8-9 8'  L8-9 8' L 8-9 8' L 8-9   8' L8-9 8' L8-9   Heel slides             Heel raises 4x10 4x10 4x10 4x10 4x10 4x10   4x10 4x10   SL leg press 4x10 165# 4x10  165# 4x10 165# 4x10  165# 4x10 165# 4x10   165#   4x10 165# 4x10 165#                Ther Activity             Lateral step ups             Step ups 4x10 6\"  4x10 6\" 4x10 6\" 4x10  6'' 4x10 6\" 4x10 6\"   4x10 6\" 4x10 6\"   Mini squats 4x10 4x10 4x10 4x10 4x10 4x10   4x10 4x10   Gait Training                                       Modalities             [place] stim c quad sets 8' 10/10 intensity 94 8' 10/10  Intensity 100 8' 10/10  Intensity 100 8'  10/10  Intensity 100 8'  10/10  Intensity 100    8' 10/10 intensity 100 8' 10/10 intensity 100                                     "

## 2025-02-26 ENCOUNTER — OFFICE VISIT (OUTPATIENT)
Dept: PHYSICAL THERAPY | Facility: CLINIC | Age: 70
End: 2025-02-26
Payer: OTHER MISCELLANEOUS

## 2025-02-26 DIAGNOSIS — S76.111A RUPTURE OF QUADRICEPS TENDON, RIGHT, INITIAL ENCOUNTER: Primary | ICD-10-CM

## 2025-02-26 PROCEDURE — 97530 THERAPEUTIC ACTIVITIES: CPT

## 2025-02-26 PROCEDURE — 97112 NEUROMUSCULAR REEDUCATION: CPT

## 2025-02-26 PROCEDURE — 97110 THERAPEUTIC EXERCISES: CPT

## 2025-02-26 NOTE — PROGRESS NOTES
"Daily Note     Today's date: 2025  Patient name: Tobin Kerns  : 1955  MRN: [id number]  Referring provider: America Ambrose PA-C  Dx:   Encounter Diagnosis     ICD-10-CM    1. Rupture of quadriceps tendon, right, initial encounter  S76.111A                      Subjective: Patient states he does not use the brace at home because there is furniture within reach.       Objective: See treatment diary below      Assessment: Cont with current treatment POC with good tolerance and min discomfort during session. He lacks quad activation mainly in rectus femorus. Visible vastus lateralis and medialis activation. Patient would benefit from cont PT.       Plan: Progress treatment as tolerated.       Precautions: R quad tendon repair on     POC expires 1 Month Re-Eval Auth Expiration date PT/OT + Visit Limit?   3/31 3/2 N/A BOMN                 Visit/Unit Tracking  AUTH Status:  Date 2/12 2/17 2/19 2/24 2/26   1/27 1/29 2/3 2/5 2/10   N/A Used 34 35 36 37 38   29 30 31 32 33    Remaining                  FOTO, done       Manuals 2/5 2/10 2/12 2/17 2/19 2/24 2/26  1/29 2/3   PROM R knee                                                    Neuro Re-Ed             Pt education             Quad set C russain towel under knee C russain towel under knee C russain towel under knee C russain towel under knee C russain towel under knee C russian towel under knee C russian towel under knee  C russain towel under knee C russain towel under knee   Hvy bnd TKE 3x10 5\" blue 3x10 5\" blue 3x10 5\" blue 3x10  5''  blue 3x10  5''  blue 3x10 5\" blue 3x10 5\" blue   3x10 5\" blue 3x10 5\" blue   SLR c brace 4x10 c extra strap on brace 4x10 c extra strap on brace 4x10 c extra strap on brace 4x10 c extra strap on brace 4x10 c extra strap on brace 4x10 c extra strap on brace 4x10   C extra strap on brace  4x10 c extra strap on brace 4x10 c extra strap on brace   LAQ AA 4x10 4x10 4x10 4x10 4x10 4x10 4x10  4x10 4x10   SAQ AA   2x10 " "2x10 2x10 2x10 2x10                   Ther Ex                          Sidelying hip abduction c brace 4x10 4x10 4x10 4x10 4x10 4x10 4x10  4x10 4x10   Prone hip extension c brace 4x10  4x10 4x10 4x10 4x10 4x10 4x10  4x10 4x10   Upright bike for LE ROM 8' L8-9 8'  L8-9 8' L8-9 8'  L8-9 8' L 8-9 8' L 8-9 10' L8-9  8' L8-9 8' L8-9   Heel slides             Heel raises 4x10 4x10 4x10 4x10 4x10 4x10 4x10  4x10 4x10   SL leg press 4x10 165# 4x10  165# 4x10 165# 4x10  165# 4x10 165# 4x10   165# 4x10   165#  4x10 165# 4x10 165#                Ther Activity             Lateral step ups             Step ups 4x10 6\"  4x10 6\" 4x10 6\" 4x10  6'' 4x10 6\" 4x10 6\" 4x10 6\"  4x10 6\" 4x10 6\"   Mini squats 4x10 4x10 4x10 4x10 4x10 4x10 4x10   4x10 4x10   Gait Training                                       Modalities             [place] stim c quad sets 8' 10/10 intensity 94 8' 10/10  Intensity 100 8' 10/10  Intensity 100 8'  10/10  Intensity 100 8'  10/10  Intensity 100 8' 10/10 intensity 100 8' 10/10 intensity 100  8' 10/10 intensity 100 8' 10/10 intensity 100                                       "

## 2025-03-03 ENCOUNTER — PREP FOR PROCEDURE (OUTPATIENT)
Dept: OBGYN CLINIC | Facility: CLINIC | Age: 70
End: 2025-03-03

## 2025-03-03 ENCOUNTER — HOSPITAL ENCOUNTER (OUTPATIENT)
Dept: RADIOLOGY | Facility: HOSPITAL | Age: 70
Discharge: HOME | End: 2025-03-03
Payer: MEDICARE

## 2025-03-03 ENCOUNTER — APPOINTMENT (OUTPATIENT)
Dept: LAB | Facility: HOSPITAL | Age: 70
End: 2025-03-03
Payer: MEDICARE

## 2025-03-03 ENCOUNTER — OFFICE VISIT (OUTPATIENT)
Dept: LAB | Facility: HOSPITAL | Age: 70
End: 2025-03-03
Payer: MEDICARE

## 2025-03-03 ENCOUNTER — EVALUATION (OUTPATIENT)
Dept: PHYSICAL THERAPY | Facility: CLINIC | Age: 70
End: 2025-03-03
Payer: OTHER MISCELLANEOUS

## 2025-03-03 ENCOUNTER — OFFICE VISIT (OUTPATIENT)
Dept: OBGYN CLINIC | Facility: CLINIC | Age: 70
End: 2025-03-03
Payer: OTHER MISCELLANEOUS

## 2025-03-03 VITALS — HEIGHT: 77 IN | WEIGHT: 307 LBS | BODY MASS INDEX: 36.25 KG/M2

## 2025-03-03 DIAGNOSIS — S76.111D QUADRICEPS TENDON RUPTURE, RIGHT, SUBSEQUENT ENCOUNTER: ICD-10-CM

## 2025-03-03 DIAGNOSIS — S76.111D QUADRICEPS TENDON RUPTURE, RIGHT, SUBSEQUENT ENCOUNTER: Primary | ICD-10-CM

## 2025-03-03 DIAGNOSIS — R73.01 IFG (IMPAIRED FASTING GLUCOSE): ICD-10-CM

## 2025-03-03 DIAGNOSIS — S76.111A RUPTURE OF QUADRICEPS TENDON, RIGHT, INITIAL ENCOUNTER: Primary | ICD-10-CM

## 2025-03-03 LAB
ALBUMIN SERPL BCG-MCNC: 4.3 G/DL (ref 3.5–5)
ALP SERPL-CCNC: 59 U/L (ref 34–104)
ALT SERPL W P-5'-P-CCNC: 10 U/L (ref 7–52)
ANION GAP SERPL CALCULATED.3IONS-SCNC: 9 MMOL/L (ref 4–13)
AST SERPL W P-5'-P-CCNC: 14 U/L (ref 13–39)
ATRIAL RATE: 63 BPM
BILIRUB SERPL-MCNC: 0.93 MG/DL (ref 0.2–1)
BUN SERPL-MCNC: 17 MG/DL (ref 5–25)
CALCIUM SERPL-MCNC: 9.2 MG/DL (ref 8.4–10.2)
CHLORIDE SERPL-SCNC: 105 MMOL/L (ref 96–108)
CO2 SERPL-SCNC: 25 MMOL/L (ref 21–32)
CREAT SERPL-MCNC: 1.01 MG/DL (ref 0.6–1.3)
ERYTHROCYTE [DISTWIDTH] IN BLOOD BY AUTOMATED COUNT: 13 % (ref 11.6–15.1)
GFR SERPL CREATININE-BSD FRML MDRD: 76 ML/MIN/1.73SQ M
GLUCOSE P FAST SERPL-MCNC: 89 MG/DL (ref 65–99)
HCT VFR BLD AUTO: 40.4 % (ref 36.5–49.3)
HGB BLD-MCNC: 14 G/DL (ref 12–17)
MCH RBC QN AUTO: 29.8 PG (ref 26.8–34.3)
MCHC RBC AUTO-ENTMCNC: 34.7 G/DL (ref 31.4–37.4)
MCV RBC AUTO: 86 FL (ref 82–98)
P AXIS: 37 DEGREES
PLATELET # BLD AUTO: 237 THOUSANDS/UL (ref 149–390)
PMV BLD AUTO: 9.3 FL (ref 8.9–12.7)
POTASSIUM SERPL-SCNC: 4 MMOL/L (ref 3.5–5.3)
PR INTERVAL: 148 MS
PROT SERPL-MCNC: 6.8 G/DL (ref 6.4–8.4)
QRS AXIS: 15 DEGREES
QRSD INTERVAL: 102 MS
QT INTERVAL: 452 MS
QTC INTERVAL: 463 MS
RBC # BLD AUTO: 4.7 MILLION/UL (ref 3.88–5.62)
SODIUM SERPL-SCNC: 139 MMOL/L (ref 135–147)
T WAVE AXIS: 9 DEGREES
VENTRICULAR RATE: 63 BPM
WBC # BLD AUTO: 9.86 THOUSAND/UL (ref 4.31–10.16)

## 2025-03-03 PROCEDURE — 85027 COMPLETE CBC AUTOMATED: CPT

## 2025-03-03 PROCEDURE — 80053 COMPREHEN METABOLIC PANEL: CPT

## 2025-03-03 PROCEDURE — 93005 ELECTROCARDIOGRAM TRACING: CPT

## 2025-03-03 PROCEDURE — 71046 X-RAY EXAM CHEST 2 VIEWS: CPT

## 2025-03-03 PROCEDURE — 97110 THERAPEUTIC EXERCISES: CPT | Performed by: PHYSICAL MEDICINE & REHABILITATION

## 2025-03-03 PROCEDURE — 93010 ELECTROCARDIOGRAM REPORT: CPT | Performed by: INTERNAL MEDICINE

## 2025-03-03 PROCEDURE — 99214 OFFICE O/P EST MOD 30 MIN: CPT | Performed by: ORTHOPAEDIC SURGERY

## 2025-03-03 PROCEDURE — 97530 THERAPEUTIC ACTIVITIES: CPT | Performed by: PHYSICAL MEDICINE & REHABILITATION

## 2025-03-03 PROCEDURE — 97112 NEUROMUSCULAR REEDUCATION: CPT | Performed by: PHYSICAL MEDICINE & REHABILITATION

## 2025-03-03 PROCEDURE — 36415 COLL VENOUS BLD VENIPUNCTURE: CPT

## 2025-03-03 RX ORDER — METFORMIN HYDROCHLORIDE 500 MG/1
1500 TABLET, EXTENDED RELEASE ORAL
Qty: 270 TABLET | Refills: 1 | Status: ON HOLD | OUTPATIENT
Start: 2025-03-03

## 2025-03-03 RX ORDER — CHLORHEXIDINE GLUCONATE 40 MG/ML
SOLUTION TOPICAL DAILY PRN
Status: CANCELLED | OUTPATIENT
Start: 2025-03-03

## 2025-03-03 RX ORDER — CHLORHEXIDINE GLUCONATE ORAL RINSE 1.2 MG/ML
15 SOLUTION DENTAL ONCE
Status: CANCELLED | OUTPATIENT
Start: 2025-03-03 | End: 2025-03-03

## 2025-03-03 NOTE — H&P (VIEW-ONLY)
Name: Tobin Kerns      : 1955      MRN: [id number]  Encounter Provider: Fletcher Foster DO  Encounter Date: 3/3/2025   Encounter department: [facility] [place]  :  Assessment & Plan  Quadriceps tendon rupture, right, subsequent encounter             Approximately 8 months s/p right quadriceps tendon repair performed on 2024 with recurrent high-grade tear with extensor lag on exam.  MRI reviewed in the office today  Treatment options were discussed including operative and nonoperative management  Nonoperative management by means of bracing, ambulating with walker, attending outpatient physical therapy. Discussed limitations in strength gains and SLR and overall function with nonoperative management and concern for subsequent falls and injury.  Due to the nature of his injury, operative management was discussed in the form of right quadriceps tendon revision repair, possible VY advancement, possible allograft augmentation.  Discussed casting after the procedure for postoperative immobilization.  Due to the patient's age, need for immobilization postoperatively, baseline activity level, BMI, medical comorbidities, living alone without any assistance at home, we discussed his need for postoperative inpatient rehabilitation.    Risks of surgery, including but not limited to, infection, nerve and blood vessel injury, postoperative stiffness, persistent pain, persistent extensor lag, inability to repair, retear, DVT/PE, posttraumatic degenerative changes, anesthesia complications, and need for subsequent surgery were discussed in detail.  The patient understands and is elected to proceed forward surgical invention.  Patient will have clearance from PCP, CXR, EKG, and labs prior to operative management  Plan for operative management on 3/6/2025 vs 3/13/2025 pending clearance.  Follow up post operatively      History of the Present Illness   History of Present Illness   HPI  "Tobin Kerns is a 69 y.o. male approximately 8 months s/p right quadriceps tendon repair performed on 7/5/2024. Today, the patient reports no pain in his knee. He is having difficulty raising his leg. He continues to wear TROM brace and ambulates with walker. He admits to having one fall after his surgery that was a few months ago at this point.  He is at a standstill with physical therapy in regards to strength and ability to straighten his leg.  He does notice that the defect over his knee seems to have gotten more noticeable.         Review of Systems     Review of Systems   Constitutional:  Negative for chills and fever.   HENT:  Negative for ear pain and sore throat.    Eyes:  Negative for pain and visual disturbance.   Respiratory:  Negative for cough and shortness of breath.    Cardiovascular:  Negative for chest pain and palpitations.   Gastrointestinal:  Negative for abdominal pain and vomiting.   Genitourinary:  Negative for dysuria and hematuria.   Musculoskeletal:  Negative for arthralgias and back pain.   Skin:  Negative for color change and rash.   Neurological:  Negative for seizures and syncope.   All other systems reviewed and are negative.      Physical Exam   Objective   Ht 6' 5\" (1.956 m)   Wt (!) 139 kg (307 lb)   BMI 36.40 kg/m²        Right Knee  Range of motion from 0 to 130.    Previous incision well-healed  Palpable defect at quadriceps tendon proximal to patella  There is no effusion.    There is no tenderness over the knee.    The patient displays a 45 degree lag extension lag with attempted straight leg raise.  Varus stress testing reveals no instability at 0 and 30 degrees   Valgus stress testing reveals no instability at 0 and 30 degrees  The patient is neurovascular intact distally.      Data Review     I have personally reviewed pertinent films in PACS, and my interpretation follows.  MRI taken 1/13/2025 of Right knee independently reviewed and demonstrate prior quad tendon " repair with high grade recurrent tear with at least 2.5 cm retraction.     Social History     Tobacco Use    Smoking status: Former     Current packs/day: 0.00     Average packs/day: 1 pack/day for 15.0 years (15.0 ttl pk-yrs)     Types: Cigarettes     Start date: 1980     Quit date: 2005     Years since quittin.1     Passive exposure: Never    Smokeless tobacco: Never   Vaping Use    Vaping status: Never Used   Substance Use Topics    Alcohol use: Yes     Alcohol/week: 1.0 standard drink of alcohol     Types: 1 Cans of beer per week    Drug use: Not Currently     Types: Marijuana     Comment: Last Marijuana Use ; Other unknown drugs while in [place]           Procedures  None performed.    Amirah Chavez   Scribe Attestation      I,:  Amirah Chavez am acting as a scribe while in the presence of the attending physician.:       I,:  Fletcher Foster,  personally performed the services described in this documentation    as scribed in my presence.:

## 2025-03-03 NOTE — PROGRESS NOTES
Name: Tobin Kerns      : 1955      MRN: [id number]  Encounter Provider: Fletcher Foster DO  Encounter Date: 3/3/2025   Encounter department: [facility] [place]  :  Assessment & Plan  Quadriceps tendon rupture, right, subsequent encounter             Approximately 8 months s/p right quadriceps tendon repair performed on 2024 with recurrent high-grade tear with extensor lag on exam.  MRI reviewed in the office today  Treatment options were discussed including operative and nonoperative management  Nonoperative management by means of bracing, ambulating with walker, attending outpatient physical therapy. Discussed limitations in strength gains and SLR and overall function with nonoperative management and concern for subsequent falls and injury.  Due to the nature of his injury, operative management was discussed in the form of right quadriceps tendon revision repair, possible VY advancement, possible allograft augmentation.  Discussed casting after the procedure for postoperative immobilization.  Due to the patient's age, need for immobilization postoperatively, baseline activity level, BMI, medical comorbidities, living alone without any assistance at home, we discussed his need for postoperative inpatient rehabilitation.    Risks of surgery, including but not limited to, infection, nerve and blood vessel injury, postoperative stiffness, persistent pain, persistent extensor lag, inability to repair, retear, DVT/PE, posttraumatic degenerative changes, anesthesia complications, and need for subsequent surgery were discussed in detail.  The patient understands and is elected to proceed forward surgical invention.  Patient will have clearance from PCP, CXR, EKG, and labs prior to operative management  Plan for operative management on 3/6/2025 vs 3/13/2025 pending clearance.  Follow up post operatively      History of the Present Illness   History of Present Illness   HPI  "Tobin Kerns is a 69 y.o. male approximately 8 months s/p right quadriceps tendon repair performed on 7/5/2024. Today, the patient reports no pain in his knee. He is having difficulty raising his leg. He continues to wear TROM brace and ambulates with walker. He admits to having one fall after his surgery that was a few months ago at this point.  He is at a standstill with physical therapy in regards to strength and ability to straighten his leg.  He does notice that the defect over his knee seems to have gotten more noticeable.         Review of Systems     Review of Systems   Constitutional:  Negative for chills and fever.   HENT:  Negative for ear pain and sore throat.    Eyes:  Negative for pain and visual disturbance.   Respiratory:  Negative for cough and shortness of breath.    Cardiovascular:  Negative for chest pain and palpitations.   Gastrointestinal:  Negative for abdominal pain and vomiting.   Genitourinary:  Negative for dysuria and hematuria.   Musculoskeletal:  Negative for arthralgias and back pain.   Skin:  Negative for color change and rash.   Neurological:  Negative for seizures and syncope.   All other systems reviewed and are negative.      Physical Exam   Objective   Ht 6' 5\" (1.956 m)   Wt (!) 139 kg (307 lb)   BMI 36.40 kg/m²        Right Knee  Range of motion from 0 to 130.    Previous incision well-healed  Palpable defect at quadriceps tendon proximal to patella  There is no effusion.    There is no tenderness over the knee.    The patient displays a 45 degree lag extension lag with attempted straight leg raise.  Varus stress testing reveals no instability at 0 and 30 degrees   Valgus stress testing reveals no instability at 0 and 30 degrees  The patient is neurovascular intact distally.      Data Review     I have personally reviewed pertinent films in PACS, and my interpretation follows.  MRI taken 1/13/2025 of Right knee independently reviewed and demonstrate prior quad tendon " repair with high grade recurrent tear with at least 2.5 cm retraction.     Social History     Tobacco Use    Smoking status: Former     Current packs/day: 0.00     Average packs/day: 1 pack/day for 15.0 years (15.0 ttl pk-yrs)     Types: Cigarettes     Start date: 1980     Quit date: 2005     Years since quittin.1     Passive exposure: Never    Smokeless tobacco: Never   Vaping Use    Vaping status: Never Used   Substance Use Topics    Alcohol use: Yes     Alcohol/week: 1.0 standard drink of alcohol     Types: 1 Cans of beer per week    Drug use: Not Currently     Types: Marijuana     Comment: Last Marijuana Use ; Other unknown drugs while in [place]           Procedures  None performed.    Amirah Chavez   Scribe Attestation      I,:  Amirah Chavez am acting as a scribe while in the presence of the attending physician.:       I,:  Fletcher Foster,  personally performed the services described in this documentation    as scribed in my presence.:

## 2025-03-03 NOTE — PROGRESS NOTES
PT Re-Evaluation     Today's date: 3/3/2025  Patient name: Tobin Kerns  : 1955  MRN: [id number]  Referring provider: America Ambrose PA-C  Dx:   Encounter Diagnosis     ICD-10-CM    1. Rupture of quadriceps tendon, right, initial encounter  S76.111A                        Assessment  Impairments: abnormal or restricted ROM, activity intolerance, impaired physical strength, lacks appropriate home exercise program, pain with function and weight-bearing intolerance  Functional limitations: self-care/ADLs, household activities, ambulation, and stair negotiation.    Assessment details: Patient has participated consistently in skilled outpatient physical therapy since injury and over this time has made improvements in objective and subjective measures. Despite improvements patient continues with decreased RLE strength and control, which impacts overall functional activity tolerance and ability to safely return to PLOF. Patient would benefit from continued skilled intervention.           Understanding of Dx/Px/POC: good     Prognosis: good    Goals  STG - 4 weeks  1. Pt will be able to ambulate with no AD and brace to facilitate a return to his prior level of function Ongoing  2. Pt's FOTO score will improve by 15 points or better to facilitate a return to pt's prior level of function. MET     LTG - 8 weeks  1. Pt will demonstrate WNL AROM of his R knee in all planes to facilitate a return to his prior level of function MET  2. Pt will demonstrate 4+/5 gross strength or better of his R knee and hip in all planes to facilitate a return to his prior level of function Ongoing      Plan  Patient would benefit from: PT eval and skilled physical therapy  Planned modality interventions: cryotherapy, thermotherapy: hydrocollator packs and unattended electrical stimulation    Planned therapy interventions: activity modification, ADL training, balance, behavior modification, balance/weight bearing training, flexibility,  "functional ROM exercises, gait training, graded exercise, home exercise program, IASTM, joint mobilization, kinesiology taping, manual therapy, massage, Miller taping, nerve gliding, neuromuscular re-education, self care, patient/caregiver education, stretching, therapeutic activities, strengthening and therapeutic exercise    Frequency: 2x week  Duration in weeks: 6  Plan of Care beginning date: 3/3/2025  Plan of Care expiration date: 2025      Subjective Evaluation    History of Present Illness  Mechanism of injury: 3/3/25 RE: Patient offers no new complaints to begin session. Some improvement in ambulation, rising to stand. Has been able to increase time out of brace within home. No AD use in home, able to furniture walk if needed. Continued strength deficits. F/u with MD today.     Pt is a 68 y/o male presenting to physical therapy s/p R quad tendon repair on . Pt reports he did receive his MRI and is awaiting to discuss the results with Dr. Foster. Pt reports he overall continues to have improved motion of his R knee as well as improved hip and knee strength. Pt reports he has been able to walk around his home with no AD. Pt reports also trying to wean the brace when he is at home. However, pt reports he continues to be unable to kick his RLE out straight as well as maintain a SLR even with use of the brace. Pt reports continues to have weakness in his RLE when going up and down stairs. Pt reports he has overall improved since starting therapy.   Patient Goals  Patient goals for therapy: decreased pain, increased motion, return to work and increased strength    Pain  Current pain ratin  At best pain ratin  At worst pain ratin (\"sometimes it twinges\")  Location: R knee  Quality: dull ache  Relieving factors: rest  Aggravating factors: stair climbing, walking and standing  Progression: improved    Treatments  Previous treatment: medication  Current treatment: medication and physical " "therapy      Objective     Palpation     Additional Palpation Details  Significant indentation of pt's L quad when relaxed just superior to the R patella- persistent at 3/3 RE    Tenderness     Right Knee   No tenderness in the lateral joint line and medial joint line.     Active Range of Motion     Right Knee   Flexion: 133 degrees   Extension: 0 degrees     Mobility   Patellar Mobility:     Right Knee   WFL: medial, lateral, superior and inferior    Strength/Myotome Testing     Right Hip   Planes of Motion   Flexion: 4  Extension: 4  Abduction: 4  Adduction: 4+  External rotation: 4  Internal rotation: 4+    Right Knee   Flexion: 4  Extension: 3+  Quadriceps contraction: fair    Additional Strength Details  Significant quad leg in LAQ and SLR positions of his RLE  3/3: able to achieve -45 deg ext in LAQ today    General Comments:      Knee Comments  Pt currently ambulating with brace and using RW, no AD use within home   Increased posterior chain activation to achieve knee extension during functional tasks  Steps: quad avoidance pattern  Gait: w/ RW and brace open, decreased end range extension control       Precautions: R quad tendon repair on 7/25    POC expires 1 Month Re-Eval Auth Expiration date PT/OT + Visit Limit?   3/31 3/2 N/A BOMN                 Visit/Unit Tracking  AUTH Status:  Date 2/12 2/17 2/19 2/24 2/26 3/3  1/27 1/29 2/3 2/5 2/10   N/A Used 34 35 36 37 38 39  29 30 31 32 33    Remaining                  FOTO, done 1/2      Manuals 2/5 2/10 2/12 2/17 2/19 2/24 2/26 3/3  2/3   PROM R knee                                                    Neuro Re-Ed             Pt education             Quad set C russain towel under knee C russain towel under knee C russain towel under knee C russain towel under knee C russain towel under knee C russian towel under knee C russian towel under knee Russian 10' total, 10\"on/10\" off, Ch2 1/2 roll at ankle, towel under knee  C russain towel under knee   Hvy bnd TKE " "3x10 5\" blue 3x10 5\" blue 3x10 5\" blue 3x10  5''  blue 3x10  5''  blue 3x10 5\" blue 3x10 5\" blue  4x10x5\" Blue  3x10 5\" blue   SLR c brace 4x10 c extra strap on brace 4x10 c extra strap on brace 4x10 c extra strap on brace 4x10 c extra strap on brace 4x10 c extra strap on brace 4x10 c extra strap on brace 4x10   C extra strap on brace 4x10  4x10 c extra strap on brace   LAQ AA 4x10 4x10 4x10 4x10 4x10 4x10 4x10 4x10  4x10   SAQ AA   2x10 2x10 2x10 2x10 2x10 2x10                  Ther Ex        3/3                  Sidelying hip abduction c brace 4x10 4x10 4x10 4x10 4x10 4x10 4x10 4x10  4x10   Prone hip extension c brace 4x10  4x10 4x10 4x10 4x10 4x10 4x10 4x10  4x10   Upright bike for LE ROM 8' L8-9 8'  L8-9 8' L8-9 8'  L8-9 8' L 8-9 8' L 8-9 10' L8-9 10' L8-9  8' L8-9   Heel slides             Heel raises 4x10 4x10 4x10 4x10 4x10 4x10 4x10 4x10  4x10   SL leg press 4x10 165# 4x10  165# 4x10 165# 4x10  165# 4x10 165# 4x10   165# 4x10   165# 4x10 165#  4x10 165#                Ther Activity        3/3     Lateral step ups             Step ups 4x10 6\"  4x10 6\" 4x10 6\" 4x10  6'' 4x10 6\" 4x10 6\" 4x10 6\" 4x10, 6\"  4x10 6\"   Mini squats 4x10 4x10 4x10 4x10 4x10 4x10 4x10  4x10  4x10   Gait Training                                       Modalities             [place] stim c quad sets 8' 10/10 intensity 94 8' 10/10  Intensity 100 8' 10/10  Intensity 100 8'  10/10  Intensity 100 8'  10/10  Intensity 100 8' 10/10 intensity 100 8' 10/10 intensity 100 As above  8' 10/10 intensity 100                           "

## 2025-03-04 DIAGNOSIS — B35.1 ONYCHOMYCOSIS: ICD-10-CM

## 2025-03-04 PROBLEM — E66.9 OBESITY (BMI 35.0-39.9 WITHOUT COMORBIDITY): Status: ACTIVE | Noted: 2025-03-04

## 2025-03-04 PROBLEM — L97.512 RIGHT FOOT ULCER, WITH FAT LAYER EXPOSED (HCC): Status: RESOLVED | Noted: 2023-04-17 | Resolved: 2025-03-04

## 2025-03-04 PROBLEM — S76.111D: Status: ACTIVE | Noted: 2024-07-25

## 2025-03-04 NOTE — PROGRESS NOTES
Internal Medicine Pre-Operative Evaluation:     Reason for Visit: Pre-operative Evaluation for Risk Stratification and Optimization    Patient ID: Tobin Kerns is a 69 y.o. male.     Case: [id number] Date/Time: 03/06/25 1110   Procedure: REPAIR TENDON QUADRICEPS- Right knee Revision quad tendon repair (Right: Thigh)   Anesthesia type: General   Diagnosis: Quadriceps tendon rupture, right, subsequent encounter [S76.111D]   Pre-op diagnosis: Quadriceps tendon rupture, right, subsequent encounter [S76.111D]   Location: [facility] ROOM 05 / [facility]   Surgeons: Fletcher Foster DO         Recommendations to Proceed withSurgery    Patient is considered to be Low risk for Medium risk procedure.     After evaluation and discussion with patient with emphasis that all surgery has some degree of inherent risk it is acknowledged by patient this risk is Acceptable.    Patient is optimized and may proceed with planned procedure.     Assessment    Pre-operative Medical Evaluation for planned surgery  Recommendations as listed in PLAN section below  Contact surgical nurse  navigator with any questions regarding preoperative plan or schedule.      Assessment & Plan  Quadriceps muscle rupture, right, subsequent encounter  Electing to undergo surgical procedure as stated above    Obesity (BMI 35.0-39.9 without comorbidity)  Has recently lost about 25 lbs  Continues to work on same  Preoperative clearance    JOVANY on CPAP    Hypertension, unspecified type  Stable   Refer to PAT instructions regarding medication administration the morning of surgery             Plan:     1. Further preoperative workup as follows:   - none no further testing required may proceed with surgery    2. Preoperative Medication Management Review performed by PAT nursing  YES    3. Patient requires further consultation with:   No Consults Required    4. Discharge Planning / Barriers to Discharge  patient is not comfortable with  discharge directly to home        Subjective:           History of Present Illness:     Tobin Kerns is a 69 y.o. male who presents to the office today for a preoperative consultation at the request of surgeon. They are electing to undergo planned procedure with an understanding that all surgery has inherent risk. Today they present for preoperative risk assessment and recommendations for optimization in preparation for surgery.    Pt seen in center for perioperative medicine for upcoming proposed surgery.     Pt meets presurgical lab. His EKG was completed, he has chronic T wave abnormalities from back in 2021, his exercise capacity is acceptable.     Pt has had some urinary retention post operatively, he required guzman catheter in July.     He went to an urgent center yesterday for left ankle pain, I don't have the records available however he states they were negative and it was felt that he sprained his ankle, however he has minimal pain today. He does have a resolving wound on the plantar surface of his left foot that he has painted in betadine and a dressing in place.       Tobin Kerns has an IN HOSPITAL cardiac risk of RCI RISK CLASS I (0 risk factors, risk of major cardiac compl. appr. 0.5%) based on RCRI calculator    Cardiac Risk Estimation: per the Revised Cardiac Risk Index (Circ. 100:1043, 1999),         Pre-op Exam    Previous history of bleeding disorders or clots?: No  Previous Anesthesia reaction?: No  Prolonged steroid use in the last 6 months?: No    Assessment of Cardiac Risk:   - Unstable or severe angina or MI in the last 6 weeks or history of stent placement in the last year?: No   - Decompensated heart failure (e.g. New onset heart failure, NYHA  Class IV heart failure, or worsening existing heart failure)?: No  - Significant arrhythmias such as high grade AV block, symptomatic ventricular arrhythmia, newly recognized ventricular tachycardia, supraventricular tachycardia with resting  "heart rate >100, or symptomatic bradycardia?: No  - Severe heart valve disease including aortic stenosis or symptomatic mitral stenosis?: No      Pre-operative Risk Factors:  Elevated-risk surgery: No    History of cerebrovascular disease: No    History of ischemic heart disease: No  Pre-operative treatment with insulin: No  Pre-operative creatinine >2 mg/dL: No    History of congestive heart failure: No    D[facility] Activity Status Index (DASI):   DASI Total Score: 18.95  METs: 5.1        ROS: No TIA's or unusual headaches, no dysphagia.  No prolonged cough. No dyspnea or chest pain on exertion.  No abdominal pain, change in bowel habits, black or bloody stools.  No urinary tract or BPH symptoms.  Positive reported pain in arthritic joint. Positive difficulty with gait. No skin rashes or issues.      Objective:    /79 (BP Location: Left arm, Patient Position: Sitting, Cuff Size: Standard)   Pulse 74   Ht 6' 5\" (1.956 m)   Wt (!) 140 kg (308 lb)   SpO2 99%   BMI 36.52 kg/m²       General Appearance: no distress, conversive  HEENT: PERRLA, conjuctiva normal; oropharynx clear; mucous membranes moist;   Neck:  Supple, no lymphadenopathy or thyromegaly  Lungs: breath sounds normal, normal respiratory effort, no retractions, expiratory effort normal  CV: normal heart sounds S1/S2, PMI normal   ABD: soft non tender, +BS x4, obese  EXT: DP pulses intact, no lymphadenopathy, no edema, dressing intact to left foot; knee immobilizer in place to right lower extremity  Skin: normal turgor, normal texture, no rash  Psych: affect normal, mood normal  Neuro: AAOx3        The following portions of the patient's history were reviewed and updated as appropriate: allergies, current medications, past family history, past medical history, past social history, past surgical history and problem list.     Past History:       Past Medical History:   Diagnosis Date    Abdominal aortic ectasia (HCC) 11/13/2023    Next Abdominal Aortic " U/S due 27    Closed fracture of right foot     As a child    Diverticulosis     Drusen (degenerative) of macula, bilateral 2021    Gastro-esophageal reflux disease without esophagitis 2019    GERD (gastroesophageal reflux disease) 2010    Hammer toe 15 years    History of colon polyps     Hypertension 2021    IFG (impaired fasting glucose) 10/01/2021    Internal hemorrhoids     Kidney stone 2007    Mixed hyperlipidemia 2019    Morbid obesity (HCC) 2021    Myopia, bilateral 2021    JOVANY on CPAP     Peripheral neuropathy 2021    Pinguecula, bilateral 2021    Presbyopia 10/11/2016    Pure hypertriglyceridemia 2019    Radial fracture 1975    Stress Fracture - Right Arm    Regular astigmatism, bilateral 2021    Tendonitis     Left shoulder    Past Surgical History:   Procedure Laterality Date    COLONOSCOPY      EGD  2022    Gastric Erosisons, Esophagitis    FIBULA FRACTURE SURGERY Right 2009    Hardware - Plate and screws in place    FRACTURE SURGERY  2005    KNEE SURGERY      QUADRICEPS TENDON REPAIR Right 2024    Procedure: REPAIR TENDON QUADRICEPS- Right;  Surgeon: Fletcher Foster DO;  Location: [facility];  Service: Orthopedics    SKIN GRAFT      Right foot     TONSILECTOMY AND ADNOIDECTOMY      TONSILLECTOMY            Social History     Tobacco Use    Smoking status: Former     Current packs/day: 0.00     Average packs/day: 1 pack/day for 15.0 years (15.0 ttl pk-yrs)     Types: Cigarettes     Start date: 1980     Quit date: 2005     Years since quittin.1     Passive exposure: Never    Smokeless tobacco: Never   Vaping Use    Vaping status: Never Used   Substance Use Topics    Alcohol use: Not Currently    Drug use: Not Currently     Types: Marijuana     Comment: Last Marijuana Use ; Other unknown drugs while in [place]     Family History   Problem Relation Age of Onset    Cancer Mother 68        Type Unknown     "COPD Father         Previous smoker    Neuropathy Father     Neuropathy Sister     Neuropathy Sister     Neuropathy Brother     Neuropathy Brother     Arthritis Maternal Grandmother     Arthritis Maternal Aunt           Allergies:     No Known Allergies     Current Medications:     Current Outpatient Medications   Medication Instructions    acetaminophen (TYLENOL) 650 mg, Oral, Every 6 hours PRN    aspirin (ECOTRIN LOW STRENGTH) 81 mg, Oral, 2 times daily    Cholecalciferol 5,000 Units, Daily    famotidine (PEPCID) 20 mg, Oral, 2 times daily PRN    losartan (COZAAR) 50 mg, Oral, Daily    metFORMIN (GLUCOPHAGE-XR) 1,500 mg, Oral, Daily with dinner    multivitamin (THERAGRAN) TABS 1 tablet, Daily    simvastatin (ZOCOR) 20 mg, Oral, Daily at bedtime    tamsulosin (FLOMAX) 0.4 mg, Oral, Daily with dinner    terbinafine (LAMISIL) 250 mg, Oral, Daily    Vitamin C 250 mg, Daily           PRE-OP WORKSHEET DATA    Assessment of Pre-Operative Risks     MLJ Quality Hard Stops:    BMI (<40) : Estimated body mass index is 36.52 kg/m² as calculated from the following:    Height as of this encounter: 6' 5\" (1.956 m).    Weight as of this encounter: 140 kg (308 lb).    Hgb ( >11):   Lab Results   Component Value Date    HGB 14.0 03/03/2025    HGB 13.7 02/17/2025    HGB 13.2 07/28/2024       HbA1c (<7.5) :   Lab Results   Component Value Date    HGBA1C 5.4 02/17/2025       GFR (>60) (Less then 45 = Nephrology consult):    Lab Results   Component Value Date    EGFR 76 03/03/2025    EGFR 68 02/17/2025    EGFR 76 07/28/2024            Pre-Op Data Reviewed:       Laboratory Results: I have personally reviewed the pertinent reports    EKG: I personally reviewed and interpreted available tracings in the electronic medical record    Encounter Date: 03/03/25   ECG 12 lead   Result Value    Ventricular Rate 63    Atrial Rate 63    IA Interval 148    QRSD Interval 102    QT Interval 452    QTC Interval 463    P Axis 37    QRS Axis 15    T " Wave Axis 9    Narrative    Normal sinus rhythm  T wave abnormality, consider anterior ischemia  Prolonged QT  Confirmed by Elvira Isaacs ([id number]) on 3/3/2025 4:46:13 PM       OLD RECORDS: reviewed old records in the chart review section if EHR on day of visit.    Previous cardiopulmonary studies within the past year:  Echocardiogram: yes   Cardiac Catheterization: no  Stress Test: no      Time of visit including pre-visit chart review, visit and post-visit coordination of plan and care , review of pre-surgical lab work, preparation and time spent documenting note in electronic medical record, time spent face-to-face in physical examination answering patient questions by care team 35 minutes             [facility]

## 2025-03-05 ENCOUNTER — OFFICE VISIT (OUTPATIENT)
Age: 70
End: 2025-03-05
Payer: OTHER MISCELLANEOUS

## 2025-03-05 ENCOUNTER — APPOINTMENT (OUTPATIENT)
Dept: PHYSICAL THERAPY | Facility: CLINIC | Age: 70
End: 2025-03-05
Payer: OTHER MISCELLANEOUS

## 2025-03-05 VITALS
HEIGHT: 77 IN | BODY MASS INDEX: 36.37 KG/M2 | WEIGHT: 308 LBS | SYSTOLIC BLOOD PRESSURE: 145 MMHG | DIASTOLIC BLOOD PRESSURE: 79 MMHG | OXYGEN SATURATION: 99 % | HEART RATE: 74 BPM

## 2025-03-05 DIAGNOSIS — I10 HYPERTENSION, UNSPECIFIED TYPE: ICD-10-CM

## 2025-03-05 DIAGNOSIS — S76.111D QUADRICEPS MUSCLE RUPTURE, RIGHT, SUBSEQUENT ENCOUNTER: ICD-10-CM

## 2025-03-05 DIAGNOSIS — S76.111D QUADRICEPS TENDON RUPTURE, RIGHT, SUBSEQUENT ENCOUNTER: ICD-10-CM

## 2025-03-05 DIAGNOSIS — G47.33 OSA ON CPAP: ICD-10-CM

## 2025-03-05 DIAGNOSIS — E66.9 OBESITY (BMI 35.0-39.9 WITHOUT COMORBIDITY): ICD-10-CM

## 2025-03-05 DIAGNOSIS — Z01.818 PREOPERATIVE CLEARANCE: Primary | ICD-10-CM

## 2025-03-05 PROCEDURE — 99213 OFFICE O/P EST LOW 20 MIN: CPT | Performed by: NURSE PRACTITIONER

## 2025-03-05 RX ORDER — TERBINAFINE HYDROCHLORIDE 250 MG/1
250 TABLET ORAL DAILY
Qty: 30 TABLET | Refills: 0 | Status: ON HOLD | OUTPATIENT
Start: 2025-03-05 | End: 2025-04-04

## 2025-03-05 NOTE — PRE-PROCEDURE INSTRUCTIONS
Pre-Surgery Instructions:   Medication Instructions    acetaminophen (TYLENOL) 325 mg tablet Hold day of surgery.    Ascorbic Acid (Vitamin C) 250 MG CHEW Stop taking 1 day prior to surgery.    aspirin (ECOTRIN LOW STRENGTH) 81 mg EC tablet Take night before surgery    Cholecalciferol 125 MCG (5000 UT) TABS Stop taking 1 day prior to surgery.    famotidine (PEPCID) 20 mg tablet Take night before surgery    losartan (COZAAR) 50 mg tablet Take night before surgery    metFORMIN (GLUCOPHAGE-XR) 500 mg 24 hr tablet Take night before surgery    multivitamin (THERAGRAN) TABS Stop taking 1 day prior to surgery.    simvastatin (ZOCOR) 20 mg tablet Take night before surgery    tamsulosin (FLOMAX) 0.4 mg Take night before surgery    terbinafine (LamISIL) 250 mg tablet Take night before surgery    Medication instructions for day of surgery reviewed. Please take all instructed medications with only a sip of water.       You will receive a call one business day prior to surgery with an arrival time and hospital directions. If your surgery is scheduled on a Monday, the hospital will be calling you on the Friday prior to your surgery. If you have not heard from anyone by 8pm, please call the hospital supervisor through the hospital  at [phone]. ([place] [phone] or [place] [phone]).    Do not eat or drink anything after midnight the night before your surgery, including candy, mints, lifesavers, or chewing gum. Do not drink alcohol 24hrs before your surgery. Try not to smoke at least 24hrs before your surgery.       Follow the pre surgery showering instructions as listed in the “My Surgical Experience Booklet” or otherwise provided by your surgeon's office. Do not use a blade to shave the surgical area 1 week before surgery. It is okay to use a clean electric clippers up to 24 hours before surgery. Do not apply any lotions, creams, including makeup, cologne, deodorant, or perfumes after showering on the day  of your surgery. Do not use dry shampoo, hair spray, hair gel, or any type of hair products.     No contact lenses, eye make-up, or artificial eyelashes. Remove nail polish, including gel polish, and any artificial, gel, or acrylic nails if possible. Remove all jewelry including rings and body piercing jewelry.     Wear causal clothing that is easy to take on and off. Consider your type of surgery.    Keep any valuables, jewelry, piercings at home. Please bring any specially ordered equipment (sling, braces) if indicated.    Arrange for a responsible person to drive you to and from the hospital on the day of your surgery. Please confirm the visitor policy for the day of your procedure when you receive your phone call with an arrival time.     Call the surgeon's office with any new illnesses, exposures, or additional questions prior to surgery.    Please reference your “My Surgical Experience Booklet” for additional information to prepare for your upcoming surgery.

## 2025-03-05 NOTE — PATIENT INSTRUCTIONS
BEFORE SURGERY    Contact your surgical nurse navigator or surgical provider with any questions regarding preoperative plan or schedule.  Stop all over the counter supplements, herbal, naturopathic  medications for 1 week prior to surgery UNLESS prescribed by your surgeon  Hold NSAIDS (i.e. advil, alleve, motrin, ibuprofen, celebrex) minimum 5 days prior to surgery  Follow presurgical medication instructions provided by preadmission nursing team reviewed during your presurgery phone call  Strategies for optimizing your surgery through breathing exercises, nutrition and physical activity can be found at [email or web address].org/best  Call [phone] with any presurgical concerns or medications questions or use the messaging feature in your [employer] roxi to contact your provider    AFTER SURGERY    Recommend using Tylenol ( acetaminophen ) 1000 mg every eight hours during the first week post discharge along with icing the area for 20 mins every 3-4 hours while awake can be helpful in reducing your need for post operative opioid use. This opioid sparing plan can be used along side your surgeons pain plan.  Use stool softener over the counter (colace) daily after surgery during the first 1-2 weeks to avoid post operative constipation issues  If no bowel movement within 3 days after surgery then use over the counter Miralax in addition to your stool softener   If cleared by your surgical team for activity then early and often walking is encouraged and can be important in prevention of post surgical blood clots. Additionally spend as much time out of bed as possible and allowed by your surgical team  Use your incentive spirometer twice per hour in the first seven days after surgery to help prevent post surgery lung complications and infections  It is very important you follow the instructions from your surgeon regarding any medications for after surgery blood clot prevention. Compliance with these medications or interventions is very  important.  Call [phone] with any post discharge concerns or medical issues or use the messaging feature in your [employer] roxi to contact your provider

## 2025-03-06 ENCOUNTER — ANESTHESIA (OUTPATIENT)
Dept: PERIOP | Facility: HOSPITAL | Age: 70
DRG: 502 | End: 2025-03-06
Payer: MEDICARE

## 2025-03-06 ENCOUNTER — ANESTHESIA EVENT (OUTPATIENT)
Dept: PERIOP | Facility: HOSPITAL | Age: 70
DRG: 502 | End: 2025-03-06
Payer: MEDICARE

## 2025-03-06 ENCOUNTER — HOSPITAL ENCOUNTER (INPATIENT)
Facility: HOSPITAL | Age: 70
DRG: 502 | End: 2025-03-06
Attending: ORTHOPAEDIC SURGERY | Admitting: ORTHOPAEDIC SURGERY
Payer: MEDICARE

## 2025-03-06 DIAGNOSIS — S76.111D RUPTURE OF RIGHT QUADRICEPS TENDON, SUBSEQUENT ENCOUNTER: ICD-10-CM

## 2025-03-06 DIAGNOSIS — K21.9 GASTRO-ESOPHAGEAL REFLUX DISEASE WITHOUT ESOPHAGITIS: ICD-10-CM

## 2025-03-06 DIAGNOSIS — S76.111D QUADRICEPS MUSCLE RUPTURE, RIGHT, SUBSEQUENT ENCOUNTER: Primary | ICD-10-CM

## 2025-03-06 DIAGNOSIS — S76.111S QUADRICEPS TENDON RUPTURE, RIGHT, SEQUELA: ICD-10-CM

## 2025-03-06 LAB — GLUCOSE SERPL-MCNC: 86 MG/DL (ref 65–140)

## 2025-03-06 PROCEDURE — 0LUL0KZ SUPPLEMENT RIGHT UPPER LEG TENDON WITH NONAUTOLOGOUS TISSUE SUBSTITUTE, OPEN APPROACH: ICD-10-PCS | Performed by: ORTHOPAEDIC SURGERY

## 2025-03-06 PROCEDURE — 27386 REPAIR/GRAFT OF THIGH MUSCLE: CPT | Performed by: PHYSICIAN ASSISTANT

## 2025-03-06 PROCEDURE — C1781 MESH (IMPLANTABLE): HCPCS | Performed by: ORTHOPAEDIC SURGERY

## 2025-03-06 PROCEDURE — 0L8L0ZZ DIVISION OF RIGHT UPPER LEG TENDON, OPEN APPROACH: ICD-10-PCS | Performed by: ORTHOPAEDIC SURGERY

## 2025-03-06 PROCEDURE — C1713 ANCHOR/SCREW BN/BN,TIS/BN: HCPCS | Performed by: ORTHOPAEDIC SURGERY

## 2025-03-06 PROCEDURE — 27386 REPAIR/GRAFT OF THIGH MUSCLE: CPT | Performed by: ORTHOPAEDIC SURGERY

## 2025-03-06 PROCEDURE — 82948 REAGENT STRIP/BLOOD GLUCOSE: CPT

## 2025-03-06 DEVICE — 4.75MM BC KNOTLESS SWIVELOCK
Type: IMPLANTABLE DEVICE | Site: FEMUR | Status: FUNCTIONAL
Brand: ARTHREX®

## 2025-03-06 DEVICE — TENDON GRAFT ACHILLIES W/ CALCANEOUS: Type: IMPLANTABLE DEVICE | Site: FEMUR | Status: FUNCTIONAL

## 2025-03-06 RX ORDER — CHLORHEXIDINE GLUCONATE ORAL RINSE 1.2 MG/ML
15 SOLUTION DENTAL ONCE
Status: COMPLETED | OUTPATIENT
Start: 2025-03-06 | End: 2025-03-06

## 2025-03-06 RX ORDER — FENTANYL CITRATE 50 UG/ML
INJECTION, SOLUTION INTRAMUSCULAR; INTRAVENOUS AS NEEDED
Status: DISCONTINUED | OUTPATIENT
Start: 2025-03-06 | End: 2025-03-06

## 2025-03-06 RX ORDER — MIDAZOLAM HYDROCHLORIDE 2 MG/2ML
INJECTION, SOLUTION INTRAMUSCULAR; INTRAVENOUS AS NEEDED
Status: DISCONTINUED | OUTPATIENT
Start: 2025-03-06 | End: 2025-03-06

## 2025-03-06 RX ORDER — ONDANSETRON 2 MG/ML
4 INJECTION INTRAMUSCULAR; INTRAVENOUS ONCE AS NEEDED
Status: DISCONTINUED | OUTPATIENT
Start: 2025-03-06 | End: 2025-03-06 | Stop reason: HOSPADM

## 2025-03-06 RX ORDER — CHLORHEXIDINE GLUCONATE 40 MG/ML
SOLUTION TOPICAL DAILY PRN
Status: DISCONTINUED | OUTPATIENT
Start: 2025-03-06 | End: 2025-03-06 | Stop reason: HOSPADM

## 2025-03-06 RX ORDER — HYDROMORPHONE HCL/PF 1 MG/ML
0.2 SYRINGE (ML) INJECTION
Refills: 0 | Status: DISCONTINUED | OUTPATIENT
Start: 2025-03-06 | End: 2025-03-06 | Stop reason: HOSPADM

## 2025-03-06 RX ORDER — BUPIVACAINE HYDROCHLORIDE 5 MG/ML
INJECTION, SOLUTION EPIDURAL; INTRACAUDAL
Status: COMPLETED | OUTPATIENT
Start: 2025-03-06 | End: 2025-03-06

## 2025-03-06 RX ORDER — FENTANYL CITRATE/PF 50 MCG/ML
25 SYRINGE (ML) INJECTION
Refills: 0 | Status: DISCONTINUED | OUTPATIENT
Start: 2025-03-06 | End: 2025-03-06 | Stop reason: HOSPADM

## 2025-03-06 RX ORDER — VANCOMYCIN HYDROCHLORIDE 1 G/20ML
INJECTION, POWDER, LYOPHILIZED, FOR SOLUTION INTRAVENOUS AS NEEDED
Status: DISCONTINUED | OUTPATIENT
Start: 2025-03-06 | End: 2025-03-06 | Stop reason: HOSPADM

## 2025-03-06 RX ORDER — LIDOCAINE HYDROCHLORIDE 20 MG/ML
INJECTION, SOLUTION EPIDURAL; INFILTRATION; INTRACAUDAL; PERINEURAL AS NEEDED
Status: DISCONTINUED | OUTPATIENT
Start: 2025-03-06 | End: 2025-03-06

## 2025-03-06 RX ORDER — ONDANSETRON 2 MG/ML
INJECTION INTRAMUSCULAR; INTRAVENOUS AS NEEDED
Status: DISCONTINUED | OUTPATIENT
Start: 2025-03-06 | End: 2025-03-06

## 2025-03-06 RX ORDER — PROPOFOL 10 MG/ML
INJECTION, EMULSION INTRAVENOUS AS NEEDED
Status: DISCONTINUED | OUTPATIENT
Start: 2025-03-06 | End: 2025-03-06

## 2025-03-06 RX ORDER — ROCURONIUM BROMIDE 10 MG/ML
INJECTION, SOLUTION INTRAVENOUS AS NEEDED
Status: DISCONTINUED | OUTPATIENT
Start: 2025-03-06 | End: 2025-03-06

## 2025-03-06 RX ORDER — SODIUM CHLORIDE, SODIUM LACTATE, POTASSIUM CHLORIDE, CALCIUM CHLORIDE 600; 310; 30; 20 MG/100ML; MG/100ML; MG/100ML; MG/100ML
INJECTION, SOLUTION INTRAVENOUS CONTINUOUS PRN
Status: DISCONTINUED | OUTPATIENT
Start: 2025-03-06 | End: 2025-03-06

## 2025-03-06 RX ADMIN — PROPOFOL 200 MG: 10 INJECTION, EMULSION INTRAVENOUS at 10:49

## 2025-03-06 RX ADMIN — LIDOCAINE HYDROCHLORIDE 100 MG: 20 INJECTION, SOLUTION EPIDURAL; INFILTRATION; INTRACAUDAL; PERINEURAL at 10:48

## 2025-03-06 RX ADMIN — ROCURONIUM BROMIDE 20 MG: 10 INJECTION, SOLUTION INTRAVENOUS at 13:26

## 2025-03-06 RX ADMIN — ROCURONIUM BROMIDE 30 MG: 10 INJECTION, SOLUTION INTRAVENOUS at 12:51

## 2025-03-06 RX ADMIN — BUPIVACAINE HYDROCHLORIDE 20 ML: 5 INJECTION, SOLUTION EPIDURAL; INTRACAUDAL at 10:30

## 2025-03-06 RX ADMIN — SODIUM CHLORIDE, SODIUM LACTATE, POTASSIUM CHLORIDE, AND CALCIUM CHLORIDE: .6; .31; .03; .02 INJECTION, SOLUTION INTRAVENOUS at 10:45

## 2025-03-06 RX ADMIN — FENTANYL CITRATE 50 MCG: 50 INJECTION, SOLUTION INTRAMUSCULAR; INTRAVENOUS at 14:48

## 2025-03-06 RX ADMIN — ROCURONIUM BROMIDE 50 MG: 10 INJECTION, SOLUTION INTRAVENOUS at 10:50

## 2025-03-06 RX ADMIN — ONDANSETRON 4 MG: 2 INJECTION INTRAMUSCULAR; INTRAVENOUS at 14:26

## 2025-03-06 RX ADMIN — FENTANYL CITRATE 50 MCG: 50 INJECTION, SOLUTION INTRAMUSCULAR; INTRAVENOUS at 14:31

## 2025-03-06 RX ADMIN — PROPOFOL 50 MG: 10 INJECTION, EMULSION INTRAVENOUS at 10:50

## 2025-03-06 RX ADMIN — FENTANYL CITRATE 100 MCG: 50 INJECTION, SOLUTION INTRAMUSCULAR; INTRAVENOUS at 10:48

## 2025-03-06 RX ADMIN — MIDAZOLAM HYDROCHLORIDE 2 MG: 1 INJECTION, SOLUTION INTRAMUSCULAR; INTRAVENOUS at 10:24

## 2025-03-06 RX ADMIN — CHLORHEXIDINE GLUCONATE 0.12% ORAL RINSE 15 ML: 1.2 LIQUID ORAL at 08:30

## 2025-03-06 RX ADMIN — ROCURONIUM BROMIDE 50 MG: 10 INJECTION, SOLUTION INTRAVENOUS at 11:18

## 2025-03-06 RX ADMIN — SUGAMMADEX 200 MG: 100 INJECTION, SOLUTION INTRAVENOUS at 14:48

## 2025-03-06 RX ADMIN — Medication 3000 MG: at 10:51

## 2025-03-06 NOTE — ANESTHESIA PROCEDURE NOTES
Peripheral Block    Patient location during procedure: holding area  Start time: 3/6/2025 10:27 AM  Reason for block: at surgeon's request and post-op pain management  Staffing  Performed by: Mayra Pacheco DO  Authorized by: Mayra Pacheco DO    Preanesthetic Checklist  Completed: patient identified, IV checked, site marked, risks and benefits discussed, surgical consent, monitors and equipment checked, pre-op evaluation and timeout performed  Peripheral Block  Patient position: supine  Prep: ChloraPrep  Patient monitoring: frequent blood pressure checks, continuous pulse oximetry and heart rate  Block type: Femoral  Laterality: right  Injection technique: single-shot  Procedures: ultrasound guided, Ultrasound guidance required for the procedure to increase accuracy and safety of medication placement and decrease risk of complications.  Ultrasound permanent image saved  bupivacaine (PF) (MARCAINE) 0.5 % injection 20 mL - Perineural   20 mL - 3/6/2025 10:30:00 AM  Needle  Needle type: Stimuplex   Needle gauge: 20 G  Needle length: 4 in  Needle localization: anatomical landmarks and ultrasound guidance  Assessment  Injection assessment: incremental injection, frequent aspiration, injected with ease, negative aspiration, negative for heart rate change, no paresthesia on injection, no symptoms of intraneural/intravenous injection and needle tip visualized at all times  Paresthesia pain: none  Post-procedure:  site cleaned  patient tolerated the procedure well with no immediate complications

## 2025-03-06 NOTE — INTERVAL H&P NOTE
H&P reviewed. After examining the patient I find no changes in the patients condition since the H&P had been written. Patient was seen and evaluated in the office where continued nonoperative vs surgical management of Right knee quad tendon re-tear was discussed. In light of patients weakness with knee extension, difficulty with ambulation, patient would like to move forward with surgical intervention. Risks of surgical intervention discussed in the office with patient and detailed consent obtained. Patient will go to OR on 03/06/2025 with Dr Foster for Right knee revision quad tendon repair with allograft augmentation.     14 point ROS in office   Musculoskeletal Right knee pain, extension lag      Heart RRR  Lungs CTA BL   Abdomen Present nontender         Right knee   Palpable defect at superior pole of patella at quad tendon insertion  45 degree extension lag with straight leg raise   Calf soft compressible and nontender  ROM from 0 to 130 degrees  Neurovascularly intact distally     Vitals:    03/06/25 0823   BP: 153/86   Pulse: 63   Resp: 19   Temp: 98.2 °F (36.8 °C)   SpO2: 97%

## 2025-03-06 NOTE — ANESTHESIA PREPROCEDURE EVALUATION
Procedure:  REPAIR TENDON QUADRICEPS- Right knee Revision quad tendon repair (Right: Thigh)    Relevant Problems   ANESTHESIA (within normal limits)      CARDIO   (+) Abdominal aortic ectasia (HCC)   (+) Hypertension   (+) Mixed hyperlipidemia   (+) Pure hypertriglyceridemia      GI/HEPATIC   (+) Gastro-esophageal reflux disease without esophagitis      MUSCULOSKELETAL   (+) Quadriceps muscle rupture, right, subsequent encounter      NEURO/PSYCH   (-) CVA (cerebral vascular accident) (HCC)   (-) Seizures (HCC)      PULMONARY   (+) JOVANY on CPAP        Physical Exam    Airway    Mallampati score: III  TM Distance: >3 FB  Neck ROM: full     Dental   Comment: Poor dentition, multiple chipped/cracked teeth, denies loose teeth     Cardiovascular  Cardiovascular exam normal    Pulmonary  Pulmonary exam normal     Other Findings        Anesthesia Plan  ASA Score- 2     Anesthesia Type- general with ASA Monitors.         Additional Monitors:     Airway Plan: ETT.    Comment: + femoral nerve block.       Plan Factors-Exercise tolerance (METS): >4 METS.    Chart reviewed. EKG reviewed. Imaging results reviewed. Existing labs reviewed. Patient summary reviewed.    Patient is not a current smoker.      Obstructive sleep apnea risk education given perioperatively.        Induction- intravenous.    Postoperative Plan- Plan for postoperative opioid use. Planned trial extubation        Informed Consent- Anesthetic plan and risks discussed with patient.  I personally reviewed this patient with the CRNA. Discussed and agreed on the Anesthesia Plan with the CRNA..      NPO Status:  Vitals Value Taken Time   Date of last liquid 03/05/25 03/06/25 0825   Time of last liquid 2100 03/06/25 0825   Date of last solid     Time of last solid 2100 03/06/25 0825

## 2025-03-06 NOTE — ANESTHESIA POSTPROCEDURE EVALUATION
Post-Op Assessment Note    CV Status:  Stable  Pain Score: 0    Pain management: adequate    Multimodal analgesia used between 6 hours prior to anesthesia start to PACU discharge    Mental Status:  Alert and awake   Hydration Status:  Euvolemic   PONV Controlled:  Controlled   Airway Patency:  Patent     Post Op Vitals Reviewed: Yes    No anethesia notable event occurred.            Last Filed PACU Vitals:  Vitals Value Taken Time   Temp 98 °F (36.7 °C) 03/06/25 1530   Pulse 64 03/06/25 1533   /66 03/06/25 1530   Resp 31 03/06/25 1533   SpO2 95 % 03/06/25 1533   Vitals shown include unfiled device data.    Modified Mary:     Vitals Value Taken Time   Activity 2 03/06/25 1530   Respiration 2 03/06/25 1530   Circulation 2 03/06/25 1530   Consciousness 2 03/06/25 1530   Oxygen Saturation 1 03/06/25 1530     Modified Mary Score: 9

## 2025-03-06 NOTE — ANESTHESIA POSTPROCEDURE EVALUATION
Post-Op Assessment Note    CV Status:  Stable    Pain management: adequate       Mental Status:  Alert and awake   Hydration Status:  Euvolemic   PONV Controlled:  Controlled   Airway Patency:  Patent     Post Op Vitals Reviewed: Yes    No anethesia notable event occurred.    Staff: Anesthesiologist, CRNA           Last Filed PACU Vitals:  Vitals Value Taken Time   Temp 98.2    Pulse 79 03/06/25 1458   /77    Resp 27 03/06/25 1458   SpO2 97 % 03/06/25 1458   Vitals shown include unfiled device data.

## 2025-03-06 NOTE — OP NOTE
OPERATIVE REPORT  PATIENT NAME: Tobin Kerns    :  1955  MRN: [id number]  Pt Location: [facility] ROOM 05    SURGERY DATE: 3/6/2025    Surgeons and Role:     * Fletcher Foster,  - Primary     * America Ambrose PA-C - Assisting    Preop Diagnosis:  Quadriceps tendon rupture, right, subsequent encounter [S76.111D]  Right quadricep tendon retear    Post-Op Diagnosis Codes:     * Quadriceps tendon rupture, right, subsequent encounter [S76.111D]  Right quadricep tendon retear    Procedure(s):  Right - REPAIR TENDON QUADRICEPS- Right knee Revision quad tendon repair with allograft augmentation    Specimen(s):  * No specimens in log *    Estimated Blood Loss:   20 cc    Drains:  Urethral Catheter Straight-tip 16 Fr. (Active)   Number of days: 222       Anesthesia Type:   General with femoral nerve block    Operative Indications:  Quadriceps tendon rupture, right, subsequent encounter [S76.111D]  Quadricep tendon retear with persistent extensor lag    Operative Findings:  Near full-thickness retracted right quadricep tendon rupture      Complications:   None    Procedure and Technique:      Antibiotics: 3 g Ancef  Implants: Arthrex 4.75 mm bio composite swivel lock anchor x 4, 28.8 cm Achilles tendon allograft  Tourniquet time: 120 minutes    The patient was seen in the preoperative holding area and the appropriate site of surgery was confirmed and the patient was marked.  Upon bring the patient back to operating room he was placed supine on the operating table.  Tourniquet was placed high on the operative thigh.  General anesthesia was provided.  The right lower extremity was prepped and draped in usual sterile fashion.  Preoperative timeout was performed and once again confirmed the site of surgery and procedure.    Esmarch was used exsanguinate the leg and tourniquet was inflated to 300 mmHg.  A 10 blade was used to make a longitudinal midline incision along the course of the previous incision extending  proximally, approximately 6 cm proximal to the palpable quadricep defect.  Extension of the incision was taken distally to the tibial tubercle.  Full-thickness skin flaps were developed.  Thin wispy tissue was noted to be interposed in the quadricep tendon defect.  This was sharply excised.  Remnants of suture anchors were appreciable in the superior patella.  Previous sutures were embedded deep within the retracted quadricep tendon.  Bovie was used to remove soft tissue from the superior patella.  Joint space was irrigated.  This was followed by curette and rongeur to lightly decorticate the area.  Blunt dissection was performed superficial and deep to the quadricep tendon to further free the retracted tendon.  Upon fraying of the tendon, Allis clamps were placed and the residual defect was reduced from approximately 5 cm to 2 cm with the knee in full extension.  Due to persistent gapping, decision was made to perform a small VY lengthening of the quadricep tendon.  Approximately the quadricep tendon to 4 cm limbs were made medially and laterally in a V fashion.  Upon fraying of the release, 2 FiberWire was used to perform a side-to-side repair most proximally to convert to a wall I can provide additional length.  Improved excursion the quadricep tendon was noted.     Attention was then turned to the Achilles allograft.  Bone block was removed and 2 FiberWire suture was placed in Maxwell-Fei fashion each end and the graft was placed on tension on the graft for.  Vancomycin and saline solution was used to lightly soaked the graft.  Attention was then turned to suturing of the tendon.  5 Ethibond suture was placed in a running locking fashion running from distal to proximal along the lateral border of the quadricep tendon, across, and then out distally through the center of the tendon.  In the process, running, locking suture was passed through the thick end of the Achilles allograft to add collagen for healing at  the repair site.  This process was repeated more medially through the tendon leaving 4 limbs of suture, ensuring proper incorporation of the allograft once again.  Attention was then turned to fixation.  Drilling was performed to make a starter hole for a 4.75 mm bio composite swivel lock anchor in the central lateral aspect of the superior patella.  This was followed by appropriately tapping.  This process was repeated more medially in the patella for the second anchor.  Lateral to Ethibond sutures were appropriately loaded.  Suture anchor was also loaded with a fiber tape suture.  Upon tensioning of sutures, the suture anchor was inserted into the swivel lock anchor.  Upon proper tensioning the anchor was inserted.  This process was repeated with remaining 2 sutures for the medial anchor.  Proper reduction and tensioning of the repair was noted.  Previously loaded fiber tape was then placed in horizontal mattress fashion into the medial and lateral aspects of the quadricep tendon, respectively.  Self tensioning device from the swivel lock was also used from both anchors.  2 separate fiber tape sutures were then passed in running locking fashion from distal to proximal and then distal again at the distal extent of the Achilles tendon allograft.  This left 4 limbs of suture in the distal tendon.  Achilles tendon allograft was tensioned and appropriate site of anchor fixation was noted in the proximal tibia.  Drill holes were made medial and lateral to the tibial tubercle.  Upon appropriately tapping, medial anchors were loaded into another swivel lock anchor which was appropriately inserted into the medial hole, ensuring proper tensioning and compression of the allograft distally.  This process was repeated with remaining 2 sutures laterally.  Allograft was noted to augment the repair.  A series of 2 Ethibond sutures were then used to help repair medial and lateral retinaculum and associated quadricep tendon.  0  Vicryl suture was then used to incorporate the allograft distally into the patellar tendon.  Proper reduction and tensioning of repair and augmentation was noted.  The knee was flexed to approximately 20 degrees without any gapping appreciable.  Surgical site was copiously irrigated with vancomycin and saline solution followed by normal saline..  Skin was closed with 2-0 Vicryl and staples.  A sterile dressing was applied.  The patient was placed into a cylinder cast in approximately 5 degrees of flexion.  He tolerated the procedure well no complications were noted.    Postoperatively the patient will be toe-touch weightbearing right lower extremity with cylinder class in place and walker assistance.  The patient receive Ancef 3 g every 8 hours x 2 doses postoperatively.  He will work with physical and Occupational Therapy while in the hospital.  He will receive Lovenox daily for DVT prophylaxis while in hospital and convert to aspirin 81 mg twice daily upon discharge for 4 weeks total postoperatively.  Due to patient's lack of help around the house, his age, activity level, and restrictions postoperatively, he will need placement in rehab after discharge.       I was present for the entire procedure. A qualified resident physician was not available, and A physician assistant, America Ambrose PA-C was required during the procedure for tissue retraction and handling, dissection and suturing.    Patient Disposition:  PACU              SIGNATURE: Fletcher Foster DO  DATE: March 6, 2025  TIME: 3:40 PM

## 2025-03-07 PROBLEM — S76.111A RUPTURE OF RIGHT QUADRICEPS TENDON: Status: ACTIVE | Noted: 2025-03-07

## 2025-03-07 LAB
ALBUMIN SERPL BCG-MCNC: 4.4 G/DL (ref 3.5–5)
ALP SERPL-CCNC: 60 U/L (ref 34–104)
ALT SERPL W P-5'-P-CCNC: 10 U/L (ref 7–52)
ANION GAP SERPL CALCULATED.3IONS-SCNC: 7 MMOL/L (ref 4–13)
AST SERPL W P-5'-P-CCNC: 14 U/L (ref 13–39)
BASOPHILS # BLD AUTO: 0.04 THOUSANDS/ÂΜL (ref 0–0.1)
BASOPHILS NFR BLD AUTO: 0 % (ref 0–1)
BILIRUB SERPL-MCNC: 1.2 MG/DL (ref 0.2–1)
BUN SERPL-MCNC: 16 MG/DL (ref 5–25)
CALCIUM SERPL-MCNC: 9.2 MG/DL (ref 8.4–10.2)
CHLORIDE SERPL-SCNC: 105 MMOL/L (ref 96–108)
CO2 SERPL-SCNC: 24 MMOL/L (ref 21–32)
CREAT SERPL-MCNC: 1 MG/DL (ref 0.6–1.3)
EOSINOPHIL # BLD AUTO: 0.02 THOUSAND/ÂΜL (ref 0–0.61)
EOSINOPHIL NFR BLD AUTO: 0 % (ref 0–6)
ERYTHROCYTE [DISTWIDTH] IN BLOOD BY AUTOMATED COUNT: 13.2 % (ref 11.6–15.1)
GFR SERPL CREATININE-BSD FRML MDRD: 77 ML/MIN/1.73SQ M
GLUCOSE SERPL-MCNC: 109 MG/DL (ref 65–140)
GLUCOSE SERPL-MCNC: 111 MG/DL (ref 65–140)
GLUCOSE SERPL-MCNC: 112 MG/DL (ref 65–140)
HCT VFR BLD AUTO: 39.7 % (ref 36.5–49.3)
HGB BLD-MCNC: 13.7 G/DL (ref 12–17)
IMM GRANULOCYTES # BLD AUTO: 0.07 THOUSAND/UL (ref 0–0.2)
IMM GRANULOCYTES NFR BLD AUTO: 1 % (ref 0–2)
LYMPHOCYTES # BLD AUTO: 2.22 THOUSANDS/ÂΜL (ref 0.6–4.47)
LYMPHOCYTES NFR BLD AUTO: 17 % (ref 14–44)
MCH RBC QN AUTO: 30.3 PG (ref 26.8–34.3)
MCHC RBC AUTO-ENTMCNC: 34.5 G/DL (ref 31.4–37.4)
MCV RBC AUTO: 88 FL (ref 82–98)
MONOCYTES # BLD AUTO: 1.39 THOUSAND/ÂΜL (ref 0.17–1.22)
MONOCYTES NFR BLD AUTO: 11 % (ref 4–12)
NEUTROPHILS # BLD AUTO: 9.46 THOUSANDS/ÂΜL (ref 1.85–7.62)
NEUTS SEG NFR BLD AUTO: 71 % (ref 43–75)
NRBC BLD AUTO-RTO: 0 /100 WBCS
PLATELET # BLD AUTO: 222 THOUSANDS/UL (ref 149–390)
PMV BLD AUTO: 8.9 FL (ref 8.9–12.7)
POTASSIUM SERPL-SCNC: 4 MMOL/L (ref 3.5–5.3)
PROT SERPL-MCNC: 6.9 G/DL (ref 6.4–8.4)
RBC # BLD AUTO: 4.52 MILLION/UL (ref 3.88–5.62)
SODIUM SERPL-SCNC: 136 MMOL/L (ref 135–147)
WBC # BLD AUTO: 13.2 THOUSAND/UL (ref 4.31–10.16)

## 2025-03-07 PROCEDURE — 99024 POSTOP FOLLOW-UP VISIT: CPT | Performed by: ORTHOPAEDIC SURGERY

## 2025-03-07 PROCEDURE — 85025 COMPLETE CBC W/AUTO DIFF WBC: CPT | Performed by: PHYSICIAN ASSISTANT

## 2025-03-07 PROCEDURE — 82948 REAGENT STRIP/BLOOD GLUCOSE: CPT

## 2025-03-07 PROCEDURE — 97163 PT EVAL HIGH COMPLEX 45 MIN: CPT

## 2025-03-07 PROCEDURE — 97167 OT EVAL HIGH COMPLEX 60 MIN: CPT

## 2025-03-07 PROCEDURE — 80053 COMPREHEN METABOLIC PANEL: CPT | Performed by: PHYSICIAN ASSISTANT

## 2025-03-07 RX ORDER — TERBINAFINE HYDROCHLORIDE 250 MG/1
250 TABLET ORAL DAILY
Status: DISCONTINUED | OUTPATIENT
Start: 2025-03-07 | End: 2025-03-11 | Stop reason: HOSPADM

## 2025-03-07 RX ORDER — OXYCODONE HYDROCHLORIDE 10 MG/1
10 TABLET ORAL EVERY 4 HOURS PRN
Status: DISCONTINUED | OUTPATIENT
Start: 2025-03-07 | End: 2025-03-11 | Stop reason: HOSPADM

## 2025-03-07 RX ORDER — ASCORBIC ACID 500 MG
500 TABLET ORAL DAILY
Status: DISCONTINUED | OUTPATIENT
Start: 2025-03-07 | End: 2025-03-11 | Stop reason: HOSPADM

## 2025-03-07 RX ORDER — OXYCODONE HYDROCHLORIDE 5 MG/1
5 TABLET ORAL EVERY 4 HOURS PRN
Status: DISCONTINUED | OUTPATIENT
Start: 2025-03-07 | End: 2025-03-11 | Stop reason: HOSPADM

## 2025-03-07 RX ORDER — LOSARTAN POTASSIUM 50 MG/1
50 TABLET ORAL DAILY
Status: DISCONTINUED | OUTPATIENT
Start: 2025-03-07 | End: 2025-03-11 | Stop reason: HOSPADM

## 2025-03-07 RX ORDER — TAMSULOSIN HYDROCHLORIDE 0.4 MG/1
0.4 CAPSULE ORAL
Status: DISCONTINUED | OUTPATIENT
Start: 2025-03-07 | End: 2025-03-11 | Stop reason: HOSPADM

## 2025-03-07 RX ORDER — PRAVASTATIN SODIUM 40 MG
40 TABLET ORAL
Status: DISCONTINUED | OUTPATIENT
Start: 2025-03-07 | End: 2025-03-11 | Stop reason: HOSPADM

## 2025-03-07 RX ORDER — METFORMIN HYDROCHLORIDE 500 MG/1
1500 TABLET, EXTENDED RELEASE ORAL
Status: DISCONTINUED | OUTPATIENT
Start: 2025-03-07 | End: 2025-03-11 | Stop reason: HOSPADM

## 2025-03-07 RX ORDER — FAMOTIDINE 20 MG/1
20 TABLET, FILM COATED ORAL DAILY
Status: DISCONTINUED | OUTPATIENT
Start: 2025-03-07 | End: 2025-03-11 | Stop reason: HOSPADM

## 2025-03-07 RX ORDER — KETOROLAC TROMETHAMINE 30 MG/ML
15 INJECTION, SOLUTION INTRAMUSCULAR; INTRAVENOUS EVERY 8 HOURS SCHEDULED
Status: COMPLETED | OUTPATIENT
Start: 2025-03-07 | End: 2025-03-07

## 2025-03-07 RX ORDER — ENOXAPARIN SODIUM 100 MG/ML
40 INJECTION SUBCUTANEOUS
Status: DISCONTINUED | OUTPATIENT
Start: 2025-03-07 | End: 2025-03-11 | Stop reason: HOSPADM

## 2025-03-07 RX ORDER — ACETAMINOPHEN 325 MG/1
650 TABLET ORAL EVERY 6 HOURS SCHEDULED
Status: DISCONTINUED | OUTPATIENT
Start: 2025-03-07 | End: 2025-03-11 | Stop reason: HOSPADM

## 2025-03-07 RX ADMIN — ACETAMINOPHEN 650 MG: 325 TABLET, FILM COATED ORAL at 13:07

## 2025-03-07 RX ADMIN — Medication 5000 UNITS: at 13:07

## 2025-03-07 RX ADMIN — ACETAMINOPHEN 650 MG: 325 TABLET, FILM COATED ORAL at 17:31

## 2025-03-07 RX ADMIN — FAMOTIDINE 20 MG: 20 TABLET, FILM COATED ORAL at 13:07

## 2025-03-07 RX ADMIN — ENOXAPARIN SODIUM 40 MG: 40 INJECTION SUBCUTANEOUS at 13:07

## 2025-03-07 RX ADMIN — KETOROLAC TROMETHAMINE 15 MG: 30 INJECTION, SOLUTION INTRAMUSCULAR; INTRAVENOUS at 21:30

## 2025-03-07 RX ADMIN — PRAVASTATIN SODIUM 40 MG: 40 TABLET ORAL at 17:31

## 2025-03-07 RX ADMIN — METFORMIN ER 500 MG 1500 MG: 500 TABLET ORAL at 17:32

## 2025-03-07 RX ADMIN — KETOROLAC TROMETHAMINE 15 MG: 30 INJECTION, SOLUTION INTRAMUSCULAR; INTRAVENOUS at 15:00

## 2025-03-07 RX ADMIN — CEFAZOLIN 3000 MG: 1 INJECTION, POWDER, FOR SOLUTION INTRAMUSCULAR; INTRAVENOUS at 13:07

## 2025-03-07 RX ADMIN — TAMSULOSIN HYDROCHLORIDE 0.4 MG: 0.4 CAPSULE ORAL at 17:31

## 2025-03-07 RX ADMIN — OXYCODONE HYDROCHLORIDE AND ACETAMINOPHEN 500 MG: 500 TABLET ORAL at 13:07

## 2025-03-07 RX ADMIN — CEFAZOLIN 3000 MG: 1 INJECTION, POWDER, FOR SOLUTION INTRAMUSCULAR; INTRAVENOUS at 19:31

## 2025-03-07 RX ADMIN — LOSARTAN POTASSIUM 50 MG: 50 TABLET, FILM COATED ORAL at 13:07

## 2025-03-07 NOTE — WOUND OSTOMY CARE
Consult Note - Wound   Tobin Kerns 69 y.o. male MRN: [id number]  Unit/Bed#: 2 E 275-01 Encounter: [id number]        History and Present Illness:  Patient is a 68 yo male that was admitted to [facility] for treatment of right knee revision quad tendon repair with allograft augmentation. Patient has a PMH of hypertension, mixed hyperlipidemia, morbid obesity, impaired fasting glucose, JOVANY on CPAP, and Peripheral Neuropathy.  Patient is a minimum assist for turning and repositioning. Patient is continent of bowel and bladder. On assessment, patient is lying in bed on a standard mattress.     Wound Care was consulted for toe blanchable wounds like blisters    Assessment Findings:   Bilateral heels, elbows and hips skin dry, intact and blanchable.     Right Anterior Toes D2-D4: Skin is pink, dry, intact and blanchable. Patient stated they used to be blisters due to a shoe he used to wear. Recommend Hydraguard.  Left Plantar foot: Wound is oval in shape, pink/yellow blanchable tissue. Audrey-wound is dry, intact, brown/pink blanchable tissue. No drainage noted at time of exam. Recommend silicone foam dressing.       Educated patient on importance of frequent offloading of pressure via turning, repositioning and weight-shifting. Patient verbalized understanding of plan of care.     No induration, fluctuance, odor, warmth/temperature differences, redness, or purulence noted to the above noted wounds and skin areas assessed. New dressings applied per orders listed below. Patient tolerated well- no s/s of non-verbal pain or discomfort observed during the encounter. Bedside nurse aware of plan of care. See flow sheets for more detailed assessment findings.      Orders listed below and wound care will continue to follow, call or Secure Chat Message with questions.     Skin Care Plan:  1-Left plantar foot: Cleanse with soap and water, pat dry. Apply silicone foam dressing to wound, change every other day. Peel back dressing  to assess skin every shift.   2-Turn/reposition q2h or when medically stable for pressure re-distribution on skin.  3-Elevate heels to offload pressure.  4-Moisturize skin daily with skin nourishing cream  5-Ehob cushion in chair when out of bed.  6- Apply Hydraguard to bilateral sacro-buttocks, anterior right toes and bilateral heels BID and PRN      Wounds:      Wound 03/06/25 Toe D4, fourth Anterior;Right (Active)   Wound Image   03/07/25 0928   Wound Description Pink;Dry;Intact 03/07/25 0928   Audrey-wound Assessment Dry;Intact 03/07/25 0928   Wound Length (cm) 0 cm 03/07/25 0928   Wound Width (cm) 0 cm 03/07/25 0928   Wound Depth (cm) 0 cm 03/07/25 0928   Wound Surface Area (cm^2) 0 cm^2 03/07/25 0928   Wound Volume (cm^3) 0 cm^3 03/07/25 0928   Calculated Wound Volume (cm^3) 0 cm^3 03/07/25 0928   Drainage Amount None 03/07/25 0928   Non-staged Wound Description Not applicable 03/07/25 0928   Treatments Cleansed 03/07/25 0928   Dressing Open to air 03/07/25 0928   Patient Tolerance Tolerated well 03/07/25 0928       Wound 03/06/25 Plantar Left (Active)   Wound Image   03/07/25 0929   Wound Description Pink;Yellow 03/07/25 0929   Audrey-wound Assessment Dry;Intact;Brown;Pink 03/07/25 0929   Wound Length (cm) 0.4 cm 03/07/25 0929   Wound Width (cm) 3.4 cm 03/07/25 0929   Wound Depth (cm) 0.1 cm 03/07/25 0929   Wound Surface Area (cm^2) 1.36 cm^2 03/07/25 0929   Wound Volume (cm^3) 0.136 cm^3 03/07/25 0929   Calculated Wound Volume (cm^3) 0.14 cm^3 03/07/25 0929   Drainage Amount None 03/07/25 0929   Non-staged Wound Description Partial thickness 03/07/25 0929   Treatments Cleansed;Site care 03/07/25 0929   Dressing Foam, Silicon (eg. Allevyn, etc) 03/07/25 0929   Wound packed? No 03/07/25 0929   Dressing Changed New 03/07/25 0929   Patient Tolerance Tolerated well 03/07/25 0929   Dressing Status Clean;Dry;Intact 03/07/25 0929         Patient seen and assessed with Joy Gracia, RN, BSN, CWON.  Zahira Torrez  RN, BSN

## 2025-03-07 NOTE — DISCHARGE INSTR - OTHER ORDERS
Skin Care Plan:  1-Left plantar foot: Cleanse with soap and water, pat dry. Apply silicone foam dressing to wound, change every other day. Peel back dressing to assess skin every shift.   2-Turn/reposition q2h or when medically stable for pressure re-distribution on skin.  3-Elevate heels to offload pressure.  4-Moisturize skin daily with skin nourishing cream  5-Ehob cushion in chair when out of bed.  6- Apply Hydraguard to bilateral sacro-buttocks, anterior right toes and bilateral heels BID and PRN

## 2025-03-07 NOTE — PHYSICAL THERAPY NOTE
Physical Therapy Evaluation     Patient's Name: Tobin Emmanuelbeatriz    Admitting Diagnosis  Quadriceps tendon rupture, right, subsequent encounter [S76.111D]    Problem List  Patient Active Problem List   Diagnosis    Hypertension    Mixed hyperlipidemia    Gastro-esophageal reflux disease without esophagitis    Peripheral neuropathy    Drusen (degenerative) of macula, bilateral    Myopia, bilateral    Pinguecula, bilateral    Regular astigmatism, bilateral    Presbyopia    Pure hypertriglyceridemia    JOVANY on CPAP    History of colon polyps    IFG (impaired fasting glucose)    Ulcer of left foot, with fat layer exposed (HCC)    Abdominal aortic ectasia (HCC)    Ambulatory dysfunction    Quadriceps muscle rupture, right, subsequent encounter    Urinary retention    Onychomycosis    Obesity (BMI 35.0-39.9 without comorbidity)    Rupture of right quadriceps tendon     Past Medical History  Past Medical History:   Diagnosis Date    Abdominal aortic ectasia (HCC) 11/13/2023    Next Abdominal Aortic U/S due 11/13/27    Closed fracture of right foot     As a child    Diverticulosis     Drusen (degenerative) of macula, bilateral 09/27/2021    Gastro-esophageal reflux disease without esophagitis 05/30/2019    GERD (gastroesophageal reflux disease) 2010    Hammer toe 15 years    History of colon polyps     Hypertension 09/27/2021    IFG (impaired fasting glucose) 10/01/2021    Internal hemorrhoids     Kidney stone 2007    Mixed hyperlipidemia 05/30/2019    Morbid obesity (HCC) 09/27/2021    Myopia, bilateral 09/27/2021    JOVANY on CPAP     Peripheral neuropathy 09/27/2021    Pinguecula, bilateral 09/27/2021    Presbyopia 10/11/2016    Pure hypertriglyceridemia 08/19/2019    Radial fracture 1975    Stress Fracture - Right Arm    Regular astigmatism, bilateral 09/27/2021    Tendonitis     Left shoulder     Past Surgical History  Past Surgical History:   Procedure Laterality Date    COLONOSCOPY      EGD  04/14/2022    Gastric  "Erosisons, Esophagitis    FIBULA FRACTURE SURGERY Right 2009    Hardware - Plate and screws in place    FRACTURE SURGERY  2005    KNEE SURGERY      VT SUTR QUADRICEPS/HAMSTRING MUSC RPT RCNSTJ Right 3/6/2025    Procedure: REPAIR TENDON QUADRICEPS- Right knee Revision quad tendon repair with allograft augmentation;  Surgeon: Fletcher Foster DO;  Location: [facility];  Service: Orthopedics    QUADRICEPS TENDON REPAIR Right 07/25/2024    Procedure: REPAIR TENDON QUADRICEPS- Right;  Surgeon: Fletcher Foster DO;  Location: [facility];  Service: Orthopedics    SKIN GRAFT  2021    Right foot     TONSILECTOMY AND ADNOIDECTOMY      TONSILLECTOMY        03/07/25 1236   PT Last Visit   PT Visit Date 03/07/25   Note Type   Note type Evaluation   Pain Assessment   Pain Assessment Tool 0-10   Pain Score 1  (2/10 post ambulation)   Pain Location/Orientation Orientation: Right;Orientation: Lower;Location: Leg   Pain Onset/Description Onset: Ongoing   Hospital Pain Intervention(s) Repositioned;Ambulation/increased activity   Restrictions/Precautions   Weight Bearing Precautions Per Order Yes   RLE Weight Bearing Per Order (S)  TTWB  (per ortho)   Braces or Orthoses Other (Comment)  (R LE casted; history of TROM brace prior to surgery)   Other Precautions Chair Alarm;Bed Alarm;Fall Risk;WBS;Pain   Home Living   Type of Home Mobile home   Home Layout One level;Able to live on main level with bedroom/bathroom;Performs ADLs on one level;Stairs to enter with rails  (4 CRISTAL)   Bathroom Shower/Tub   (pt has been going to \"comfort station\" (public bathroom) to sponge bathe)   Bathroom Toilet Standard   Bathroom Equipment Other (Comment)  (none per patient)   Bathroom Accessibility Accessible   Home Equipment Walker   Additional Comments Pt ambulates without an AD.   Prior Function   Level of [place] Independent with functional mobility;Independent with ADLs;Independent with IADLS   Lives With Alone   Receives Help From Other " "(Comment)  (limited support per patient)   IADLs Independent with driving;Independent with meal prep;Independent with medication management   Falls in the last 6 months 1 to 4  (1 fall)   Vocational Retired   General   Family/Caregiver Present No   Cognition   Overall Cognitive Status WFL   Arousal/Participation Alert   Orientation Level Oriented X4   Memory Within functional limits   Following Commands Follows all commands and directions without difficulty   Comments Pt agreeable to PT.   Subjective   Subjective \"I haven't stood yet.\"   RLE Assessment   RLE Assessment X   RLE Overall AROM   R Knee Flexion limited; casted in knee extension   Strength RLE   R Hip Flexion 3/5   R Ankle Dorsiflexion 3+/5   LLE Assessment   LLE Assessment X   Strength LLE   LLE Overall Strength 4/5   Light Touch   RLE Light Touch Impaired   RLE Light Touch Comments neuropathy   LLE Light Touch Impaired   LLE Light Touch Comments neuropathy   Bed Mobility   Supine to Sit 4  Minimal assistance   Additional items Assist x 1;HOB elevated;Bedrails;Increased time required;Verbal cues;LE management   Transfers   Sit to Stand 4  Minimal assistance   Additional items Assist x 2;Increased time required;Verbal cues   Stand to Sit 4  Minimal assistance   Additional items Assist x 2;Increased time required;Verbal cues;Armrests   Additional Comments Pt requires verbal cues to maintain TTWB on R LE.   Ambulation/Elevation   Gait pattern Step to;Short stride;Decreased R stance;Antalgic   Gait Assistance 4  Minimal assist   Additional items Assist x 1;Verbal cues   Assistive Device Rolling walker   Distance 10 feet x 2 trials; seated rest break between trials   Balance   Static Sitting Good   Dynamic Sitting Fair +   Static Standing Fair -   Dynamic Standing Poor +   Ambulatory Poor +   Endurance Deficit   Endurance Deficit Yes   Endurance Deficit Description decreased activity tolerance   Activity Tolerance   Activity Tolerance Patient tolerated " treatment well   Medical Staff Made Aware OT Karyna; Dr. Foster; ortho WESLEY Cross  (Co-evaluation performed with OT secondary to complex medical condition of patient and regression of functional status from baseline. PT/OT goals were addressed separately.)   Nurse Made Aware LA Leigh   Assessment   Prognosis Good   Problem List Decreased strength;Decreased range of motion;Decreased endurance;Impaired balance;Decreased mobility;Impaired sensation;Orthopedic restrictions;Pain   Assessment Pt is 69 year old male seen for PT evaluation s/p admit [facility] on 3/6/2025 with right quadriceps tendon rupture. Pt is POD#1 s/p Right knee revision quad tendon repair with allograft augmentation. PT consulted to assess pt's functional mobility and discharge needs. Order placed for PT evaluation and treatment. Comorbidities affecting pt's physical performance at time of assessment include hypertension, mixed hyperlipidemia, GERD, peripheral neuropathy, urinary retention, and ambulatory dysfunction. Prior to hospitalization, pt was independent with all functional mobility without an AD. Pt ambulates unrestricted distances on all terrain and elevations. Pt resides alone, in a one level house with four steps to enter. Personal factors affecting pt at time of initial evaluation include stairs to enter home, ambulating with an assistive device, inability to ambulate household distances, inability to ambulate community distances, inability to navigate level surfaces without external assistance, unable to perform dynamic tasks in the community, limited home support, positive fall history, difficulty performing ADLs, and inability to perform IADLs. Please find objective findings from PT assessment regarding body systems outlined above with impairments and limitations including weakness, decreased right knee ROM, impaired balance, decreased endurance, gait deviations, pain, decreased activity tolerance, decreased functional  mobility tolerance, altered sensation, fall risk, and orthopedic restrictions. The following objective measures were performed on initial evaluation Barthel Index: 50/100, Modified Michelle: 4 (moderate/severe disability), and AM-PAC 6-Clicks: 16/24. Pt's clinical presentation is currently unstable/unpredictable seen in pt's presentation of need for ongoing medical management/monitoring, pt is a fall risk, pt requires use of RW for safe ambulation, pt has orthopedic restrictions limiting functional mobility, and pt requires cues and assist for safety with functional mobility. Pt to benefit from continued PT treatment to address deficits as defined above and maximize pt's level of function and independence with mobility. From a PT standpoint, recommendation at time of discharge would be level 1, maximum resource intensity in order to facilitate return to prior level of function.   Barriers to Discharge Inaccessible home environment;Decreased caregiver support;Other (Comment)  (decline in functional mobility)   Goals   STG Expiration Date 03/17/25   Short Term Goal #1 In 10 days: Increase bilateral LE strength 1/2 grade to facilitate independent mobility, Perform all bed mobility tasks modified independent to decrease caregiver burden, Perform all transfers modified independent, while maintaining TTWB on R % of the time, to improve independence, Ambulate > 75 ft. with RW modified independent w/o LOB and while maintaining TTWB on R % of the time, Navigate 4 stair(s) modified independent with unilateral handrail, while maintaining TTWB on R % of the time to facilitate return to previous living environment, and Increase all balance 1/2 grade to decrease risk for falls   Plan   Treatment/Interventions LE strengthening/ROM;Functional transfer training;Elevations;Therapeutic exercise;Endurance training;Patient/family training;Bed mobility;Gait training;Spoke to nursing;Spoke to MD;Spoke to advanced  practitioner;OT   PT Frequency 4-6x/wk   Discharge Recommendation   Rehab Resource Intensity Level, PT I (Maximum Resource Intensity)   AM-PAC Basic Mobility Inpatient   Turning in Flat Bed Without Bedrails 3   Lying on Back to Sitting on Edge of Flat Bed Without Bedrails 3   Moving Bed to Chair 3   Standing Up From Chair Using Arms 3   Walk in Room 3   Climb 3-5 Stairs With Railing 1   Basic Mobility Inpatient Raw Score 16   Basic Mobility Standardized Score 38.32   [facility] Highest Level Of Mobility   -[facility] Goal 5: Stand one or more mins   -[facility] Achieved 6: Walk 10 steps or more   Modified Michelle Scale   Modified Gleason Scale 4   Barthel Index   Feeding 10   Bathing 0   Grooming Score 0   Dressing Score 5   Bladder Score 10   Bowels Score 10   Toilet Use Score 5   Transfers (Bed/Chair) Score 10   Mobility (Level Surface) Score 0   Stairs Score 0   Barthel Index Score 50     PT Evaluation Time: 9802-9829  Amirah Stephens, PT, DPT

## 2025-03-07 NOTE — PLAN OF CARE
Problem: PAIN - ADULT  Goal: Verbalizes/displays adequate comfort level or baseline comfort level  Description: Interventions:  - Encourage patient to monitor pain and request assistance  - Assess pain using appropriate pain scale  - Administer analgesics based on type and severity of pain and evaluate response  - Implement non-pharmacological measures as appropriate and evaluate response  - Consider cultural and social influences on pain and pain management  - Notify physician/advanced practitioner if interventions unsuccessful or patient reports new pain  Outcome: Progressing     Problem: INFECTION - ADULT  Goal: Absence or prevention of progression during hospitalization  Description: INTERVENTIONS:  - Assess and monitor for signs and symptoms of infection  - Monitor lab/diagnostic results  - Monitor all insertion sites, i.e. indwelling lines, tubes, and drains  - Monitor endotracheal if appropriate and nasal secretions for changes in amount and color  - [place] appropriate cooling/warming therapies per order  - Administer medications as ordered  - Instruct and encourage patient and family to use good hand hygiene technique  - Identify and instruct in appropriate isolation precautions for identified infection/condition  Outcome: Progressing  Goal: Absence of fever/infection during neutropenic period  Description: INTERVENTIONS:  - Monitor WBC    Outcome: Progressing     Problem: SAFETY ADULT  Goal: Patient will remain free of falls  Description: INTERVENTIONS:  - Educate patient/family on patient safety including physical limitations  - Instruct patient to call for assistance with activity   - Consult OT/PT to assist with strengthening/mobility   - Keep Call bell within reach  - Keep bed low and locked with side rails adjusted as appropriate  - Keep care items and personal belongings within reach  - Initiate and maintain comfort rounds  - Make Fall Risk Sign visible to staff  - Offer Toileting every 3 Hours,  in advance of need  - Initiate/Maintain alarm  - Obtain necessary fall risk management equipment:   - Apply yellow socks and bracelet for high fall risk patients  - Consider moving patient to room near nurses station  Outcome: Progressing  Goal: Maintain or return to baseline ADL function  Description: INTERVENTIONS:  -  Assess patient's ability to carry out ADLs; assess patient's baseline for ADL function and identify physical deficits which impact ability to perform ADLs (bathing, care of mouth/teeth, toileting, grooming, dressing, etc.)  - Assess/evaluate cause of self-care deficits   - Assess range of motion  - Assess patient's mobility; develop plan if impaired  - Assess patient's need for assistive devices and provide as appropriate  - Encourage maximum independence but intervene and supervise when necessary  - Involve family in performance of ADLs  - Assess for home care needs following discharge   - Consider OT consult to assist with ADL evaluation and planning for discharge  - Provide patient education as appropriate  Outcome: Progressing  Goal: Maintains/Returns to pre admission functional level  Description: INTERVENTIONS:  - Perform AM-PAC 6 Click Basic Mobility/ Daily Activity assessment daily.  - Set and communicate daily mobility goal to care team and patient/family/caregiver.   - Collaborate with rehabilitation services on mobility goals if consulted  - Perform Range of Motion 3 times a day.  - Reposition patient every 2 hours.  - Dangle patient 3 times a day  - Stand patient 3 times a day  - Ambulate patient 3 times a day  - Out of bed to chair 3 times a day   - Out of bed for meals 3 times a day  - Out of bed for toileting  - Record patient progress and toleration of activity level   Outcome: Progressing     Problem: DISCHARGE PLANNING  Goal: Discharge to home or other facility with appropriate resources  Description: INTERVENTIONS:  - Identify barriers to discharge w/patient and caregiver  -  Arrange for needed discharge resources and transportation as appropriate  - Identify discharge learning needs (meds, wound care, etc.)  - Arrange for interpretive services to assist at discharge as needed  - Refer to Case Management Department for coordinating discharge planning if the patient needs post-hospital services based on physician/advanced practitioner order or complex needs related to functional status, cognitive ability, or social support system  Outcome: Progressing     Problem: Knowledge Deficit  Goal: Patient/family/caregiver demonstrates understanding of disease process, treatment plan, medications, and discharge instructions  Description: Complete learning assessment and assess knowledge base.  Interventions:  - Provide teaching at level of understanding  - Provide teaching via preferred learning methods  Outcome: Progressing

## 2025-03-07 NOTE — PLAN OF CARE
Problem: PHYSICAL THERAPY ADULT  Goal: Performs mobility at highest level of function for planned discharge setting.  See evaluation for individualized goals.  Description: Treatment/Interventions: LE strengthening/ROM, Functional transfer training, Elevations, Therapeutic exercise, Endurance training, Patient/family training, Bed mobility, Gait training, Spoke to nursing, Spoke to MD, Spoke to advanced practitioner, OT          See flowsheet documentation for full assessment, interventions and recommendations.  Note: Prognosis: Good  Problem List: Decreased strength, Decreased range of motion, Decreased endurance, Impaired balance, Decreased mobility, Impaired sensation, Orthopedic restrictions, Pain  Assessment: Pt is 69 year old male seen for PT evaluation s/p admit to [facility] on 3/6/2025 with right quadriceps tendon rupture. Pt is POD#1 s/p Right knee revision quad tendon repair with allograft augmentation. PT consulted to assess pt's functional mobility and discharge needs. Order placed for PT evaluation and treatment. Comorbidities affecting pt's physical performance at time of assessment include hypertension, mixed hyperlipidemia, GERD, peripheral neuropathy, urinary retention, and ambulatory dysfunction. Prior to hospitalization, pt was independent with all functional mobility without an AD. Pt ambulates unrestricted distances on all terrain and elevations. Pt resides alone, in a one level house with four steps to enter. Personal factors affecting pt at time of initial evaluation include stairs to enter home, ambulating with an assistive device, inability to ambulate household distances, inability to ambulate community distances, inability to navigate level surfaces without external assistance, unable to perform dynamic tasks in the community, limited home support, positive fall history, difficulty performing ADLs, and inability to perform IADLs. Please find objective findings from PT assessment  regarding body systems outlined above with impairments and limitations including weakness, decreased right knee ROM, impaired balance, decreased endurance, gait deviations, pain, decreased activity tolerance, decreased functional mobility tolerance, altered sensation, fall risk, and orthopedic restrictions. The following objective measures were performed on initial evaluation Barthel Index: 50/100, Modified [place]: 4 (moderate/severe disability), and AM-PAC 6-Clicks: 16/24. Pt's clinical presentation is currently unstable/unpredictable seen in pt's presentation of need for ongoing medical management/monitoring, pt is a fall risk, pt requires use of RW for safe ambulation, pt has orthopedic restrictions limiting functional mobility, and pt requires cues and assist for safety with functional mobility. Pt to benefit from continued PT treatment to address deficits as defined above and maximize pt's level of function and independence with mobility. From a PT standpoint, recommendation at time of discharge would be level 1, maximum resource intensity in order to facilitate return to prior level of function.    Barriers to Discharge: Inaccessible home environment, Decreased caregiver support, Other (Comment) (decline in functional mobility)     Rehab Resource Intensity Level, PT: I (Maximum Resource Intensity)    See flowsheet documentation for full assessment.

## 2025-03-07 NOTE — PROGRESS NOTES
"Progress Note - Orthopedics   Name: Tobin Kerns 69 y.o. male I MRN: [id number]  Unit/Bed#: 2 E 275-01 I Date of Admission: 3/6/2025   Date of Service: 3/7/2025 I Hospital Day: 0     Assessment & Plan  Rupture of right quadriceps tendon  POD#1 s/p Right knee revision quad tendon repair with allograft augmentation  Toe touch weightbearing Right lower extremity with use of walker  Do not expose cast to moisture  Can elevate Right lower extremity, ankle above knee above hip  Oxycodone 5 and 10 for moderate and severe pain, respectively. Tylenol and can consider Toradol if needed  PT/OT- up and out of bed, gait training  CM consulted for likely rehab placement  Dispo: Discharge planning       Subjective   69 y.o.male POD#1 s/p Right knee revision quad tendon repair with allograft augmentation. No acute events, no new complaints. Pain well controlled. Denies fevers, chills, CP, SOB, N/V, numbness or tingling. Patient reports no issues with urination or bowel movements. Patient states he is doing okay and pain is well controlled. He has not yet ambulated with therapy at this time.     Objective :  Temp:  [97.5 °F (36.4 °C)-99.2 °F (37.3 °C)] 99.2 °F (37.3 °C)  HR:  [61-82] 80  BP: (115-153)/(66-86) 141/78  Resp:  [17-26] 18  SpO2:  [92 %-97 %] 95 %  O2 Device: Nasal cannula  Nasal Cannula O2 Flow Rate (L/min):  [2 L/min] 2 L/min    Physical Exam  Musculoskeletal:   Right lower extremity  Cast clean, dry and intact   Decreased sensation over dorsum and plantar aspect of foot secondary to previous history of neuropathy   DP pulse palpated       Lab Results: I have reviewed the following results:  No results for input(s): \"WBC\", \"HGB\", \"HCT\", \"PLT\", \"BANDSPCT\", \"BUN\", \"CREATININE\", \"PTT\", \"INR\", \"ESR\", \"CRP\" in the last 72 hours.  Blood Culture:    Lab Results   Component Value Date    BLOODCX No Growth After 5 Days. 01/18/2024    BLOODCX No Growth After 5 Days. 01/18/2024     Wound Culture: No results found for: " "\"WOUNDCULT\"      "

## 2025-03-07 NOTE — OCCUPATIONAL THERAPY NOTE
Occupational Therapy Evaluation     Patient Name: Tobin Kerns  Today's Date: 3/7/2025  Problem List  Active Problems:    Rupture of right quadriceps tendon    Past Medical History  Past Medical History:   Diagnosis Date    Abdominal aortic ectasia (HCC) 11/13/2023    Next Abdominal Aortic U/S due 11/13/27    Closed fracture of right foot     As a child    Diverticulosis     Drusen (degenerative) of macula, bilateral 09/27/2021    Gastro-esophageal reflux disease without esophagitis 05/30/2019    GERD (gastroesophageal reflux disease) 2010    Hammer toe 15 years    History of colon polyps     Hypertension 09/27/2021    IFG (impaired fasting glucose) 10/01/2021    Internal hemorrhoids     Kidney stone 2007    Mixed hyperlipidemia 05/30/2019    Morbid obesity (HCC) 09/27/2021    Myopia, bilateral 09/27/2021    JOVANY on CPAP     Peripheral neuropathy 09/27/2021    Pinguecula, bilateral 09/27/2021    Presbyopia 10/11/2016    Pure hypertriglyceridemia 08/19/2019    Radial fracture 1975    Stress Fracture - Right Arm    Regular astigmatism, bilateral 09/27/2021    Tendonitis     Left shoulder     Past Surgical History  Past Surgical History:   Procedure Laterality Date    COLONOSCOPY      EGD  04/14/2022    Gastric Erosisons, Esophagitis    FIBULA FRACTURE SURGERY Right 2009    Hardware - Plate and screws in place    FRACTURE SURGERY  2005    KNEE SURGERY      CT SUTR QUADRICEPS/HAMSTRING MUSC RPT [id number] Right 3/6/2025    Procedure: REPAIR TENDON QUADRICEPS- Right knee Revision quad tendon repair with allograft augmentation;  Surgeon: Fletcher Foster DO;  Location: [facility];  Service: Orthopedics    QUADRICEPS TENDON REPAIR Right 07/25/2024    Procedure: REPAIR TENDON QUADRICEPS- Right;  Surgeon: Fletcher Foster DO;  Location: [facility];  Service: Orthopedics    SKIN GRAFT  2021    Right foot     TONSILECTOMY AND ADNOIDECTOMY      TONSILLECTOMY               03/07/25 1237   Note Type   Note type Evaluation    Pain Assessment   Pain Assessment Tool 0-10   Pain Score 1   Pain Location/Orientation Orientation: Right  (shin)   Pain Onset/Description Onset: Ongoing   Hospital Pain Intervention(s) Repositioned;Ambulation/increased activity   Restrictions/Precautions   Weight Bearing Precautions Per Order Yes   RLE Weight Bearing Per Order (S)  TTWB   Braces or Orthoses   (Knee brace prior to surgery)   Other Precautions Bed Alarm;Chair Alarm;WBS;Fall Risk;Pain   Home Living   Type of Home Mobile home   Home Layout One level;Stairs to enter with rails  (4 CRISTAL with rail)   Bathroom Shower/Tub   (Has been going to the comfort station where he would sponge bathe)   Bathroom Toilet Standard   Bathroom Equipment   (None)   Bathroom Accessibility Accessible   Home Equipment Walker   Additional Comments No DME in the home   Prior Function   Level of [place] Independent with ADLs;Independent with functional mobility;Independent with IADLS   Lives With Alone   Receives Help From Family;Friend(s)  (Family and friends work)   IADLs Independent with driving;Independent with meal prep;Independent with medication management   Falls in the last 6 months 1 to 4  (1 fall shoveling snow)   Vocational Retired  ()   Lifestyle   Autonomy Pt was independent with ADLs/ MI with mobility and able to complete his own IADLs prior to admission.   Reciprocal Relationships Friends   Service to Others Retired   General   Family/Caregiver Present No   ADL   Where Assessed Other (Comment)  (Some ADL levels based on functional performance during OT evaluation.)   Eating Assistance 7  Independent   UB Bathing Assistance 5  Supervision/Setup   UB Bathing Deficit Setup;Supervision/safety;Increased time to complete   LB Bathing Assistance 2  Maximal Assistance   LB Bathing Deficit Increased time to complete;Supervision/safety;Verbal cueing   UB Dressing Assistance 5  Supervision/Setup   UB Dressing Deficit Setup;Supervision/safety   LB Dressing  Assistance 2  Maximal Assistance   LB Dressing Deficit Increased time to complete;Supervision/safety;Verbal cueing   Toileting Assistance  4  Minimal Assistance   Toileting Deficit Increased time to complete;Supervison/safety   Bed Mobility   Supine to Sit 4  Minimal assistance   Additional items Assist x 1;HOB elevated;Bedrails;Increased time required;Verbal cues   Transfers   Sit to Stand 4  Minimal assistance   Additional items Assist x 2;Increased time required;Verbal cues   Stand to Sit 4  Minimal assistance   Additional items Assist x 2;Increased time required;Verbal cues   Additional Comments Pt required verbal cues for TTWB during session.   Functional Mobility   Functional Mobility 4  Minimal assistance   Additional Comments Ax1 with RW   Additional items Rolling walker   Balance   Static Sitting Good   Dynamic Sitting Fair +   Static Standing Fair -   Dynamic Standing Poor +   Ambulatory Poor +   Activity Tolerance   Activity Tolerance Patient tolerated treatment well   Medical Staff Made Aware Pt seen as a co-eval with PT Tiffany due to the patient's co-morbidities, clinically unstable presentation, and present impairments which are a regression from the patient's baseline.   Nurse Made Aware [place] Friend   RUE Assessment   RUE Assessment WFL   LUE Assessment   LUE Assessment WFL   Hand Function   Gross Motor Coordination Functional   Fine Motor Coordination Functional   Sensation   Light Touch Severe deficits in the RLE;Severe deficits in the LLE  (BLE, little numbness in hands)   Vision-Basic Assessment   Current Vision Wears glasses for distance only  (PRN)   Cognition   Overall Cognitive Status WFL   Arousal/Participation Alert;Responsive;Cooperative   Attention Within functional limits   Orientation Level Oriented X4   Memory Within functional limits   Following Commands Follows all commands and directions without difficulty   Assessment   Limitation Decreased ADL status;Decreased Safe judgement during  ADL;Decreased self-care trans;Decreased high-level ADLs;Decreased endurance   Prognosis Good   Assessment Patient is a 69 y.o. male seen for OT evaluation s/p admit to [facility] on 3/6/2025 w/ s/p Right knee revision quad tendon repair with allograft augmentation . Commorbidities affecting patient's functional performance at time of assessment include:  has a past medical history of Abdominal aortic ectasia (HCC), Closed fracture of right foot, Diverticulosis, Drusen (degenerative) of macula, bilateral, Gastro-esophageal reflux disease without esophagitis, GERD (gastroesophageal reflux disease), Hammer toe, History of colon polyps, Hypertension, IFG (impaired fasting glucose), Internal hemorrhoids, Kidney stone, Mixed hyperlipidemia, Morbid obesity (HCC), Myopia, bilateral, JOVANY on CPAP, Peripheral neuropathy, Pinguecula, bilateral, Presbyopia, Pure hypertriglyceridemia, Radial fracture, Regular astigmatism, bilateral, and Tendonitis.  Orders placed for OT evaluation and treatment.  Performed at least two patient identifiers during session including name and wristband.  Prior to admission, patient was Independent with ADLs and Meal prep, Housekeeping, and Transportation. Personal factors affecting patient at time of initial evaluation include: limited caregiver support, steps to enter, difficulty performing ADLs, difficulty performing IADLs, environment, and health management. Upon evaluation, patient requires supervision and set up assist for UB ADLs, maximal assist for LB ADLs, transfers and functional ambulation in room and bathroom with minimum assistance x1 assist, with the use of Rolling Walker.  Patient is oriented x 4. Occupational performance is affected by the following deficits: dynamic sit/ stand balance deficit with poor standing tolerance time for self care and functional mobility, decreased activity tolerance, and orthopedic restrictions.  Patient to benefit from continued Occupational  Therapy treatment while in the hospital to address deficits as defined above and maximize level of functional independence with ADLs and functional mobility. Occupational Performance areas to address include: grooming , bathing/ shower, dressing, toilet hygiene, transfer to all surfaces, functional mobility, community mobility, and IADLs: meal prep/ clean up. From OT standpoint, recommendation at time of d/c would be   Level I (Maximum Resource Intensity)   Plan   Treatment Interventions ADL retraining;Functional transfer training;UE strengthening/ROM;Compensatory technique education;Energy conservation   Goal Expiration Date 03/21/25   OT Treatment Day 0   OT Frequency 3-5x/wk   Discharge Recommendation   Rehab Resource Intensity Level, OT I (Maximum Resource Intensity)   AM-PAC Daily Activity Inpatient   Lower Body Dressing 2   Bathing 2   Toileting 3   Upper Body Dressing 3   Grooming 3   Eating 4   Daily Activity Raw Score 17   Daily Activity Standardized Score (Calc for Raw Score >=11) 37.26   AM-PAC Applied Cognition Inpatient   Following a Speech/Presentation 4   Understanding Ordinary Conversation 4   Taking Medications 4   Remembering Where Things Are Placed or Put Away 4   Remembering List of 4-5 Errands 4   Taking Care of Complicated Tasks 4   Applied Cognition Raw Score 24   Applied Cognition Standardized Score 62.21   Barthel Index   Feeding 10   Bathing 0   Grooming Score 0   Dressing Score 5   Bladder Score 10   Bowels Score 10   Toilet Use Score 5   Transfers (Bed/Chair) Score 10   Mobility (Level Surface) Score 0   Stairs Score 0   Barthel Index Score 50     Occupational Therapy Goals: In 7- 10 days:  1.  Patient will complete UB dressing tasks while seated with MI.   2. Patient will complete LB ADLs at min A level,  with the use of AE as indicated.  3. Patient will increase OOB/ sitting tolerance to 2-4 hours per day for increased participation in self care and leisure tasks with no s/s of  exertion  4. Patient will increase standing tolerance time to  5  minutes with  Unilateral UE support to complete sink level ADLs @ supervision level.   5.  Patient/ Family  will demonstrate competency with UE Home Exercise Program.

## 2025-03-07 NOTE — ASSESSMENT & PLAN NOTE
POD#1 s/p Right knee revision quad tendon repair with allograft augmentation  Toe touch weightbearing Right lower extremity with use of walker  Do not expose cast to moisture  Can elevate Right lower extremity, ankle above knee above hip  Oxycodone 5 and 10 for moderate and severe pain, respectively. Tylenol and can consider Toradol if needed  PT/OT- up and out of bed, gait training  CM consulted for likely rehab placement  Dispo: Discharge planning

## 2025-03-07 NOTE — PLAN OF CARE
Problem: OCCUPATIONAL THERAPY ADULT  Goal: Performs self-care activities at highest level of function for planned discharge setting.  See evaluation for individualized goals.  Description: Treatment Interventions: ADL retraining, Functional transfer training, UE strengthening/ROM, Compensatory technique education, Energy conservation          See flowsheet documentation for full assessment, interventions and recommendations.   Note: Limitation: Decreased ADL status, Decreased Safe judgement during ADL, Decreased self-care trans, Decreased high-level ADLs, Decreased endurance  Prognosis: Good  Assessment: Patient is a 69 y.o. male seen for OT evaluation s/p admit to [facility] on 3/6/2025 w/ s/p Right knee revision quad tendon repair with allograft augmentation . Commorbidities affecting patient's functional performance at time of assessment include:  has a past medical history of Abdominal aortic ectasia (HCC), Closed fracture of right foot, Diverticulosis, Drusen (degenerative) of macula, bilateral, Gastro-esophageal reflux disease without esophagitis, GERD (gastroesophageal reflux disease), Hammer toe, History of colon polyps, Hypertension, IFG (impaired fasting glucose), Internal hemorrhoids, Kidney stone, Mixed hyperlipidemia, Morbid obesity (HCC), Myopia, bilateral, JOVANY on CPAP, Peripheral neuropathy, Pinguecula, bilateral, Presbyopia, Pure hypertriglyceridemia, Radial fracture, Regular astigmatism, bilateral, and Tendonitis.  Orders placed for OT evaluation and treatment.  Performed at least two patient identifiers during session including name and wristband.  Prior to admission, patient was Independent with ADLs and Meal prep, Housekeeping, and Transportation. Personal factors affecting patient at time of initial evaluation include: limited caregiver support, steps to enter, difficulty performing ADLs, difficulty performing IADLs, environment, and health management. Upon evaluation, patient  requires supervision and set up assist for UB ADLs, maximal assist for LB ADLs, transfers and functional ambulation in room and bathroom with minimum assistance x1 assist, with the use of Rolling Walker.  Patient is oriented x 4. Occupational performance is affected by the following deficits: dynamic sit/ stand balance deficit with poor standing tolerance time for self care and functional mobility, decreased activity tolerance, and orthopedic restrictions.  Patient to benefit from continued Occupational Therapy treatment while in the hospital to address deficits as defined above and maximize level of functional independence with ADLs and functional mobility. Occupational Performance areas to address include: grooming , bathing/ shower, dressing, toilet hygiene, transfer to all surfaces, functional mobility, community mobility, and IADLs: meal prep/ clean up. From OT standpoint, recommendation at time of d/c would be   Level I (Maximum Resource Intensity)     Rehab Resource Intensity Level, OT: I (Maximum Resource Intensity)

## 2025-03-07 NOTE — PLAN OF CARE
Problem: PAIN - ADULT  Goal: Verbalizes/displays adequate comfort level or baseline comfort level  Description: Interventions:  - Encourage patient to monitor pain and request assistance  - Assess pain using appropriate pain scale  - Administer analgesics based on type and severity of pain and evaluate response  - Implement non-pharmacological measures as appropriate and evaluate response  - Consider cultural and social influences on pain and pain management  - Notify physician/advanced practitioner if interventions unsuccessful or patient reports new pain  Outcome: Progressing     Problem: INFECTION - ADULT  Goal: Absence or prevention of progression during hospitalization  Description: INTERVENTIONS:  - Assess and monitor for signs and symptoms of infection  - Monitor lab/diagnostic results  - Monitor all insertion sites, i.e. indwelling lines, tubes, and drains  - Monitor endotracheal if appropriate and nasal secretions for changes in amount and color  - [place] appropriate cooling/warming therapies per order  - Administer medications as ordered  - Instruct and encourage patient and family to use good hand hygiene technique  - Identify and instruct in appropriate isolation precautions for identified infection/condition  Outcome: Progressing  Goal: Absence of fever/infection during neutropenic period  Description: INTERVENTIONS:  - Monitor WBC    Outcome: Progressing     Problem: SAFETY ADULT  Goal: Maintain or return to baseline ADL function  Description: INTERVENTIONS:  -  Assess patient's ability to carry out ADLs; assess patient's baseline for ADL function and identify physical deficits which impact ability to perform ADLs (bathing, care of mouth/teeth, toileting, grooming, dressing, etc.)  - Assess/evaluate cause of self-care deficits   - Assess range of motion  - Assess patient's mobility; develop plan if impaired  - Assess patient's need for assistive devices and provide as appropriate  - Encourage  maximum independence but intervene and supervise when necessary  - Involve family in performance of ADLs  - Assess for home care needs following discharge   - Consider OT consult to assist with ADL evaluation and planning for discharge  - Provide patient education as appropriate  Outcome: Progressing     Problem: DISCHARGE PLANNING  Goal: Discharge to home or other facility with appropriate resources  Description: INTERVENTIONS:  - Identify barriers to discharge w/patient and caregiver  - Arrange for needed discharge resources and transportation as appropriate  - Identify discharge learning needs (meds, wound care, etc.)  - Arrange for interpretive services to assist at discharge as needed  - Refer to Case Management Department for coordinating discharge planning if the patient needs post-hospital services based on physician/advanced practitioner order or complex needs related to functional status, cognitive ability, or social support system  Outcome: Progressing

## 2025-03-08 LAB
GLUCOSE SERPL-MCNC: 105 MG/DL (ref 65–140)

## 2025-03-08 PROCEDURE — 99024 POSTOP FOLLOW-UP VISIT: CPT | Performed by: PHYSICIAN ASSISTANT

## 2025-03-08 PROCEDURE — 97530 THERAPEUTIC ACTIVITIES: CPT

## 2025-03-08 PROCEDURE — 97116 GAIT TRAINING THERAPY: CPT

## 2025-03-08 PROCEDURE — 82948 REAGENT STRIP/BLOOD GLUCOSE: CPT

## 2025-03-08 RX ADMIN — PRAVASTATIN SODIUM 40 MG: 40 TABLET ORAL at 17:28

## 2025-03-08 RX ADMIN — TAMSULOSIN HYDROCHLORIDE 0.4 MG: 0.4 CAPSULE ORAL at 17:28

## 2025-03-08 RX ADMIN — ACETAMINOPHEN 650 MG: 325 TABLET, FILM COATED ORAL at 17:28

## 2025-03-08 RX ADMIN — FAMOTIDINE 20 MG: 20 TABLET, FILM COATED ORAL at 08:38

## 2025-03-08 RX ADMIN — LOSARTAN POTASSIUM 50 MG: 50 TABLET, FILM COATED ORAL at 08:38

## 2025-03-08 RX ADMIN — Medication 5000 UNITS: at 08:38

## 2025-03-08 RX ADMIN — METFORMIN ER 500 MG 1500 MG: 500 TABLET ORAL at 17:39

## 2025-03-08 RX ADMIN — ENOXAPARIN SODIUM 40 MG: 40 INJECTION SUBCUTANEOUS at 08:38

## 2025-03-08 RX ADMIN — ACETAMINOPHEN 650 MG: 325 TABLET, FILM COATED ORAL at 05:30

## 2025-03-08 RX ADMIN — OXYCODONE HYDROCHLORIDE AND ACETAMINOPHEN 500 MG: 500 TABLET ORAL at 08:38

## 2025-03-08 NOTE — ASSESSMENT & PLAN NOTE
POD#2 s/p Right knee revision quad tendon repair with allograft augmentation, cast application  Toe touch weightbearing Right lower extremity with use of walker  Do not expose cast to moisture  Can elevate Right lower extremity, ankle above knee above hip  Oxycodone 5 and 10 for moderate and severe pain, respectively. Tylenol and can consider Toradol if needed  PT/OT- up and out of bed, gait training  CM consulted for likely rehab placement  Dispo: Discharge planning

## 2025-03-08 NOTE — PLAN OF CARE
Problem: PAIN - ADULT  Goal: Verbalizes/displays adequate comfort level or baseline comfort level  Description: Interventions:  - Encourage patient to monitor pain and request assistance  - Assess pain using appropriate pain scale  - Administer analgesics based on type and severity of pain and evaluate response  - Implement non-pharmacological measures as appropriate and evaluate response  - Consider cultural and social influences on pain and pain management  - Notify physician/advanced practitioner if interventions unsuccessful or patient reports new pain  Outcome: Progressing     Problem: INFECTION - ADULT  Goal: Absence or prevention of progression during hospitalization  Description: INTERVENTIONS:  - Assess and monitor for signs and symptoms of infection  - Monitor lab/diagnostic results  - Monitor all insertion sites, i.e. indwelling lines, tubes, and drains  - Monitor endotracheal if appropriate and nasal secretions for changes in amount and color  - [place] appropriate cooling/warming therapies per order  - Administer medications as ordered  - Instruct and encourage patient and family to use good hand hygiene technique  - Identify and instruct in appropriate isolation precautions for identified infection/condition  Outcome: Progressing  Goal: Absence of fever/infection during neutropenic period  Description: INTERVENTIONS:  - Monitor WBC    Outcome: Progressing     Problem: SAFETY ADULT  Goal: Patient will remain free of falls  Description: INTERVENTIONS:  - Educate patient/family on patient safety including physical limitations  - Instruct patient to call for assistance with activity   - Consult OT/PT to assist with strengthening/mobility   - Keep Call bell within reach  - Keep bed low and locked with side rails adjusted as appropriate  - Keep care items and personal belongings within reach  - Initiate and maintain comfort rounds  - Make Fall Risk Sign visible to staff  - Offer Toileting every 3 Hours,  in advance of need  - Initiate/Maintain alarm  - Obtain necessary fall risk management equipment  - Apply yellow socks and bracelet for high fall risk patients  - Consider moving patient to room near nurses station  Outcome: Progressing  Goal: Maintain or return to baseline ADL function  Description: INTERVENTIONS:  -  Assess patient's ability to carry out ADLs; assess patient's baseline for ADL function and identify physical deficits which impact ability to perform ADLs (bathing, care of mouth/teeth, toileting, grooming, dressing, etc.)  - Assess/evaluate cause of self-care deficits   - Assess range of motion  - Assess patient's mobility; develop plan if impaired  - Assess patient's need for assistive devices and provide as appropriate  - Encourage maximum independence but intervene and supervise when necessary  - Involve family in performance of ADLs  - Assess for home care needs following discharge   - Consider OT consult to assist with ADL evaluation and planning for discharge  - Provide patient education as appropriate  Outcome: Progressing  Goal: Maintains/Returns to pre admission functional level  Description: INTERVENTIONS:  - Perform AM-PAC 6 Click Basic Mobility/ Daily Activity assessment daily.  - Set and communicate daily mobility goal to care team and patient/family/caregiver.   - Collaborate with rehabilitation services on mobility goals if consulted  - Perform Range of Motion 3 times a day.  - Reposition patient every 2 hours.  - Dangle patient 3 times a day  - Stand patient 3 times a day  - Ambulate patient 3 times a day  - Out of bed to chair 3 times a day   - Out of bed for meals 3 times a day  - Out of bed for toileting  - Record patient progress and toleration of activity level   Outcome: Progressing     Problem: DISCHARGE PLANNING  Goal: Discharge to home or other facility with appropriate resources  Description: INTERVENTIONS:  - Identify barriers to discharge w/patient and caregiver  - Arrange  for needed discharge resources and transportation as appropriate  - Identify discharge learning needs (meds, wound care, etc.)  - Arrange for interpretive services to assist at discharge as needed  - Refer to Case Management Department for coordinating discharge planning if the patient needs post-hospital services based on physician/advanced practitioner order or complex needs related to functional status, cognitive ability, or social support system  Outcome: Progressing     Problem: Knowledge Deficit  Goal: Patient/family/caregiver demonstrates understanding of disease process, treatment plan, medications, and discharge instructions  Description: Complete learning assessment and assess knowledge base.  Interventions:  - Provide teaching at level of understanding  - Provide teaching via preferred learning methods  Outcome: Progressing

## 2025-03-08 NOTE — PLAN OF CARE
Problem: PAIN - ADULT  Goal: Verbalizes/displays adequate comfort level or baseline comfort level  Description: Interventions:  - Encourage patient to monitor pain and request assistance  - Assess pain using appropriate pain scale  - Administer analgesics based on type and severity of pain and evaluate response  - Implement non-pharmacological measures as appropriate and evaluate response  - Consider cultural and social influences on pain and pain management  - Notify physician/advanced practitioner if interventions unsuccessful or patient reports new pain  Outcome: Progressing     Problem: INFECTION - ADULT  Goal: Absence or prevention of progression during hospitalization  Description: INTERVENTIONS:  - Assess and monitor for signs and symptoms of infection  - Monitor lab/diagnostic results  - Monitor all insertion sites, i.e. indwelling lines, tubes, and drains  - Monitor endotracheal if appropriate and nasal secretions for changes in amount and color  - [place] appropriate cooling/warming therapies per order  - Administer medications as ordered  - Instruct and encourage patient and family to use good hand hygiene technique  - Identify and instruct in appropriate isolation precautions for identified infection/condition  Outcome: Progressing  Goal: Absence of fever/infection during neutropenic period  Description: INTERVENTIONS:  - Monitor WBC    Outcome: Progressing     Problem: SAFETY ADULT  Goal: Maintain or return to baseline ADL function  Description: INTERVENTIONS:  -  Assess patient's ability to carry out ADLs; assess patient's baseline for ADL function and identify physical deficits which impact ability to perform ADLs (bathing, care of mouth/teeth, toileting, grooming, dressing, etc.)  - Assess/evaluate cause of self-care deficits   - Assess range of motion  - Assess patient's mobility; develop plan if impaired  - Assess patient's need for assistive devices and provide as appropriate  - Encourage  maximum independence but intervene and supervise when necessary  - Involve family in performance of ADLs  - Assess for home care needs following discharge   - Consider OT consult to assist with ADL evaluation and planning for discharge  - Provide patient education as appropriate  Outcome: Progressing     Problem: Prexisting or High Potential for Compromised Skin Integrity  Goal: Skin integrity is maintained or improved  Description: INTERVENTIONS:  - Identify patients at risk for skin breakdown  - Assess and monitor skin integrity  - Assess and monitor nutrition and hydration status  - Monitor labs   - Assess for incontinence   - Turn and reposition patient  - Assist with mobility/ambulation  - Relieve pressure over bony prominences  - Avoid friction and shearing  - Provide appropriate hygiene as needed including keeping skin clean and dry  - Evaluate need for skin moisturizer/barrier cream  - Collaborate with interdisciplinary team   - Patient/family teaching  - Consider wound care consult   Outcome: Progressing

## 2025-03-08 NOTE — PROGRESS NOTES
"Progress Note - Orthopedics   Name: Tobin Kerns 69 y.o. male I MRN: [id number]  Unit/Bed#: 2 E 275-01 I Date of Admission: 3/6/2025   Date of Service: 3/8/2025 I Hospital Day: 1     Assessment & Plan  Rupture of right quadriceps tendon  POD#2 s/p Right knee revision quad tendon repair with allograft augmentation, cast application  Toe touch weightbearing Right lower extremity with use of walker  Do not expose cast to moisture  Can elevate Right lower extremity, ankle above knee above hip  Oxycodone 5 and 10 for moderate and severe pain, respectively. Tylenol and can consider Toradol if needed  PT/OT- up and out of bed, gait training  CM consulted for likely rehab placement  Dispo: Discharge planning       Subjective   69 y.o.male POD#2 s/p Right knee revision quad tendon repair with allograft augmentation, cast application. No acute events, no new complaints. Pain well controlled. Denies fevers, chills, CP, SOB, N/V, numbness or tingling. Patient reports no issues with urination or bowel movements. Patient states he is doing well. He did get out of bed with therapy yesterday and was \"hopping\". He had minimal pain and no complaints.    Objective :  Temp:  [97.5 °F (36.4 °C)-98.1 °F (36.7 °C)] 98 °F (36.7 °C)  HR:  [65-81] 71  BP: (116-141)/(63-81) 116/63  Resp:  [18-20] 20  SpO2:  [90 %-94 %] 93 %  O2 Device: None (Room air)    Physical Exam  Musculoskeletal:   Right lower extremity  Long leg cast intact without evidence of integrity disruption  Motor intact EHL, FHL, ankle dorsiflexion and plantar flexion  Sensation decreased dorsum and plantar aspect of foot secondary to neuropathy  DP pulse palpated       Lab Results: I have reviewed the following results:  Recent Labs     03/07/25  1311   WBC 13.20*   HGB 13.7   HCT 39.7      BUN 16   CREATININE 1.00     Blood Culture:    Lab Results   Component Value Date    BLOODCX No Growth After 5 Days. 01/18/2024    BLOODCX No Growth After 5 Days. 01/18/2024 " "    Wound Culture: No results found for: \"WOUNDCULT\"        "

## 2025-03-08 NOTE — PHYSICAL THERAPY NOTE
Physical Therapy Evaluation     Patient's Name: Tobin Emmanuelbeatriz    Admitting Diagnosis  Quadriceps tendon rupture, right, subsequent encounter [S76.111D]    Problem List  Patient Active Problem List   Diagnosis    Hypertension    Mixed hyperlipidemia    Gastro-esophageal reflux disease without esophagitis    Peripheral neuropathy    Drusen (degenerative) of macula, bilateral    Myopia, bilateral    Pinguecula, bilateral    Regular astigmatism, bilateral    Presbyopia    Pure hypertriglyceridemia    JOVANY on CPAP    History of colon polyps    IFG (impaired fasting glucose)    Ulcer of left foot, with fat layer exposed (HCC)    Abdominal aortic ectasia (HCC)    Ambulatory dysfunction    Quadriceps muscle rupture, right, subsequent encounter    Urinary retention    Onychomycosis    Obesity (BMI 35.0-39.9 without comorbidity)    Rupture of right quadriceps tendon       Past Medical History  Past Medical History:   Diagnosis Date    Abdominal aortic ectasia (HCC) 11/13/2023    Next Abdominal Aortic U/S due 11/13/27    Closed fracture of right foot     As a child    Diverticulosis     Drusen (degenerative) of macula, bilateral 09/27/2021    Gastro-esophageal reflux disease without esophagitis 05/30/2019    GERD (gastroesophageal reflux disease) 2010    Hammer toe 15 years    History of colon polyps     Hypertension 09/27/2021    IFG (impaired fasting glucose) 10/01/2021    Internal hemorrhoids     Kidney stone 2007    Mixed hyperlipidemia 05/30/2019    Morbid obesity (HCC) 09/27/2021    Myopia, bilateral 09/27/2021    JOVANY on CPAP     Peripheral neuropathy 09/27/2021    Pinguecula, bilateral 09/27/2021    Presbyopia 10/11/2016    Pure hypertriglyceridemia 08/19/2019    Radial fracture 1975    Stress Fracture - Right Arm    Regular astigmatism, bilateral 09/27/2021    Tendonitis     Left shoulder       Past Surgical History  Past Surgical History:   Procedure Laterality Date    COLONOSCOPY      EGD  04/14/2022    Gastric  "Erosisons, Esophagitis    FIBULA FRACTURE SURGERY Right 2009    Hardware - Plate and screws in place    FRACTURE SURGERY  2005    KNEE SURGERY      [facility] QUADRICE[facility]/HAMSTRING [facility] RPT [id number] Right 3/6/2025    Procedure: REPAIR TENDON QUADRICEPS- Right knee Revision quad tendon repair with allograft augmentation;  Surgeon: Fletcher Foster DO;  Location: [facility];  Service: Orthopedics    QUADRICEPS TENDON REPAIR Right 07/25/2024    Procedure: REPAIR TENDON QUADRICEPS- Right;  Surgeon: Fletcher Foster DO;  Location: [facility];  Service: Orthopedics    SKIN GRAFT  2021    Right foot     TONSILECTOMY AND ADNOIDECTOMY      TONSILLECTOMY          03/08/25 1507   PT Last Visit   PT Visit Date 03/08/25   Note Type   Note Type Treatment   Pain Assessment   Pain Assessment Tool 0-10   Pain Score 4   Pain Location/Orientation Orientation: Right;Location: Leg   Restrictions/Precautions   Weight Bearing Precautions Per Order Yes   RLE Weight Bearing Per Order (S)  TTWB   Braces or Orthoses   (R LE casted; history of TROM brace prior to surgery  by Amirah Stephens, PT a)   Other Precautions Chair Alarm;Bed Alarm;Fall Risk;Pain;WBS   General   Chart Reviewed Yes   Family/Caregiver Present No   Cognition   Overall Cognitive Status WFL   Arousal/Participation Alert;Responsive   Attention Within functional limits   Orientation Level Oriented X4   Memory Within functional limits   Following Commands Follows all commands and directions without difficulty   Comments Pt. agreeable to PT tx   Subjective   Subjective \"I want to walk 25'\"   Bed Mobility   Supine to Sit 5  Supervision   Additional items Assist x 1;Increased time required;Verbal cues;Bedrails;HOB elevated   Sit to Supine 4  Minimal assistance   Additional items Assist x 1;Increased time required;LE management;Verbal cues;HOB elevated;Bedrails   Transfers   Sit to Stand 4  Minimal assistance   Additional items Assist x 1;Increased time required;Verbal cues   Stand to " Sit 4  Minimal assistance   Additional items Assist x 1;Increased time required;Verbal cues   Additional Comments Pt requires verbal cues to maintain TTWB on RLE   Ambulation/Elevation   Gait pattern Step to;Short stride;Decreased R stance;Antalgic   Gait Assistance 4  Minimal assist   Additional items Assist x 1   Assistive Device Rolling walker   Distance 50'   Stair Management Assistance Not tested   Balance   Static Sitting Good   Dynamic Sitting Fair +   Static Standing Fair -   Dynamic Standing Poor +   Ambulatory Fair -   Endurance Deficit   Endurance Deficit Yes   Endurance Deficit Description decreased activity tolerance   Activity Tolerance   Activity Tolerance Patient tolerated treatment well   Nurse Made Aware RN ok to see   Assessment   Prognosis Good   Problem List Decreased strength;Decreased range of motion;Decreased endurance;Impaired balance;Decreased mobility;Impaired sensation;Orthopedic restrictions;Pain   Assessment Pt seen for PT treatment session this date with interventions consisting of gait training w/ emphasis on improving pt's ability to ambulate level surfaces x 50' while maintaining TTWB with min A provided by therapist with RW and therapeutic activity consisting of training: bed mobility, supine<>sit transfers, sit<>stand transfers, and Static stand x 2' duration . Pt agreeable to PT treatment session upon arrival, pt found supine in bed w/ HOB elevated, in no apparent distress. In comparison to previous session, pt with improvements in ambulation distance, activity tolerance; pt. Concerned primarily with self care, ADL/IADL and house is inaccessible at this time . Post session: pt returned BTB, all needs in reach, and RN notified of session findings/recommendations. Continue to recommend Maximum Resource Intensity at time of d/c in order to maximize pt's functional independence and safety w/ mobility. Pt continues to be functioning below baseline level, and remains limited 2* factors  listed above and including strength, balance, mobility deficits, reduced activity tolerance, pain. PT will continue to see pt during current hospitalization in order to address the deficits listed above and provide interventions consistent w/ POC in effort to achieve STGs.   Barriers to Discharge Decreased caregiver support;Inaccessible home environment  (WB status, decline in functional mobiltiy)   Goals   Patient Goals to go to rehab   STG Expiration Date 03/17/25   Short Term Goal #1 In 10 days: Increase bilateral LE strength 1/2 grade to facilitate independent mobility, Perform all bed mobility tasks modified independent to decrease caregiver burden, Perform all transfers modified independent, while maintaining TTWB on R % of the time, to improve independence, Ambulate > 75 ft. with RW modified independent w/o LOB and while maintaining TTWB on R % of the time, Navigate 4 stair(s) modified independent with unilateral handrail, while maintaining TTWB on R % of the time to facilitate return to previous living environment, and Increase all balance 1/2 grade to decrease risk for falls   Plan   Treatment/Interventions LE strengthening/ROM;Functional transfer training;Elevations;Therapeutic exercise;Endurance training;Patient/family training;Bed mobility;Gait training   Progress Progressing toward goals   PT Frequency 4-6x/wk   Discharge Recommendation   Rehab Resource Intensity Level, PT I (Maximum Resource Intensity)   AM-PAC Basic Mobility Inpatient   Turning in Flat Bed Without Bedrails 3   Lying on Back to Sitting on Edge of Flat Bed Without Bedrails 3   Moving Bed to Chair 3   Standing Up From Chair Using Arms 3   Walk in Room 3   Climb 3-5 Stairs With Railing 1   Basic Mobility Inpatient Raw Score 16   Basic Mobility Standardized Score 38.32   [facility] Highest Level Of Mobility   -HLM Goal 5: Stand one or more mins   J[facility]-HLM Achieved 7: Walk 25 feet or more           Katlin Cannon, PT

## 2025-03-08 NOTE — CASE MANAGEMENT
Case Management Assessment & Discharge Planning Note    Patient name Tobin Kerns  Location 2 EAST 275/2 E 275-01 MRN [id number]  : 1955 Date 3/7/2025       Current Admission Date: 3/6/2025  Current Admission Diagnosis:Rupture of right quadriceps tendon  Patient Active Problem List    Diagnosis Date Noted Date Diagnosed    Rupture of right quadriceps tendon 2025     Obesity (BMI 35.0-39.9 without comorbidity) 2025     Onychomycosis 2024     Urinary retention 2024     Quadriceps muscle rupture, right, subsequent encounter 2024     Ambulatory dysfunction 2024     Abdominal aortic ectasia (HCC) 2023     Ulcer of left foot, with fat layer exposed (HCC) 2022     IFG (impaired fasting glucose) 10/01/2021     Hypertension 2021     Peripheral neuropathy 2021     Drusen (degenerative) of macula, bilateral 2021     Myopia, bilateral 2021     Pinguecula, bilateral 2021     Regular astigmatism, bilateral 2021     JOVANY on CPAP      History of colon polyps      Pure hypertriglyceridemia 2019     Mixed hyperlipidemia 2019     Gastro-esophageal reflux disease without esophagitis 2019     Presbyopia 10/11/2016       LOS (days): 0  Geometric Mean LOS (GMLOS) (days): 2.3  Days to GMLOS:2.1     OBJECTIVE:    Risk of Unplanned Readmission Score: 6.14         Current admission status: Inpatient       Preferred Pharmacy:   [id number] - [place] - [address]  [address]  [place] [address]  Phone: [phone] Fax: [phone]    [id number] - [place] - [address]  [address]  [place] [address]  Phone: [phone] Fax: [phone]    Primary Care Provider: Ann Carmona DO    Primary Insurance: WORKERS COMPENSATION  Secondary Insurance: MEDICARE    ASSESSMENT:  Active Health Care Proxies       Pinky Amanda Health Care Representative - Sister   Primary Phone:  [phone] (Mobile)  Home Phone: [phone]                           Readmission Root Cause  30 Day Readmission: No    Patient Information  Admitted from:: Home  Mental Status: Alert  During Assessment patient was accompanied by: Not accompanied during assessment  Assessment information provided by:: Patient  Primary Caregiver: Self  Support Systems: Family members  [facility] Residence:  [facility])  What city do you live in?: [place]  Home entry access options. Select all that apply.: Stairs  Number of steps to enter home.: 4  Type of Current Residence: Trailer Home  Living Arrangements: Lives Alone  Is patient a ?: No    Activities of Daily Living Prior to Admission  Functional Status: Independent  Completes ADLs independently?: Yes  Ambulates independently?: Yes  Does patient use assisted devices?: Yes  Assisted Devices (DME) used: CPAP, Walker  Does patient currently own DME?: Yes  What DME does the patient currently own?: Walker, CPAP  Does patient have a history of Outpatient Therapy (PT/OT)?: Yes  Does the patient have a history of Short-Term Rehab?: Yes ([facility] [place] 7/2024)  Does patient currently have HHC?: No         Patient Information Continued  Income Source: Pension/longterm  Does patient have prescription coverage?: Yes  Does patient receive dialysis treatments?: No  Does patient have a history of substance abuse?: No  Does patient have a history of Mental Health Diagnosis?: No         Means of Transportation  Means of Transport to Appts:: Drives Self      Social Determinants of Health (SDOH)      Flowsheet Row Most Recent Value   Housing Stability    In the last 12 months, was there a time when you were not able to pay the mortgage or rent on time? N   In the past 12 months, how many times have you moved where you were living? 0   At any time in the past 12 months, were you homeless or living in a shelter (including now)? N   Transportation Needs    In the past 12 months, has lack  of transportation kept you from medical appointments or from getting medications? no   In the past 12 months, has lack of transportation kept you from meetings, work, or from getting things needed for daily living? No   Food Insecurity    Within the past 12 months, you worried that your food would run out before you got the money to buy more. Never true   Within the past 12 months, the food you bought just didn't last and you didn't have money to get more. Never true   Utilities    In the past 12 months has the electric, gas, oil, or water company threatened to shut off services in your home? No            DISCHARGE DETAILS:    Discharge planning discussed with:: Patient  Freedom of Choice: Yes  Comments - Freedom of Choice: CM introduced self and role to patient.  The patient staes he will need to go to an in-patient rehab upon d/c as he lives alone- presently TTWB.  Therapy is recommending Level I- maximum resource level.   His primary residence, mailing address is in [place] but he is staying at his [facility] in the [facility] --  [address] [address]. in 11/2024 he had a STR stay at [facility] and he wants to do Rehab local to this area again. His first preference would be [facility]. He informed CM this is a Workmans Comp claim.  CM alerted Financial Services, who added that info to his record.  CM made blanket referrals for ARU and STR.  Ortho service was updated to status.  CM contacted family/caregiver?: No- see comments (Patient-declined)  Were Treatment Team discharge recommendations reviewed with patient/caregiver?: Yes  Did patient/caregiver verbalize understanding of patient care needs?: Yes  Were patient/caregiver advised of the risks associated with not following Treatment Team discharge recommendations?: Yes    Contacts  Patient Contacts: Pinky Amanda  Sister  [phone]  Relationship to Patient:: Family  Reason/Outcome: Emergency Contact    Requested Home Health Care         Is  the patient interested in HHC at discharge?: No    DME Referral Provided  Referral made for DME?: No    Other Referral/Resources/Interventions Provided:  Interventions: Short Term Rehab, Acute Rehab  Referral Comments: [facility] referrals are in for both ARU and STR- please review with patient when choice is avaiable.    Would you like to participate in our [facility] service program?  : No - Declined    Treatment Team Recommendation: Acute Rehab  Discharge Destination Plan::  (TBD)

## 2025-03-09 VITALS
TEMPERATURE: 98 F | HEIGHT: 77 IN | RESPIRATION RATE: 16 BRPM | SYSTOLIC BLOOD PRESSURE: 123 MMHG | HEART RATE: 71 BPM | BODY MASS INDEX: 36.29 KG/M2 | WEIGHT: 307.32 LBS | OXYGEN SATURATION: 92 % | DIASTOLIC BLOOD PRESSURE: 69 MMHG

## 2025-03-09 LAB
GLUCOSE SERPL-MCNC: 101 MG/DL (ref 65–140)
GLUCOSE SERPL-MCNC: 111 MG/DL (ref 65–140)
GLUCOSE SERPL-MCNC: 135 MG/DL (ref 65–140)

## 2025-03-09 PROCEDURE — 82948 REAGENT STRIP/BLOOD GLUCOSE: CPT

## 2025-03-09 PROCEDURE — 99024 POSTOP FOLLOW-UP VISIT: CPT | Performed by: PHYSICIAN ASSISTANT

## 2025-03-09 RX ADMIN — PRAVASTATIN SODIUM 40 MG: 40 TABLET ORAL at 17:01

## 2025-03-09 RX ADMIN — ACETAMINOPHEN 650 MG: 325 TABLET, FILM COATED ORAL at 17:01

## 2025-03-09 RX ADMIN — TAMSULOSIN HYDROCHLORIDE 0.4 MG: 0.4 CAPSULE ORAL at 17:01

## 2025-03-09 RX ADMIN — OXYCODONE HYDROCHLORIDE AND ACETAMINOPHEN 500 MG: 500 TABLET ORAL at 09:08

## 2025-03-09 RX ADMIN — ENOXAPARIN SODIUM 40 MG: 40 INJECTION SUBCUTANEOUS at 09:08

## 2025-03-09 RX ADMIN — LOSARTAN POTASSIUM 50 MG: 50 TABLET, FILM COATED ORAL at 09:08

## 2025-03-09 RX ADMIN — ACETAMINOPHEN 650 MG: 325 TABLET, FILM COATED ORAL at 04:06

## 2025-03-09 RX ADMIN — Medication 5000 UNITS: at 09:08

## 2025-03-09 RX ADMIN — METFORMIN ER 500 MG 1500 MG: 500 TABLET ORAL at 17:01

## 2025-03-09 RX ADMIN — FAMOTIDINE 20 MG: 20 TABLET, FILM COATED ORAL at 09:08

## 2025-03-09 NOTE — PLAN OF CARE
Problem: PAIN - ADULT  Goal: Verbalizes/displays adequate comfort level or baseline comfort level  Description: Interventions:  - Encourage patient to monitor pain and request assistance  - Assess pain using appropriate pain scale  - Administer analgesics based on type and severity of pain and evaluate response  - Implement non-pharmacological measures as appropriate and evaluate response  - Consider cultural and social influences on pain and pain management  - Notify physician/advanced practitioner if interventions unsuccessful or patient reports new pain  Outcome: Progressing     Problem: INFECTION - ADULT  Goal: Absence or prevention of progression during hospitalization  Description: INTERVENTIONS:  - Assess and monitor for signs and symptoms of infection  - Monitor lab/diagnostic results  - Monitor all insertion sites, i.e. indwelling lines, tubes, and drains  - Monitor endotracheal if appropriate and nasal secretions for changes in amount and color  - [place] appropriate cooling/warming therapies per order  - Administer medications as ordered  - Instruct and encourage patient and family to use good hand hygiene technique  - Identify and instruct in appropriate isolation precautions for identified infection/condition  Outcome: Progressing

## 2025-03-09 NOTE — ARC ADMISSION
Patient appears appropriate for ARC pending medical readiness / continued functional need /insurance auth /  bed avail  - CM made aware in [facility].

## 2025-03-09 NOTE — ASSESSMENT & PLAN NOTE
POD#3 s/p Right knee revision quad tendon repair with allograft augmentation, cast application  Toe touch weightbearing Right lower extremity with use of walker  Do not expose cast to moisture  Can elevate Right lower extremity, ankle above knee above hip  Oxycodone 5 and 10 for moderate and severe pain, respectively. Tylenol and can consider Toradol if needed  PT/OT- up and out of bed, gait training  CM consulted for likely rehab placement  Dispo: Discharge planning

## 2025-03-09 NOTE — PROGRESS NOTES
Progress Note - Orthopedics   Name: Tobin Kerns 69 y.o. male I MRN: [id number]  Unit/Bed#: 2 E 275-01 I Date of Admission: 3/6/2025   Date of Service: 3/9/2025 I Hospital Day: 2     Assessment & Plan  Rupture of right quadriceps tendon  POD#3 s/p Right knee revision quad tendon repair with allograft augmentation, cast application  Toe touch weightbearing Right lower extremity with use of walker  Do not expose cast to moisture  Can elevate Right lower extremity, ankle above knee above hip  Oxycodone 5 and 10 for moderate and severe pain, respectively. Tylenol and can consider Toradol if needed  PT/OT- up and out of bed, gait training  CM consulted for likely rehab placement  Dispo: Discharge planning       Subjective   69 y.o.male POD#3 s/p Right knee reivision quad tendon repair with allograft augmentation, cast application. No acute events, no new complaints. Pain well controlled. Denies fevers, chills, CP, SOB, N/V, numbness or tingling. Patient reports no issues with urination or bowel movements. Patient states he thinks the cast is digging into the back of his ankle and causing some discomfort. Otherwise, patient is doing well with minimal knee pain.     Objective :  Temp:  [98.1 °F (36.7 °C)-98.9 °F (37.2 °C)] 98.4 °F (36.9 °C)  HR:  [73-78] 76  BP: (128-143)/(66-82) 129/66  Resp:  [16-18] 16  SpO2:  [92 %-97 %] 94 %  O2 Device: None (Room air)    Physical Exam  Musculoskeletal:   Cast intact, clean, and dry  Small area of erythema mid achilles tendon, possible impending pressure injury  Motor intact EHL, FHL, ankle dorsiflexion and plantar flexion  Sensation decreased dorsum and plantar aspect of the foot  DP pulse palpated       Lab Results: I have reviewed the following results:  Recent Labs     03/07/25  1311   WBC 13.20*   HGB 13.7   HCT 39.7      BUN 16   CREATININE 1.00     Blood Culture:    Lab Results   Component Value Date    BLOODCX No Growth After 5 Days. 01/18/2024    BLOODCX No Growth  "After 5 Days. 01/18/2024     Wound Culture: No results found for: \"WOUNDCULT\"      "

## 2025-03-09 NOTE — PLAN OF CARE
Problem: PAIN - ADULT  Goal: Verbalizes/displays adequate comfort level or baseline comfort level  Description: Interventions:  - Encourage patient to monitor pain and request assistance  - Assess pain using appropriate pain scale  - Administer analgesics based on type and severity of pain and evaluate response  - Implement non-pharmacological measures as appropriate and evaluate response  - Consider cultural and social influences on pain and pain management  - Notify physician/advanced practitioner if interventions unsuccessful or patient reports new pain  Outcome: Progressing     Problem: INFECTION - ADULT  Goal: Absence or prevention of progression during hospitalization  Description: INTERVENTIONS:  - Assess and monitor for signs and symptoms of infection  - Monitor lab/diagnostic results  - Monitor all insertion sites, i.e. indwelling lines, tubes, and drains  - Monitor endotracheal if appropriate and nasal secretions for changes in amount and color  - [place] appropriate cooling/warming therapies per order  - Administer medications as ordered  - Instruct and encourage patient and family to use good hand hygiene technique  - Identify and instruct in appropriate isolation precautions for identified infection/condition  Outcome: Progressing  Goal: Absence of fever/infection during neutropenic period  Description: INTERVENTIONS:  - Monitor WBC    Outcome: Progressing     Problem: SAFETY ADULT  Goal: Maintain or return to baseline ADL function  Description: INTERVENTIONS:  -  Assess patient's ability to carry out ADLs; assess patient's baseline for ADL function and identify physical deficits which impact ability to perform ADLs (bathing, care of mouth/teeth, toileting, grooming, dressing, etc.)  - Assess/evaluate cause of self-care deficits   - Assess range of motion  - Assess patient's mobility; develop plan if impaired  - Assess patient's need for assistive devices and provide as appropriate  - Encourage  maximum independence but intervene and supervise when necessary  - Involve family in performance of ADLs  - Assess for home care needs following discharge   - Consider OT consult to assist with ADL evaluation and planning for discharge  - Provide patient education as appropriate  Outcome: Progressing     Problem: Prexisting or High Potential for Compromised Skin Integrity  Goal: Skin integrity is maintained or improved  Description: INTERVENTIONS:  - Identify patients at risk for skin breakdown  - Assess and monitor skin integrity  - Assess and monitor nutrition and hydration status  - Monitor labs   - Assess for incontinence   - Turn and reposition patient  - Assist with mobility/ambulation  - Relieve pressure over bony prominences  - Avoid friction and shearing  - Provide appropriate hygiene as needed including keeping skin clean and dry  - Evaluate need for skin moisturizer/barrier cream  - Collaborate with interdisciplinary team   - Patient/family teaching  - Consider wound care consult   Outcome: Progressing

## 2025-03-10 ENCOUNTER — APPOINTMENT (OUTPATIENT)
Dept: PHYSICAL THERAPY | Facility: CLINIC | Age: 70
End: 2025-03-10
Payer: OTHER MISCELLANEOUS

## 2025-03-10 LAB
GLUCOSE SERPL-MCNC: 101 MG/DL (ref 65–140)
GLUCOSE SERPL-MCNC: 102 MG/DL (ref 65–140)
GLUCOSE SERPL-MCNC: 103 MG/DL (ref 65–140)
GLUCOSE SERPL-MCNC: 99 MG/DL (ref 65–140)

## 2025-03-10 PROCEDURE — 97535 SELF CARE MNGMENT TRAINING: CPT

## 2025-03-10 PROCEDURE — 97116 GAIT TRAINING THERAPY: CPT

## 2025-03-10 PROCEDURE — 99024 POSTOP FOLLOW-UP VISIT: CPT | Performed by: ORTHOPAEDIC SURGERY

## 2025-03-10 PROCEDURE — 82948 REAGENT STRIP/BLOOD GLUCOSE: CPT

## 2025-03-10 PROCEDURE — 97110 THERAPEUTIC EXERCISES: CPT

## 2025-03-10 RX ADMIN — METFORMIN ER 500 MG 1500 MG: 500 TABLET ORAL at 17:52

## 2025-03-10 RX ADMIN — ENOXAPARIN SODIUM 40 MG: 40 INJECTION SUBCUTANEOUS at 09:07

## 2025-03-10 RX ADMIN — PRAVASTATIN SODIUM 40 MG: 40 TABLET ORAL at 17:51

## 2025-03-10 RX ADMIN — FAMOTIDINE 20 MG: 20 TABLET, FILM COATED ORAL at 09:07

## 2025-03-10 RX ADMIN — LOSARTAN POTASSIUM 50 MG: 50 TABLET, FILM COATED ORAL at 09:07

## 2025-03-10 RX ADMIN — Medication 5000 UNITS: at 09:07

## 2025-03-10 RX ADMIN — OXYCODONE HYDROCHLORIDE AND ACETAMINOPHEN 500 MG: 500 TABLET ORAL at 09:07

## 2025-03-10 RX ADMIN — ACETAMINOPHEN 650 MG: 325 TABLET, FILM COATED ORAL at 23:10

## 2025-03-10 RX ADMIN — ACETAMINOPHEN 650 MG: 325 TABLET, FILM COATED ORAL at 17:51

## 2025-03-10 RX ADMIN — ACETAMINOPHEN 650 MG: 325 TABLET, FILM COATED ORAL at 13:11

## 2025-03-10 RX ADMIN — ACETAMINOPHEN 650 MG: 325 TABLET, FILM COATED ORAL at 00:23

## 2025-03-10 RX ADMIN — TAMSULOSIN HYDROCHLORIDE 0.4 MG: 0.4 CAPSULE ORAL at 17:51

## 2025-03-10 RX ADMIN — ACETAMINOPHEN 650 MG: 325 TABLET, FILM COATED ORAL at 05:16

## 2025-03-10 NOTE — PLAN OF CARE
Problem: PAIN - ADULT  Goal: Verbalizes/displays adequate comfort level or baseline comfort level  Description: Interventions:  - Encourage patient to monitor pain and request assistance  - Assess pain using appropriate pain scale  - Administer analgesics based on type and severity of pain and evaluate response  - Implement non-pharmacological measures as appropriate and evaluate response  - Consider cultural and social influences on pain and pain management  - Notify physician/advanced practitioner if interventions unsuccessful or patient reports new pain  3/10/2025 0133 by Lisa Quintanilla RN  Outcome: Progressing  3/10/2025 0133 by Lisa Quintanilla RN  Outcome: Progressing     Problem: INFECTION - ADULT  Goal: Absence or prevention of progression during hospitalization  Description: INTERVENTIONS:  - Assess and monitor for signs and symptoms of infection  - Monitor lab/diagnostic results  - Monitor all insertion sites, i.e. indwelling lines, tubes, and drains  - Monitor endotracheal if appropriate and nasal secretions for changes in amount and color  - [place] appropriate cooling/warming therapies per order  - Administer medications as ordered  - Instruct and encourage patient and family to use good hand hygiene technique  - Identify and instruct in appropriate isolation precautions for identified infection/condition  3/10/2025 0133 by Lisa Quintanilla RN  Outcome: Progressing  3/10/2025 0133 by Lisa Quintanilla RN  Outcome: Progressing

## 2025-03-10 NOTE — CASE MANAGEMENT
Called Pts workers comp    [phone], option 1 to find out wether prior authorization is needed for acute rehab . Spoke to Tobin who stated that he has a claim of medical benefits and as of march 5th these cpt codes are covered under claim [id number]. Through period of  3/3-3/31. Tobin emailed me the claim letter which I forwarded to cms email. Devin notified Charley mancia

## 2025-03-10 NOTE — PLAN OF CARE
Problem: PHYSICAL THERAPY ADULT  Goal: Performs mobility at highest level of function for planned discharge setting.  See evaluation for individualized goals.  Description: Treatment/Interventions: LE strengthening/ROM, Functional transfer training, Elevations, Therapeutic exercise, Endurance training, Patient/family training, Bed mobility, Gait training, Spoke to nursing, Spoke to MD, Spoke to advanced practitioner, OT          See flowsheet documentation for full assessment, interventions and recommendations.  Outcome: Progressing  Note: Prognosis: Good  Problem List: Decreased strength, Decreased range of motion, Decreased endurance, Impaired balance, Decreased mobility, Impaired sensation, Orthopedic restrictions, Pain  Assessment: Chart review and two person identifiers were completed. Pt seen for PT treatment session this date, consisting of ther ex focused on strengthening and gt training on level surfaces to improve pt safety in household environment. Since previous session, pt has made good progress in terms of increased distance ambulated and ability to complete exercises. Pt greeted supine in bed and agreeable to PT session. Pt completed supine to sit well with supervision and increased bed height to allow foot to comfortably rest on ground. Pt completed STS well with RW and VC fro hand placement. Pt ambulated 80' with a few standing rest breaks. Pt required VC to maintain TTWB with fatigue pt has increased difficulty maintaining WB status. Pt completed seated LE exercise well with no increase in pain.  Pt ended session seated in recliner with alarm activated and all needs within reach. Spoke to RN about session outcomes. Pertinent barriers during this session include  fatigue . Current goals and POC established on IE remain appropriate. Pt prognosis for achieving goals is good, pending pt progress with hospitalization/medical status improvements, and indicated by ability to follow directions,  responsive to cues/strategies, and previous response to intervention. Pt limited d/t limited family/caregiver support. Pt continues to be functioning below baseline level, and remains limited due to factors listed above. PT will continue to see pt during current hospitalization in order to address the deficits listed above and provide interventions consistent w/ POC in effort to achieve STGs. PT recommends level 1, maximum resource intensity upon discharge.  Barriers to Discharge: Inaccessible home environment, Decreased caregiver support (WB status, decline in functional mobiltiy)     Rehab Resource Intensity Level, PT: I (Maximum Resource Intensity)    See flowsheet documentation for full assessment.

## 2025-03-10 NOTE — OCCUPATIONAL THERAPY NOTE
Occupational Therapy Treatment Note        Patient Name: Tobin Kerns  Today's Date: 3/10/2025         03/10/25 1406   OT Last Visit   OT Visit Date 03/10/25   Note Type   Note Type Treatment   Pain Assessment   Pain Assessment Tool 0-10   Pain Score 1   Pain Location/Orientation Orientation: Right;Location: Leg;Location: Knee   Pain Onset/Description Onset: Ongoing   Hospital Pain Intervention(s) Repositioned;Ambulation/increased activity   Restrictions/Precautions   Weight Bearing Precautions Per Order Yes   RLE Weight Bearing Per Order (S)  TTWB  (per ortho)   Other Precautions Chair Alarm;Bed Alarm;Fall Risk;Pain;WBS   ADL   Where Assessed Standing at sink  (Assist levels for some self care tasks are based on functional assessment of performance skills and deficits observed during session.)   Eating Assistance 6  Modified independent   Eating Deficit Setup   Grooming Assistance 5  Supervision/Setup   Grooming Deficit Setup;Verbal cueing;Supervision/safety;Increased time to complete;Wash/dry hands;Teeth care   Grooming Comments standing at sink   UB Bathing Assistance 4  Minimal Assistance   UB Bathing Deficit Setup;Verbal cueing;Supervision/safety;Increased time to complete   LB Bathing Assistance 3  Moderate Assistance   LB Bathing Deficit Setup;Verbal cueing;Supervision/safety;Increased time to complete   UB Dressing Assistance 4  Minimal Assistance   UB Dressing Deficit Setup;Verbal cueing;Supervision/safety;Increased time to complete;Thread RUE;Thread LUE   UB Dressing Comments sitting EOB   LB Dressing Assistance 2  Maximal Assistance   LB Dressing Deficit Setup;Verbal cueing;Supervision/safety;Increased time to complete;Don/doff L sock;Don/doff R sock   LB Dressing Comments sitting EOB   Toileting Assistance  4  Minimal Assistance   Functional Standing Tolerance   Time ~8 minutes   Activity Grooming standing at sink   Comments w/ RW   Bed Mobility   Additional Comments pt OOB upon arrival standing  in room w/ PCA   Transfers   Sit to Stand 4  Minimal assistance   Additional items Assist x 1;Bedrails;Increased time required;Verbal cues   Stand to Sit 4  Minimal assistance   Additional items Assist x 1;Increased time required;Verbal cues;Armrests   Additional Comments w/ RW   Functional Mobility   Functional Mobility 4  Minimal assistance   Additional Comments MinAx1 w/ RW   Additional items Rolling walker   Therapeutic Exercise - ROM   UE-ROM Yes   ROM- Right Upper Extremities   R Hand   (composite  and extension)   R Position Seated   Equipment Other (Comment)  (none)   R Weight/Reps/Sets 1x15   ROM - Left Upper Extremities    L Hand AROM  (composite  and extension)   L Position Seated   Equipment Other (Comment)  (none)   L Weight/Reps/Sets 1x15   Cognition   Overall Cognitive Status WFL   Arousal/Participation Alert;Responsive;Cooperative   Attention Within functional limits   Orientation Level Oriented X4   Memory Within functional limits   Following Commands Follows all commands and directions without difficulty   Activity Tolerance   Activity Tolerance Patient tolerated treatment well   Assessment   Assessment Patient participated in Skilled OT session this date with interventions consisting of ADL re training with the use of correct body mechnaics, Energy Conservation techniques, deep breathing technique, safety awareness and fall prevention techniques, maintaining weight bearing restrictions, and increase dynamic sit/ stand balance during functional activity  . Patient agreeable to OT treatment session, upon arrival patient was found  standing in room w/ PCA .  Pt requiring MinAx1 for tx and ambulation in room and bathroom. In comparison to previous session, patient with improvements in functional mobility and standing tolerance. Patient requiring verbal cues for safety and verbal cues for correct technique. Patient continues to be functioning below baseline level, occupational performance  remains limited secondary to factors listed above and increased risk for falls and injury.   From OT standpoint, recommendation at time of d/c would be Level 1. Patient to benefit from continued Occupational Therapy treatment while in the hospital to address deficits as defined above and maximize level of functional independence with ADLs and functional mobility.   Plan   Treatment Interventions ADL retraining;Functional transfer training;UE strengthening/ROM;Equipment evaluation/education;Compensatory technique education;Neuromuscular reeducation;Energy conservation;Activityengagement   Goal Expiration Date 03/21/25   OT Treatment Day 1   OT Frequency 3-5x/wk   Discharge Recommendation   Rehab Resource Intensity Level, OT I (Maximum Resource Intensity)   AM-PAC Daily Activity Inpatient   Lower Body Dressing 2   Bathing 2   Toileting 3   Upper Body Dressing 3   Grooming 3   Eating 4   Daily Activity Raw Score 17   Daily Activity Standardized Score (Calc for Raw Score >=11) 37.26   AM-PAC Applied Cognition Inpatient   Following a Speech/Presentation 4   Understanding Ordinary Conversation 4   Taking Medications 4   Remembering Where Things Are Placed or Put Away 4   Remembering List of 4-5 Errands 4   Taking Care of Complicated Tasks 4   Applied Cognition Raw Score 24   Applied Cognition Standardized Score 62.21   End of Consult   Patient Position at End of Consult Supine;Bed/Chair alarm activated;All needs within reach         Raisa Snow OTDAMION, OTR/L

## 2025-03-10 NOTE — PLAN OF CARE
Problem: PAIN - ADULT  Goal: Verbalizes/displays adequate comfort level or baseline comfort level  Description: Interventions:  - Encourage patient to monitor pain and request assistance  - Assess pain using appropriate pain scale  - Administer analgesics based on type and severity of pain and evaluate response  - Implement non-pharmacological measures as appropriate and evaluate response  - Consider cultural and social influences on pain and pain management  - Notify physician/advanced practitioner if interventions unsuccessful or patient reports new pain  Outcome: Progressing     Problem: INFECTION - ADULT  Goal: Absence or prevention of progression during hospitalization  Description: INTERVENTIONS:  - Assess and monitor for signs and symptoms of infection  - Monitor lab/diagnostic results  - Monitor all insertion sites, i.e. indwelling lines, tubes, and drains  - Monitor endotracheal if appropriate and nasal secretions for changes in amount and color  - [place] appropriate cooling/warming therapies per order  - Administer medications as ordered  - Instruct and encourage patient and family to use good hand hygiene technique  - Identify and instruct in appropriate isolation precautions for identified infection/condition  Outcome: Progressing

## 2025-03-10 NOTE — PROGRESS NOTES
Progress Note - Orthopedics   Name: Tobin Kerns 69 y.o. male I MRN: [id number]  Unit/Bed#: 2 E 275-01 I Date of Admission: 3/6/2025   Date of Service: 3/10/2025 I Hospital Day: 3     Assessment & Plan  Rupture of right quadriceps tendon  POD#4 s/p Right knee revision quad tendon repair with allograft augmentation, cast application  Toe touch weightbearing Right lower extremity with use of walker  Do not expose cast to moisture  Can elevate Right lower extremity, ankle above knee above hip; placed heel on pillow to avoid increased pressure of cast on achilles. Overnight nursing staff placed protective dressing over distal aspect of posterior cast for protection.  Oxycodone 5 and 10 for moderate and severe pain, respectively. Tylenol and can consider Toradol if needed  PT/OT- up and out of bed, gait training  CM consulted for rehab placement  Dispo: Discharge planning       Subjective   69 y.o.male POD#4 s/p Right knee revision quad tendon repair with allograft augmentation, cast application. No acute events, no new complaints. Pain well controlled. Denies fevers, chills, CP, SOB, N/V, numbness or tingling. Patient reports no issues with urination or bowel movements. Patient states he is doing okay. He has tried to maintain the elevation, but if he moves, he cannot lift his leg back onto the pillow for elevation.     Objective :  Temp:  [98 °F (36.7 °C)-99 °F (37.2 °C)] 98 °F (36.7 °C)  HR:  [71] 71  BP: (123-133)/(62-69) 123/69  SpO2:  [92 %] 92 %  O2 Device: None (Room air)    Physical Exam  Musculoskeletal:   Right lower extremity   Cast clean, dry and intact without soilage  Motor intact EHL, FHL, dorsiflexion and plantar flexion of ankle  Decreased sensation L2-S1 of distal extremity secondary to neuropathy   Mild erythema over mid achilles near Posterior distal cast, padded with silicone bandage    DP pulse palpated      Lab Results: I have reviewed the following results:  Recent Labs     03/07/25  1311  "  WBC 13.20*   HGB 13.7   HCT 39.7      BUN 16   CREATININE 1.00     Blood Culture:    Lab Results   Component Value Date    BLOODCX No Growth After 5 Days. 01/18/2024    BLOODCX No Growth After 5 Days. 01/18/2024     Wound Culture: No results found for: \"WOUNDCULT\"        "

## 2025-03-10 NOTE — PLAN OF CARE
Problem: OCCUPATIONAL THERAPY ADULT  Goal: Performs self-care activities at highest level of function for planned discharge setting.  See evaluation for individualized goals.  Description: Treatment Interventions: ADL retraining, Functional transfer training, UE strengthening/ROM, Equipment evaluation/education, Compensatory technique education, Neuromuscular reeducation, Energy conservation, Activityengagement          See flowsheet documentation for full assessment, interventions and recommendations.   Outcome: Progressing  Note: Limitation: Decreased ADL status, Decreased Safe judgement during ADL, Decreased self-care trans, Decreased high-level ADLs, Decreased endurance  Prognosis: Good  Assessment: Patient participated in Skilled OT session this date with interventions consisting of ADL re training with the use of correct body mechnaics, Energy Conservation techniques, deep breathing technique, safety awareness and fall prevention techniques, maintaining weight bearing restrictions, and increase dynamic sit/ stand balance during functional activity  . Patient agreeable to OT treatment session, upon arrival patient was found  standing in room w/ PCA .  Pt requiring MinAx1 for tx and ambulation in room and bathroom. In comparison to previous session, patient with improvements in functional mobility and standing tolerance. Patient requiring verbal cues for safety and verbal cues for correct technique. Patient continues to be functioning below baseline level, occupational performance remains limited secondary to factors listed above and increased risk for falls and injury.   From OT standpoint, recommendation at time of d/c would be Level 1. Patient to benefit from continued Occupational Therapy treatment while in the hospital to address deficits as defined above and maximize level of functional independence with ADLs and functional mobility.     Rehab Resource Intensity Level, OT: I (Maximum Resource  Intensity)

## 2025-03-10 NOTE — ASSESSMENT & PLAN NOTE
POD#4 s/p Right knee revision quad tendon repair with allograft augmentation, cast application  Toe touch weightbearing Right lower extremity with use of walker  Do not expose cast to moisture  Can elevate Right lower extremity, ankle above knee above hip; placed heel on pillow to avoid increased pressure of cast on achilles. Overnight nursing staff placed protective dressing over distal aspect of posterior cast for protection.  Oxycodone 5 and 10 for moderate and severe pain, respectively. Tylenol and can consider Toradol if needed  PT/OT- up and out of bed, gait training  CM consulted for rehab placement  Dispo: Discharge planning

## 2025-03-10 NOTE — PHYSICAL THERAPY NOTE
PHYSICAL THERAPY NOTE          Patient Name: Tobin Kerns  Today's Date: 3/10/2025       03/10/25 1053   PT Last Visit   PT Visit Date 03/10/25   Note Type   Note Type Treatment   Pain Assessment   Pain Assessment Tool 0-10   Pain Score 2   Pain Location/Orientation Orientation: Right;Location: Knee   Pain Onset/Description Onset: Ongoing   Patient's Stated Pain Goal No pain   Hospital Pain Intervention(s) Repositioned;Ambulation/increased activity   Restrictions/Precautions   Weight Bearing Precautions Per Order Yes   RLE Weight Bearing Per Order (S)  TTWB   Other Precautions Chair Alarm;Bed Alarm;Fall Risk;Pain;WBS   General   Chart Reviewed Yes   Additional Pertinent History POD#4 s/p Right knee revision quad tendon repair with allograft augmentation, cast application   Family/Caregiver Present No   Cognition   Overall Cognitive Status WFL   Arousal/Participation Alert;Responsive   Attention Within functional limits   Orientation Level Oriented X4   Memory Within functional limits   Following Commands Follows all commands and directions without difficulty   Comments Pt agreeable to PT session   Bed Mobility   Supine to Sit 5  Supervision   Additional items Assist x 1;Increased time required;Verbal cues   Transfers   Sit to Stand 4  Minimal assistance   Additional items Assist x 1;Bedrails;Increased time required;Verbal cues   Stand to Sit 4  Minimal assistance   Additional items Assist x 1;Increased time required;Verbal cues;Armrests   Ambulation/Elevation   Gait pattern Short stride;Step to;Excessively slow   Gait Assistance 4  Minimal assist   Additional items Assist x 1;Verbal cues   Assistive Device Rolling walker   Distance 80'   Ambulation/Elevation Additional Comments Pt required VC to maintain TTWB on R LE, With fatigue duruing ambulation pt was putting foot down on ground more often   Balance   Static Sitting Good    Dynamic Sitting Fair +   Static Standing Fair -   Dynamic Standing Poor +   Ambulatory Poor +   Endurance Deficit   Endurance Deficit Yes   Endurance Deficit Description decreased activity tolerance   Activity Tolerance   Activity Tolerance Patient tolerated treatment well;Patient limited by fatigue   Nurse Made Aware Spoke to RN   Exercises       Glute Sets Sitting;20 reps;AROM;Bilateral   Hip Adduction Sitting;20 reps;AROM;Bilateral   Ankle Pumps Sitting;20 reps;AROM;Bilateral   Assessment   Prognosis Good   Problem List Decreased strength;Decreased range of motion;Decreased endurance;Impaired balance;Decreased mobility;Impaired sensation;Orthopedic restrictions;Pain   Assessment Chart review and two person identifiers were completed. Pt seen for PT treatment session this date, consisting of ther ex focused on strengthening and gt training on level surfaces to improve pt safety in household environment. Since previous session, pt has made good progress in terms of increased distance ambulated and ability to complete exercises. Pt greeted supine in bed and agreeable to PT session. Pt completed supine to sit well with supervision and increased bed height to allow foot to comfortably rest on ground. Pt completed STS well with RW and VC fro hand placement. Pt ambulated 80' with a few standing rest breaks. Pt required VC to maintain TTWB with fatigue pt has increased difficulty maintaining WB status. Pt completed seated LE exercise well with no increase in pain.  Pt ended session seated in recliner with alarm activated and all needs within reach. Spoke to RN about session outcomes. Pertinent barriers during this session include  fatigue . Current goals and POC established on IE remain appropriate. Pt prognosis for achieving goals is good, pending pt progress with hospitalization/medical status improvements, and indicated by ability to follow directions, responsive to cues/strategies, and previous response to  intervention. Pt limited d/t limited family/caregiver support. Pt continues to be functioning below baseline level, and remains limited due to factors listed above. PT will continue to see pt during current hospitalization in order to address the deficits listed above and provide interventions consistent w/ POC in effort to achieve STGs. PT recommends level 1, maximum resource intensity upon discharge.   Barriers to Discharge Inaccessible home environment;Decreased caregiver support  (WB status, decline in functional mobiltiy)   Goals   STG Expiration Date 03/17/25   PT Treatment Day 2   Plan   Treatment/Interventions Functional transfer training;LE strengthening/ROM;Therapeutic exercise;Endurance training;Patient/family training;Equipment eval/education;Bed mobility;Gait training;Continued evaluation;OT   Progress Progressing toward goals   PT Frequency 4-6x/wk   Discharge Recommendation   Rehab Resource Intensity Level, PT I (Maximum Resource Intensity)   AM-PAC Basic Mobility Inpatient   Turning in Flat Bed Without Bedrails 3   Lying on Back to Sitting on Edge of Flat Bed Without Bedrails 3   Moving Bed to Chair 3   Standing Up From Chair Using Arms 3   Walk in Room 3   Climb 3-5 Stairs With Railing 1   Basic Mobility Inpatient Raw Score 16   Basic Mobility Standardized Score 38.32   [facility] Highest Level Of Mobility   -HLM Goal 5: Stand one or more mins   -HLM Achieved 7: Walk 25 feet or more   Education   Education Provided Mobility training;Home exercise program;Assistive device   Patient Demonstrates acceptance/verbal understanding   End of Consult   Patient Position at End of Consult Bedside chair;Bed/Chair alarm activated;All needs within reach     Jesus Manuel Sotelo, PT, DPT

## 2025-03-10 NOTE — PLAN OF CARE
Problem: PAIN - ADULT  Goal: Verbalizes/displays adequate comfort level or baseline comfort level  Description: Interventions:  - Encourage patient to monitor pain and request assistance  - Assess pain using appropriate pain scale  - Administer analgesics based on type and severity of pain and evaluate response  - Implement non-pharmacological measures as appropriate and evaluate response  - Consider cultural and social influences on pain and pain management  - Notify physician/advanced practitioner if interventions unsuccessful or patient reports new pain  Outcome: Progressing     Problem: INFECTION - ADULT  Goal: Absence or prevention of progression during hospitalization  Description: INTERVENTIONS:  - Assess and monitor for signs and symptoms of infection  - Monitor lab/diagnostic results  - Monitor all insertion sites, i.e. indwelling lines, tubes, and drains  - Monitor endotracheal if appropriate and nasal secretions for changes in amount and color  - [place] appropriate cooling/warming therapies per order  - Administer medications as ordered  - Instruct and encourage patient and family to use good hand hygiene technique  - Identify and instruct in appropriate isolation precautions for identified infection/condition  Outcome: Progressing  Goal: Absence of fever/infection during neutropenic period  Description: INTERVENTIONS:  - Monitor WBC    Outcome: Progressing     Problem: SAFETY ADULT  Goal: Patient will remain free of falls  Description: INTERVENTIONS:  - Educate patient/family on patient safety including physical limitations  - Instruct patient to call for assistance with activity   - Consult OT/PT to assist with strengthening/mobility   - Keep Call bell within reach  - Keep bed low and locked with side rails adjusted as appropriate  - Keep care items and personal belongings within reach  - Initiate and maintain comfort rounds  - Make Fall Risk Sign visible to staff  - Offer Toileting every 2 Hours,  in advance of need  - Initiate/Maintain bed alarm  - Obtain necessary fall risk management equipment  - Apply yellow socks and bracelet for high fall risk patients  - Consider moving patient to room near nurses station  Outcome: Progressing  Goal: Maintain or return to baseline ADL function  Description: INTERVENTIONS:  -  Assess patient's ability to carry out ADLs; assess patient's baseline for ADL function and identify physical deficits which impact ability to perform ADLs (bathing, care of mouth/teeth, toileting, grooming, dressing, etc.)  - Assess/evaluate cause of self-care deficits   - Assess range of motion  - Assess patient's mobility; develop plan if impaired  - Assess patient's need for assistive devices and provide as appropriate  - Encourage maximum independence but intervene and supervise when necessary  - Involve family in performance of ADLs  - Assess for home care needs following discharge   - Consider OT consult to assist with ADL evaluation and planning for discharge  - Provide patient education as appropriate  Outcome: Progressing  Goal: Maintains/Returns to pre admission functional level  Description: INTERVENTIONS:  - Perform AM-PAC 6 Click Basic Mobility/ Daily Activity assessment daily.  - Set and communicate daily mobility goal to care team and patient/family/caregiver.   - Collaborate with rehabilitation services on mobility goals if consulted  - Perform Range of Motion 3 times a day.  - Reposition patient every 3 hours.  - Dangle patient 3 times a day  - Stand patient 3 times a day  - Ambulate patient 3 times a day  - Out of bed to chair 3 times a day   - Out of bed for meals 3 times a day  - Out of bed for toileting  - Record patient progress and toleration of activity level   Outcome: Progressing     Problem: DISCHARGE PLANNING  Goal: Discharge to home or other facility with appropriate resources  Description: INTERVENTIONS:  - Identify barriers to discharge w/patient and caregiver  -  Arrange for needed discharge resources and transportation as appropriate  - Identify discharge learning needs (meds, wound care, etc.)  - Arrange for interpretive services to assist at discharge as needed  - Refer to Case Management Department for coordinating discharge planning if the patient needs post-hospital services based on physician/advanced practitioner order or complex needs related to functional status, cognitive ability, or social support system  Outcome: Progressing     Problem: Knowledge Deficit  Goal: Patient/family/caregiver demonstrates understanding of disease process, treatment plan, medications, and discharge instructions  Description: Complete learning assessment and assess knowledge base.  Interventions:  - Provide teaching at level of understanding  - Provide teaching via preferred learning methods  Outcome: Progressing     Problem: Prexisting or High Potential for Compromised Skin Integrity  Goal: Skin integrity is maintained or improved  Description: INTERVENTIONS:  - Identify patients at risk for skin breakdown  - Assess and monitor skin integrity  - Assess and monitor nutrition and hydration status  - Monitor labs   - Assess for incontinence   - Turn and reposition patient  - Assist with mobility/ambulation  - Relieve pressure over bony prominences  - Avoid friction and shearing  - Provide appropriate hygiene as needed including keeping skin clean and dry  - Evaluate need for skin moisturizer/barrier cream  - Collaborate with interdisciplinary team   - Patient/family teaching  - Consider wound care consult   Outcome: Progressing

## 2025-03-10 NOTE — CASE MANAGEMENT
Case Management Discharge Planning Note    Patient name Tobin Kerns  Location 2 EAST 275/2 E 275-01 MRN [id number]  : 1955 Date 3/10/2025       Current Admission Date: 3/6/2025  Current Admission Diagnosis:Rupture of right quadriceps tendon  Patient Active Problem List    Diagnosis Date Noted Date Diagnosed    Rupture of right quadriceps tendon 2025     Obesity (BMI 35.0-39.9 without comorbidity) 2025     Onychomycosis 2024     Urinary retention 2024     Quadriceps muscle rupture, right, subsequent encounter 2024     Ambulatory dysfunction 2024     Abdominal aortic ectasia (HCC) 2023     Ulcer of left foot, with fat layer exposed (HCC) 2022     IFG (impaired fasting glucose) 10/01/2021     Hypertension 2021     Peripheral neuropathy 2021     Drusen (degenerative) of macula, bilateral 2021     Myopia, bilateral 2021     Pinguecula, bilateral 2021     Regular astigmatism, bilateral 2021     JOVANY on CPAP      History of colon polyps      Pure hypertriglyceridemia 2019     Mixed hyperlipidemia 2019     Gastro-esophageal reflux disease without esophagitis 2019     Presbyopia 10/11/2016       LOS (days): 3  Geometric Mean LOS (GMLOS) (days): 2.3  Days to GMLOS:-0.7     OBJECTIVE:  Risk of Unplanned Readmission Score: 8.72         Current admission status: Inpatient   Preferred Pharmacy:   [id number] - [place] - [address]  [address]  [place] [address]  Phone: [phone] Fax: [phone]    [id number] - [place] - [address]  [address]  [place] [address]  Phone: [phone] Fax: [phone]    Primary Care Provider: Ann Carmona DO    Primary Insurance: WORKERS COMPENSATION  Secondary Insurance: MEDICARE    DISCHARGE DETAILS:    Additional Comments: CM met with pt at bedside and discussed acute rehab rec. Pt agreeable and reported preference was [facility]. [facility] pre-approved pt. Waiting on NPI information to submit for insurance auth through pt's worker's comp.     ADDENDUM 2:23pm: CM discharge support tasked with submission of insurance authorization for [facility] through pt's workman's comp insurance.

## 2025-03-10 NOTE — [facility] ADMISSION
[facility]  met with patient at bedside: introduced self, explained role, reviewed [facility] program, services offered, acute rehab criteria, review of referral by [facility] Medical Director, insurance authorization process, three [facility] locations and anticipated rehab length of stay. [facility] Rehab folder left with patient; all questions answered. Patient's only choice is [facility]. Patient  made aware [facility] Reviewer will communicate with their Care Manager who will keep patient updated on referral status.

## 2025-03-11 ENCOUNTER — HOSPITAL ENCOUNTER (INPATIENT)
Facility: HOSPITAL | Age: 70
LOS: 56 days | Discharge: HOME | DRG: 466 | End: 2025-05-06
Attending: FAMILY MEDICINE | Admitting: FAMILY MEDICINE
Payer: OTHER MISCELLANEOUS

## 2025-03-11 VITALS
HEIGHT: 77 IN | BODY MASS INDEX: 36.29 KG/M2 | DIASTOLIC BLOOD PRESSURE: 76 MMHG | TEMPERATURE: 98.5 F | SYSTOLIC BLOOD PRESSURE: 128 MMHG | WEIGHT: 307.32 LBS | HEART RATE: 87 BPM | OXYGEN SATURATION: 98 % | RESPIRATION RATE: 15 BRPM

## 2025-03-11 DIAGNOSIS — R73.01 IFG (IMPAIRED FASTING GLUCOSE): ICD-10-CM

## 2025-03-11 DIAGNOSIS — K21.9 GASTRO-ESOPHAGEAL REFLUX DISEASE WITHOUT ESOPHAGITIS: ICD-10-CM

## 2025-03-11 DIAGNOSIS — I10 PRIMARY HYPERTENSION: ICD-10-CM

## 2025-03-11 DIAGNOSIS — B35.1 ONYCHOMYCOSIS: ICD-10-CM

## 2025-03-11 DIAGNOSIS — Z74.09 IMPAIRED MOBILITY AND ADLS: ICD-10-CM

## 2025-03-11 DIAGNOSIS — Z78.9 IMPAIRED MOBILITY AND ADLS: ICD-10-CM

## 2025-03-11 DIAGNOSIS — Z91.89 AT RISK FOR CONSTIPATION: ICD-10-CM

## 2025-03-11 DIAGNOSIS — E78.2 MIXED HYPERLIPIDEMIA: ICD-10-CM

## 2025-03-11 DIAGNOSIS — S76.111S QUADRICEPS TENDON RUPTURE, RIGHT, SEQUELA: Primary | ICD-10-CM

## 2025-03-11 LAB
GLUCOSE SERPL-MCNC: 100 MG/DL (ref 65–140)
GLUCOSE SERPL-MCNC: 103 MG/DL (ref 65–140)
GLUCOSE SERPL-MCNC: 86 MG/DL (ref 65–140)

## 2025-03-11 PROCEDURE — 97116 GAIT TRAINING THERAPY: CPT

## 2025-03-11 PROCEDURE — 99222 1ST HOSP IP/OBS MODERATE 55: CPT

## 2025-03-11 PROCEDURE — 97110 THERAPEUTIC EXERCISES: CPT

## 2025-03-11 PROCEDURE — NC001 PR NO CHARGE: Performed by: PHYSICAL MEDICINE & REHABILITATION

## 2025-03-11 PROCEDURE — NC001 PR NO CHARGE: Performed by: PHYSICIAN ASSISTANT

## 2025-03-11 PROCEDURE — 82948 REAGENT STRIP/BLOOD GLUCOSE: CPT

## 2025-03-11 PROCEDURE — 99024 POSTOP FOLLOW-UP VISIT: CPT | Performed by: ORTHOPAEDIC SURGERY

## 2025-03-11 RX ORDER — LOSARTAN POTASSIUM 50 MG/1
50 TABLET ORAL DAILY
Status: DISCONTINUED | OUTPATIENT
Start: 2025-03-12 | End: 2025-05-06 | Stop reason: HOSPADM

## 2025-03-11 RX ORDER — OXYCODONE HYDROCHLORIDE 10 MG/1
10 TABLET ORAL EVERY 4 HOURS PRN
Refills: 0 | Status: DISCONTINUED | OUTPATIENT
Start: 2025-03-11 | End: 2025-03-12

## 2025-03-11 RX ORDER — ACETAMINOPHEN 325 MG/1
650 TABLET ORAL EVERY 6 HOURS SCHEDULED
Status: DISCONTINUED | OUTPATIENT
Start: 2025-03-11 | End: 2025-03-12

## 2025-03-11 RX ORDER — TAMSULOSIN HYDROCHLORIDE 0.4 MG/1
0.4 CAPSULE ORAL
Status: DISCONTINUED | OUTPATIENT
Start: 2025-03-11 | End: 2025-05-06 | Stop reason: HOSPADM

## 2025-03-11 RX ORDER — ENOXAPARIN SODIUM 100 MG/ML
40 INJECTION SUBCUTANEOUS
Status: ON HOLD
Start: 2025-03-12

## 2025-03-11 RX ORDER — FAMOTIDINE 20 MG/1
20 TABLET, FILM COATED ORAL DAILY
Status: DISCONTINUED | OUTPATIENT
Start: 2025-03-12 | End: 2025-05-06 | Stop reason: HOSPADM

## 2025-03-11 RX ORDER — OXYCODONE HYDROCHLORIDE 5 MG/1
5 TABLET ORAL EVERY 4 HOURS PRN
Status: ON HOLD | OUTPATIENT
Start: 2025-03-11

## 2025-03-11 RX ORDER — POLYETHYLENE GLYCOL 3350 17 G/17G
17 POWDER, FOR SOLUTION ORAL DAILY PRN
Status: DISCONTINUED | OUTPATIENT
Start: 2025-03-11 | End: 2025-04-14

## 2025-03-11 RX ORDER — ENOXAPARIN SODIUM 100 MG/ML
40 INJECTION SUBCUTANEOUS
Status: DISCONTINUED | OUTPATIENT
Start: 2025-03-12 | End: 2025-04-07

## 2025-03-11 RX ORDER — ASCORBIC ACID 500 MG
500 TABLET ORAL DAILY
Status: DISCONTINUED | OUTPATIENT
Start: 2025-03-12 | End: 2025-05-06 | Stop reason: HOSPADM

## 2025-03-11 RX ORDER — METFORMIN HYDROCHLORIDE 500 MG/1
1500 TABLET, EXTENDED RELEASE ORAL
Status: DISCONTINUED | OUTPATIENT
Start: 2025-03-11 | End: 2025-05-06 | Stop reason: HOSPADM

## 2025-03-11 RX ORDER — PRAVASTATIN SODIUM 40 MG
40 TABLET ORAL
Status: DISCONTINUED | OUTPATIENT
Start: 2025-03-11 | End: 2025-05-06 | Stop reason: HOSPADM

## 2025-03-11 RX ORDER — OXYCODONE HYDROCHLORIDE 5 MG/1
5 TABLET ORAL EVERY 4 HOURS PRN
Refills: 0 | Status: DISCONTINUED | OUTPATIENT
Start: 2025-03-11 | End: 2025-03-12

## 2025-03-11 RX ADMIN — Medication 5000 UNITS: at 08:49

## 2025-03-11 RX ADMIN — LOSARTAN POTASSIUM 50 MG: 50 TABLET, FILM COATED ORAL at 08:49

## 2025-03-11 RX ADMIN — PRAVASTATIN SODIUM 40 MG: 40 TABLET ORAL at 17:06

## 2025-03-11 RX ADMIN — TAMSULOSIN HYDROCHLORIDE 0.4 MG: 0.4 CAPSULE ORAL at 17:06

## 2025-03-11 RX ADMIN — ACETAMINOPHEN 650 MG: 325 TABLET, FILM COATED ORAL at 05:00

## 2025-03-11 RX ADMIN — OXYCODONE HYDROCHLORIDE AND ACETAMINOPHEN 500 MG: 500 TABLET ORAL at 08:49

## 2025-03-11 RX ADMIN — ENOXAPARIN SODIUM 40 MG: 40 INJECTION SUBCUTANEOUS at 08:49

## 2025-03-11 RX ADMIN — ACETAMINOPHEN 650 MG: 325 TABLET ORAL at 17:06

## 2025-03-11 RX ADMIN — FAMOTIDINE 20 MG: 20 TABLET, FILM COATED ORAL at 08:49

## 2025-03-11 RX ADMIN — ACETAMINOPHEN 650 MG: 325 TABLET, FILM COATED ORAL at 12:32

## 2025-03-11 RX ADMIN — ACETAMINOPHEN 650 MG: 325 TABLET ORAL at 23:23

## 2025-03-11 RX ADMIN — METFORMIN ER 500 MG 1500 MG: 500 TABLET ORAL at 17:04

## 2025-03-11 RX ADMIN — B-COMPLEX W/ C & FOLIC ACID TAB 1 TABLET: TAB at 17:06

## 2025-03-11 NOTE — PLAN OF CARE
Problem: PAIN - ADULT  Goal: Verbalizes/displays adequate comfort level or baseline comfort level  Description: Interventions:  - Encourage patient to monitor pain and request assistance  - Assess pain using appropriate pain scale  - Administer analgesics based on type and severity of pain and evaluate response  - Implement non-pharmacological measures as appropriate and evaluate response  - Consider cultural and social influences on pain and pain management  - Notify physician/advanced practitioner if interventions unsuccessful or patient reports new pain  Outcome: Progressing     Problem: INFECTION - ADULT  Goal: Absence or prevention of progression during hospitalization  Description: INTERVENTIONS:  - Assess and monitor for signs and symptoms of infection  - Monitor lab/diagnostic results  - Monitor all insertion sites, i.e. indwelling lines, tubes, and drains  - Monitor endotracheal if appropriate and nasal secretions for changes in amount and color  - [place] appropriate cooling/warming therapies per order  - Administer medications as ordered  - Instruct and encourage patient and family to use good hand hygiene technique  - Identify and instruct in appropriate isolation precautions for identified infection/condition  Outcome: Progressing  Goal: Absence of fever/infection during neutropenic period  Description: INTERVENTIONS:  - Monitor WBC    Outcome: Progressing     Problem: SAFETY ADULT  Goal: Patient will remain free of falls  Description: INTERVENTIONS:  - Educate patient/family on patient safety including physical limitations  - Instruct patient to call for assistance with activity   - Consult OT/PT to assist with strengthening/mobility   - Keep Call bell within reach  - Keep bed low and locked with side rails adjusted as appropriate  - Keep care items and personal belongings within reach  - Initiate and maintain comfort rounds  - Make Fall Risk Sign visible to staff  - Offer Toileting every  Hours,  in advance of need  - Initiate/Maintain alarm  - Obtain necessary fall risk management equipment:   - Apply yellow socks and bracelet for high fall risk patients  - Consider moving patient to room near nurses station  Outcome: Progressing  Goal: Maintain or return to baseline ADL function  Description: INTERVENTIONS:  -  Assess patient's ability to carry out ADLs; assess patient's baseline for ADL function and identify physical deficits which impact ability to perform ADLs (bathing, care of mouth/teeth, toileting, grooming, dressing, etc.)  - Assess/evaluate cause of self-care deficits   - Assess range of motion  - Assess patient's mobility; develop plan if impaired  - Assess patient's need for assistive devices and provide as appropriate  - Encourage maximum independence but intervene and supervise when necessary  - Involve family in performance of ADLs  - Assess for home care needs following discharge   - Consider OT consult to assist with ADL evaluation and planning for discharge  - Provide patient education as appropriate  Outcome: Progressing  Goal: Maintains/Returns to pre admission functional level  Description: INTERVENTIONS:  - Perform AM-PAC 6 Click Basic Mobility/ Daily Activity assessment daily.  - Set and communicate daily mobility goal to care team and patient/family/caregiver.   - Collaborate with rehabilitation services on mobility goals if consulted  - Perform Range of Motion  times a day.  - Reposition patient every  hours.  - Dangle patient  times a day  - Stand patient  times a day  - Ambulate patient  times a day  - Out of bed to chair  times a day   - Out of bed for meals  times a day  - Out of bed for toileting  - Record patient progress and toleration of activity level   Outcome: Progressing     Problem: DISCHARGE PLANNING  Goal: Discharge to home or other facility with appropriate resources  Description: INTERVENTIONS:  - Identify barriers to discharge w/patient and caregiver  - Arrange for  needed discharge resources and transportation as appropriate  - Identify discharge learning needs (meds, wound care, etc.)  - Arrange for interpretive services to assist at discharge as needed  - Refer to Case Management Department for coordinating discharge planning if the patient needs post-hospital services based on physician/advanced practitioner order or complex needs related to functional status, cognitive ability, or social support system  Outcome: Progressing     Problem: Knowledge Deficit  Goal: Patient/family/caregiver demonstrates understanding of disease process, treatment plan, medications, and discharge instructions  Description: Complete learning assessment and assess knowledge base.  Interventions:  - Provide teaching at level of understanding  - Provide teaching via preferred learning methods  Outcome: Progressing     Problem: Prexisting or High Potential for Compromised Skin Integrity  Goal: Skin integrity is maintained or improved  Description: INTERVENTIONS:  - Identify patients at risk for skin breakdown  - Assess and monitor skin integrity  - Assess and monitor nutrition and hydration status  - Monitor labs   - Assess for incontinence   - Turn and reposition patient  - Assist with mobility/ambulation  - Relieve pressure over bony prominences  - Avoid friction and shearing  - Provide appropriate hygiene as needed including keeping skin clean and dry  - Evaluate need for skin moisturizer/barrier cream  - Collaborate with interdisciplinary team   - Patient/family teaching  - Consider wound care consult   Outcome: Progressing

## 2025-03-11 NOTE — CASE MANAGEMENT
Worker's Comp claim # [id number]  phone : [phone]  CM met with patient, explained rehab routine and CM role with introduction. Patient stated he has a [place] address where he gets his mail, but has been living at [address]  Patient stated he lives alone in trailer home with 4STE. Patient independent PTA, driving, retired from work 12/31/24. Patient has a RW and CPAP. Patient has prior outpt therapy experience at [facility] location, STR experience at [facility], no [facility] experience. Patient has prescription coverage and gets medications from [facility] in [place]. CM confirmed patient's mailing address, emergency contact information and insurance  with patient.. Patient has a brother and Sister marie who may be able to offer some assistance upon discharge. Patient's original work related injury happened in July 2024., Patient stated since the first surgery with knee immobilizer post op was not successful, he had this surgery with full leg cast post op. CM explained team meeting process, and CM's communication with Workers Comp for coverage of ARC stay. CM will follow for discharge needs.

## 2025-03-11 NOTE — ASSESSMENT & PLAN NOTE
POD#5 s/p Right knee revision quad tendon repair with allograft augmentation, cast application  Removed approximately 1 inch of cast at posterior aspect to continue to evaluate pressure wound. Placed consult for wound care.   Toe touch weightbearing Right lower extremity with use of walker  Do not expose cast to moisture  Can elevate Right lower extremity, ankle above knee above hip; placed heel on pillow to avoid increased pressure of cast on achilles. Overnight nursing staff placed protective dressing over distal aspect of posterior cast for protection.  Oxycodone 5 and 10 for moderate and severe pain, respectively. Tylenol and can consider Toradol if needed  PT/OT- up and out of bed, gait training  CM consulted for rehab placement  Dispo: Discharge planning to [facility] today
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
## 2025-03-11 NOTE — DISCHARGE SUMMARY
Discharge Summary - Orthopedics   Name: Tobin Kerns 69 y.o. male I MRN: [id number]  Unit/Bed#: 2 E 275-01 I Date of Admission: 3/6/2025   Date of Service: 3/11/2025 I Hospital Day: 4    Admission Date: 3/6/2025 0740  Discharge Date: 03/11/25  Admitting Diagnosis: Quadriceps tendon rupture, right, subsequent encounter [S76.111D]  Discharge Diagnosis:   Medical Problems       Resolved Problems  Date Reviewed: 3/5/2025   None         HPI: Patient was seen and evaluated in the office where surgical management was recommended for re-tear of Right quadriceps tendon on 03/06/2025.     Procedures Performed: No orders of the defined types were placed in this encounter.      Summary of Hospital Course:  Patient was taken to the OR on 03/06/2025 and underwent an uncomplicated Right knee revision quadriceps tendon repair with allograft augmentation and cast application.. Patient spend a brief time in PACU and was transferred to the floor. Patient's pain was well managed with multimodal pain medications and HGB was monitored for acute blood loss anemia. POD1 patient doing well and working with therapy. POD2 improving gait with therapy, increased discomfort. POD3 posterior heel pain. POD4 cast augmented to relieve pressure near achilles. POD5 patient cleared for DC as he was accepted at [facility]. Patient worked with PT/OT effectively and was cleared for discharge.      Significant Findings, Care, Treatment and Services Provided: Therapy    Complications: Posterior heel/achilles tendon pressure injury, with removal of a portion of posterior cast     Condition at Discharge: good       Discharge instructions/Information to patient and family:   See After Visit Summary (AVS) for information provided to patient and family.      Provisions for Follow-Up Care:  See after visit summary for information related to follow-up care and any pertinent home health orders.      PCP: Ann Carmona DO    Disposition:  ARC    Planned  Readmission: No     Discharge Medications:  See after visit summary for reconciled discharge medications provided to patient and family.      Discharge Statement:  I have spent a total time of 20 minutes in caring for this patient on the day of the visit/encounter. >30 minutes of time was spent on: Prognosis, Risks and benefits of tx options, and Instructions for management.

## 2025-03-11 NOTE — ASSESSMENT & PLAN NOTE
"HPI:   He initially underwent quad tendon repair on 7/25/24 with Dr. Foster. He was discharged home with outpatient PT.  Later noted to have recurrent high-grade tear with extensor lag on exam and elected for operative management  January 2025 MRI: \"Prior quadriceps insertion repair with high-grade recurrent tear exhibiting up to 2.2 cm of tendon retraction.\"  On 3/6/25, he underwent revision of quad tendon repair with allograft augmentation    Plan:   PT and OT for 3 hours a day, 5-6 days a week   TTWB RLE   Avoid moisture to cast   Pain: PRN Tylenol (monitor LFTs) and PRN 2.5-5 mg oxycodone q 8 hours (wean when able) (last weaned 3/12)   Lovenox for DVT prophylaxis while inpatient   "

## 2025-03-11 NOTE — DISCHARGE INSTR - AVS FIRST PAGE
Discharge Instructions - Orthopedics  Tobin RIO Kerns 69 y.o. male MRN: [id number]  Unit/Bed#: [address] [place] [address]    Weight Bearing Status:                                           Toe touch weightbearing Right lower extremity.  Walker for safety during ambulation    DVT prophylaxis:  Lovenox 40mg SQ once daily     Pain:  Continue analgesics as directed. Please take with food and water.     Dressing Instructions:   Please keep clean, dry and intact. DO not expose to mosture. Continue to elevate Right lower extremity at the heel, ensuring proper padding and to ensure posterior cast does not cause pressure wound to posterior lower extremity.     Appt Instructions:   Follow up in office with Dr Foster on 03/21/2025 for cast check, consideration of removal and application of new cast. Will reevaluate posterior heel/achilles pressure wound     Contact the office sooner if you experience any increased numbness/tingling in the extremities.

## 2025-03-11 NOTE — CASE MANAGEMENT
Case Management Discharge Planning Note    Patient name Tobin Kerns  Location 2 EAST 275/2 E 275-01 MRN [id number]  : 1955 Date 3/11/2025       Current Admission Date: 3/6/2025  Current Admission Diagnosis:Quadriceps tendon rupture, right, sequela   Patient Active Problem List    Diagnosis Date Noted Date Diagnosed    Quadriceps tendon rupture, right, sequela 2025     Obesity (BMI 35.0-39.9 without comorbidity) 2025     Onychomycosis 2024     Urinary retention 2024     Quadriceps muscle rupture, right, subsequent encounter 2024     Ambulatory dysfunction 2024     Abdominal aortic ectasia (HCC) 2023     Ulcer of left foot, with fat layer exposed (HCC) 2022     IFG (impaired fasting glucose) 10/01/2021     Hypertension 2021     Peripheral neuropathy 2021     Drusen (degenerative) of macula, bilateral 2021     Myopia, bilateral 2021     Pinguecula, bilateral 2021     Regular astigmatism, bilateral 2021     JOVANY on CPAP      History of colon polyps      Pure hypertriglyceridemia 2019     Mixed hyperlipidemia 2019     Gastro-esophageal reflux disease without esophagitis 2019     Presbyopia 10/11/2016       LOS (days): 4  Geometric Mean LOS (GMLOS) (days): 2.3  Days to GMLOS:-1.5     OBJECTIVE:  Risk of Unplanned Readmission Score: 8.83         Current admission status: Inpatient   Preferred Pharmacy:   [id number] - [place] - [address]  [address]  [place] [address]  Phone: [phone] Fax: [phone]    [id number] - [place] - [address]  [address]  [place] [address]  Phone: [phone] Fax: [phone]    Primary Care Provider: Ann Carmona DO    Primary Insurance: WORKERS COMPENSATION  Secondary Insurance: MEDICARE    DISCHARGE DETAILS:    Other Referral/Resources/Interventions Provided:  Referral Comments: [facility] able to accept  today. Copy of pt's worker's comp claim sent to [facility] via [employer].    Treatment Team Recommendation: Acute Rehab  Discharge Destination Plan:: Acute Rehab  Transport at Discharge : Wheelchair van     Number/Name of Dispatcher: Roundtrip  Transported by (Company and Unit #): [employer]  ETA of Transport (Date): 03/11/25  ETA of Transport (Time): 1400       Additional Comments: CM met with pt at bedside and informed of discharge today to [facility]. Pt agreeable. Pt declined call to family and informed CM he would let them know. Provider and nursing aware of 2pm transport time and location.    Accepting Facility Name, City & State : [facility]  Receiving Facility/Agency Phone Number: [facility] report #  [phone]

## 2025-03-11 NOTE — PLAN OF CARE
Problem: PAIN - ADULT  Goal: Verbalizes/displays adequate comfort level or baseline comfort level  Description: Interventions:  - Encourage patient to monitor pain and request assistance  - Assess pain using appropriate pain scale  - Administer analgesics based on type and severity of pain and evaluate response  - Implement non-pharmacological measures as appropriate and evaluate response  - Consider cultural and social influences on pain and pain management  - Notify physician/advanced practitioner if interventions unsuccessful or patient reports new pain  Outcome: Progressing     Problem: INFECTION - ADULT  Goal: Absence or prevention of progression during hospitalization  Description: INTERVENTIONS:  - Assess and monitor for signs and symptoms of infection  - Monitor lab/diagnostic results  - Monitor all insertion sites, i.e. indwelling lines, tubes, and drains  - Monitor endotracheal if appropriate and nasal secretions for changes in amount and color  - [place] appropriate cooling/warming therapies per order  - Administer medications as ordered  - Instruct and encourage patient and family to use good hand hygiene technique  - Identify and instruct in appropriate isolation precautions for identified infection/condition  Outcome: Progressing  Goal: Absence of fever/infection during neutropenic period  Description: INTERVENTIONS:  - Monitor WBC    Outcome: Progressing     Problem: SAFETY ADULT  Goal: Patient will remain free of falls  Description: INTERVENTIONS:  - Educate patient/family on patient safety including physical limitations  - Instruct patient to call for assistance with activity   - Consult OT/PT to assist with strengthening/mobility   - Keep Call bell within reach  - Keep bed low and locked with side rails adjusted as appropriate  - Keep care items and personal belongings within reach  - Initiate and maintain comfort rounds  - Make Fall Risk Sign visible to staff  - Offer Toileting every 2 Hours,  in advance of need  - Initiate/Maintain bed alarm  - Obtain necessary fall risk management equipment  - Apply yellow socks and bracelet for high fall risk patients  - Consider moving patient to room near nurses station  Outcome: Progressing  Goal: Maintain or return to baseline ADL function  Description: INTERVENTIONS:  -  Assess patient's ability to carry out ADLs; assess patient's baseline for ADL function and identify physical deficits which impact ability to perform ADLs (bathing, care of mouth/teeth, toileting, grooming, dressing, etc.)  - Assess/evaluate cause of self-care deficits   - Assess range of motion  - Assess patient's mobility; develop plan if impaired  - Assess patient's need for assistive devices and provide as appropriate  - Encourage maximum independence but intervene and supervise when necessary  - Involve family in performance of ADLs  - Assess for home care needs following discharge   - Consider OT consult to assist with ADL evaluation and planning for discharge  - Provide patient education as appropriate  Outcome: Progressing  Goal: Maintains/Returns to pre admission functional level  Description: INTERVENTIONS:  - Perform AM-PAC 6 Click Basic Mobility/ Daily Activity assessment daily.  - Set and communicate daily mobility goal to care team and patient/family/caregiver.   - Collaborate with rehabilitation services on mobility goals if consulted  - Perform Range of Motion 2 times a day.  - Reposition patient every 2 hours.  - Dangle patient 2 times a day  - Stand patient 2 times a day  - Ambulate patient 2 times a day  - Out of bed to chair 2 times a day   - Out of bed for meals 2 times a day  - Out of bed for toileting  - Record patient progress and toleration of activity level   Outcome: Progressing     Problem: DISCHARGE PLANNING  Goal: Discharge to home or other facility with appropriate resources  Description: INTERVENTIONS:  - Identify barriers to discharge w/patient and caregiver  -  Arrange for needed discharge resources and transportation as appropriate  - Identify discharge learning needs (meds, wound care, etc.)  - Arrange for interpretive services to assist at discharge as needed  - Refer to Case Management Department for coordinating discharge planning if the patient needs post-hospital services based on physician/advanced practitioner order or complex needs related to functional status, cognitive ability, or social support system  Outcome: Progressing     Problem: Knowledge Deficit  Goal: Patient/family/caregiver demonstrates understanding of disease process, treatment plan, medications, and discharge instructions  Description: Complete learning assessment and assess knowledge base.  Interventions:  - Provide teaching at level of understanding  - Provide teaching via preferred learning methods  Outcome: Progressing     Problem: Prexisting or High Potential for Compromised Skin Integrity  Goal: Skin integrity is maintained or improved  Description: INTERVENTIONS:  - Identify patients at risk for skin breakdown  - Assess and monitor skin integrity  - Assess and monitor nutrition and hydration status  - Monitor labs   - Assess for incontinence   - Turn and reposition patient  - Assist with mobility/ambulation  - Relieve pressure over bony prominences  - Avoid friction and shearing  - Provide appropriate hygiene as needed including keeping skin clean and dry  - Evaluate need for skin moisturizer/barrier cream  - Collaborate with interdisciplinary team   - Patient/family teaching  - Consider wound care consult   Outcome: Progressing

## 2025-03-11 NOTE — PLAN OF CARE
Problem: PHYSICAL THERAPY ADULT  Goal: Performs mobility at highest level of function for planned discharge setting.  See evaluation for individualized goals.  Description: Treatment/Interventions: LE strengthening/ROM, Functional transfer training, Elevations, Therapeutic exercise, Endurance training, Patient/family training, Bed mobility, Gait training, Spoke to nursing, Spoke to MD, Spoke to advanced practitioner, OT          See flowsheet documentation for full assessment, interventions and recommendations.  Outcome: Progressing  Note: Prognosis: Good  Problem List: Decreased strength, Decreased range of motion, Decreased endurance, Impaired balance, Decreased mobility, Impaired sensation, Orthopedic restrictions, Pain  Assessment: Chart review and two person identifiers were completed. Pt seen for PT treatment session this date, consisting of ther ex focused on strengthening and gt training on level surfaces to improve pt safety in household environment. Since previous session, pt has made good progress in terms of increased distance ambulated and tolerance to exercises. Pt greeted supine in bed and agreeable to PT session. Pt completed bed mobility well at a supervision level. Pt completed transfers well with VC for hand placement. Pt ambulated while maintaining TTWB on R LE. With increased distance pt would rest foot on ground more often. Pt stood at sink with 1 UE support while brushing teeth with no LOB. Pt completed seated exercises well. Pt completed 5 STS with armrest support. Pt demonstrated fatigue with increased # of reps.  Pt ended session seated in recliner with alarm activated and all needs within reach. Spoke to RN about session outcomes. Pertinent barriers during this session include  fatigue . Current goals and POC established on IE remain appropriate. Pt prognosis for achieving goals is good, pending pt progress with hospitalization/medical status improvements, and indicated by ability to  follow directions, responsive to cues/strategies, and previous response to intervention. Pt limited d/t limited family/caregiver support. Pt continues to be functioning below baseline level, and remains limited due to factors listed above. PT will continue to see pt during current hospitalization in order to address the deficits listed above and provide interventions consistent w/ POC in effort to achieve STGs. PT recommends level 1, maximum resource intensity upon discharge.  Barriers to Discharge: Inaccessible home environment, Decreased caregiver support (WB status, decline in functional mobiltiy)     Rehab Resource Intensity Level, PT: I (Maximum Resource Intensity)    See flowsheet documentation for full assessment.

## 2025-03-11 NOTE — PROGRESS NOTES
Progress Note - Orthopedics   Name: Tobin Kerns 69 y.o. male I MRN: [id number]  Unit/Bed#: 2 E 275-01 I Date of Admission: 3/6/2025   Date of Service: 3/11/2025 I Hospital Day: 4     Assessment & Plan  Rupture of right quadriceps tendon  POD#5 s/p Right knee revision quad tendon repair with allograft augmentation, cast application  Removed approximately 1 inch of cast at posterior aspect to continue to evaluate pressure wound. Placed consult for wound care.   Toe touch weightbearing Right lower extremity with use of walker  Do not expose cast to moisture  Can elevate Right lower extremity, ankle above knee above hip; placed heel on pillow to avoid increased pressure of cast on achilles. Overnight nursing staff placed protective dressing over distal aspect of posterior cast for protection.  Oxycodone 5 and 10 for moderate and severe pain, respectively. Tylenol and can consider Toradol if needed  PT/OT- up and out of bed, gait training  CM consulted for rehab placement  Dispo: Discharge planning to [facility] today      Subjective   69 y.o.male POD#5 s/p Right knee revision quad tendon repair with allograft and cast application. No acute events, no new complaints. Pain well controlled. Denies fevers, chills, CP, SOB, N/V, numbness or tingling. Patient reports no issues with urination or bowel movements. Patient states he is feeling better overall. He admits to improvement of ankle with removal of posterior cast    Objective :  Temp:  [98.1 °F (36.7 °C)-98.6 °F (37 °C)] 98.5 °F (36.9 °C)  HR:  [69-87] 87  BP: (127-134)/(65-76) 128/76  Resp:  [15-18] 15  SpO2:  [91 %-98 %] 98 %  O2 Device: CPAP    Physical Exam  Musculoskeletal:   Right lower extremity   Skin at posterior aspect of ankle, near achilles/calcaneus as depicted below in clinical images  Motor intact EHL, FHL, dorsiflexion and plantar flexion of ankle  DP pulse palpated  Decreased sensation to dorsum and plantar aspect of Left foot secondary to  "neuropathy          Lab Results: I have reviewed the following results:  No results for input(s): \"WBC\", \"HGB\", \"HCT\", \"PLT\", \"BANDSPCT\", \"BUN\", \"CREATININE\", \"PTT\", \"INR\", \"ESR\", \"CRP\" in the last 72 hours.  Blood Culture:    Lab Results   Component Value Date    BLOODCX No Growth After 5 Days. 01/18/2024    BLOODCX No Growth After 5 Days. 01/18/2024     Wound Culture: No results found for: \"WOUNDCULT\"        "

## 2025-03-11 NOTE — PLAN OF CARE
Problem: PAIN - ADULT  Goal: Verbalizes/displays adequate comfort level or baseline comfort level  Description: Interventions:  - Encourage patient to monitor pain and request assistance  - Assess pain using appropriate pain scale  - Administer analgesics based on type and severity of pain and evaluate response  - Implement non-pharmacological measures as appropriate and evaluate response  - Consider cultural and social influences on pain and pain management  - Notify physician/advanced practitioner if interventions unsuccessful or patient reports new pain  Outcome: Progressing     Problem: INFECTION - ADULT  Goal: Absence or prevention of progression during hospitalization  Description: INTERVENTIONS:  - Assess and monitor for signs and symptoms of infection  - Monitor lab/diagnostic results  - Monitor all insertion sites, i.e. indwelling lines, tubes, and drains  - Monitor endotracheal if appropriate and nasal secretions for changes in amount and color  - [place] appropriate cooling/warming therapies per order  - Administer medications as ordered  - Instruct and encourage patient and family to use good hand hygiene technique  - Identify and instruct in appropriate isolation precautions for identified infection/condition  Outcome: Progressing  Goal: Absence of fever/infection during neutropenic period  Description: INTERVENTIONS:  - Monitor WBC    Outcome: Progressing     Problem: SAFETY ADULT  Goal: Patient will remain free of falls  Description: INTERVENTIONS:  - Educate patient/family on patient safety including physical limitations  - Instruct patient to call for assistance with activity   - Consult OT/PT to assist with strengthening/mobility   - Keep Call bell within reach  - Keep bed low and locked with side rails adjusted as appropriate  - Keep care items and personal belongings within reach  - Initiate and maintain comfort rounds  - Make Fall Risk Sign visible to staff  - Apply yellow socks and bracelet  for high fall risk patients  - Consider moving patient to room near nurses station  Outcome: Progressing  Goal: Maintain or return to baseline ADL function  Description: INTERVENTIONS:  -  Assess patient's ability to carry out ADLs; assess patient's baseline for ADL function and identify physical deficits which impact ability to perform ADLs (bathing, care of mouth/teeth, toileting, grooming, dressing, etc.)  - Assess/evaluate cause of self-care deficits   - Assess range of motion  - Assess patient's mobility; develop plan if impaired  - Assess patient's need for assistive devices and provide as appropriate  - Encourage maximum independence but intervene and supervise when necessary  - Involve family in performance of ADLs  - Assess for home care needs following discharge   - Consider OT consult to assist with ADL evaluation and planning for discharge  - Provide patient education as appropriate  Outcome: Progressing       Problem: DISCHARGE PLANNING  Goal: Discharge to home or other facility with appropriate resources  Description: INTERVENTIONS:  - Identify barriers to discharge w/patient and caregiver  - Arrange for needed discharge resources and transportation as appropriate  - Identify discharge learning needs (meds, wound care, etc.)  - Arrange for interpretive services to assist at discharge as needed  - Refer to Case Management Department for coordinating discharge planning if the patient needs post-hospital services based on physician/advanced practitioner order or complex needs related to functional status, cognitive ability, or social support system  Outcome: Progressing

## 2025-03-11 NOTE — PHYSICAL THERAPY NOTE
PHYSICAL THERAPY NOTE          Patient Name: Tobin Kerns  Today's Date: 3/11/2025       03/11/25 1055   PT Last Visit   PT Visit Date 03/11/25   Note Type   Note Type Treatment   Pain Assessment   Pain Assessment Tool 0-10   Pain Score 2   Pain Location/Orientation Orientation: Right;Location: Knee   Pain Onset/Description Onset: Ongoing;Descriptor: Dull;Descriptor: Aching   Patient's Stated Pain Goal No pain   Hospital Pain Intervention(s) Repositioned;Ambulation/increased activity   Bed Mobility   Supine to Sit 5  Supervision   Additional items Assist x 1;Bedrails;Increased time required;Verbal cues   Transfers   Sit to Stand 4  Minimal assistance   Additional items Assist x 1;Increased time required;Bedrails;Verbal cues   Stand to Sit 4  Minimal assistance   Additional items Assist x 1;Armrests;Increased time required;Verbal cues   Additional Comments w/ RW   Ambulation/Elevation   Gait pattern Short stride;Excessively slow;Decreased foot clearance;Step to   Gait Assistance 4  Minimal assist   Additional items Assist x 1;Verbal cues   Assistive Device Rolling walker   Distance 80', 10'   Stair Management Assistance Not tested   Ambulation/Elevation Additional Comments Pt required VC to maintain TTWB on R LE, With fatigue duruing ambulation pt was putting foot down on ground more often   Balance   Static Sitting Good   Dynamic Sitting Fair +   Static Standing Fair   Dynamic Standing Fair -   Ambulatory Fair -   Endurance Deficit   Endurance Deficit Yes   Endurance Deficit Description decreased activity tolerance   Activity Tolerance   Activity Tolerance Patient tolerated treatment well;Patient limited by fatigue   Nurse Made Aware spoke to RN   Exercises   Glute Sets Sitting;AROM;Bilateral  (2 sets of 20)   Hip Abduction Sitting;AROM;Bilateral  (2x 20)   Ankle Pumps Sitting;AROM  (2 sets of 20)   Squat 5 reps  (STS; pt with  fatigued with increased # of reps)   Assessment   Prognosis Good   Problem List Decreased strength;Decreased range of motion;Decreased endurance;Impaired balance;Decreased mobility;Impaired sensation;Orthopedic restrictions;Pain   Assessment Chart review and two person identifiers were completed. Pt seen for PT treatment session this date, consisting of ther ex focused on strengthening and gt training on level surfaces to improve pt safety in household environment. Since previous session, pt has made good progress in terms of increased distance ambulated and tolerance to exercises. Pt greeted supine in bed and agreeable to PT session. Pt completed bed mobility well at a supervision level. Pt completed transfers well with VC for hand placement. Pt ambulated while maintaining TTWB on R LE. With increased distance pt would rest foot on ground more often. Pt stood at sink with 1 UE support while brushing teeth with no LOB. Pt completed seated exercises well. Pt completed 5 STS with armrest support. Pt demonstrated fatigue with increased # of reps.  Pt ended session seated in recliner with alarm activated and all needs within reach. Spoke to RN about session outcomes. Pertinent barriers during this session include  fatigue . Current goals and POC established on IE remain appropriate. Pt prognosis for achieving goals is good, pending pt progress with hospitalization/medical status improvements, and indicated by ability to follow directions, responsive to cues/strategies, and previous response to intervention. Pt limited d/t limited family/caregiver support. Pt continues to be functioning below baseline level, and remains limited due to factors listed above. PT will continue to see pt during current hospitalization in order to address the deficits listed above and provide interventions consistent w/ POC in effort to achieve STGs. PT recommends level 1, maximum resource intensity upon discharge.   Barriers to Discharge  Inaccessible home environment;Decreased caregiver support  (WB status, decline in functional mobiltiy)   Goals   STG Expiration Date 03/17/25   PT Treatment Day 3   Plan   Treatment/Interventions Functional transfer training;LE strengthening/ROM;Therapeutic exercise;Endurance training;Patient/family training;Equipment eval/education;Bed mobility;Gait training;Continued evaluation;Spoke to nursing;OT   Progress Progressing toward goals   PT Frequency 4-6x/wk   Discharge Recommendation   Rehab Resource Intensity Level, PT I (Maximum Resource Intensity)   Equipment Recommended Walker   Walker Package Recommended HD Bariatric wheeled walker   AM-PAC Basic Mobility Inpatient   Turning in Flat Bed Without Bedrails 3   Lying on Back to Sitting on Edge of Flat Bed Without Bedrails 3   Moving Bed to Chair 3   Standing Up From Chair Using Arms 3   Walk in Room 3   Climb 3-5 Stairs With Railing 1   Basic Mobility Inpatient Raw Score 16   Basic Mobility Standardized Score 38.32   [facility] Highest Level Of Mobility   -HL Goal 5: Stand one or more mins   JH-HLM Achieved 7: Walk 25 feet or more   Education   Education Provided Mobility training;Home exercise program;Assistive device   Patient Demonstrates acceptance/verbal understanding   End of Consult   Patient Position at End of Consult Bedside chair;Bed/Chair alarm activated;All needs within reach     Jesus Manuel Sotelo, PT, DPT

## 2025-03-11 NOTE — PROGRESS NOTES
PHYSICAL MEDICINE AND REHABILITATION   PREADMISSION ASSESSMENT     Projected IGC and Rehabilitation Diagnoses:  Impairment of mobility, safety and Activities of Daily Living (ADLs) due to Orthopedic Disorders:  08.9  Other Orthopedic    Etiologic: Rupture of right quadriceps tendon   Date of Onset: 3/6/25   Date of surgery: 3/6/25 Right - REPAIR TENDON QUADRICEPS- Right knee Revision quad tendon repair with allograft augmentation     PATIENT INFORMATION  Name: Tobin Kerns Phone #: [phone] (home) [phone] (work)  Address: [address] [place] [address] [address] [place] [address]  YOB: 1955 Age: 69 y.o. SS#   Marital Status: Single  Ethnicity:    Employment Status: currently employed  Extended Emergency Contact Information  Primary Emergency Contact: Pinky Amanda  Address: [address] [place] [address] [place]  Home Phone: [phone]  Mobile Phone: [phone]  Relation: Sister  Advance Directive: Level 1 ( No advance directive on file )    INSURANCE/COVERAGE:     Primary Payor: WORKERS COMPENSATION / Plan:  WORKERS COMPENSATION / Product Type: Workers Compensation /   Secondary Payer: Medicare A&B   ID# [id number]    Payer Contact: see below  Payer Contact: n/a    Contact Phone: see below  Contact Phone: n/a      Per [employer] - No specific assigned workers comp  /  claim# [id number] -  PH [phone], option 1  Per MYKEL byrne / Tobin who stated there is No prior authorization needed for acute rehab. Rehab Coverage 3/11-3/31/25.     MEDICARE #: [id number]   Medicare Days: 60/30/60     Medical Record #: [id number]    REFERRAL SOURCE:   Referring provider: Fletcher Foster DO  Referring facility: [facility]   Room: [address] [place], [place] [address] [id number]  PCP: Ann Carmona DO PCP phone number: [phone]    MEDICAL INFORMATION  HPI:  Tobin is a 69 y.o. male with past medical history of but not limited to obesity,  GERD, hyperlipidemia, hypertension, right quadriceps tendon repair performed on 7/5/2024 who presented to [facility] on 03/06/2025 for elective / scheduled Right knee revision quadriceps tendon repair. He underwent an uncomplicated Right knee revision quadriceps tendon repair with allograft augmentation and cast application. Post op he spent a brief time in PACU and was transferred to the floor. His pain was managed with multimodal PRN pain medications. He is TTWB to [facility]. PT/OT therapies were consulted and continue to follow patient at this time and are recommending inpatient acute rehab when medically stable. All involved medical disciplines feel/agree patient is medically ready for discharge at this time. Inpatient acute rehabilitation physician was consulted. Upon review of patient's case and correspondence with PT/OT therapy services, [facility] physician feels Tobin will benefit and is a good candidate / appropriate for inpatient acute rehab at this time. He has demonstrated the willingness / desire and tolerance to participate in the required 3 hours or more of therapies per day.   Past Medical History:   Past Surgical History:   Allergies:     Past Medical History:   Diagnosis Date    Abdominal aortic ectasia (HCC) 11/13/2023    Next Abdominal Aortic U/S due 11/13/27    Closed fracture of right foot     As a child    Diverticulosis     Drusen (degenerative) of macula, bilateral 09/27/2021    Gastro-esophageal reflux disease without esophagitis 05/30/2019    GERD (gastroesophageal reflux disease) 2010    Hammer toe 15 years    History of colon polyps     Hypertension 09/27/2021    IFG (impaired fasting glucose) 10/01/2021    Internal hemorrhoids     Kidney stone 2007    Mixed hyperlipidemia 05/30/2019    Morbid obesity (HCC) 09/27/2021    Myopia, bilateral 09/27/2021    JOVANY on CPAP     Peripheral neuropathy 09/27/2021    Pinguecula, bilateral 09/27/2021    Presbyopia 10/11/2016    Pure  hypertriglyceridemia 08/19/2019    Radial fracture 1975    Stress Fracture - Right Arm    Regular astigmatism, bilateral 09/27/2021    Tendonitis     Left shoulder    Past Surgical History:   Procedure Laterality Date    COLONOSCOPY      EGD  04/14/2022    Gastric Erosisons, Esophagitis    FIBULA FRACTURE SURGERY Right 2009    Hardware - Plate and screws in place    FRACTURE SURGERY  2005    KNEE SURGERY      MA SUTR QUADRICEPS/HAMSTRING MUSC RPT RCNSTJ Right 3/6/2025    Procedure: REPAIR TENDON QUADRICEPS- Right knee Revision quad tendon repair with allograft augmentation;  Surgeon: Fletcher Foster DO;  Location: [facility];  Service: Orthopedics    QUADRICEPS TENDON REPAIR Right 07/25/2024    Procedure: REPAIR TENDON QUADRICEPS- Right;  Surgeon: Fletcher Foster DO;  Location: [facility];  Service: Orthopedics    SKIN GRAFT  2021    Right foot     TONSILECTOMY AND ADNOIDECTOMY      TONSILLECTOMY       No Known Allergies      Medical/functional conditions requiring inpatient rehabilitation: impaired mobility/ self care ; impaired balance /ambulation     Risk for medical/clinical complications: risk for falls; pain ; skin breakdown ; infection     Comorbidities/Surgeries in the last 100 days:   Rupture of right quadriceps tendon with repair   obesity, GERD, hyperlipidemia, hypertension    CURRENT VITAL SIGNS:   Temp:  [98.1 °F (36.7 °C)-98.6 °F (37 °C)] 98.5 °F (36.9 °C)  HR:  [69-87] 87  BP: (127-134)/(65-76) 128/76  Resp:  [15-18] 15  SpO2:  [91 %-98 %] 98 %  O2 Device: None (Room air)   Intake/Output Summary (Last 24 hours) at 3/11/2025 1310  Last data filed at 3/11/2025 1232  Gross per 24 hour   Intake 720 ml   Output 1150 ml   Net -430 ml        LABORATORY RESULTS:      Lab Results   Component Value Date    HGB 13.7 03/07/2025    HCT 39.7 03/07/2025    WBC 13.20 (H) 03/07/2025     Lab Results   Component Value Date    BUN 16 03/07/2025    BUN 22 08/24/2021    K 4.0 03/07/2025    K 4.2 08/24/2021      2025     2021    CREATININE 1.00 2025    CREATININE 1.05 2021     Lab Results   Component Value Date    PROTIME 14.0 2021    INR 1.13 2021    INR 1.1 2021        DIAGNOSTIC STUDIES:  No results found.    PRECAUTIONS/SPECIAL NEEDS:  Tobacco:   Social History     Tobacco Use   Smoking Status Former    Current packs/day: 0.00    Average packs/day: 1 pack/day for 15.0 years (15.0 ttl pk-yrs)    Types: Cigarettes    Start date: 1980    Quit date: 2005    Years since quittin.2    Passive exposure: Never   Smokeless Tobacco Never   , Alcohol:    Social History     Substance and Sexual Activity   Alcohol Use Not Currently   , Weight Bearing Precautions:  toe-touch weight bearing RLE , Anticoagulation:   Lovenox SQ @ ordered by MD  , Blood Sugar Management: @ ordered by MD, Edema Management, Safety Concerns, Pain Management, Dietary Restrictions: Regular, Language Preference: English , and Fall precautions     MEDICATIONS:     Current Facility-Administered Medications:     acetaminophen (TYLENOL) tablet 650 mg, 650 mg, Oral, Q6H PEREZ, America Ambrose PA-C, 650 mg at 25 1232    ascorbic acid (VITAMIN C) tablet 500 mg, 500 mg, Oral, Daily, America Ambrose PA-C, 500 mg at 25 0849    Cholecalciferol (VITAMIN D3) tablet 5,000 Units, 5,000 Units, Oral, Daily, America Ambrose PA-C, 5,000 Units at 25 0849    enoxaparin (LOVENOX) subcutaneous injection 40 mg, 40 mg, Subcutaneous, Q24H PEREZ, America Ambrose PA-C, 40 mg at 25 0849    famotidine (PEPCID) tablet 20 mg, 20 mg, Oral, Daily, America Ambrose PA-C, 20 mg at 25 0849    losartan (COZAAR) tablet 50 mg, 50 mg, Oral, Daily, America Ambrose PA-C, 50 mg at 25 0849    metFORMIN (GLUCOPHAGE-XR) 24 hr tablet 1,500 mg, 1,500 mg, Oral, Daily With Dinner, WESLEY Delgado-C, 1,500 mg at 03/10/25 3853    oxyCODONE (ROXICODONE) immediate release tablet 10 mg, 10 mg, Oral, Q4H PRN, America  RYLAND Ambrose    oxyCODONE (ROXICODONE) IR tablet 5 mg, 5 mg, Oral, Q4H PRN, America Ambrose PA-C    pravastatin (PRAVACHOL) tablet 40 mg, 40 mg, Oral, Daily With Dinner, America Ambrose PA-C, 40 mg at 03/10/25 1751    tamsulosin (FLOMAX) capsule 0.4 mg, 0.4 mg, Oral, Daily With Dinner, America Ambrose PA-C, 0.4 mg at 03/10/25 1751    [Held by provider] terbinafine (LamISIL) tablet 250 mg, 250 mg, Oral, Daily, America Ambrose PA-C    SKIN INTEGRITY:   RLE s/p sx site  - Treatment @ ordered     PRIOR LEVEL OF FUNCTION:  He lives in a(n) mobile home  Tobin Kerns is not  and lives alone.  Self Care: Independent, Indoor Mobility: Independent, Stairs (in/outdoor): Independent, and Cognition: Independent    FALLS IN THE LAST 6 MONTHS: 1  HOME ENVIRONMENT:  The living area: can live on one level  There are 4 steps to enter the home.    The patient will not have 24 hour supervision/physical assistance available upon discharge.    PREVIOUS DME:  Equipment in home (previous DME): Rolling Walker    FUNCTIONAL STATUS:  Physical Therapy Occupational Therapy Speech Therapy   As per PT:     PT Visit Date 03/11/25   Note Type   Note Type Treatment   Pain Assessment   Pain Assessment Tool 0-10   Pain Score 2   Pain Location/Orientation Orientation: Right;Location: Knee   Pain Onset/Description Onset: Ongoing;Descriptor: Dull;Descriptor: Aching   Patient's Stated Pain Goal No pain   Hospital Pain Intervention(s) Repositioned;Ambulation/increased activity   Bed Mobility   Supine to Sit 5  Supervision   Additional items Assist x 1;Bedrails;Increased time required;Verbal cues   Transfers   Sit to Stand 4  Minimal assistance   Additional items Assist x 1;Increased time required;Bedrails;Verbal cues   Stand to Sit 4  Minimal assistance   Additional items Assist x 1;Armrests;Increased time required;Verbal cues   Additional Comments w/ RW   Ambulation/Elevation   Gait pattern Short stride;Excessively slow;Decreased foot  clearance;Step to   Gait Assistance 4  Minimal assist   Additional items Assist x 1;Verbal cues   Assistive Device Rolling walker   Distance 80', 10'   Stair Management Assistance Not tested   Ambulation/Elevation Additional Comments Pt required VC to maintain TTWB on R LE, With fatigue duruing ambulation pt was putting foot down on ground more often   Balance   Static Sitting Good   Dynamic Sitting Fair +   Static Standing Fair   Dynamic Standing Fair -   Ambulatory Fair -   Endurance Deficit   Endurance Deficit Yes   Endurance Deficit Description decreased activity tolerance   Activity Tolerance   Activity Tolerance Patient tolerated treatment well;Patient limited by fatigue   Nurse Made Aware spoke to RN   Exercises   Glute Sets Sitting;AROM;Bilateral  (2 sets of 20)   Hip Abduction Sitting;AROM;Bilateral  (2x 20)   Ankle Pumps Sitting;AROM  (2 sets of 20)   Squat 5 reps  (STS; pt with fatigued with increased # of reps)   Assessment   Prognosis Good   Problem List Decreased strength;Decreased range of motion;Decreased endurance;Impaired balance;Decreased mobility;Impaired sensation;Orthopedic restrictions;Pain   Assessment Chart review and two person identifiers were completed. Pt seen for PT treatment session this date, consisting of ther ex focused on strengthening and gt training on level surfaces to improve pt safety in household environment. Since previous session, pt has made good progress in terms of increased distance ambulated and tolerance to exercises. Pt greeted supine in bed and agreeable to PT session. Pt completed bed mobility well at a supervision level. Pt completed transfers well with VC for hand placement. Pt ambulated while maintaining TTWB on R LE. With increased distance pt would rest foot on ground more often. Pt stood at sink with 1 UE support while brushing teeth with no LOB. Pt completed seated exercises well. Pt completed 5 STS with armrest support. Pt demonstrated fatigue with  increased # of reps.  Pt ended session seated in recliner with alarm activated and all needs within reach. Spoke to RN about session outcomes. Pertinent barriers during this session include  fatigue . Current goals and POC established on IE remain appropriate. Pt prognosis for achieving goals is good, pending pt progress with hospitalization/medical status improvements, and indicated by ability to follow directions, responsive to cues/strategies, and previous response to intervention. Pt limited d/t limited family/caregiver support. Pt continues to be functioning below baseline level, and remains limited due to factors listed above. PT will continue to see pt during current hospitalization in order to address the deficits listed above and provide interventions consistent w/ POC in effort to achieve STGs. PT recommends level 1, maximum resource intensity upon discharge.    As per OT:     OT Visit Date 03/10/25   Note Type   Note Type Treatment   Pain Assessment   Pain Assessment Tool 0-10   Pain Score 1   Pain Location/Orientation Orientation: Right;Location: Leg;Location: Knee   Pain Onset/Description Onset: Ongoing   Hospital Pain Intervention(s) Repositioned;Ambulation/increased activity   Restrictions/Precautions   Weight Bearing Precautions Per Order Yes   RLE Weight Bearing Per Order (S)  TTWB  (per ortho)   Other Precautions Chair Alarm;Bed Alarm;Fall Risk;Pain;WBS   ADL   Where Assessed Standing at sink  (Assist levels for some self care tasks are based on functional assessment of performance skills and deficits observed during session.)   Eating Assistance 6  Modified independent   Eating Deficit Setup   Grooming Assistance 5  Supervision/Setup   Grooming Deficit Setup;Verbal cueing;Supervision/safety;Increased time to complete;Wash/dry hands;Teeth care   Grooming Comments standing at sink   UB Bathing Assistance 4  Minimal Assistance   UB Bathing Deficit Setup;Verbal cueing;Supervision/safety;Increased time  to complete   LB Bathing Assistance 3  Moderate Assistance   LB Bathing Deficit Setup;Verbal cueing;Supervision/safety;Increased time to complete   UB Dressing Assistance 4  Minimal Assistance   UB Dressing Deficit Setup;Verbal cueing;Supervision/safety;Increased time to complete;Thread RUE;Thread LUE   UB Dressing Comments sitting EOB   LB Dressing Assistance 2  Maximal Assistance   LB Dressing Deficit Setup;Verbal cueing;Supervision/safety;Increased time to complete;Don/doff L sock;Don/doff R sock   LB Dressing Comments sitting EOB   Toileting Assistance  4  Minimal Assistance   Functional Standing Tolerance   Time ~8 minutes   Activity Grooming standing at sink   Comments w/ RW   Bed Mobility   Additional Comments pt OOB upon arrival standing in room w/ PCA   Transfers   Sit to Stand 4  Minimal assistance   Additional items Assist x 1;Bedrails;Increased time required;Verbal cues   Stand to Sit 4  Minimal assistance   Additional items Assist x 1;Increased time required;Verbal cues;Armrests   Additional Comments w/ RW   Functional Mobility   Functional Mobility 4  Minimal assistance   Additional Comments MinAx1 w/ RW   Additional items Rolling walker   Therapeutic Exercise - ROM   UE-ROM Yes   ROM- Right Upper Extremities   R Hand    (composite  and extension)   R Position Seated   Equipment Other (Comment)  (none)   R Weight/Reps/Sets 1x15   ROM - Left Upper Extremities    L Hand AROM  (composite  and extension)   L Position Seated   Equipment Other (Comment)  (none)   L Weight/Reps/Sets 1x15   Cognition   Overall Cognitive Status WFL   Arousal/Participation Alert;Responsive;Cooperative   Attention Within functional limits   Orientation Level Oriented X4   Memory Within functional limits   Following Commands Follows all commands and directions without difficulty   Activity Tolerance   Activity Tolerance Patient tolerated treatment well   Assessment   Assessment Patient participated in Skilled OT session  this date with interventions consisting of ADL re training with the use of correct body mechnaics, Energy Conservation techniques, deep breathing technique, safety awareness and fall prevention techniques, maintaining weight bearing restrictions, and increase dynamic sit/ stand balance during functional activity  . Patient agreeable to OT treatment session, upon arrival patient was found  standing in room w/ PCA .  Pt requiring MinAx1 for tx and ambulation in room and bathroom. In comparison to previous session, patient with improvements in functional mobility and standing tolerance. Patient requiring verbal cues for safety and verbal cues for correct technique. Patient continues to be functioning below baseline level, occupational performance remains limited secondary to factors listed above and increased risk for falls and injury.   From OT standpoint, recommendation at time of d/c would be Level 1. Patient to benefit from continued Occupational Therapy treatment while in the hospital to address deficits as defined above and maximize level of functional independence with ADLs and functional mobility.    N/A      CARE SCORES:  Self Care:  Eatin: Independent  Oral hygiene: 04: Supervision or touching  assistance  Toilet hygiene: 03: Partial/moderate assistance  Shower/bathing self: 02: Substantial/maximal assistance  Upper body dressin: Partial/moderate assistance  Lower body dressin: Substantial/maximal assistance  Putting on/taking off footwear: 02: Substantial/maximal assistance  Transfers:  Roll left and right: 03: Partial/moderate assistance  Sit to lyin: Partial/moderate assistance  Lying to sitting on side of bed: 03: Partial/moderate assistance  Sit to stand: 03: Partial/moderate assistance  Chair/bed to chair transfer: 03: Partial/moderate assistance  Toilet transfer: 03: Partial/moderate assistance  Mobility:  Walk 10 ft: 03: Partial/moderate assistance  Walk 50 ft with two turns: 03:  Partial/moderate assistance  Walk 150ft: 09: Not applicable    CURRENT GAP IN FUNCTION  Prior to Admission: Functional Status: Patient was independent with mobility/ambulation, transfers, ADL's, IADL's.    Expected functional outcomes: It is expected that with skilled acute rehabilitation services the patient will progress to Independent for self care and Independent for mobility     Estimated length of stay: 10 to 14 days    Anticipated Post-Discharge Disposition/Treatment  Disposition: Return to previous home/apartment.  Outpatient Services: Physical Therapy (PT) and Occupational Therapy (OT)    BARRIERS TO DISCHARGE  Lovenox, Weakness, Pain, Balance Difficulty, Fatigue, Home Accessibility, Caregiver Accessibility, Equipment Needs, and Lives Alone    INTERVENTIONS FOR DISCHARGE  Adaptive equipment, Patient/Family/Caregiver Education, Arrange DME needs, Medication Changes per MD, Therapy exercises, and Energy conservation education     REQUIRED THERAPY:  Patient will require PT and OT 90 minutes each per day, five days per week to achieve rehab goals.     REQUIRED FUNCTIONAL AND MEDICAL MANAGEMENT FOR INPATIENT REHABILITATION:  Pain Management: Overall pain is well controlled, Deep Vein Thrombosis (DVT) Prophylaxis:  low molecular weight heparin, SCD's while in bed, and   Nursing education and bowel/bladder management, internal medicine to manage/monitor medical conditions, PM&R to maximize function and provide medical oversight, PT/OT intervention, patient/family education/training, and any consults as needed     RECOMMENDED LEVEL OF CARE:   Tobin is a 69 y.o. male with past medical history of but not limited to obesity, GERD, hyperlipidemia, hypertension, right quadriceps tendon repair performed on 7/5/2024 who presented to [facility] on 03/06/2025 for elective / scheduled Right knee revision quadriceps tendon repair. He underwent an uncomplicated Right knee revision quadriceps tendon repair with  allograft augmentation and cast application. Post op he spent a brief time in PACU and was transferred to the floor. His pain was managed with multimodal PRN pain medications. He is TTWB to [facility]. PT/OT therapies were consulted and continue to follow patient at this time and are recommending inpatient acute rehab when medically stable. All involved medical disciplines feel/agree patient is medically ready for discharge at this time. Inpatient acute rehabilitation physician was consulted. Upon review of patient's case and correspondence with PT/OT therapy services, [facility] physician feels Tobin will benefit and is a good candidate / appropriate for inpatient acute rehab at this time. He has demonstrated the willingness / desire and tolerance to participate in the required 3 hours or more of therapies per day. Prior to admission / hospital stay Tobin was independent with all ADLs /functional mobility / iADLs. Currently with PT therapies /  OT therapies : mod independent - min A with upper body ADLs ; mod-max A with lower body ADLs. Nursing is being recommended for medication distribution / management , bowel / bladder management and educational purposes , internal medicine to continue to monitor and manage medical conditions ,PM&R to maximize  function and provide medical oversight, and inpatient rehab to maximize self care ,mobility ,strength , and endurance upon discharge to home.

## 2025-03-11 NOTE — CONSULTS
PHYSICAL MEDICINE AND REHABILITATION H&P/ CONSULT NOTE  Tobin Kerns 69 y.o. male MRN: [id number]  Unit/Bed#: [facility] 210-01 Encounter: [id number]     Rehab Diagnosis: Impairment of mobility, safety and Activities of Daily Living (ADLs) due to Orthopedic Disorders:  08.9  Other Orthopedic Etiologic: Rupture of right quadriceps tendon   Date of Onset: 3/6/25   Date of surgery: 3/6/25 Right - REPAIR TENDON QUADRICEPS- Right knee Revision quad tendon repair with allograft augmentation        History of Present Illness:   Tobin Kerns is a 69 year-old male, with a past medical history of hypertension, hyperlipidemia, JOVANY, impaired fasting glucose, bilateral lower extremity neuropathy, GERD, BPH, presenting to [facility] after an elective right quadricept tendon repair. He initially underwent quad tendon repair on 7/25/24 with Dr. Foster. He was discharged home with outpatient PT. He was noted to have recurrent high-grade tear on repeat MRI with extensor lag on exam and elected for operative management. On 3/6/25, he underwent revision of quad tendon repair with allograft augmentation. He was evaluated by PT and OT and deemed an appropriate candidate for ARC due to functional deficits.     Plan:     Rehabilitation  Functional deficits: impaired mobility, self care  Begin PT/OT/SLP.  Rehabilitation goals are to achieve a modified independent level with mobility and self care.  Prognosis is good.  ELOS is 10-14 days.  Estimated discharge is home.     DVT prophylaxis  Lovenox while inpatient     Pain  Scheduled Tylenol and PRN 5-10 mg oxycodone q 4 hours     Bladder plan  Continent    Bowel plan  Continent    Code Status  Level 1     Incidental Findings  None     * Quadriceps muscle rupture, right, subsequent encounter  Assessment & Plan  HPI:   He initially underwent quad tendon repair on 7/25/24 with Dr. Foster. He was discharged home with outpatient PT.  Later noted to have recurrent high-grade tear with extensor lag on  "exam and elected for operative management  January 2025 MRI: \"Prior quadriceps insertion repair with high-grade recurrent tear exhibiting up to 2.2 cm of tendon retraction.\"  On 3/6/25, he underwent revision of quad tendon repair with allograft augmentation    Plan:   PT and OT for 3 hours a day, 5-6 days a week   TTWB RLE   Avoid moisture to cast   Pain: Scheduled Tylenol (monitor LFTs) and PRN 5-10 mg oxycodone q 4 hours (wean when able)   Lovenox for DVT prophylaxis while inpatient     Wound of right ankle  Assessment & Plan  Skin breakdown on R heel due to cast. Ortho removed approximately 1 inch of cast at posterior aspect on 3/11  Local wound care  Continue to monitor   Present on admission to [facility]   Wound care RN consulted     Impaired mobility and ADLs  Assessment & Plan  Patient was evaluated by the rehabilitation team MD and an appropriate candidate for acute inpatient rehabilitation program at this time.  The patient will tolerate 3 hours/day 5 to 7 days/week of intensive physical and  occupational therapy   in order to obtain goals for community discharge  Due to the patient's functional Compared to their baseline level of function in addition to their ongoing medical needs, the patient would benefit from daily supervision from a rehabilitation physician as well as rehabilitation nursing to implement and adjust the medical as well as functional plan of care in order to meet the patient's goals.  Bladder: Monitor closely for urinary incontinence and/or retention. Monitor urine output and encourage spontaneous voids. If unable to void spontaneously, will monitor with PVR bladder scans and initiate CIC regimen.  Skin: Monitor for breakdown, frequent turns, pressure offloading  Sleep: Encourage sleep hygiene (routine that promotes healthy circadian rhythm,avoid caffeine later in the day, quiet/dark room at night, reduce electronic before bedtime)        BPH (benign prostatic hyperplasia)  Assessment & " Plan  Continue tamsulosin 0.4 mg daily   Patient currently voiding without difficulty   PRN PVRs if urinary retention is suspected     Ulcer of left foot, with fat layer exposed (HCC)  Assessment & Plan  Local wound care  Present on admission   Wound care RN consulted     IFG (impaired fasting glucose)  Assessment & Plan  Continue metformin 1500 mg daily   Pending repeat A1c ordered by IM     JOVANY on CPAP  Assessment & Plan  Continue CPAP qHS    Peripheral neuropathy  Assessment & Plan  Patient is not diabetic   Consider B12 level     Gastro-esophageal reflux disease without esophagitis  Assessment & Plan  Continue famotidine 20 mg daily     Mixed hyperlipidemia  Assessment & Plan  Continue pravastatin 40 mg daily     Hypertension  Assessment & Plan  Continue losartan 50 mg daily   Monitor BP               Subjective/Interval Events:     Seen and evaluated patient at bedside. Reviewed overview of [facility] facility with patient. He does not have any concerns at this time. Last BM 3/11, continent of bowel and bladder. He states he was catheterized once in the hospital but has since been able to urinate without difficulty.     Review of Systems   Constitutional:  Negative for appetite change.   Respiratory:  Negative for shortness of breath.    Cardiovascular:  Negative for chest pain.   Gastrointestinal:  Negative for constipation.   Genitourinary:  Negative for difficulty urinating (not currently).   Neurological:  Positive for numbness (baseline BL LE neuropathy). Negative for dizziness and light-headedness.   Psychiatric/Behavioral:  Negative for sleep disturbance.          Function:  PRIOR LEVEL OF FUNCTION:  He lives in a(n) mobile home  Tobin Kerns is not  and lives alone.  Self Care: Independent, Indoor Mobility: Independent, Stairs (in/outdoor): Independent, and Cognition: Independent     FALLS IN THE LAST 6 MONTHS: 1  HOME ENVIRONMENT:  The living area: can live on one level  There are 4 steps to enter  "the home.    The patient will not have 24 hour supervision/physical assistance available upon discharge.       PREVIOUS DME:  Equipment in home (previous DME): [employer]    Current level of function:    Physical Therapy: transfers min A, ambulation min A with RW     Occupational Therapy: LB bathing mod A, LB dressing Max A, otherwise min A-supervision     Physical Exam:  /69 (BP Location: Left arm)   Pulse 65   Temp (!) 97.4 °F (36.3 °C) (Temporal)   Resp 18   Ht 6' 5\" (1.956 m)   Wt (!) 139 kg (306 lb 14.1 oz)   SpO2 98%   BMI 36.39 kg/m²        Intake/Output Summary (Last 24 hours) at 3/12/2025 1049  Last data filed at 3/12/2025 0931  Gross per 24 hour   Intake 240 ml   Output --   Net 240 ml       Body mass index is 36.39 kg/m².      Physical Exam  Constitutional:       General: He is not in acute distress.     Appearance: He is obese. He is not ill-appearing, toxic-appearing or diaphoretic.   HENT:      Head: Normocephalic and atraumatic.      Nose: Nose normal.      Mouth/Throat:      Mouth: Mucous membranes are moist.   Eyes:      Conjunctiva/sclera: Conjunctivae normal.   Cardiovascular:      Pulses:           Dorsalis pedis pulses are 2+ on the right side and 2+ on the left side.   Pulmonary:      Effort: Pulmonary effort is normal. No respiratory distress.   Skin:     General: Skin is warm and dry.   Neurological:      Mental Status: He is oriented to person, place, and time.      Sensory: Sensory deficit (baseline BL LE neuropathy) present.   Psychiatric:         Mood and Affect: Mood normal.         Behavior: Behavior normal.            Labs, medications, and imaging personally reviewed.    Laboratory:    Lab Results   Component Value Date    SODIUM 137 03/12/2025    K 4.3 03/12/2025     03/12/2025    CO2 25 03/12/2025    BUN 23 03/12/2025    CREATININE 1.05 03/12/2025    GLUC 95 03/12/2025    CALCIUM 9.1 03/12/2025     Lab Results   Component Value Date    WBC 9.17 03/12/2025    " HGB 12.4 03/12/2025    HCT 37.9 03/12/2025    MCV 91 03/12/2025     03/12/2025     Lab Results   Component Value Date    INR 1.13 12/21/2021    INR 1.1 08/21/2021    PROTIME 14.0 12/21/2021         Current Facility-Administered Medications:     acetaminophen (TYLENOL) tablet 650 mg, 650 mg, Oral, Q6H PEREZ, Toño Brito MD, 650 mg at 03/12/25 0519    ascorbic acid (VITAMIN C) tablet 500 mg, 500 mg, Oral, Daily, Toño Brito MD, 500 mg at 03/12/25 0851    Cholecalciferol (VITAMIN D3) tablet 5,000 Units, 5,000 Units, Oral, Daily, Toño Brito MD, 5,000 Units at 03/12/25 0852    enoxaparin (LOVENOX) subcutaneous injection 40 mg, 40 mg, Subcutaneous, Q24H PEREZ, Toño Brito MD, 40 mg at 03/12/25 0851    famotidine (PEPCID) tablet 20 mg, 20 mg, Oral, Daily, Toño Brito MD, 20 mg at 03/12/25 0851    losartan (COZAAR) tablet 50 mg, 50 mg, Oral, Daily, Toño Brito MD, 50 mg at 03/12/25 0852    metFORMIN (GLUCOPHAGE-XR) 24 hr tablet 1,500 mg, 1,500 mg, Oral, Daily With Dinner, Toño Brito MD, 1,500 mg at 03/11/25 1704    multivitamin stress formula tablet 1 tablet, 1 tablet, Oral, Daily, Toño Brito MD, 1 tablet at 03/12/25 0851    oxyCODONE (ROXICODONE) immediate release tablet 10 mg, 10 mg, Oral, Q4H PRN, Toño Brito MD    oxyCODONE (ROXICODONE) IR tablet 5 mg, 5 mg, Oral, Q4H PRN, Toño Brito MD    polyethylene glycol (MIRALAX) packet 17 g, 17 g, Oral, Daily PRN, Modesta Lee PA-C    pravastatin (PRAVACHOL) tablet 40 mg, 40 mg, Oral, Daily With Dinner, Toño Brito MD, 40 mg at 03/11/25 1706    tamsulosin (FLOMAX) capsule 0.4 mg, 0.4 mg, Oral, Daily With Dinner, Toño Brito MD, 0.4 mg at 03/11/25 1706  No Known Allergies   Patient Active Problem List    Diagnosis Date Noted    Quadriceps muscle rupture, right, subsequent encounter 07/25/2024    BPH (benign prostatic hyperplasia) 03/12/2025    Impaired mobility and ADLs 03/12/2025    Wound of right ankle 03/12/2025    Quadriceps tendon rupture,  right, sequela 03/07/2025    Obesity (BMI 35.0-39.9 without comorbidity) 03/04/2025    Onychomycosis 12/30/2024    Urinary retention 07/27/2024    Ambulatory dysfunction 07/23/2024    Abdominal aortic ectasia (HCC) 11/13/2023    Ulcer of left foot, with fat layer exposed (HCC) 02/23/2022    IFG (impaired fasting glucose) 10/01/2021    Hypertension 09/27/2021    Peripheral neuropathy 09/27/2021    Drusen (degenerative) of macula, bilateral 09/27/2021    Myopia, bilateral 09/27/2021    Pinguecula, bilateral 09/27/2021    Regular astigmatism, bilateral 09/27/2021    JOVANY on CPAP     History of colon polyps     Pure hypertriglyceridemia 08/19/2019    Mixed hyperlipidemia 05/30/2019    Gastro-esophageal reflux disease without esophagitis 05/30/2019    Presbyopia 10/11/2016     Past Medical History:   Diagnosis Date    Abdominal aortic ectasia (HCC) 11/13/2023    Next Abdominal Aortic U/S due 11/13/27    Closed fracture of right foot     As a child    Diabetes ([facility])     Diverticulosis     Drusen (degenerative) of macula, bilateral 09/27/2021    Dyslipidemia     Gastro-esophageal reflux disease without esophagitis 05/30/2019    GERD (gastroesophageal reflux disease) 2010    Hammer toe 15 years    History of colon polyps     Hypertension 09/27/2021    IFG (impaired fasting glucose) 10/01/2021    Internal hemorrhoids     Kidney stone 2007    Mixed hyperlipidemia 05/30/2019    Morbid obesity (HCC) 09/27/2021    Myopia, bilateral 09/27/2021    JOVANY on CPAP     Peripheral neuropathy 09/27/2021    Pinguecula, bilateral 09/27/2021    Presbyopia 10/11/2016    Pure hypertriglyceridemia 08/19/2019    Radial fracture 1975    Stress Fracture - Right Arm    Regular astigmatism, bilateral 09/27/2021    Tendonitis     Left shoulder     Past Surgical History:   Procedure Laterality Date    COLONOSCOPY      EGD  04/14/2022    Gastric Erosisons, Esophagitis    FIBULA FRACTURE SURGERY Right 2009    Hardware - Plate and screws in place     FRACTURE SURGERY  2005    KNEE SURGERY      GA SUTR QUADRICEPS/HAMSTRING MUSC RPT RCNSTJ Right 3/6/2025    Procedure: REPAIR TENDON QUADRICEPS- Right knee Revision quad tendon repair with allograft augmentation;  Surgeon: Fletcher Foster DO;  Location: [facility];  Service: Orthopedics    QUADRICEPS TENDON REPAIR Right 2024    Procedure: REPAIR TENDON QUADRICEPS- Right;  Surgeon: Fletcher Foster DO;  Location: [facility];  Service: Orthopedics    SKIN GRAFT      Right foot     TONSILECTOMY AND ADNOIDECTOMY      TONSILLECTOMY       Social History     Socioeconomic History    Marital status: Single     Spouse name: Not on file    Number of children: 0    Years of education: Not on file    Highest education level: Not on file   Occupational History    Occupation:  / [employer]   Tobacco Use    Smoking status: Former     Current packs/day: 0.00     Average packs/day: 1 pack/day for 15.0 years (15.0 ttl pk-yrs)     Types: Cigarettes     Start date: 1980     Quit date: 2005     Years since quittin.2     Passive exposure: Never    Smokeless tobacco: Never   Vaping Use    Vaping status: Never Used   Substance and Sexual Activity    Alcohol use: Not Currently    Drug use: Not Currently     Types: Marijuana     Comment: Last Marijuana Use ; Other unknown drugs while in [place]    Sexual activity: Not Currently     Partners: Female     Birth control/protection: Condom Male   Other Topics Concern    Not on file   Social History Narrative    Single    Lives alone     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     Social Drivers of Health     Financial Resource Strain: Low Risk  (10/16/2023)    Overall Financial Resource Strain (CARDIA)     Difficulty of Paying Living Expenses: Not hard at all   Food Insecurity: No Food Insecurity (3/11/2025)    Nursing - Inadequate Food Risk Classification     Worried About Running Out of Food in the Last Year: Never true     Ran Out of Food in the Last Year: Never  true     Ran Out of Food in the Last Year: Never true   Transportation Needs: No Transportation Needs (3/11/2025)    Nursing - Transportation Risk Classification     Lack of Transportation: Not on file     Lack of Transportation: No   Physical Activity: Not on file   Stress: Not on file   Social Connections: Unknown (2024)    Received from [facility]    Social Connections     How often do you feel lonely or isolated from those around you? (Adult - for ages 18 years and over): Not on file   Intimate Partner Violence: Unknown (3/11/2025)    Nursing IPS     Feels Physically and Emotionally Safe: Not on file     Physically Hurt by Someone: Not on file     Humiliated or Emotionally Abused by Someone: Not on file     Physically Hurt by Someone: No     Hurt or Threatened by Someone: No   Housing Stability: Unknown (3/11/2025)    Nursing: Inadequate Housing Risk Classification     Has Housing: Not on file     Worried About Losing Housing: Not on file     Unable to Get Utilities: Not on file     Unable to Pay for Housing in the Last Year: No     Has Housin     Social History     Tobacco Use   Smoking Status Former    Current packs/day: 0.00    Average packs/day: 1 pack/day for 15.0 years (15.0 ttl pk-yrs)    Types: Cigarettes    Start date: 1980    Quit date: 2005    Years since quittin.2    Passive exposure: Never   Smokeless Tobacco Never     Social History     Substance and Sexual Activity   Alcohol Use Not Currently     Family History   Problem Relation Age of Onset    Cancer Mother 68        Type Unknown    COPD Father         Previous smoker    Neuropathy Father     Neuropathy Sister     Neuropathy Sister     Neuropathy Brother     Neuropathy Brother     Arthritis Maternal Grandmother     Arthritis Maternal Aunt          Medical Necessity Criteria for ARC Admission: close monitoring and management of acute post-tendon tear and post-operative pain, bowel/bladder management, skin/incision care and  monitoring, VTE risk monitoring and management. In addition, the preadmission screen, post-admission physical evaluation, overall plan of care and admissions order demonstrate a reasonable expectation that the following criteria were met at the time of admission to the [facility].  The patient requires active and ongoing therapeutic intervention of multiple therapy disciplines (physical therapy, occupational therapy, speech-language pathology, or prosthetics/orthotics), one of which is physical or occupational therapy.    Patient requires an intensive rehabilitation therapy program, as defined in Chapter 1, section 110.2.2 of the CMS Medicare Policy Manual. This intensive rehabilitation therapy program will consist of at least 3 hours of therapy per day at least 5 days per week or at least 15 hours of intensive rehabilitation therapy within a 7 consecutive day period, beginning with the date of admission to the [facility].    The patient is reasonably expected to actively participate in, and benefit significantly from, the intensive rehabilitation therapy program as defined in Chapter 1, section 110.2.2 of the CMS Medicare Policy Manual at this time of admission to the [facility]. He can reasonably be expected to make measurable improvement (that will be of practical value to improve the patient’s functional capacity or adaptation to impairments) as a result of the rehabilitation treatment, as defined in section 110.3, and such improvement can be expected to be made within the prescribed period of time. As noted in the CMS Medicare Policy Manual, the patient need not be expected to achieve complete independence in the domain of self-care nor be expected to return to his or her prior level of functioning in order to meet this standard.  The patient must require physician supervision by a rehabilitation physician. As such, a rehabilitation physician will conduct face-to-face visits with the patient at least 3 days per week throughout the  patient’s stay in the ARC to assess the patient both medically and functionally, as well as to modify the course of treatment as needed to maximize the patient’s capacity to benefit from the rehabilitation process.  The patient requires an intensive and coordinated interdisciplinary approach to providing rehabilitation, as defined in Chapter 1, section 110.2.5 of the CMS Medicare Policy Manual. This will be achieved through periodic team conferences, conducted at least once in a 7-day period, and comprising of an interdisciplinary team of medical professionals consisting of: a rehabilitation physician, registered nurse,  and/or , and a licensed/certified therapist from each therapy discipline involved in treating the patient.     Changes Since Pre-admission Assessment: None -This patient's participation in rehab continues to be reasonable, necessary and appropriate.    Modesta Lee PA-C  Physical Medicine and Rehabilitation  [facility]

## 2025-03-12 ENCOUNTER — APPOINTMENT (OUTPATIENT)
Dept: PHYSICAL THERAPY | Facility: CLINIC | Age: 70
End: 2025-03-12
Payer: OTHER MISCELLANEOUS

## 2025-03-12 PROBLEM — N40.0 BPH (BENIGN PROSTATIC HYPERPLASIA): Status: ACTIVE | Noted: 2025-03-12

## 2025-03-12 PROBLEM — S91.001A WOUND OF RIGHT ANKLE: Status: ACTIVE | Noted: 2025-03-12

## 2025-03-12 PROBLEM — Z78.9 IMPAIRED MOBILITY AND ADLS: Status: ACTIVE | Noted: 2025-03-12

## 2025-03-12 PROBLEM — Z74.09 IMPAIRED MOBILITY AND ADLS: Status: ACTIVE | Noted: 2025-03-12

## 2025-03-12 LAB
ALBUMIN SERPL BCG-MCNC: 3.8 G/DL (ref 3.5–5)
ALP SERPL-CCNC: 44 U/L (ref 34–104)
ALT SERPL W P-5'-P-CCNC: 10 U/L (ref 7–52)
ANION GAP SERPL CALCULATED.3IONS-SCNC: 9 MMOL/L (ref 4–13)
AST SERPL W P-5'-P-CCNC: 12 U/L (ref 13–39)
BASOPHILS # BLD AUTO: 0.05 THOUSANDS/ÂΜL (ref 0–0.1)
BASOPHILS NFR BLD AUTO: 1 % (ref 0–1)
BILIRUB SERPL-MCNC: 0.72 MG/DL (ref 0.2–1)
BUN SERPL-MCNC: 23 MG/DL (ref 5–25)
CALCIUM SERPL-MCNC: 9.1 MG/DL (ref 8.4–10.2)
CHLORIDE SERPL-SCNC: 103 MMOL/L (ref 96–108)
CO2 SERPL-SCNC: 25 MMOL/L (ref 21–32)
CREAT SERPL-MCNC: 1.05 MG/DL (ref 0.6–1.3)
EOSINOPHIL # BLD AUTO: 0.19 THOUSAND/ÂΜL (ref 0–0.61)
EOSINOPHIL NFR BLD AUTO: 2 % (ref 0–6)
ERYTHROCYTE [DISTWIDTH] IN BLOOD BY AUTOMATED COUNT: 13 % (ref 11.6–15.1)
EST. AVERAGE GLUCOSE BLD GHB EST-MCNC: 108 MG/DL
GFR SERPL CREATININE-BSD FRML MDRD: 73 ML/MIN/1.73SQ M
GLUCOSE SERPL-MCNC: 103 MG/DL (ref 65–140)
GLUCOSE SERPL-MCNC: 95 MG/DL (ref 65–140)
GLUCOSE SERPL-MCNC: 99 MG/DL (ref 65–140)
HBA1C MFR BLD: 5.4 %
HCT VFR BLD AUTO: 37.9 % (ref 36.5–49.3)
HGB BLD-MCNC: 12.4 G/DL (ref 12–17)
IMM GRANULOCYTES # BLD AUTO: 0.05 THOUSAND/UL (ref 0–0.2)
IMM GRANULOCYTES NFR BLD AUTO: 1 % (ref 0–2)
LYMPHOCYTES # BLD AUTO: 2.2 THOUSANDS/ÂΜL (ref 0.6–4.47)
LYMPHOCYTES NFR BLD AUTO: 24 % (ref 14–44)
MCH RBC QN AUTO: 29.7 PG (ref 26.8–34.3)
MCHC RBC AUTO-ENTMCNC: 32.7 G/DL (ref 31.4–37.4)
MCV RBC AUTO: 91 FL (ref 82–98)
MONOCYTES # BLD AUTO: 1.05 THOUSAND/ÂΜL (ref 0.17–1.22)
MONOCYTES NFR BLD AUTO: 12 % (ref 4–12)
NEUTROPHILS # BLD AUTO: 5.63 THOUSANDS/ÂΜL (ref 1.85–7.62)
NEUTS SEG NFR BLD AUTO: 60 % (ref 43–75)
NRBC BLD AUTO-RTO: 0 /100 WBCS
PLATELET # BLD AUTO: 261 THOUSANDS/UL (ref 149–390)
PMV BLD AUTO: 9.4 FL (ref 8.9–12.7)
POTASSIUM SERPL-SCNC: 4.3 MMOL/L (ref 3.5–5.3)
PROT SERPL-MCNC: 6.5 G/DL (ref 6.4–8.4)
RBC # BLD AUTO: 4.17 MILLION/UL (ref 3.88–5.62)
SODIUM SERPL-SCNC: 137 MMOL/L (ref 135–147)
WBC # BLD AUTO: 9.17 THOUSAND/UL (ref 4.31–10.16)

## 2025-03-12 PROCEDURE — 97535 SELF CARE MNGMENT TRAINING: CPT

## 2025-03-12 PROCEDURE — 85025 COMPLETE CBC W/AUTO DIFF WBC: CPT

## 2025-03-12 PROCEDURE — 97166 OT EVAL MOD COMPLEX 45 MIN: CPT

## 2025-03-12 PROCEDURE — 82948 REAGENT STRIP/BLOOD GLUCOSE: CPT

## 2025-03-12 PROCEDURE — 97530 THERAPEUTIC ACTIVITIES: CPT

## 2025-03-12 PROCEDURE — 99233 SBSQ HOSP IP/OBS HIGH 50: CPT | Performed by: PHYSICAL MEDICINE & REHABILITATION

## 2025-03-12 PROCEDURE — 80053 COMPREHEN METABOLIC PANEL: CPT

## 2025-03-12 PROCEDURE — 97163 PT EVAL HIGH COMPLEX 45 MIN: CPT

## 2025-03-12 PROCEDURE — 97116 GAIT TRAINING THERAPY: CPT

## 2025-03-12 PROCEDURE — 97110 THERAPEUTIC EXERCISES: CPT

## 2025-03-12 PROCEDURE — 83036 HEMOGLOBIN GLYCOSYLATED A1C: CPT | Performed by: FAMILY MEDICINE

## 2025-03-12 RX ORDER — OXYCODONE HYDROCHLORIDE 5 MG/1
5 TABLET ORAL EVERY 8 HOURS PRN
Refills: 0 | Status: DISCONTINUED | OUTPATIENT
Start: 2025-03-12 | End: 2025-03-17

## 2025-03-12 RX ORDER — ACETAMINOPHEN 325 MG/1
650 TABLET ORAL EVERY 6 HOURS PRN
Status: DISCONTINUED | OUTPATIENT
Start: 2025-03-12 | End: 2025-05-06 | Stop reason: HOSPADM

## 2025-03-12 RX ADMIN — ENOXAPARIN SODIUM 40 MG: 40 INJECTION SUBCUTANEOUS at 08:51

## 2025-03-12 RX ADMIN — LOSARTAN POTASSIUM 50 MG: 50 TABLET, FILM COATED ORAL at 08:52

## 2025-03-12 RX ADMIN — FAMOTIDINE 20 MG: 20 TABLET, FILM COATED ORAL at 08:51

## 2025-03-12 RX ADMIN — TAMSULOSIN HYDROCHLORIDE 0.4 MG: 0.4 CAPSULE ORAL at 17:32

## 2025-03-12 RX ADMIN — CHOLECALCIFEROL TAB 25 MCG (1000 UNIT) 5000 UNITS: 25 TAB at 08:52

## 2025-03-12 RX ADMIN — B-COMPLEX W/ C & FOLIC ACID TAB 1 TABLET: TAB at 08:51

## 2025-03-12 RX ADMIN — PRAVASTATIN SODIUM 40 MG: 40 TABLET ORAL at 17:32

## 2025-03-12 RX ADMIN — METFORMIN ER 500 MG 1500 MG: 500 TABLET ORAL at 17:32

## 2025-03-12 RX ADMIN — OXYCODONE HYDROCHLORIDE AND ACETAMINOPHEN 500 MG: 500 TABLET ORAL at 08:51

## 2025-03-12 RX ADMIN — ACETAMINOPHEN 650 MG: 325 TABLET ORAL at 05:19

## 2025-03-12 NOTE — ASSESSMENT & PLAN NOTE
Skin breakdown on R heel due to cast. Ortho removed approximately 1 inch of cast at posterior aspect on 3/11  Local wound care  Continue to monitor   Present on admission to [facility]   Wound care RN consulted

## 2025-03-12 NOTE — PROGRESS NOTES
"Progress Note - PMR   Name: Tobin Kerns 69 y.o. male I MRN: [id number]  Unit/Bed#: -01 I Date of Admission: 3/11/2025   Date of Service: 3/12/2025 I Hospital Day: 1     Assessment & Plan  Quadriceps muscle rupture, right, subsequent encounter  HPI:   He initially underwent quad tendon repair on 7/25/24 with Dr. Foster. He was discharged home with outpatient PT.  Later noted to have recurrent high-grade tear with extensor lag on exam and elected for operative management  January 2025 MRI: \"Prior quadriceps insertion repair with high-grade recurrent tear exhibiting up to 2.2 cm of tendon retraction.\"  On 3/6/25, he underwent revision of quad tendon repair with allograft augmentation    Plan:   PT and OT for 3 hours a day, 5-6 days a week   TTWB RLE   Avoid moisture to cast   Pain: PRN Tylenol (monitor LFTs) and PRN 2.5-5 mg oxycodone q 8 hours (wean when able) (last weaned 3/12)   Lovenox for DVT prophylaxis while inpatient   Hypertension  Continue losartan 50 mg daily   Monitor BP   Mixed hyperlipidemia  Continue pravastatin 40 mg daily   Gastro-esophageal reflux disease without esophagitis  Continue famotidine 20 mg daily   Peripheral neuropathy  Patient is not diabetic   Consider B12 level   JOVANY on CPAP  Continue CPAP qHS  IFG (impaired fasting glucose)  Continue metformin 1500 mg daily   Seen by RD 3/12, diet adjusted to regular   Pending repeat A1c ordered by IM   Ulcer of left foot, with fat layer exposed (HCC)  Local wound care  Present on admission   Wound care RN consulted   BPH (benign prostatic hyperplasia)  Continue tamsulosin 0.4 mg daily   Patient currently voiding without difficulty   PRN PVRs if urinary retention is suspected   Impaired mobility and ADLs  Patient was evaluated by the rehabilitation team MD and an appropriate candidate for acute inpatient rehabilitation program at this time.  The patient will tolerate 3 hours/day 5 to 7 days/week of intensive physical and  occupational " therapy   in order to obtain goals for community discharge  Due to the patient's functional Compared to their baseline level of function in addition to their ongoing medical needs, the patient would benefit from daily supervision from a rehabilitation physician as well as rehabilitation nursing to implement and adjust the medical as well as functional plan of care in order to meet the patient's goals.  Bladder: Monitor closely for urinary incontinence and/or retention. Monitor urine output and encourage spontaneous voids. If unable to void spontaneously, will monitor with PVR bladder scans and initiate CIC regimen.  Skin: Monitor for breakdown, frequent turns, pressure offloading  Sleep: Encourage sleep hygiene (routine that promotes healthy circadian rhythm,avoid caffeine later in the day, quiet/dark room at night, reduce electronic before bedtime)      Wound of right ankle  Skin breakdown on R heel due to cast. Ortho removed approximately 1 inch of cast at posterior aspect on 3/11  Local wound care  Continue to monitor   Present on admission to [facility]   Wound care RN consulted     Subjective   Tobin Kerns is a 69 year-old male, with a past medical history of hypertension, hyperlipidemia, JOVANY, impaired fasting glucose, bilateral lower extremity neuropathy, GERD, BPH, presenting to [facility] after an elective right quadricept tendon repair. He initially underwent quad tendon repair on 7/25/24 with Dr. Foster. He was discharged home with outpatient PT. He was noted to have recurrent high-grade tear on repeat MRI with extensor lag on exam and elected for operative management. On 3/6/25, he underwent revision of quad tendon repair with allograft augmentation. He was evaluated by PT and OT and deemed an appropriate candidate for ARC due to functional deficits.     Chief Complaint: f/u ambulatory dysfunction    Interval:   Seen and evaluated patient at bedside. He does not have any concerns. He states therapy went well  today. Last BM 3/11, continent of bowel and bladder. Seen by RD today, diet adjusted to regular. Narcotic de-escalation: he has not taken any oxycodone and is having minimal pain, ordered modified to significantly reduce dose. Due to minimal pain, Tylenol also modified to PRN.    CMP stable, pending CBC and A1c     Objective :  Temp:  [97.4 °F (36.3 °C)-97.9 °F (36.6 °C)] 97.4 °F (36.3 °C)  HR:  [65-78] 65  BP: (128-136)/(63-71) 129/69  Resp:  [16-18] 18  SpO2:  [96 %-98 %] 98 %  O2 Device: None (Room air)    Functional Update:  Physical Therapy Occupational Therapy      Toilet Transfer   Surface Assessed Standard Toilet   Transfer Technique Stand Pivot   Type of Assistance Needed Physical assistance   Physical Assistance Level 25% or less   Comment Carolin RW, VC to maintain TTWB R LE   Toilet Transfer CARE Score 3   Transfer Bed/Chair/Wheelchair   Type of Assistance Needed Physical assistance   Physical Assistance Level 25% or less   Comment CGA/Carolin RW; VC to maintain TTWB R LE   Chair/Bed-to-Chair Transfer CARE Score 3   Roll Left and Right   Type of Assistance Needed Independent   Physical Assistance Level No physical assistance   Roll Left and Right CARE Score 6   Sit to Lying   Type of Assistance Needed Supervision   Physical Assistance Level No physical assistance   Sit to Lying CARE Score 4   Lying to Sitting on Side of Bed   Type of Assistance Needed Supervision   Physical Assistance Level No physical assistance   Lying to Sitting on Side of Bed CARE Score 4   Sit to Stand   Type of Assistance Needed Physical assistance;Verbal cues   Physical Assistance Level 25% or less   Comment initially CGA/Carolin , VC to maintain TTWB R LE; progressed to CGA by end of session   Sit to Stand CARE Score 3     Ambulation   Primary Mode of Locomotion Prior to Admission Walk   Distance Walked (feet) 121 ft  (121', 55', 46', 10')   Assist Device Roller Walker   Gait Pattern Antalgic;Inconsistant Ghzaal;Slow Ghazal;Decreased  foot clearance;L foot drag;Forward Flexion;Step to;Step through;Decreased R stance;Improper weight shift   Limitations Noted In Balance;Device Management;Endurance;Heel Strike;Sequencing;Speed;Strength;Swing   Provided Assistance with: Balance;Direction   Walk Assist Level Contact Guard;Minimum Assist   Findings initially Carolin, progressed to CGA; VC to maintain TTWB R LE   Walk 10 Feet   Type of Assistance Needed Physical assistance;Verbal cues   Physical Assistance Level 25% or less   Comment initially Carolin, progressed to CGA; VC to maintain TTWB R LE   Walk 10 Feet CARE Score 3   Walk 50 Feet with Two Turns   Type of Assistance Needed Physical assistance;Verbal cues   Physical Assistance Level 25% or less   Comment initially Carolin, progressed to CGA; VC to maintain TTWB R LE       Comprehension   QI: Comprehension 4. Undestands: Clear comprehension without cues or repetitions   Comprehension (FIM) 6 - Has only MILD difficulty with complex/abstract info   Expression   QI: Expression 4. Express complex messages without difficulty and with speech that is clear and easy to understan   Expression (FIM) 7 - Expresses complex/abstract ideas in a reasonable time w/o devices or helper.   Social Interaction   Social Interaction (FIM) 6 - Interacts appropriately with others BUT requires extra  time   Problem Solving   Problem solving (FIM) 6 - Solves complex problems BUT requires extra time            Physical Exam  Vitals and nursing note reviewed.   Constitutional:       General: He is not in acute distress.     Appearance: He is obese. He is not ill-appearing or diaphoretic.   HENT:      Head: Normocephalic and atraumatic.      Nose: Nose normal.      Mouth/Throat:      Mouth: Mucous membranes are moist.   Cardiovascular:      Pulses:           Dorsalis pedis pulses are 2+ on the right side and 2+ on the left side.   Pulmonary:      Effort: Pulmonary effort is normal. No respiratory distress.   Abdominal:      General:  There is no distension.   Skin:     General: Skin is warm and dry.   Neurological:      General: No focal deficit present.      Mental Status: He is alert.      Sensory: Sensory deficit ((baseline BL LE neuropathy)) present.   Psychiatric:         Mood and Affect: Mood normal.         Behavior: Behavior normal.           Scheduled Meds:  Current Facility-Administered Medications   Medication Dose Route Frequency Provider Last Rate    acetaminophen  650 mg Oral Q6H [facility] Toño Brito MD      ascorbic acid  500 mg Oral Daily Toño Brito MD      Cholecalciferol  5,000 Units Oral Daily Toño Brito MD      enoxaparin  40 mg Subcutaneous Q24H [facility] Toño Brito MD      famotidine  20 mg Oral Daily Toño Brito MD      losartan  50 mg Oral Daily Toño Brito MD      metFORMIN  1,500 mg Oral Daily With Dinner Toño Brito MD      multivitamin stress formula  1 tablet Oral Daily Toño Brito MD      oxyCODONE  10 mg Oral Q4H PRN Toño Brito MD      oxyCODONE  5 mg Oral Q4H PRN Toño Brito MD      polyethylene glycol  17 g Oral Daily PRN Modesta Lee PA-C      pravastatin  40 mg Oral Daily With Dinner Toño Brito MD      tamsulosin  0.4 mg Oral Daily With Dinner Toño Brito MD           Lab Results: I have reviewed the following results:  Results from last 7 days   Lab Units 03/12/25  0518 03/07/25  1311   HEMOGLOBIN g/dL 12.4 13.7   HEMATOCRIT % 37.9 39.7   WBC Thousand/uL 9.17 13.20*   PLATELETS Thousands/uL 261 222     Results from last 7 days   Lab Units 03/12/25  0518 03/07/25  1311   BUN mg/dL 23 16   SODIUM mmol/L 137 136   POTASSIUM mmol/L 4.3 4.0   CHLORIDE mmol/L 103 105   CREATININE mg/dL 1.05 1.00   AST U/L 12* 14   ALT U/L 10 10            Modesta Lee PA-C  Physical Medicine and Rehabilitation  [facility]

## 2025-03-12 NOTE — NURSING NOTE
Patient resting in bed at this time.  No signs of distress noted.  Patient was able to ambulate with one assist and a walker to the bathroom overnight.  Right lower extremity cast in place.  Patient to maintain toe touch weight bearing to the right lower extremity as ordered.  Patient wore own CPAP unit overnight as ordered for sleep apnea.  Bed alarm in place to promote patient safety.  SCD to the left lower extremity while in bed.  Call bell within reach.  Plan of care ongoing.

## 2025-03-12 NOTE — NUTRITION
"   03/12/25 1149   Biochemical Data,Medical Tests, and Procedures   Biochemical Data/Medical Tests/Procedures Lab values reviewed;Meds reviewed   Labs (Comment) 3/12/2025 AST 12   Meds (Comment) Vitamin C, vitamin D3, Pepcid, metformin, MVI   Other Diagnostic/Procedures (Comment) Status post \"right knee revision quadriceps tendon repair with allograft augmentation and cast application on 3/6/2025.\"   Nutrition-Focused Physical Exam   Nutrition-Focused Physical Exam Findings RN skin assessment reviewed;Edema;Wound  (Trace right lower extremity edema.  Wound at right knee, wound at right fourth toe, wound at left plantar, wound at right ankle.)   Medical-Related Concerns hypertension, JOVANY on CPAP, mixed hyperlipidemia, GERD, obesity   Adequacy of Intake   Nutrition Modality PO   Feeding Route   PO Independent   Current PO Intake   Current Diet Order CCD 2 diet, thin liquids   Current Meal Intake 50-75%;%   Estimated calorie intake compared to estimated need Nutrient needs are met   PES Statement   Problem Intake   Nutrient (5) Increased nutrient needs (specify) NI-5.1  (Protein)   Related to Wound (s)   As evidenced by: Wound healing   Recommendations/Interventions   Malnutrition/BMI Present No   Summary Wound care RN consulted; weight loss, poor p.o.-MST 3.  Status post \"right knee revision quadriceps tendon repair with allograft augmentation and cast application on 3/6/2025.\"  Past medical history significant for hypertension, JOVANY on CPAP, mixed hyperlipidemia, GERD, obesity.  Weight history reviewed.  No significant changes.  Trace right lower extremity edema.  Wound at right knee, wound at right fourth toe, wound at left plantar, wound at right ankle.  Prescribed a CCD 2 diet, thin liquids.  Meal completion %.  Nutrient needs are met.  Patient reports having an okay appetite.  Usually has 2 meals daily.  Follows no special diet.  NKFA.  Denies dysphagia.  Patient cooks and grocery shops.  Reports " intentional weight loss, decreasing portion size.  RD discussed diet as prescribed.  Medications reviewed.  Labs reviewed.  Glucose WNL. 2/17/25 HgA1c 5.4.  Recommend adjusting diet to regular.  RD to follow.   Interventions/Recommendations Adjust diet order;Monitor I & O's   Education Assessment   Education Education not indicated at this time   Patient Nutrition Goals   Goal Improve skin integrity;Adequate hydration;Adequate intake   Goal Status Initiated   Timeframe to complete goal by next f/u   Nutrition Complexity Risk   Nutrition complexity level Low risk   Follow up date 03/19/25

## 2025-03-12 NOTE — PROGRESS NOTES
03/12/25 1330   Pain Assessment   Pain Assessment Tool 0-10   Pain Score 1   Pain Location/Orientation Orientation: Right;Location: Knee;Location: Leg   Restrictions/Precautions   Precautions Bed/chair alarms;Fall Risk;Limb alert;Supervision on toilet/commode   RLE Weight Bearing Per Order TTWB   ROM Restrictions No   Braces or Orthoses   (cast RLE)   Oral Hygiene   Type of Assistance Needed Supervision   Physical Assistance Level No physical assistance   Comment standing at sink, TTWB RLE maintained   Oral Hygiene CARE Score 4   Grooming   Able To Initiate Tasks;Acquire Items;Brush/Clean Teeth;Wash/Dry Face;Wash/Dry Hands   Limitation Noted In Safety;Strength   Shower/Bathe Self   Type of Assistance Needed Physical assistance   Physical Assistance Level 25% or less   Comment assist for R foot   Shower/Bathe Self CARE Score 3   Bathing   Assessed Bath Style Sponge Bath   Anticipated D/C Bath Style Sponge Bath;Shower   Able to Gather/Transport No   Able to Wash/Rinse/Dry (body part) Left Arm;Right Arm;L Upper Leg;R Upper Leg;R Lower Leg/Foot;Chest;Abdomen;Perineal Area;Buttocks   Limitations Noted in Balance;Endurance;ROM;Safety;Strength   Positioning Seated;Standing   Findings  given long handled sponge at end of session for future ADL use   Upper Body Dressing   Type of Assistance Needed Set-up / clean-up   Physical Assistance Level No physical assistance   Upper Body Dressing CARE Score 5   Lower Body Dressing   Type of Assistance Needed Physical assistance   Physical Assistance Level 26%-50%   Comment assist to doff/don clothing over R foot, adjust over R hip/buttocks   Lower Body Dressing CARE Score 3   Putting On/Taking Off Footwear   Type of Assistance Needed Physical assistance   Physical Assistance Level 51%-75%   Comment assist to doff/don R sock   Putting On/Taking Off Footwear CARE Score 2   Picking Up Object   Reason if not Attempted Safety concerns   Picking Up Object CARE Score 88    Dressing/Undressing Clothing   Remove UB Clothes Pullover Shirt   Don UB Clothes Pullover Shirt   Remove LB Clothes Undergarment;Socks   Don LB Clothes Shorts;Undergarment;Socks  (belt)   Limitations Noted In Balance;Endurance;Safety;Strength;ROM   Positioning Supported Sit;Standing  (sat on toilet)   Sit to Lying   Type of Assistance Needed Supervision   Physical Assistance Level No physical assistance   Sit to Lying CARE Score 4   Lying to Sitting on Side of Bed   Type of Assistance Needed Supervision   Physical Assistance Level No physical assistance   Lying to Sitting on Side of Bed CARE Score 4   Sit to Stand   Type of Assistance Needed Physical assistance   Physical Assistance Level 25% or less   Comment RW, TTWB RLE   Sit to Stand CARE Score 3   Exercise Tools   Other Exercise Tool 1 x20 pronation/supination and internal/external rotation using red flexbar   Cognition   Overall Cognitive Status WFL   Arousal/Participation Alert;Responsive;Cooperative   Attention Within functional limits   Orientation Level Oriented X4   Memory Within functional limits   Following Commands Follows all commands and directions without difficulty   Assessment   Treatment Assessment Pt agreeable to OT session this PM. Received lying supine in bed. ADL session completed; current LOF and details listed in respective sections. Pt is overall min-modA LB dressing, Carolin bathing, supervision grooming, set up UB dressing; fxl transfers overall Carolin using RW and maintaining TTWB RLE effectively. Fxl mobility to and from bathroom using RW completed with close supervision/CG. Pt given AE for LB ADL tasks at end of session for future ADL use; pt reported never using AE prior to hospitalization however did adapt ADL tasks with grab bars and sitting by sink on commode or kitchen chair. Pt is a motivated participant in therapy and reports would prefer to d/c to another facility after rehab until his cast is removed; care team notified. Pt's  barriers to d/c include decreased strength throughout but especially RLE s/p quadricepts rupture, decreased ROM, decreased balance, decreased activity tolerance, decreased safety awareness, and TTWB RLE ; all affect independence in self care and fxl transfers. Pt would benefit from continued skilled OT services in order to address listed barriers and prepare for safe d/c.   Prognosis Good   Problem List Decreased strength;Decreased range of motion;Decreased endurance;Impaired balance;Decreased safety awareness;Orthopedic restrictions   Plan   Treatment/Interventions ADL retraining;Functional transfer training;LE strengthening/ROM;Endurance training;Therapeutic exercise;Equipment eval/education;Patient/family training;Compensatory technique education;OT   Progress Progressing toward goals   OT Therapy Minutes   OT Time In 1330   OT Time Out 1430   OT Total Time (minutes) 60   OT Mode of treatment - Individual (minutes) 60

## 2025-03-12 NOTE — ASSESSMENT & PLAN NOTE
Continue metformin 1500 mg daily   Seen by RD 3/12, diet adjusted to regular   Pending repeat A1c ordered by IM

## 2025-03-12 NOTE — PROGRESS NOTES
03/12/25 0900   Patient Data   Rehab Impairment Impairment of mobility, safety and Activities of Daily Living (ADLs) due to Orthopedic Disorders: 08.9 Other Orthopedic   Etiologic Diagnosis Rupture of right quadriceps tendon   Date of Onset 03/06/25   Home Setup   Type of Home Single Level  (lives alone in trailer, 4 CRISTAL with L HR. Pt reports bedroom/bathroom is too narrow for RW to fit, has used walls in past. Pt reports also has condo in [place] as well)   Method of Entry Stairs;Hand Rail Left   Number of Stairs 4  (3 to porch, half step over threshold into trailer)   First Floor Bathroom Tub;Shower;Combo  (reports bathroom is too narrow for RW or commode)   First Floor Setup Available Yes   Home Modification Comment Pt reports bedroom and bathroom are too narrow for RW, would use UE support on wall following last surgery   Available Equipment Roller Walker  (1 standard RW, 1 bariatric RW)   Baseline Information   Transportation   (was driving prior to surgery)   Prior IADL Participation   Money Management Identify Money;Estimate Costs;Estimate Change;Combine Bills;Manage Checkbook   Meal Preparation Full Participation   Laundry Full Participation   Home Cleaning Full Participation   Prior Level of Function   Self-Care 3. Independent - Patient completed the activities by him/herself, with or without an assistive device, with no assistance from a helper.   Indoor-Mobility (Ambulation) 3. Independent - Patient completed the activities by him/herself, with or without an assistive device, with no assistance from a helper.   Stairs 3. Independent - Patient completed the activities by him/herself, with or without an assistive device, with no assistance from a helper.   Functional Cognition 3. Independent - Patient completed the activities by him/herself, with or without an assistive device, with no assistance from a helper.   Prior Device Used Z. None of the above  (occasional RW and R KI use)   Falls in  the Last Year   Number of falls in the past 12 months 1   Type of Injury Associated with Fall No injury   Patient Preference   Marleny (Patient Preference) Khris Rudd   Patient Normally Wakes at 0500   Psychosocial   Psychosocial (WDL) WDL   Restrictions/Precautions   Precautions Bed/chair alarms;Fall Risk;Limb alert;Supervision on toilet/commode   RLE Weight Bearing Per Order TTWB   ROM Restrictions No   Braces or Orthoses   (cast R LE)   Pain Assessment   Pain Assessment Tool 0-10   Pain Score No Pain   Toilet Transfer   Surface Assessed Standard Toilet   Transfer Technique Stand Pivot   Type of Assistance Needed Physical assistance   Physical Assistance Level 25% or less   Comment Carolin RW, VC to maintain TTWB R LE   Toilet Transfer CARE Score 3   Transfer Bed/Chair/Wheelchair   Type of Assistance Needed Physical assistance   Physical Assistance Level 25% or less   Comment CGA/Carolin RW; VC to maintain TTWB R LE   Chair/Bed-to-Chair Transfer CARE Score 3   Roll Left and Right   Type of Assistance Needed Independent   Physical Assistance Level No physical assistance   Roll Left and Right CARE Score 6   Sit to Lying   Type of Assistance Needed Supervision   Physical Assistance Level No physical assistance   Sit to Lying CARE Score 4   Lying to Sitting on Side of Bed   Type of Assistance Needed Supervision   Physical Assistance Level No physical assistance   Lying to Sitting on Side of Bed CARE Score 4   Sit to Stand   Type of Assistance Needed Physical assistance;Verbal cues   Physical Assistance Level 25% or less   Comment initially CGA/Carolin , VC to maintain TTWB R LE; progressed to CGA by end of session   Sit to Stand CARE Score 3   Picking Up Object   Reason if not Attempted Safety concerns   Picking Up Object CARE Score 88   Car Transfer   Reason if not Attempted Safety concerns   Car Transfer CARE Score 88   Ambulation   Primary Mode of Locomotion Prior to Admission Walk   Distance Walked (feet) 121 ft  (121',  "55', 46', 10')   Assist Device Roller Walker   Gait Pattern Antalgic;Inconsistant Ghazal;Slow Ghazal;Decreased foot clearance;L foot drag;Forward Flexion;Step to;Step through;Decreased R stance;Improper weight shift   Limitations Noted In Balance;Device Management;Endurance;Heel Strike;Sequencing;Speed;Strength;Swing   Provided Assistance with: Balance;Direction   Walk Assist Level Contact Guard;Minimum Assist   Findings initially Carolin, progressed to CGA; VC to maintain TTWB R LE   Walk 10 Feet   Type of Assistance Needed Physical assistance;Verbal cues   Physical Assistance Level 25% or less   Comment initially Carolin, progressed to CGA; VC to maintain TTWB R LE   Walk 10 Feet CARE Score 3   Walk 50 Feet with Two Turns   Type of Assistance Needed Physical assistance;Verbal cues   Physical Assistance Level 25% or less   Comment initially Carolin, progressed to CGA; VC to maintain TTWB R LE   Walk 50 Feet with Two Turns CARE Score 3   Walk 150 Feet   Comment fatigue   Reason if not Attempted Safety concerns   Walk 150 Feet CARE Score 88   Walking 10 Feet on Uneven Surfaces   Reason if not Attempted Safety concerns   Walking 10 Feet on Uneven Surfaces CARE Score 88   Wheelchair mobility   Findings N/A   Wheel 50 Feet with Two Turns   Reason if not Attempted Activity not applicable   Wheel 50 Feet with Two Turns CARE Score 9   Wheel 150 Feet   Reason if not Attempted Activity not applicable   Wheel 150 Feet CARE Score 9   Curb or Single Stair   Style negotiated Single stair   Type of Assistance Needed Physical assistance;Verbal cues   Physical Assistance Level 25% or less   Comment Carolin up/down 4 4\"  steps, L HR; VC for LE sequencing, maintain TTWB R LE   1 Step (Curb) CARE Score 3   4 Steps   Type of Assistance Needed Physical assistance;Verbal cues   Physical Assistance Level 25% or less   Comment Carolin up/down 4 4\"  steps, L HR; VC for LE sequencing, maintain TTWB R LE   4 Steps CARE Score 3   12 Steps " "  Reason if not Attempted Activity not applicable   12 Steps CARE Score 9   RLE Assessment   RLE Assessment X   Strength RLE   RLE Overall Strength   (pt in cast, unable to assess knee strength)   R Ankle Dorsiflexion 4-/5   R Ankle Plantar Flexion 4-/5   LLE Assessment   LLE Assessment WFL   Strength LLE   LLE Overall Strength 4/5   RUE Assessment   RUE Assessment WFL   LUE Assessment   LUE Assessment WFL   Coordination   Movements are Fluid and Coordinated 1   Sensation   Light Touch Partial deficits in the RUE;Partial deficits in the LUE;Partial deficits in the RLE;Partial deficits in the LLE  (neuropathy, decreased sensation distally > proximally)   Propioception No apparent deficits   Cognition   Overall Cognitive Status WFL   Arousal/Participation Alert;Responsive;Cooperative   Attention Within functional limits   Orientation Level Oriented X4   Memory Within functional limits   Following Commands Follows all commands and directions without difficulty   Vision   Vision Comments glasses for distance   Discharge Information   Vocational Plan Retired/not working   Patient's Discharge Plan Return home   Patient's Rehab Expectations \"To not have to wear the Ant immobilizer\"   Barriers to Discharge Home Limited Family Support;Unsafe Home Setup;Decreased Strength;Decreased Endurance;Safety Considerations   Impressions Pt is 69 year old male seen for PT evaluation on 03/12/2025 s/p admit to [facility] on 03/11/2025 w/ repair of R quadriceps tendon repair revision.  Orders placed at admission.  Performed at least 2 patient identifiers during session: Name and wristband. Comorbidities affecting pt's physical performance at time of assessment include: obesity, GERD, hyperlipidemia, hypertension, right quadriceps tendon repair performed on 7/5/2024. LOF prior to admission was mod I for functional mobility without AD. Personal factors affecting pt at time of IE include: TTWB R LE in cast, impaired sensation, decreased " strength/ROM, decreased balance and safety, and decreased endurance. Please find objective findings from PT assessment regarding body systems outlined above with impairments and limitations including weakness, impaired balance, decreased endurance, pain, decreased activity tolerance and fall risk, as well as mobility assessment.  Pt's clinical presentation warrants participation in multidisciplinary acute rehab program at 3 or greater hours of therapy per day. Pt to benefit from continued PT tx to address deficits as defined above and maximize level of functional independent mobility and consistency.  PT for improved safe return home with maximized functional mobility.   PT Therapy Minutes   PT Time In 0900   PT Time Out 1030   PT Total Time (minutes) 90   PT Mode of treatment - Individual (minutes) 90   PT Mode of treatment - Concurrent (minutes) 0   PT Mode of treatment - Group (minutes) 0   PT Mode of treatment - Co-treat (minutes) 0   PT Mode of Treatment - Total time(minutes) 90 minutes   PT Cumulative Minutes 90   Cumulative Minutes   Cumulative therapy minutes 90     Supine LE TE:  3x10, B/L LEs  SLR - flexion (L LE only)  SLR - ABd - anti-gravity  GS  QS  APs  Heel slides (L LE only)  Single-leg Bridges (L LE only)    Patient remains OOB on toilet with rehab aide,  all needs in reach. Encouraged use of call bell, patient verbalizes understanding.

## 2025-03-12 NOTE — ASSESSMENT & PLAN NOTE
Continue tamsulosin 0.4 mg daily   Patient currently voiding without difficulty   PRN PVRs if urinary retention is suspected

## 2025-03-12 NOTE — PROGRESS NOTES
03/12/25 0830   Patient Data   Rehab Impairment Impairment of mobility, safety and Activities of Daily Living (ADLs) due to Orthopedic Disorders:  08.9  Other Orthopedic   Etiologic Diagnosis Rupture of right quadriceps tendon   Home Setup   Type of Home Single Level  (trailer; pt also reports he has a condo)   Method of Entry Stairs;Hand Rail Left   Number of Stairs   (3 to porch, half step over threshold into trailer)   First Floor Bathroom Tub;Shower;Combo  (reports bathroom is too narrow for RW or commode)   First Floor Setup Available Yes  (sleeps in regular bed)   Home Modifications Necessary?   (lives alone)   Home Modification Comment [place] to pt in [place]   Available Equipment Roller Walker  (2 RW's)   Baseline Information   Transportation   (reports was driving to outpatient therapy prior to hospitalization)   Prior IADL Participation   Money Management Identify Money;Estimate Costs;Estimate Change;Combine Bills;Manage Checkbook   Meal Preparation Full Participation   Laundry Full Participation   Home Cleaning Full Participation   Prior Level of Function   Self-Care 3. Independent - Patient completed the activities by him/herself, with or without an assistive device, with no assistance from a helper.   Functional Cognition 3. Independent - Patient completed the activities by him/herself, with or without an assistive device, with no assistance from a helper.   Prior Device Used Z. None of the above  (reports was using RW and brace on RLE occasionally)   Falls in the Last Year   Number of falls in the past 12 months 1   Type of Injury Associated with Fall No injury   Patient Preference   Nickname (Patient Preference) Khris Rudd   Patient Normally Wakes at 0500   Psychosocial   Psychosocial (WDL) WDL   Restrictions/Precautions   Precautions Bed/chair alarms;Fall Risk;Limb alert;Supervision on toilet/commode   RLE Weight Bearing Per Order TTWB   ROM Restrictions No   Braces or  "Orthoses   (cast RLE)   Pain Assessment   Pain Assessment Tool 0-10   Pain Score No Pain  (at rest)   Comprehension   QI: Comprehension 4. Undestands: Clear comprehension without cues or repetitions   Comprehension (FIM) 6 - Has only MILD difficulty with complex/abstract info   Expression   QI: Expression 4. Express complex messages without difficulty and with speech that is clear and easy to understan   Expression (FIM) 7 - Expresses complex/abstract ideas in a reasonable time w/o devices or helper.   Social Interaction   Social Interaction (FIM) 6 - Interacts appropriately with others BUT requires extra  time   Problem Solving   Problem solving (FIM) 6 - Solves complex problems BUT requires extra time   Memory   Memory (FIM) 6 - Recognizes with extra time   RUE Assessment   RUE Assessment WFL   LUE Assessment   LUE Assessment WFL   Coordination   Movements are Fluid and Coordinated 1   Sensation   Light Touch Partial deficits in the RUE;Partial deficits in the LUE;Partial deficits in the RLE;Partial deficits in the LLE  (neuropathy)   Propioception No apparent deficits   Cognition   Overall Cognitive Status WFL   Arousal/Participation Alert;Responsive;Cooperative   Attention Within functional limits   Orientation Level Oriented X4   Memory Within functional limits   Following Commands Follows all commands and directions without difficulty   Comments Pt was in the [employer] for 20 years, 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 Enjoys watching movies, working on computer, working on [employer]   Vision   Vision Comments glasses \"for whenever I need them\"   Discharge Information   Patient's Discharge Plan pt would like to return to trailer [place]   Patient's Rehab Expectations \"bend the knee so I can drive\"   Impressions Pt is a 69 y.o. male seen for OT evaluation s/p admit to  rehab  on 3/11/2025 w/ Quadriceps muscle rupture, right, subsequent encounter.  " Comorbidities affecting pt's functional performance at time of assessment include: DM, HTN, and GERD, BPH/urinary retention . Personal factors affecting pt at time of IE include:steps to enter environment, limited home support, difficulty performing ADLS, and difficulty performing IADLS . Prior to admission, pt was mod I for ADL/IADL tasks and fxl transfers with RW occasionally; pt was receiving outpatient PT from previous sx.  Formal ADL to be completed later this PM. The following deficits impact occupational performance: weakness, decreased ROM, decreased strength, decreased balance, decreased tolerance, decreased safety awareness, increased pain, orthopedic restrictions, and TTWB RLE . Pt to benefit from continued skilled OT tx while in the hospital to address deficits as defined above and maximize level of functional independence w ADL's and functional mobility. Occupational Performance areas to address include: grooming, bathing/shower, toilet hygiene, dressing, medication management, health maintenance, functional mobility, clothing management, and household maintenance.   OT Therapy Minutes   OT Time In 0830   OT Time Out 0900   OT Total Time (minutes) 30   OT Mode of treatment - Individual (minutes) 30
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
## 2025-03-12 NOTE — ASSESSMENT & PLAN NOTE
Patient was evaluated by the rehabilitation team MD and an appropriate candidate for acute inpatient rehabilitation program at this time.  The patient will tolerate 3 hours/day 5 to 7 days/week of intensive physical and  occupational therapy   in order to obtain goals for community discharge  Due to the patient's functional Compared to their baseline level of function in addition to their ongoing medical needs, the patient would benefit from daily supervision from a rehabilitation physician as well as rehabilitation nursing to implement and adjust the medical as well as functional plan of care in order to meet the patient's goals.  Bladder: Monitor closely for urinary incontinence and/or retention. Monitor urine output and encourage spontaneous voids. If unable to void spontaneously, will monitor with PVR bladder scans and initiate CIC regimen.  Skin: Monitor for breakdown, frequent turns, pressure offloading  Sleep: Encourage sleep hygiene (routine that promotes healthy circadian rhythm,avoid caffeine later in the day, quiet/dark room at night, reduce electronic before bedtime)

## 2025-03-12 NOTE — TREATMENT PLAN
Individualized Plan of Care - [facility]  Tobin Kerns 69 y.o. male MRN: [id number]  Unit/Bed#: [facility] 210-01 Encounter: [id number]     PATIENT INFORMATION  ADMISSION DATE: 3/11/2025  3:20 PM MARQUEZ CATEGORY:Orthopedic Disorders:  08.9  Other Orthopedic Etiologic: Rupture of right quadriceps tendon   Projected IGC and Rehabilitation Diagnoses:  Impairment of mobility, safety and Activities of Daily Living (ADLs) due to Orthopedic Disorders:  08.9  Other Orthopedic    Etiologic: Rupture of right quadriceps tendon   Date of Onset: 3/6/25   Date of surgery: 3/6/25 Right - REPAIR TENDON QUADRICEPS- Right knee Revision quad tendon repair with allograft augmentation    ADMISSION DIAGNOSIS: Quadriceps muscle rupture [S76.119A]  EXPECTED LOS: 10 to 14 days     MEDICAL/FUNCTIONAL PROGNOSIS  Based on my assessment of the patient's medical conditions and current functional status, the prognosis for attaining medical and functional goals or the IRF stay is:  Good    Medical Goals: Patient will be medically stable for discharge to [facility] upon completion of rehab program and Patient will be able to manage medical conditions and comorbid conditions with medications and follow up upon completion of rehab program    Cardiopulmonary function: Ensure cardiopulmonary stability and optimize cardiopulmonary function not only at rest but with activity as patient's activity level significantly increases in acute rehab compared with prior to transfer in preparation for safe discharge from [facility].  Must closely and frequently monitor blood pressure and HR to ensure adequate cardiac output during ADLs and ambulation as patient is at increased risk for orthostatic hypotension/syncope and potential injury if not monitored for and managed adequately.    Blood pressure management:    Frequent monitoring of blood pressure with appropriate adjustments in blood pressure medication management to optimize blood  pressure control and prevent/limit renal complications.   Monitoring impact of blood pressure and side-effects of blood pressure medications at rest and with activity.  Hypoxia prevention: Ensure appropriate level of oxygenation at rest and with activity to avoid symptomatic hypoxia, maximize functional performance, and decrease risk of atelectasis/pneumonia through close and frequent monitoring, providing appropriate respiratory treatments (such as incentive spirometry), and when necessary provide/adjust respiratory medications.    Pain management:  Pain will improve with frequent evaluation of pain, careful adjustments in medications, frequent re-evaluation of patient's pain and medical/neurologic status to ensure optimal pain control, avoidance of potential serious and even life-threatening side-effects and drug interactions, as well as weaning pain medications as soon as possible to decrease risk of short and long-term use.     Orthopedic Disorder: Right quad tendon rupture causing impaired mobility, ADLs, and gait:  intensive skilled therapies with physical therapy and occupational therapy with close oversight and management by rehab specialized physician in acute rehabilitation setting to most expeditiously and effectively improve functional mobility, transfers, upper and lower body strengthening, conditioning, balance, and gait training with appropriate assistive device.  Patient will have optimal supervision and management of patient's underlying orthopedic disorder with specialized rehabilitation physician during this period of recovery to ensure most expeditious and optimal recovery with decreased risks of fall/injury and other complications including acute worsening of ortho disorder, decrease risk of VTE, PNA, and skin ulceration.  Inpatient rehabilitation education/teaching:  To be provided to patient and typically family/caregiver (if able to be identified) by all skilled therapists, rehab nursing,  case management, and rehab specialized physician to ensure optimal recovery and decrease risks of complications in both acute rehabilitation setting as well as after discharge.   Bladder dysfunction:  Appropriate bladder management with appropriate toileting program from rehab nursing and staff with oversight management by rehabilitation physicians which include appropriate monitoring and possible adjustments in medications to with goals to optimize bladder function and decrease risk of bladder retention, incontinence, and urinary tract infection.   Bowel dysfunction: Appropriate bowel management with appropriate toileting program from rehab nursing and staff with oversight management by rehabilitation physicians which include adjustments in medications to optimize appropriate bowel function and prevent/decrease risk of constipation and bowel obstruction.    Obesity:  Close monitoring of nutrition status, nutrition specialist with adjustments in diet.   on appropriate short and long term nutrition and activity.  Obesity increases complexity of patient's overall condition and causes unique challenges during this part of patient's recovery process.  Supervise and if necessary make adjustments in rehab nursing care and skilled therapy care to ensure appropriate toileting, bed mobility, other ADLs, and ambulation to decrease risk of falls/injuries, VTE, skin breakdown/ulceration and optimize functional recovery.    Skin wounds: Appropriate skin checks for wound/skin evaluation including evaluation of healing, worsening of wounds, or signs of infection.   Wound care management from rehab nursing, wound care nursing, physicians.  Ensure frequent appropriate turning, positioning in bed, in chair, when mobilizing, and when appropriate with use of appropriate devices to optimize healing and decrease risk of worsening or new skin breakdown.        ANTICIPATED DISCHARGE DISPOSITION AND SERVICES  COMMUNITY SETTING: Home  - independent/modified independent      ANTICIPATED FOLLOW-UP SERVICE:   Outpatient Therapy Services: PT and OT          DISCIPLINE SPECIFIC PLANS:  Required Disciplines & Services: Rehabillitation Nursing and Case Management    REQUIRED THERAPY:  Therapy Hours per Day Days per Week   Physical Therapy 1.5 5-6   Occupational Therapy 1.5 5-6   NOTE: Additional therapy time(s) or changes to allocation of therapies as appropriate to meet patient needs and to achieve functional goals.    Patient will participate in above therapy regimen consisting of PT and OT due to the following medical procedure/condition:Orthopedic Disorders:  08.9  Other Orthopedic Etiologic: Rupture of right quadriceps tendon     ANTICIPATED FUNCTIONAL OUTCOMES:  ADL: Modified independent with least restrictive assistive device    Bladder/Bowel: Patient will return to premorbid level for bladder/bowel management upon completion of rehab program   Transfers:  Modified independent with least restrictive assistive device    Locomotion:  Modified independent with least restrictive assistive device    Cognitive:  Independent      DISCHARGE PLANNING NEEDS  Equipment needs: Discharge needs to be reviewed with team      REHAB ANTICIPATED PARTICIPATION RESTRICTIONS:  Assist with Mobility and Rquires Assist with ADLS

## 2025-03-12 NOTE — PROGRESS NOTES
"Progress Note - Tobin Kerns 69 y.o. male MRN: [id number]    Unit/Bed#: [facility] 210-01 Encounter: [id number]        Subjective:   Patient seen and examined at bedside after reviewing the chart and discussing the case with the caring staff.      Patient examined at bedside.  Patient has no acute symptoms.    Labs 3/12:  CBC, CMP, hgb A1c WNL.     Physical Exam   Vitals: Blood pressure 129/69, pulse 65, temperature (!) 97.4 °F (36.3 °C), temperature source Temporal, resp. rate 18, height 6' 5\" (1.956 m), weight (!) 139 kg (306 lb 14.1 oz), SpO2 98%.,Body mass index is 36.39 kg/m².  Constitutional: Patient in no acute distress.  HEENT: PERR, EOMI, MMM.  Cardiovascular: Normal rate and regular rhythm.    Pulmonary/Chest: Effort normal and breath sounds normal.   Abdomen: Soft, + BS, NT.    Assessment/Plan:  Tobin Kerns is a(n) 69 y.o. year old male who is s/p quadriceps tendon repair.     Cardiac with hx of HTN, HLD.  Patient is on losartan 50 mg daily, pravastatin 40 mg daily.  Impaired fasting glucose.  Hgb a1c 5.4% on 3/12/25.  Continue metformin XR 1500 mg daily with dinner.  Regular diet and d/c accucheks as blood sugar is well controlled.    BPH.  Continue Flomax 0.4 mg daily.  GERD.  Continue Pepcid 20 mg daily.  JOVANY.  On CPAP at bedtime.   Vitamin D deficiency.  Patient is on vitamin D3 5000 units daily.  Pain and bowel management.  As per physiatrist.  Quadriceps tendon repair.  Toe touch weightbearing RLE with use of walker.  Patient is receiving intensive PT OT as per physiatrist.    The patient was discussed with Dr. Brito and he is in agreement with the above note.  "

## 2025-03-12 NOTE — NURSING NOTE
Patient's family brought in patients own home CPAP unit in to the unit for patient to use.  Hospitalist notified of the same and order received for patient to be able to use own CPAP unit for sleep apnea overnight.

## 2025-03-12 NOTE — CASE MANAGEMENT
SHILA called patient's [employer] contact number, spoke with Azeb, who gave Ramirez as patient's , was told to contact him via email:[email or web address]. SHILA sent an email to Ramirez, with questions regarding patient, and request for contact.

## 2025-03-12 NOTE — NURSING NOTE
Patient is awake and alert. Pleasant with interactions. Cast intact to RLE.  No signs of circulatory compromise noted. Participated in therapy sessions and tolerated same well.  Denies complaints of pain or discomfort. Call bell in reach at bedside.

## 2025-03-13 LAB
GLUCOSE SERPL-MCNC: 101 MG/DL (ref 65–140)
GLUCOSE SERPL-MCNC: 96 MG/DL (ref 65–140)

## 2025-03-13 PROCEDURE — 0HBRXZZ EXCISION OF TOE NAIL, EXTERNAL APPROACH: ICD-10-PCS | Performed by: PODIATRIST

## 2025-03-13 PROCEDURE — 97116 GAIT TRAINING THERAPY: CPT

## 2025-03-13 PROCEDURE — 97110 THERAPEUTIC EXERCISES: CPT

## 2025-03-13 PROCEDURE — 97535 SELF CARE MNGMENT TRAINING: CPT

## 2025-03-13 PROCEDURE — 97530 THERAPEUTIC ACTIVITIES: CPT

## 2025-03-13 PROCEDURE — 11720 DEBRIDE NAIL 1-5: CPT | Performed by: PODIATRIST

## 2025-03-13 PROCEDURE — 99232 SBSQ HOSP IP/OBS MODERATE 35: CPT | Performed by: PHYSICAL MEDICINE & REHABILITATION

## 2025-03-13 PROCEDURE — 99221 1ST HOSP IP/OBS SF/LOW 40: CPT | Performed by: PODIATRIST

## 2025-03-13 PROCEDURE — 82948 REAGENT STRIP/BLOOD GLUCOSE: CPT

## 2025-03-13 RX ADMIN — PRAVASTATIN SODIUM 40 MG: 40 TABLET ORAL at 16:57

## 2025-03-13 RX ADMIN — OXYCODONE HYDROCHLORIDE AND ACETAMINOPHEN 500 MG: 500 TABLET ORAL at 09:08

## 2025-03-13 RX ADMIN — B-COMPLEX W/ C & FOLIC ACID TAB 1 TABLET: TAB at 09:08

## 2025-03-13 RX ADMIN — METFORMIN ER 500 MG 1500 MG: 500 TABLET ORAL at 16:57

## 2025-03-13 RX ADMIN — TAMSULOSIN HYDROCHLORIDE 0.4 MG: 0.4 CAPSULE ORAL at 16:57

## 2025-03-13 RX ADMIN — CHOLECALCIFEROL TAB 25 MCG (1000 UNIT) 5000 UNITS: 25 TAB at 09:08

## 2025-03-13 RX ADMIN — ENOXAPARIN SODIUM 40 MG: 40 INJECTION SUBCUTANEOUS at 09:08

## 2025-03-13 RX ADMIN — LOSARTAN POTASSIUM 50 MG: 50 TABLET, FILM COATED ORAL at 09:08

## 2025-03-13 RX ADMIN — ACETAMINOPHEN 650 MG: 325 TABLET ORAL at 15:06

## 2025-03-13 RX ADMIN — FAMOTIDINE 20 MG: 20 TABLET, FILM COATED ORAL at 09:08

## 2025-03-13 NOTE — NURSING NOTE
Patient is awake and alert. Cast intact RLE. No signs of circulatory  compromise noted. Denies complaints of pain or discomfort offered.

## 2025-03-13 NOTE — WOUND OSTOMY CARE
Consult Note - Wound   Tobin PATE Scisco 69 y.o. male MRN: [id number]  Unit/Bed#: [facility] 210-01 Encounter: [id number]        Assessment :   Patient admitted to [facility] due to quadriceps muscle rupture of right leg. Wound care nurse consulted for right heel pressure injury and left plantar foot ulcer. Wound care nurse consult completed virtually today by viewing/assessing wounds pictured by primary nurse and communicating with primary nurse through Epic secure chat. Communicated with MD MAURICE Brito via epic secure chat to make aware of the pressure injury beneath the right leg cast and recommendation for orthopedic surgery to assess for solution to help relieve pressure and friction from this region to prevent worsening of pressure injury.    1. Pressure injury right posterior ankle, DTI-POA- Wound appears oval in shape, intact purple skin. Audrey-wound appears dry, intact, no redness. Recommend apply silicone Mepilex dressing to protect skin and help with pressure relief to this area. This wound has the potential to evolve to a full thickness injury, stage 3 or 4.    2. Left plantar aspect of foot- Two wounds noted and appear to be oval in shape and covered in dry brown adhered tissue, no drainage reported. Audrey-wound appears dry, intact, no redness. Recommend applying silicone Mepilex dressing over wound beds.    3. No other wounds reported by primary nurse.     Educated patient on importance of frequent offloading of pressure via turning, repositioning, and weight-shifting. Verbalized understanding of plan of care.    Primary nurse aware of plan of care. Will follow along.      Skin care plans:  1-Hydraguard/Silicone Cream to bilateral sacrum, buttock, and heels BID and PRN  2-Elevate heels to offload pressure.  3-Ehob cushion in chair when out of bed.  4-Moisturize skin daily with skin nourishing cream.  5-Turn/reposition q2h for pressure re-distribution on skin.   6-Right posterior ankle and left plantar foot wounds-  Cleanse with soap and water, pat dry. Apply Silicone foam Mepilex dressing over wounds. Change every other day and as needed for soilage/displacement.         Contact through [facility] Secure Chat with any questions  Wound Care will continue to follow while inpatient    Joy PARMAR RN CWON  Wound and Ostomy care

## 2025-03-13 NOTE — PROGRESS NOTES
03/13/25 1340   Pain Assessment   Pain Assessment Tool 0-10   Pain Score 2   Pain Location/Orientation Orientation: Right;Location: Leg   Pain Onset/Description Onset: Ongoing   Restrictions/Precautions   Precautions Bed/chair alarms;Fall Risk;Pain;Supervision on toilet/commode;Limb alert   RLE Weight Bearing Per Order TTWB   Putting On/Taking Off Footwear   Type of Assistance Needed Adaptive equipment;Verbal cues;Supervision   Comment Provided visual demo followed by verbal instr and pt demo ability to doff with reacher and don with sock aide and then dsg stick and reacher to pull up over cast   Putting On/Taking Off Footwear CARE Score 4   Sit to Lying   Type of Assistance Needed Supervision   Physical Assistance Level No physical assistance   Sit to Lying CARE Score 4   Sit to Stand   Type of Assistance Needed Supervision;Verbal cues   Physical Assistance Level No physical assistance   Sit to Stand CARE Score 4   Toilet Transfer   Findings pt is able to manage toielt txf with use of BSC placed to L emory eof pt to allow for pushing up and reaching back for safe txfs   Exercise Tools   Exercise Tools Yes   Theraband blue 3x10 reps in 3 planes of motion   Hand Gripper heavy duty resistance gripper x 30 B hands   Other Exercise Tool 1 5# dowel 2x15 reps in 6 planes of motion   Other Exercise Tool 2 red t bar 2x15 reps upward and then downward   Cognition   Overall Cognitive Status WFL   Arousal/Participation Alert;Cooperative   Attention Within functional limits   Orientation Level Oriented X4   Memory Within functional limits   Following Commands Follows all commands and directions without difficulty   Additional Activities   Additional Activities Comments fxnl mobility RW TTWB CS from OT room to pt room   Activity Tolerance   Activity Tolerance Patient tolerated treatment well   Assessment   Treatment Assessment OT tx addressed use of AE for don/doffing of socks, safety with functional txfs and mobility and B UE  TE for generalized strength and endurance for functional activity performance. Details listed in respective sections of note. Pt tolerated session well. Pt mainatined TTWB during txfs and mobility.  Pt is a motivated participant in therapy and reports would prefer to d/c to another facility after rehab until his cast is removed; care team notified. Pt's barriers to d/c include decreased strength throughout but especially RLE s/p quadricepts rupture, decreased ROM, decreased balance, decreased activity tolerance, decreased safety awareness, and TTWB RLE ; all affect independence in self care and fxl transfers. Pt would benefit from continued skilled OT services in order to address listed barriers and prepare for safe d/c.   Prognosis Good   Problem List Decreased strength;Decreased range of motion;Impaired balance;Decreased mobility;Orthopedic restrictions;Pain   Plan   Treatment/Interventions ADL retraining;Functional transfer training;Therapeutic exercise;Endurance training;Equipment eval/education;Gait training;Bed mobility;Compensatory technique education;Spoke to nursing;OT   Progress Progressing toward goals   OT Therapy Minutes   OT Time In 1340   OT Time Out 1440   OT Total Time (minutes) 60   OT Mode of treatment - Individual (minutes) 60   OT Mode of treatment - Concurrent (minutes) 0   OT Mode of treatment - Group (minutes) 0   OT Mode of treatment - Co-treat (minutes) 0   OT Mode of Treatment - Total time(minutes) 60 minutes   OT Cumulative Minutes 60   Therapy Time missed   Time missed? No

## 2025-03-13 NOTE — ASSESSMENT & PLAN NOTE
Skin breakdown on R heel due to cast. Ortho removed approximately 1 inch of cast at posterior aspect on 3/11  Local wound care  Continue to monitor   Present on admission to [facility]   Wound care RN consulted

## 2025-03-13 NOTE — DISCHARGE INSTR - OTHER ORDERS
Skin Care orders:  1-Hydraguard to sacrum, buttocks bid and prn   2-EHOB / waffle  cushion when out of bed in chair.  3-Moisturize skin daily with skin nourishing cream  4-Elevate heels to offload pressure.  5. Hydraguard to bilateral heels bid and prn

## 2025-03-13 NOTE — NURSING NOTE
Patient resting in bed at this time. No signs of distress noted. Patient was able to ambulate with one assist and a walker to the bathroom overnight. Right lower extremity cast in place and elevated on a pillow overnight. Patient to maintain toe touch weight bearing to the right lower extremity as ordered. Patient wore own CPAP unit overnight as ordered for sleep apnea. Bed alarm in place to promote patient safety. SCD to the left lower extremity while in bed. Call bell within reach. Plan of care ongoing.

## 2025-03-13 NOTE — PROGRESS NOTES
"Progress Note - PMR   Name: Tobin Kerns 69 y.o. male I MRN: [id number]  Unit/Bed#: -01 I Date of Admission: 3/11/2025   Date of Service: 3/13/2025 I Hospital Day: 2     Assessment & Plan  Quadriceps muscle rupture, right, subsequent encounter  HPI:   He initially underwent quad tendon repair on 7/25/24 with Dr. Foster. He was discharged home with outpatient PT.  Later noted to have recurrent high-grade tear with extensor lag on exam and elected for operative management  January 2025 MRI: \"Prior quadriceps insertion repair with high-grade recurrent tear exhibiting up to 2.2 cm of tendon retraction.\"  On 3/6/25, he underwent revision of quad tendon repair with allograft augmentation    Plan:   PT and OT for 3 hours a day, 5-6 days a week   TTWB RLE   Avoid moisture to cast   Pain: PRN Tylenol (monitor LFTs) and PRN 2.5-5 mg oxycodone q 8 hours (wean when able) (last weaned 3/12)   Lovenox for DVT prophylaxis while inpatient   Hypertension  Continue losartan 50 mg daily   Monitor BP   Mixed hyperlipidemia  Continue pravastatin 40 mg daily   Gastro-esophageal reflux disease without esophagitis  Continue famotidine 20 mg daily   Peripheral neuropathy  Patient is not diabetic   Consider B12 level   JOVANY on CPAP  Continue CPAP qHS  IFG (impaired fasting glucose)  Continue metformin 1500 mg daily   Seen by RD 3/12, diet adjusted to regular   Pending repeat A1c ordered by IM   Ulcer of left foot, with fat layer exposed (HCC)  Local wound care  Present on admission   Wound care RN consulted   BPH (benign prostatic hyperplasia)  Continue tamsulosin 0.4 mg daily   Patient currently voiding without difficulty   PRN PVRs if urinary retention is suspected   Impaired mobility and ADLs  Patient was evaluated by the rehabilitation team MD and an appropriate candidate for acute inpatient rehabilitation program at this time.  The patient will tolerate 3 hours/day 5 to 7 days/week of intensive physical and  occupational " therapy   in order to obtain goals for community discharge  Due to the patient's functional Compared to their baseline level of function in addition to their ongoing medical needs, the patient would benefit from daily supervision from a rehabilitation physician as well as rehabilitation nursing to implement and adjust the medical as well as functional plan of care in order to meet the patient's goals.  Bladder: Monitor closely for urinary incontinence and/or retention. Monitor urine output and encourage spontaneous voids. If unable to void spontaneously, will monitor with PVR bladder scans and initiate CIC regimen.  Skin: Monitor for breakdown, frequent turns, pressure offloading  Sleep: Encourage sleep hygiene (routine that promotes healthy circadian rhythm,avoid caffeine later in the day, quiet/dark room at night, reduce electronic before bedtime)      Wound of right ankle  Skin breakdown on R heel due to cast. Ortho removed approximately 1 inch of cast at posterior aspect on 3/11  Local wound care  Continue to monitor   Present on admission to [facility]   Wound care RN consulted     Subjective   Tobin Kerns is a 69 year-old male, with a past medical history of hypertension, hyperlipidemia, JOVANY, impaired fasting glucose, bilateral lower extremity neuropathy, GERD, BPH, presenting to [facility] after an elective right quadricept tendon repair. He initially underwent quad tendon repair on 7/25/24 with Dr. Foster. He was discharged home with outpatient PT. He was noted to have recurrent high-grade tear on repeat MRI with extensor lag on exam and elected for operative management. On 3/6/25, he underwent revision of quad tendon repair with allograft augmentation. He was evaluated by PT and OT and deemed an appropriate candidate for ARC due to functional deficits.     Chief Complaint: f/u ambulatory dysfunction    Interval:     Seen and evaluated patient at bedside. He does not have any concerns. Last BM 3/11. He is eating and  voiding. Wound care e-consult today, pending recommendations.     Objective :  Temp:  [97.6 °F (36.4 °C)-98 °F (36.7 °C)] 97.6 °F (36.4 °C)  HR:  [68] 68  BP: (118-131)/(66-78) 131/78  Resp:  [12-16] 16  SpO2:  [90 %-95 %] 90 %  O2 Device: None (Room air)    Functional Update:  Physical Therapy Occupational Therapy      Toilet Transfer   Surface Assessed Standard Toilet   Transfer Technique Stand Pivot   Type of Assistance Needed Physical assistance   Physical Assistance Level 25% or less   Comment Carolin RW, VC to maintain TTWB R LE   Toilet Transfer CARE Score 3   Transfer Bed/Chair/Wheelchair   Type of Assistance Needed Physical assistance   Physical Assistance Level 25% or less   Comment CGA/Carolin RW; VC to maintain TTWB R LE   Chair/Bed-to-Chair Transfer CARE Score 3   Roll Left and Right   Type of Assistance Needed Independent   Physical Assistance Level No physical assistance   Roll Left and Right CARE Score 6   Sit to Lying   Type of Assistance Needed Supervision   Physical Assistance Level No physical assistance   Sit to Lying CARE Score 4   Lying to Sitting on Side of Bed   Type of Assistance Needed Supervision   Physical Assistance Level No physical assistance   Lying to Sitting on Side of Bed CARE Score 4   Sit to Stand   Type of Assistance Needed Physical assistance;Verbal cues   Physical Assistance Level 25% or less   Comment initially CGA/Carolin , VC to maintain TTWB R LE; progressed to CGA by end of session       Shower/Bathe Self   Type of Assistance Needed Physical assistance   Physical Assistance Level 25% or less   Comment assist for R foot   Shower/Bathe Self CARE Score 3   Bathing   Assessed Bath Style Sponge Bath   Anticipated D/C Bath Style Sponge Bath;Shower   Able to Gather/Transport No   Able to Wash/Rinse/Dry (body part) Left Arm;Right Arm;L Upper Leg;R Upper Leg;R Lower Leg/Foot;Chest;Abdomen;Perineal Area;Buttocks   Limitations Noted in Balance;Endurance;ROM;Safety;Strength   Positioning  Seated;Standing   Findings  given long handled sponge at end of session for future ADL use   Upper Body Dressing   Type of Assistance Needed Set-up / clean-up   Physical Assistance Level No physical assistance   Upper Body Dressing CARE Score 5   Lower Body Dressing   Type of Assistance Needed Physical assistance   Physical Assistance Level 26%-50%   Comment assist to doff/don clothing over R foot, adjust over R hip/buttocks   Lower Body Dressing CARE Score 3   Putting On/Taking Off Footwear   Type of Assistance Needed Physical assistance   Physical Assistance Level 51%-75%   Comment assist to doff/don R sock   Putting On/Taking Off Footwear CARE Score 2              Physical Exam  Vitals and nursing note reviewed.   Constitutional:       General: He is not in acute distress.     Appearance: He is obese. He is not ill-appearing or diaphoretic.   HENT:      Head: Normocephalic and atraumatic.      Nose: Nose normal.      Mouth/Throat:      Mouth: Mucous membranes are moist.   Pulmonary:      Effort: Pulmonary effort is normal. No respiratory distress.   Abdominal:      General: There is no distension.   Skin:     General: Skin is warm and dry.   Neurological:      General: No focal deficit present.      Mental Status: He is alert.   Psychiatric:         Mood and Affect: Mood normal.         Behavior: Behavior normal.       Scheduled Meds:  Current Facility-Administered Medications   Medication Dose Route Frequency Provider Last Rate    acetaminophen  650 mg Oral Q6H PRN Modesta Lee PA-C      ascorbic acid  500 mg Oral Daily Toño Brito MD      Cholecalciferol  5,000 Units Oral Daily Toño Brito MD      enoxaparin  40 mg Subcutaneous Q24H [facility] Toño Brito MD      famotidine  20 mg Oral Daily Toño Brito MD      losartan  50 mg Oral Daily Toño Brito MD      metFORMIN  1,500 mg Oral Daily With Dinner Toño Brito MD      multivitamin stress formula  1 tablet Oral Daily Toño Brito MD      oxyCODONE  5  mg Oral Q8H PRN Modesta Lee PA-C      Or    oxyCODONE  2.5 mg Oral Q8H PRN Modesta Lee PA-C      polyethylene glycol  17 g Oral Daily PRN Modesta Lee PA-C      pravastatin  40 mg Oral Daily With Dinner Toño Brito MD      tamsulosin  0.4 mg Oral Daily With Dinner Toño Brito MD           Lab Results: I have reviewed the following results:  Results from last 7 days   Lab Units 03/12/25  0518 03/07/25  1311   HEMOGLOBIN g/dL 12.4 13.7   HEMATOCRIT % 37.9 39.7   WBC Thousand/uL 9.17 13.20*   PLATELETS Thousands/uL 261 222     Results from last 7 days   Lab Units 03/12/25  0518 03/07/25  1311   BUN mg/dL 23 16   SODIUM mmol/L 137 136   POTASSIUM mmol/L 4.3 4.0   CHLORIDE mmol/L 103 105   CREATININE mg/dL 1.05 1.00   AST U/L 12* 14   ALT U/L 10 10            Modesta Lee PA-C  Physical Medicine and Rehabilitation  [facility]

## 2025-03-13 NOTE — PROGRESS NOTES
"Progress Note - Tobin Kerns 69 y.o. male MRN: [id number]    Unit/Bed#: [facility] 210-01 Encounter: [id number]        Subjective:   Patient seen and examined at bedside after reviewing the chart and discussing the case with the caring staff.      Patient examined at bedside.  Patient has no acute symptoms.    Physical Exam   Vitals: Blood pressure 131/78, pulse 68, temperature 97.6 °F (36.4 °C), temperature source Temporal, resp. rate 16, height 6' 5\" (1.956 m), weight (!) 139 kg (306 lb 14.1 oz), SpO2 90%.,Body mass index is 36.39 kg/m².  Constitutional: Patient in no acute distress.  HEENT: PERR, EOMI, MMM.  Cardiovascular: Normal rate and regular rhythm.    Pulmonary/Chest: Effort normal and breath sounds normal.   Abdomen: Soft, + BS, NT.    Assessment/Plan:  Tobin Kerns is a(n) 69 y.o. year old male who is s/p quadriceps tendon repair.     Cardiac with hx of HTN, HLD.  Patient is on losartan 50 mg daily, pravastatin 40 mg daily.  Impaired fasting glucose.  Hgb a1c 5.4% on 3/12/25.  Continue metformin XR 1500 mg daily with dinner.  Regular diet and d/c accucheks as blood sugar is well controlled.    BPH.  Continue Flomax 0.4 mg daily.  GERD.  Continue Pepcid 20 mg daily.  JOVANY.  On CPAP at bedtime.   Vitamin D deficiency.  Patient is on vitamin D3 5000 units daily.  Pain and bowel management.  As per physiatrist.  Quadriceps tendon repair.  Toe touch weightbearing RLE with use of walker.  Patient is receiving intensive PT OT as per physiatrist.    The patient was discussed with Dr. Brito and he is in agreement with the above note.  "

## 2025-03-13 NOTE — PROGRESS NOTES
03/13/25 0859   Pain Assessment   Pain Assessment Tool 0-10   Pain Score No Pain   Restrictions/Precautions   Precautions Bed/chair alarms;Fall Risk;Limb alert;Pain;Supervision on toilet/commode   RLE Weight Bearing Per Order TTWB   ROM Restrictions No   Braces or Orthoses   (R LE cast)   Cognition   Overall Cognitive Status WFL   Arousal/Participation Alert;Responsive;Cooperative   Attention Within functional limits   Orientation Level Oriented X4   Memory Within functional limits   Following Commands Follows all commands and directions without difficulty   Subjective   Subjective Pt reports he is ready to go, no c/o of pain in R leg   Roll Left and Right   Type of Assistance Needed Independent   Physical Assistance Level No physical assistance   Roll Left and Right CARE Score 6   Sit to Lying   Type of Assistance Needed Supervision   Physical Assistance Level No physical assistance   Sit to Lying CARE Score 4   Lying to Sitting on Side of Bed   Type of Assistance Needed Supervision   Physical Assistance Level No physical assistance   Lying to Sitting on Side of Bed CARE Score 4   Sit to Stand   Type of Assistance Needed Incidental touching   Physical Assistance Level No physical assistance   Comment CGA RW, TTWB R LE   Sit to Stand CARE Score 4   Bed-Chair Transfer   Type of Assistance Needed Incidental touching   Physical Assistance Level No physical assistance   Comment CGA RW, TTWB R LE   Chair/Bed-to-Chair Transfer CARE Score 4   Car Transfer   Reason if not Attempted Environmental limitations   Car Transfer CARE Score 10   Walk 10 Feet   Type of Assistance Needed Incidental touching   Physical Assistance Level No physical assistance   Comment CGA RW, TTWB R LE   Walk 10 Feet CARE Score 4   Walk 50 Feet with Two Turns   Type of Assistance Needed Incidental touching   Physical Assistance Level No physical assistance   Comment CGA RW, TTWB R LE   Walk 50 Feet with Two Turns CARE Score 4   Walk 150 Feet   Type  "of Assistance Needed Incidental touching   Physical Assistance Level No physical assistance   Comment CGA RW, TTWB R LE   Walk 150 Feet CARE Score 4   Walking 10 Feet on Uneven Surfaces   Type of Assistance Needed Incidental touching   Physical Assistance Level No physical assistance   Comment CGA RW, TTWB R LE   Walking 10 Feet on Uneven Surfaces CARE Score 4   Ambulation   Primary Mode of Locomotion Prior to Admission Walk   Distance Walked (feet) 150 ft  (150', 90')   Assist Device Roller Walker   Findings CGA RW, increased difficulty maintaining TTWB R LE with fatigue; VC for keeping RW forward   Does the patient walk? 2. Yes   Wheel 50 Feet with Two Turns   Reason if not Attempted Activity not applicable   Wheel 50 Feet with Two Turns CARE Score 9   Wheel 150 Feet   Reason if not Attempted Activity not applicable   Wheel 150 Feet CARE Score 9   Wheelchair mobility   Findings N/A   Does the patient use a wheelchair? 0. No   Curb or Single Stair   Style negotiated Single stair   Type of Assistance Needed Physical assistance   Physical Assistance Level 25% or less   Comment CGA/Carolin up/down 4 6\"  steps, L HR, non-reciprocal; forward ascending, backward descending, TTWB R LE   1 Step (Curb) CARE Score 3   4 Steps   Type of Assistance Needed Incidental touching   Physical Assistance Level No physical assistance   Comment CGA/Carolin up/down 4 6\"  steps, L HR, non-reciprocal; forward ascending, backward descending, TTWB R LE   4 Steps CARE Score 4   12 Steps   Reason if not Attempted Activity not applicable   12 Steps CARE Score 9   Picking Up Object   Reason if not Attempted Safety concerns   Picking Up Object CARE Score 88   Toilet Transfer   Comment did not require during session   Therapeutic Interventions   Strengthening standing LE TE   Other bed mobility, transfer training, gait training, stair training   Assessment   Treatment Assessment Pt agreeable to PT this AM, received supine in bed. Pt is " demonstrating improved LE strength/endurance as demonstrated by increased ambulatory distance. Pt tolerated standing LE TE for RLE well, requiring intermittent seated rest breaks as needed. Pt maintained TTWB for stair negotiation well this session using single HR. He continues to demonstrate intermittent difficulty with maintaining TTWB R LE with transfers and static standing. Will continue with current PT POC to improve deficits and promote return to PLOF.   Problem List Decreased strength;Decreased range of motion;Decreased endurance;Impaired balance;Decreased safety awareness;Orthopedic restrictions   PT Barriers   Physical Impairment Decreased strength;Decreased range of motion;Decreased endurance;Impaired balance;Decreased mobility;Impaired sensation;Obesity;Decreased skin integrity;Orthopedic restrictions;Pain   Functional Limitation Walking;Standing;Transfers;Stair negotiation;Ramp negotiation;Car transfers   Plan   Treatment/Interventions Functional transfer training;LE strengthening/ROM;Therapeutic exercise;Endurance training;Patient/family training;Equipment eval/education;Bed mobility;Gait training   Progress Progressing toward goals   PT Therapy Minutes   PT Time In 0900   PT Time Out 1000   PT Total Time (minutes) 60   PT Mode of treatment - Individual (minutes) 0   PT Mode of treatment - Concurrent (minutes) 60   PT Mode of treatment - Group (minutes) 0   PT Mode of treatment - Co-treat (minutes) 0   PT Mode of Treatment - Total time(minutes) 60 minutes   PT Cumulative Minutes 150     Standing LE TE:  3x10, R LE only  March  Hip ABd  Hip ADd  Hip extension  Mini Squats (L LE only)    PT session occurred from 9:00 - 10:00, documented in 8:59 column.    Patient remains seated at EOB, all needs in reach.  Alarm in place and activated.  Encouraged use of call bell, patient verbalizes understanding.

## 2025-03-13 NOTE — PROGRESS NOTES
03/13/25 1230   Pain Assessment   Pain Assessment Tool 0-10   Pain Score 2   Pain Location/Orientation Orientation: Right;Location: Knee   Restrictions/Precautions   Precautions Bed/chair alarms;Fall Risk;Limb alert;Pain;Supervision on toilet/commode   RLE Weight Bearing Per Order TTWB   ROM Restrictions No   Braces or Orthoses   (cast R LE)   Cognition   Overall Cognitive Status WFL   Arousal/Participation Alert;Responsive;Cooperative   Attention Within functional limits   Orientation Level Oriented X4   Memory Within functional limits   Following Commands Follows all commands and directions without difficulty   Subjective   Subjective Pt reports some soreness from this morning   Roll Left and Right   Comment Pt sitting EOB   Sit to Lying   Comment Pt sitting EOB   Lying to Sitting on Side of Bed   Comment Pt sitting EOB   Sit to Stand   Type of Assistance Needed Incidental touching   Physical Assistance Level No physical assistance   Comment CGA RW, TTWB R LE   Sit to Stand CARE Score 4   Bed-Chair Transfer   Type of Assistance Needed Incidental touching   Physical Assistance Level No physical assistance   Comment CGA RW, TTWB R LE   Chair/Bed-to-Chair Transfer CARE Score 4   Car Transfer   Reason if not Attempted Environmental limitations   Car Transfer CARE Score 10   Walk 10 Feet   Type of Assistance Needed Incidental touching   Physical Assistance Level No physical assistance   Comment CGA RW, TTWB R LE   Walk 10 Feet CARE Score 4   Walk 50 Feet with Two Turns   Type of Assistance Needed Incidental touching   Physical Assistance Level No physical assistance   Comment CGA RW, TTWB R LE   Walk 50 Feet with Two Turns CARE Score 4   Walk 150 Feet   Type of Assistance Needed Incidental touching   Physical Assistance Level No physical assistance   Comment CGA RW, TTWB R LE   Walk 150 Feet CARE Score 4   Walking 10 Feet on Uneven Surfaces   Type of Assistance Needed Incidental touching   Physical Assistance Level  No physical assistance   Comment CGA RW, green foam, TTWB R LE   Walking 10 Feet on Uneven Surfaces CARE Score 4   Ambulation   Primary Mode of Locomotion Prior to Admission Walk   Distance Walked (feet) 165 ft  (165', 150')   Assist Device Roller Walker   Findings CGA RW, improved ability to maintain TTWB R LE, decreased ability with fatigue   Does the patient walk? 2. Yes   Wheel 50 Feet with Two Turns   Reason if not Attempted Activity not applicable   Wheel 50 Feet with Two Turns CARE Score 9   Wheel 150 Feet   Reason if not Attempted Activity not applicable   Wheel 150 Feet CARE Score 9   Wheelchair mobility   Findings N/A   Does the patient use a wheelchair? 0. No   Curb or Single Stair   Comment performed stairs this AM   12 Steps   Reason if not Attempted Activity not applicable   12 Steps CARE Score 9   Picking Up Object   Reason if not Attempted Safety concerns   Picking Up Object CARE Score 88   Toilet Transfer   Comment did not require during session   Therapeutic Interventions   Strengthening seated LE TE   Other transfer training, gait training   Assessment   Treatment Assessment Pt agreeable to PT this PM, received sitting upright at EOB. Continued to challenge LE strength/endurance with seated LE TE, with good tolerance. Pt continues to maintain TTWB well with ambulation and transfers this session, remains highly motivated to improve. Will continue with current PT POC to improve deficits and promote return to PLOF.   Problem List Decreased strength;Decreased range of motion;Decreased endurance;Impaired balance;Decreased safety awareness;Orthopedic restrictions   PT Barriers   Physical Impairment Decreased strength;Decreased range of motion;Decreased endurance;Impaired balance;Decreased mobility;Impaired sensation;Obesity;Decreased skin integrity;Orthopedic restrictions;Pain   Functional Limitation Walking;Transfers;Standing;Stair negotiation;Ramp negotiation;Car transfers   Plan    Treatment/Interventions Functional transfer training;LE strengthening/ROM;Therapeutic exercise;Endurance training;Patient/family training;Equipment eval/education;Bed mobility;Gait training   Progress Progressing toward goals   PT Therapy Minutes   PT Time In 1230   PT Time Out 1330   PT Total Time (minutes) 60   PT Mode of treatment - Individual (minutes) 60   PT Mode of treatment - Concurrent (minutes) 0   PT Mode of treatment - Group (minutes) 0   PT Mode of treatment - Co-treat (minutes) 0   PT Mode of Treatment - Total time(minutes) 60 minutes   PT Cumulative Minutes 210     Seated LE TE:  3x10, B/L LEs, 2.5# L LE  March  LAQ (L LE only)  Hip ABd - blue T-band  Hip ADd - ball Edgar/blueT-band  GS  HR  TR  HS Curls - blue T-band (L LE only)    Patient remains OOB in chair with OT, all needs in reach.

## 2025-03-14 PROCEDURE — 97535 SELF CARE MNGMENT TRAINING: CPT

## 2025-03-14 PROCEDURE — 97110 THERAPEUTIC EXERCISES: CPT

## 2025-03-14 PROCEDURE — 97116 GAIT TRAINING THERAPY: CPT

## 2025-03-14 PROCEDURE — 99232 SBSQ HOSP IP/OBS MODERATE 35: CPT | Performed by: PHYSICAL MEDICINE & REHABILITATION

## 2025-03-14 PROCEDURE — 97530 THERAPEUTIC ACTIVITIES: CPT

## 2025-03-14 RX ADMIN — PRAVASTATIN SODIUM 40 MG: 40 TABLET ORAL at 15:31

## 2025-03-14 RX ADMIN — FAMOTIDINE 20 MG: 20 TABLET, FILM COATED ORAL at 08:02

## 2025-03-14 RX ADMIN — OXYCODONE HYDROCHLORIDE AND ACETAMINOPHEN 500 MG: 500 TABLET ORAL at 08:02

## 2025-03-14 RX ADMIN — ACETAMINOPHEN 650 MG: 325 TABLET ORAL at 15:31

## 2025-03-14 RX ADMIN — B-COMPLEX W/ C & FOLIC ACID TAB 1 TABLET: TAB at 08:02

## 2025-03-14 RX ADMIN — LOSARTAN POTASSIUM 50 MG: 50 TABLET, FILM COATED ORAL at 08:02

## 2025-03-14 RX ADMIN — TAMSULOSIN HYDROCHLORIDE 0.4 MG: 0.4 CAPSULE ORAL at 15:31

## 2025-03-14 RX ADMIN — CHOLECALCIFEROL TAB 25 MCG (1000 UNIT) 5000 UNITS: 25 TAB at 08:02

## 2025-03-14 RX ADMIN — METFORMIN ER 500 MG 1500 MG: 500 TABLET ORAL at 15:31

## 2025-03-14 RX ADMIN — ENOXAPARIN SODIUM 40 MG: 40 INJECTION SUBCUTANEOUS at 08:02

## 2025-03-14 NOTE — PROGRESS NOTES
"Progress Note - Tobin Kerns 69 y.o. male MRN: [id number]    Unit/Bed#: [facility] 210-01 Encounter: [id number]        Subjective:   Patient seen and examined at bedside after reviewing the chart and discussing the case with the caring staff.      Patient examined at bedside.  Patient has no acute symptoms.    Physical Exam   Vitals: Blood pressure 116/65, pulse 60, temperature (!) 97.3 °F (36.3 °C), temperature source Temporal, resp. rate 18, height 6' 5\" (1.956 m), weight (!) 139 kg (306 lb 14.1 oz), SpO2 96%.,Body mass index is 36.39 kg/m².  Constitutional: Patient in no acute distress.  HEENT: PERR, EOMI, MMM.  Cardiovascular: Normal rate and regular rhythm.    Pulmonary/Chest: Effort normal and breath sounds normal.   Abdomen: Soft, + BS, NT.    Assessment/Plan:  Tobin Kerns is a(n) 69 y.o. year old male who is s/p quadriceps tendon repair.     Cardiac with hx of HTN, HLD.  Patient is on losartan 50 mg daily, pravastatin 40 mg daily.  Impaired fasting glucose.  Hgb a1c 5.4% on 3/12/25.  Continue metformin XR 1500 mg daily with dinner.  Regular diet and d/c accucheks as blood sugar is well controlled.    BPH.  Continue Flomax 0.4 mg daily.  GERD.  Continue Pepcid 20 mg daily.  JOVANY.  On CPAP at bedtime.   Vitamin D deficiency.  Patient is on vitamin D3 5000 units daily.  Pain and bowel management.  As per physiatrist.  Quadriceps tendon repair.  Toe touch weightbearing RLE with use of walker.  Patient is receiving intensive PT OT as per physiatrist.    The patient was discussed with Dr. Brito and he is in agreement with the above note.  "

## 2025-03-14 NOTE — PROGRESS NOTES
03/14/25 0900   Pain Assessment   Pain Assessment Tool 0-10   Pain Score No Pain   Restrictions/Precautions   Precautions Bed/chair alarms;Fall Risk;Supervision on toilet/commode;Pain;Limb alert   RLE Weight Bearing Per Order TTWB   Braces or Orthoses Other (Comment)  (cast)   Oral Hygiene   Type of Assistance Needed Set-up / clean-up   Physical Assistance Level No physical assistance   Oral Hygiene CARE Score 5   Grooming   Able To Initiate Tasks;Wash/Dry Face;Brush/Clean Teeth;Wash/Dry Hands   Findings stance at sink to brush teeth   Shower/Bathe Self   Type of Assistance Needed Incidental touching;Verbal cues;Adaptive equipment   Comment LHS for R foot;at pt request he sat on toilet to bathe due to it being the most optimal position with R LE cast   Shower/Bathe Self CARE Score 4   Bathing   Assessed Bath Style Sponge Bath   Able to Adjust Water Temperature Yes   Able to Wash/Rinse/Dry (body part) Left Arm;Right Arm;L Upper Leg;L Lower Leg/Foot;R Lower Leg/Foot;Chest;Abdomen;Perineal Area;Buttocks   Limitations Noted in Balance;ROM;Strength   Positioning Standing;Seated   Adaptive Equipment Longhand Sponge;Longhand Reacher   Upper Body Dressing   Type of Assistance Needed Set-up / clean-up   Physical Assistance Level No physical assistance   Upper Body Dressing CARE Score 5   Lower Body Dressing   Type of Assistance Needed Incidental touching;Verbal cues;Adaptive equipment   Lower Body Dressing CARE Score 4   Putting On/Taking Off Footwear   Type of Assistance Needed Supervision;Verbal cues;Adaptive equipment   Physical Assistance Level No physical assistance   Comment good recall with AE   Putting On/Taking Off Footwear CARE Score 4   Picking Up Object   Type of Assistance Needed Verbal cues;Supervision;Adaptive equipment   Physical Assistance Level No physical assistance   Picking Up Object CARE Score 4   Dressing/Undressing Clothing   Don UB Clothes Button Shirt   Remove LB Clothes Undergarment;Socks   Don  LB Clothes Shorts;Undergarment;Socks   Limitations Noted In Balance;ROM;Strength   Positioning Supported Sit;Standing   Sit to Lying   Type of Assistance Needed Independent   Physical Assistance Level No physical assistance   Sit to Lying CARE Score 6   Lying to Sitting on Side of Bed   Type of Assistance Needed Independent   Physical Assistance Level No physical assistance   Lying to Sitting on Side of Bed CARE Score 6   Sit to Stand   Type of Assistance Needed Supervision   Physical Assistance Level No physical assistance   Comment maintained TTWB R LE   Sit to Stand CARE Score 4   Toileting Hygiene   Type of Assistance Needed Supervision   Physical Assistance Level No physical assistance   Toileting Hygiene CARE Score 4   Toileting   Able to Pull Clothing down yes, up yes.   Manage Hygiene Bladder;Bowel   Limitations Noted In Balance;ROM;LE Strength   Adaptive Equipment Grab Bar   Toilet Transfer   Type of Assistance Needed Supervision;Adaptive equipment   Comment placed 3in1 next to toilet to allow pt to have two handles by toilet to push up from and reach back for support. Pt declined 3in1 over toilet due to high cast to R LE doesnt allow for comfort in sitting on hard commode   Toilet Transfer CARE Score 4   Toilet Transfer   Surface Assessed Standard Commode   Transfer Technique Standard   Limitations Noted In Balance;ROM;LE Strength   Adaptive Equipment Walker;Grab Bar   Light Housekeeping   Light Housekeeping Level Walker   Light Housekeeping Level of Assistance Close supervision   Cognition   Overall Cognitive Status WFL   Arousal/Participation Alert;Cooperative   Attention Within functional limits   Orientation Level Oriented X4   Memory Within functional limits   Following Commands Follows all commands and directions without difficulty   Additional Activities   Additional Activities Comments fxnl mobility in room S with RW with TTWB   Activity Tolerance   Activity Tolerance Patient tolerated treatment  well   Assessment   Treatment Assessment OT session addressed ADL via sponge bathe level with focus on recall of AE, compensatory strategies and compliance with safe techniuqes. Details listed in respective sections of note. Pt tolerated session well. Pt maintained TTWB during txfs and mobility. Pt expressed desire to transition to [facility] upon d/c from [facility].  Pt is a motivated participant in therapy and reports would prefer to d/c to another facility after rehab until his cast is removed; care team notified. Pt's barriers to d/c include decreased strength throughout but especially RLE s/p quadricepts rupture, decreased ROM, decreased balance, decreased activity tolerance, decreased safety awareness, and TTWB RLE ; all affect independence in self care and fxl transfers. Pt would benefit from continued skilled OT services in order to address listed barriers and prepare for safe d/c.   Prognosis Good   Problem List Decreased strength;Decreased range of motion;Impaired balance;Decreased mobility;Pain;Orthopedic restrictions   Plan   Treatment/Interventions ADL retraining;Functional transfer training;Patient/family training;Equipment eval/education;Bed mobility;Gait training;Compensatory technique education;Spoke to nursing;OT   Progress Progressing toward goals   OT Therapy Minutes   OT Time In 0900   OT Time Out 1000   OT Total Time (minutes) 60   OT Mode of treatment - Individual (minutes) 60   OT Mode of treatment - Concurrent (minutes) 0   OT Mode of treatment - Group (minutes) 0   OT Mode of treatment - Co-treat (minutes) 0   OT Mode of Treatment - Total time(minutes) 60 minutes   OT Cumulative Minutes 120   Therapy Time missed   Time missed? No

## 2025-03-14 NOTE — ASSESSMENT & PLAN NOTE
Skin breakdown on R heel due to cast. Ortho removed approximately 1 inch of cast at posterior aspect on 3/11  Local wound care  Continue to monitor   Present on admission to [facility]   Wound care RN consulted

## 2025-03-14 NOTE — PROGRESS NOTES
"Progress Note - PMR   Name: Tobin Kerns 69 y.o. male I MRN: [id number]  Unit/Bed#: -01 I Date of Admission: 3/11/2025   Date of Service: 3/14/2025 I Hospital Day: 3     Assessment & Plan  Quadriceps muscle rupture, right, subsequent encounter  HPI:   He initially underwent quad tendon repair on 7/25/24 with Dr. Foster. He was discharged home with outpatient PT.  Later noted to have recurrent high-grade tear with extensor lag on exam and elected for operative management  January 2025 MRI: \"Prior quadriceps insertion repair with high-grade recurrent tear exhibiting up to 2.2 cm of tendon retraction.\"  On 3/6/25, he underwent revision of quad tendon repair with allograft augmentation    Plan:   PT and OT for 3 hours a day, 5-6 days a week   TTWB RLE   Avoid moisture to cast   Pain: PRN Tylenol (monitor LFTs) and PRN 2.5-5 mg oxycodone q 8 hours (wean when able) (last weaned 3/12)   Lovenox for DVT prophylaxis while inpatient   Hypertension  Continue losartan 50 mg daily   Monitor BP   Mixed hyperlipidemia  Continue pravastatin 40 mg daily   Gastro-esophageal reflux disease without esophagitis  Continue famotidine 20 mg daily   Peripheral neuropathy  Patient is not diabetic   Consider B12 level   JOVANY on CPAP  Continue CPAP qHS  IFG (impaired fasting glucose)  Continue metformin 1500 mg daily   Seen by RD 3/12, diet adjusted to regular   Pending repeat A1c ordered by IM   Ulcer of left foot, with fat layer exposed (HCC)  Local wound care  Present on admission   Wound care RN consulted   BPH (benign prostatic hyperplasia)  Continue tamsulosin 0.4 mg daily   Patient currently voiding without difficulty   PRN PVRs if urinary retention is suspected   Impaired mobility and ADLs  Patient was evaluated by the rehabilitation team MD and an appropriate candidate for acute inpatient rehabilitation program at this time.  The patient will tolerate 3 hours/day 5 to 7 days/week of intensive physical and  occupational " therapy   in order to obtain goals for community discharge  Due to the patient's functional Compared to their baseline level of function in addition to their ongoing medical needs, the patient would benefit from daily supervision from a rehabilitation physician as well as rehabilitation nursing to implement and adjust the medical as well as functional plan of care in order to meet the patient's goals.  Bladder: Monitor closely for urinary incontinence and/or retention. Monitor urine output and encourage spontaneous voids. If unable to void spontaneously, will monitor with PVR bladder scans and initiate CIC regimen.  Skin: Monitor for breakdown, frequent turns, pressure offloading  Sleep: Encourage sleep hygiene (routine that promotes healthy circadian rhythm,avoid caffeine later in the day, quiet/dark room at night, reduce electronic before bedtime)      Wound of right ankle  Skin breakdown on R heel due to cast. Ortho removed approximately 1 inch of cast at posterior aspect on 3/11  Local wound care  Continue to monitor   Present on admission to [facility]   Wound care RN consulted     Subjective   Tobin Kerns is a 69 year-old male, with a past medical history of hypertension, hyperlipidemia, JOVANY, impaired fasting glucose, bilateral lower extremity neuropathy, GERD, BPH, presenting to [facility] after an elective right quadricept tendon repair. He initially underwent quad tendon repair on 7/25/24 with Dr. Foster. He was discharged home with outpatient PT. He was noted to have recurrent high-grade tear on repeat MRI with extensor lag on exam and elected for operative management. On 3/6/25, he underwent revision of quad tendon repair with allograft augmentation. He was evaluated by PT and OT and deemed an appropriate candidate for [facility] due to functional deficits.     Chief Complaint: f/u ambulatory dysfunction    Interval:   Seen and evaluated patient at bedside. He does not have any concerns. Last BM 3/13. He is eating and  voiding     Objective :  Temp:  [97.2 °F (36.2 °C)-97.3 °F (36.3 °C)] 97.3 °F (36.3 °C)  HR:  [60-65] 60  BP: (110-116)/(59-65) 116/65  Resp:  [16-18] 18  SpO2:  [96 %-97 %] 96 %  O2 Device: None (Room air)    Functional Update:  Physical Therapy Occupational Therapy      Walk 10 Feet   Type of Assistance Needed Supervision   Physical Assistance Level No physical assistance   Comment Cl S RW, TTWB R LE   Walk 10 Feet CARE Score 4   Walk 50 Feet with Two Turns   Type of Assistance Needed Supervision   Physical Assistance Level No physical assistance   Comment Cl S RW, TTWB R LE   Walk 50 Feet with Two Turns CARE Score 4   Walk 150 Feet   Type of Assistance Needed Supervision   Physical Assistance Level No physical assistance   Comment Cl S RW, TTWB R LE   Walk 150 Feet CARE Score 4   Walking 10 Feet on Uneven Surfaces   Type of Assistance Needed Incidental touching   Physical Assistance Level No physical assistance   Comment CGA, green foam, RW   Walking 10 Feet on Uneven Surfaces CARE Score 4   Ambulation   Primary Mode of Locomotion Prior to Admission Walk   Distance Walked (feet) 150 ft  (150' x 2)   Assist Device Roller Walker   Does the patient walk? 2. Yes           03/14/25 0900   Pain Assessment   Pain Assessment Tool 0-10   Pain Score No Pain   Restrictions/Precautions   Precautions Bed/chair alarms;Fall Risk;Supervision on toilet/commode;Pain;Limb alert   RLE Weight Bearing Per Order TTWB   Braces or Orthoses Other (Comment)  (cast)   Oral Hygiene   Type of Assistance Needed Set-up / clean-up   Physical Assistance Level No physical assistance   Oral Hygiene CARE Score 5   Grooming   Able To Initiate Tasks;Wash/Dry Face;Brush/Clean Teeth;Wash/Dry Hands   Findings stance at sink to brush teeth   Shower/Bathe Self   Type of Assistance Needed Incidental touching;Verbal cues;Adaptive equipment   Comment LHS for R foot;at pt request he sat on toilet to bathe due to it being the most optimal position with R LE  cast   Shower/Bathe Self CARE Score 4   Bathing   Assessed Bath Style Sponge Bath   Able to Adjust Water Temperature Yes   Able to Wash/Rinse/Dry (body part) Left Arm;Right Arm;L Upper Leg;L Lower Leg/Foot;R Lower Leg/Foot;Chest;Abdomen;Perineal Area;Buttocks   Limitations Noted in Balance;ROM;Strength   Positioning Standing;Seated   Adaptive Equipment Longhand Sponge;Longhand Reacher   Upper Body Dressing   Type of Assistance Needed Set-up / clean-up   Physical Assistance Level No physical assistance   Upper Body Dressing CARE Score 5   Lower Body Dressing   Type of Assistance Needed Incidental touching;Verbal cues;Adaptive equipment   Lower Body Dressing CARE Score 4   Putting On/Taking Off Footwear   Type of Assistance Needed Supervision;Verbal cues;Adaptive equipment   Physical Assistance Level No physical assistance   Comment good recall with AE   Putting On/Taking Off Footwear CARE Score 4   Picking Up Object   Type of Assistance Needed Verbal cues;Supervision;Adaptive equipment   Physical Assistance Level No physical assistance   Picking Up Object CARE Score 4   Dressing/Undressing Clothing   Don UB Clothes Button Shirt   Remove LB Clothes Undergarment;Socks   Don LB Clothes Shorts;Undergarment;Socks   Limitations Noted In Balance;ROM;Strength   Positioning Supported Sit;Standing   Sit to Lying   Type of Assistance Needed Independent   Physical Assistance Level No physical assistance   Sit to Lying CARE Score 6   Lying to Sitting on Side of Bed   Type of Assistance Needed Independent   Physical Assistance Level No physical assistance   Lying to Sitting on Side of Bed CARE Score 6   Sit to Stand   Type of Assistance Needed Supervision   Physical Assistance Level No physical assistance   Comment maintained TTWB R LE   Sit to Stand CARE Score 4   Toileting Hygiene   Type of Assistance Needed Supervision   Physical Assistance Level No physical assistance   Toileting Hygiene CARE Score 4   Toileting   Able to Pull  Clothing down yes, up yes.   Manage Hygiene Bladder;Bowel   Limitations Noted In Balance;ROM;LE Strength   Adaptive Equipment Grab Bar            Physical Exam  Vitals and nursing note reviewed.   Constitutional:       General: He is not in acute distress.     Appearance: He is obese. He is not ill-appearing or diaphoretic.   HENT:      Head: Normocephalic and atraumatic.      Nose: Nose normal.      Mouth/Throat:      Mouth: Mucous membranes are moist.   Cardiovascular:      Pulses:           Dorsalis pedis pulses are 2+ on the right side.   Pulmonary:      Effort: Pulmonary effort is normal. No respiratory distress.   Abdominal:      General: There is no distension.   Skin:     General: Skin is warm and dry.   Neurological:      General: No focal deficit present.      Mental Status: He is alert.   Psychiatric:         Mood and Affect: Mood normal.         Behavior: Behavior normal.             Scheduled Meds:  Current Facility-Administered Medications   Medication Dose Route Frequency Provider Last Rate    acetaminophen  650 mg Oral Q6H PRN Modesta Lee PA-C      ascorbic acid  500 mg Oral Daily Toño Brito MD      Cholecalciferol  5,000 Units Oral Daily Toño Brito MD      enoxaparin  40 mg Subcutaneous Q24H [facility] Toño Brito MD      famotidine  20 mg Oral Daily Toño Brito MD      losartan  50 mg Oral Daily Toño Brito MD      metFORMIN  1,500 mg Oral Daily With Dinner Toño Brito MD      multivitamin stress formula  1 tablet Oral Daily Toño Brito MD      oxyCODONE  5 mg Oral Q8H PRN Modesta Lee PA-C      Or    oxyCODONE  2.5 mg Oral Q8H PRN Modesta Lee PA-C      polyethylene glycol  17 g Oral Daily PRN Modesta Lee PA-C      pravastatin  40 mg Oral Daily With Dinner Toño Brito MD      tamsulosin  0.4 mg Oral Daily With Dinner Toño Brito MD           Lab Results: I have reviewed the following results:  Results from last 7 days   Lab Units 03/12/25  0518   HEMOGLOBIN g/dL 12.4    HEMATOCRIT % 37.9   WBC Thousand/uL 9.17   PLATELETS Thousands/uL 261     Results from last 7 days   Lab Units 03/12/25  0518   BUN mg/dL 23   SODIUM mmol/L 137   POTASSIUM mmol/L 4.3   CHLORIDE mmol/L 103   CREATININE mg/dL 1.05   AST U/L 12*   ALT U/L 10            Modesta Lee PA-C  Physical Medicine and Rehabilitation  [facility]

## 2025-03-14 NOTE — PROGRESS NOTES
03/14/25 1330   Pain Assessment   Pain Assessment Tool 0-10   Pain Score 1   Pain Location/Orientation Orientation: Right;Location: Knee   Restrictions/Precautions   Precautions Bed/chair alarms;Fall Risk;Limb alert;Pain;Supervision on toilet/commode   RLE Weight Bearing Per Order TTWB   Braces or Orthoses Other (Comment)  (cast R LE)   Cognition   Overall Cognitive Status WFL   Arousal/Participation Alert;Responsive;Cooperative   Attention Within functional limits   Orientation Level Oriented X4   Memory Within functional limits   Following Commands Follows all commands and directions without difficulty   Subjective   Subjective Pt reports his new computer will not connect to the hospital's WiUserTesting   Roll Left and Right   Type of Assistance Needed Independent   Physical Assistance Level No physical assistance   Roll Left and Right CARE Score 6   Sit to Lying   Type of Assistance Needed Independent   Physical Assistance Level No physical assistance   Sit to Lying CARE Score 6   Lying to Sitting on Side of Bed   Type of Assistance Needed Independent   Physical Assistance Level No physical assistance   Lying to Sitting on Side of Bed CARE Score 6   Sit to Stand   Type of Assistance Needed Supervision   Physical Assistance Level No physical assistance   Comment RW, TTWB R LE   Sit to Stand CARE Score 4   Bed-Chair Transfer   Type of Assistance Needed Supervision   Physical Assistance Level No physical assistance   Comment COREY TTWB R LE   Chair/Bed-to-Chair Transfer CARE Score 4   Car Transfer   Reason if not Attempted Environmental limitations   Car Transfer CARE Score 10   Walk 10 Feet   Type of Assistance Needed Supervision   Physical Assistance Level No physical assistance   Comment COREY TTWB R LE   Walk 10 Feet CARE Score 4   Walk 50 Feet with Two Turns   Type of Assistance Needed Supervision   Physical Assistance Level No physical assistance   Comment RW TTWB R LE   Walk 50 Feet with Two Turns CARE Score 4  CHIEF COMPLAINT:  Follow up multiple medical problems    HISTORY OF PRESENT ILLNESS:    Hypertension, edema:  Blood pressures usually run 100/70. Pulse 93 average  . The patient complains of peripheral edema.  Wearing supportive stockings.  No sores on the legs   Patient denies chest pain , shortness of breath, palpitations. Hehas been taking his medicationregularly.  The patient is tolerating the medication. Exercise: walks  He exercises daily      Hyperlipidemia: Currently on Atorvastatin (Lipitor),20 mg.  Hehas been taking his medicationregularly. The patient is tolerating the medication. He is not having muscle aches.  The patient consistently watches his diet.    Emphysema:  Breathing doing well lately.  Using advair twice a day and spiriva 2 puffs in the morning.  Little coughing in bed.  Very seldom does he hear wheezing.  Not using the albuterol  Very much.  About once every 2 weeks.      Chronic rhinitis Using atrovent for the nose.  Some running in the morning.  Not much running during the day.  Using zyrtec at bedtime.      Walking up and down the hallways.  Working on the legs.  Legs stiffened up on trip to [place] and back.  Knees are aching.  Using freeze it rub for the skin.      BPH:  Urinating frequently with water pill in the morning.  Rare nocturia.      Eczema:  Skin is good.  No rashes.  Skin dry.  Using skin softener.      Liver, Alcohol:  1 beer at night.  Occasionally second drink.  Abdomen is doing well without problems, no nausea.    Patient Active Problem List   Diagnosis   • Moderate persistent asthma without complication   • Essential hypertension, benign   • Type II or unspecified type diabetes mellitus without mention of complication, not stated as uncontrolled   • Esophageal reflux   • Gout, unspecified   • Obesity, unspecified   • JACQUELINE (obstructive sleep apnea)   • Pure hypercholesterolemia   • Former smoker   • Allergic rhinitis (pollen, animal, mold) s/p allergy    Ambulation   Primary Mode of Locomotion Prior to Admission Walk   Distance Walked (feet) 120 ft  (120' x 2)   Assist Device Roller Walker   Does the patient walk? 2. Yes   Wheel 50 Feet with Two Turns   Reason if not Attempted Activity not applicable   Wheel 50 Feet with Two Turns CARE Score 9   Wheel 150 Feet   Reason if not Attempted Activity not applicable   Wheel 150 Feet CARE Score 9   Wheelchair mobility   Findings N/A   Does the patient use a wheelchair? 0. No   Curb or Single Stair   Style negotiated Single stair   Type of Assistance Needed Incidental touching   Physical Assistance Level No physical assistance   Comment CGA up/down 6  steps with L HR, non-reciprocal, forward ascending, backward descending, TTWB R LE maintained   1 Step (Curb) CARE Score 4   4 Steps   Type of Assistance Needed Incidental touching   Physical Assistance Level No physical assistance   Comment CGA up/down 6  steps with L HR, non-reciprocal, forward ascending, backward descending, TTWB R LE maintained   4 Steps CARE Score 4   12 Steps   Reason if not Attempted Activity not applicable   12 Steps CARE Score 9   Toilet Transfer   Comment did not require during session   Therapeutic Interventions   Other bed mobility,  transfer training, gait training, stair training, HEP provided   Equipment Use   NuStep L4, 10 min, B/L UE, L LE only   Assessment   Treatment Assessment Pt agreeable to PT this PM, received supine in bed. Challenged L LE strength/endurance with NuStep with R LE supported with good tolerance. Pt continues to maintain TTWB R LE well with stair negotiation, requiring no physical assist this session. HEP provided, answered all pt's questions and concerns regarding above. Pt remains at increased fall risk, but improving towards functional goals. Will continue with current PT POC to improve deficits and promote return to PLOF.   Problem List Decreased strength;Decreased range of motion;Impaired  immunotherapy Dr. Valdez x 16 year (quit 6/2016)   • Benign localized hyperplasia of prostate without urinary obstruction and other lower urinary tract symptoms (LUTS)   • History of retinal detachment   • ERM OS (epiretinal membrane, left eye)   • CL (cholelithiasis)   • Rhinitis, atrophic   • Rosacea   • Generalized weakness bilateral lower extremities   • Atrial fibrillation, new onset   • Primary osteoarthritis of both knees   • Pulmonary emphysema, unspecified type   • Uncomplicated alcohol dependence (CMS/HCC)   • Bilateral lower extremity edema   • Pleural effusion on right   • Hypoxia       MEDICATIONS:  Current Outpatient Medications   Medication Sig Dispense Refill   • ADVAIR DISKUS 500-50 MCG/DOSE inhaler USE 1 INHALATION TWICE A DAY (NEED OFFICE FOLLOW UP PRIOR TO REFILLS) 180 each 6   • KLOR-CON M 10 MEQ alis ER tablet TAKE 1 TABLET DAILY 90 tablet 3   • SPIRIVA RESPIMAT 2.5 MCG/ACT inhaler USE 2 INHALATIONS DAILY 4 g 11   • terazosin (HYTRIN) 2 MG capsule TAKE 2 CAPSULES NIGHTLY 180 capsule 1   • furosemide (LASIX) 20 MG tablet TAKE 1 TABLET DAILY 90 tablet 1   • dilTIAZem (CARDIZEM CD) 180 MG 24 hr capsule Take 1 capsule by mouth daily. 90 capsule 1   • potassium CHLORIDE (KLOR-CON M) 20 MEQ alis ER tablet Take 1 tablet by mouth daily. 1 tablet 0   • atorvastatin (LIPITOR) 20 MG tablet TAKE 1 TABLET DAILY 90 tablet 3   • ipratropium (ATROVENT) 0.06 % nasal spray USE 1 TO 2 SPRAYS IN EACH NOSTRIL UP TO THREE TIMES A DAY. MAY INCREASE OR DECREASE FREQUENCY BASED ON NEED. 45 mL 3   • albuterol 108 (90 Base) MCG/ACT inhaler Inhale 2 puffs into the lungs every 4 hours as needed (as needed for breathing difficulties). 1 Inhaler 1   • Calcium-Vitamin D 600-125 MG-UNIT TABS Take 1 tablet by mouth daily.     • Cetirizine HCl (ZYRTEC ALLERGY PO) Take 1 tablet by mouth nightly.        No current facility-administered medications for this visit.        ALLERGIES:  Allergies as of 09/11/2020 - Reviewed 09/11/2020    Allergen Reaction Noted   • Pollen     • Allergy     • Cat dander     • Dog dander     • Mold   (environmental)     • Rabbit   (animal)     • Dust     • Grass     • Trees  10/09/2013        SOCIAL HISTORY  Social History     Tobacco Use   Smoking Status Former Smoker   • Packs/day: 1.00   • Years: 20.00   • Pack years: 20.00   • Types: Cigarettes   • Quit date: 1979   • Years since quittin.7   Smokeless Tobacco Never Used     Health Maintenance   Topic Date Due   • Hepatitis B Vaccine (1 of 3 - Risk 3-dose series) 1963   • Shingles Vaccine (1 of 2) 1994   • Colorectal Cancer Screening-Colonoscopy  04/10/2019   • Influenza Vaccine (1) 2020   • Medicare Wellness Visit  2021   • Depression Screening  2021   • DTaP/Tdap/Td Vaccine (2 - Td) 2025   • Abdominal Aortic Aneurysm (AAA) Screening  Completed   • Pneumococcal Vaccine 65+  Completed   • Meningococcal Vaccine  Aged Out   • HPV Vaccine  Aged Out       VITALS:  Visit Vitals  /62   Pulse 75   Temp 98.1 °F (36.7 °C) (Temporal)   Ht 5' 8\" (1.727 m)   Wt 93 kg   SpO2 92%   BMI 31.17 kg/m²       PHYSICAL EXAM:  Constitutional:  Well developed, well nourished, no acute distress, non-toxic appearance   Eyes:  Pupils equal, round, reactive to light, conjunctivae normal   HENT:  Atraumatic, external ears normal, nose normal, oropharynx moist. Neck- normal range of motion, no tenderness, supple, no thyroid enlargement , nose without drainage  Respiratory:  Normal respiratory rate and effort, mild coarse breath sounds, no rales, no wheezing   Cardiovascular:  Normal rate, irregular rhythm, no murmurs, no gallops, no rubs   GI:  Soft, nondistended, normal bowel sounds, nontender, no organomegaly.  No evidence of ascites  Musculoskeletal:  1+ bilateral lower extremity edema  Integument:  Well hydrated, no rashes , areas of dryness, no well defined rashes  Lymphatic:  No lymphadenopathy noted   Neurologic:  Alert & oriented x  balance;Decreased mobility;Pain;Orthopedic restrictions   PT Barriers   Physical Impairment Decreased strength;Decreased range of motion;Decreased endurance;Impaired balance;Decreased mobility;Impaired sensation;Obesity;Decreased skin integrity;Orthopedic restrictions;Pain   Functional Limitation Walking;Transfers;Standing;Stair negotiation;Ramp negotiation;Car transfers   Plan   Treatment/Interventions Functional transfer training;LE strengthening/ROM;Therapeutic exercise;Endurance training;Patient/family training;Equipment eval/education;Bed mobility;Gait training   Progress Progressing toward goals   PT Therapy Minutes   PT Time In 1330   PT Time Out 1430   PT Total Time (minutes) 60   PT Mode of treatment - Individual (minutes) 30   PT Mode of treatment - Concurrent (minutes) 30   PT Mode of treatment - Group (minutes) 0   PT Mode of treatment - Co-treat (minutes) 0   PT Mode of Treatment - Total time(minutes) 60 minutes   PT Cumulative Minutes 330     Patient remains supine in bed, all needs in reach.  Alarm in place and activated.  Encouraged use of call bell, patient verbalizes understanding.     3  Psychiatric:  Speech and behavior appropriate    LABS:  No visits with results within 1 Week(s) from this visit.   Latest known visit with results is:   Lab Services on 09/03/2020   Component Date Value   • Fasting Status 09/03/2020 12    • Sodium 09/03/2020 141    • Potassium 09/03/2020 3.8    • Chloride 09/03/2020 102    • Carbon Dioxide 09/03/2020 31    • Anion Gap 09/03/2020 12    • Glucose 09/03/2020 101*   • BUN 09/03/2020 13    • Creatinine 09/03/2020 0.74    • Glomerular Filtration Ra* 09/03/2020 90*   • BUN/ Creatinine Ratio 09/03/2020 18    • Bilirubin, Total 09/03/2020 1.6*   • GOT/AST 09/03/2020 36    • Alkaline Phosphatase 09/03/2020 104    • Albumin 09/03/2020 2.9*   • Protein, Total 09/03/2020 7.1    • Globulin 09/03/2020 4.2*   • A/G Ratio 09/03/2020 0.7*   • GPT/ALT 09/03/2020 14    • Calcium 09/03/2020 8.3*   • WBC 09/03/2020 3.9*   • RBC 09/03/2020 3.47*   • HGB 09/03/2020 12.1*   • HCT 09/03/2020 37.0*   • MCV 09/03/2020 106.6*   • MCH 09/03/2020 34.9*   • MCHC 09/03/2020 32.7    • RDW-CV 09/03/2020 13.7    • PLT 09/03/2020 141    • NRBC 09/03/2020 0    • Neutrophil, Percent 09/03/2020 70    • Lymphocytes, Percent 09/03/2020 22    • Mono, Percent 09/03/2020 8    • Eosinophils, Percent 09/03/2020 0    • Basophils, Percent 09/03/2020 0    • Immature Granulocytes 09/03/2020 0    • Absolute Neutrophils 09/03/2020 2.7    • Absolute Lymphocytes 09/03/2020 0.9*   • Absolute Monocytes 09/03/2020 0.3    • Absolute Eosinophils  09/03/2020 0.0*   • Absolute Basophils 09/03/2020 0.0    • Absolute Immmature Granu* 09/03/2020 0.0    • RDW-SD 09/03/2020 53.7*         ASSESSMENT AND PLAN:    Bilateral lower extremity edema  Edema under reasonable control.  Will continue with his current furosemide.  Encouraged supportive garments on a regular basis.  Keep his legs elevated when possible.  - COMPREHENSIVE METABOLIC PANEL; Future  - OFFICE/OUTPT VISIT EST LEVEL IV    Anemia, unspecified type  Patient's anemia  is stable.  Likely anemia of chronic disease.  Will continue good nutrition.  Will continue to monitor.  - OFFICE/OUTPT VISIT EST LEVEL IV    Chronic liver disease  Patient continues to have low albumin, transaminases are normal.  Patient without symptoms.  Encouraged to avoid alcohol or least decrease alcohol.  Abdomen benign on exam.  - COMPREHENSIVE METABOLIC PANEL; Future  - OFFICE/OUTPT VISIT EST LEVEL IV    Essential hypertension, benign  Patient's blood pressure is well controlled. Patient tolerating antihypertensive medicines well. Will continue current medications at the current dose. Encouraged an active lifestyle.    - OFFICE/OUTPT VISIT EST LEVEL IV    Atrial fibrillation, unspecified type (CMS/HCC)  Atrial fibrillation well controlled.  Normal heart rate, normal blood pressure, no evidence of CHF.  Patient determined to be a poor candidate for anticoagulation due to risk of GI bleeding.  - OFFICE/OUTPT VISIT EST LEVEL IV    Pure hypercholesterolemia  Patient's hypercholesterolemia well controlled with current medications and/or lifestyle measures. Patient tolerating current treatment. We'll continue current treatment. Encouraged to watch the fat and cholesterol in their diet.    - LIPID PANEL WITH REFLEX; Future  - OFFICE/OUTPT VISIT EST LEVEL IV    Pulmonary emphysema, unspecified emphysema type (CMS/HCC)  Patient breathing well on exam today.  Encouraged use Advair in a regular basis, Spiriva.  Albuterol as needed.  Encouraged to avoid airway irritants.  Control allergies  - OFFICE/OUTPT VISIT EST LEVEL IV    Benign localized hyperplasia of prostate without urinary obstruction and other lower urinary tract symptoms (LUTS)  Patient urinating well.  Will continue to raises in at the current dose.  Encouraged to avoid bladder irritants.  - OFFICE/OUTPT VISIT EST LEVEL IV    Eczema, unspecified type  Skin is doing well.  Encourage regular lotions, avoid irritants to the skin may use hydrocortisone as  needed  - OFFICE/OUTPT VISIT EST LEVEL IV    Impaired fasting glucose  Encouraged to watch the carbohydrates, the sweets in his diet.  Exercise more regularly.  Lose weight.  - OFFICE/OUTPT VISIT EST LEVEL IV    Chronic rhinitis  Chronic rhinitis.  Doing reasonably well.  Encourage nasal saline, Atrovent nasal spray.  - OFFICE/OUTPT VISIT EST LEVEL IV    Medication monitoring encounter    - COMPREHENSIVE METABOLIC PANEL; Future  - OFFICE/OUTPT VISIT EST LEVEL IV        Patient Instructions     The Shingles shot (Shingrix) is recommended for people over 50. The shot is very effective and safe. Cash price on it is around 300-400 dollars. Check with your insurance on coverage. Check on where it is best covered. The cheapest option may be a pharmacy or a  doctor's office.  In addition, shingles shots are hard to find right now.  Consider getting on a waiting list at a pharmacy.      Less alcohol to be nice to the liver.      Good amount of protein in the diet.    Keep up exercise for the legs.    Medicare Wellness Visit  Plan for Preventive Care    A good way for you to stay healthy is to use preventive care.  Medicare covers many services that can help you stay healthy.* The goal of these services is to find any health problems as quickly as possible. Finding problems early can help make them easier to treat.  Your personal plan below lists the services you may need and when they are due.     Health Maintenance Summary     Hepatitis B Vaccine (1 of 3 - Risk 3-dose series)  Overdue since 12/2/1963    Shingles Vaccine (1 of 2)  Overdue since 12/2/1994    Colorectal Cancer Screening-Colonoscopy (Every 2 Years)  Overdue since 4/10/2019    Medicare Wellness Visit (Yearly)  Overdue since 6/1/2019    Influenza Vaccine (1)  Due since 9/1/2020    Depression Screening (Yearly)  Next due on 9/11/2021    DTaP/Tdap/Td Vaccine (2 - Td)  Next due on 11/23/2025    Abdominal Aortic Aneurysm (AAA) Screening   Completed    Pneumococcal  Vaccine 65+   Completed    Meningococcal Vaccine   Aged Out    HPV Vaccine   Aged Out           Preventive Care for Women and Men    Heart Screenings (Cardiovascular):  · Blood tests are used to check your cholesterol, lipid and triglyceride levels. High levels can increase your risk for heart disease and stroke. High levels can be treated with medications, diet and exercise. Lowering your levels can help keep your heart and blood vessels healthy.  Your provider will order these tests if they are needed.    · An ultrasound is done to see if you have an abdominal aortic aneurysm (AAA).  This is an enlargement of one of the main blood vessels that delivers blood to the body.   In the [place], 9,000 deaths are caused by AAA.  You may not even know you have this problem and as many as 1 in 3 people will have a serious problem if it is not treated.  Early diagnosis allows for more effective treatment and cure.  If you have a family history of AAA or are a male age 65-75 who has smoked, you are at higher risk of an AAA.  Your provider can order this test, if needed.    Colorectal Screening:  · There are many tests that are used to check for cancer of your colon and rectum. You and your provider should discuss what test is best for you and when to have it done.  Options include:  · Screening Colonoscopy: exam of the entire colon, seen through a flexible lighted tube.  · Flexible Sigmoidoscopy: exam of the last third (sigmoid portion) of the colon and rectum, seen through a flexible lighted tube.  · Cologuard DNA stool test: a sample of your stool is used to screen for cancer and unseen blood in your stool.  · Fecal Occult Blood Test: a sample of your stool is studied to find any unseen blood    Flu Shot:  · An immunization that helps to prevent influenza (the flu). You should get this every year. The best time to get the shot is in the fall.    Pneumococcal Shot:  • Vaccines are available that can help prevent  pneumococcal disease, which is any type of infection caused by Streptococcus pneumoniae bacteria.   Their use can prevent some cases of pneumonia, meningitis, and sepsis. There are two types of pneumococcal vaccines:   o Conjugate vaccines (PCV-13 or Prevnar 13®) - helps protect against the 13 types of pneumococcal bacteria that are the most common causes of serious infections in children and adults.    o Polysaccharide vaccine (PPSV23 or [id number]®) - helps protect against 23 types of pneumococcal bacteria for patients who are recommended to get it.  These vaccines should be given at least 12 months apart.  A booster is usually not needed.     Hepatitis B Shot:  · An immunization that helps to protect people from getting Hepatitis B. Hepatitis B is a virus that spreads through contact with infected blood or body fluids. Many people with the virus do not have symptoms.  The virus can lead to serious problems, such as liver disease. Some people are at higher risk than others. Your doctor will tell you if you need this shot.     Diabetes Screening:  · A test to measure sugar (glucose) in your blood is called a fasting blood sugar. Fasting means you cannot have food or drink for at least 8 hours before the test. This test can detect diabetes long before you may notice symptoms.    Glaucoma Screening:  · Glaucoma screening is performed by your eye doctor. The test measures the fluid pressure inside your eyes to determine if you have glaucoma.     Hepatitis C Screening:  · A blood test to see if you have the hepatitis C virus.  Hepatitis C attacks the liver and is a major cause of chronic liver disease.  Medicare will cover a single screening for all adults born between 1945 & 1965, or high risk patients (people who have injected illegal drugs or people who have had blood transfusions).  High risk patients who continue to inject illegal drugs can be screened for Hepatitis C every year.    Smoking and Tobacco-Use  Cessation Counseling:  · Tobacco is the single greatest cause of disease and early death in our country today. Medication and counseling together can increase a person’s chance of quitting for good.   · Medicare covers two quitting attempts per year, with four counseling sessions per attempt (eight sessions in a 12 month period)    Preventive Screening tests for Women    Screening Mammograms and Breast Exams:  · An x-ray of your breasts to check for breast cancer before you or your doctor may be able to feel it.  If breast cancer is found early it can usually be treated with success.    Pelvic Exams and Pap Tests:  · An exam to check for cervical and vaginal cancer. A Pap test is a lab test in which cells are taken from your cervix and sent to the lab to look for signs of cervical cancer. If cancer of the cervix is found early, chances for a cure are good. Testing can generally end at age 65, or if a woman has a hysterectomy for a benign condition. Your provider may recommend more frequent testing if certain abnormal results are found.    Bone Mass Measurements:  · A painless x-ray of your bone density to see if you are at risk for a broken bone. Bone density refers to the thickness of bones or how tightly the bone tissue is packed.    Preventive Screening tests for Men    Prostate Screening:  · Should you have a prostate cancer test (PSA)?  It is up to you to decide if you want a prostate cancer test. Talk to your clinician to find out if the test is right for you.  Things for you to consider and talk about should include:  · Benefits and harms of the test  · Your family history  · How your race/ethnicity may influence the test  · If the test may impact other medical conditions you have  · Your values on screenings and treatments    *Medicare pays for many preventive services to keep you healthy. For some of these services, you might have to pay a deductible, coinsurance, and / or copayment.  The amounts vary  depending on the type of services you need and the kind of Medicare health plan you have.                  Return in about 1 year (around 9/11/2021) for Medicare Wellness Visit, also med check 30 min in 6 months, fasting labs before.

## 2025-03-14 NOTE — PLAN OF CARE
Problem: PAIN - ADULT  Goal: Verbalizes/displays adequate comfort level or baseline comfort level  Description: Interventions:  - Encourage patient to monitor pain and request assistance  - Assess pain using appropriate pain scale  - Administer analgesics based on type and severity of pain and evaluate response  - Implement non-pharmacological measures as appropriate and evaluate response  - Consider cultural and social influences on pain and pain management  - Notify physician/advanced practitioner if interventions unsuccessful or patient reports new pain  Outcome: Progressing     Problem: SAFETY ADULT  Goal: Patient will remain free of falls  Description: INTERVENTIONS:  - Educate patient/family on patient safety including physical limitations  - Instruct patient to call for assistance with activity   - Consult OT/PT to assist with strengthening/mobility   - Keep Call bell within reach  - Keep bed low and locked with side rails adjusted as appropriate  - Keep care items and personal belongings within reach  - Initiate and maintain comfort rounds  - Make Fall Risk Sign visible to staff  - Offer Toileting every 2 Hours, in advance of need  - Initiate/Maintain bed/chair alarm  - Apply yellow socks and bracelet for high fall risk patients    Outcome: Progressing

## 2025-03-14 NOTE — CONSULTS
[facility] Podiatry - Consultation    Patient Information:   Tobin Kerns 69 y.o. male MRN: [id number]  Unit/Bed#: -01 Encounter: [id number]  PCP: Ann Carmona DO  Date of Admission:  3/11/2025  Date of Consultation: 03/13/25  Requesting Physician: Toño Brito MD      ASSESSMENT:    Tobin Kerns is a 69 y.o. male with:    Painful thick nails on both feet x 2  Slowly healing ulcer on the bottom of his left foot  peripheral neuropathy    PLAN:    Debride mycotic nails and thin the nail plates x 2 with the use of a nail nipper manually and an electric Dremel bur was used to reduce the thickness of the nail beds and smoothed the distal aspect of the nails.   Patient is to continue using the bordered gauze pad to hydrate the wound  Rest of care per primary team.      Weight Bearing Status: FWB    SUBJECTIVE    History of Present Illness:    Tobin Kerns is a 69 y.o. male with past medical history of peripheral neuropathy who presents with mycotic nails x 2 and a slowly healing wound on the bottom of his left foot..     Review of Systems:    Constitutional: Negative.    HENT: Negative.    Eyes: Negative.    Respiratory: Negative.    Cardiovascular: Negative.    Gastrointestinal: Negative.    Musculoskeletal: Negative  Skin: Painful thick nails x 2 and a slowly healing wound on the bottom of his left foot  Neurological: Negative  Psych: Negative.     Past Medical and Surgical History:     Past Medical History:   Diagnosis Date    Abdominal aortic ectasia (HCC) 11/13/2023    Next Abdominal Aortic U/S due 11/13/27    Closed fracture of right foot     As a child    Diabetes (HCC)     Diverticulosis     Drusen (degenerative) of macula, bilateral 09/27/2021    Dyslipidemia     Gastro-esophageal reflux disease without esophagitis 05/30/2019    GERD (gastroesophageal reflux disease) 2010    Hammer toe 15 years    History of colon polyps     Hypertension 09/27/2021    IFG (impaired fasting glucose)  10/01/2021    Internal hemorrhoids     Kidney stone 2007    Mixed hyperlipidemia 05/30/2019    Morbid obesity (HCC) 09/27/2021    Myopia, bilateral 09/27/2021    JOVANY on CPAP     Peripheral neuropathy 09/27/2021    Pinguecula, bilateral 09/27/2021    Presbyopia 10/11/2016    Pure hypertriglyceridemia 08/19/2019    Radial fracture 1975    Stress Fracture - Right Arm    Regular astigmatism, bilateral 09/27/2021    Tendonitis     Left shoulder       Past Surgical History:   Procedure Laterality Date    COLONOSCOPY      EGD  04/14/2022    Gastric Erosisons, Esophagitis    FIBULA FRACTURE SURGERY Right 2009    Hardware - Plate and screws in place    FRACTURE SURGERY  2005    KNEE SURGERY      ND SUTR QUADRICEPS/HAMSTRING MUSC RPT RCNSTJ Right 3/6/2025    Procedure: REPAIR TENDON QUADRICEPS- Right knee Revision quad tendon repair with allograft augmentation;  Surgeon: Fletcher Foster DO;  Location: [facility];  Service: Orthopedics    QUADRICEPS TENDON REPAIR Right 07/25/2024    Procedure: REPAIR TENDON QUADRICEPS- Right;  Surgeon: Fletcher Foster DO;  Location: [facility];  Service: Orthopedics    SKIN GRAFT  2021    Right foot     TONSILECTOMY AND ADNOIDECTOMY      TONSILLECTOMY         Meds/Allergies:      Medications Prior to Admission:     acetaminophen (TYLENOL) 325 mg tablet    Ascorbic Acid (Vitamin C) 250 MG CHEW    Cholecalciferol 125 MCG (5000 UT) TABS    enoxaparin (LOVENOX) 40 mg/0.4 mL    famotidine (PEPCID) 20 mg tablet    losartan (COZAAR) 50 mg tablet    metFORMIN (GLUCOPHAGE-XR) 500 mg 24 hr tablet    multivitamin (THERAGRAN) TABS    oxyCODONE (ROXICODONE) 5 immediate release tablet    simvastatin (ZOCOR) 20 mg tablet    tamsulosin (FLOMAX) 0.4 mg    terbinafine (LamISIL) 250 mg tablet    No Known Allergies    Social History:     Marital Status: Single    Substance Use History:   Social History     Substance and Sexual Activity   Alcohol Use Not Currently     Social History     Tobacco Use   Smoking  "Status Former    Current packs/day: 0.00    Average packs/day: 1 pack/day for 15.0 years (15.0 ttl pk-yrs)    Types: Cigarettes    Start date: 1980    Quit date: 2005    Years since quittin.2    Passive exposure: Never   Smokeless Tobacco Never     Social History     Substance and Sexual Activity   Drug Use Not Currently    Types: Marijuana    Comment: Last Marijuana Use 's; Other unknown drugs while in [place]       Family History:    Family History   Problem Relation Age of Onset    Cancer Mother 68        Type Unknown    COPD Father         Previous smoker    Neuropathy Father     Neuropathy Sister     Neuropathy Sister     Neuropathy Brother     Neuropathy Brother     Arthritis Maternal Grandmother     Arthritis Maternal Aunt          OBJECTIVE:    Vitals:   Blood Pressure: 131/78 (25 0800)  Pulse: 68 (25 0800)  Temperature: 97.6 °F (36.4 °C) (25 08)  Temp Source: Temporal (25 08)  Respirations: 16 (25 08)  Height: 6' 5\" (195.6 cm) (25 1523)  Weight - Scale: (!) 139 kg (306 lb 14.1 oz) (25 0300)  SpO2: 90 % (25 0800)    Physical Exam:     General Appearance: Alert, cooperative, no distress.  HEENT: Head normocephalic, atraumatic, without obvious abnormality.  Heart: Normal rate and rhythm.  Lungs: Non-labored breathing. No respiratory distress.  Abdomen: Without distension.  Psychiatric: AAOx3  Lower Extremity:  Vascular:   DP/PT pedal pulses on the left are present. DP/PT pedal pulses on the right are present. CRT brisk at the digits. Pedal hair is absent. 1+ edema noted at bilateral lower extremities. Skin temperature is within normal limits bilaterally.    Musculoskeletal:  MMT is 4/5 in all muscle compartments bilaterally. Passive ROM at the 1st MPJ and ankle joint are Normal bilaterally with the leg extended. Active ROM at the lesser digits is intact. No Pain on palpation.  No gross deformities noted.     Dermatological:  Nails are brittle " elongated hypertrophic yellow-brown discoloration with subungual debris x 2.  There is an increased thickness of the nails of approximately 1 to 2 mm.  There is a small superficial ulcer present on the plantar aspect of the left foot in the submet 2 area measuring approximately 0.4 x 0.2 cm.  It is superficial granular bed hyper trophic tissue is minimal surrounding it no pain with palpation skin is of normal appearance, temperature, and turgor.    LE Wound Exam:   Wound (1) located plantar aspect of the left foot, measures approximately 0.4 cm x 0.2 cm x 0.1 cm  without sinus tracking or undermining. Wound bed appears pink/red with  no  drainage. The wound base is 100% red/granular, 0% yellow/fibrous, 0% black/necrotic. Wound depth is depth to the level of subcutaneous tissue. Malodor is not noticed. Wound edge appears Attached. Audrey-wound skin appears intact. Probe to bone is negative . Signs of infection are not present at this time.     Neurological:  Gross sensation is absent. Protective sensation is absent. Patient Reports numbness and/or paresthesias.        Additional Data:     Lab Results: I have personally reviewed pertinent labs including:    Results from last 7 days   Lab Units 03/12/25  0518   WBC Thousand/uL 9.17   HEMOGLOBIN g/dL 12.4   HEMATOCRIT % 37.9   PLATELETS Thousands/uL 261   SEGS PCT % 60   LYMPHO PCT % 24   MONO PCT % 12   EOS PCT % 2     Results from last 7 days   Lab Units 03/12/25  0518   POTASSIUM mmol/L 4.3   CHLORIDE mmol/L 103   CO2 mmol/L 25   BUN mg/dL 23   CREATININE mg/dL 1.05   CALCIUM mg/dL 9.1   ALK PHOS U/L 44   ALT U/L 10   AST U/L 12*           Cultures: I have personally reviewed pertinent cultures including:              Imaging: I have personally reviewed pertinent films in PACS.  EKG, Pathology, and Other Studies: I have personally reviewed pertinent reports.        ** Please Note: Portions of the record may have been created with voice recognition software. Occasional  "wrong word or \"sound a like\" substitutions may have occurred due to the inherent limitations of voice recognition software. Read the chart carefully and recognize, using context, where substitutions have occurred. **   "

## 2025-03-14 NOTE — PROGRESS NOTES
03/14/25 1030   Pain Assessment   Pain Assessment Tool 0-10   Pain Score 1   Pain Location/Orientation Orientation: Right;Location: Knee   Restrictions/Precautions   Precautions Bed/chair alarms;Fall Risk;Limb alert;Pain;Supervision on toilet/commode   RLE Weight Bearing Per Order TTWB   ROM Restrictions No   Braces or Orthoses Other (Comment)  (cast R LE)   Cognition   Overall Cognitive Status WFL   Arousal/Participation Alert;Responsive;Cooperative   Attention Within functional limits   Orientation Level Oriented X4   Memory Within functional limits   Following Commands Follows all commands and directions without difficulty   Subjective   Subjective Pt reports his arms were a little sore from exercises with OT yesterday   Roll Left and Right   Type of Assistance Needed Independent   Physical Assistance Level No physical assistance   Roll Left and Right CARE Score 6   Sit to Lying   Type of Assistance Needed Independent   Physical Assistance Level No physical assistance   Sit to Lying CARE Score 6   Lying to Sitting on Side of Bed   Type of Assistance Needed Independent   Physical Assistance Level No physical assistance   Lying to Sitting on Side of Bed CARE Score 6   Sit to Stand   Type of Assistance Needed Supervision;Verbal cues   Physical Assistance Level No physical assistance   Comment TTWB R LE, RW, VC to maintain TTWB R LE when completing stand-sit   Sit to Stand CARE Score 4   Bed-Chair Transfer   Type of Assistance Needed Supervision   Physical Assistance Level No physical assistance   Comment Cl S RW, TTWB R LE   Chair/Bed-to-Chair Transfer CARE Score 4   Car Transfer   Reason if not Attempted Environmental limitations   Car Transfer CARE Score 10   Walk 10 Feet   Type of Assistance Needed Supervision   Physical Assistance Level No physical assistance   Comment Cl S RW, TTWB R LE   Walk 10 Feet CARE Score 4   Walk 50 Feet with Two Turns   Type of Assistance Needed Supervision   Physical Assistance  Level No physical assistance   Comment Cl S RW, TTWB R LE   Walk 50 Feet with Two Turns CARE Score 4   Walk 150 Feet   Type of Assistance Needed Supervision   Physical Assistance Level No physical assistance   Comment Cl S RW, TTWB R LE   Walk 150 Feet CARE Score 4   Walking 10 Feet on Uneven Surfaces   Type of Assistance Needed Incidental touching   Physical Assistance Level No physical assistance   Comment DAMION green foam, RW   Walking 10 Feet on Uneven Surfaces CARE Score 4   Ambulation   Primary Mode of Locomotion Prior to Admission Walk   Distance Walked (feet) 150 ft  (150' x 2)   Assist Device Roller Walker   Does the patient walk? 2. Yes   Wheel 50 Feet with Two Turns   Reason if not Attempted Activity not applicable   Wheel 50 Feet with Two Turns CARE Score 9   Wheel 150 Feet   Reason if not Attempted Activity not applicable   Wheel 150 Feet CARE Score 9   Wheelchair mobility   Findings N/A   Does the patient use a wheelchair? 0. No   Curb or Single Stair   Comment will challenge in PM   Toilet Transfer   Comment did not require during session   Therapeutic Interventions   Strengthening supine LE TE   Other bed mobility, transfer training, gait training   Assessment   Treatment Assessment Pt agreeable to PT this AM, received supine in bed. Continued to challenge LE strength/endurance with supine LE TE, with good tolerance. Pt educated on improved technique with completing stand-sit technique at end of session to maintain TTWB R LE. He continues to maintain TTWB R LE well during ambulation, improving with endurance. Will continue with current PT POC to improve deficits and promote return to PLOF.   Problem List Decreased strength;Decreased range of motion;Impaired balance;Decreased mobility;Pain;Orthopedic restrictions   PT Barriers   Physical Impairment Decreased strength;Decreased range of motion;Decreased endurance;Impaired balance;Decreased mobility;Impaired sensation;Obesity;Decreased skin  integrity;Orthopedic restrictions;Pain   Functional Limitation Walking;Transfers;Standing;Stair negotiation;Ramp negotiation;Car transfers   Plan   Treatment/Interventions Functional transfer training;LE strengthening/ROM;Therapeutic exercise;Endurance training;Patient/family training;Equipment eval/education;Bed mobility;Gait training   Progress Progressing toward goals   PT Therapy Minutes   PT Time In 1030   PT Time Out 1130   PT Total Time (minutes) 60   PT Mode of treatment - Individual (minutes) 30   PT Mode of treatment - Concurrent (minutes) 30   PT Mode of treatment - Group (minutes) 0   PT Mode of treatment - Co-treat (minutes) 0   PT Mode of Treatment - Total time(minutes) 60 minutes   PT Cumulative Minutes 270     Supine LE TE:  3x10, B/L LEs  SLR - flexion (L LE)  SLR (L LE only)  SLR - ABd - anti-gravity  GS  QS  APs  Hip ADd - emmanuelle  Heel slides (L LE only)  Sidelying hip abduction  Banded DF/PF    Patient remains seated at EOB, all needs in reach.  Alarm in place and activated.  Encouraged use of call bell, patient verbalizes understanding.

## 2025-03-15 PROCEDURE — 97535 SELF CARE MNGMENT TRAINING: CPT

## 2025-03-15 PROCEDURE — 97110 THERAPEUTIC EXERCISES: CPT

## 2025-03-15 PROCEDURE — 97530 THERAPEUTIC ACTIVITIES: CPT

## 2025-03-15 PROCEDURE — 97116 GAIT TRAINING THERAPY: CPT

## 2025-03-15 RX ADMIN — METFORMIN ER 500 MG 1500 MG: 500 TABLET ORAL at 17:11

## 2025-03-15 RX ADMIN — CHOLECALCIFEROL TAB 25 MCG (1000 UNIT) 5000 UNITS: 25 TAB at 08:45

## 2025-03-15 RX ADMIN — FAMOTIDINE 20 MG: 20 TABLET, FILM COATED ORAL at 08:45

## 2025-03-15 RX ADMIN — LOSARTAN POTASSIUM 50 MG: 50 TABLET, FILM COATED ORAL at 08:45

## 2025-03-15 RX ADMIN — TAMSULOSIN HYDROCHLORIDE 0.4 MG: 0.4 CAPSULE ORAL at 17:11

## 2025-03-15 RX ADMIN — PRAVASTATIN SODIUM 40 MG: 40 TABLET ORAL at 17:11

## 2025-03-15 RX ADMIN — B-COMPLEX W/ C & FOLIC ACID TAB 1 TABLET: TAB at 08:45

## 2025-03-15 RX ADMIN — ENOXAPARIN SODIUM 40 MG: 40 INJECTION SUBCUTANEOUS at 08:45

## 2025-03-15 RX ADMIN — OXYCODONE HYDROCHLORIDE AND ACETAMINOPHEN 500 MG: 500 TABLET ORAL at 08:45

## 2025-03-15 NOTE — PLAN OF CARE
Problem: PAIN - ADULT  Goal: Verbalizes/displays adequate comfort level or baseline comfort level  Description: Interventions:  - Encourage patient to monitor pain and request assistance  - Assess pain using appropriate pain scale  - Administer analgesics based on type and severity of pain and evaluate response  - Implement non-pharmacological measures as appropriate and evaluate response    - Notify physician/advanced practitioner if interventions unsuccessful or patient reports new pain  Outcome: Progressing     Problem: SAFETY ADULT  Goal: Patient will remain free of falls  Description: INTERVENTIONS:  - Educate patient/family on patient safety including physical limitations  - Instruct patient to call for assistance with activity   - Consult OT/PT to assist with strengthening/mobility   - Keep Call bell within reach  - Keep bed low and locked with side rails adjusted as appropriate  - Keep care items and personal belongings within reach  - Initiate and maintain comfort rounds  - Make Fall Risk Sign visible to staff  - Offer Toileting every 2 Hours, in advance of need  - Initiate/Maintain bed alarm  - Apply yellow socks and bracelet for high fall risk patients    Outcome: Progressing

## 2025-03-15 NOTE — PROGRESS NOTES
03/15/25 1230   Pain Assessment   Pain Assessment Tool 0-10   Pain Score 2   Pain Location/Orientation Orientation: Right;Location: Leg   Restrictions/Precautions   Precautions Bed/chair alarms;Fall Risk;Limb alert;Pain;Supervision on toilet/commode   RLE Weight Bearing Per Order TTWB   Sit to Stand   Type of Assistance Needed Supervision   Comment RW; R LE TTWB   Sit to Stand CARE Score 4   Functional Standing Tolerance   Time 4 minutes 30 seconds   Activity Standing to complete card match activity; Maintained R LE TTWB   Comments RW   Exercise Tools   Other Exercise Tool 1 B/L UE wrist flex/ext and sup/pro with Red Flexbar, 20x   Cognition   Overall Cognitive Status WFL   Arousal/Participation Alert;Responsive;Cooperative   Attention Within functional limits   Additional Activities   Additional Activities Other (Comment)   Additional Activities Comments Pt completed functional mobility trials with RW with Supervision   Assessment   Treatment Assessment Pt recieved sitting in bed and agreeable to participation with OT session. Pt with focus with UE strengthening and standing tasks; maintained R LE TTWB; Overall good tolerence with tasks to help support increased strength and activity tolerence. Pt would benefit from continued participation with OT services to address barriers and support progress towards increased safety and independence with ADL routines and mobility.   Plan   Treatment/Interventions ADL retraining;Functional transfer training;Therapeutic exercise;Endurance training;Patient/family training;Equipment eval/education;Bed mobility;Compensatory technique education   Progress Progressing toward goals   OT Therapy Minutes   OT Time In 1230   OT Time Out 1300   OT Total Time (minutes) 30   OT Mode of treatment - Individual (minutes) 30        Home

## 2025-03-15 NOTE — PROGRESS NOTES
03/15/25 0655   Pain Assessment   Pain Assessment Tool 0-10   Pain Score No Pain   Restrictions/Precautions   Precautions Bed/chair alarms;Fall Risk;Limb alert;Pain;Supervision on toilet/commode   RLE Weight Bearing Per Order TTWB   Braces or Orthoses   (R LE cast)   Oral Hygiene   Type of Assistance Needed Set-up / clean-up   Comment Standing at sink; maintained TTWB R LE   Oral Hygiene CARE Score 5   Grooming   Able To Initiate Tasks;Wash/Dry Face;Brush/Clean Teeth;Wash/Dry Hands   Limitation Noted In Safety;Strength   Shower/Bathe Self   Type of Assistance Needed Incidental touching   Physical Assistance Level 25% or less   Shower/Bathe Self CARE Score 3   Bathing   Assessed Bath Style Sponge Bath   Anticipated D/C Bath Style Shower;Sponge Bath   Able to Wash/Rinse/Dry (body part) Left Arm;Right Arm;L Upper Leg;L Lower Leg/Foot;R Lower Leg/Foot;Chest;Abdomen;Perineal Area;Buttocks   Limitations Noted in Balance;ROM;Safety;Strength   Positioning Seated;Standing   Adaptive Equipment Longhand Sponge;Longhand Reacher   Tub/Shower Transfer   Reason Not Assessed Sponge Bath   Upper Body Dressing   Type of Assistance Needed Set-up / clean-up   Upper Body Dressing CARE Score 5   Lower Body Dressing   Type of Assistance Needed Supervision;Adaptive equipment   Lower Body Dressing CARE Score 4   Putting On/Taking Off Footwear   Type of Assistance Needed Supervision;Adaptive equipment   Putting On/Taking Off Footwear CARE Score 4   Dressing/Undressing Clothing   Remove UB Clothes Other  (Hospital gown)   Don UB Clothes Button Shirt   Remove LB Clothes Undergarment;Socks   Don LB Clothes Shorts;Undergarment;Socks   Limitations Noted In Balance;Safety;Strength;ROM   Positioning Supported Sit;Standing   Lying to Sitting on Side of Bed   Type of Assistance Needed Independent   Lying to Sitting on Side of Bed CARE Score 6   Sit to Stand   Type of Assistance Needed Supervision   Comment RW, R LE TTWB   Sit to Stand CARE Score 4    Toilet Transfer   Type of Assistance Needed Supervision   Comment RW; grab abr; maintained R LE TTWB   Toilet Transfer CARE Score 4   Toilet Transfer   Surface Assessed Standard Commode   Limitations Noted In Balance;ROM;Safety;LE Strength   Adaptive Equipment Grab Bar;David Walker   Findings BSC placed on L side   Exercise Tools   Other Exercise Tool 1 B/L UE AROM in avaialble planes with 5# bar, 3x10   Cognition   Overall Cognitive Status WFL   Arousal/Participation Alert;Responsive;Cooperative   Attention Within functional limits   Additional Activities   Additional Activities Other (Comment)   Additional Activities Comments Functional mobility within Pts room, facility hallways and therapy room with RW with Sup; maintained R LE TTWB   Assessment   Treatment Assessment Pt receieved resting in bed and agreeable to participation with OT session. Pt demonstrated ability to complete functional transfers with overall Sup; Maintained R LE TTWB with functional transfers and mobility. Overall effective use and good recall with use of AE to support increased safety and independence with LE dressing. Pt would benefit from continued participation with OT services to address barriers with limitations with overall strength, balance and activity tolerence for use with ADL routines and functional transfers and help support progress towards safe discharge.   Plan   Treatment/Interventions ADL retraining;Functional transfer training;Therapeutic exercise;Endurance training;Patient/family training;Equipment eval/education;Bed mobility;Compensatory technique education   Progress Progressing toward goals   OT Therapy Minutes   OT Time In 0655   OT Time Out 0825   OT Total Time (minutes) 90   OT Mode of treatment - Individual (minutes) 90

## 2025-03-15 NOTE — PROGRESS NOTES
03/15/25 0918   Pain Assessment   Pain Assessment Tool 0-10   Pain Score No Pain   Restrictions/Precautions   Precautions Bed/chair alarms;Fall Risk;Limb alert;Pain;Supervision on toilet/commode   RLE Weight Bearing Per Order TTWB   Braces or Orthoses   (RLE cast)   Cognition   Overall Cognitive Status WFL   Orientation Level Oriented X4   Roll Left and Right   Type of Assistance Needed Independent   Comment bed rail   Roll Left and Right CARE Score 6   Sit to Lying   Type of Assistance Needed Independent   Comment bed rail   Sit to Lying CARE Score 6   Lying to Sitting on Side of Bed   Type of Assistance Needed Independent   Comment bed rail   Lying to Sitting on Side of Bed CARE Score 6   Sit to Stand   Type of Assistance Needed Supervision;Verbal cues   Comment S with RW; TTWB RLE   Sit to Stand CARE Score 4   Bed-Chair Transfer   Type of Assistance Needed Supervision;Verbal cues   Comment S with RW; TTWB RLE   Chair/Bed-to-Chair Transfer CARE Score 4   Walk 10 Feet   Type of Assistance Needed Supervision;Verbal cues   Comment S level and unlevel surfaces with RW; maintains TTWB RLE   Walk 10 Feet CARE Score 4   Walk 50 Feet with Two Turns   Type of Assistance Needed Supervision;Verbal cues   Comment S level and unlevel surfaces with RW; maintains TTWB RLE   Walk 50 Feet with Two Turns CARE Score 4   Walking 10 Feet on Uneven Surfaces   Type of Assistance Needed Supervision;Verbal cues   Comment S level and unlevel surfaces with RW; maintains TTWB RLE   Walking 10 Feet on Uneven Surfaces CARE Score 4   Ambulation   Primary Mode of Locomotion Prior to Admission Walk   Distance Walked (feet) 122 ft  (101' 122' 20' (out of bathroom to bed))   Assist Device Roller Walker   Gait Pattern Antalgic;Inconsistant Ghazal;Slow Ghazal;Decreased foot clearance;L foot drag;Forward Flexion;Step to;Step through;Decreased R stance;Improper weight shift   Limitations Noted In Balance;Device Management;Endurance;Heel  Strike;Sequencing;Speed;Strength;Swing   Provided Assistance with: Balance   Walk Assist Level Supervision   Findings S level and unlevel surfaces with RW; maintains TTWB RLE   Does the patient walk? 2. Yes   Curb or Single Stair   Style negotiated Single stair   Type of Assistance Needed Incidental touching;Verbal cues   Comment CGA up/down 6  steps with L HR, non-reciprocal, forward ascending, backward descending, TTWB R LE maintained   1 Step (Curb) CARE Score 4   4 Steps   Type of Assistance Needed Incidental touching;Verbal cues   Comment CGA up/down 6  steps with L HR, non-reciprocal, forward ascending, backward descending, TTWB R LE maintained   4 Steps CARE Score 4   Stairs   Type Stairs   # of Steps 6   Weight Bearing Precautions TTWB;RLE   Assist Devices Single Rail   Findings CGA up/down 6  steps with L HR, non-reciprocal, forward ascending, backward descending, TTWB R LE maintained   Toilet Transfer   Type of Assistance Needed Supervision   Comment RW; grab bar   Toilet Transfer CARE Score 4   Therapeutic Interventions   Strengthening seated ther ex 2 # weight LLE; supine ther ex   Balance gait and transfer training   Other stair negotiation   Assessment   Treatment Assessment Patient agreeable to therapy session.   No pain reported.  Cues for general safety.  Maintains TTWB RLE with all tasks.   Completed ther ex for general LE strengthening; gait and transfer training focusing on sequence and technique for improved balance and safety with functional mobility using walker.  Negotiated steps with CG.    Returned to room in bed with alarms on and all needs within reach.   PT Barriers   Physical Impairment Decreased strength;Decreased range of motion;Decreased endurance;Impaired balance;Decreased mobility;Impaired sensation;Obesity;Decreased skin integrity;Orthopedic restrictions;Pain   Functional Limitation Walking;Transfers;Standing;Stair negotiation;Ramp negotiation;Car transfers    Plan   Treatment/Interventions Functional transfer training;LE strengthening/ROM;Elevations;Therapeutic exercise;Bed mobility;Gait training   Progress Progressing toward goals   PT Therapy Minutes   PT Time In 0930   PT Time Out 1030   PT Total Time (minutes) 60   PT Mode of treatment - Individual (minutes) 60   PT Mode of treatment - Concurrent (minutes) 0   PT Mode of treatment - Group (minutes) 0   PT Mode of treatment - Co-treat (minutes) 0   PT Mode of Treatment - Total time(minutes) 60 minutes   PT Cumulative Minutes 390   Therapy Time missed   Time missed? No

## 2025-03-15 NOTE — PLAN OF CARE
Problem: PAIN - ADULT  Goal: Verbalizes/displays adequate comfort level or baseline comfort level  Description: Interventions:  - Encourage patient to monitor pain and request assistance  - Assess pain using appropriate pain scale  - Administer analgesics based on type and severity of pain and evaluate response  - Implement non-pharmacological measures as appropriate and evaluate response  - Consider cultural and social influences on pain and pain management  - Notify physician/advanced practitioner if interventions unsuccessful or patient reports new pain  Outcome: Progressing     Problem: INFECTION - ADULT  Goal: Absence or prevention of progression during hospitalization  Description: INTERVENTIONS:  - Assess and monitor for signs and symptoms of infection  - Monitor lab/diagnostic results  - Monitor all insertion sites, i.e. indwelling lines, tubes, and drains  - Monitor endotracheal if appropriate and nasal secretions for changes in amount and color  - [place] appropriate cooling/warming therapies per order  - Administer medications as ordered  - Instruct and encourage patient and family to use good hand hygiene technique  - Identify and instruct in appropriate isolation precautions for identified infection/condition  Outcome: Progressing  Goal: Absence of fever/infection during neutropenic period  Description: INTERVENTIONS:  - Monitor WBC    Outcome: Progressing

## 2025-03-15 NOTE — PROGRESS NOTES
"Progress Note - Tobin Kerns 69 y.o. male MRN: [id number]    Unit/Bed#: [facility] 210-01 Encounter: [id number]        Subjective:   Patient seen and examined at bedside after reviewing the chart and discussing the case with the caring staff.      Patient examined at bedside.  Patient has no acute symptoms.    Physical Exam   Vitals: Blood pressure 126/59, pulse 72, temperature 97.5 °F (36.4 °C), temperature source Temporal, resp. rate 18, height 6' 5\" (1.956 m), weight (!) 139 kg (306 lb 14.1 oz), SpO2 96%.,Body mass index is 36.39 kg/m².  Constitutional: Patient in no acute distress.  HEENT: PERR, EOMI, MMM.  Cardiovascular: Normal rate and regular rhythm.    Pulmonary/Chest: Effort normal and breath sounds normal.   Abdomen: Soft, + BS, NT.    Assessment/Plan:  Tobin Kerns is a(n) 69 y.o. year old male who is s/p quadriceps tendon repair.     Cardiac with hx of HTN, HLD.  Patient is on losartan 50 mg daily, pravastatin 40 mg daily.  Impaired fasting glucose.  Hgb a1c 5.4% on 3/12/25.  Continue metformin XR 1500 mg daily with dinner.  Regular diet and d/c accucheks as blood sugar is well controlled.    BPH.  Continue Flomax 0.4 mg daily.  GERD.  Continue Pepcid 20 mg daily.  JOVANY.  On CPAP at bedtime.   Vitamin D deficiency.  Patient is on vitamin D3 5000 units daily.  Pain and bowel management.  As per physiatrist.  Quadriceps tendon repair.  Toe touch weightbearing RLE with use of walker.  Patient is receiving intensive PT OT as per physiatrist.    Anticipated date of discharge.  TBD.  "

## 2025-03-16 RX ADMIN — LOSARTAN POTASSIUM 50 MG: 50 TABLET, FILM COATED ORAL at 08:48

## 2025-03-16 RX ADMIN — FAMOTIDINE 20 MG: 20 TABLET, FILM COATED ORAL at 08:48

## 2025-03-16 RX ADMIN — B-COMPLEX W/ C & FOLIC ACID TAB 1 TABLET: TAB at 08:48

## 2025-03-16 RX ADMIN — PRAVASTATIN SODIUM 40 MG: 40 TABLET ORAL at 15:45

## 2025-03-16 RX ADMIN — ENOXAPARIN SODIUM 40 MG: 40 INJECTION SUBCUTANEOUS at 08:49

## 2025-03-16 RX ADMIN — OXYCODONE HYDROCHLORIDE AND ACETAMINOPHEN 500 MG: 500 TABLET ORAL at 08:49

## 2025-03-16 RX ADMIN — METFORMIN ER 500 MG 1500 MG: 500 TABLET ORAL at 15:45

## 2025-03-16 RX ADMIN — PRAVASTATIN SODIUM 40 MG: 40 TABLET ORAL at 15:27

## 2025-03-16 RX ADMIN — TAMSULOSIN HYDROCHLORIDE 0.4 MG: 0.4 CAPSULE ORAL at 15:27

## 2025-03-16 RX ADMIN — METFORMIN ER 500 MG 1500 MG: 500 TABLET ORAL at 15:26

## 2025-03-16 RX ADMIN — CHOLECALCIFEROL TAB 25 MCG (1000 UNIT) 5000 UNITS: 25 TAB at 08:48

## 2025-03-16 RX ADMIN — TAMSULOSIN HYDROCHLORIDE 0.4 MG: 0.4 CAPSULE ORAL at 15:46

## 2025-03-16 NOTE — PROGRESS NOTES
"Progress Note - Tobin Kerns 69 y.o. male MRN: [id number]    Unit/Bed#: [facility] 210-01 Encounter: [id number]        Subjective:   Patient seen and examined at bedside after reviewing the chart and discussing the case with the caring staff.      Patient examined at bedside.  Patient has no acute symptoms.    Physical Exam   Vitals: Blood pressure 118/60, pulse 72, temperature 98.4 °F (36.9 °C), temperature source Temporal, resp. rate 16, height 6' 5\" (1.956 m), weight (!) 139 kg (306 lb 14.1 oz), SpO2 95%.,Body mass index is 36.39 kg/m².  Constitutional: Patient in no acute distress.  HEENT: PERR, EOMI, MMM.  Cardiovascular: Normal rate and regular rhythm.    Pulmonary/Chest: Effort normal and breath sounds normal.   Abdomen: Soft, + BS, NT.    Assessment/Plan:  Tobin Kerns is a(n) 69 y.o. year old male who is s/p quadriceps tendon repair.     Cardiac with hx of HTN, HLD.  Patient is on losartan 50 mg daily, pravastatin 40 mg daily.  Impaired fasting glucose.  Hgb a1c 5.4% on 3/12/25.  Continue metformin XR 1500 mg daily with dinner.  Regular diet and d/c accucheks as blood sugar is well controlled.    BPH.  Continue Flomax 0.4 mg daily.  GERD.  Continue Pepcid 20 mg daily.  JOVANY.  On CPAP at bedtime.   Vitamin D deficiency.  Patient is on vitamin D3 5000 units daily.  Pain and bowel management.  As per physiatrist.  Quadriceps tendon repair.  Toe touch weightbearing RLE with use of walker.  Patient is receiving intensive PT OT as per physiatrist.    Anticipated date of discharge.  TBD.  "

## 2025-03-16 NOTE — PLAN OF CARE
Problem: PAIN - ADULT  Goal: Verbalizes/displays adequate comfort level or baseline comfort level  Description: Interventions:  - Encourage patient to monitor pain and request assistance  - Assess pain using appropriate pain scale  - Administer analgesics based on type and severity of pain and evaluate response  - Implement non-pharmacological measures as appropriate and evaluate response  - Consider cultural and social influences on pain and pain management  - Notify physician/advanced practitioner if interventions unsuccessful or patient reports new pain  Outcome: Progressing     Problem: INFECTION - ADULT  Goal: Absence or prevention of progression during hospitalization  Description: INTERVENTIONS:  - Assess and monitor for signs and symptoms of infection  - Monitor lab/diagnostic results  - Monitor all insertion sites, i.e. indwelling lines, tubes, and drains  - Monitor endotracheal if appropriate and nasal secretions for changes in amount and color  - [place] appropriate cooling/warming therapies per order  - Administer medications as ordered  - Instruct and encourage patient and family to use good hand hygiene technique  - Identify and instruct in appropriate isolation precautions for identified infection/condition  Outcome: Progressing  Goal: Absence of fever/infection during neutropenic period  Description: INTERVENTIONS:  - Monitor WBC    Outcome: Progressing

## 2025-03-17 ENCOUNTER — APPOINTMENT (OUTPATIENT)
Dept: PHYSICAL THERAPY | Facility: CLINIC | Age: 70
End: 2025-03-17
Payer: OTHER MISCELLANEOUS

## 2025-03-17 PROBLEM — Z87.2: Status: ACTIVE | Noted: 2022-02-23

## 2025-03-17 PROCEDURE — 97535 SELF CARE MNGMENT TRAINING: CPT

## 2025-03-17 PROCEDURE — 97116 GAIT TRAINING THERAPY: CPT

## 2025-03-17 PROCEDURE — 99232 SBSQ HOSP IP/OBS MODERATE 35: CPT | Performed by: PHYSICAL MEDICINE & REHABILITATION

## 2025-03-17 PROCEDURE — 97530 THERAPEUTIC ACTIVITIES: CPT

## 2025-03-17 PROCEDURE — 97110 THERAPEUTIC EXERCISES: CPT

## 2025-03-17 RX ADMIN — CHOLECALCIFEROL TAB 25 MCG (1000 UNIT) 5000 UNITS: 25 TAB at 09:14

## 2025-03-17 RX ADMIN — FAMOTIDINE 20 MG: 20 TABLET, FILM COATED ORAL at 09:15

## 2025-03-17 RX ADMIN — B-COMPLEX W/ C & FOLIC ACID TAB 1 TABLET: TAB at 09:15

## 2025-03-17 RX ADMIN — LOSARTAN POTASSIUM 50 MG: 50 TABLET, FILM COATED ORAL at 09:15

## 2025-03-17 RX ADMIN — ACETAMINOPHEN 650 MG: 325 TABLET ORAL at 12:08

## 2025-03-17 RX ADMIN — OXYCODONE HYDROCHLORIDE AND ACETAMINOPHEN 500 MG: 500 TABLET ORAL at 09:15

## 2025-03-17 RX ADMIN — ENOXAPARIN SODIUM 40 MG: 40 INJECTION SUBCUTANEOUS at 09:15

## 2025-03-17 RX ADMIN — PRAVASTATIN SODIUM 40 MG: 40 TABLET ORAL at 17:10

## 2025-03-17 RX ADMIN — METFORMIN ER 500 MG 1500 MG: 500 TABLET ORAL at 17:10

## 2025-03-17 RX ADMIN — TAMSULOSIN HYDROCHLORIDE 0.4 MG: 0.4 CAPSULE ORAL at 17:10

## 2025-03-17 NOTE — PROGRESS NOTES
03/17/25 1030   Pain Assessment   Pain Assessment Tool 0-10   Pain Score No Pain   Restrictions/Precautions   Precautions Fall Risk;Limb alert;Pain   RLE Weight Bearing Per Order TTWB   Braces or Orthoses   (full cast R LE)   Oral Hygiene   Type of Assistance Needed Independent   Physical Assistance Level No physical assistance   Comment stance   Oral Hygiene CARE Score 6   Grooming   Able To Initiate Tasks;Acquire Items;Comb/Brush Hair;Wash/Dry Face;Brush/Clean Teeth;Wash/Dry Hands   Shower/Bathe Self   Type of Assistance Needed Set-up / clean-up   Physical Assistance Level No physical assistance   Shower/Bathe Self CARE Score 5   Bathing   Assessed Bath Style Sponge Bath   Able to Wash/Rinse/Dry (body part) Left Arm;Right Arm;L Upper Leg;Buttocks;Perineal Area;Abdomen;Chest;L Lower Leg/Foot   Limitations Noted in Balance;ROM;Strength   Positioning Seated;Standing   Adaptive Equipment Longhand Sponge   Upper Body Dressing   Type of Assistance Needed Independent   Physical Assistance Level No physical assistance   Upper Body Dressing CARE Score 6   Lower Body Dressing   Type of Assistance Needed Set-up / clean-up;Adaptive equipment   Physical Assistance Level No physical assistance   Lower Body Dressing CARE Score 5   Putting On/Taking Off Footwear   Type of Assistance Needed Adaptive equipment;Set-up / clean-up   Physical Assistance Level No physical assistance   Putting On/Taking Off Footwear CARE Score 5   Picking Up Object   Type of Assistance Needed Independent;Adaptive equipment   Physical Assistance Level No physical assistance   Picking Up Object CARE Score 6   Dressing/Undressing Clothing   Able to  Obtain Clothing;Store removed clothing   Don UB Clothes Button Shirt   Remove LB Clothes Undergarment;Socks   Don LB Clothes Undergarment;Socks;Shorts   Limitations Noted In Balance;ROM;Strength   Adaptive Equipment Reacher;Dressing Stick;Sock Aide   Positioning Supported Sit;Standing   Sit to Stand   Type of  Assistance Needed Independent   Physical Assistance Level No physical assistance   Sit to Stand CARE Score 6   Toileting Hygiene   Type of Assistance Needed Independent;Adaptive equipment   Physical Assistance Level No physical assistance   Toileting Hygiene CARE Score 6   Toileting   Able to Pull Clothing down yes, up yes.   Manage Hygiene Bladder   Limitations Noted In Balance;LE Strength;ROM   Adaptive Equipment Grab Bar   Toilet Transfer   Type of Assistance Needed Independent;Adaptive equipment   Physical Assistance Level No physical assistance   Toilet Transfer CARE Score 6   Toilet Transfer   Surface Assessed Standard Toilet   Transfer Technique Standard   Limitations Noted In Balance;LE Strength   Adaptive Equipment Grab Bar;Walker   Light Housekeeping   Light Housekeeping Level Walker   Light Housekeeping Level of Assistance Independent   Exercise Tools   Hand Gripper heavy resistance gripper   Other Exercise Tool 1 5# dowel 2x15 in 6 planes of motion   Other Exercise Tool 2 red t bar 2x15 upward then downward   Cognition   Overall Cognitive Status WFL   Arousal/Participation Alert;Cooperative   Attention Within functional limits   Orientation Level Oriented X4   Memory Within functional limits   Following Commands Follows all commands and directions without difficulty   Additional Activities   Additional Activities Comments Functional mobility RW TTWB MI   Activity Tolerance   Activity Tolerance Patient tolerated treatment well   Assessment   Treatment Assessment OT session addressed ADL via sponge bathe level with focus on recall of AE, compensatory strategies and compliance with safe techniques. Details listed in respective sections of note. Pt tolerated session well. Pt maintained TTWB during txfs and mobility. Pt then completed TE for generalized strength and endurance.  Pt is a motivated participant in therapy and reports would prefer to d/c to another facility after rehab until his cast is removed;  care team notified. Pt's barriers to d/c include decreased strength throughout but especially RLE s/p quadricepts rupture, decreased ROM, decreased balance, decreased activity tolerance, decreased safety awareness, and TTWB RLE ; all affect independence in self care and fxl transfers. Pt would benefit from continued skilled OT services in order to address listed barriers and prepare for safe d/c.   Prognosis Good   Problem List Decreased strength;Decreased range of motion;Pain;Orthopedic restrictions;Decreased skin integrity;Impaired balance;Decreased mobility   Plan   Treatment/Interventions ADL retraining;Functional transfer training;Therapeutic exercise;Endurance training;Patient/family training;Equipment eval/education;Gait training;Compensatory technique education;Spoke to nursing;OT   Progress Progressing toward goals   OT Therapy Minutes   OT Time In 1030   OT Time Out 1200   OT Total Time (minutes) 90   OT Mode of treatment - Individual (minutes) 90   OT Mode of treatment - Concurrent (minutes) 0   OT Mode of treatment - Group (minutes) 0   OT Mode of treatment - Co-treat (minutes) 0   OT Mode of Treatment - Total time(minutes) 90 minutes   OT Cumulative Minutes 210   Therapy Time missed   Time missed? No

## 2025-03-17 NOTE — ASSESSMENT & PLAN NOTE
"HPI:   He initially underwent quad tendon repair on 7/25/24 with Dr. Foster. He was discharged home with outpatient PT.  Later noted to have recurrent high-grade tear with extensor lag on exam and elected for operative management  January 2025 MRI: \"Prior quadriceps insertion repair with high-grade recurrent tear exhibiting up to 2.2 cm of tendon retraction.\"  On 3/6/25, he underwent revision of quad tendon repair with allograft augmentation    Plan:   PT and OT for 3 hours a day, 5-6 days a week   TTWB RLE   Avoid moisture to cast   Pain: PRN Tylenol (monitor LFTs) and oxycodone discontinued on 3/17 due to minimal pain   Lovenox for DVT prophylaxis while inpatient   "

## 2025-03-17 NOTE — PCC PHYSICAL THERAPY
03/18/2025:  Patient being followed by PT, making good progress.  Presents, following R quadriceps muscle rupture, now TTWB R LE in cast, with decreased ROM/strength, decreased balance and safety, decreased endurance, and pain.   Patient  mod I bed mobility, mod I transfers with RW, mod I short distances, S >50' ambulation with RW, S negotiation of 6 steps with L handrail.  Patient would benefit from continued inpatient [facility] PT to increase function, safety, and increased independence in prep for safe d/c to home.    3/25/2025:  Patient being followed by PT, making good progress.  Presents, following R quadriceps muscle rupture, now TTWB R LE in cast, with decreased ROM/strength, decreased balance and safety, decreased endurance, and pain.   Patient  mod I bed mobility, mod I transfers with RW, mod I ambulation with RW up to 180ft, mod I negotiation of 6 steps with L handrail.  Patient would benefit from continued ARC PT to increase function, safety, and increased independence in prep for safe d/c to home.     04/01/2025:  Patient being followed by PT, making good progress.  Presents, following R quadriceps muscle rupture, now TTWB R LE in cast, with decreased ROM/strength, decreased balance and safety, decreased endurance, and pain.   Patient  mod I bed mobility, mod I transfers with RW, mod I ambulation with RW up to 180ft, mod I negotiation of 6 steps with L handrail.  Patient would benefit from continued ARC PT to increase function, safety, and increased independence in prep for safe d/c to home.     04/08/2025:  Patient being followed by PT, making good progress.  Presents, following R quadriceps muscle rupture, now TTWB R LE in cast, with decreased ROM/strength, decreased balance and safety, decreased endurance, and pain.   Patient  mod I bed mobility, mod I transfers with RW, mod I ambulation with RW up to 200ft, mod I negotiation of 6 steps with L handrail.  Patient would benefit from continued ARC PT to  increase function, safety, and increased independence in prep for safe d/c to home.     04/15/2025:  Patient being followed by PT, making good progress.  Presents, following R quadriceps muscle rupture, now TTWB R LE in cast, with decreased ROM/strength, decreased balance and safety, decreased endurance, and pain.   Patient  mod I bed mobility, mod I transfers with RW, mod I ambulation with RW up to 200ft, mod I negotiation of 7 steps with L handrail.  Patient would benefit from continued ARC PT to increase function, safety, and increased independence in prep for safe d/c to home.     04/22/2025:  Patient being followed by PT, making good progress.  Presents, following R quadriceps muscle rupture, now WBAT R LE in TROM locked in extension with ambulation, unlocked to 10 degress with rest, no forceful/aggressive knee flexion, no active straight leg raise R LE, with decreased ROM/strength, decreased balance and safety, decreased endurance, and pain.   Patient  mod I bed mobility, mod I transfers with RW, mod I ambulation with RW up to 200ft, mod I negotiation of 7 steps with L handrail.  Patient would benefit from continued ARC PT to increase function, safety, and increased independence in prep for safe d/c to home.     04/29/2025:  Patient being followed by PT, making good progress.  Presents, following R quadriceps muscle rupture, now WBAT R LE in TROM locked in extension with ambulation, unlocked to 20 degress with rest, no forceful/aggressive knee flexion, no active straight leg raise R LE, with decreased ROM/strength, decreased balance and safety, decreased endurance, and pain.   Patient  mod I bed mobility, mod I transfers with RW, mod I ambulation with RW up to 200ft, mod I negotiation of 7 steps with L handrail.  Patient would benefit from continued ARC PT to increase function, safety, and increased independence in prep for safe d/c to home.     04/29/2025:  Patient being followed by PT, making good progress.   Presents, following R quadriceps muscle rupture, now WBAT R LE in TROM locked in extension with ambulation, unlocked to 30 degress with rest, no forceful/aggressive knee flexion, no active straight leg raise R LE, with decreased ROM/strength, decreased balance and safety, decreased endurance, and pain.   Patient  mod I bed mobility, mod I transfers with RW, mod I ambulation with RW up to 1000ft, mod I negotiation of 25 steps with L handrail while managing RW and personal belongings.  Patient would benefit from continued ARC PT to increase function, safety, and increased independence in prep for safe d/c to home.     05/6/2025:  Patient being followed by PT, making good progress.  Presents, following R quadriceps muscle rupture, now WBAT R LE in TROM locked in extension with ambulation, unlocked to 30 degress with rest, no forceful/aggressive knee flexion, no active straight leg raise R LE, with decreased ROM/strength, decreased balance and safety, decreased endurance, and pain.   Patient  mod I bed mobility, mod I transfers with RW, mod I ambulation with RW up to 1000ft, mod I negotiation of 25 steps with L handrail while managing RW and personal belongings.  Patient would benefit from continued ARC PT to increase function, safety, and increased independence in prep for safe d/c to home.

## 2025-03-17 NOTE — PROGRESS NOTES
03/17/25 0900   Pain Assessment   Pain Assessment Tool 0-10   Pain Score No Pain   Restrictions/Precautions   Precautions Fall Risk;Limb alert;Pain   RLE Weight Bearing Per Order TTWB   ROM Restrictions No   Braces or Orthoses   (R LE cast)   Cognition   Overall Cognitive Status WFL   Arousal/Participation Alert;Responsive;Cooperative   Attention Within functional limits   Orientation Level Oriented X4   Memory Within functional limits   Following Commands Follows all commands and directions without difficulty   Subjective   Subjective Pt has no complaints of pain or discomfort   Roll Left and Right   Type of Assistance Needed Independent   Physical Assistance Level No physical assistance   Roll Left and Right CARE Score 6   Sit to Lying   Type of Assistance Needed Independent   Physical Assistance Level No physical assistance   Sit to Lying CARE Score 6   Lying to Sitting on Side of Bed   Type of Assistance Needed Independent   Physical Assistance Level No physical assistance   Lying to Sitting on Side of Bed CARE Score 6   Sit to Stand   Type of Assistance Needed Independent   Physical Assistance Level No physical assistance   Comment RW, maintains TTWB R LE   Sit to Stand CARE Score 6   Bed-Chair Transfer   Type of Assistance Needed Independent   Physical Assistance Level No physical assistance   Comment RW, maintains TTWB R LE   Chair/Bed-to-Chair Transfer CARE Score 6   Car Transfer   Reason if not Attempted Environmental limitations   Car Transfer CARE Score 10   Walk 10 Feet   Type of Assistance Needed Independent   Physical Assistance Level No physical assistance   Comment RW, maintains TTWB R LE   Walk 10 Feet CARE Score 6   Walk 50 Feet with Two Turns   Type of Assistance Needed Supervision   Physical Assistance Level No physical assistance   Comment RW, maintains TTWB R LE   Walk 50 Feet with Two Turns CARE Score 4   Walk 150 Feet   Type of Assistance Needed Supervision   Physical Assistance Level No  physical assistance   Comment RW, maintains TTWB R LE   Walk 150 Feet CARE Score 4   Walking 10 Feet on Uneven Surfaces   Type of Assistance Needed Supervision   Physical Assistance Level No physical assistance   Comment RW, green foam, maintains TTWB R LE   Walking 10 Feet on Uneven Surfaces CARE Score 4   Ambulation   Primary Mode of Locomotion Prior to Admission Walk   Distance Walked (feet) 180 ft  (120', 60')   Assist Device Roller Walker   Does the patient walk? 2. Yes   Wheel 50 Feet with Two Turns   Reason if not Attempted Activity not applicable   Wheel 50 Feet with Two Turns CARE Score 9   Wheel 150 Feet   Reason if not Attempted Activity not applicable   Wheel 150 Feet CARE Score 9   Wheelchair mobility   Findings N/A   Does the patient use a wheelchair? 0. No   Curb or Single Stair   Style negotiated Single stair   Type of Assistance Needed Supervision   Physical Assistance Level No physical assistance   Comment S up/down 6  steps, L HR, nonreciprocal, forward ascending, backward descending, maintains TTWB R LE   1 Step (Curb) CARE Score 4   4 Steps   Type of Assistance Needed Supervision   Physical Assistance Level No physical assistance   Comment S up/down 6  steps, L HR, nonreciprocal, forward ascending, backward descending, maintains TTWB R LE   4 Steps CARE Score 4   12 Steps   Reason if not Attempted Activity not applicable   12 Steps CARE Score 9   Stairs   Type Ramp   Weight Bearing Precautions TTWB;RLE   Assist Devices Roller Walker   Findings Cl S up/down ramp with RW   Toilet Transfer   Type of Assistance Needed Independent   Physical Assistance Level No physical assistance   Comment using grab bar   Toilet Transfer CARE Score 6   Therapeutic Interventions   Strengthening seated LE TE   Other transfer training, gait training, stair training, ramp negotiation   Equipment Use   NuStep L4, 15 min, B/L UE, L LE only   Assessment   Treatment Assessment Pt agreeable to PT this AM,  received supine in bed. Pt is demonstrating improved LE strength/endurance, sequencing, and safety, now mod I for functional transfers and short distance ambulation with use of RW. Pt continues to maintain TTWB R LE well with all mobility. He remains limited by decreased ROM/strength and impaired balance. Will continue with current PT POC to improve deficits and promote return to PLOF.   Problem List Decreased strength;Decreased range of motion;Impaired balance;Decreased mobility;Pain;Orthopedic restrictions   PT Barriers   Physical Impairment Decreased strength;Decreased range of motion;Decreased endurance;Impaired balance;Decreased mobility;Impaired sensation;Obesity;Decreased skin integrity;Orthopedic restrictions;Pain   Functional Limitation Walking;Transfers;Standing;Stair negotiation;Ramp negotiation;Car transfers   Plan   Treatment/Interventions Functional transfer training;LE strengthening/ROM;Therapeutic exercise;Endurance training;Patient/family training;Equipment eval/education;Bed mobility;Gait training   Progress Progressing toward goals   PT Therapy Minutes   PT Time In 0900   PT Time Out 1030   PT Total Time (minutes) 90   PT Mode of treatment - Individual (minutes) 90   PT Mode of treatment - Concurrent (minutes) 0   PT Mode of treatment - Group (minutes) 0   PT Mode of treatment - Co-treat (minutes) 0   PT Mode of Treatment - Total time(minutes) 90 minutes   PT Cumulative Minutes 480     Seated LE TE as performed previously.    Patient remains OOB on toilet, all needs in reach. Encouraged use of call bell, patient verbalizes understanding.

## 2025-03-17 NOTE — PROGRESS NOTES
"Progress Note - PMR   Name: Tobin Kerns 69 y.o. male I MRN: [id number]  Unit/Bed#: -01 I Date of Admission: 3/11/2025   Date of Service: 3/17/2025 I Hospital Day: 6     Assessment & Plan  Quadriceps muscle rupture, right, subsequent encounter  HPI:   He initially underwent quad tendon repair on 7/25/24 with Dr. Foster. He was discharged home with outpatient PT.  Later noted to have recurrent high-grade tear with extensor lag on exam and elected for operative management  January 2025 MRI: \"Prior quadriceps insertion repair with high-grade recurrent tear exhibiting up to 2.2 cm of tendon retraction.\"  On 3/6/25, he underwent revision of quad tendon repair with allograft augmentation    Plan:   PT and OT for 3 hours a day, 5-6 days a week   TTWB RLE   Avoid moisture to cast   Pain: PRN Tylenol (monitor LFTs) and oxycodone discontinued on 3/17 due to minimal pain   Lovenox for DVT prophylaxis while inpatient   Hypertension  Continue losartan 50 mg daily   Monitor BP   Mixed hyperlipidemia  Continue pravastatin 40 mg daily   Gastro-esophageal reflux disease without esophagitis  Continue famotidine 20 mg daily   Peripheral neuropathy  Patient is not diabetic   Monitor skin for new or worsening wounds   JOVANY on CPAP  Continue CPAP qHS  IFG (impaired fasting glucose)  Continue metformin 1500 mg daily   Seen by RD 3/12, diet adjusted to regular   3/12 A1c: 5.4   Healed ulcer of left foot  Local wound care  Present on admission   Wound care RN consulted   BPH (benign prostatic hyperplasia)  Continue tamsulosin 0.4 mg daily   Patient currently voiding without difficulty   PRN PVRs if urinary retention is suspected   Impaired mobility and ADLs  Patient was evaluated by the rehabilitation team MD and an appropriate candidate for acute inpatient rehabilitation program at this time.  The patient will tolerate 3 hours/day 5 to 7 days/week of intensive physical and  occupational therapy   in order to obtain goals for " community discharge  Due to the patient's functional Compared to their baseline level of function in addition to their ongoing medical needs, the patient would benefit from daily supervision from a rehabilitation physician as well as rehabilitation nursing to implement and adjust the medical as well as functional plan of care in order to meet the patient's goals.  Bladder: Monitor closely for urinary incontinence and/or retention. Monitor urine output and encourage spontaneous voids. If unable to void spontaneously, will monitor with PVR bladder scans and initiate CIC regimen.  Skin: Monitor for breakdown, frequent turns, pressure offloading  Sleep: Encourage sleep hygiene (routine that promotes healthy circadian rhythm,avoid caffeine later in the day, quiet/dark room at night, reduce electronic before bedtime)      Wound of right ankle  Skin breakdown on R heel due to cast. Ortho removed approximately 1 inch of cast at posterior aspect on 3/11  Local wound care  Continue to monitor   Present on admission to [facility]   Wound care RN consulted     Subjective   Tobin Kerns is a 69 year-old male, with a past medical history of hypertension, hyperlipidemia, JOVANY, impaired fasting glucose, bilateral lower extremity neuropathy, GERD, BPH, presenting to [facility] after an elective right quadricept tendon repair. He initially underwent quad tendon repair on 7/25/24 with Dr. Foster. He was discharged home with outpatient PT. He was noted to have recurrent high-grade tear on repeat MRI with extensor lag on exam and elected for operative management. On 3/6/25, he underwent revision of quad tendon repair with allograft augmentation. He was evaluated by PT and OT and deemed an appropriate candidate for [facility] due to functional deficits.     Chief Complaint: f/u ambulatory dysfunction    Interval:   Seen and evaluated patient at bedside. He does not have any concerns. Last BM 3/15. He is eating and voiding. Oxycodone discontinued as he has  not received a dose in 10+ days and pain is controlled.     Objective :  Temp:  [97.5 °F (36.4 °C)-97.7 °F (36.5 °C)] 97.5 °F (36.4 °C)  HR:  [65-69] 65  BP: (110-116)/(63-68) 110/63  Resp:  [16] 16  SpO2:  [95 %] 95 %  O2 Device: None (Room air)    Functional Update:  Physical Therapy Occupational Therapy   Weight Bearing Status: Toe touch (TTWB R LE)  Transfers: Independent  Bed Mobility: Independent  Amulation Distance (ft): 160 feet  Ambulation: Independent, Supervision (S distances >50')  Assistive Device for Ambulation: Roller Walker  Wheelchair Mobility Distance:  (N/A)  Wheelchair Mobility:  (N/A)  Number of Stairs: 6  Assistive Device for Stairs: Lehft Hand Rail  Stair Assistance: Supervision  Ramp: Supervision  Assistive Device for Ramp: Roller Walker  Discharge Recommendations: Assisted Living Facility   Eating: Independent  Grooming: Independent  Bathing: Independent, Supervision  Bathing: Independent, Supervision  Upper Body Dressing: Independent  Lower Body Dressing: Independent, Supervision  Toileting: Independent  Tub/Shower Transfer:  (N/T due to full leg cast)  Toilet Transfer: Independent  Cognition: Within Defined Limits  Orientation: Person, Situation, Place, Time         Physical Exam  Vitals and nursing note reviewed.   Constitutional:       General: He is not in acute distress.     Appearance: He is obese. He is not ill-appearing or diaphoretic.   HENT:      Head: Normocephalic and atraumatic.      Nose: Nose normal.      Mouth/Throat:      Mouth: Mucous membranes are moist.   Cardiovascular:      Pulses:           Dorsalis pedis pulses are 2+ on the right side and 2+ on the left side.   Pulmonary:      Effort: Pulmonary effort is normal. No respiratory distress.   Abdominal:      General: There is no distension.   Skin:     General: Skin is warm and dry.   Neurological:      General: No focal deficit present.      Mental Status: He is alert.   Psychiatric:         Mood and Affect: Mood  normal.         Behavior: Behavior normal.       Scheduled Meds:  Current Facility-Administered Medications   Medication Dose Route Frequency Provider Last Rate    acetaminophen  650 mg Oral Q6H PRN Modesta Lee PA-C      ascorbic acid  500 mg Oral Daily Toño Brito MD      Cholecalciferol  5,000 Units Oral Daily Toño Brito MD      enoxaparin  40 mg Subcutaneous Q24H [facility] Toño Brito MD      famotidine  20 mg Oral Daily Toño Brito MD      losartan  50 mg Oral Daily Toño Brito MD      metFORMIN  1,500 mg Oral Daily With Dinner Toño Brito MD      multivitamin stress formula  1 tablet Oral Daily Toño Brito MD      oxyCODONE  5 mg Oral Q8H PRN Modesta Lee PA-C      Or    oxyCODONE  2.5 mg Oral Q8H PRN Modesta Lee PA-C      polyethylene glycol  17 g Oral Daily PRN Modesta Lee PA-C      pravastatin  40 mg Oral Daily With Dinner Toño Brito MD      tamsulosin  0.4 mg Oral Daily With Dinner Toño Brito MD           Lab Results: I have reviewed the following results:  Results from last 7 days   Lab Units 03/12/25  0518   HEMOGLOBIN g/dL 12.4   HEMATOCRIT % 37.9   WBC Thousand/uL 9.17   PLATELETS Thousands/uL 261     Results from last 7 days   Lab Units 03/12/25  0518   BUN mg/dL 23   SODIUM mmol/L 137   POTASSIUM mmol/L 4.3   CHLORIDE mmol/L 103   CREATININE mg/dL 1.05   AST U/L 12*   ALT U/L 10            Modesta Lee PA-C  Physical Medicine and Rehabilitation  [facility]

## 2025-03-17 NOTE — ASSESSMENT & PLAN NOTE
Skin breakdown on R heel due to cast. Ortho removed approximately 1 inch of cast at posterior aspect on 3/11  Local wound care  Continue to monitor   Present on admission to [facility]   Wound care RN consulted

## 2025-03-17 NOTE — NURSING NOTE
Patient MOD I in room with walker. Maintains TTWB to RLE with cast in place. Medicated once for pain in shoulders with relief noted. Sitting upright on side of bed for all meals. Educated on safety with ambulation. Will continue to monitor and follow plan of care.

## 2025-03-17 NOTE — PLAN OF CARE
Problem: PAIN - ADULT  Goal: Verbalizes/displays adequate comfort level or baseline comfort level  Description: Interventions:  - Encourage patient to monitor pain and request assistance  - Assess pain using appropriate pain scale  - Administer analgesics based on type and severity of pain and evaluate response  - Implement non-pharmacological measures as appropriate and evaluate response  - Consider cultural and social influences on pain and pain management  - Notify physician/advanced practitioner if interventions unsuccessful or patient reports new pain  Outcome: Progressing     Problem: INFECTION - ADULT  Goal: Absence or prevention of progression during hospitalization  Description: INTERVENTIONS:  - Assess and monitor for signs and symptoms of infection  - Monitor lab/diagnostic results  - Monitor all insertion sites, i.e. indwelling lines, tubes, and drains  - Monitor endotracheal if appropriate and nasal secretions for changes in amount and color  - [place] appropriate cooling/warming therapies per order  - Administer medications as ordered  - Instruct and encourage patient and family to use good hand hygiene technique  - Identify and instruct in appropriate isolation precautions for identified infection/condition  Outcome: Progressing     Problem: SAFETY ADULT  Goal: Maintain or return to baseline ADL function  Description: INTERVENTIONS:  -  Assess patient's ability to carry out ADLs; assess patient's baseline for ADL function and identify physical deficits which impact ability to perform ADLs (bathing, care of mouth/teeth, toileting, grooming, dressing, etc.)  - Assess/evaluate cause of self-care deficits   - Assess range of motion  - Assess patient's mobility; develop plan if impaired  - Assess patient's need for assistive devices and provide as appropriate  - Encourage maximum independence but intervene and supervise when necessary  - Involve family in performance of ADLs  - Assess for home care  POD 7, Pt contacted navigator with update the rectal bleeding has completely resolved. Pt now reports chills and body aches that started on Saturday the 17th, 2 days ago. Pt reports not being warm to the touch and her axillary temp is 98.0. Pt reports not taking her temperature orally due to difficulty sanitizing and cross contamination with that route. Pt states she is taking Tylenol, which completely resolves her chills and body aches. Pt encouraged to continue to use the Tylenol to manage her symptoms. Pt informed to rest, hydrate and update navigator if temperature elevated greater than 100.4 Pt verbalized understanding and updated that Dr Weaver would be updated.   needs following discharge   - Consider OT consult to assist with ADL evaluation and planning for discharge  - Provide patient education as appropriate  Outcome: Progressing

## 2025-03-17 NOTE — PROGRESS NOTES
"Progress Note - Tobin Kerns 69 y.o. male MRN: [id number]    Unit/Bed#: [facility] 210-01 Encounter: [id number]        Subjective:   Patient seen and examined at bedside after reviewing the chart and discussing the case with the caring staff.      Patient examined at bedside.  Patient has no acute symptoms.    Physical Exam   Vitals: Blood pressure 110/63, pulse 65, temperature 97.5 °F (36.4 °C), temperature source Temporal, resp. rate 16, height 6' 5\" (1.956 m), weight (!) 139 kg (306 lb 14.1 oz), SpO2 95%.,Body mass index is 36.39 kg/m².  Constitutional: Patient in no acute distress.  HEENT: PERR, EOMI, MMM.  Cardiovascular: Normal rate and regular rhythm.    Pulmonary/Chest: Effort normal and breath sounds normal.   Abdomen: Soft, + BS, NT.    Assessment/Plan:  Tobin Kerns is a(n) 69 y.o. year old male who is s/p quadriceps tendon repair.     Cardiac with hx of HTN, HLD.  Patient is on losartan 50 mg daily, pravastatin 40 mg daily.  Impaired fasting glucose.  Hgb a1c 5.4% on 3/12/25.  Continue metformin XR 1500 mg daily with dinner.  Regular diet and d/c accucheks as blood sugar is well controlled.    BPH.  Continue Flomax 0.4 mg daily.  GERD.  Continue Pepcid 20 mg daily.  JOVANY.  On CPAP at bedtime.   Vitamin D deficiency.  Patient is on vitamin D3 5000 units daily.  Pain and bowel management.  As per physiatrist.  Quadriceps tendon repair.  Toe touch weightbearing RLE with use of walker.  Patient is receiving intensive PT OT as per physiatrist.    Anticipated date of discharge.  TBD.  "

## 2025-03-17 NOTE — PCC OCCUPATIONAL THERAPY
03/17/25 Pt participating in Adls/iADLs, safety with functional txfs and mobility, TE/TA for generalized strength and endurance. Pts LOF noted above. Pt is progressing toward OT goals. Pt's barriers to d/c include decreased strength throughout but especially RLE s/p quadricepts rupture, decreased ROM, decreased balance TTWB RLE. Pt would benefit from continued skilled OT services in order to address listed barriers and prepare for safe d/c.     03/24/25 Pt participating in Adls/iADLs, safety with functional txfs and mobility, TE/TA for generalized strength and endurance. Pts LOF noted above. Pt continues to be consistently MI with all ADLs.  Pt's barriers to d/c include decreased strength  RLE s/p quadricepts rupture, decreased ROM, decreased balance due to TTWB RLE. Plan is contingent upon placement.     03/31/25 Pt participating in Adls/iADLs, safety with functional txfs and mobility, TE/TA for generalized strength and endurance. Pts LOF noted above. Pt continues to be consistently MI with all ADLs.  Pt's barriers to d/c include decreased strength  RLE s/p quadricepts rupture, decreased ROM, decreased balance due to TTWB RLE. Plan is contingent upon placement.     04/07/25 Pt participating in Adls/iADLs, safety with functional txfs and mobility, TE/TA for generalized strength and endurance. Pts LOF noted above. Pt continues to be consistently MI with all ADLs.  Pt's barriers to d/c include decreased strength  RLE s/p quadricepts rupture, decreased ROM, decreased balance due to TTWB RLE. Plan is contingent upon placement.     04/14/25 Pt participating in Adls/iADLs, safety with functional txfs and mobility, TE/TA for generalized strength and endurance. Pts LOF noted above. Pt continues to be consistently MI with all ADLs.  Progress has plateaued due to the following limitations: decreased strength and ROM RLE s/p quadricepts rupture, TTWB RLE. Plan is contingent upon placement.     04/21/25 Pt participating in  ADLs/iADLS, functional txfs and mobility: Effective 4/18 pt allowed to WBAT with  immobilizer set at 10* to R LE. Pt tolerating change in WB status without difficulty for ADL/iADL purposes. Pt continues to manage at MI level. Pt also completing TE/TA for generalized strength and endurance. Pts LOF noted above. Plan is to continue with OT, pt now awaiting increase in ROM with use of immobilizer and d/c to home planned.  (Addendum: brace remains locked in extension)     04/28/25 Pt participating in ADLs/iADLS, functional txfs and mobility, TE/TA.  Pt continues to have R LE  immobilizer locked in extension. Pt is tolerating  WBAT status without difficulty for ADL/iADL purposes. Pt continues to manage at MI level. Pt also completing TE/TA for generalized strength and endurance. Pts LOF noted above. Plan is to continue with OT awaiting increase in ROM with use of immobilizer and d/c to home planned.     05/05/25 Pt participating in ADLs/iADLS, functional txfs and mobility,TE/TA. Pt continues to have R LE  immobilizer locked in extension. Pt is tolerating  WBAT status without difficulty for ADL/iADL purposes. Pt continues to manage at MI level. Pt also completing TE/TA for generalized strength and endurance. Pts LOF noted above. Plan is to continue with OT awaiting d/c plan, with d/c to home advised

## 2025-03-18 PROCEDURE — 97116 GAIT TRAINING THERAPY: CPT

## 2025-03-18 PROCEDURE — 97110 THERAPEUTIC EXERCISES: CPT

## 2025-03-18 PROCEDURE — 97535 SELF CARE MNGMENT TRAINING: CPT

## 2025-03-18 PROCEDURE — 97530 THERAPEUTIC ACTIVITIES: CPT

## 2025-03-18 PROCEDURE — 99232 SBSQ HOSP IP/OBS MODERATE 35: CPT | Performed by: PHYSICAL MEDICINE & REHABILITATION

## 2025-03-18 RX ADMIN — CHOLECALCIFEROL TAB 25 MCG (1000 UNIT) 5000 UNITS: 25 TAB at 08:47

## 2025-03-18 RX ADMIN — LOSARTAN POTASSIUM 50 MG: 50 TABLET, FILM COATED ORAL at 08:48

## 2025-03-18 RX ADMIN — PRAVASTATIN SODIUM 40 MG: 40 TABLET ORAL at 17:18

## 2025-03-18 RX ADMIN — FAMOTIDINE 20 MG: 20 TABLET, FILM COATED ORAL at 08:47

## 2025-03-18 RX ADMIN — B-COMPLEX W/ C & FOLIC ACID TAB 1 TABLET: TAB at 08:48

## 2025-03-18 RX ADMIN — METFORMIN ER 500 MG 1500 MG: 500 TABLET ORAL at 17:18

## 2025-03-18 RX ADMIN — OXYCODONE HYDROCHLORIDE AND ACETAMINOPHEN 500 MG: 500 TABLET ORAL at 08:48

## 2025-03-18 RX ADMIN — ENOXAPARIN SODIUM 40 MG: 40 INJECTION SUBCUTANEOUS at 08:47

## 2025-03-18 RX ADMIN — TAMSULOSIN HYDROCHLORIDE 0.4 MG: 0.4 CAPSULE ORAL at 17:18

## 2025-03-18 NOTE — ASSESSMENT & PLAN NOTE
Skin breakdown on R heel due to cast. Ortho removed approximately 1 inch of cast at posterior aspect on 3/11  Local wound care  Continue to monitor   Present on admission to [facility]   Wound care RN consulted

## 2025-03-18 NOTE — PROGRESS NOTES
03/18/25 0700   Pain Assessment   Pain Assessment Tool 0-10   Pain Score No Pain   Restrictions/Precautions   Precautions Fall Risk;Limb alert;Pain   RLE Weight Bearing Per Order TTWB   ROM Restrictions No   Cognition   Overall Cognitive Status WFL   Arousal/Participation Alert;Responsive;Cooperative   Attention Within functional limits   Orientation Level Oriented X4   Memory Within functional limits   Following Commands Follows all commands and directions without difficulty   Subjective   Subjective Pt reports he is able to lift his R LE easier since surgery   Roll Left and Right   Type of Assistance Needed Independent   Physical Assistance Level No physical assistance   Roll Left and Right CARE Score 6   Sit to Lying   Type of Assistance Needed Independent   Physical Assistance Level No physical assistance   Sit to Lying CARE Score 6   Lying to Sitting on Side of Bed   Type of Assistance Needed Independent   Physical Assistance Level No physical assistance   Lying to Sitting on Side of Bed CARE Score 6   Sit to Stand   Type of Assistance Needed Independent   Physical Assistance Level No physical assistance   Comment RW   Sit to Stand CARE Score 6   Bed-Chair Transfer   Type of Assistance Needed Independent   Physical Assistance Level No physical assistance   Comment RW   Chair/Bed-to-Chair Transfer CARE Score 6   Car Transfer   Reason if not Attempted Environmental limitations   Car Transfer CARE Score 10   Walk 10 Feet   Type of Assistance Needed Independent   Physical Assistance Level No physical assistance   Comment RW   Walk 10 Feet CARE Score 6   Walk 50 Feet with Two Turns   Type of Assistance Needed Independent   Physical Assistance Level No physical assistance   Comment RW   Walk 50 Feet with Two Turns CARE Score 6   Walk 150 Feet   Type of Assistance Needed Independent   Physical Assistance Level No physical assistance   Comment RW   Walk 150 Feet CARE Score 6   Walking 10 Feet on Uneven Surfaces    Type of Assistance Needed Independent   Physical Assistance Level No physical assistance   Comment RW, green foam   Walking 10 Feet on Uneven Surfaces CARE Score 6   Ambulation   Primary Mode of Locomotion Prior to Admission Walk   Distance Walked (feet) 180 ft  (180', 120', 20' x 2)   Assist Device Roller Walker   Does the patient walk? 2. Yes   Wheel 50 Feet with Two Turns   Reason if not Attempted Activity not applicable   Wheel 50 Feet with Two Turns CARE Score 9   Wheel 150 Feet   Reason if not Attempted Activity not applicable   Wheel 150 Feet CARE Score 9   Wheelchair mobility   Findings N/A   Does the patient use a wheelchair? 0. No   Curb or Single Stair   Style negotiated Single stair   Type of Assistance Needed Set-up / clean-up   Physical Assistance Level No physical assistance   Comment place RW at bottom/top of  stairs, up/down 6  steps, L HR, non-reciprocal, forward ascending, backward descending; maintains TTWB R LE   1 Step (Curb) CARE Score 5   4 Steps   Type of Assistance Needed Set-up / clean-up   Physical Assistance Level No physical assistance   Comment place RW at bottom/top of  stairs, up/down 6  steps, L HR, non-reciprocal, forward ascending, backward descending; maintains TTWB R LE   4 Steps CARE Score 5   12 Steps   Reason if not Attempted Activity not applicable   12 Steps CARE Score 9   Stairs   Type Ramp   Weight Bearing Precautions TTWB;RLE   Assist Devices Roller Walker   Findings S RW   Picking Up Object   Type of Assistance Needed Independent;Adaptive equipment   Physical Assistance Level No physical assistance   Comment pants from floor   Picking Up Object CARE Score 6   Toilet Transfer   Type of Assistance Needed Independent   Physical Assistance Level No physical assistance   Comment grab bar   Toilet Transfer CARE Score 6   Therapeutic Interventions   Strengthening supine LE TE   Other stair training, gait training, ramp negotiation   Equipment Use    NuStep L4, 15 min, B/L UE, L LE   Assessment   Treatment Assessment Pt agreeable to PT this AM, received supine in bed. Pt is continuing to do well with all LE TE, with decreased rest breaks, improved technique, and increased repetitions. Pt continues to maintain TTWB on R LE well with all mobility. He remains limited by TTWB restrictions on R LE, progressing well towards PT goals. Will continue with current PT POC to improve deficits and promote return to PLOF.   Problem List Decreased strength;Decreased range of motion;Pain;Orthopedic restrictions;Decreased skin integrity;Impaired balance;Decreased mobility   PT Barriers   Physical Impairment Decreased strength;Decreased range of motion;Decreased endurance;Impaired balance;Decreased mobility;Impaired sensation;Obesity;Decreased skin integrity;Orthopedic restrictions;Pain   Functional Limitation Walking;Transfers;Standing;Stair negotiation;Ramp negotiation;Car transfers   Plan   Treatment/Interventions Functional transfer training;LE strengthening/ROM;Therapeutic exercise;Endurance training;Patient/family training;Equipment eval/education;Bed mobility;Gait training   Progress Progressing toward goals   PT Therapy Minutes   PT Time In 0700   PT Time Out 0830   PT Total Time (minutes) 90   PT Mode of treatment - Individual (minutes) 90   PT Mode of treatment - Concurrent (minutes) 0   PT Mode of treatment - Group (minutes) 0   PT Mode of treatment - Co-treat (minutes) 0   PT Mode of Treatment - Total time(minutes) 90 minutes   PT Cumulative Minutes 570     Patient remains supine in bed, all needs in reach. Encouraged use of call bell, patient verbalizes understanding.

## 2025-03-18 NOTE — PROGRESS NOTES
03/18/25 1000   Pain Assessment   Pain Assessment Tool 0-10   Pain Score No Pain   Restrictions/Precautions   Precautions Fall Risk;Limb alert;Pain   Oral Hygiene   Type of Assistance Needed Independent   Physical Assistance Level No physical assistance   Oral Hygiene CARE Score 6   Grooming   Able To Initiate Tasks;Acquire Items;Comb/Brush Hair;Wash/Dry Face;Brush/Clean Teeth;Wash/Dry Hands   Findings stance at sink   Shower/Bathe Self   Type of Assistance Needed Independent   Physical Assistance Level No physical assistance   Shower/Bathe Self CARE Score 6   Bathing   Assessed Bath Style Sponge Bath   Able to Wash/Rinse/Dry (body part) Left Arm;Right Arm;L Lower Leg/Foot;R Lower Leg/Foot;Chest;Abdomen;Perineal Area;Buttocks;L Upper Leg   Limitations Noted in Balance;ROM;Strength   Positioning Seated;Standing   Tub/Shower Transfer   Reason Not Assessed Sponge Bath   Upper Body Dressing   Type of Assistance Needed Independent   Physical Assistance Level No physical assistance   Upper Body Dressing CARE Score 6   Lower Body Dressing   Type of Assistance Needed Independent;Adaptive equipment   Physical Assistance Level No physical assistance   Lower Body Dressing CARE Score 6   Putting On/Taking Off Footwear   Comment pt declined change of socks due to nurse in prior to OT to complete dressing   Picking Up Object   Type of Assistance Needed Independent;Adaptive equipment   Picking Up Object CARE Score 6   Dressing/Undressing Clothing   Able to  Obtain Clothing;Store removed clothing   Remove UB Clothes Button Shirt   Don UB Clothes Button Shirt   Remove LB Clothes Shorts;Undergarment   Don LB Clothes Shorts;Undergarment   Limitations Noted In Balance;ROM;Strength   Adaptive Equipment Reacher;Dressing Stick   Positioning Supported Sit;Standing   Sit to Stand   Type of Assistance Needed Independent   Physical Assistance Level No physical assistance   Sit to Stand CARE Score 6   Toileting Hygiene   Type of Assistance  Needed Independent   Physical Assistance Level No physical assistance   Toileting Hygiene CARE Score 6   Toileting   Able to Pull Clothing down yes, up yes.   Able to Manage Clothing Closures Yes   Manage Hygiene Bladder   Limitations Noted In Balance;LE Strength;ROM   Adaptive Equipment Grab Bar   Toilet Transfer   Type of Assistance Needed Independent;Adaptive equipment   Physical Assistance Level No physical assistance   Toilet Transfer CARE Score 6   Toilet Transfer   Surface Assessed Standard Toilet   Transfer Technique Standard   Limitations Noted In Balance;LE Strength   Adaptive Equipment Grab Bar;Walker   Light Housekeeping   Light Housekeeping Level Walker   Light Housekeeping Level of Assistance Modified independent   Exercise Tools   Theraband blue 3x10 reps in 3 planes of motion   Cognition   Overall Cognitive Status WFL   Arousal/Participation Alert;Cooperative   Attention Within functional limits   Orientation Level Oriented X4   Memory Within functional limits   Following Commands Follows all commands and directions without difficulty   Activity Tolerance   Activity Tolerance Patient tolerated treatment well   Assessment   Treatment Assessment OT session focused on assessment of consistency with  ADL via sponge bathe level. Pt demon good  recall of AE, compensatory strategies and compliance with safe techniques. Details listed in respective sections of note. Pt tolerated session well. Pt maintained TTWB during txfs and mobility. Pt then completed TE for generalized strength and endurance.  Pt is a motivated participant in therapy and reports would prefer to d/c to another facility after rehab until his cast is removed; care team notified. Pt's barriers to d/c include decreased strength throughout but especially RLE s/p quadricepts rupture, decreased ROM, decreased balance, and TTWB RLE ; all affect independence in self care and fxl transfers. Pt would benefit from continued skilled OT services in  order to address listed barriers and prepare for safe d/c.   Prognosis Good   Problem List Decreased strength;Decreased range of motion;Impaired balance;Decreased mobility;Pain;Orthopedic restrictions   Plan   Treatment/Interventions ADL retraining;Functional transfer training;Patient/family training;Equipment eval/education;Gait training;Spoke to nursing;OT   Progress Progressing toward goals   OT Therapy Minutes   OT Time In 1000   OT Time Out 1100   OT Total Time (minutes) 60   OT Mode of treatment - Individual (minutes) 60   OT Mode of treatment - Concurrent (minutes) 0   OT Mode of treatment - Group (minutes) 0   OT Mode of treatment - Co-treat (minutes) 0   OT Mode of Treatment - Total time(minutes) 60 minutes   OT Cumulative Minutes 270   Therapy Time missed   Time missed? No

## 2025-03-18 NOTE — PROGRESS NOTES
"Progress Note - PMR   Name: Tobin Kerns 69 y.o. male I MRN: [id number]  Unit/Bed#: -01 I Date of Admission: 3/11/2025   Date of Service: 3/18/2025 I Hospital Day: 7     Assessment & Plan  Quadriceps muscle rupture, right, subsequent encounter  HPI:   He initially underwent quad tendon repair on 7/25/24 with Dr. Foster. He was discharged home with outpatient PT.  Later noted to have recurrent high-grade tear with extensor lag on exam and elected for operative management  January 2025 MRI: \"Prior quadriceps insertion repair with high-grade recurrent tear exhibiting up to 2.2 cm of tendon retraction.\"  On 3/6/25, he underwent revision of quad tendon repair with allograft augmentation    Plan:   PT and OT for 3 hours a day, 5-6 days a week   TTWB RLE   Avoid moisture to cast   Pain: PRN Tylenol (monitor LFTs) and oxycodone discontinued on 3/17 due to minimal pain   Lovenox for DVT prophylaxis while inpatient   Hypertension  Continue losartan 50 mg daily   Monitor BP   Mixed hyperlipidemia  Continue pravastatin 40 mg daily   Gastro-esophageal reflux disease without esophagitis  Continue famotidine 20 mg daily   Peripheral neuropathy  Patient is not diabetic   Monitor skin for new or worsening wounds   JOVANY on CPAP  Continue CPAP qHS  IFG (impaired fasting glucose)  Continue metformin 1500 mg daily   Seen by RD 3/12, diet adjusted to regular   3/12 A1c: 5.4   Healed ulcer of left foot  Local wound care  Present on admission   Wound care RN consulted   BPH (benign prostatic hyperplasia)  Continue tamsulosin 0.4 mg daily   Patient currently voiding without difficulty   PRN PVRs if urinary retention is suspected   Impaired mobility and ADLs  Patient was evaluated by the rehabilitation team MD and an appropriate candidate for acute inpatient rehabilitation program at this time.  The patient will tolerate 3 hours/day 5 to 7 days/week of intensive physical and  occupational therapy   in order to obtain goals for " community discharge  Due to the patient's functional Compared to their baseline level of function in addition to their ongoing medical needs, the patient would benefit from daily supervision from a rehabilitation physician as well as rehabilitation nursing to implement and adjust the medical as well as functional plan of care in order to meet the patient's goals.  Bladder: Monitor closely for urinary incontinence and/or retention. Monitor urine output and encourage spontaneous voids. If unable to void spontaneously, will monitor with PVR bladder scans and initiate CIC regimen.  Skin: Monitor for breakdown, frequent turns, pressure offloading  Sleep: Encourage sleep hygiene (routine that promotes healthy circadian rhythm,avoid caffeine later in the day, quiet/dark room at night, reduce electronic before bedtime)      Wound of right ankle  Skin breakdown on R heel due to cast. Ortho removed approximately 1 inch of cast at posterior aspect on 3/11  Local wound care  Continue to monitor   Present on admission to [facility]   Wound care RN consulted     Subjective   Tobin Kerns is a 69 year-old male, with a past medical history of hypertension, hyperlipidemia, JOVANY, impaired fasting glucose, bilateral lower extremity neuropathy, GERD, BPH, presenting to [facility] after an elective right quadricept tendon repair. He initially underwent quad tendon repair on 7/25/24 with Dr. Foster. He was discharged home with outpatient PT. He was noted to have recurrent high-grade tear on repeat MRI with extensor lag on exam and elected for operative management. On 3/6/25, he underwent revision of quad tendon repair with allograft augmentation. He was evaluated by PT and OT and deemed an appropriate candidate for [facility] due to functional deficits.        Chief Complaint: f/u ambulatory dysfunction    Interval:   Seen and evaluated patient in room. He denies pain or any concerns. Last BM 3/17  Discussion: Plan for return home vs DC to assistive  living with [facility] for PT, OT, and nursing. Anticipated Discharge Date:  March 25, 2025    Objective :  Temp:  [97.4 °F (36.3 °C)-97.6 °F (36.4 °C)] 97.4 °F (36.3 °C)  HR:  [62-66] 62  BP: (117-120)/(63-67) 120/67  Resp:  [12-18] 12  SpO2:  [97 %] 97 %  O2 Device: None (Room air)    Functional Update:  Physical Therapy Occupational Therapy   Weight Bearing Status: Toe touch (TTWB R LE)  Transfers: Independent  Bed Mobility: Independent  Amulation Distance (ft): 160 feet  Ambulation: Independent, Supervision (S distances >50')  Assistive Device for Ambulation: Roller Walker  Wheelchair Mobility Distance:  (N/A)  Wheelchair Mobility:  (N/A)  Number of Stairs: 6  Assistive Device for Stairs: Lehft Hand Rail  Stair Assistance: Supervision  Ramp: Supervision  Assistive Device for Ramp: Roller Walker  Discharge Recommendations: Assisted Living Facility   Eating: Independent  Grooming: Independent  Bathing: Independent, Supervision  Bathing: Independent, Supervision  Upper Body Dressing: Independent  Lower Body Dressing: Independent, Supervision  Toileting: Independent  Tub/Shower Transfer:  (N/T due to full leg cast)  Toilet Transfer: Independent  Cognition: Within Defined Limits  Orientation: Person, Situation, Place, Time         Physical Exam  Vitals and nursing note reviewed.   Constitutional:       General: He is not in acute distress.     Appearance: He is obese. He is not ill-appearing or diaphoretic.   HENT:      Head: Normocephalic and atraumatic.      Nose: Nose normal.      Mouth/Throat:      Mouth: Mucous membranes are moist.   Cardiovascular:      Pulses:           Dorsalis pedis pulses are 2+ on the right side and 2+ on the left side.   Pulmonary:      Effort: Pulmonary effort is normal. No respiratory distress.   Abdominal:      General: There is no distension.   Skin:     General: Skin is warm and dry.   Neurological:      General: No focal deficit present.      Mental Status: He is alert.   Psychiatric:          Mood and Affect: Mood normal.         Behavior: Behavior normal.         Scheduled Meds:  Current Facility-Administered Medications   Medication Dose Route Frequency Provider Last Rate    acetaminophen  650 mg Oral Q6H PRN Modesta Lee PA-C      ascorbic acid  500 mg Oral Daily Toño Brito MD      Cholecalciferol  5,000 Units Oral Daily Toño Brito MD      enoxaparin  40 mg Subcutaneous Q24H [facility] Toño Brito MD      famotidine  20 mg Oral Daily Toño Brito MD      losartan  50 mg Oral Daily Toño Brito MD      metFORMIN  1,500 mg Oral Daily With Dinner Toño Brito MD      multivitamin stress formula  1 tablet Oral Daily Toño Brito MD      polyethylene glycol  17 g Oral Daily PRN Modesta Lee PA-C      pravastatin  40 mg Oral Daily With Dinner Toño Brito MD      tamsulosin  0.4 mg Oral Daily With Dinner Toño Brito MD           Lab Results: I have reviewed the following results:  Results from last 7 days   Lab Units 03/12/25  0518   HEMOGLOBIN g/dL 12.4   HEMATOCRIT % 37.9   WBC Thousand/uL 9.17   PLATELETS Thousands/uL 261     Results from last 7 days   Lab Units 03/12/25  0518   BUN mg/dL 23   SODIUM mmol/L 137   POTASSIUM mmol/L 4.3   CHLORIDE mmol/L 103   CREATININE mg/dL 1.05   AST U/L 12*   ALT U/L 10            Modesta Lee PA-C  Physical Medicine and Rehabilitation  [facility]

## 2025-03-18 NOTE — WOUND OSTOMY CARE
Progress Note - Wound   Tobin Emmanuelsco 69 y.o. male MRN: [id number]  Unit/Bed#: [facility] 210-01 Encounter: [id number]        Assessment: Patient is seen for weekly wound care assessment today . He is a 69 year old male admitted to the [facility] for therapy . Patient is alert and pleasant for the skin assessment . Patient 3/6/25 patient underwent a repair of the a quad tendon repair from a quadriceps muscle rupture . He has a full length cast on the the right leg from thigh to the ankle area . PMH includes HTN, Mixed hyperlipidemia , GERD , peripheral neuropathy and not a diabetic , JOVANY on CPAP , IFG , Left foot dry ulcer areas on the plantar aspect , BPH , right heel and ankle wounds . Continent of bowel and bladder . In bed and rolling independently in the bed . Patient reports the lower end of the cast was cut on the bottom to prevent rubbing .Noted foam getting dislodged with ambulation on the left plantar aspect of the foot will change to keep it dry and secure.       Assessment Findings  Patient did not want the sacral buttocks assessed and reports he is fine   Left heel dry and intact   Left plantar aspect - 2 dry intact stable eschar areas from previous wounds covered in brown adherent tissue . No fluctuance or drainage . No redness noted due to dressing rolling will change   Right posterior heel - POA DTI area that is oval in shape and was a blistered that has opened and is a evolving DTI due to area being open will change to the xeroform and mepilex   Right medial aspect of the foot - partial thickness wound that is 100%  pink and blanchable related to the friction and rubbing of the cast   Right lateral aspect of the foot - partial thickness wound that is 100% pink blanchable related to the friction and rubbing of the cast .        Skin Care orders:  1-Hydraguard to sacrum, buttocks bid and prn   2-EHOB / waffle  cushion when out of bed in chair.  3-Moisturize skin daily with skin nourishing cream  4-Elevate  heels to offload pressure.  5-Turn/reposition q2h for pressure re-distribution on skin  6. Right posterior heel , right medial ankle and right lateral ankle - cleanse with Normal saline then apply xeroform then top with the silicone foam nate with a T and date change every other day as needed for soilage or dislodgement   7. Left plantar aspect of the foot - apply betadine paint then a AND and olga wrap change every other day           Wound 03/06/25 Plantar Left (Active)   Wound Image   03/18/25 0934   Wound Description Dry;Intact 03/18/25 0934   Non-staged Wound Description Not applicable 03/18/25 0934   Wound Length (cm) 0.5 cm 03/18/25 0934   Wound Width (cm) 4 cm 03/18/25 0934   Wound Depth (cm) 0 cm 03/18/25 0934   Wound Surface Area (cm^2) 2 cm^2 03/18/25 0934   Wound Volume (cm^3) 0 cm^3 03/18/25 0934   Calculated Wound Volume (cm^3) 0 cm^3 03/18/25 0934   Change in Wound Size % 100 03/18/25 0934   Drainage Amount None 03/18/25 0934   Drainage Description Brown 03/18/25 0934   Audrey-wound Assessment Clean;Dry;Intact 03/18/25 0934   Treatments Cleansed;Site care;Irrigation with NSS 03/16/25 1300   Wound Site Closure Adhesive bandage 03/16/25 1300   Dressing ABD;Other (Comment) 03/18/25 0934   Wound packed? No 03/16/25 1300   Dressing Changed Changed 03/18/25 0934   Patient Tolerance Tolerated well 03/18/25 0934   Dressing Status Clean;Dry;Intact 03/17/25 1918       Wound 03/10/25 Pressure Injury Ankle Posterior;Right (Active)   Wound Image   03/18/25 0931   Wound Description Fragile;Pink 03/18/25 0931   Pressure Injury Stage DTPI 03/18/25 0931   Non-staged Wound Description Partial thickness 03/18/25 0931   Wound Length (cm) 1 cm 03/18/25 0931   Wound Width (cm) 2 cm 03/18/25 0931   Wound Depth (cm) 0.1 cm 03/18/25 0931   Wound Surface Area (cm^2) 2 cm^2 03/18/25 0931   Wound Volume (cm^3) 0.2 cm^3 03/18/25 0931   Calculated Wound Volume (cm^3) 0.2 cm^3 03/18/25 0931   Drainage Amount Scant 03/18/25 0931    Drainage Description Serosanguineous 03/18/25 0931   Audrey-wound Assessment Clean;Dry;Intact 03/18/25 0931   Treatments Cleansed;Irrigation with NSS;Site care 03/18/25 0931   Wound Site Closure Adhesive bandage 03/16/25 1300   Dressing Foam, Silicon (eg. Allevyn, etc);Xeroform 03/18/25 0931   Dressing Changed Changed 03/18/25 0931   Patient Tolerance Tolerated well 03/18/25 0931   Dressing Status Clean;Dry;Intact 03/17/25 1918       Wound 03/18/25 Ankle Anterior;Right;Medial (Active)   Wound Image   03/18/25 0926   Wound Description Clean;Fragile;Pink 03/18/25 0926   Wound Length (cm) 0.7 cm 03/18/25 0926   Wound Width (cm) 1 cm 03/18/25 0926   Wound Depth (cm) 0.1 cm 03/18/25 0926   Wound Surface Area (cm^2) 0.7 cm^2 03/18/25 0926   Wound Volume (cm^3) 0.07 cm^3 03/18/25 0926   Calculated Wound Volume (cm^3) 0.07 cm^3 03/18/25 0926   Drainage Amount Scant 03/18/25 0926   Drainage Description Serosanguineous 03/18/25 0926   Audrey-wound Assessment Clean;Dry;Intact 03/18/25 0926   Treatments Cleansed;Irrigation with NSS;Site care 03/18/25 0926   Dressing Foam, Silicon (eg. Allevyn, etc);Xeroform 03/18/25 0926   Dressing Changed Changed 03/18/25 0926   Patient Tolerance Tolerated well 03/18/25 0926       Wound 03/18/25 Ankle Anterior;Right;Lateral (Active)   Wound Image   03/18/25 0929   Wound Description Clean;Fragile;Pink 03/18/25 0929   Non-staged Wound Description Partial thickness 03/18/25 0929   Wound Length (cm) 1 cm 03/18/25 0929   Wound Width (cm) 1.5 cm 03/18/25 0929   Wound Depth (cm) 0.1 cm 03/18/25 0929   Wound Surface Area (cm^2) 1.5 cm^2 03/18/25 0929   Wound Volume (cm^3) 0.15 cm^3 03/18/25 0929   Calculated Wound Volume (cm^3) 0.15 cm^3 03/18/25 0929   Drainage Amount Scant 03/18/25 0929   Drainage Description Serosanguineous 03/18/25 0929   Audrey-wound Assessment Clean;Dry;Intact 03/18/25 0929   Treatments Cleansed;Irrigation with NSS 03/18/25 0929   Dressing Foam, Silicon (eg. Allevyn, etc);Xeroform  03/18/25 0929   Dressing Changed Changed 03/18/25 0929   Patient Tolerance Tolerated well 03/18/25 0929       Wound care will follow weekly secure chat with questions or concerns     Kymberly Snowden RN BSN CWOCN

## 2025-03-18 NOTE — PCC CARE MANAGEMENT
Patient admitted to [facility] on3/11/25 after inpatient hospital stay for right knee revision quad tendon repair on 3/6/25. Patient lives alone in a mobile home, 4 CRISTAL,. Patient independent PTA, driving. Original injury is from July 2024, had repair with a knee immobilizer. Did not work. Revised . Workmen's comp. Patient at Mod I level, looking into alternate disposition[facility]. [employer]'s comp  3/25/25 Having difficulty getting any assistance from [employer] comp. Patient for discharge today 3/25 waiting to establish home care, where he is going on d/c, transportation.  4/1/25 Emailed and sent fax trying to get assistance with discharge planning from [employer] comp. My need to have patient pay out of pocket. Last covered day 3/31.  4/8/25 Waiting on [employer] comp, who is getting auth for inpatient hospital stay, will not be able to assist with discharge planning until that is done.  0415/2025: Call placed last week to [facility] billing office to inquire if authorization was obtained for pt's surgery in March.   Per Radha Cho had started the process and was waiting for validation to progress with surgery auth.    Recalled office today and spoke with Regina, who reports they are still waiting for validation.  Until this is completed, the [employer] comp Rep, Ceasar, cannot assist with d/c planning from [facility].  Patient with ortho appt 4/18/2025 at 0930.  Transportation set up for 0815.  04/22/2025:  Received phone call from Deanne Martinez that authorization had been obtained for pt's 03/06/2025 surgery.   CM then placed call to [employer] with request to return call to discuss getting approval for d/c planning, DME, and disposition.   Patient's next ortho visit is 5/12/2025 at 2:15pm.  Will need transport set up.  4/28/25 CM submitted auth request for DME, and updated clinical information, for stay here at [facility].  5/6/25 Patient for discharge today 1:00 PM transport set up. DME to be  delivered to here at [facility] or home address in [place]

## 2025-03-18 NOTE — PROGRESS NOTES
"Progress Note - Tobin Kerns 69 y.o. male MRN: [id number]    Unit/Bed#: [facility] 210-01 Encounter: [id number]        Subjective:   Patient seen and examined at bedside after reviewing the chart and discussing the case with the caring staff.      Patient examined at bedside.  Patient has no acute symptoms.    Physical Exam   Vitals: Blood pressure 120/67, pulse 62, temperature (!) 97.4 °F (36.3 °C), temperature source Temporal, resp. rate 12, height 6' 5\" (1.956 m), weight (!) 139 kg (306 lb 14.1 oz), SpO2 97%.,Body mass index is 36.39 kg/m².  Constitutional: Patient in no acute distress.  HEENT: PERR, EOMI, MMM.  Cardiovascular: Normal rate and regular rhythm.    Pulmonary/Chest: Effort normal and breath sounds normal.   Abdomen: Soft, + BS, NT.    Assessment/Plan:  Tobin Kerns is a(n) 69 y.o. year old male who is s/p quadriceps tendon repair.     Cardiac with hx of HTN, HLD.  Patient is on losartan 50 mg daily, pravastatin 40 mg daily.  Impaired fasting glucose.  Hgb a1c 5.4% on 3/12/25.  Continue metformin XR 1500 mg daily with dinner.  Regular diet and d/c accucheks as blood sugar is well controlled.    BPH.  Continue Flomax 0.4 mg daily.  GERD.  Continue Pepcid 20 mg daily.  JOVANY.  On CPAP at bedtime.   Vitamin D deficiency.  Patient is on vitamin D3 5000 units daily.  Pain and bowel management.  As per physiatrist.  Quadriceps tendon repair.  Toe touch weightbearing RLE with use of walker.  Patient is receiving intensive PT OT as per physiatrist.    Anticipated date of discharge.  TBD.  "

## 2025-03-18 NOTE — PROGRESS NOTES
03/18/25 1400   Pain Assessment   Pain Assessment Tool 0-10   Pain Score No Pain   Restrictions/Precautions   Precautions Fall Risk;Pain;Limb alert   RLE Weight Bearing Per Order TTWB   Sit to Lying   Type of Assistance Needed Independent   Physical Assistance Level No physical assistance   Sit to Lying CARE Score 6   Lying to Sitting on Side of Bed   Type of Assistance Needed Independent   Physical Assistance Level No physical assistance   Lying to Sitting on Side of Bed CARE Score 6   Sit to Stand   Type of Assistance Needed Independent   Physical Assistance Level No physical assistance   Sit to Stand CARE Score 6   Exercise Tools   Hand Gripper heavy resistance gripper 2 sets x 40 reps   Other Exercise Tool 1 red t bar 2x15 upward then downward   Other Exercise Tool 2 5# dowel 2x15 in 6 planes of motion   Cognition   Overall Cognitive Status WFL   Arousal/Participation Alert;Cooperative   Attention Within functional limits   Orientation Level Oriented X4   Memory Within functional limits   Following Commands Follows all commands and directions without difficulty   Additional Activities   Additional Activities Comments functional mobility RW TTWB R LE MI   Activity Tolerance   Activity Tolerance Patient tolerated treatment well   Assessment   Treatment Assessment OT brief session addressed B UE TE for generalized strength and endurance. Pt tolerated session well. Pt barriers same as previous session. Plan is to continue with skilled OT.   Prognosis Good   Problem List Decreased strength;Decreased range of motion;Impaired balance;Decreased mobility;Pain;Orthopedic restrictions   Plan   Treatment/Interventions Functional transfer training;Therapeutic exercise;Endurance training;Patient/family training;Gait training;Bed mobility;Equipment eval/education;Compensatory technique education;Spoke to nursing;OT   Progress Progressing toward goals   OT Therapy Minutes   OT Time In 1400   OT Time Out 1430   OT Total Time  (minutes) 30   OT Mode of treatment - Individual (minutes) 30   OT Mode of treatment - Concurrent (minutes) 0   OT Mode of treatment - Group (minutes) 0   OT Mode of treatment - Co-treat (minutes) 0   OT Mode of Treatment - Total time(minutes) 30 minutes   OT Cumulative Minutes 300   Therapy Time missed   Time missed? No

## 2025-03-18 NOTE — NURSING NOTE
Patient MOD I in room with walker. Able to maintain TTWB to RLE. Reports no pain. Educated on safety being MOD I. Sitting upright for all meals. Wound nurse in to change dressings and assess wounds on RLE and bottom of left foot. Will continue to monitor and follow plan of care.

## 2025-03-18 NOTE — PLAN OF CARE
Problem: PAIN - ADULT  Goal: Verbalizes/displays adequate comfort level or baseline comfort level  Description: Interventions:  - Encourage patient to monitor pain and request assistance  - Assess pain using appropriate pain scale  - Administer analgesics based on type and severity of pain and evaluate response  - Implement non-pharmacological measures as appropriate and evaluate response  - Consider cultural and social influences on pain and pain management  - Notify physician/advanced practitioner if interventions unsuccessful or patient reports new pain  Outcome: Progressing     Problem: INFECTION - ADULT  Goal: Absence or prevention of progression during hospitalization  Description: INTERVENTIONS:  - Assess and monitor for signs and symptoms of infection  - Monitor lab/diagnostic results  - Monitor all insertion sites, i.e. indwelling lines, tubes, and drains  - Monitor endotracheal if appropriate and nasal secretions for changes in amount and color  - [place] appropriate cooling/warming therapies per order  - Administer medications as ordered  - Instruct and encourage patient and family to use good hand hygiene technique  - Identify and instruct in appropriate isolation precautions for identified infection/condition  Outcome: Progressing     Problem: SAFETY ADULT  Goal: Maintain or return to baseline ADL function  Description: INTERVENTIONS:  -  Assess patient's ability to carry out ADLs; assess patient's baseline for ADL function and identify physical deficits which impact ability to perform ADLs (bathing, care of mouth/teeth, toileting, grooming, dressing, etc.)  - Assess/evaluate cause of self-care deficits   - Assess range of motion  - Assess patient's mobility; develop plan if impaired  - Assess patient's need for assistive devices and provide as appropriate  - Encourage maximum independence but intervene and supervise when necessary  - Involve family in performance of ADLs  - Assess for home care  needs following discharge   - Consider OT consult to assist with ADL evaluation and planning for discharge  - Provide patient education as appropriate  Outcome: Progressing     Problem: DISCHARGE PLANNING  Goal: Discharge to home or other facility with appropriate resources  Description: INTERVENTIONS:  - Identify barriers to discharge w/patient and caregiver  - Arrange for needed discharge resources and transportation as appropriate  - Identify discharge learning needs (meds, wound care, etc.)  - Arrange for interpretive services to assist at discharge as needed  - Refer to Case Management Department for coordinating discharge planning if the patient needs post-hospital services based on physician/advanced practitioner order or complex needs related to functional status, cognitive ability, or social support system  Outcome: Progressing

## 2025-03-18 NOTE — PCC NURSING
3/18/25 Patient was admitted to [facility] following a right quadriceps muscle rupture with revision surgery performed.  Patient with a hard cast to the right lower extremity and is to maintain toe-touch weight bearing restriction to that limb.  Lungs are clear diminished on room air.  Patient wears own CPAP unit from home overnight as ordered for sleep apnea.  Patient is continent of bowel and bladder.  Every other day dressing changes to the right lower leg where cast was rubbing and to the left plantar foot.  Patient is now modified independent to the bathroom with a walker.      3/25/2025  A/O x 4, Pt. Is Mod I TTWB RLE with long leg cast using RW. Has decreased sensation to B/L lower ext which he has had prior, continues to have dressing changes every other day to RLE where previous cast was rubbing and wounds to left plantar foot that he had PTA. Continues to wear his CPAP from home.     3/31/25 Patient remains alert and oriented.  Right lower extremity with a leg cast in place at all times as ordered.  Patient is to follow up with ortho on Friday for further cast care.  Every other day dressing changes to the right posterior ankle and left plantar foot.  Patient remains modified independent to the bathroom with a walker.  Patient to maintain toe touch weight bearing status to the right lower extremity.      4/7/25 Patient remains alert and oriented.  Right lower extremity cast in place at all times.  Patient to maintain toe touch weight bearing restriction to the right lower extremity at all times.  Every other day dressing changes to the right posterior ankle.  Left plantar foot wound healed and left open to air.  Patient was modified independent to the bathroom overnight with walker in use.      4/15/25 Patient remains alert and oriented.  Right lower extremity cast in place at all times.  Patient to follow up with ortho this Friday.  Patient to maintain toe touch weight bearing restriction to the right lower  extremity at all times.  Daily dressing changes to the right heel wound.  Patient remains modified independent to the bathroom with walker in use.      4/21/25 Alert and oriented. Lungs clear on RA. HR regular. RLE cast was removed and immobilizer now in place at 10 degrees. R ankle area healed and INDY. L plantar foot wound healed and INDY. Continent of bowel and bladder. Pt is Mod I. LC    4/28/25 Alert and oriented. Lungs clear on RA. HR regular. Immobilizer in place to RLE. Skin is intact. Continent of bowel and bladder. Pt is mod I on unit.

## 2025-03-18 NOTE — TEAM CONFERENCE
Acute RehabilitationTeam Conference Note  Date: 3/18/2025   Time: 10:43 AM       Patient Name:  Tobin Kerns       Medical Record Number: [id number]   YOB: 1955  Sex: Male          Room/Bed:  [facility] 210/[facility] 210-01  Payor Info:  Payor: MEDICARE / Plan: MEDICARE A AND B / Product Type: Medicare A & B Fee for Service /      Admitting Diagnosis: Quadriceps muscle rupture [S76.119A]   Admit Date/Time:  3/11/2025  3:20 PM  Admission Comments: No comment available     Primary Diagnosis:  Quadriceps muscle rupture, right, subsequent encounter  Principal Problem: Quadriceps muscle rupture, right, subsequent encounter    Patient Active Problem List    Diagnosis Date Noted    BPH (benign prostatic hyperplasia) 03/12/2025    Impaired mobility and ADLs 03/12/2025    Wound of right ankle 03/12/2025    Quadriceps tendon rupture, right, sequela 03/07/2025    Obesity (BMI 35.0-39.9 without comorbidity) 03/04/2025    Onychomycosis 12/30/2024    Urinary retention 07/27/2024    Quadriceps muscle rupture, right, subsequent encounter 07/25/2024    Ambulatory dysfunction 07/23/2024    Abdominal aortic ectasia (HCC) 11/13/2023    Healed ulcer of left foot 02/23/2022    IFG (impaired fasting glucose) 10/01/2021    Hypertension 09/27/2021    Peripheral neuropathy 09/27/2021    Drusen (degenerative) of macula, bilateral 09/27/2021    Myopia, bilateral 09/27/2021    Pinguecula, bilateral 09/27/2021    Regular astigmatism, bilateral 09/27/2021    JOVANY on CPAP     History of colon polyps     Pure hypertriglyceridemia 08/19/2019    Mixed hyperlipidemia 05/30/2019    Gastro-esophageal reflux disease without esophagitis 05/30/2019    Presbyopia 10/11/2016       Physical Therapy:    Weight Bearing Status: Toe touch (TTWB R LE)  Transfers: Independent  Bed Mobility: Independent  Amulation Distance (ft): 160 feet  Ambulation: Independent, Supervision (S distances >50')  Assistive Device for Ambulation: Roller Walker  Wheelchair Mobility  Distance:  (N/A)  Wheelchair Mobility:  (N/A)  Number of Stairs: 6  Assistive Device for Stairs: Lehft Hand Rail  Stair Assistance: Supervision  Ramp: Supervision  Assistive Device for Ramp: Roller Walker  Discharge Recommendations: Assisted Living Facility    03/18/2025:  Patient being followed by PT, making good progress.  Presents, following R quadriceps muscle rupture, now TTWB R LE in cast, with decreased ROM/strength, decreased balance and safety, decreased endurance, and pain.   Patient  mod I bed mobility, mod I transfers with RW, mod I short distances, S >50' ambulation with RW, S negotiation of 6 steps with L handrail.  Patient would benefit from continued inpatient [facility] PT to increase function, safety, and increased independence in prep for safe d/c to home.      Occupational Therapy:  Eating: Independent  Grooming: Independent  Bathing: Independent, Supervision  Bathing: Independent, Supervision  Upper Body Dressing: Independent  Lower Body Dressing: Independent, Supervision  Toileting: Independent  Tub/Shower Transfer:  (N/T due to full leg cast)  Toilet Transfer: Independent  Cognition: Within Defined Limits  Orientation: Person, Situation, Place, Time  Discharge Recommendations: Home with:  DC Home with:: First Floor Setup, Home Occupational Therapy (Pt requesting [facility] or SNF after d/c from [facility] due to home s/u)       03/17/25 Pt participating in Adls/iADLs, safety with functional txfs and mobility, TE/TA for generalized strength and endurance. Pts LOF noted above. Pt is progressing toward OT goals. Pt's barriers to d/c include decreased strength throughout but especially RLE s/p quadricepts rupture, decreased ROM, decreased balance TTWB RLE. Pt would benefit from continued skilled OT services in order to address listed barriers and prepare for safe d/c.     Speech Therapy:           No notes on file    Nursing Notes:  Appetite: Good  Diet Type: Regular/House                      Diet Patient/Family  Education Complete: Yes    Type of Wound (LDA): Wound (black callus area bottom of foot)                    Type of Wound Patient/Family Education: Yes  Bladder: 6 - Modified [place]     Bladder Patient/Family Education: Yes  Bowel: 6 - Modified [place]     Bowel Patient/Family Education: Yes  Pain Location/Orientation: Orientation: Bilateral, Location: Shoulder  Pain Score: 0                       Hospital Pain Intervention(s): Medication (See MAR), Relaxation technique  Pain Patient/Family Education: Yes  Medication Management/Safety  Safe Administration: Yes  Medication Patient/Family Education Complete: Yes    3/18/25 Patient was admitted to [facility] following a right quadriceps muscle rupture with revision surgery performed.  Patient with a hard cast to the right lower extremity and is to maintain toe-touch weight bearing restriction to that limb.  Lungs are clear diminished on room air.  Patient wears own CPAP unit from home overnight as ordered for sleep apnea.  Patient is continent of bowel and bladder.  Every other day dressing changes to the right lower leg where cast was rubbing and to the left plantar foot.  Patient is now modified independent to the bathroom with a walker.      Case Management:     Discharge Planning  Living Arrangements: Lives Alone  Support Systems: Self  Assistance Needed: unknown  Type of Current Residence: Private residence  Current Home Care Services: No  Patient admitted to [facility] on3/11/25 after inpatient hospital stay for right knee revision quad tendon repair on 3/6/25. Patient lives alone in a mobile home, 4 [address]RIST[address],. Patient independent PTA, driving. Original injury is from July 2024, had repair with a knee immobilizer. Did not work. Revised . Workmen's comp. Patient at Mod I level, looking into alternate [facility]. Workmen's comp    Is the patient actively participating in therapies? yes  List any modifications to the treatment plan: na    Barriers  Interventions   Revision of quad tendon tear Medical management and oversight   Pressure injury from cast Wound care following, local care   Decreased ROM and strength Therapeutic exercise, therapeutic activity   TTWB right knee Cues, knee immobilizer, ADL, transfer, gait training   Decreased balance Therapeutic exercise, therapeutic activity     Is the patient making expected progress toward goals? yes  List any update or changes to goals: na    Medical Goals: Patient will be able to manage medical conditions and comorbid conditions with medications and follow up upon completion of rehab program    Weekly Team Goals:   Rehab Team Goals  ADL Team Goal: Patient will be independent with ADLs with least restrictive device upon completion of rehab program  Bowel/Bladder Team Goal: Patient will return to premorbid level for bladder/bowel management upon completion of rehab program  Transfer Team Goal: Patient will be independent with transfers with least restrictive device upon completion of rehab program  Locomotion Team Goal: Patient will be independent with locomotion with least restrictive device upon completion of rehab program    Mod I bed mobility, transfers, and mobility  Mod I self care    Health and wellness: to be able to return home and complete homemaking tasks    Discussion: Plan for return home vs DC to assistive living with [facility] for PT, OT, and nursing     Anticipated Discharge Date:  March 25, 2025  [facility] Team Members Present:  The following team members are supervising care for this patient and were present during this Weekly Team Conference.    MD Onelia Vasquez, RN  Maren Schoenberger, RN and LA Costello, PT  Gisell Mejía OTR/L and Lilian Fitch OTR/L

## 2025-03-19 ENCOUNTER — APPOINTMENT (OUTPATIENT)
Dept: PHYSICAL THERAPY | Facility: CLINIC | Age: 70
End: 2025-03-19
Payer: OTHER MISCELLANEOUS

## 2025-03-19 PROCEDURE — 99232 SBSQ HOSP IP/OBS MODERATE 35: CPT | Performed by: PHYSICAL MEDICINE & REHABILITATION

## 2025-03-19 PROCEDURE — 97535 SELF CARE MNGMENT TRAINING: CPT

## 2025-03-19 PROCEDURE — 97530 THERAPEUTIC ACTIVITIES: CPT

## 2025-03-19 PROCEDURE — 97116 GAIT TRAINING THERAPY: CPT

## 2025-03-19 PROCEDURE — 97110 THERAPEUTIC EXERCISES: CPT

## 2025-03-19 RX ADMIN — B-COMPLEX W/ C & FOLIC ACID TAB 1 TABLET: TAB at 08:52

## 2025-03-19 RX ADMIN — FAMOTIDINE 20 MG: 20 TABLET, FILM COATED ORAL at 08:52

## 2025-03-19 RX ADMIN — LOSARTAN POTASSIUM 50 MG: 50 TABLET, FILM COATED ORAL at 08:52

## 2025-03-19 RX ADMIN — PRAVASTATIN SODIUM 40 MG: 40 TABLET ORAL at 16:08

## 2025-03-19 RX ADMIN — OXYCODONE HYDROCHLORIDE AND ACETAMINOPHEN 500 MG: 500 TABLET ORAL at 08:52

## 2025-03-19 RX ADMIN — METFORMIN ER 500 MG 1500 MG: 500 TABLET ORAL at 16:08

## 2025-03-19 RX ADMIN — ENOXAPARIN SODIUM 40 MG: 40 INJECTION SUBCUTANEOUS at 08:52

## 2025-03-19 RX ADMIN — CHOLECALCIFEROL TAB 25 MCG (1000 UNIT) 5000 UNITS: 25 TAB at 08:51

## 2025-03-19 RX ADMIN — TAMSULOSIN HYDROCHLORIDE 0.4 MG: 0.4 CAPSULE ORAL at 16:08

## 2025-03-19 NOTE — ASSESSMENT & PLAN NOTE
Patient was evaluated by the rehabilitation team MD and an appropriate candidate for acute inpatient rehabilitation program at this time.  The patient will tolerate 3 hours/day 5 to 7 days/week of intensive physical and  occupational therapy   in order to obtain goals for community discharge  Due to the patient's functional Compared to their baseline level of function in addition to their ongoing medical needs, the patient would benefit from daily supervision from a rehabilitation physician as well as rehabilitation nursing to implement and adjust the medical as well as functional plan of care in order to meet the patient's goals.  Bladder: Monitor closely for urinary incontinence and/or retention. Monitor urine output and encourage spontaneous voids. If unable to void spontaneously, will monitor with PVR bladder scans and initiate CIC regimen.  Skin: Monitor for breakdown, frequent turns, pressure offloading  Sleep: Encourage sleep hygiene (routine that promotes healthy circadian rhythm,avoid caffeine later in the day, quiet/dark room at night, reduce electronic before bedtime)       [place]

## 2025-03-19 NOTE — PROGRESS NOTES
"Progress Note - Tobin Kerns 69 y.o. male MRN: [id number]    Unit/Bed#: -01 Encounter: [id number]        Subjective:   Patient seen and examined at bedside after reviewing the chart and discussing the case with the caring staff.      Patient examined at bedside.  Patient has no acute symptoms.    Physical Exam   Vitals: Blood pressure 130/59, pulse 83, temperature 97.9 °F (36.6 °C), temperature source Temporal, resp. rate 22, height 6' 5\" (1.956 m), weight (!) 137 kg (300 lb 14.9 oz), SpO2 94%.,Body mass index is 35.68 kg/m².  Constitutional: Patient in no acute distress.  HEENT: PERR, EOMI, MMM.  Cardiovascular: Normal rate and regular rhythm.    Pulmonary/Chest: Effort normal and breath sounds normal.   Abdomen: Soft, + BS, NT.    Assessment/Plan:  Tobin Kerns is a(n) 69 y.o. year old male who is s/p quadriceps tendon repair.     Cardiac with hx of HTN, HLD.  Patient is on losartan 50 mg daily, pravastatin 40 mg daily.  Impaired fasting glucose.  Hgb a1c 5.4% on 3/12/25.  Continue metformin XR 1500 mg daily with dinner.  Regular diet and d/c accucheks as blood sugar is well controlled.    BPH.  Continue Flomax 0.4 mg daily.  GERD.  Continue Pepcid 20 mg daily.  JOVANY.  On CPAP at bedtime.   Vitamin D deficiency.  Patient is on vitamin D3 5000 units daily.  Pain and bowel management.  As per physiatrist.  Quadriceps tendon repair.  Toe touch weightbearing RLE with use of walker.  Patient is receiving intensive PT OT as per physiatrist.    Anticipated date of discharge.  3/25/25    The patient was discussed with Dr. Brito and he is in agreement with the above note.  "

## 2025-03-19 NOTE — ASSESSMENT & PLAN NOTE
Skin breakdown on R heel due to cast. Ortho removed approximately 1 inch of cast at posterior aspect on 3/11  Local wound care  Continue to monitor   Present on admission to [facility]   Wound care RN consulted:  Skin Care orders:  1-Hydraguard to sacrum, buttocks bid and prn   2-EHOB / waffle  cushion when out of bed in chair.  3-Moisturize skin daily with skin nourishing cream  4-Elevate heels to offload pressure.  5-Turn/reposition q2h for pressure re-distribution on skin  6. Right posterior heel , right medial ankle and right lateral ankle - cleanse with Normal saline then apply xeroform then top with the silicone foam nate with a T and date change every other day as needed for soilage or dislodgement   7. Left plantar aspect of the foot - apply betadine paint then a AND and olga wrap change every other day

## 2025-03-19 NOTE — PROGRESS NOTES
03/19/25 0700   Pain Assessment   Pain Assessment Tool 0-10   Pain Score No Pain   Restrictions/Precautions   Precautions Fall Risk;Limb alert;Pain   RLE Weight Bearing Per Order TTWB   ROM Restrictions No   Braces or Orthoses Other (Comment)  (R LE cast)   Cognition   Overall Cognitive Status WFL   Arousal/Participation Alert;Responsive;Cooperative   Attention Within functional limits   Orientation Level Oriented X4   Memory Within functional limits   Following Commands Follows all commands and directions without difficulty   Subjective   Subjective Pt reports Friday is going to be a doozy   Roll Left and Right   Type of Assistance Needed Independent   Physical Assistance Level No physical assistance   Roll Left and Right CARE Score 6   Sit to Lying   Type of Assistance Needed Independent   Physical Assistance Level No physical assistance   Sit to Lying CARE Score 6   Lying to Sitting on Side of Bed   Type of Assistance Needed Independent   Physical Assistance Level No physical assistance   Lying to Sitting on Side of Bed CARE Score 6   Sit to Stand   Type of Assistance Needed Independent   Physical Assistance Level No physical assistance   Comment RW   Sit to Stand CARE Score 6   Bed-Chair Transfer   Type of Assistance Needed Independent   Physical Assistance Level No physical assistance   Comment RW   Chair/Bed-to-Chair Transfer CARE Score 6   Car Transfer   Reason if not Attempted Environmental limitations   Car Transfer CARE Score 10   Walk 10 Feet   Type of Assistance Needed Independent   Physical Assistance Level No physical assistance   Comment RW   Walk 10 Feet CARE Score 6   Walk 50 Feet with Two Turns   Type of Assistance Needed Independent   Physical Assistance Level No physical assistance   Comment RW   Walk 50 Feet with Two Turns CARE Score 6   Walk 150 Feet   Type of Assistance Needed Independent   Physical Assistance Level No physical assistance   Comment RW   Walk 150 Feet CARE Score 6   Walking  10 Feet on Uneven Surfaces   Type of Assistance Needed Independent   Physical Assistance Level No physical assistance   Comment RW, green foam   Walking 10 Feet on Uneven Surfaces CARE Score 6   Ambulation   Primary Mode of Locomotion Prior to Admission Walk   Distance Walked (feet) 180 ft  (180', 150', household distances in PT gym)   Assist Device Roller Walker   Does the patient walk? 2. Yes   Wheel 50 Feet with Two Turns   Reason if not Attempted Activity not applicable   Wheel 50 Feet with Two Turns CARE Score 9   Wheel 150 Feet   Reason if not Attempted Activity not applicable   Wheel 150 Feet CARE Score 9   Wheelchair mobility   Findings N/A   Does the patient use a wheelchair? 0. No   Curb or Single Stair   Style negotiated Single stair   Type of Assistance Needed Set-up / clean-up   Physical Assistance Level No physical assistance   Comment place RW at bottom/top of  stairs, up/down 6  steps, L HR, non-reciprocal, forward ascending, backward descending; maintains TTWB R LE   1 Step (Curb) CARE Score 5   4 Steps   Type of Assistance Needed Set-up / clean-up   Physical Assistance Level No physical assistance   Comment place RW at bottom/top of  stairs, up/down 6  steps, L HR, non-reciprocal, forward ascending, backward descending; maintains TTWB R LE   4 Steps CARE Score 5   12 Steps   Reason if not Attempted Activity not applicable   12 Steps CARE Score 9   Stairs   Type Ramp   Weight Bearing Precautions TTWB;RLE   Assist Devices Roller Walker   Findings mod I RW   Picking Up Object   Type of Assistance Needed Independent;Adaptive equipment   Physical Assistance Level No physical assistance   Comment pants from floor   Picking Up Object CARE Score 6   Toilet Transfer   Type of Assistance Needed Independent   Physical Assistance Level No physical assistance   Comment grab bar   Toilet Transfer CARE Score 6   Therapeutic Interventions   Strengthening seated and standing LE TE    Other gait training, stair training, ramp negotiation   Equipment Use   NuStep L5, 15 min, B/L UE, L LE   Assessment   Treatment Assessment Pt agreeable to PT this AM, received supine in bed. Continued to challenge LE strength/endurance with NuStep, standing/sitting LE TE with good tolerance. Pt required one VC to maintain TTWB with standing at beginning of session, but able to maintain well for the rest of session. He remains limited by TTWB R LE, but has met most PT goals. Will continue with current PT POC to improve deficits and promote return to PLOF.   Problem List Decreased strength;Decreased range of motion;Impaired balance;Decreased mobility;Pain;Orthopedic restrictions   PT Barriers   Physical Impairment Decreased strength;Decreased range of motion;Decreased endurance;Impaired balance;Decreased mobility;Impaired sensation;Obesity;Decreased skin integrity;Orthopedic restrictions;Pain   Functional Limitation Walking;Transfers;Standing;Stair negotiation;Ramp negotiation;Car transfers   Plan   Treatment/Interventions Functional transfer training;LE strengthening/ROM;Therapeutic exercise;Endurance training;Patient/family training;Equipment eval/education;Bed mobility;Gait training   Progress Progressing toward goals   PT Therapy Minutes   PT Time In 0700   PT Time Out 0830   PT Total Time (minutes) 90   PT Mode of treatment - Individual (minutes) 90   PT Mode of treatment - Concurrent (minutes) 0   PT Mode of treatment - Group (minutes) 0   PT Mode of treatment - Co-treat (minutes) 0   PT Mode of Treatment - Total time(minutes) 90 minutes   PT Cumulative Minutes 660     Patient remains supine in bed, all needs in reach. Encouraged use of call bell, patient verbalizes understanding.

## 2025-03-19 NOTE — PROGRESS NOTES
"Progress Note - PMR   Name: Tobin Kerns 69 y.o. male I MRN: [id number]  Unit/Bed#: -01 I Date of Admission: 3/11/2025   Date of Service: 3/19/2025 I Hospital Day: 8     Assessment & Plan  Quadriceps muscle rupture, right, subsequent encounter  HPI:   He initially underwent quad tendon repair on 7/25/24 with Dr. Foster. He was discharged home with outpatient PT.  Later noted to have recurrent high-grade tear with extensor lag on exam and elected for operative management  January 2025 MRI: \"Prior quadriceps insertion repair with high-grade recurrent tear exhibiting up to 2.2 cm of tendon retraction.\"  On 3/6/25, he underwent revision of quad tendon repair with allograft augmentation    Plan:   PT and OT for 3 hours a day, 5-6 days a week   TTWB RLE   Avoid moisture to cast   Pain: PRN Tylenol (monitor LFTs) and oxycodone discontinued on 3/17 due to minimal pain   Lovenox for DVT prophylaxis while inpatient   Hypertension  Continue losartan 50 mg daily   Monitor BP   Mixed hyperlipidemia  Continue pravastatin 40 mg daily   Gastro-esophageal reflux disease without esophagitis  Continue famotidine 20 mg daily   Peripheral neuropathy  Patient is not diabetic   Monitor skin for new or worsening wounds   JOVANY on CPAP  Continue CPAP qHS  IFG (impaired fasting glucose)  Continue metformin 1500 mg daily   Seen by RD 3/12, diet adjusted to regular   3/12 A1c: 5.4   Healed ulcer of left foot  Local wound care  Present on admission   Wound care RN consulted   BPH (benign prostatic hyperplasia)  Continue tamsulosin 0.4 mg daily   Patient currently voiding without difficulty   PRN PVRs if urinary retention is suspected   Impaired mobility and ADLs  Patient was evaluated by the rehabilitation team MD and an appropriate candidate for acute inpatient rehabilitation program at this time.  The patient will tolerate 3 hours/day 5 to 7 days/week of intensive physical and  occupational therapy   in order to obtain goals for " community discharge  Due to the patient's functional Compared to their baseline level of function in addition to their ongoing medical needs, the patient would benefit from daily supervision from a rehabilitation physician as well as rehabilitation nursing to implement and adjust the medical as well as functional plan of care in order to meet the patient's goals.  Bladder: Monitor closely for urinary incontinence and/or retention. Monitor urine output and encourage spontaneous voids. If unable to void spontaneously, will monitor with PVR bladder scans and initiate CIC regimen.  Skin: Monitor for breakdown, frequent turns, pressure offloading  Sleep: Encourage sleep hygiene (routine that promotes healthy circadian rhythm,avoid caffeine later in the day, quiet/dark room at night, reduce electronic before bedtime)      Wound of right ankle  Skin breakdown on R heel due to cast. Ortho removed approximately 1 inch of cast at posterior aspect on 3/11  Local wound care  Continue to monitor   Present on admission to [facility]   Wound care RN consulted:  Skin Care orders:  1-Hydraguard to sacrum, buttocks bid and prn   2-EHOB / waffle  cushion when out of bed in chair.  3-Moisturize skin daily with skin nourishing cream  4-Elevate heels to offload pressure.  5-Turn/reposition q2h for pressure re-distribution on skin  6. Right posterior heel , right medial ankle and right lateral ankle - cleanse with Normal saline then apply xeroform then top with the silicone foam nate with a T and date change every other day as needed for soilage or dislodgement   7. Left plantar aspect of the foot - apply betadine paint then a AND and olga wrap change every other day      Subjective   Tobin Kerns is a 69 year-old male, with a past medical history of hypertension, hyperlipidemia, JOVANY, impaired fasting glucose, bilateral lower extremity neuropathy, GERD, BPH, presenting to [facility] after an elective right quadricept tendon repair. He initially  underwent quad tendon repair on 7/25/24 with Dr. Foster. He was discharged home with outpatient PT. He was noted to have recurrent high-grade tear on repeat MRI with extensor lag on exam and elected for operative management. On 3/6/25, he underwent revision of quad tendon repair with allograft augmentation. He was evaluated by PT and OT and deemed an appropriate candidate for [facility] due to functional deficits.        Chief Complaint: f/u ambulatory dysfunction    Interval:   Seen and evaluated patient in room. He denies pain or any concerns. Last BM 3/17  Discussion: Plan for return home vs DC to assistive living with [facility] for PT, OT, and nursing. Anticipated Discharge Date:  March 25, 2025    Objective :  Temp:  [97.8 °F (36.6 °C)-97.9 °F (36.6 °C)] 97.9 °F (36.6 °C)  HR:  [67-83] 83  BP: (115-130)/(59-64) 130/59  Resp:  [18-22] 22  SpO2:  [94 %-97 %] 94 %  O2 Device: None (Room air)    Functional Update:  Physical Therapy Occupational Therapy   Weight Bearing Status: Toe touch (TTWB R LE)  Transfers: Independent  Bed Mobility: Independent  Amulation Distance (ft): 160 feet  Ambulation: Independent, Supervision (S distances >50')  Assistive Device for Ambulation: Roller Walker  Wheelchair Mobility Distance:  (N/A)  Wheelchair Mobility:  (N/A)  Number of Stairs: 6  Assistive Device for Stairs: Lehft Hand Rail  Stair Assistance: Supervision  Ramp: Supervision  Assistive Device for Ramp: Roller Walker  Discharge Recommendations: Assisted Living Facility   Eating: Independent  Grooming: Independent  Bathing: Independent, Supervision  Bathing: Independent, Supervision  Upper Body Dressing: Independent  Lower Body Dressing: Independent, Supervision  Toileting: Independent  Tub/Shower Transfer:  (N/T due to full leg cast)  Toilet Transfer: Independent  Cognition: Within Defined Limits  Orientation: Person, Situation, Place, Time         Physical Exam  Vitals and nursing note reviewed.   Constitutional:       General: He is  not in acute distress.     Appearance: He is obese. He is not ill-appearing or diaphoretic.   HENT:      Head: Normocephalic and atraumatic.      Nose: Nose normal.      Mouth/Throat:      Mouth: Mucous membranes are moist.   Cardiovascular:      Pulses:           Dorsalis pedis pulses are 2+ on the right side and 2+ on the left side.   Pulmonary:      Effort: Pulmonary effort is normal. No respiratory distress.   Abdominal:      General: There is no distension.   Skin:     General: Skin is warm and dry.   Neurological:      General: No focal deficit present.      Mental Status: He is alert.   Psychiatric:         Mood and Affect: Mood normal.         Behavior: Behavior normal.           Scheduled Meds:  Current Facility-Administered Medications   Medication Dose Route Frequency Provider Last Rate    acetaminophen  650 mg Oral Q6H PRN Modesta Lee PA-C      ascorbic acid  500 mg Oral Daily Toño Brito MD      Cholecalciferol  5,000 Units Oral Daily Toño Brito MD      enoxaparin  40 mg Subcutaneous Q24H [facility] Toño Brito MD      famotidine  20 mg Oral Daily Toño Brito MD      losartan  50 mg Oral Daily Toño Brito MD      metFORMIN  1,500 mg Oral Daily With Dinner Toño Brito MD      multivitamin stress formula  1 tablet Oral Daily Toño Brito MD      polyethylene glycol  17 g Oral Daily PRN Modesta Lee PA-C      pravastatin  40 mg Oral Daily With Dinner Toño Brito MD      tamsulosin  0.4 mg Oral Daily With Dinner Toño Brito MD           Lab Results: I have reviewed the following results:                 Modesta Lee PA-C  Physical Medicine and Rehabilitation  [facility]

## 2025-03-19 NOTE — NUTRITION
03/19/25 1259   Biochemical Data,Medical Tests, and Procedures   Biochemical Data/Medical Tests/Procedures Meds reviewed;Lab values reviewed   Labs (Comment) 3/12 AST:12, CBC WNL   Meds (Comment) Vit C, Vit D, lovenox, pepcid, cozaar, metformin, MVI, pravachol   Nutrition-Focused Physical Exam   Nutrition-Focused Physical Exam Findings RN skin assessment reviewed  (Wound right ankle, wound right knee, wound left plantar, pressure injury right ankle per documentation)   Medical-Related Concerns PMH reviewed   Adequacy of Intake   Nutrition Modality PO   Feeding Route   PO Independent   Current PO Intake   Current Diet Order Regular diet thin liquids   Current Meal Intake %   Estimated calorie intake compared to estimated need Nutrient needs met.   PES Statement   Problem Continue previous diagnosis   Recommendations/Interventions   Malnutrition/BMI Present No  (does not meet criteria)   Summary Regular diet thin liquids. Meal completions %. No nutrition supplements. 3/19 300#, BMI=35.68; 13#(4%) loss from admission 3/11 313#. Missing teeth. Medications reviewed. Trace right foot edema. Skin integrity reviewed. Reports his appetite is okay. No issues with meals reported.   Interventions/Recommendations Continue current diet order   Education Assessment   Education Education not indicated at this time   Patient Nutrition Goals   Goal Adequate hydration;Adequate intake;Improve skin integrity   Goal Status Met;Extended   Timeframe to complete goal by next f/u   Nutrition Complexity Risk   Nutrition complexity level Low risk   Follow up date 03/26/25

## 2025-03-19 NOTE — PROGRESS NOTES
03/19/25 1030   Pain Assessment   Pain Assessment Tool 0-10   Pain Score No Pain   Restrictions/Precautions   Precautions Fall Risk;Pain   RLE Weight Bearing Per Order TTWB   Oral Hygiene   Type of Assistance Needed Independent   Physical Assistance Level No physical assistance   Oral Hygiene CARE Score 6   Grooming   Able To Initiate Tasks;Acquire Items;Comb/Brush Hair;Wash/Dry Face;Brush/Clean Teeth;Wash/Dry Hands   Findings stance at sink   Shower/Bathe Self   Type of Assistance Needed Independent;Adaptive equipment   Physical Assistance Level No physical assistance   Shower/Bathe Self CARE Score 6   Bathing   Assessed Bath Style Sponge Bath   Able to Wash/Rinse/Dry (body part) Left Arm;Right Arm;Buttocks;Perineal Area;Abdomen;Chest;L Lower Leg/Foot;R Lower Leg/Foot;L Upper Leg   Limitations Noted in Balance;ROM;Strength   Positioning Seated;Standing   Adaptive Equipment Longhand Sponge   Upper Body Dressing   Type of Assistance Needed Independent   Physical Assistance Level No physical assistance   Upper Body Dressing CARE Score 6   Lower Body Dressing   Type of Assistance Needed Independent;Adaptive equipment   Physical Assistance Level No physical assistance   Lower Body Dressing CARE Score 6   Putting On/Taking Off Footwear   Type of Assistance Needed Independent;Adaptive equipment   Physical Assistance Level No physical assistance   Putting On/Taking Off Footwear CARE Score 6   Picking Up Object   Type of Assistance Needed Independent;Adaptive equipment   Physical Assistance Level No physical assistance   Picking Up Object CARE Score 6   Dressing/Undressing Clothing   Able to  Obtain Clothing;Store removed clothing   Remove UB Clothes Button Shirt   Don UB Clothes Button Shirt   Remove LB Clothes Shorts;Undergarment;Socks   Don LB Clothes Socks;Undergarment;Shorts   Limitations Noted In Balance;Strength;ROM   Adaptive Equipment Reacher;Dressing Stick;Elastic Laces   Positioning Supported Sit;Standing    Sit to Stand   Type of Assistance Needed Independent   Physical Assistance Level No physical assistance   Sit to Stand CARE Score 6   Toileting Hygiene   Type of Assistance Needed Independent   Physical Assistance Level No physical assistance   Toileting Hygiene CARE Score 6   Toileting   Able to Pull Clothing down yes, up yes.   Manage Hygiene Bladder   Toilet Transfer   Type of Assistance Needed Independent;Adaptive equipment   Physical Assistance Level No physical assistance   Toilet Transfer CARE Score 6   Toilet Transfer   Surface Assessed Standard Toilet   Transfer Technique Standard   Limitations Noted In Balance;LE Strength;ROM   Adaptive Equipment Grab Bar;Walker   Light Housekeeping   Light Housekeeping Level Walker   Light Housekeeping Level of Assistance Modified independent   Light Housekeeping s/u and c/u of items   Exercise Tools   Hand Gripper heavy resistance gripper 2 sets x 40 reps   Other Exercise Tool 1 red t bar 2x15 upward then downward   Other Exercise Tool 2 5# dowel 2x15 in 6 planes of motion   Cognition   Overall Cognitive Status WFL   Arousal/Participation Alert;Cooperative   Attention Within functional limits   Orientation Level Oriented X4   Memory Within functional limits   Following Commands Follows all commands and directions without difficulty   Additional Activities   Additional Activities Comments functional mobility RW TTWB R LE MI in room and in hallway   Activity Tolerance   Activity Tolerance Patient tolerated treatment well   Assessment   Treatment Assessment OT session focused on assessment of consistency with  ADL via sponge bathe level. Pt demon consistent recall of AE, compensatory strategies and compliance with safe techniques. Details listed in respective sections of note. Pt tolerated session well. Pt maintained TTWB during txfs and mobility. Pt then completed TE for generalized strength and endurance.  Pt is a motivated participant in therapy and reports would  prefer to d/c to another facility after rehab until his cast is removed.  Pt's barriers to d/c include decreased strength throughout but especially RLE s/p quadricepts rupture, decreased ROM, decreased balance, and TTWB RLE ; all affect independence in self care and fxl transfers. Pt would benefit from continued skilled OT services in order to address listed barriers and prepare for safe d/c.   Prognosis Good   Problem List Decreased strength;Decreased range of motion;Impaired balance;Decreased mobility;Pain;Orthopedic restrictions;Decreased skin integrity   Assessment pt participated in 30 minutes of concurrent tx addressing similar goals with a pt with similar deficits.   Plan   Treatment/Interventions ADL retraining;Functional transfer training;Therapeutic exercise;Endurance training;Patient/family training;Equipment eval/education;Gait training;Compensatory technique education;Spoke to nursing;OT   Progress Progressing toward goals   OT Therapy Minutes   OT Time In 1030   OT Time Out 1209   OT Total Time (minutes) 99   OT Mode of treatment - Individual (minutes) 69   OT Mode of treatment - Concurrent (minutes) 30   OT Mode of treatment - Group (minutes) 0   OT Mode of treatment - Co-treat (minutes) 0   OT Mode of Treatment - Total time(minutes) 99 minutes   OT Cumulative Minutes 399   Therapy Time missed   Time missed? No

## 2025-03-20 PROCEDURE — 97116 GAIT TRAINING THERAPY: CPT

## 2025-03-20 PROCEDURE — 99232 SBSQ HOSP IP/OBS MODERATE 35: CPT

## 2025-03-20 PROCEDURE — 97535 SELF CARE MNGMENT TRAINING: CPT

## 2025-03-20 PROCEDURE — 97530 THERAPEUTIC ACTIVITIES: CPT

## 2025-03-20 PROCEDURE — 97110 THERAPEUTIC EXERCISES: CPT

## 2025-03-20 RX ADMIN — TAMSULOSIN HYDROCHLORIDE 0.4 MG: 0.4 CAPSULE ORAL at 17:09

## 2025-03-20 RX ADMIN — ACETAMINOPHEN 650 MG: 325 TABLET ORAL at 05:04

## 2025-03-20 RX ADMIN — ENOXAPARIN SODIUM 40 MG: 40 INJECTION SUBCUTANEOUS at 08:58

## 2025-03-20 RX ADMIN — PRAVASTATIN SODIUM 40 MG: 40 TABLET ORAL at 17:09

## 2025-03-20 RX ADMIN — B-COMPLEX W/ C & FOLIC ACID TAB 1 TABLET: TAB at 08:58

## 2025-03-20 RX ADMIN — LOSARTAN POTASSIUM 50 MG: 50 TABLET, FILM COATED ORAL at 08:58

## 2025-03-20 RX ADMIN — OXYCODONE HYDROCHLORIDE AND ACETAMINOPHEN 500 MG: 500 TABLET ORAL at 08:58

## 2025-03-20 RX ADMIN — CHOLECALCIFEROL TAB 25 MCG (1000 UNIT) 5000 UNITS: 25 TAB at 08:58

## 2025-03-20 RX ADMIN — METFORMIN ER 500 MG 1500 MG: 500 TABLET ORAL at 17:09

## 2025-03-20 RX ADMIN — FAMOTIDINE 20 MG: 20 TABLET, FILM COATED ORAL at 08:58

## 2025-03-20 NOTE — PROGRESS NOTES
"Progress Note - PMR   Name: Tobin Kerns 69 y.o. male I MRN: [id number]  Unit/Bed#: -01 I Date of Admission: 3/11/2025   Date of Service: 3/20/2025 I Hospital Day: 9     Assessment & Plan  Quadriceps muscle rupture, right, subsequent encounter  HPI:   He initially underwent quad tendon repair on 7/25/24 with Dr. Foster. He was discharged home with outpatient PT.  Later noted to have recurrent high-grade tear with extensor lag on exam and elected for operative management  January 2025 MRI: \"Prior quadriceps insertion repair with high-grade recurrent tear exhibiting up to 2.2 cm of tendon retraction.\"  On 3/6/25, he underwent revision of quad tendon repair with allograft augmentation    Plan:   PT and OT for 3 hours a day, 5-6 days a week   TTWB RLE   Avoid moisture to cast   Pain: PRN Tylenol (monitor LFTs) and oxycodone discontinued on 3/17 due to minimal pain   Lovenox for DVT prophylaxis while inpatient   Hypertension  Continue losartan 50 mg daily   Monitor BP   Mixed hyperlipidemia  Continue pravastatin 40 mg daily   Gastro-esophageal reflux disease without esophagitis  Continue famotidine 20 mg daily   Peripheral neuropathy  Patient is not diabetic   Monitor skin for new or worsening wounds   JOVANY on CPAP  Continue CPAP qHS  IFG (impaired fasting glucose)  Continue metformin 1500 mg daily   Seen by RD 3/12, diet adjusted to regular   3/12 A1c: 5.4   Healed ulcer of left foot  Local wound care  Present on admission   Wound care RN consulted   BPH (benign prostatic hyperplasia)  Continue tamsulosin 0.4 mg daily   Patient currently voiding without difficulty   PRN PVRs if urinary retention is suspected   Impaired mobility and ADLs  Patient was evaluated by the rehabilitation team MD and an appropriate candidate for acute inpatient rehabilitation program at this time.  The patient will tolerate 3 hours/day 5 to 7 days/week of intensive physical and  occupational therapy   in order to obtain goals for " community discharge  Due to the patient's functional Compared to their baseline level of function in addition to their ongoing medical needs, the patient would benefit from daily supervision from a rehabilitation physician as well as rehabilitation nursing to implement and adjust the medical as well as functional plan of care in order to meet the patient's goals.  Bladder: Monitor closely for urinary incontinence and/or retention. Monitor urine output and encourage spontaneous voids. If unable to void spontaneously, will monitor with PVR bladder scans and initiate CIC regimen.  Skin: Monitor for breakdown, frequent turns, pressure offloading  Sleep: Encourage sleep hygiene (routine that promotes healthy circadian rhythm,avoid caffeine later in the day, quiet/dark room at night, reduce electronic before bedtime)      Wound of right ankle  Skin breakdown on R heel due to cast. Ortho removed approximately 1 inch of cast at posterior aspect on 3/11  Local wound care  Continue to monitor   Present on admission to [facility]   Wound care RN consulted:  Skin Care orders:  1-Hydraguard to sacrum, buttocks bid and prn   2-EHOB / waffle  cushion when out of bed in chair.  3-Moisturize skin daily with skin nourishing cream  4-Elevate heels to offload pressure.  5-Turn/reposition q2h for pressure re-distribution on skin  6. Right posterior heel , right medial ankle and right lateral ankle - cleanse with Normal saline then apply xeroform then top with the silicone foam nate with a T and date change every other day as needed for soilage or dislodgement   7. Left plantar aspect of the foot - apply betadine paint then a AND and olga wrap change every other day      Subjective   Tobin Kerns is a 69 year-old male, with a past medical history of hypertension, hyperlipidemia, JOVANY, impaired fasting glucose, bilateral lower extremity neuropathy, GERD, BPH, presenting to [facility] after an elective right quadricept tendon repair. He initially  underwent quad tendon repair on 7/25/24 with Dr. Foster. He was discharged home with outpatient PT. He was noted to have recurrent high-grade tear on repeat MRI with extensor lag on exam and elected for operative management. On 3/6/25, he underwent revision of quad tendon repair with allograft augmentation. He was evaluated by PT and OT and deemed an appropriate candidate for ARC due to functional deficits.     Chief Complaint: f/u ambulatory dysfunction    Interval:   Seen and evaluated patient in room. He denies pain or any concerns. He has an appointment with orthopedics tomorrow. Last BM 3/20  Discussion: Plan for return home vs DC to assistive living with [facility] for PT, OT, and nursing. Anticipated Discharge Date:  March 25, 2025    Objective :  Temp:  [97.2 °F (36.2 °C)-97.7 °F (36.5 °C)] 97.2 °F (36.2 °C)  HR:  [53-65] 53  BP: (126-128)/(70) 126/70  Resp:  [16-17] 17  SpO2:  [96 %] 96 %  O2 Device: None (Room air)    Functional Update:  Physical Therapy Occupational Therapy   Weight Bearing Status: Toe touch (TTWB R LE)  Transfers: Independent  Bed Mobility: Independent  Amulation Distance (ft): 160 feet  Ambulation: Independent, Supervision (S distances >50')  Assistive Device for Ambulation: Roller Walker  Wheelchair Mobility Distance:  (N/A)  Wheelchair Mobility:  (N/A)  Number of Stairs: 6  Assistive Device for Stairs: Lehft Hand Rail  Stair Assistance: Supervision  Ramp: Supervision  Assistive Device for Ramp: Roller Walker  Discharge Recommendations: Assisted Living Facility   Eating: Independent  Grooming: Independent  Bathing: Independent, Supervision  Bathing: Independent, Supervision  Upper Body Dressing: Independent  Lower Body Dressing: Independent, Supervision  Toileting: Independent  Tub/Shower Transfer:  (N/T due to full leg cast)  Toilet Transfer: Independent  Cognition: Within Defined Limits  Orientation: Person, Situation, Place, Time           Physical Exam  Vitals and nursing note reviewed.    Constitutional:       General: He is not in acute distress.     Appearance: He is obese. He is not ill-appearing or diaphoretic.   HENT:      Head: Normocephalic and atraumatic.      Nose: Nose normal.      Mouth/Throat:      Mouth: Mucous membranes are moist.   Cardiovascular:      Pulses:           Dorsalis pedis pulses are 2+ on the right side and 2+ on the left side.   Pulmonary:      Effort: Pulmonary effort is normal. No respiratory distress.   Abdominal:      General: There is no distension.   Skin:     General: Skin is warm and dry.   Neurological:      General: No focal deficit present.      Mental Status: He is alert.   Psychiatric:         Mood and Affect: Mood normal.         Behavior: Behavior normal.             Scheduled Meds:  Current Facility-Administered Medications   Medication Dose Route Frequency Provider Last Rate    acetaminophen  650 mg Oral Q6H PRN Modesta Lee PA-C      ascorbic acid  500 mg Oral Daily Toño Brito MD      Cholecalciferol  5,000 Units Oral Daily Toño Brito MD      enoxaparin  40 mg Subcutaneous Q24H [facility] Toño Brito MD      famotidine  20 mg Oral Daily Toño Brito MD      losartan  50 mg Oral Daily Toño Brito MD      metFORMIN  1,500 mg Oral Daily With Dinner Toño Brito MD      multivitamin stress formula  1 tablet Oral Daily Toño Brito MD      polyethylene glycol  17 g Oral Daily PRN Modesta Lee PA-C      pravastatin  40 mg Oral Daily With Dinner Toño Brito MD      tamsulosin  0.4 mg Oral Daily With Dinner Toño Brito MD           Lab Results: I have reviewed the following results:                 Modesta Lee PA-C  Physical Medicine and Rehabilitation  [facility]

## 2025-03-20 NOTE — NURSING NOTE
Patient MOD I in room with walker. Maintains TTWB to RLE. Reports no pain. Allevyns changed to around cast. Skin pink but intact. Patient going to ortho appt at 1:45 with  at 12:15. Patient aware of same. Will continue to monitor and follow plan of care.

## 2025-03-20 NOTE — ASSESSMENT & PLAN NOTE
Skin breakdown on R heel due to cast. Ortho removed approximately 1 inch of cast at posterior aspect on 3/11  Local wound care  Continue to monitor   Present on admission to [facility]   Wound care RN consulted:  Skin Care orders:  1-Hydraguard to sacrum, buttocks bid and prn   2-EHOB / waffle  cushion when out of bed in chair.  3-Moisturize skin daily with skin nourishing cream  4-Elevate heels to offload pressure.  5-Turn/reposition q2h for pressure re-distribution on skin  6. Right posterior heel , right medial ankle and right lateral ankle - cleanse with Normal saline then apply xeroform then top with the silicone foam nate with a T and date change every other day as needed for soilage or dislodgement   7. Left plantar aspect of the foot - apply betadine paint then a AND and olga wrap change every other day

## 2025-03-20 NOTE — PROGRESS NOTES
"Progress Note - Tobin Kerns 69 y.o. male MRN: [id number]    Unit/Bed#: [facility] 210-01 Encounter: [id number]        Subjective:   Patient seen and examined at bedside after reviewing the chart and discussing the case with the caring staff.      Patient examined at bedside.  Patient has no acute symptoms.    Physical Exam   Vitals: Blood pressure 126/70, pulse (!) 53, temperature (!) 97.2 °F (36.2 °C), temperature source Temporal, resp. rate 17, height 6' 5\" (1.956 m), weight (!) 137 kg (300 lb 14.9 oz), SpO2 96%.,Body mass index is 35.68 kg/m².  Constitutional: Patient in no acute distress.  HEENT: PERR, EOMI, MMM.  Cardiovascular: Normal rate and regular rhythm.    Pulmonary/Chest: Effort normal and breath sounds normal.   Abdomen: Soft, + BS, NT.    Assessment/Plan:  Tobin Kerns is a(n) 69 y.o. year old male who is s/p quadriceps tendon repair.     Cardiac with hx of HTN, HLD.  Patient is on losartan 50 mg daily, pravastatin 40 mg daily.  Impaired fasting glucose.  Hgb a1c 5.4% on 3/12/25.  Continue metformin XR 1500 mg daily with dinner.  Regular diet and d/c accucheks as blood sugar is well controlled.    BPH.  Continue Flomax 0.4 mg daily.  GERD.  Continue Pepcid 20 mg daily.  JOVANY.  On CPAP at bedtime.   Vitamin D deficiency.  Patient is on vitamin D3 5000 units daily.  Pain and bowel management.  As per physiatrist.  Quadriceps tendon repair.  Toe touch weightbearing RLE with use of walker.  Patient is receiving intensive PT OT as per physiatrist.    Anticipated date of discharge.  3/25/25    The patient was discussed with Dr. Brito and he is in agreement with the above note.  "

## 2025-03-20 NOTE — PLAN OF CARE
Problem: PAIN - ADULT  Goal: Verbalizes/displays adequate comfort level or baseline comfort level  Description: Interventions:  - Encourage patient to monitor pain and request assistance  - Assess pain using appropriate pain scale  - Administer analgesics based on type and severity of pain and evaluate response  - Implement non-pharmacological measures as appropriate and evaluate response  - Consider cultural and social influences on pain and pain management  - Notify physician/advanced practitioner if interventions unsuccessful or patient reports new pain  3/20/2025 0032 by Lisa Shah RN  Outcome: Progressing  3/20/2025 0032 by Lisa Shah RN  Outcome: Progressing     Problem: INFECTION - ADULT  Goal: Absence or prevention of progression during hospitalization  Description: INTERVENTIONS:  - Assess and monitor for signs and symptoms of infection  - Monitor lab/diagnostic results  - Monitor all insertion sites, i.e. indwelling lines, tubes, and drains  - Monitor endotracheal if appropriate and nasal secretions for changes in amount and color  - [place] appropriate cooling/warming therapies per order  - Administer medications as ordered  - Instruct and encourage patient and family to use good hand hygiene technique  - Identify and instruct in appropriate isolation precautions for identified infection/condition  3/20/2025 0032 by Lisa Shah RN  Outcome: Progressing  3/20/2025 0032 by Lisa Shah RN  Outcome: Not Progressing  Goal: Absence of fever/infection during neutropenic period  Description: INTERVENTIONS:  - Monitor WBC    3/20/2025 0032 by Lisa Shah RN  Outcome: Progressing  3/20/2025 0032 by Lisa Shah RN  Outcome: Not Progressing

## 2025-03-20 NOTE — PROGRESS NOTES
03/20/25 0700   Pain Assessment   Pain Assessment Tool 0-10   Pain Score 2   Pain Location/Orientation Orientation: Right;Location: Hip   Restrictions/Precautions   Precautions Fall Risk;Pain   RLE Weight Bearing Per Order TTWB   ROM Restrictions No   Braces or Orthoses Other (Comment)  (R LE Cast)   Cognition   Overall Cognitive Status WFL   Arousal/Participation Alert;Responsive;Cooperative   Attention Within functional limits   Orientation Level Oriented X4   Memory Within functional limits   Following Commands Follows all commands and directions without difficulty   Subjective   Subjective Pt reports slight pain / soreness at the top of his R hip   Roll Left and Right   Type of Assistance Needed Independent   Physical Assistance Level No physical assistance   Roll Left and Right CARE Score 6   Sit to Lying   Type of Assistance Needed Independent   Physical Assistance Level No physical assistance   Sit to Lying CARE Score 6   Lying to Sitting on Side of Bed   Type of Assistance Needed Independent   Physical Assistance Level No physical assistance   Lying to Sitting on Side of Bed CARE Score 6   Sit to Stand   Type of Assistance Needed Independent   Physical Assistance Level No physical assistance   Comment RW   Sit to Stand CARE Score 6   Bed-Chair Transfer   Type of Assistance Needed Independent   Physical Assistance Level No physical assistance   Comment RW   Chair/Bed-to-Chair Transfer CARE Score 6   Car Transfer   Reason if not Attempted Environmental limitations   Car Transfer CARE Score 10   Walk 10 Feet   Type of Assistance Needed Independent   Physical Assistance Level No physical assistance   Comment RW   Walk 10 Feet CARE Score 6   Walk 50 Feet with Two Turns   Type of Assistance Needed Independent   Physical Assistance Level No physical assistance   Comment RW   Walk 50 Feet with Two Turns CARE Score 6   Walk 150 Feet   Type of Assistance Needed Independent   Physical Assistance Level No physical  assistance   Comment RW   Walk 150 Feet CARE Score 6   Ambulation   Primary Mode of Locomotion Prior to Admission Walk   Distance Walked (feet) 150 ft  (150', 120', 20', 15', 20' x 2)   Assist Device Roller Walker   Does the patient walk? 2. Yes   Wheel 50 Feet with Two Turns   Reason if not Attempted Activity not applicable   Wheel 50 Feet with Two Turns CARE Score 9   Wheel 150 Feet   Reason if not Attempted Activity not applicable   Wheel 150 Feet CARE Score 9   Wheelchair mobility   Findings N/A   Does the patient use a wheelchair? 0. No   Curb or Single Stair   Style negotiated Single stair   Type of Assistance Needed Set-up / clean-up   Physical Assistance Level No physical assistance   Comment place RW at bottom/top of  stairs, up/down 6  steps, L HR, non-reciprocal, forward ascending, backward descending; maintains TTWB R LE   1 Step (Curb) CARE Score 5   4 Steps   Type of Assistance Needed Set-up / clean-up   Physical Assistance Level No physical assistance   Comment place RW at bottom/top of  stairs, up/down 6  steps, L HR, non-reciprocal, forward ascending, backward descending; maintains TTWB R LE   4 Steps CARE Score 5   12 Steps   Reason if not Attempted Activity not applicable   12 Steps CARE Score 9   Picking Up Object   Type of Assistance Needed Independent;Adaptive equipment   Physical Assistance Level No physical assistance   Comment pants from floor   Picking Up Object CARE Score 6   Toilet Transfer   Type of Assistance Needed Independent   Physical Assistance Level No physical assistance   Comment grab bar   Toilet Transfer CARE Score 6   Therapeutic Interventions   Strengthening supine LE TE   Other gait training, stair training   Equipment Use   NuStep L5, 10 min, B/L UE, L LE   Assessment   Treatment Assessment Pt agreeable to PT this AM, received supine in bed. Challenged LE strength and eccentric control with increased isometric hold and slowed pace with supine LE  TE. Pt continues to maintain TTWB R LE well with functional mobility and remains hightly motivated to participate in PT. He remains limited by TTWB R LE, will continue with current PT POC to improve deficits and promote return to PLOF.   Problem List Decreased strength;Decreased range of motion;Impaired balance;Decreased mobility;Pain;Orthopedic restrictions;Decreased skin integrity   PT Barriers   Physical Impairment Decreased strength;Decreased range of motion;Decreased endurance;Impaired balance;Decreased mobility;Impaired sensation;Obesity;Decreased skin integrity;Orthopedic restrictions;Pain   Functional Limitation Walking;Transfers;Standing;Stair negotiation;Ramp negotiation;Car transfers   Plan   Treatment/Interventions Functional transfer training;LE strengthening/ROM;Therapeutic exercise;Endurance training;Patient/family training;Equipment eval/education;Bed mobility;Gait training   Progress Progressing toward goals   PT Therapy Minutes   PT Time In 0700   PT Time Out 0830   PT Total Time (minutes) 90   PT Mode of treatment - Individual (minutes) 90   PT Mode of treatment - Concurrent (minutes) 0   PT Mode of treatment - Group (minutes) 0   PT Mode of treatment - Co-treat (minutes) 0   PT Mode of Treatment - Total time(minutes) 90 minutes   PT Cumulative Minutes 750     Patient remains seated at EOB, all needs in reach. Encouraged use of call bell, patient verbalizes understanding.

## 2025-03-20 NOTE — PLAN OF CARE
Problem: PAIN - ADULT  Goal: Verbalizes/displays adequate comfort level or baseline comfort level  Description: Interventions:  - Encourage patient to monitor pain and request assistance  - Assess pain using appropriate pain scale  - Administer analgesics based on type and severity of pain and evaluate response  - Implement non-pharmacological measures as appropriate and evaluate response  - Consider cultural and social influences on pain and pain management  - Notify physician/advanced practitioner if interventions unsuccessful or patient reports new pain  Outcome: Progressing     Problem: INFECTION - ADULT  Goal: Absence or prevention of progression during hospitalization  Description: INTERVENTIONS:  - Assess and monitor for signs and symptoms of infection  - Monitor lab/diagnostic results  - Monitor all insertion sites, i.e. indwelling lines, tubes, and drains  - Monitor endotracheal if appropriate and nasal secretions for changes in amount and color  - [place] appropriate cooling/warming therapies per order  - Administer medications as ordered  - Instruct and encourage patient and family to use good hand hygiene technique  - Identify and instruct in appropriate isolation precautions for identified infection/condition  Outcome: Progressing     Problem: SAFETY ADULT  Goal: Maintain or return to baseline ADL function  Description: INTERVENTIONS:  -  Assess patient's ability to carry out ADLs; assess patient's baseline for ADL function and identify physical deficits which impact ability to perform ADLs (bathing, care of mouth/teeth, toileting, grooming, dressing, etc.)  - Assess/evaluate cause of self-care deficits   - Assess range of motion  - Assess patient's mobility; develop plan if impaired  - Assess patient's need for assistive devices and provide as appropriate  - Encourage maximum independence but intervene and supervise when necessary  - Involve family in performance of ADLs  - Assess for home care  needs following discharge   - Consider OT consult to assist with ADL evaluation and planning for discharge  - Provide patient education as appropriate  Outcome: Progressing     Problem: DISCHARGE PLANNING  Goal: Discharge to home or other facility with appropriate resources  Description: INTERVENTIONS:  - Identify barriers to discharge w/patient and caregiver  - Arrange for needed discharge resources and transportation as appropriate  - Identify discharge learning needs (meds, wound care, etc.)  - Arrange for interpretive services to assist at discharge as needed  - Refer to Case Management Department for coordinating discharge planning if the patient needs post-hospital services based on physician/advanced practitioner order or complex needs related to functional status, cognitive ability, or social support system  Outcome: Progressing

## 2025-03-20 NOTE — PROGRESS NOTES
03/20/25 1030   Pain Assessment   Pain Assessment Tool 0-10   Pain Score No Pain   Restrictions/Precautions   Precautions Fall Risk;Pain   RLE Weight Bearing Per Order TTWB   Braces or Orthoses   (R LE Cast)   Oral Hygiene   Type of Assistance Needed Independent   Physical Assistance Level No physical assistance   Oral Hygiene CARE Score 6   Grooming   Able To Initiate Tasks;Wash/Dry Hands;Brush/Clean Teeth;Wash/Dry Face;Comb/Brush Hair;Acquire Items   Findings stance at sink   Shower/Bathe Self   Type of Assistance Needed Independent;Adaptive equipment   Physical Assistance Level No physical assistance   Shower/Bathe Self CARE Score 6   Bathing   Assessed Bath Style Sponge Bath   Able to Wash/Rinse/Dry (body part) Left Arm;Right Arm;L Upper Leg;L Lower Leg/Foot;R Lower Leg/Foot;Chest;Abdomen;Perineal Area;Buttocks   Limitations Noted in Balance;ROM   Positioning Seated;Standing   Adaptive Equipment Longhand Sponge   Upper Body Dressing   Type of Assistance Needed Independent   Physical Assistance Level No physical assistance   Upper Body Dressing CARE Score 6   Lower Body Dressing   Type of Assistance Needed Independent;Adaptive equipment   Physical Assistance Level No physical assistance   Lower Body Dressing CARE Score 6   Putting On/Taking Off Footwear   Type of Assistance Needed Independent;Adaptive equipment   Physical Assistance Level No physical assistance   Putting On/Taking Off Footwear CARE Score 6   Picking Up Object   Type of Assistance Needed Independent;Adaptive equipment   Physical Assistance Level No physical assistance   Picking Up Object CARE Score 6   Dressing/Undressing Clothing   Able to  Obtain Clothing;Store removed clothing   Remove UB Clothes Button Shirt   Don UB Clothes Button Shirt   Remove LB Clothes Shorts;Undergarment;Socks   Don LB Clothes Shorts;Undergarment;Socks   Limitations Noted In Balance;ROM   Adaptive Equipment Reacher;Sock Aide;Dressing Stick   Positioning Supported  Sit;Standing   Lying to Sitting on Side of Bed   Type of Assistance Needed Independent   Physical Assistance Level No physical assistance   Lying to Sitting on Side of Bed CARE Score 6   Sit to Stand   Type of Assistance Needed Independent   Physical Assistance Level No physical assistance   Sit to Stand CARE Score 6   Toileting Hygiene   Type of Assistance Needed Independent   Physical Assistance Level No physical assistance   Toileting Hygiene CARE Score 6   Toileting   Able to Pull Clothing down yes, up yes.   Limitations Noted In Balance;LE Strength;ROM   Toilet Transfer   Type of Assistance Needed Independent   Physical Assistance Level No physical assistance   Toilet Transfer CARE Score 6   Toilet Transfer   Surface Assessed Raised Toilet   Transfer Technique Standard   Limitations Noted In Balance;ROM;LE Strength   Adaptive Equipment Grab Bar;Walker   Light Housekeeping   Light Housekeeping Level Walker   Light Housekeeping Level of Assistance Modified independent   Light Housekeeping s/u and c/u of all ADL supplies   Exercise Tools   Hand Gripper heavy resistance gripper 2 sets x 40 reps   Other Exercise Tool 1 red t bar 2x15 upward then downward   Other Exercise Tool 2 6# dowel 2x15 reps in 6 planes of motion   Cognition   Overall Cognitive Status WFL   Arousal/Participation Alert;Cooperative   Attention Within functional limits   Orientation Level Oriented X4   Memory Within functional limits   Following Commands Follows all commands and directions without difficulty   Additional Activities   Additional Activities Comments fxnl mobility MI with RW while maintaining TTWB   Activity Tolerance   Activity Tolerance Patient tolerated treatment well   Assessment   Treatment Assessment Pt demon consistent recall of  compensatory strategies and compliance with safe techniques throughout all ADL/iADLs. Details listed in respective sections of note. Pt tolerated session well. Pt maintained TTWB during txfs and  mobility. Pt is Indep will bed mobility. Pt then completed TE for generalized strength and endurance.  Pt is a motivated participant in therapy. Pt continues to  report that he  would prefer to d/c to another facility after rehab until his cast is removed due to home s/u. Pt is adamant about needing placement if he continues to require hard cast. Pt has a f/u with ortho tommorow. Pt's barriers to d/c include decreased strength throughout but especially RLE s/p quadricepts rupture, decreased ROM, decreased balance, and TTWB RLE ; all affect independence in self care and fxl transfers. Pt would benefit from continued skilled OT services in order to address listed barriers and prepare for safe d/c.   Prognosis Good   Problem List Decreased strength;Impaired balance;Decreased mobility;Decreased range of motion;Orthopedic restrictions;Pain;Decreased skin integrity   Plan   Treatment/Interventions ADL retraining;Functional transfer training;Therapeutic exercise;Endurance training;Patient/family training;Equipment eval/education;Bed mobility;Gait training;Compensatory technique education;OT;Spoke to nursing   Progress Progressing toward goals   OT Therapy Minutes   OT Time In 1030   OT Time Out 1200   OT Total Time (minutes) 90   OT Mode of treatment - Individual (minutes) 90   OT Mode of treatment - Concurrent (minutes) 0   OT Mode of treatment - Group (minutes) 0   OT Mode of treatment - Co-treat (minutes) 0   OT Mode of Treatment - Total time(minutes) 90 minutes   OT Cumulative Minutes 489   Therapy Time missed   Time missed? No

## 2025-03-21 ENCOUNTER — OFFICE VISIT (OUTPATIENT)
Dept: OBGYN CLINIC | Facility: CLINIC | Age: 70
End: 2025-03-21
Payer: MEDICARE

## 2025-03-21 VITALS — HEIGHT: 77 IN | BODY MASS INDEX: 35.68 KG/M2

## 2025-03-21 DIAGNOSIS — S76.111D TENDON RUPTURE, TRAUMATIC. QUADRICEPS, RIGHT, SUBSEQUENT ENCOUNTER: Primary | ICD-10-CM

## 2025-03-21 PROCEDURE — 97116 GAIT TRAINING THERAPY: CPT

## 2025-03-21 PROCEDURE — 29345 APPLICATION OF LONG LEG CAST: CPT | Performed by: ORTHOPAEDIC SURGERY

## 2025-03-21 PROCEDURE — 97530 THERAPEUTIC ACTIVITIES: CPT

## 2025-03-21 PROCEDURE — 97110 THERAPEUTIC EXERCISES: CPT

## 2025-03-21 PROCEDURE — 97535 SELF CARE MNGMENT TRAINING: CPT

## 2025-03-21 PROCEDURE — 99024 POSTOP FOLLOW-UP VISIT: CPT | Performed by: ORTHOPAEDIC SURGERY

## 2025-03-21 PROCEDURE — 99232 SBSQ HOSP IP/OBS MODERATE 35: CPT | Performed by: PHYSICAL MEDICINE & REHABILITATION

## 2025-03-21 RX ADMIN — LOSARTAN POTASSIUM 50 MG: 50 TABLET, FILM COATED ORAL at 08:47

## 2025-03-21 RX ADMIN — CHOLECALCIFEROL TAB 25 MCG (1000 UNIT) 5000 UNITS: 25 TAB at 08:47

## 2025-03-21 RX ADMIN — OXYCODONE HYDROCHLORIDE AND ACETAMINOPHEN 500 MG: 500 TABLET ORAL at 08:47

## 2025-03-21 RX ADMIN — TAMSULOSIN HYDROCHLORIDE 0.4 MG: 0.4 CAPSULE ORAL at 17:15

## 2025-03-21 RX ADMIN — PRAVASTATIN SODIUM 40 MG: 40 TABLET ORAL at 17:15

## 2025-03-21 RX ADMIN — ENOXAPARIN SODIUM 40 MG: 40 INJECTION SUBCUTANEOUS at 08:47

## 2025-03-21 RX ADMIN — METFORMIN ER 500 MG 1500 MG: 500 TABLET ORAL at 17:15

## 2025-03-21 RX ADMIN — FAMOTIDINE 20 MG: 20 TABLET, FILM COATED ORAL at 08:47

## 2025-03-21 RX ADMIN — ACETAMINOPHEN 650 MG: 325 TABLET ORAL at 07:17

## 2025-03-21 RX ADMIN — B-COMPLEX W/ C & FOLIC ACID TAB 1 TABLET: TAB at 08:47

## 2025-03-21 NOTE — PROGRESS NOTES
03/21/25 0988   Pain Assessment   Pain Assessment Tool 0-10   Pain Score No Pain   Restrictions/Precautions   Precautions Fall Risk   RLE Weight Bearing Per Order TTWB   Braces or Orthoses   (cast R LE)   Oral Hygiene   Type of Assistance Needed Independent   Physical Assistance Level No physical assistance   Comment stance   Oral Hygiene CARE Score 6   Grooming   Able To Initiate Tasks;Wash/Dry Hands;Brush/Clean Teeth;Wash/Dry Face;Comb/Brush Hair   Shower/Bathe Self   Type of Assistance Needed Independent;Adaptive equipment   Physical Assistance Level No physical assistance   Shower/Bathe Self CARE Score 6   Bathing   Assessed Bath Style Sponge Bath   Able to Gather/Transport Yes   Able to Wash/Rinse/Dry (body part) Left Arm;Right Arm;L Upper Leg;R Upper Leg;L Lower Leg/Foot;R Lower Leg/Foot;Chest;Abdomen;Perineal Area;Buttocks   Limitations Noted in Balance;ROM   Positioning Seated;Standing   Upper Body Dressing   Type of Assistance Needed Independent   Physical Assistance Level No physical assistance   Upper Body Dressing CARE Score 6   Lower Body Dressing   Type of Assistance Needed Independent;Adaptive equipment   Physical Assistance Level No physical assistance   Lower Body Dressing CARE Score 6   Putting On/Taking Off Footwear   Type of Assistance Needed Independent;Adaptive equipment   Physical Assistance Level No physical assistance   Putting On/Taking Off Footwear CARE Score 6   Picking Up Object   Type of Assistance Needed Independent;Adaptive equipment   Physical Assistance Level No physical assistance   Picking Up Object CARE Score 6   Dressing/Undressing Clothing   Able to  Obtain Clothing;Store removed clothing   Remove UB Clothes Pullover Shirt   Don UB Clothes Pullover Shirt   Remove LB Clothes Shorts;Undergarment   Don LB Clothes Undergarment;Shorts   Limitations Noted In Balance;ROM   Adaptive Equipment Reacher   Positioning Supported Sit;Standing   Sit to Stand   Type of Assistance Needed  Independent   Physical Assistance Level No physical assistance   Sit to Stand CARE Score 6   Toileting Hygiene   Type of Assistance Needed Independent   Physical Assistance Level No physical assistance   Toileting Hygiene CARE Score 6   Toileting   Able to Pull Clothing down yes, up yes.   Able to Manage Clothing Closures Yes   Manage Hygiene Bladder   Limitations Noted In Balance;ROM   Toilet Transfer   Type of Assistance Needed Independent;Adaptive equipment   Physical Assistance Level No physical assistance   Toilet Transfer CARE Score 6   Toilet Transfer   Surface Assessed Raised Toilet   Transfer Technique Standard   Limitations Noted In Balance;ROM;LE Strength   Adaptive Equipment Grab Bar;Walker   Light Housekeeping   Light Housekeeping Level Walker   Light Housekeeping Level of Assistance Modified independent   Exercise Tools   Hand Gripper heavy resistance gripper 2 sets x 40 reps   Other Exercise Tool 1 red t bar 2x15 upward then downward   Other Exercise Tool 3 6# dowel 2x15 reps in 6 planes of motion   Cognition   Overall Cognitive Status WFL   Arousal/Participation Alert;Cooperative   Attention Within functional limits   Orientation Level Oriented X4   Memory Within functional limits   Following Commands Follows all commands and directions without difficulty   Additional Activities   Additional Activities Comments fxnl mobility MI with RW while maintaining TTWB   Activity Tolerance   Activity Tolerance Patient tolerated treatment well   Assessment   Treatment Assessment Pt demon consistent recall of  compensatory strategies and compliance with safe techniques throughout all ADL/iADLs. Details listed in respective sections of note. Pt tolerated session well. Pt maintained TTWB during txfs and mobility.  Pt then completed TE for generalized strength and endurance.  Pt is a motivated participant in therapy.  Pt has a f/u with ortho today. Pt's barriers to d/c include decreased strength throughout but  especially RLE s/p quadricepts rupture, decreased ROM, decreased balance, and TTWB RLE ; all affect independence in self care and fxl transfers. Pt would benefit from continued skilled OT services in order to address listed barriers and prepare for safe d/c.   Prognosis Good   Problem List Decreased strength;Decreased range of motion;Orthopedic restrictions;Pain   Assessment pt participated in 30 minutes of concurrent tx addressing similar goals with a pt with similar deficits   Plan   Treatment/Interventions ADL retraining;Functional transfer training;Therapeutic exercise;Endurance training;Patient/family training;Gait training;Equipment eval/education;Compensatory technique education;Spoke to nursing;OT   Progress Progressing toward goals   OT Therapy Minutes   OT Time In 0930   OT Time Out 1100   OT Total Time (minutes) 90   OT Mode of treatment - Individual (minutes) 60   OT Mode of treatment - Concurrent (minutes) 30   OT Mode of treatment - Group (minutes) 0   OT Mode of treatment - Co-treat (minutes) 0   OT Mode of Treatment - Total time(minutes) 90 minutes   OT Cumulative Minutes 579   Therapy Time missed   Time missed? No

## 2025-03-21 NOTE — PROGRESS NOTES
Name: Tobin Kerns      : 1955      MRN: [id number]  Encounter Provider: Fletcher Foster DO  Encounter Date: 3/21/2025   Encounter department: [facility] [place]  :  Assessment & Plan  Tendon rupture, traumatic. quadriceps, right, subsequent encounter  Approximately 2 weeks s/p revision right quadriceps tendon repair with allograft augmentation performed on 3/6/2025  Approximately 8.5 months s/p right quadriceps tendon repair performed on 2024 with failure of repair   Orders:    Cast application      Approximately 2 weeks s/p revision right quadriceps tendon repair with allograft augmentation performed on 3/6/2025, approximately 8.5 months s/p right quadriceps tendon repair performed on 2024   The patient's cast was removed today.   The patient's staples were removed today. Xeroform was applied to the incision.   Xeroform was applied to the 3 distal ankle wounds and were secured by wrap. Patient is to have daily wound checks in his facility with application of xeroform.   If the patient has any new onset of wounds the facility should reach out to Dr. Foster immediately.   A new cast was applied. Patient is to continue with current cast care instructions.   Patient is to continue with Lovenox for 2 more weeks for DVT prophylaxis.   Patient may perform quad sets in cast.  Patient is to continue with toe-touch weightbearing.   I will see the patient back in approximately 1 week for a cast/wound check.          History of the Present Illness   History of Present Illness   HPI   Tobin Kerns is a 69 y.o. male approximately 2 weeks s/p revision right quadriceps tendon repair with allograft augmentation performed on 3/6/2025. The patient reports he is not having any pain at this time. He has maintained his cast. The cast has loosened and he has developed wounds at the ankle. He has been toe touch weightbearing with the use of a walker. He is currently in a rehab  "facility. Patient was brought to facility by a [email or web address]'s worker from the facility in which he resides currently.       Review of Systems     Review of Systems   Constitutional:  Negative for chills and fever.   HENT:  Negative for ear pain and sore throat.    Eyes:  Negative for pain and visual disturbance.   Respiratory:  Negative for cough and shortness of breath.    Cardiovascular:  Negative for chest pain and palpitations.   Gastrointestinal:  Negative for abdominal pain and vomiting.   Genitourinary:  Negative for dysuria and hematuria.   Musculoskeletal:  Negative for arthralgias and back pain.   Skin:  Negative for color change and rash.   Neurological:  Negative for seizures and syncope.   All other systems reviewed and are negative.      Physical Exam   Objective   Ht 6' 5\" (1.956 m)   BMI 35.68 kg/m²        Right Knee   Incision is intact without signs of infection, erythema, discharge  There are 3 superficial wounds on the ankle, 1 medial, 1 lateral, 1 posterior over the Achilles healing appropriately  Range of motion deferred due to post-operative period   There is normal postoperative effusion.    Patient is able to actively fire quadriceps  Patient able to move ankle and toes  Sensation is decreased to lower extremity  below the knee unchanged from baseline due to neuropathy.      Data Review     I have personally reviewed pertinent films in PACS, and my interpretation follows.    MRI taken 2025 of Right knee independently reviewed and demonstrate prior quad tendon repair with high grade recurrent tear with at least 2.5 cm retraction.     Social History     Tobacco Use    Smoking status: Former     Current packs/day: 0.00     Average packs/day: 1 pack/day for 15.0 years (15.0 ttl pk-yrs)     Types: Cigarettes     Start date: 1980     Quit date: 2005     Years since quittin.2     Passive exposure: Never    Smokeless tobacco: Never   Vaping Use    Vaping status: Never Used   Substance " Use Topics    Alcohol use: Yes     Alcohol/week: 1.0 standard drink of alcohol     Types: 1 Cans of beer per week    Drug use: Not Currently     Types: Marijuana     Comment: Last Marijuana Use 1970's; Other unknown drugs while in [place]           Cast application    Date/Time: 3/21/2025 1:45 PM    Performed by: Fletcher Foster DO  Authorized by: Fletcher Foster, DO    Verbal consent obtained?: Yes    Risks and benefits: Risks, benefits and alternatives were discussed    Consent given by:  Patient  Time Out:     Time out: Immediately prior to the procedure a time out was called    Patient states understanding of procedure being performed: Yes    Site marked: Yes    Radiology Images displayed and confirmed. If images not available, report reviewed: Yes    Patient identity confirmed:  Verbally with patient  Pre-procedure details:     Sensation:  Normal  Procedure details:     Laterality:  Right    Location:  Knee    Knee:  R knee    Cast type:  Long leg    Supplies:  Cotton padding, fiberglass, 2 layer wrap and skin protective strip (xeroform)  Post-procedure details:     Pain:  Unchanged    Sensation:  Unchanged    Patient tolerance of procedure:  Tolerated well, no immediate complications        Zuleyka Fleming   Scribe Attestation      I,:  Zuleyka Fleming am acting as a scribe while in the presence of the attending physician.:       I,:  Fletcher Foster DO personally performed the services described in this documentation    as scribed in my presence.:

## 2025-03-21 NOTE — PROGRESS NOTES
"Progress Note - PMR   Name: Tobin Kerns 69 y.o. male I MRN: [id number]  Unit/Bed#: -01 I Date of Admission: 3/11/2025   Date of Service: 3/21/2025 I Hospital Day: 10     Assessment & Plan  Quadriceps muscle rupture, right, subsequent encounter  HPI:   He initially underwent quad tendon repair on 7/25/24 with Dr. Foster. He was discharged home with outpatient PT.  Later noted to have recurrent high-grade tear with extensor lag on exam and elected for operative management  January 2025 MRI: \"Prior quadriceps insertion repair with high-grade recurrent tear exhibiting up to 2.2 cm of tendon retraction.\"  On 3/6/25, he underwent revision of quad tendon repair with allograft augmentation    Plan:   PT and OT for 3 hours a day, 5-6 days a week   TTWB RLE   Avoid moisture to cast   Pain: PRN Tylenol (monitor LFTs) and oxycodone discontinued on 3/17 due to minimal pain   Lovenox for DVT prophylaxis while inpatient   Hypertension  Continue losartan 50 mg daily   Monitor BP   Mixed hyperlipidemia  Continue pravastatin 40 mg daily   Gastro-esophageal reflux disease without esophagitis  Continue famotidine 20 mg daily   Peripheral neuropathy  Patient is not diabetic   Monitor skin for new or worsening wounds   JOVANY on CPAP  Continue CPAP qHS  IFG (impaired fasting glucose)  Continue metformin 1500 mg daily   Seen by RD 3/12, diet adjusted to regular   3/12 A1c: 5.4   Healed ulcer of left foot  Local wound care  Present on admission   Wound care RN consulted   BPH (benign prostatic hyperplasia)  Continue tamsulosin 0.4 mg daily   Patient currently voiding without difficulty   PRN PVRs if urinary retention is suspected   Impaired mobility and ADLs  Patient was evaluated by the rehabilitation team MD and an appropriate candidate for acute inpatient rehabilitation program at this time.  The patient will tolerate 3 hours/day 5 to 7 days/week of intensive physical and  occupational therapy   in order to obtain goals for " community discharge  Due to the patient's functional Compared to their baseline level of function in addition to their ongoing medical needs, the patient would benefit from daily supervision from a rehabilitation physician as well as rehabilitation nursing to implement and adjust the medical as well as functional plan of care in order to meet the patient's goals.  Bladder: Monitor closely for urinary incontinence and/or retention. Monitor urine output and encourage spontaneous voids. If unable to void spontaneously, will monitor with PVR bladder scans and initiate CIC regimen.  Skin: Monitor for breakdown, frequent turns, pressure offloading  Sleep: Encourage sleep hygiene (routine that promotes healthy circadian rhythm,avoid caffeine later in the day, quiet/dark room at night, reduce electronic before bedtime)      Wound of right ankle  Skin breakdown on R heel due to cast. Ortho removed approximately 1 inch of cast at posterior aspect on 3/11  Local wound care  Continue to monitor   Present on admission to [facility]   Wound care RN consulted:  Skin Care orders:  1-Hydraguard to sacrum, buttocks bid and prn   2-EHOB / waffle  cushion when out of bed in chair.  3-Moisturize skin daily with skin nourishing cream  4-Elevate heels to offload pressure.  5-Turn/reposition q2h for pressure re-distribution on skin  6. Right posterior heel , right medial ankle and right lateral ankle - cleanse with Normal saline then apply xeroform then top with the silicone foam nate with a T and date change every other day as needed for soilage or dislodgement   7. Left plantar aspect of the foot - apply betadine paint then a AND and olga wrap change every other day      Subjective   Tobin Kerns is a 69 year-old male, with a past medical history of hypertension, hyperlipidemia, JOVANY, impaired fasting glucose, bilateral lower extremity neuropathy, GERD, BPH, presenting to [facility] after an elective right quadricept tendon repair. He initially  underwent quad tendon repair on 7/25/24 with Dr. Foster. He was discharged home with outpatient PT. He was noted to have recurrent high-grade tear on repeat MRI with extensor lag on exam and elected for operative management. On 3/6/25, he underwent revision of quad tendon repair with allograft augmentation. He was evaluated by PT and OT and deemed an appropriate candidate for [facility] due to functional deficits.     Chief Complaint: f/u ambulatory dysfunction    Interval:   Seen and evaluated patient in room. He denies pain or any concerns. He has an appointment with orthopedics this afternoon. Last BM 3/20  Discussion: Plan for return home vs DC to assistive living with [facility] for PT, OT, and nursing. Anticipated Discharge Date:  March 25, 2025     Objective :  Temp:  [97.3 °F (36.3 °C)-97.7 °F (36.5 °C)] 97.3 °F (36.3 °C)  HR:  [67-70] 67  BP: (116-127)/(60-69) 127/69  Resp:  [16] 16  SpO2:  [97 %-98 %] 97 %  O2 Device: None (Room air)    Functional Update:  Physical Therapy Occupational Therapy   Weight Bearing Status: Toe touch (TTWB R LE)  Transfers: Independent  Bed Mobility: Independent  Amulation Distance (ft): 160 feet  Ambulation: Independent, Supervision (S distances >50')  Assistive Device for Ambulation: Roller Walker  Wheelchair Mobility Distance:  (N/A)  Wheelchair Mobility:  (N/A)  Number of Stairs: 6  Assistive Device for Stairs: Lehft Hand Rail  Stair Assistance: Supervision  Ramp: Supervision  Assistive Device for Ramp: Roller Walker  Discharge Recommendations: Assisted Living Facility   Eating: Independent  Grooming: Independent  Bathing: Independent, Supervision  Bathing: Independent, Supervision  Upper Body Dressing: Independent  Lower Body Dressing: Independent, Supervision  Toileting: Independent  Tub/Shower Transfer:  (N/T due to full leg cast)  Toilet Transfer: Independent  Cognition: Within Defined Limits  Orientation: Person, Situation, Place, Time           Physical Exam  Vitals and nursing  note reviewed.   Constitutional:       General: He is not in acute distress.     Appearance: He is obese. He is not ill-appearing or diaphoretic.   HENT:      Head: Normocephalic and atraumatic.      Nose: Nose normal.      Mouth/Throat:      Mouth: Mucous membranes are moist.   Cardiovascular:      Pulses:           Dorsalis pedis pulses are 2+ on the right side and 2+ on the left side.   Pulmonary:      Effort: Pulmonary effort is normal. No respiratory distress.   Abdominal:      General: There is no distension.   Skin:     General: Skin is warm and dry.   Neurological:      General: No focal deficit present.      Mental Status: He is alert.   Psychiatric:         Mood and Affect: Mood normal.         Behavior: Behavior normal.         Scheduled Meds:  Current Facility-Administered Medications   Medication Dose Route Frequency Provider Last Rate    acetaminophen  650 mg Oral Q6H PRN Modesta Lee PA-C      ascorbic acid  500 mg Oral Daily Toño Brito MD      Cholecalciferol  5,000 Units Oral Daily Toño Brito MD      enoxaparin  40 mg Subcutaneous Q24H [facility] Toño Brito MD      famotidine  20 mg Oral Daily Toño Brito MD      losartan  50 mg Oral Daily Toño Brito MD      metFORMIN  1,500 mg Oral Daily With Dinner Toño Brito MD      multivitamin stress formula  1 tablet Oral Daily Toño Brito MD      polyethylene glycol  17 g Oral Daily PRN Modesta Lee PA-C      pravastatin  40 mg Oral Daily With Dinner Toño Brito MD      tamsulosin  0.4 mg Oral Daily With Dinner Toño Brito MD           Lab Results: I have reviewed the following results:                 Modesta Lee PA-C  Physical Medicine and Rehabilitation  [facility]

## 2025-03-21 NOTE — PROGRESS NOTES
"Progress Note - Tobin Kerns 69 y.o. male MRN: [id number]    Unit/Bed#: [facility] 210-01 Encounter: [id number]        Subjective:   Patient seen and examined at bedside after reviewing the chart and discussing the case with the caring staff.      Patient examined at bedside.  Patient has no acute symptoms.  He has an appointment with orthopedics today at 1:45 in [place].    Physical Exam   Vitals: Blood pressure 127/69, pulse 67, temperature (!) 97.3 °F (36.3 °C), temperature source Temporal, resp. rate 16, height 6' 5\" (1.956 m), weight (!) 137 kg (300 lb 14.9 oz), SpO2 97%.,Body mass index is 35.68 kg/m².  Constitutional: Patient in no acute distress.  HEENT: PERR, EOMI, MMM.  Cardiovascular: Normal rate and regular rhythm.    Pulmonary/Chest: Effort normal and breath sounds normal.   Abdomen: Soft, + BS, NT.    Assessment/Plan:  Tobin Kerns is a(n) 69 y.o. year old male who is s/p quadriceps tendon repair.     Cardiac with hx of HTN, HLD.  Patient is on losartan 50 mg daily, pravastatin 40 mg daily.  Impaired fasting glucose.  Hgb a1c 5.4% on 3/12/25.  Continue metformin XR 1500 mg daily with dinner.  Regular diet and d/c accucheks as blood sugar is well controlled.    BPH.  Continue Flomax 0.4 mg daily.  GERD.  Continue Pepcid 20 mg daily.  JOVANY.  On CPAP at bedtime.   Vitamin D deficiency.  Patient is on vitamin D3 5000 units daily.  Pain and bowel management.  As per physiatrist.  Quadriceps tendon repair.  Toe touch weightbearing RLE with use of walker.  Patient is receiving intensive PT OT as per physiatrist.    Anticipated date of discharge.  3/25/25    The patient was discussed with Dr. Brito and he is in agreement with the above note.  "

## 2025-03-21 NOTE — PROGRESS NOTES
03/21/25 0700   Pain Assessment   Pain Assessment Tool 0-10   Pain Score 2   Pain Location/Orientation Orientation: Right;Location: Groin   Restrictions/Precautions   Precautions Fall Risk;Pain   RLE Weight Bearing Per Order TTWB   ROM Restrictions No   Braces or Orthoses Other (Comment)  (R LE cast)   Cognition   Overall Cognitive Status WFL   Arousal/Participation Alert;Responsive;Cooperative   Attention Within functional limits   Orientation Level Oriented X4   Memory Within functional limits   Following Commands Follows all commands and directions without difficulty   Subjective   Subjective Pt reports ongoing R groin soreness   Roll Left and Right   Type of Assistance Needed Independent   Physical Assistance Level No physical assistance   Roll Left and Right CARE Score 6   Sit to Lying   Type of Assistance Needed Independent   Physical Assistance Level No physical assistance   Sit to Lying CARE Score 6   Lying to Sitting on Side of Bed   Type of Assistance Needed Independent   Physical Assistance Level No physical assistance   Lying to Sitting on Side of Bed CARE Score 6   Sit to Stand   Type of Assistance Needed Independent   Physical Assistance Level No physical assistance   Comment RW   Sit to Stand CARE Score 6   Bed-Chair Transfer   Type of Assistance Needed Independent   Physical Assistance Level No physical assistance   Comment RW   Chair/Bed-to-Chair Transfer CARE Score 6   Car Transfer   Reason if not Attempted Environmental limitations   Car Transfer CARE Score 10   Walk 10 Feet   Type of Assistance Needed Independent   Physical Assistance Level No physical assistance   Comment RW   Walk 10 Feet CARE Score 6   Walk 50 Feet with Two Turns   Type of Assistance Needed Independent   Physical Assistance Level No physical assistance   Comment RW   Walk 50 Feet with Two Turns CARE Score 6   Walk 150 Feet   Type of Assistance Needed Independent   Physical Assistance Level No physical assistance   Comment  RW   Walk 150 Feet CARE Score 6   Walking 10 Feet on Uneven Surfaces   Type of Assistance Needed Independent   Physical Assistance Level No physical assistance   Comment RW, green foam   Walking 10 Feet on Uneven Surfaces CARE Score 6   Ambulation   Primary Mode of Locomotion Prior to Admission Walk   Distance Walked (feet) 150 ft  (150', 110', household distances)   Assist Device Roller Walker   Does the patient walk? 2. Yes   Wheel 50 Feet with Two Turns   Reason if not Attempted Activity not applicable   Wheel 50 Feet with Two Turns CARE Score 9   Wheel 150 Feet   Reason if not Attempted Activity not applicable   Wheel 150 Feet CARE Score 9   Wheelchair mobility   Findings N/A   Does the patient use a wheelchair? 0. No   Curb or Single Stair   Style negotiated Single stair   Type of Assistance Needed Set-up / clean-up   Physical Assistance Level No physical assistance   Comment place RW at bottom/top of  stairs, up/down 6  steps, L HR, non-reciprocal, forward ascending, backward descending; maintains TTWB R LE   1 Step (Curb) CARE Score 5   4 Steps   Type of Assistance Needed Set-up / clean-up   Physical Assistance Level No physical assistance   Comment place RW at bottom/top of  stairs, up/down 6  steps, L HR, non-reciprocal, forward ascending, backward descending; maintains TTWB R LE   4 Steps CARE Score 5   12 Steps   Reason if not Attempted Activity not applicable   12 Steps CARE Score 9   Picking Up Object   Type of Assistance Needed Independent;Adaptive equipment   Physical Assistance Level No physical assistance   Picking Up Object CARE Score 6   Toilet Transfer   Comment did not require during session   Therapeutic Interventions   Strengthening seated LE TE   Other gait training, stair training   Equipment Use   NuStep L5, 10 min, B/L UE, L LE   Assessment   Treatment Assessment Pt agreeable to PT this AM, received supine in bed. Pt c/o R groin pain this session, limiting R hip  flexion exercises this session. Continued to emphasize slow, eccentric controlled repetitions during seated LE TE with good tolerance. Pt continues to maintain TTWB well with R LE during functional mobility. He remains limited by TTWB R LE and R groin pain, will continue with current PT POC to improve deficits and promote return to PLOF.   Problem List Decreased strength;Impaired balance;Decreased mobility;Decreased range of motion;Orthopedic restrictions;Pain;Decreased skin integrity   PT Barriers   Physical Impairment Decreased strength;Decreased range of motion;Decreased endurance;Impaired balance;Decreased mobility;Impaired sensation;Obesity;Decreased skin integrity;Orthopedic restrictions;Pain   Functional Limitation Walking;Transfers;Standing;Stair negotiation;Ramp negotiation;Car transfers   Plan   Treatment/Interventions Functional transfer training;LE strengthening/ROM;Therapeutic exercise;Endurance training;Patient/family training;Equipment eval/education;Bed mobility;Gait training   Progress Progressing toward goals   PT Therapy Minutes   PT Time In 0700   PT Time Out 0830   PT Total Time (minutes) 90   PT Mode of treatment - Individual (minutes) 90   PT Mode of treatment - Concurrent (minutes) 0   PT Mode of treatment - Group (minutes) 0   PT Mode of treatment - Co-treat (minutes) 0   PT Mode of Treatment - Total time(minutes) 90 minutes   PT Cumulative Minutes 840     Seated LE TE as performed previously, 2.5# L LE.    Patient remains seated at EOB, all needs in reach. Encouraged use of call bell, patient verbalizes understanding.

## 2025-03-21 NOTE — PLAN OF CARE
Problem: PAIN - ADULT  Goal: Verbalizes/displays adequate comfort level or baseline comfort level  Description: Interventions:  - Encourage patient to monitor pain and request assistance  - Assess pain using appropriate pain scale  - Administer analgesics based on type and severity of pain and evaluate response  - Implement non-pharmacological measures as appropriate and evaluate response  - Consider cultural and social influences on pain and pain management  - Notify physician/advanced practitioner if interventions unsuccessful or patient reports new pain  Outcome: Progressing     Problem: INFECTION - ADULT  Goal: Absence or prevention of progression during hospitalization  Description: INTERVENTIONS:  - Assess and monitor for signs and symptoms of infection  - Monitor lab/diagnostic results  - Monitor all insertion sites, i.e. indwelling lines, tubes, and drains  - Monitor endotracheal if appropriate and nasal secretions for changes in amount and color  - [place] appropriate cooling/warming therapies per order  - Administer medications as ordered  - Instruct and encourage patient and family to use good hand hygiene technique  - Identify and instruct in appropriate isolation precautions for identified infection/condition  Outcome: Progressing  Goal: Absence of fever/infection during neutropenic period  Description: INTERVENTIONS:  - Monitor WBC    Outcome: Progressing

## 2025-03-21 NOTE — PLAN OF CARE
Problem: PAIN - ADULT  Goal: Verbalizes/displays adequate comfort level or baseline comfort level  Description: Interventions:  - Encourage patient to monitor pain and request assistance  - Assess pain using appropriate pain scale  - Administer analgesics based on type and severity of pain and evaluate response  - Implement non-pharmacological measures as appropriate and evaluate response  - Consider cultural and social influences on pain and pain management  - Notify physician/advanced practitioner if interventions unsuccessful or patient reports new pain  Outcome: Progressing     Problem: SAFETY ADULT  Goal: Patient will remain free of falls  Description: INTERVENTIONS:  - Educate patient/family on patient safety including physical limitations  - Instruct patient to call for assistance with activity   - Consult OT/PT to assist with strengthening/mobility   - Keep Call bell within reach  - Keep bed low and locked with side rails adjusted as appropriate  - Keep care items and personal belongings within reach  - Initiate and maintain comfort rounds  - Make Fall Risk Sign visible to staff  - Apply yellow socks and bracelet for high fall risk patients    Outcome: Progressing

## 2025-03-21 NOTE — ASSESSMENT & PLAN NOTE
Skin breakdown on R heel due to cast. Ortho removed approximately 1 inch of cast at posterior aspect on 3/11  Local wound care  Continue to monitor   Present on admission to [facility]   Wound care RN consulted:  Skin Care orders:  1-Hydraguard to sacrum, buttocks bid and prn   2-EHOB / waffle  cushion when out of bed in chair.  3-Moisturize skin daily with skin nourishing cream  4-Elevate heels to offload pressure.  5-Turn/reposition q2h for pressure re-distribution on skin  6. Right posterior heel , right medial ankle and right lateral ankle - cleanse with Normal saline then apply xeroform then top with the silicone foam nate with a T and date change every other day as needed for soilage or dislodgement   7. Left plantar aspect of the foot - apply betadine paint then a AND and olga wrap change every other day

## 2025-03-22 PROCEDURE — 97530 THERAPEUTIC ACTIVITIES: CPT

## 2025-03-22 PROCEDURE — 97537 COMMUNITY/WORK REINTEGRATION: CPT

## 2025-03-22 PROCEDURE — 97116 GAIT TRAINING THERAPY: CPT

## 2025-03-22 PROCEDURE — 97110 THERAPEUTIC EXERCISES: CPT

## 2025-03-22 RX ADMIN — METFORMIN ER 500 MG 1500 MG: 500 TABLET ORAL at 17:09

## 2025-03-22 RX ADMIN — LOSARTAN POTASSIUM 50 MG: 50 TABLET, FILM COATED ORAL at 08:17

## 2025-03-22 RX ADMIN — CHOLECALCIFEROL TAB 25 MCG (1000 UNIT) 5000 UNITS: 25 TAB at 08:17

## 2025-03-22 RX ADMIN — PRAVASTATIN SODIUM 40 MG: 40 TABLET ORAL at 17:09

## 2025-03-22 RX ADMIN — ENOXAPARIN SODIUM 40 MG: 40 INJECTION SUBCUTANEOUS at 08:17

## 2025-03-22 RX ADMIN — FAMOTIDINE 20 MG: 20 TABLET, FILM COATED ORAL at 08:17

## 2025-03-22 RX ADMIN — TAMSULOSIN HYDROCHLORIDE 0.4 MG: 0.4 CAPSULE ORAL at 17:09

## 2025-03-22 RX ADMIN — OXYCODONE HYDROCHLORIDE AND ACETAMINOPHEN 500 MG: 500 TABLET ORAL at 08:17

## 2025-03-22 RX ADMIN — B-COMPLEX W/ C & FOLIC ACID TAB 1 TABLET: TAB at 08:17

## 2025-03-22 NOTE — PLAN OF CARE
Problem: PAIN - ADULT  Goal: Verbalizes/displays adequate comfort level or baseline comfort level  Description: Interventions:  - Encourage patient to monitor pain and request assistance  - Assess pain using appropriate pain scale  - Administer analgesics based on type and severity of pain and evaluate response  - Implement non-pharmacological measures as appropriate and evaluate response  - Consider cultural and social influences on pain and pain management  - Notify physician/advanced practitioner if interventions unsuccessful or patient reports new pain  Outcome: Progressing     Problem: INFECTION - ADULT  Goal: Absence or prevention of progression during hospitalization  Description: INTERVENTIONS:  - Assess and monitor for signs and symptoms of infection  - Monitor lab/diagnostic results  - [place] appropriate cooling/warming therapies per order  - Administer medications as ordered  - Instruct and encourage patient and family to use good hand hygiene technique  - Identify and instruct in appropriate isolation precautions for identified infection/condition  Outcome: Progressing

## 2025-03-22 NOTE — PROGRESS NOTES
03/22/25 0645   Pain Assessment   Pain Assessment Tool 0-10   Pain Score No Pain   Restrictions/Precautions   Precautions Fall Risk   RLE Weight Bearing Per Order TTWB   Braces or Orthoses Other (Comment)  (full leg cast RLE)   General   Change In Medical/Functional Status Per Pt, no changes from ortho appt. New cast applied.   Cognition   Overall Cognitive Status WFL   Arousal/Participation Alert;Cooperative   Attention Within functional limits   Orientation Level Oriented X4   Memory Within functional limits   Following Commands Follows all commands and directions without difficulty   Subjective   Subjective Pt stated since he will have a cast for another month, that he will most likely be going to assisted living.   Roll Left and Right   Type of Assistance Needed Independent   Roll Left and Right CARE Score 6   Sit to Lying   Type of Assistance Needed Independent   Sit to Lying CARE Score 6   Lying to Sitting on Side of Bed   Type of Assistance Needed Independent   Lying to Sitting on Side of Bed CARE Score 6   Sit to Stand   Type of Assistance Needed Independent   Sit to Stand CARE Score 6   Bed-Chair Transfer   Type of Assistance Needed Independent   Chair/Bed-to-Chair Transfer CARE Score 6   Car Transfer   Reason if not Attempted Environmental limitations   Car Transfer CARE Score 10   Walk 10 Feet   Type of Assistance Needed Independent   Walk 10 Feet CARE Score 6   Walk 50 Feet with Two Turns   Type of Assistance Needed Independent   Walk 50 Feet with Two Turns CARE Score 6   Walk 150 Feet   Type of Assistance Needed Independent   Walk 150 Feet CARE Score 6   Ambulation   Primary Mode of Locomotion Prior to Admission Walk   Distance Walked (feet) 150 ft  (TTWB)   Assist Device Roller Walker   Gait Pattern Antalgic;Slow Ghazal;Decreased foot clearance;Forward Flexion;Step through;Improper weight shift   Limitations Noted In Balance;Posture;Safety;Speed   Provided Assistance with: Balance   Walk Assist  Level Supervision   Does the patient walk? 2. Yes   Curb or Single Stair   Style negotiated Single stair   Type of Assistance Needed Independent   1 Step (Curb) CARE Score 6   4 Steps   Type of Assistance Needed Independent   4 Steps CARE Score 6   Stairs   Type Stairs   # of Steps 6   Weight Bearing Precautions TTWB;RLE   Assist Devices Roller Walker   Findings S   Therapeutic Interventions   Strengthening seated ther ex   Other standing tolerance   Assessment   Treatment Assessment Pt seated in bed to start session, pleasant and agreeable. Pt offers no complaints at this time, no pain. Ortho apt yesterday with no changes to WBS, new cast applied and staples removed. Pt able to complete all activity with S-MI. completed quad sets with no complication or pain. Pt amb 150'x2 with RW and TTWB,S. Pt seated in chair to end session, call bell in reach and all needs met. Pt will  benefit from continued skilled PT to improve over all strength and endurence.   PT Barriers   Physical Impairment Decreased strength;Decreased range of motion;Decreased endurance;Impaired balance;Decreased mobility;Impaired sensation;Obesity;Decreased skin integrity;Orthopedic restrictions;Pain   Functional Limitation Walking;Transfers;Standing;Stair negotiation;Ramp negotiation;Car transfers   Plan   Treatment/Interventions Functional transfer training;Elevations;LE strengthening/ROM;Therapeutic exercise;Endurance training;Bed mobility;Gait training   Progress Progressing toward goals   PT Therapy Minutes   PT Time In 0645   PT Time Out 0815   PT Total Time (minutes) 90   PT Mode of treatment - Individual (minutes) 90   PT Mode of treatment - Concurrent (minutes) 0   PT Mode of treatment - Group (minutes) 0   PT Mode of treatment - Co-treat (minutes) 0   PT Mode of Treatment - Total time(minutes) 90 minutes   PT Cumulative Minutes 930   Therapy Time missed   Time missed? No

## 2025-03-22 NOTE — NURSING NOTE
Awake and alert and oriented x4. R/a status resp easy rt full leg cast is maintained clean, dry and intact. Plus 2 rt pedal. Trace edema. Denies pain. No distress. Call bell near.

## 2025-03-22 NOTE — PLAN OF CARE
Problem: PAIN - ADULT  Goal: Verbalizes/displays adequate comfort level or baseline comfort level  Description: Interventions:  - Encourage patient to monitor pain and request assistance  - Assess pain using appropriate pain scale  - Administer analgesics based on type and severity of pain and evaluate response  - Implement non-pharmacological measures as appropriate and evaluate response  - Consider cultural and social influences on pain and pain management  - Notify physician/advanced practitioner if interventions unsuccessful or patient reports new pain  Outcome: Progressing     Problem: INFECTION - ADULT  Goal: Absence or prevention of progression during hospitalization  Description: INTERVENTIONS:  - Assess and monitor for signs and symptoms of infection  - Monitor lab/diagnostic results  - Monitor all insertion sites, i.e. indwelling lines, tubes, and drains  - Monitor endotracheal if appropriate and nasal secretions for changes in amount and color  - [place] appropriate cooling/warming therapies per order  - Administer medications as ordered  - Instruct and encourage patient and family to use good hand hygiene technique  - Identify and instruct in appropriate isolation precautions for identified infection/condition  Outcome: Progressing  Goal: Absence of fever/infection during neutropenic period  Description: INTERVENTIONS:  - Monitor WBC    Outcome: Progressing     Problem: SAFETY ADULT  Goal: Patient will remain free of falls  Description: INTERVENTIONS:  - Educate patient/family on patient safety including physical limitations  - Instruct patient to call for assistance with activity   - Consult OT/PT to assist with strengthening/mobility   - Keep Call bell within reach  - Keep bed low and locked with side rails adjusted as appropriate  - Keep care items and personal belongings within reach  - Initiate and maintain comfort rounds  - Make Fall Risk Sign visible to staff  Problem: DISCHARGE  PLANNING  Goal: Discharge to home or other facility with appropriate resources  Description: INTERVENTIONS:  - Identify barriers to discharge w/patient and caregiver  - Arrange for needed discharge resources and transportation as appropriate  - Identify discharge learning needs (meds, wound care, etc.)  - Arrange for interpretive services to assist at discharge as needed  - Refer to Case Management Department for coordinating discharge planning if the patient needs post-hospital services based on physician/advanced practitioner order or complex needs related to functional status, cognitive ability, or social support system  Outcome: Progressing     - Apply yellow socks and bracelet for high fall risk patients  - Consider moving patient to room near nurses station  Outcome: Progressing  Goal: Maintain or return to baseline ADL function  Description: INTERVENTIONS:  -  Assess patient's ability to carry out ADLs; assess patient's baseline for ADL function and identify physical deficits which impact ability to perform ADLs (bathing, care of mouth/teeth, toileting, grooming, dressing, etc.)  - Assess/evaluate cause of self-care deficits   - Assess range of motion  - Assess patient's mobility; develop plan if impaired  - Assess patient's need for assistive devices and provide as appropriate  - Encourage maximum independence but intervene and supervise when necessary  - Involve family in performance of ADLs  - Assess for home care needs following discharge   - Consider OT consult to assist with ADL evaluation and planning for discharge  - Provide patient education as appropriate  Outcome: Progressing

## 2025-03-23 RX ADMIN — TAMSULOSIN HYDROCHLORIDE 0.4 MG: 0.4 CAPSULE ORAL at 17:18

## 2025-03-23 RX ADMIN — METFORMIN ER 500 MG 1500 MG: 500 TABLET ORAL at 17:18

## 2025-03-23 RX ADMIN — PRAVASTATIN SODIUM 40 MG: 40 TABLET ORAL at 17:18

## 2025-03-23 RX ADMIN — B-COMPLEX W/ C & FOLIC ACID TAB 1 TABLET: TAB at 08:50

## 2025-03-23 RX ADMIN — FAMOTIDINE 20 MG: 20 TABLET, FILM COATED ORAL at 08:50

## 2025-03-23 RX ADMIN — ENOXAPARIN SODIUM 40 MG: 40 INJECTION SUBCUTANEOUS at 08:50

## 2025-03-23 RX ADMIN — LOSARTAN POTASSIUM 50 MG: 50 TABLET, FILM COATED ORAL at 08:50

## 2025-03-23 RX ADMIN — CHOLECALCIFEROL TAB 25 MCG (1000 UNIT) 5000 UNITS: 25 TAB at 08:50

## 2025-03-23 RX ADMIN — OXYCODONE HYDROCHLORIDE AND ACETAMINOPHEN 500 MG: 500 TABLET ORAL at 08:50

## 2025-03-23 NOTE — NURSING NOTE
Pt MOD-I w/ RW in room. Maintains TTWB to Rleg. Cast CDI. Wound care completed. Pt tolerated well. C/O 2/10 RLE pain. Denies PRN pain medication. Natasha DOEL.

## 2025-03-23 NOTE — NURSING NOTE
Awake and oriented x4. R/a status. Resp easy. Rt leg cast dry and intact. Plus 2 pedal to rt /l foot. Denies pain. Call bell near

## 2025-03-24 ENCOUNTER — APPOINTMENT (OUTPATIENT)
Dept: PHYSICAL THERAPY | Facility: CLINIC | Age: 70
End: 2025-03-24
Payer: OTHER MISCELLANEOUS

## 2025-03-24 LAB
ALBUMIN SERPL BCG-MCNC: 4 G/DL (ref 3.5–5)
ALP SERPL-CCNC: 58 U/L (ref 34–104)
ALT SERPL W P-5'-P-CCNC: 14 U/L (ref 7–52)
ANION GAP SERPL CALCULATED.3IONS-SCNC: 9 MMOL/L (ref 4–13)
AST SERPL W P-5'-P-CCNC: 17 U/L (ref 13–39)
BASOPHILS # BLD AUTO: 0.05 THOUSANDS/ÂΜL (ref 0–0.1)
BASOPHILS NFR BLD AUTO: 1 % (ref 0–1)
BILIRUB SERPL-MCNC: 0.49 MG/DL (ref 0.2–1)
BUN SERPL-MCNC: 22 MG/DL (ref 5–25)
CALCIUM SERPL-MCNC: 9.3 MG/DL (ref 8.4–10.2)
CHLORIDE SERPL-SCNC: 106 MMOL/L (ref 96–108)
CO2 SERPL-SCNC: 22 MMOL/L (ref 21–32)
CREAT SERPL-MCNC: 1.12 MG/DL (ref 0.6–1.3)
EOSINOPHIL # BLD AUTO: 0.2 THOUSAND/ÂΜL (ref 0–0.61)
EOSINOPHIL NFR BLD AUTO: 3 % (ref 0–6)
ERYTHROCYTE [DISTWIDTH] IN BLOOD BY AUTOMATED COUNT: 12.4 % (ref 11.6–15.1)
GFR SERPL CREATININE-BSD FRML MDRD: 67 ML/MIN/1.73SQ M
GLUCOSE SERPL-MCNC: 92 MG/DL (ref 65–140)
HCT VFR BLD AUTO: 42.7 % (ref 36.5–49.3)
HGB BLD-MCNC: 13.8 G/DL (ref 12–17)
IMM GRANULOCYTES # BLD AUTO: 0.02 THOUSAND/UL (ref 0–0.2)
IMM GRANULOCYTES NFR BLD AUTO: 0 % (ref 0–2)
LYMPHOCYTES # BLD AUTO: 2.59 THOUSANDS/ÂΜL (ref 0.6–4.47)
LYMPHOCYTES NFR BLD AUTO: 32 % (ref 14–44)
MCH RBC QN AUTO: 29.1 PG (ref 26.8–34.3)
MCHC RBC AUTO-ENTMCNC: 32.3 G/DL (ref 31.4–37.4)
MCV RBC AUTO: 90 FL (ref 82–98)
MONOCYTES # BLD AUTO: 0.87 THOUSAND/ÂΜL (ref 0.17–1.22)
MONOCYTES NFR BLD AUTO: 11 % (ref 4–12)
NEUTROPHILS # BLD AUTO: 4.31 THOUSANDS/ÂΜL (ref 1.85–7.62)
NEUTS SEG NFR BLD AUTO: 53 % (ref 43–75)
NRBC BLD AUTO-RTO: 0 /100 WBCS
PLATELET # BLD AUTO: 338 THOUSANDS/UL (ref 149–390)
PMV BLD AUTO: 9.6 FL (ref 8.9–12.7)
POTASSIUM SERPL-SCNC: 4.6 MMOL/L (ref 3.5–5.3)
PROT SERPL-MCNC: 7 G/DL (ref 6.4–8.4)
RBC # BLD AUTO: 4.74 MILLION/UL (ref 3.88–5.62)
SODIUM SERPL-SCNC: 137 MMOL/L (ref 135–147)
WBC # BLD AUTO: 8.04 THOUSAND/UL (ref 4.31–10.16)

## 2025-03-24 PROCEDURE — 97530 THERAPEUTIC ACTIVITIES: CPT | Performed by: PHYSICAL THERAPIST

## 2025-03-24 PROCEDURE — 97530 THERAPEUTIC ACTIVITIES: CPT

## 2025-03-24 PROCEDURE — 85025 COMPLETE CBC W/AUTO DIFF WBC: CPT

## 2025-03-24 PROCEDURE — 97110 THERAPEUTIC EXERCISES: CPT

## 2025-03-24 PROCEDURE — 80053 COMPREHEN METABOLIC PANEL: CPT

## 2025-03-24 PROCEDURE — 97535 SELF CARE MNGMENT TRAINING: CPT

## 2025-03-24 PROCEDURE — 97110 THERAPEUTIC EXERCISES: CPT | Performed by: PHYSICAL THERAPIST

## 2025-03-24 PROCEDURE — 97116 GAIT TRAINING THERAPY: CPT | Performed by: PHYSICAL THERAPIST

## 2025-03-24 PROCEDURE — 99232 SBSQ HOSP IP/OBS MODERATE 35: CPT | Performed by: PHYSICAL MEDICINE & REHABILITATION

## 2025-03-24 RX ADMIN — TAMSULOSIN HYDROCHLORIDE 0.4 MG: 0.4 CAPSULE ORAL at 17:00

## 2025-03-24 RX ADMIN — LOSARTAN POTASSIUM 50 MG: 50 TABLET, FILM COATED ORAL at 08:03

## 2025-03-24 RX ADMIN — OXYCODONE HYDROCHLORIDE AND ACETAMINOPHEN 500 MG: 500 TABLET ORAL at 08:03

## 2025-03-24 RX ADMIN — METFORMIN ER 500 MG 1500 MG: 500 TABLET ORAL at 17:00

## 2025-03-24 RX ADMIN — PRAVASTATIN SODIUM 40 MG: 40 TABLET ORAL at 17:01

## 2025-03-24 RX ADMIN — B-COMPLEX W/ C & FOLIC ACID TAB 1 TABLET: TAB at 08:03

## 2025-03-24 RX ADMIN — FAMOTIDINE 20 MG: 20 TABLET, FILM COATED ORAL at 08:03

## 2025-03-24 RX ADMIN — CHOLECALCIFEROL TAB 25 MCG (1000 UNIT) 5000 UNITS: 25 TAB at 08:03

## 2025-03-24 RX ADMIN — ENOXAPARIN SODIUM 40 MG: 40 INJECTION SUBCUTANEOUS at 08:02

## 2025-03-24 NOTE — PROGRESS NOTES
"Progress Note - PMR   Name: Tobin Kerns 69 y.o. male I MRN: [id number]  Unit/Bed#: -01 I Date of Admission: 3/11/2025   Date of Service: 3/24/2025 I Hospital Day: 13     Assessment & Plan  Quadriceps muscle rupture, right, subsequent encounter  HPI:   He initially underwent quad tendon repair on 7/25/24 with Dr. Foster. He was discharged home with outpatient PT.  Later noted to have recurrent high-grade tear with extensor lag on exam and elected for operative management  January 2025 MRI: \"Prior quadriceps insertion repair with high-grade recurrent tear exhibiting up to 2.2 cm of tendon retraction.\"  On 3/6/25, he underwent revision of quad tendon repair with allograft augmentation  3/21: outpatient follow-up with Dr. Foster, new cast applied     Plan:   PT and OT for 3 hours a day, 5-6 days a week   TTWB RLE   Avoid moisture to cast   Pain: PRN Tylenol (monitor LFTs) and oxycodone discontinued on 3/17 due to minimal pain   Lovenox for DVT prophylaxis while inpatient   Hypertension  Continue losartan 50 mg daily   Monitor BP   Mixed hyperlipidemia  Continue pravastatin 40 mg daily   Gastro-esophageal reflux disease without esophagitis  Continue famotidine 20 mg daily   Peripheral neuropathy  Patient is not diabetic   Monitor skin for new or worsening wounds   JOVANY on CPAP  Continue CPAP qHS  IFG (impaired fasting glucose)  Continue metformin 1500 mg daily   Seen by RD 3/12, diet adjusted to regular   3/12 A1c: 5.4   Healed ulcer of left foot  Local wound care  Present on admission   Wound care RN consulted   BPH (benign prostatic hyperplasia)  Continue tamsulosin 0.4 mg daily   Patient currently voiding without difficulty   PRN PVRs if urinary retention is suspected   Impaired mobility and ADLs  Patient was evaluated by the rehabilitation team MD and an appropriate candidate for acute inpatient rehabilitation program at this time.  The patient will tolerate 3 hours/day 5 to 7 days/week of intensive " physical and  occupational therapy   in order to obtain goals for community discharge  Due to the patient's functional Compared to their baseline level of function in addition to their ongoing medical needs, the patient would benefit from daily supervision from a rehabilitation physician as well as rehabilitation nursing to implement and adjust the medical as well as functional plan of care in order to meet the patient's goals.  Bladder: Monitor closely for urinary incontinence and/or retention. Monitor urine output and encourage spontaneous voids. If unable to void spontaneously, will monitor with PVR bladder scans and initiate CIC regimen.  Skin: Monitor for breakdown, frequent turns, pressure offloading  Sleep: Encourage sleep hygiene (routine that promotes healthy circadian rhythm,avoid caffeine later in the day, quiet/dark room at night, reduce electronic before bedtime)      Wound of right ankle  Skin breakdown on R heel due to cast. Ortho removed approximately 1 inch of cast at posterior aspect on 3/11  New cast 3/21   Local wound care  Continue to monitor   Present on admission to [facility]   Wound care RN consulted:  Skin Care orders:  1-Hydraguard to sacrum, buttocks bid and prn   2-EHOB / waffle  cushion when out of bed in chair.  3-Moisturize skin daily with skin nourishing cream  4-Elevate heels to offload pressure.  5-Turn/reposition q2h for pressure re-distribution on skin  6. Right posterior heel , right medial ankle and right lateral ankle - cleanse with Normal saline then apply xeroform then top with the silicone foam nate with a T and date change every other day as needed for soilage or dislodgement   7. Left plantar aspect of the foot - apply betadine paint then a AND and olga wrap change every other day      Subjective   Tobin Kerns is a 69 year-old male, with a past medical history of hypertension, hyperlipidemia, JOVANY, impaired fasting glucose, bilateral lower extremity neuropathy, GERD, BPH,  presenting to [facility] after an elective right quadricept tendon repair. He initially underwent quad tendon repair on 7/25/24 with Dr. Foster. He was discharged home with outpatient PT. He was noted to have recurrent high-grade tear on repeat MRI with extensor lag on exam and elected for operative management. On 3/6/25, he underwent revision of quad tendon repair with allograft augmentation. He was evaluated by PT and OT and deemed an appropriate candidate for ARC due to functional deficits.     Chief Complaint: f/u ambulatory dysfunction    Interval:   Seen and evaluated patient in room. He denies pain or any concerns. Last BM 3/23   Discussion: Plan for return home vs DC to assistive living with [facility] for PT, OT, and nursing. Anticipated Discharge Date:  March 25, 2025  3/24 CBC and CMP WNL     Objective :  Temp:  [97.8 °F (36.6 °C)-98.9 °F (37.2 °C)] 98.9 °F (37.2 °C)  HR:  [69-70] 69  BP: (117-118)/(56-72) 117/72  Resp:  [16-17] 17  SpO2:  [96 %-97 %] 97 %  O2 Device: None (Room air)    Functional Update:  Physical Therapy Occupational Therapy   Weight Bearing Status: Toe touch (R LE)  Transfers: Independent  Bed Mobility: Independent  Amulation Distance (ft): 180 feet  Ambulation: Independent  Assistive Device for Ambulation: Roller Walker  Wheelchair Mobility Distance:  (n/a)  Wheelchair Mobility:  (n/a)  Number of Stairs: 6  Assistive Device for Stairs: Lehft Hand Rail  Stair Assistance: Independent  Ramp: Independent  Assistive Device for Ramp: Roller Walker  Discharge Recommendations: Home with:  DC Home with::  (alternative placement)   Eating: Independent  Grooming: Independent  Bathing: Independent  Bathing: Independent  Upper Body Dressing: Independent  Lower Body Dressing: Independent  Toileting: Independent  Tub/Shower Transfer:  (TBA;pt declines covering leg for showering)  Toilet Transfer: Independent  Cognition: Within Defined Limits  Orientation: Person, Situation, Place, Time           Physical  Exam  Vitals and nursing note reviewed.   Constitutional:       General: He is not in acute distress.     Appearance: He is obese. He is not ill-appearing or diaphoretic.   HENT:      Head: Normocephalic and atraumatic.      Nose: Nose normal.      Mouth/Throat:      Mouth: Mucous membranes are moist.   Cardiovascular:      Pulses:           Dorsalis pedis pulses are 2+ on the right side and 2+ on the left side.   Pulmonary:      Effort: Pulmonary effort is normal. No respiratory distress.   Abdominal:      General: There is no distension.   Skin:     General: Skin is warm and dry.      Comments: No new R ankle wounds noted    Neurological:      General: No focal deficit present.      Mental Status: He is alert.   Psychiatric:         Mood and Affect: Mood normal.         Behavior: Behavior normal.         Scheduled Meds:  Current Facility-Administered Medications   Medication Dose Route Frequency Provider Last Rate    acetaminophen  650 mg Oral Q6H PRN Modesta Lee PA-C      ascorbic acid  500 mg Oral Daily Toño Brito MD      Cholecalciferol  5,000 Units Oral Daily Toño Brito MD      enoxaparin  40 mg Subcutaneous Q24H [facility] Toño Brito MD      famotidine  20 mg Oral Daily Toño Brito MD      losartan  50 mg Oral Daily Toño Brito MD      metFORMIN  1,500 mg Oral Daily With Dinner Toño Brito MD      multivitamin stress formula  1 tablet Oral Daily Toño Brito MD      polyethylene glycol  17 g Oral Daily PRN Modesta Lee PA-C      pravastatin  40 mg Oral Daily With Dinner Toño Brito MD      tamsulosin  0.4 mg Oral Daily With Dinner Toño Brito MD           Lab Results: I have reviewed the following results:  Results from last 7 days   Lab Units 03/24/25  0516   HEMOGLOBIN g/dL 13.8   HEMATOCRIT % 42.7   WBC Thousand/uL 8.04   PLATELETS Thousands/uL 338     Results from last 7 days   Lab Units 03/24/25  0516   BUN mg/dL 22   SODIUM mmol/L 137   POTASSIUM mmol/L 4.6   CHLORIDE mmol/L 106    CREATININE mg/dL 1.12   AST U/L 17   ALT U/L 14            Modesta Lee PA-C  Physical Medicine and Rehabilitation  [facility]

## 2025-03-24 NOTE — CASE MANAGEMENT
CM called patient's workmen's comp [phone], spoke with Kristopher, who gave this CM the correct claim ID# [id number], and directed Cm to call [facility] for any authorizations needed for this case. CM called [facility] [phone], spoke with Cory, who stated he did not have nay information on the ARC stay on this patient.  CM inquired if  number would be the person to call, Cory stated yes. CM called [employer] ([employer])  number, x 2 gave correct claim #, left message on voice mail with contact information, requesting a return phone call to discuss this workers comp case and discharge disposition options.

## 2025-03-24 NOTE — PROGRESS NOTES
03/24/25 1230   Pain Assessment   Pain Assessment Tool 0-10   Pain Score No Pain   Restrictions/Precautions   Precautions Fall Risk   RLE Weight Bearing Per Order TTWB   Braces or Orthoses   (R leg cast)   Cognition   Orientation Level Oriented X4   Subjective   Subjective has another ortho appt upcoming.  Pt reports he is unclear of discharge yet.   Roll Left and Right   Type of Assistance Needed Independent   Physical Assistance Level No physical assistance   Roll Left and Right CARE Score 6   Sit to Lying   Type of Assistance Needed Independent   Physical Assistance Level No physical assistance   Sit to Lying CARE Score 6   Lying to Sitting on Side of Bed   Type of Assistance Needed Independent   Physical Assistance Level No physical assistance   Lying to Sitting on Side of Bed CARE Score 6   Sit to Stand   Type of Assistance Needed Independent   Physical Assistance Level No physical assistance   Comment RW   Sit to Stand CARE Score 6   Bed-Chair Transfer   Type of Assistance Needed Independent   Physical Assistance Level No physical assistance   Comment RW   Chair/Bed-to-Chair Transfer CARE Score 6   Car Transfer   Comment FILIPE - SUV   Reason if not Attempted Environmental limitations   Car Transfer CARE Score 10   Walk 10 Feet   Type of Assistance Needed Independent   Physical Assistance Level No physical assistance   Comment RW   Walk 10 Feet CARE Score 6   Walk 50 Feet with Two Turns   Type of Assistance Needed Independent   Physical Assistance Level No physical assistance   Comment RW   Walk 50 Feet with Two Turns CARE Score 6   Walk 150 Feet   Type of Assistance Needed Independent   Physical Assistance Level No physical assistance   Comment RW   Walk 150 Feet CARE Score 6   Walking 10 Feet on Uneven Surfaces   Type of Assistance Needed Independent   Physical Assistance Level No physical assistance   Comment RW, green foam   Walking 10 Feet on Uneven Surfaces CARE Score 6   Ambulation   Primary Mode of  Locomotion Prior to Admission Walk   Distance Walked (feet) 200 ft   Assist Device Roller Walker   Findings TTWBing right LE   Does the patient walk? 2. Yes   Wheel 50 Feet with Two Turns   Reason if not Attempted Activity not applicable   Wheel 50 Feet with Two Turns CARE Score 9   Wheel 150 Feet   Reason if not Attempted Activity not applicable   Wheel 150 Feet CARE Score 9   Curb or Single Stair   Style negotiated Single stair   Type of Assistance Needed Independent   Physical Assistance Level No physical assistance   Comment place RW at bottom step, TTWBing   1 Step (Curb) CARE Score 6   4 Steps   Type of Assistance Needed Independent   Physical Assistance Level No physical assistance   Comment TWBing R LE   4 Steps CARE Score 6   Stairs   Type Stairs   # of Steps 6   Weight Bearing Precautions TTWB;RLE   Picking Up Object   Type of Assistance Needed Independent   Physical Assistance Level No physical assistance   Picking Up Object CARE Score 6   Therapeutic Interventions   Strengthening Seated LE TE, 2.5# on left LE, blue t-band   Equipment Use   NuStep L5, 10 mins as prior   Assessment   Treatment Assessment Pt presents supine in bed, NAD.  He continues to demonstrate independence with mobliity on flat surfaces.  MI on stair .  Cont per POC and progress as tolerated.  No concerns with QS noted this date.  cont per POC.   Problem List Decreased strength;Decreased range of motion;Decreased mobility;Impaired balance;Orthopedic restrictions   PT Barriers   Physical Impairment Decreased strength;Decreased range of motion;Decreased endurance;Impaired balance;Decreased mobility;Impaired sensation;Obesity;Decreased skin integrity;Orthopedic restrictions;Pain   Functional Limitation Walking;Transfers;Standing;Stair negotiation;Ramp negotiation;Car transfers   Plan   Treatment/Interventions ADL retraining;Functional transfer training;LE strengthening/ROM;Elevations;Therapeutic exercise;Endurance  training;Patient/family training;Equipment eval/education;Bed mobility;Gait training   Progress Improving as expected   PT Therapy Minutes   PT Time In 1230   PT Time Out 1400   PT Total Time (minutes) 90   PT Mode of treatment - Individual (minutes) 30   PT Mode of treatment - Concurrent (minutes) 60   PT Mode of treatment - Group (minutes) 0   PT Mode of treatment - Co-treat (minutes) 0   PT Mode of Treatment - Total time(minutes) 90 minutes   PT Cumulative Minutes 1020     Returned to supine, NAD.  All needs in reach.

## 2025-03-24 NOTE — ASSESSMENT & PLAN NOTE
"HPI:   He initially underwent quad tendon repair on 7/25/24 with Dr. Foster. He was discharged home with outpatient PT.  Later noted to have recurrent high-grade tear with extensor lag on exam and elected for operative management  January 2025 MRI: \"Prior quadriceps insertion repair with high-grade recurrent tear exhibiting up to 2.2 cm of tendon retraction.\"  On 3/6/25, he underwent revision of quad tendon repair with allograft augmentation  3/21: outpatient follow-up with Dr. Foster, new cast applied     Plan:   PT and OT for 3 hours a day, 5-6 days a week   TTWB RLE   Avoid moisture to cast   Pain: PRN Tylenol (monitor LFTs) and oxycodone discontinued on 3/17 due to minimal pain   Lovenox for DVT prophylaxis while inpatient   "

## 2025-03-24 NOTE — PROGRESS NOTES
"Progress Note - Tobin Kerns 69 y.o. male MRN: [id number]    Unit/Bed#: [facility] 210-01 Encounter: [id number]        Subjective:   Patient seen and examined at bedside after reviewing the chart and discussing the case with the caring staff.      Patient examined at bedside.  Patient has no acute symptoms.    On review of patient's labs from 3/24/2025.  Patient's CMP and CBC were found to be within normal limits.    Physical Exam   Vitals: Blood pressure 117/72, pulse 69, temperature 98.9 °F (37.2 °C), temperature source Temporal, resp. rate 17, height 6' 5\" (1.956 m), weight (!) 137 kg (300 lb 14.9 oz), SpO2 97%.,Body mass index is 35.68 kg/m².  Constitutional: Patient in no acute distress.  HEENT: PERR, EOMI, MMM.  Cardiovascular: Normal rate and regular rhythm.    Pulmonary/Chest: Effort normal and breath sounds normal.   Abdomen: Soft, + BS, NT.    Assessment/Plan:  Tobin Kerns is a(n) 69 y.o. year old male who is s/p quadriceps tendon repair.     Cardiac with hx of HTN, HLD.  Patient is on losartan 50 mg daily, pravastatin 40 mg daily.  Impaired fasting glucose.  Hgb a1c 5.4% on 3/12/25.  Continue metformin XR 1500 mg daily with dinner.  Regular diet and d/c accucheks as blood sugar is well controlled.    BPH.  Continue Flomax 0.4 mg daily.  GERD.  Continue Pepcid 20 mg daily.  JOVANY.  On CPAP at bedtime.   Vitamin D deficiency.  Patient is on vitamin D3 5000 units daily.  Pain and bowel management.  As per physiatrist.  Quadriceps tendon repair.  Toe touch weightbearing RLE with use of walker.  Patient is receiving intensive PT OT as per physiatrist.    Anticipated date of discharge.  TBD  "

## 2025-03-24 NOTE — ASSESSMENT & PLAN NOTE
Skin breakdown on R heel due to cast. Ortho removed approximately 1 inch of cast at posterior aspect on 3/11  New cast 3/21   Local wound care  Continue to monitor   Present on admission to [facility]   Wound care RN consulted:  Skin Care orders:  1-Hydraguard to sacrum, buttocks bid and prn   2-EHOB / waffle  cushion when out of bed in chair.  3-Moisturize skin daily with skin nourishing cream  4-Elevate heels to offload pressure.  5-Turn/reposition q2h for pressure re-distribution on skin  6. Right posterior heel , right medial ankle and right lateral ankle - cleanse with Normal saline then apply xeroform then top with the silicone foam nate with a T and date change every other day as needed for soilage or dislodgement   7. Left plantar aspect of the foot - apply betadine paint then a AND and olga wrap change every other day

## 2025-03-24 NOTE — PROGRESS NOTES
03/24/25 0900   Pain Assessment   Pain Assessment Tool 0-10   Pain Score No Pain   Restrictions/Precautions   Precautions Fall Risk   RLE Weight Bearing Per Order TTWB   Braces or Orthoses   (R leg cast)   Oral Hygiene   Type of Assistance Needed Independent   Physical Assistance Level No physical assistance   Oral Hygiene CARE Score 6   Grooming   Able To Initiate Tasks;Acquire Items;Wash/Dry Face;Comb/Brush Hair;Brush/Clean Teeth;Wash/Dry Hands   Findings stance at sink   Shower/Bathe Self   Type of Assistance Needed Independent   Physical Assistance Level No physical assistance   Shower/Bathe Self CARE Score 6   Bathing   Assessed Bath Style Sponge Bath   Able to Adjust Water Temperature Yes   Able to Wash/Rinse/Dry (body part) Left Arm;Right Arm;L Upper Leg;R Upper Leg;L Lower Leg/Foot;Chest;Abdomen;Perineal Area;Buttocks   Limitations Noted in Balance;ROM   Positioning Seated;Standing   Adaptive Equipment Longhand Sponge   Upper Body Dressing   Type of Assistance Needed Independent   Physical Assistance Level No physical assistance   Upper Body Dressing CARE Score 6   Lower Body Dressing   Type of Assistance Needed Independent;Adaptive equipment   Physical Assistance Level No physical assistance   Lower Body Dressing CARE Score 6   Putting On/Taking Off Footwear   Type of Assistance Needed Independent;Adaptive equipment   Physical Assistance Level No physical assistance   Putting On/Taking Off Footwear CARE Score 6   Picking Up Object   Type of Assistance Needed Independent;Adaptive equipment   Physical Assistance Level No physical assistance   Picking Up Object CARE Score 6   Dressing/Undressing Clothing   Able to  Obtain Clothing;Store removed clothing   Remove UB Clothes Pullover Shirt   Don UB Clothes Pullover Shirt   Remove LB Clothes Undergarment;Socks   Don LB Clothes Shorts;Undergarment;Socks   Limitations Noted In Balance;ROM;Strength   Adaptive Equipment Reacher;Sock Aide   Positioning Supported  Sit;Standing   Lying to Sitting on Side of Bed   Type of Assistance Needed Independent   Physical Assistance Level No physical assistance   Lying to Sitting on Side of Bed CARE Score 6   Sit to Stand   Type of Assistance Needed Independent   Physical Assistance Level No physical assistance   Sit to Stand CARE Score 6   Toileting Hygiene   Type of Assistance Needed Independent   Physical Assistance Level No physical assistance   Toileting Hygiene CARE Score 6   Toileting   Able to Pull Clothing down yes, up yes.   Manage Hygiene Bladder   Limitations Noted In Balance;ROM;LE Strength   Adaptive Equipment Grab Bar   Toilet Transfer   Type of Assistance Needed Independent;Adaptive equipment   Physical Assistance Level No physical assistance   Toilet Transfer CARE Score 6   Toilet Transfer   Surface Assessed Raised Toilet   Transfer Technique Standard   Limitations Noted In Balance;ROM;LE Strength   Adaptive Equipment Grab Bar;Walker   Light Housekeeping   Light Housekeeping Level Walker   Light Housekeeping Level of Assistance Modified independent   Light Housekeeping Pt completed laundry by gathering items in bag and transporting them to wash machine to load   Exercise Tools   Hand Gripper heavy resistance gripper 2 sets x 40 reps   Other Exercise Tool 1 red t bar 2x15 upward then downward   Other Exercise Tool 2 6# dowel 2x15 reps in 6 planes of motion   Cognition   Overall Cognitive Status WFL   Arousal/Participation Alert;Cooperative   Attention Within functional limits   Orientation Level Oriented X4   Memory Within functional limits   Following Commands Follows all commands and directions without difficulty   Additional Activities   Additional Activities Comments fxnl mobility MI RW with TTWB R LE   Activity Tolerance   Activity Tolerance Patient tolerated treatment well   Assessment   Treatment Assessment ADL completed via sponge bathe level: Pt able to s/u and c/u all supplies. Pt demon consistent recall of   compensatory strategies and compliance with safe techniques throughout all ADL/iADLs. Details listed in respective sections of note. Pt tolerated session well. Pt maintained TTWB during txfs and mobility.  Pt then completed TE for generalized strength and endurance.  Pt is a motivated participant in therapy. Pt's barriers to d/c include decreased strength RLE s/p quadricepts rupture, decreased ROM, decreased balance, and TTWB RLE. Plan is contingent upon placement due to noted barriers.   Prognosis Good   Problem List Decreased strength;Decreased range of motion;Decreased mobility;Impaired balance;Orthopedic restrictions   Plan   Treatment/Interventions ADL retraining;Functional transfer training;Therapeutic exercise;Endurance training;Gait training;Bed mobility;Equipment eval/education;Patient/family training;Spoke to nursing;OT   Progress Improving as expected   OT Therapy Minutes   OT Time In 0900   OT Time Out 1038   OT Total Time (minutes) 98   OT Mode of treatment - Individual (minutes) 98   OT Mode of treatment - Concurrent (minutes) 0   OT Mode of treatment - Group (minutes) 0   OT Mode of treatment - Co-treat (minutes) 0   OT Mode of Treatment - Total time(minutes) 98 minutes   OT Cumulative Minutes 677   Therapy Time missed   Time missed? No

## 2025-03-25 PROCEDURE — 97535 SELF CARE MNGMENT TRAINING: CPT

## 2025-03-25 PROCEDURE — 97116 GAIT TRAINING THERAPY: CPT | Performed by: PHYSICAL THERAPIST

## 2025-03-25 PROCEDURE — 97110 THERAPEUTIC EXERCISES: CPT | Performed by: PHYSICAL THERAPIST

## 2025-03-25 PROCEDURE — 97110 THERAPEUTIC EXERCISES: CPT

## 2025-03-25 PROCEDURE — 97530 THERAPEUTIC ACTIVITIES: CPT | Performed by: PHYSICAL THERAPIST

## 2025-03-25 PROCEDURE — 99232 SBSQ HOSP IP/OBS MODERATE 35: CPT | Performed by: PHYSICAL MEDICINE & REHABILITATION

## 2025-03-25 RX ADMIN — CHOLECALCIFEROL TAB 25 MCG (1000 UNIT) 5000 UNITS: 25 TAB at 08:53

## 2025-03-25 RX ADMIN — B-COMPLEX W/ C & FOLIC ACID TAB 1 TABLET: TAB at 08:53

## 2025-03-25 RX ADMIN — OXYCODONE HYDROCHLORIDE AND ACETAMINOPHEN 500 MG: 500 TABLET ORAL at 08:53

## 2025-03-25 RX ADMIN — PRAVASTATIN SODIUM 40 MG: 40 TABLET ORAL at 17:24

## 2025-03-25 RX ADMIN — LOSARTAN POTASSIUM 50 MG: 50 TABLET, FILM COATED ORAL at 08:53

## 2025-03-25 RX ADMIN — TAMSULOSIN HYDROCHLORIDE 0.4 MG: 0.4 CAPSULE ORAL at 17:24

## 2025-03-25 RX ADMIN — FAMOTIDINE 20 MG: 20 TABLET, FILM COATED ORAL at 08:53

## 2025-03-25 RX ADMIN — ENOXAPARIN SODIUM 40 MG: 40 INJECTION SUBCUTANEOUS at 08:52

## 2025-03-25 RX ADMIN — METFORMIN ER 500 MG 1500 MG: 500 TABLET ORAL at 17:24

## 2025-03-25 NOTE — ASSESSMENT & PLAN NOTE
"HPI:   He initially underwent quad tendon repair on 7/25/24 with Dr. Foster. He was discharged home with outpatient PT.  Later noted to have recurrent high-grade tear with extensor lag on exam and elected for operative management  January 2025 MRI: \"Prior quadriceps insertion repair with high-grade recurrent tear exhibiting up to 2.2 cm of tendon retraction.\"  On 3/6/25, he underwent revision of quad tendon repair with allograft augmentation  3/21: outpatient follow-up with Dr. Foster, new cast applied     Plan:   PT and OT for 3 hours a day, 5-6 days a week   TTWB RLE   Avoid moisture to cast   Pain: PRN Tylenol (monitor LFTs) and oxycodone discontinued on 3/17 due to minimal pain   Per ortho,  Lovenox for 2 more weeks for DVT prophylaxis (until 4/4)   "

## 2025-03-25 NOTE — WOUND OSTOMY CARE
Progress Note - Wound   Tobin PATE Scisco 69 y.o. male MRN: [id number]  Unit/Bed#: [facility] 210-01 Encounter: [id number]        Assessment: Patient is seen for weekly wound care assessment . Patient is in the bed for the assessment and reports that last week the cast was redone on 3/21/25 by orthopedics . He is continent of bowel and bladder . He reports that his sacral buttocks has no problems .       Assessment Findings  Patient did not want the sacral buttocks assessed and reports he is fine   Left heel dry and intact   Left plantar aspect - 2 dry intact stable eschar areas from previous wounds covered in brown adherent tissue . No fluctuance or drainage . No redness .  Right posterior heel - POA DTI area that is dry and intact dry scabbed area and noted new pink intact tissue .. No drainage . Area appears resolved .   Right medial aspect of the foot - area is resolved scabbed area . Area is away now from the new cast .  Right lateral aspect of the foot - area is a resolved scabbed area . Area is away from the cast edge now with the new cast     Skin Care orders:  1-Hydraguard to sacrum, buttocks bid and prn   2-EHOB / waffle  cushion when out of bed in chair.  3-Moisturize skin daily with skin nourishing cream  4-Elevate heels to offload pressure.  5-Turn/reposition q2h for pressure re-distribution on skin  6. Right posterior heel cleanse with soap and water then pat dry apply cavilon 3 M no sting then a silicone foam nate with a P and date change every other day   7. Left plantar aspect of the foot - cleanse with soap and water then apply a silicone foam nate with a T and date change every other day       Wound 03/06/25 Plantar Left (Active)   Wound Image   03/25/25 0832   Wound Description Dry;Intact;Brown 03/25/25 0832   Non-staged Wound Description Not applicable 03/25/25 0832   Wound Length (cm) 0.8 cm 03/25/25 0832   Wound Width (cm) 3.8 cm 03/25/25 0832   Wound Depth (cm) 0 cm 03/25/25 0832   Wound Surface Area  (cm^2) 3.04 cm^2 03/25/25 0832   Wound Volume (cm^3) 0 cm^3 03/25/25 0832   Calculated Wound Volume (cm^3) 0 cm^3 03/25/25 0832   Change in Wound Size % 100 03/25/25 0832   Drainage Amount None 03/25/25 0832   Drainage Description Brown 03/18/25 0934   Audrey-wound Assessment Clean;Dry;Intact 03/23/25 0900   Treatments Site care 03/25/25 0832   Wound Site Closure None 03/23/25 0900   Dressing Foam, Silicon (eg. Allevyn, etc) 03/25/25 0832   Wound packed? No 03/23/25 0900   Dressing Changed Changed 03/25/25 0832   Patient Tolerance Tolerated well 03/25/25 0832   Dressing Status Clean;Dry;Intact 03/23/25 1911       Wound 03/10/25 Pressure Injury Ankle Posterior;Right (Active)   Wound Image   03/25/25 0835   Wound Description Clean;Dry;Intact 03/23/25 1911   Pressure Injury Stage DTPI 03/18/25 0931   Non-staged Wound Description Partial thickness 03/23/25 0900   Wound Length (cm) 0 cm 03/25/25 0835   Wound Width (cm) 0 cm 03/25/25 0835   Wound Depth (cm) 0 cm 03/25/25 0835   Wound Surface Area (cm^2) 0 cm^2 03/25/25 0835   Wound Volume (cm^3) 0 cm^3 03/25/25 0835   Calculated Wound Volume (cm^3) 0 cm^3 03/25/25 0835   Change in Wound Size % 100 03/25/25 0835   Drainage Amount None 03/23/25 1911   Drainage Description Serosanguineous 03/23/25 0900   Audrey-wound Assessment Clean;Dry;Intact 03/23/25 0900   Treatments Site care 03/23/25 0900   Wound Site Closure None 03/23/25 0900   Dressing Foam, Silicon (eg. Allevyn, etc) 03/24/25 0800   Dressing Changed Changed 03/23/25 0900   Patient Tolerance Tolerated well 03/23/25 0900   Dressing Status Clean;Dry;Intact 03/23/25 1911       Wound 03/18/25 Ankle Anterior;Right;Medial (Active)   Wound Image   03/25/25 0828   Wound Description Dry;Intact 03/25/25 0828   Non-staged Wound Description Not applicable 03/25/25 0828   Wound Length (cm) 0 cm 03/25/25 0828   Wound Width (cm) 0 cm 03/25/25 0828   Wound Depth (cm) 0 cm 03/25/25 0828   Wound Surface Area (cm^2) 0 cm^2 03/25/25 0828    Wound Volume (cm^3) 0 cm^3 03/25/25 0828   Calculated Wound Volume (cm^3) 0 cm^3 03/25/25 0828   Change in Wound Size % 100 03/25/25 0828   Drainage Amount None 03/25/25 0828   Drainage Description Serosanguineous 03/18/25 0926   Audrey-wound Assessment Clean;Dry;Intact 03/25/25 0828   Treatments Other (Comment) 03/25/25 0828   Dressing Open to air 03/25/25 0853   Dressing Changed Changed 03/20/25 0858   Patient Tolerance Tolerated well 03/25/25 0828   Dressing Status Dry;Clean;Intact 03/23/25 1911       Wound 03/18/25 Ankle Anterior;Right;Lateral (Active)   Wound Image   03/25/25 0829   Wound Description Clean;Dry;Intact 03/25/25 0829   Non-staged Wound Description Not applicable 03/25/25 0829   Wound Length (cm) 0 cm 03/25/25 0829   Wound Width (cm) 0 cm 03/25/25 0829   Wound Depth (cm) 0 cm 03/25/25 0829   Wound Surface Area (cm^2) 0 cm^2 03/25/25 0829   Wound Volume (cm^3) 0 cm^3 03/25/25 0829   Calculated Wound Volume (cm^3) 0 cm^3 03/25/25 0829   Change in Wound Size % 100 03/25/25 0829   Drainage Amount None 03/25/25 0829   Drainage Description Serosanguineous 03/18/25 0929   Audrey-wound Assessment Clean;Dry;Intact 03/25/25 0829   Treatments Other (Comment) 03/25/25 0829   Dressing Open to air 03/25/25 0853   Dressing Changed Changed 03/20/25 0858   Patient Tolerance Tolerated well 03/25/25 0829   Dressing Status Clean;Dry;Intact 03/21/25 2200       Wound care will follow weekly secure chat with questions or concerns     Kymberly Snowden RN BSN CWOCN

## 2025-03-25 NOTE — PROGRESS NOTES
03/25/25 0655   Pain Assessment   Pain Assessment Tool 0-10   Pain Score No Pain   Restrictions/Precautions   Precautions Fall Risk  (TTWBing R LE)   RLE Weight Bearing Per Order TTWB   ROM Restrictions   (Right long leg cast)   General   Change In Medical/Functional Status Follow up ortho appointment on Friday   Cognition   Overall Cognitive Status WFL   Orientation Level Oriented X4   Subjective   Subjective Pt reports no concerns, still awaiting information regarding discharge.   Roll Left and Right   Type of Assistance Needed Independent   Physical Assistance Level No physical assistance   Roll Left and Right CARE Score 6   Sit to Lying   Type of Assistance Needed Independent   Physical Assistance Level No physical assistance   Sit to Lying CARE Score 6   Lying to Sitting on Side of Bed   Type of Assistance Needed Independent   Physical Assistance Level No physical assistance   Comment Bed rails   Lying to Sitting on Side of Bed CARE Score 6   Sit to Stand   Type of Assistance Needed Independent   Physical Assistance Level No physical assistance   Comment RW   Sit to Stand CARE Score 6   Bed-Chair Transfer   Type of Assistance Needed Independent   Physical Assistance Level No physical assistance   Comment RW   Chair/Bed-to-Chair Transfer CARE Score 6   Transfer Bed/Chair/Wheelchair   Findings Maintains TTWBing R LE well   Car Transfer   Reason if not Attempted Environmental limitations   Car Transfer CARE Score 10   Walk 10 Feet   Type of Assistance Needed Independent   Physical Assistance Level No physical assistance   Comment RW   Walk 10 Feet CARE Score 6   Walk 50 Feet with Two Turns   Type of Assistance Needed Independent   Physical Assistance Level No physical assistance   Comment RW   Walk 50 Feet with Two Turns CARE Score 6   Walk 150 Feet   Type of Assistance Needed Independent   Physical Assistance Level No physical assistance   Comment RW   Walk 150 Feet CARE Score 6   Walking 10 Feet on Uneven  Surfaces   Type of Assistance Needed Independent   Physical Assistance Level No physical assistance   Comment RW, green foam, ramp   Walking 10 Feet on Uneven Surfaces CARE Score 6   Ambulation   Primary Mode of Locomotion Prior to Admission Walk   Distance Walked (feet) 200 ft   Assist Device Roller Walker   Findings TTWBing R LE   Does the patient walk? 2. Yes   Wheel 50 Feet with Two Turns   Reason if not Attempted Activity not applicable   Wheel 50 Feet with Two Turns CARE Score 9   Wheel 150 Feet   Reason if not Attempted Activity not applicable   Wheel 150 Feet CARE Score 9   Wheelchair mobility   Findings NA   Curb or Single Stair   Style negotiated Single stair   Type of Assistance Needed Independent   Physical Assistance Level No physical assistance   Comment Place RW at bottom step, TTWBing R LE   1 Step (Curb) CARE Score 6   4 Steps   Type of Assistance Needed Independent   Physical Assistance Level No physical assistance   Comment TTWB R LE   4 Steps CARE Score 6   12 Steps   Reason if not Attempted Activity not applicable   12 Steps CARE Score 9   Stairs   Type Stairs   # of Steps 6   Weight Bearing Precautions TTWB;RLE   Findings Ramp RW independent   Picking Up Object   Type of Assistance Needed Independent   Physical Assistance Level No physical assistance   Comment AE, RW for UE support prn, TTWBing R LE   Picking Up Object CARE Score 6   Toilet Transfer   Type of Assistance Needed Independent   Physical Assistance Level No physical assistance   Comment RW   Toilet Transfer CARE Score 6   Therapeutic Interventions   Strengthening Seated LE TE, 2.5#, blue t-band   Equipment Use   NuStep L5, 10 mins as prior   Assessment   Treatment Assessment Pt presents supine in bed, agreeable to session.  Overall continues to make good progress.  Steady gait, remains MI with RW in room.  Progressing with endurance.  No issues or concerns with TE program.  cont per POC and progress as tolerated.   Problem List  Decreased strength;Decreased range of motion;Decreased mobility;Impaired balance;Orthopedic restrictions   PT Barriers   Physical Impairment Decreased strength;Decreased range of motion;Decreased endurance;Impaired balance;Decreased mobility;Impaired sensation;Obesity;Decreased skin integrity;Orthopedic restrictions;Pain   Functional Limitation Walking;Transfers;Standing;Stair negotiation;Ramp negotiation;Car transfers   Plan   Treatment/Interventions ADL retraining;Functional transfer training;LE strengthening/ROM;Elevations;Therapeutic exercise;Endurance training;Patient/family training;Equipment eval/education;Bed mobility;Gait training   Progress Improving as expected   PT Therapy Minutes   PT Time In 0655   PT Time Out 0825   PT Total Time (minutes) 90   PT Mode of treatment - Individual (minutes) 30   PT Mode of treatment - Concurrent (minutes) 60   PT Mode of treatment - Group (minutes) 0   PT Mode of treatment - Co-treat (minutes) 0   PT Mode of Treatment - Total time(minutes) 90 minutes   PT Cumulative Minutes 1110

## 2025-03-25 NOTE — PROGRESS NOTES
03/25/25 1105   Pain Assessment   Pain Assessment Tool 0-10   Pain Score No Pain   Restrictions/Precautions   Precautions Fall Risk   RLE Weight Bearing Per Order TTWB   Braces or Orthoses   (R LE cast)   Oral Hygiene   Type of Assistance Needed Independent   Physical Assistance Level No physical assistance   Oral Hygiene CARE Score 6   Grooming   Able To Initiate Tasks;Acquire Items;Comb/Brush Hair;Wash/Dry Face;Brush/Clean Teeth;Wash/Dry Hands   Findings stance   Shower/Bathe Self   Type of Assistance Needed Independent;Adaptive equipment   Physical Assistance Level No physical assistance   Shower/Bathe Self CARE Score 6   Bathing   Assessed Bath Style Sponge Bath   Able to Gather/Transport Yes   Able to Adjust Water Temperature Yes   Able to Wash/Rinse/Dry (body part) Right Arm;Left Arm;L Upper Leg;R Upper Leg;L Lower Leg/Foot;Chest;Abdomen;Perineal Area;Buttocks;R Lower Leg/Foot   Limitations Noted in ROM   Positioning Seated;Standing   Adaptive Equipment Longhand Sponge   Upper Body Dressing   Type of Assistance Needed Independent   Physical Assistance Level No physical assistance   Upper Body Dressing CARE Score 6   Lower Body Dressing   Type of Assistance Needed Independent;Adaptive equipment   Physical Assistance Level No physical assistance   Lower Body Dressing CARE Score 6   Putting On/Taking Off Footwear   Type of Assistance Needed Independent;Adaptive equipment   Physical Assistance Level No physical assistance   Putting On/Taking Off Footwear CARE Score 6   Picking Up Object   Type of Assistance Needed Independent;Adaptive equipment   Physical Assistance Level No physical assistance   Picking Up Object CARE Score 6   Dressing/Undressing Clothing   Able to  Obtain Clothing;Store removed clothing   Remove UB Clothes Pullover Shirt   Don UB Clothes Pullover Shirt   Remove LB Clothes Undergarment;Shorts;Socks   Don LB Clothes Socks;Undergarment;Shorts   Limitations Noted In ROM   Adaptive Equipment  Reacher;Sock Aide   Positioning Supported Sit;Standing   Lying to Sitting on Side of Bed   Type of Assistance Needed Independent   Physical Assistance Level No physical assistance   Lying to Sitting on Side of Bed CARE Score 6   Sit to Stand   Type of Assistance Needed Independent   Physical Assistance Level No physical assistance   Sit to Stand CARE Score 6   Toileting Hygiene   Type of Assistance Needed Independent   Physical Assistance Level No physical assistance   Toileting Hygiene CARE Score 6   Toileting   Able to Pull Clothing down yes, up yes.   Manage Hygiene Bladder   Limitations Noted In ROM   Toilet Transfer   Type of Assistance Needed Independent;Adaptive equipment   Physical Assistance Level No physical assistance   Toilet Transfer CARE Score 6   Toilet Transfer   Surface Assessed Raised Toilet   Transfer Technique Standard   Limitations Noted In ROM   Adaptive Equipment Grab Bar;Walker   Light Housekeeping   Light Housekeeping Level Walker   Light Housekeeping Level of Assistance Modified independent   Light Housekeeping s/u and c/u of supplies   Cognition   Overall Cognitive Status WFL   Arousal/Participation Alert;Cooperative   Attention Within functional limits   Orientation Level Oriented X4   Memory Within functional limits   Following Commands Follows all commands and directions without difficulty   Activity Tolerance   Activity Tolerance Patient tolerated treatment well   Assessment   Treatment Assessment ADL completed via sponge bathe level: Pt able to consistently s/u and c/u all supplies. Pt demon consistent recall of  compensatory strategies and compliance with safe techniques throughout all ADL/iADLs. Details listed in respective sections of note. Pt tolerates session well with no c/os offerred. Pt maintains TTWB during txfs and mobility consistently.  Pt's barriers to d/c to personal home environment include decreased strength RLE s/p quadricepts rupture, decreased ROM with use of leg  length hard cast, and TTWB RLE. Plan is contingent upon placement due to noted barriers.   Prognosis Good   Problem List Decreased range of motion;Decreased mobility;Orthopedic restrictions;Impaired balance;Decreased strength   Plan   Treatment/Interventions Functional transfer training;ADL retraining;OT;Spoke to nursing;Spoke to MD;Spoke to case management;Compensatory technique education;Gait training;Bed mobility;Equipment eval/education;Patient/family training   Progress Improving as expected   OT Therapy Minutes   OT Time In 1105   OT Time Out 1205   OT Total Time (minutes) 60   OT Mode of treatment - Individual (minutes) 60   OT Mode of treatment - Concurrent (minutes) 0   OT Mode of treatment - Group (minutes) 0   OT Mode of treatment - Co-treat (minutes) 0   OT Mode of Treatment - Total time(minutes) 60 minutes   OT Cumulative Minutes 737   Therapy Time missed   Time missed? No

## 2025-03-25 NOTE — TEAM CONFERENCE
Acute RehabilitationTeam Conference Note  Date: 3/25/2025   Time: 10:37 AM       Patient Name:  Tobin Kerns       Medical Record Number: [id number]   YOB: 1955  Sex: Male          Room/Bed:  [facility] 210/[facility] 210-01  Payor Info:  Payor: MEDICARE / Plan: MEDICARE A AND B / Product Type: Medicare A & B Fee for Service /      Admitting Diagnosis: Quadriceps muscle rupture [S76.119A]   Admit Date/Time:  3/11/2025  3:20 PM  Admission Comments: No comment available     Primary Diagnosis:  Quadriceps muscle rupture, right, subsequent encounter  Principal Problem: Quadriceps muscle rupture, right, subsequent encounter    Patient Active Problem List    Diagnosis Date Noted    BPH (benign prostatic hyperplasia) 03/12/2025    Impaired mobility and ADLs 03/12/2025    Wound of right ankle 03/12/2025    Quadriceps tendon rupture, right, sequela 03/07/2025    Obesity (BMI 35.0-39.9 without comorbidity) 03/04/2025    Onychomycosis 12/30/2024    Urinary retention 07/27/2024    Quadriceps muscle rupture, right, subsequent encounter 07/25/2024    Ambulatory dysfunction 07/23/2024    Abdominal aortic ectasia (HCC) 11/13/2023    Healed ulcer of left foot 02/23/2022    IFG (impaired fasting glucose) 10/01/2021    Hypertension 09/27/2021    Peripheral neuropathy 09/27/2021    Drusen (degenerative) of macula, bilateral 09/27/2021    Myopia, bilateral 09/27/2021    Pinguecula, bilateral 09/27/2021    Regular astigmatism, bilateral 09/27/2021    JOVANY on CPAP     History of colon polyps     Pure hypertriglyceridemia 08/19/2019    Mixed hyperlipidemia 05/30/2019    Gastro-esophageal reflux disease without esophagitis 05/30/2019    Presbyopia 10/11/2016       Physical Therapy:    Weight Bearing Status: Toe touch (R LE)  Transfers: Independent  Bed Mobility: Independent  Amulation Distance (ft): 180 feet  Ambulation: Independent  Assistive Device for Ambulation: Roller Walker  Wheelchair Mobility Distance:  (n/a)  Wheelchair  Mobility:  (n/a)  Number of Stairs: 6  Assistive Device for Stairs: Lehft Hand Rail  Stair Assistance: Independent  Ramp: Independent  Assistive Device for Ramp: Roller Walker  Discharge Recommendations: Home with:  DC Home with::  (alternative placement)    03/18/2025:  Patient being followed by PT, making good progress.  Presents, following R quadriceps muscle rupture, now TTWB R LE in cast, with decreased ROM/strength, decreased balance and safety, decreased endurance, and pain.   Patient  mod I bed mobility, mod I transfers with RW, mod I short distances, S >50' ambulation with RW, S negotiation of 6 steps with L handrail.  Patient would benefit from continued inpatient [facility] PT to increase function, safety, and increased independence in prep for safe d/c to home.    3/25/2025:  Patient being followed by PT, making good progress.  Presents, following R quadriceps muscle rupture, now TTWB R LE in cast, with decreased ROM/strength, decreased balance and safety, decreased endurance, and pain.   Patient  mod I bed mobility, mod I transfers with RW, mod I ambulation with RW up to 180ft, mod I negotiation of 6 steps with L handrail.  Patient would benefit from continued ARC PT to increase function, safety, and increased independence in prep for safe d/c to home.     Occupational Therapy:  Eating: Independent  Grooming: Independent  Bathing: Independent  Bathing: Independent  Upper Body Dressing: Independent  Lower Body Dressing: Independent  Toileting: Independent  Tub/Shower Transfer:  (TBA;pt declines covering leg for showering)  Toilet Transfer: Independent  Cognition: Within Defined Limits  Orientation: Person, Situation, Place, Time  Discharge Recommendations: Home with:  DC Home with:: First Floor Setup, Home Occupational Therapy (pt requested SNF/FCI due to inaccessible home environment with R full leg cast)       03/17/25 Pt participating in Adls/iADLs, safety with functional txfs and mobility, TE/TA for  generalized strength and endurance. Pts LOF noted above. Pt is progressing toward OT goals. Pt's barriers to d/c include decreased strength throughout but especially RLE s/p quadricepts rupture, decreased ROM, decreased balance TTWB RLE. Pt would benefit from continued skilled OT services in order to address listed barriers and prepare for safe d/c.     03/24/25 Pt participating in Adls/iADLs, safety with functional txfs and mobility, TE/TA for generalized strength and endurance. Pts LOF noted above. Pt continues to be consistently MI with all ADLs.  Pt's barriers to d/c include decreased strength  RLE s/p quadricepts rupture, decreased ROM, decreased balance due to TTWB RLE. Plan is contingent upon placement.     Speech Therapy:           No notes on file    Nursing Notes:  Appetite: Good  Diet Type: Regular/House                      Diet Patient/Family Education Complete: Yes    Type of Wound (LDA): Wound (abrasions to right ankle from casts, bottom of left foot plantar wound)                    Type of Wound Patient/Family Education: Yes  Bladder: 6 - Modified Ransom     Bladder Patient/Family Education: Yes  Bowel: 6 - Modified Ransom     Bowel Patient/Family Education: Yes  Pain Location/Orientation: Orientation: Right, Location: Leg  Pain Score: 0                       Hospital Pain Intervention(s): Declines  Pain Patient/Family Education: Yes  Medication Management/Safety  Safe Administration: Yes  Medication Patient/Family Education Complete: Yes    3/18/25 Patient was admitted to [facility] following a right quadriceps muscle rupture with revision surgery performed.  Patient with a hard cast to the right lower extremity and is to maintain toe-touch weight bearing restriction to that limb.  Lungs are clear diminished on room air.  Patient wears own CPAP unit from home overnight as ordered for sleep apnea.  Patient is continent of bowel and bladder.  Every other day dressing changes to the right lower  leg where cast was rubbing and to the left plantar foot.  Patient is now modified independent to the bathroom with a walker.      3/25/2025  A/O x 4, Pt. Is Mod I TTWB RLE with long leg cast using RW. Has decreased sensation to B/L lower ext which he has had prior, continues to have dressing changes every other day to RLE where previous cast was rubbing and wounds to left plantar foot that he had PTA. Continues to wear his CPAP from home.     Case Management:     Discharge Planning  Living Arrangements: Lives Alone  Support Systems: Self  Assistance Needed: unknown  Type of Current Residence: Private residence  Current Home Care Services: No  Patient admitted to [facility] on3/11/25 after inpatient hospital stay for right knee revision quad tendon repair on 3/6/25. Patient lives alone in a mobile home, 4 [address],. Patient independent PTA, driving. Original injury is from July 2024, had repair with a knee immobilizer. Did not work. Revised . Workmen's comp. Patient at Mod I level, looking into alternate disposition, [facility]. Workmen's comp  3/25/25 Having difficulty getting any assistance from [employer] workmen's comp. Patient for discharge today 3/25 waiting to establish home care, where he is going on d/c, transportation.    Is the patient actively participating in therapies? yes  List any modifications to the treatment plan: na    Barriers Interventions   TTWB right LE Cues, ADL, transfer, gait training, cast    Skin integrity left foot Local care, wound care following             Is the patient making expected progress toward goals? yes  List any update or changes to goals: na    Medical Goals: Patient will be able to manage medical conditions and comorbid conditions with medications and follow up upon completion of rehab program    Weekly Team Goals:   Rehab Team Goals  ADL Team Goal: Patient will be independent with ADLs with least restrictive device upon completion of rehab program  Bowel/Bladder Team Goal: Patient  will return to premorbid level for bladder/bowel management upon completion of rehab program  Transfer Team Goal: Patient will be independent with transfers with least restrictive device upon completion of rehab program  Locomotion Team Goal: Patient will be independent with locomotion with least restrictive device upon completion of rehab program    Mod I self care  Mod I bed mobility, transfers, and mobility    Health and wellness: to be able to return to driving     Discussion: Plan for DC to [facility]F with outpatient PT    Anticipated Discharge Date:  when bed available  [facility] Team Members Present:  The following team members are supervising care for this patient and were present during this Weekly Team Conference.    MD Onelia Vasquez, RN Maren Schoenberger, LA Naylor, PT  Petra Cline, OTR/L and Lilian Fitch OTR/L

## 2025-03-25 NOTE — NURSING NOTE
Patient MOD I with walker in room. Remains TTWB to RLE. Cast remains in place Wound nurse here to change dressings on left foot and posterior area on right foot. Reports no pain. Educated on safety being MOD I. Will continue to monitor and follow plan of care.

## 2025-03-25 NOTE — PLAN OF CARE
Problem: PAIN - ADULT  Goal: Verbalizes/displays adequate comfort level or baseline comfort level  Description: Interventions:  - Encourage patient to monitor pain and request assistance  - Assess pain using appropriate pain scale  - Administer analgesics based on type and severity of pain and evaluate response  - Implement non-pharmacological measures as appropriate and evaluate response  - Consider cultural and social influences on pain and pain management  - Notify physician/advanced practitioner if interventions unsuccessful or patient reports new pain  3/24/2025 2240 by Lisa Shah RN  Outcome: Progressing  3/24/2025 2240 by Lisa Shah RN  Outcome: Progressing     Problem: INFECTION - ADULT  Goal: Absence or prevention of progression during hospitalization  Description: INTERVENTIONS:  - Assess and monitor for signs and symptoms of infection  - Monitor lab/diagnostic results  - Monitor all insertion sites, i.e. indwelling lines, tubes, and drains  - Monitor endotracheal if appropriate and nasal secretions for changes in amount and color  - [place] appropriate cooling/warming therapies per order  - Administer medications as ordered  - Instruct and encourage patient and family to use good hand hygiene technique  - Identify and instruct in appropriate isolation precautions for identified infection/condition  Outcome: Progressing  Goal: Absence of fever/infection during neutropenic period  Description: INTERVENTIONS:  - Monitor WBC    Outcome: Progressing

## 2025-03-25 NOTE — PROGRESS NOTES
03/25/25 1232   Pain Assessment   Pain Assessment Tool 0-10   Pain Score No Pain   Restrictions/Precautions   Precautions Fall Risk   RLE Weight Bearing Per Order TTWB   Braces or Orthoses   (R leg cast)   Sit to Stand   Type of Assistance Needed Independent   Physical Assistance Level No physical assistance   Sit to Stand CARE Score 6   Exercise Tools   Hand Gripper heavy resistance gripper 2x40 reps B hands   Other Exercise Tool 1 red t bar 2x15 reps upward then downward   Other Exercise Tool 2 6# dowel 2x15 reps in 6 planes of motion   Cognition   Overall Cognitive Status WFL   Orientation Level Oriented X4   Additional Activities   Additional Activities Comments Fxnl mobility MI TTWB R LE with RW   Activity Tolerance   Activity Tolerance Patient tolerated treatment well   Assessment   Treatment Assessment OT brief session addressed B UE TE for generalized  strength and endurance. Pt tolerated session well. Higher level executive functions reviewed for d/c planning purposes. Pt awaiting d/c plan due to reports he is unable too manage in his environment due to spacial limitations with long leg cast for toielting purposes, pt also reporting he drives his car to a common area for showering and laundry purposes which he is unable to do at the present time. Barriers remain same as previous a.m. session. Plan is to continue with skilled OT as per POC.   Prognosis Good   Problem List Decreased range of motion;Decreased mobility;Impaired balance;Decreased strength;Orthopedic restrictions   Plan   Treatment/Interventions Functional transfer training;Therapeutic exercise;Endurance training;Patient/family training;Gait training;Compensatory technique education;Spoke to nursing;OT   Progress Progressing toward goals   OT Therapy Minutes   OT Time In 1232   OT Time Out 1302   OT Total Time (minutes) 30   OT Mode of treatment - Individual (minutes) 30   OT Mode of treatment - Concurrent (minutes) 0   OT Mode of treatment -  Group (minutes) 0   OT Mode of treatment - Co-treat (minutes) 0   OT Mode of Treatment - Total time(minutes) 30 minutes   OT Cumulative Minutes 767   Therapy Time missed   Time missed? No

## 2025-03-25 NOTE — PROGRESS NOTES
"Progress Note - Tobin Kerns 69 y.o. male MRN: [id number]    Unit/Bed#: [facility] 210-01 Encounter: [id number]        Subjective:   Patient seen and examined at bedside after reviewing the chart and discussing the case with the caring staff.      Patient examined at bedside.  Patient has no acute symptoms.    Physical Exam   Vitals: Blood pressure 116/79, pulse 80, temperature 97.6 °F (36.4 °C), temperature source Temporal, resp. rate 18, height 6' 5\" (1.956 m), weight (!) 137 kg (300 lb 14.9 oz), SpO2 97%.,Body mass index is 35.68 kg/m².  Constitutional: Patient in no acute distress.  HEENT: PERR, EOMI, MMM.  Cardiovascular: Normal rate and regular rhythm.    Pulmonary/Chest: Effort normal and breath sounds normal.   Abdomen: Soft, + BS, NT.    Assessment/Plan:  Tobin Kerns is a(n) 69 y.o. year old male who is s/p quadriceps tendon repair.     Cardiac with hx of HTN, HLD.  Patient is on losartan 50 mg daily, pravastatin 40 mg daily.  Impaired fasting glucose.  Hgb a1c 5.4% on 3/12/25.  Continue metformin XR 1500 mg daily with dinner.  Regular diet and d/c accucheks as blood sugar is well controlled.    BPH.  Continue Flomax 0.4 mg daily.  GERD.  Continue Pepcid 20 mg daily.  JOVANY.  On CPAP at bedtime.   Vitamin D deficiency.  Patient is on vitamin D3 5000 units daily.  Pain and bowel management.  As per physiatrist.  Quadriceps tendon repair.  Toe touch weightbearing RLE with use of walker.  Patient is receiving intensive PT OT as per physiatrist.    Anticipated date of discharge.  TBD  "

## 2025-03-25 NOTE — PLAN OF CARE
Problem: PAIN - ADULT  Goal: Verbalizes/displays adequate comfort level or baseline comfort level  Description: Interventions:  - Encourage patient to monitor pain and request assistance  - Assess pain using appropriate pain scale  - Administer analgesics based on type and severity of pain and evaluate response  - Implement non-pharmacological measures as appropriate and evaluate response  - Consider cultural and social influences on pain and pain management  - Notify physician/advanced practitioner if interventions unsuccessful or patient reports new pain  Outcome: Progressing     Problem: INFECTION - ADULT  Goal: Absence or prevention of progression during hospitalization  Description: INTERVENTIONS:  - Assess and monitor for signs and symptoms of infection  - Monitor lab/diagnostic results  - Monitor all insertion sites, i.e. indwelling lines, tubes, and drains  - Monitor endotracheal if appropriate and nasal secretions for changes in amount and color  - [place] appropriate cooling/warming therapies per order  - Administer medications as ordered  - Instruct and encourage patient and family to use good hand hygiene technique  - Identify and instruct in appropriate isolation precautions for identified infection/condition  Outcome: Progressing     Problem: SAFETY ADULT  Goal: Maintain or return to baseline ADL function  Description: INTERVENTIONS:  -  Assess patient's ability to carry out ADLs; assess patient's baseline for ADL function and identify physical deficits which impact ability to perform ADLs (bathing, care of mouth/teeth, toileting, grooming, dressing, etc.)  - Assess/evaluate cause of self-care deficits   - Assess range of motion  - Assess patient's mobility; develop plan if impaired  - Assess patient's need for assistive devices and provide as appropriate  - Encourage maximum independence but intervene and supervise when necessary  - Involve family in performance of ADLs  - Assess for home care  needs following discharge   - Consider OT consult to assist with ADL evaluation and planning for discharge  - Provide patient education as appropriate  Outcome: Progressing

## 2025-03-25 NOTE — PROGRESS NOTES
"Progress Note - PMR   Name: Tobin Kerns 69 y.o. male I MRN: [id number]  Unit/Bed#: -01 I Date of Admission: 3/11/2025   Date of Service: 3/25/2025 I Hospital Day: 14     Assessment & Plan  Quadriceps muscle rupture, right, subsequent encounter  HPI:   He initially underwent quad tendon repair on 7/25/24 with Dr. Foster. He was discharged home with outpatient PT.  Later noted to have recurrent high-grade tear with extensor lag on exam and elected for operative management  January 2025 MRI: \"Prior quadriceps insertion repair with high-grade recurrent tear exhibiting up to 2.2 cm of tendon retraction.\"  On 3/6/25, he underwent revision of quad tendon repair with allograft augmentation  3/21: outpatient follow-up with Dr. Foster, new cast applied     Plan:   PT and OT for 3 hours a day, 5-6 days a week   TTWB RLE   Avoid moisture to cast   Pain: PRN Tylenol (monitor LFTs) and oxycodone discontinued on 3/17 due to minimal pain   Per ortho,  Lovenox for 2 more weeks for DVT prophylaxis (until 4/4)   Hypertension  Continue losartan 50 mg daily   Monitor BP   Mixed hyperlipidemia  Continue pravastatin 40 mg daily   Gastro-esophageal reflux disease without esophagitis  Continue famotidine 20 mg daily   Peripheral neuropathy  Patient is not diabetic   Monitor skin for new or worsening wounds   JOVANY on CPAP  Continue CPAP qHS  IFG (impaired fasting glucose)  Continue metformin 1500 mg daily   Seen by RD 3/12, diet adjusted to regular   3/12 A1c: 5.4   Healed ulcer of left foot  Local wound care  Present on admission   Wound care RN consulted   BPH (benign prostatic hyperplasia)  Continue tamsulosin 0.4 mg daily   Patient currently voiding without difficulty   PRN PVRs if urinary retention is suspected   Impaired mobility and ADLs  Patient was evaluated by the rehabilitation team MD and an appropriate candidate for acute inpatient rehabilitation program at this time.  The patient will tolerate 3 hours/day 5 to 7 " days/week of intensive physical and  occupational therapy   in order to obtain goals for community discharge  Due to the patient's functional Compared to their baseline level of function in addition to their ongoing medical needs, the patient would benefit from daily supervision from a rehabilitation physician as well as rehabilitation nursing to implement and adjust the medical as well as functional plan of care in order to meet the patient's goals.  Bladder: Monitor closely for urinary incontinence and/or retention. Monitor urine output and encourage spontaneous voids. If unable to void spontaneously, will monitor with PVR bladder scans and initiate CIC regimen.  Skin: Monitor for breakdown, frequent turns, pressure offloading  Sleep: Encourage sleep hygiene (routine that promotes healthy circadian rhythm,avoid caffeine later in the day, quiet/dark room at night, reduce electronic before bedtime)      Wound of right ankle  Skin breakdown on R heel due to cast. Ortho removed approximately 1 inch of cast at posterior aspect on 3/11  New cast 3/21   Local wound care  Continue to monitor   Present on admission to [facility]   Wound care RN consulted:  Skin Care orders:  1-Hydraguard to sacrum, buttocks bid and prn   2-EHOB / waffle  cushion when out of bed in chair.  3-Moisturize skin daily with skin nourishing cream  4-Elevate heels to offload pressure.  5-Turn/reposition q2h for pressure re-distribution on skin  6. Right posterior heel , right medial ankle and right lateral ankle - cleanse with Normal saline then apply xeroform then top with the silicone foam nate with a T and date change every other day as needed for soilage or dislodgement   7. Left plantar aspect of the foot - apply betadine paint then a AND and olga wrap change every other day      Subjective   Tobin Kerns is a 69 year-old male, with a past medical history of hypertension, hyperlipidemia, JOVANY, impaired fasting glucose, bilateral lower extremity  neuropathy, GERD, BPH, presenting to [facility] after an elective right quadricept tendon repair. He initially underwent quad tendon repair on 7/25/24 with Dr. Foster. He was discharged home with outpatient PT. He was noted to have recurrent high-grade tear on repeat MRI with extensor lag on exam and elected for operative management. On 3/6/25, he underwent revision of quad tendon repair with allograft augmentation. He was evaluated by PT and OT and deemed an appropriate candidate for ARC due to functional deficits.     Chief Complaint: f/u ambulatory dysfunction    Interval:   Seen and evaluated patient in room. He denies pain or any concerns. Last BM 3/23   Objective :  Temp:  [97.6 °F (36.4 °C)-98.4 °F (36.9 °C)] 97.6 °F (36.4 °C)  HR:  [70-80] 80  BP: (105-116)/(57-79) 116/79  Resp:  [16-18] 18  SpO2:  [96 %-97 %] 97 %  O2 Device: None (Room air)    Functional Update:  Physical Therapy Occupational Therapy   Weight Bearing Status: Toe touch (R LE)  Transfers: Independent  Bed Mobility: Independent  Amulation Distance (ft): 180 feet  Ambulation: Independent  Assistive Device for Ambulation: Roller Walker  Wheelchair Mobility Distance:  (n/a)  Wheelchair Mobility:  (n/a)  Number of Stairs: 6  Assistive Device for Stairs: Lehft Hand Rail  Stair Assistance: Independent  Ramp: Independent  Assistive Device for Ramp: Roller Walker  Discharge Recommendations: Home with:  DC Home with::  (alternative placement)   Eating: Independent  Grooming: Independent  Bathing: Independent  Bathing: Independent  Upper Body Dressing: Independent  Lower Body Dressing: Independent  Toileting: Independent  Tub/Shower Transfer:  (TBA;pt declines covering leg for showering)  Toilet Transfer: Independent  Cognition: Within Defined Limits  Orientation: Person, Situation, Place, Time         Physical Exam  Vitals and nursing note reviewed.   Constitutional:       General: He is not in acute distress.     Appearance: He is obese. He is not  ill-appearing or diaphoretic.   HENT:      Head: Normocephalic and atraumatic.      Nose: Nose normal.      Mouth/Throat:      Mouth: Mucous membranes are moist.   Cardiovascular:      Pulses:           Dorsalis pedis pulses are 2+ on the right side and 2+ on the left side.   Pulmonary:      Effort: Pulmonary effort is normal. No respiratory distress.   Abdominal:      General: There is no distension.   Skin:     General: Skin is warm and dry.   Neurological:      General: No focal deficit present.      Mental Status: He is alert.   Psychiatric:         Mood and Affect: Mood normal.         Behavior: Behavior normal.       Scheduled Meds:  Current Facility-Administered Medications   Medication Dose Route Frequency Provider Last Rate    acetaminophen  650 mg Oral Q6H PRN Modesta Lee PA-C      ascorbic acid  500 mg Oral Daily Toño Brito MD      Cholecalciferol  5,000 Units Oral Daily Toño Brito MD      enoxaparin  40 mg Subcutaneous Q24H [facility] Toño Brito MD      famotidine  20 mg Oral Daily Toño Brito MD      losartan  50 mg Oral Daily Toño Brito MD      metFORMIN  1,500 mg Oral Daily With Dinner Toño Brito MD      multivitamin stress formula  1 tablet Oral Daily Toño Brito MD      polyethylene glycol  17 g Oral Daily PRN Modesta Lee PA-C      pravastatin  40 mg Oral Daily With Dinner Toño Brito MD      tamsulosin  0.4 mg Oral Daily With Dinner Toño Brito MD           Lab Results: I have reviewed the following results:  Results from last 7 days   Lab Units 03/24/25  0516   HEMOGLOBIN g/dL 13.8   HEMATOCRIT % 42.7   WBC Thousand/uL 8.04   PLATELETS Thousands/uL 338     Results from last 7 days   Lab Units 03/24/25  0516   BUN mg/dL 22   SODIUM mmol/L 137   POTASSIUM mmol/L 4.6   CHLORIDE mmol/L 106   CREATININE mg/dL 1.12   AST U/L 17   ALT U/L 14            Modesta Lee PA-C  Physical Medicine and Rehabilitation  [facility]

## 2025-03-25 NOTE — ASSESSMENT & PLAN NOTE
Skin breakdown on R heel due to cast. Ortho removed approximately 1 inch of cast at posterior aspect on 3/11  New cast 3/21   Local wound care  Continue to monitor   Present on admission to [facility]   Wound care RN consulted:  Skin Care orders:  1-Hydraguard to sacrum, buttocks bid and prn   2-EHOB / waffle  cushion when out of bed in chair.  3-Moisturize skin daily with skin nourishing cream  4-Elevate heels to offload pressure.  5-Turn/reposition q2h for pressure re-distribution on skin  6. Right posterior heel , right medial ankle and right lateral ankle - cleanse with Normal saline then apply xeroform then top with the silicone foam nate with a T and date change every other day as needed for soilage or dislodgement   7. Left plantar aspect of the foot - apply betadine paint then a AND and olga wrap change every other day

## 2025-03-26 ENCOUNTER — APPOINTMENT (OUTPATIENT)
Dept: PHYSICAL THERAPY | Facility: CLINIC | Age: 70
End: 2025-03-26
Payer: OTHER MISCELLANEOUS

## 2025-03-26 PROCEDURE — 97535 SELF CARE MNGMENT TRAINING: CPT

## 2025-03-26 PROCEDURE — 97110 THERAPEUTIC EXERCISES: CPT

## 2025-03-26 PROCEDURE — 97110 THERAPEUTIC EXERCISES: CPT | Performed by: PHYSICAL THERAPIST

## 2025-03-26 PROCEDURE — 97116 GAIT TRAINING THERAPY: CPT | Performed by: PHYSICAL THERAPIST

## 2025-03-26 PROCEDURE — 97530 THERAPEUTIC ACTIVITIES: CPT | Performed by: PHYSICAL THERAPIST

## 2025-03-26 PROCEDURE — 99232 SBSQ HOSP IP/OBS MODERATE 35: CPT | Performed by: PHYSICAL MEDICINE & REHABILITATION

## 2025-03-26 RX ADMIN — LOSARTAN POTASSIUM 50 MG: 50 TABLET, FILM COATED ORAL at 08:15

## 2025-03-26 RX ADMIN — METFORMIN ER 500 MG 1500 MG: 500 TABLET ORAL at 16:56

## 2025-03-26 RX ADMIN — CHOLECALCIFEROL TAB 25 MCG (1000 UNIT) 5000 UNITS: 25 TAB at 08:15

## 2025-03-26 RX ADMIN — B-COMPLEX W/ C & FOLIC ACID TAB 1 TABLET: TAB at 08:15

## 2025-03-26 RX ADMIN — OXYCODONE HYDROCHLORIDE AND ACETAMINOPHEN 500 MG: 500 TABLET ORAL at 08:15

## 2025-03-26 RX ADMIN — PRAVASTATIN SODIUM 40 MG: 40 TABLET ORAL at 16:56

## 2025-03-26 RX ADMIN — TAMSULOSIN HYDROCHLORIDE 0.4 MG: 0.4 CAPSULE ORAL at 16:56

## 2025-03-26 RX ADMIN — ENOXAPARIN SODIUM 40 MG: 40 INJECTION SUBCUTANEOUS at 08:15

## 2025-03-26 RX ADMIN — FAMOTIDINE 20 MG: 20 TABLET, FILM COATED ORAL at 08:15

## 2025-03-26 NOTE — ASSESSMENT & PLAN NOTE
----- Message from Sharan Crespo sent at 6/20/2018  9:39 AM CDT -----  Contact: pt   Pt would like to be worked in on 6/25 or 6/26 afternoon for visit to have valtrex rx renewed.       ..[phone] (home)      Continue famotidine 20 mg daily

## 2025-03-26 NOTE — PLAN OF CARE
Problem: PAIN - ADULT  Goal: Verbalizes/displays adequate comfort level or baseline comfort level  Description: Interventions:  - Encourage patient to monitor pain and request assistance  - Assess pain using appropriate pain scale  - Administer analgesics based on type and severity of pain and evaluate response  - Implement non-pharmacological measures as appropriate and evaluate response  - Consider cultural and social influences on pain and pain management  - Notify physician/advanced practitioner if interventions unsuccessful or patient reports new pain  Outcome: Progressing     Problem: INFECTION - ADULT  Goal: Absence or prevention of progression during hospitalization  Description: INTERVENTIONS:  - Assess and monitor for signs and symptoms of infection  - Monitor lab/diagnostic results  - Monitor all insertion sites, i.e. indwelling lines, tubes, and drains  - Monitor endotracheal if appropriate and nasal secretions for changes in amount and color  - [place] appropriate cooling/warming therapies per order  - Administer medications as ordered  - Instruct and encourage patient and family to use good hand hygiene technique  - Identify and instruct in appropriate isolation precautions for identified infection/condition  Outcome: Progressing     Problem: SAFETY ADULT  Goal: Maintain or return to baseline ADL function  Description: INTERVENTIONS:  -  Assess patient's ability to carry out ADLs; assess patient's baseline for ADL function and identify physical deficits which impact ability to perform ADLs (bathing, care of mouth/teeth, toileting, grooming, dressing, etc.)  - Assess/evaluate cause of self-care deficits   - Assess range of motion  - Assess patient's mobility; develop plan if impaired  - Assess patient's need for assistive devices and provide as appropriate  - Encourage maximum independence but intervene and supervise when necessary  - Involve family in performance of ADLs  - Assess for home care  needs following discharge   - Consider OT consult to assist with ADL evaluation and planning for discharge  - Provide patient education as appropriate  Outcome: Progressing     Problem: DISCHARGE PLANNING  Goal: Discharge to home or other facility with appropriate resources  Description: INTERVENTIONS:  - Identify barriers to discharge w/patient and caregiver  - Arrange for needed discharge resources and transportation as appropriate  - Identify discharge learning needs (meds, wound care, etc.)  - Arrange for interpretive services to assist at discharge as needed  - Refer to Case Management Department for coordinating discharge planning if the patient needs post-hospital services based on physician/advanced practitioner order or complex needs related to functional status, cognitive ability, or social support system  Outcome: Progressing

## 2025-03-26 NOTE — PROGRESS NOTES
"Progress Note - Tobin Kerns 69 y.o. male MRN: [id number]    Unit/Bed#: [facility] 210-01 Encounter: [id number]        Subjective:   Patient seen and examined at bedside after reviewing the chart and discussing the case with the caring staff.      Patient examined at bedside.  Patient has no acute symptoms.    Physical Exam   Vitals: Blood pressure 119/68, pulse 74, temperature 97.5 °F (36.4 °C), temperature source Temporal, resp. rate 16, height 6' 5\" (1.956 m), weight 132 kg (289 lb 14.5 oz), SpO2 96%.,Body mass index is 34.38 kg/m².  Constitutional: Patient in no acute distress.  HEENT: PERR, EOMI, MMM.  Cardiovascular: Normal rate and regular rhythm.    Pulmonary/Chest: Effort normal and breath sounds normal.   Abdomen: Soft, + BS, NT.    Assessment/Plan:  Tobin Kerns is a(n) 69 y.o. year old male who is s/p quadriceps tendon repair.     Cardiac with hx of HTN, HLD.  Patient is on losartan 50 mg daily, pravastatin 40 mg daily.  Impaired fasting glucose.  Hgb a1c 5.4% on 3/12/25.  Continue metformin XR 1500 mg daily with dinner.  Regular diet and d/c accucheks as blood sugar is well controlled.    BPH.  Continue Flomax 0.4 mg daily.  GERD.  Continue Pepcid 20 mg daily.  JOVANY.  On CPAP at bedtime.   Vitamin D deficiency.  Patient is on vitamin D3 5000 units daily.  Pain and bowel management.  As per physiatrist.  Quadriceps tendon repair.  Toe touch weightbearing RLE with use of walker.  Patient is receiving intensive PT OT as per physiatrist.    Anticipated date of discharge.  Pending placement.     The patient was discussed with Dr. Brito and he is in agreement with the above note.    "

## 2025-03-26 NOTE — NURSING NOTE
Patient resting in bed at this time.  No signs of distress noted.  Toe touch weight bearing to the right lower extremity and hard cast in place.  Patient was able to ambulate to the bathroom with a walker modified independent overnight.  Call bell within reach.  Plan of care ongoing.

## 2025-03-26 NOTE — PLAN OF CARE
Problem: Nutrition/Hydration-ADULT  Goal: Nutrient/Hydration intake appropriate for improving, restoring or maintaining nutritional needs  Description: Monitor and assess patient's nutrition/hydration status for malnutrition. Collaborate with interdisciplinary team and initiate plan and interventions as ordered.  Monitor patient's weight and dietary intake as ordered or per policy. Utilize nutrition screening tool and intervene as necessary. Determine patient's food preferences and provide high-protein, high-caloric foods as appropriate. INTERVENTIONS:- Monitor oral intake, urinary output, labs, and treatment plans- Assess nutrition and hydration status and recommend course of action- Evaluate amount of meals eaten- Assist patient with eating if necessary - Allow adequate time for meals- Recommend/ encourage appropriate diets, oral nutritional supplements, and vitamin/mineral supplements- Order, calculate, and assess calorie counts as needed- Recommend, monitor, and adjust tube feedings and TPN/PPN based on assessed needs- Assess need for intravenous fluids- Provide specific nutrition/hydration education as appropriate- Include patient/family/caregiver in decisions related to nutrition  Outcome: Progressing

## 2025-03-26 NOTE — NUTRITION
03/26/25 1140   Biochemical Data,Medical Tests, and Procedures   Biochemical Data/Medical Tests/Procedures Lab values reviewed;Meds reviewed   Labs (Comment) 3/24 all labs WNL   Meds (Comment) Vit C, Vit D, pepcid, lovenox, metformin, cozaar, MVI, pravachol   Nutrition-Focused Physical Exam   Nutrition-Focused Physical Exam Findings RN skin assessment reviewed  (Wound right ankle, wound right knee, wound left plantar per documentation.)   Nutrition-Focused Physical Exam Findings Trace REL edema noted   Medical-Related Concerns PMH reviewed   Adequacy of Intake   Nutrition Modality PO   Feeding Route   PO Independent   Current PO Intake   Current Diet Order Regular diet thin liquids   Current Meal Intake %   Estimated calorie intake compared to estimated need Nutrient needs met.   PES Statement   Problem Continue previous diagnosis   Recommendations/Interventions   Malnutrition/BMI Present No  (does not meet criteria)   Summary Regular diet thin liquids. Meal completions 100%. 3/26 289#, BMI=34.38; 24# weight loss from admission 3/11 313#. Medications reviewed. Missing teeth. Trace RLE edema. Skin integrity reviewed. LBM 3/25. Reports his appetite is okay. Denies any dysphagia.   Interventions/Recommendations Continue current diet order   Education Assessment   Education Education not indicated at this time   Patient Nutrition Goals   Goal Adequate hydration;Adequate intake;Improve skin integrity   Goal Status Met;Extended   Timeframe to complete goal by next f/u   Nutrition Complexity Risk   Nutrition complexity level Low risk   Follow up date 04/02/25

## 2025-03-26 NOTE — CASE MANAGEMENT
CM called patient's [employer] workers comp, spoke with Ramirez patient's injury ,who stated he is not the person to talk to. CM explained the multiple messages left on examiners voice mail, the return phone message last evening, and the 2 phone calls made this morning again. CM left another message with examiners voice mail. Ramirez suggested having the patient call, they might respond quickly to him. CM gave contact information for the examiner, patient stated he called , and left a message. CM will continue to follow.

## 2025-03-26 NOTE — PROGRESS NOTES
03/26/25 0900   Pain Assessment   Pain Assessment Tool 0-10   Pain Score No Pain   Restrictions/Precautions   Precautions Fall Risk   RLE Weight Bearing Per Order TTWB   Braces or Orthoses   (R leg cast)   Oral Hygiene   Type of Assistance Needed Independent   Physical Assistance Level No physical assistance   Oral Hygiene CARE Score 6   Grooming   Able To Initiate Tasks;Comb/Brush Hair;Wash/Dry Face;Brush/Clean Teeth;Wash/Dry Hands   Findings stance   Shower/Bathe Self   Type of Assistance Needed Independent;Adaptive equipment   Physical Assistance Level No physical assistance   Shower/Bathe Self CARE Score 6   Bathing   Assessed Bath Style Sponge Bath   Able to Gather/Transport Yes   Able to Adjust Water Temperature Yes   Able to Wash/Rinse/Dry (body part) Left Arm;Right Arm;L Upper Leg;R Upper Leg;L Lower Leg/Foot;R Lower Leg/Foot;Chest;Abdomen;Perineal Area;Buttocks   Limitations Noted in ROM   Positioning Seated;Standing   Upper Body Dressing   Type of Assistance Needed Independent   Physical Assistance Level No physical assistance   Upper Body Dressing CARE Score 6   Lower Body Dressing   Type of Assistance Needed Independent;Adaptive equipment   Physical Assistance Level No physical assistance   Lower Body Dressing CARE Score 6   Putting On/Taking Off Footwear   Type of Assistance Needed Independent;Adaptive equipment   Physical Assistance Level No physical assistance   Putting On/Taking Off Footwear CARE Score 6   Picking Up Object   Type of Assistance Needed Independent;Adaptive equipment   Physical Assistance Level No physical assistance   Picking Up Object CARE Score 6   Dressing/Undressing Clothing   Able to  Obtain Clothing;Store removed clothing   Remove UB Clothes Button Shirt   Don UB Clothes Button Shirt   Remove LB Clothes Undergarment;Socks;Shorts   Don LB Clothes Shorts;Undergarment;Socks   Limitations Noted In ROM   Adaptive Equipment Reacher;Sock Aide   Positioning Supported Sit;Standing    Lying to Sitting on Side of Bed   Type of Assistance Needed Independent   Physical Assistance Level No physical assistance   Lying to Sitting on Side of Bed CARE Score 6   Sit to Stand   Type of Assistance Needed Independent   Physical Assistance Level No physical assistance   Sit to Stand CARE Score 6   Toileting Hygiene   Type of Assistance Needed Independent   Physical Assistance Level No physical assistance   Toileting Hygiene CARE Score 6   Toileting   Able to Pull Clothing down yes, up yes.   Manage Hygiene Bladder   Limitations Noted In ROM   Adaptive Equipment Grab Bar   Toilet Transfer   Type of Assistance Needed Independent;Adaptive equipment   Physical Assistance Level No physical assistance   Toilet Transfer CARE Score 6   Toilet Transfer   Surface Assessed Raised Toilet   Transfer Technique Standard   Limitations Noted In ROM   Adaptive Equipment Grab Bar;Walker   Light Housekeeping   Light Housekeeping Level Walker   Light Housekeeping Level of Assistance Modified independent   Exercise Tools   Hand Gripper heavy resistance gripper 2x40 reps B hands   Other Exercise Tool 1 red t bar 2x15 reps upward then downward   Other Exercise Tool 2 6# dowel 2x15 reps in 6 planes of motion   Cognition   Overall Cognitive Status WFL   Arousal/Participation Alert;Cooperative   Attention Within functional limits   Orientation Level Oriented X4   Memory Within functional limits   Following Commands Follows all commands and directions without difficulty   Additional Activities   Additional Activities Comments Fxnl mobility MI TTWB R LE with RW   Activity Tolerance   Activity Tolerance Patient tolerated treatment well   Assessment   Treatment Assessment ADL completed via sponge bathe level: Pt able to consistently s/u and c/u all supplies. Pt demon consistent recall of compensatory strategies and compliance with safe techniques throughout all ADL/iADLs. Pt completed laundry by gathering items and transporting them with  bag to wash machine with use of RW. Details listed in respective sections of note. Pt tolerates session well with no c/os offerred. Pt maintains TTWB during txfs and mobility consistently.  Pt's barriers to d/c to personal home environment include decreased strength RLE s/p quadricepts rupture, decreased ROM with use of leg length hard cast, and TTWB RLE. Plan is contingent upon placement due to noted barriers.   Prognosis Good   Problem List Decreased range of motion;Decreased mobility;Impaired balance;Decreased strength;Pain   Plan   Treatment/Interventions Functional transfer training;ADL retraining;Therapeutic exercise;Endurance training;Patient/family training;Equipment eval/education;Gait training;Compensatory technique education;Spoke to nursing;OT   Progress Progressing toward goals   OT Therapy Minutes   OT Time In 0900   OT Time Out 1030   OT Total Time (minutes) 90   OT Mode of treatment - Individual (minutes) 90   OT Mode of treatment - Concurrent (minutes) 0   OT Mode of treatment - Group (minutes) 0   OT Mode of treatment - Co-treat (minutes) 0   OT Mode of Treatment - Total time(minutes) 90 minutes   OT Cumulative Minutes 857   Therapy Time missed   Time missed? No

## 2025-03-26 NOTE — PROGRESS NOTES
03/26/25 1030   Pain Assessment   Pain Assessment Tool 0-10   Pain Score No Pain   Restrictions/Precautions   Precautions Fall Risk   RLE Weight Bearing Per Order TTWB   ROM Restrictions   (Long leg cast)   Braces or Orthoses   (Long leg cast)   General   Change In Medical/Functional Status Follow up ortho appt Friday   Cognition   Overall Cognitive Status WFL   Subjective   Subjective Pt reports he is going to call  to find out if there's more information.   Roll Left and Right   Type of Assistance Needed Independent   Physical Assistance Level No physical assistance   Roll Left and Right CARE Score 6   Sit to Lying   Type of Assistance Needed Independent   Physical Assistance Level No physical assistance   Sit to Lying CARE Score 6   Lying to Sitting on Side of Bed   Type of Assistance Needed Independent   Physical Assistance Level No physical assistance   Comment Bed rails   Lying to Sitting on Side of Bed CARE Score 6   Sit to Stand   Type of Assistance Needed Independent   Physical Assistance Level No physical assistance   Comment Bed   Sit to Stand CARE Score 6   Bed-Chair Transfer   Type of Assistance Needed Independent   Physical Assistance Level No physical assistance   Comment RW   Chair/Bed-to-Chair Transfer CARE Score 6   Transfer Bed/Chair/Wheelchair   Findings Maintains TTWBing R LE well   Car Transfer   Reason if not Attempted Environmental limitations   Car Transfer CARE Score 10   Walk 10 Feet   Type of Assistance Needed Independent   Physical Assistance Level No physical assistance   Comment RW   Walk 10 Feet CARE Score 6   Walk 50 Feet with Two Turns   Type of Assistance Needed Independent   Physical Assistance Level No physical assistance   Comment RW   Walk 50 Feet with Two Turns CARE Score 6   Walk 150 Feet   Type of Assistance Needed Independent   Physical Assistance Level No physical assistance   Comment RW   Walk 150 Feet CARE Score 6   Walking 10 Feet on Uneven Surfaces   Type of  Assistance Needed Independent   Physical Assistance Level No physical assistance   Comment RW   Walking 10 Feet on Uneven Surfaces CARE Score 6   Ambulation   Primary Mode of Locomotion Prior to Admission Walk   Distance Walked (feet) 200 ft   Assist Device Roller Walker   Findings TTWBing R LE, long leg cast in place   Does the patient walk? 2. Yes   Wheel 50 Feet with Two Turns   Reason if not Attempted Activity not applicable   Wheel 50 Feet with Two Turns CARE Score 9   Wheel 150 Feet   Reason if not Attempted Activity not applicable   Wheel 150 Feet CARE Score 9   Wheelchair mobility   Findings NA   Curb or Single Stair   Style negotiated Single stair   Type of Assistance Needed Independent   Physical Assistance Level No physical assistance   Comment RW at bottom step   1 Step (Curb) CARE Score 6   4 Steps   Type of Assistance Needed Independent   Physical Assistance Level No physical assistance   Comment TTWB R LE   4 Steps CARE Score 6   12 Steps   Reason if not Attempted Activity not applicable   12 Steps CARE Score 9   Stairs   Type Stairs   # of Steps 6   Findings Ramp RW independent   Picking Up Object   Type of Assistance Needed Independent;Adaptive equipment   Physical Assistance Level No physical assistance   Comment RW for UE support   Picking Up Object CARE Score 6   Therapeutic Interventions   Strengthening Seated LE TE, 2.5# on left, blue t-band   Other Review of home set up, problem solving through scenarios to possibly accommodate RW and long leg cast in very limited space.  Able to identify possible solution to doorway and space in bedroom, however, pt will likely have significant difficulty maintaining WBing restrictions and likely pose increased fall risk given limitations of environment and confines of AD/AE use.   Assessment   Treatment Assessment Pt presents ambulation from OT session using RW.  He continues to maintain TTWBing R LE with RW.  Patient continues to be motivated for discharge,  however forsees challenges with environmental limitations in his home.  Cont per POC and progress as tolerated.   Problem List Decreased range of motion;Decreased mobility;Impaired balance;Decreased strength;Pain   PT Barriers   Physical Impairment Decreased strength;Decreased range of motion;Decreased endurance;Impaired balance;Decreased mobility;Impaired sensation;Obesity;Decreased skin integrity;Orthopedic restrictions;Pain   Functional Limitation Walking;Transfers;Standing;Stair negotiation;Ramp negotiation;Car transfers   Plan   Treatment/Interventions ADL retraining;Functional transfer training;LE strengthening/ROM;Elevations;Therapeutic exercise;Patient/family training;Equipment eval/education;Bed mobility;Gait training;Endurance training   Progress Progressing toward goals   PT Therapy Minutes   PT Time In 1030   PT Time Out 1200   PT Total Time (minutes) 90   PT Mode of treatment - Individual (minutes) 0   PT Mode of treatment - Concurrent (minutes) 90   PT Mode of treatment - Group (minutes) 0   PT Mode of treatment - Co-treat (minutes) 0   PT Mode of Treatment - Total time(minutes) 90 minutes   PT Cumulative Minutes 1200     Seated LE TE:  3x10, B/L LEs, 2.5# right LE March  LAQ  Hip ABd - blue T-band  Hip ADd - Edgar  GS  HR  TR  HS Curls - blue T-band

## 2025-03-26 NOTE — ASSESSMENT & PLAN NOTE
Skin breakdown on R heel due to cast. Ortho removed approximately 1 inch of cast at posterior aspect on 3/11  New cast 3/21   Local wound care  Continue to monitor   Present on admission to [facility]   Wound care RN consulted:  Skin Care orders:  1-Hydraguard to sacrum, buttocks bid and prn   2-EHOB / waffle  cushion when out of bed in chair.  3-Moisturize skin daily with skin nourishing cream  4-Elevate heels to offload pressure.  5-Turn/reposition q2h for pressure re-distribution on skin  6. Right posterior heel , right medial ankle and right lateral ankle - cleanse with Normal saline then apply xeroform then top with the silicone foam nate with a T and date change every other day as needed for soilage or dislodgement   7. Left plantar aspect of the foot - apply betadine paint then a AND and olga wrap change every other day

## 2025-03-26 NOTE — PROGRESS NOTES
"Progress Note - PMR   Name: Tobin Kerns 69 y.o. male I MRN: [id number]  Unit/Bed#: -01 I Date of Admission: 3/11/2025   Date of Service: 3/26/2025 I Hospital Day: 15     Assessment & Plan  Quadriceps muscle rupture, right, subsequent encounter  HPI:   He initially underwent quad tendon repair on 7/25/24 with Dr. Foster. He was discharged home with outpatient PT.  Later noted to have recurrent high-grade tear with extensor lag on exam and elected for operative management  January 2025 MRI: \"Prior quadriceps insertion repair with high-grade recurrent tear exhibiting up to 2.2 cm of tendon retraction.\"  On 3/6/25, he underwent revision of quad tendon repair with allograft augmentation  3/21: outpatient follow-up with Dr. Foster, new cast applied     Plan:   PT and OT for 3 hours a day, 5-6 days a week   TTWB RLE   Avoid moisture to cast   Pain: PRN Tylenol (monitor LFTs) and oxycodone discontinued on 3/17 due to minimal pain   Per ortho,  Lovenox for 2 more weeks for DVT prophylaxis (until 4/4)   Hypertension  Continue losartan 50 mg daily   Monitor BP   Mixed hyperlipidemia  Continue pravastatin 40 mg daily   Gastro-esophageal reflux disease without esophagitis  Continue famotidine 20 mg daily   Peripheral neuropathy  Patient is not diabetic   Monitor skin for new or worsening wounds   JOVANY on CPAP  Continue CPAP qHS  IFG (impaired fasting glucose)  Continue metformin 1500 mg daily   Seen by RD 3/12, diet adjusted to regular   3/12 A1c: 5.4   Healed ulcer of left foot  Local wound care  Present on admission   Wound care RN consulted   BPH (benign prostatic hyperplasia)  Continue tamsulosin 0.4 mg daily   Patient currently voiding without difficulty   PRN PVRs if urinary retention is suspected   Impaired mobility and ADLs  Patient was evaluated by the rehabilitation team MD and an appropriate candidate for acute inpatient rehabilitation program at this time.  The patient will tolerate 3 hours/day 5 to 7 " days/week of intensive physical and  occupational therapy   in order to obtain goals for community discharge  Due to the patient's functional Compared to their baseline level of function in addition to their ongoing medical needs, the patient would benefit from daily supervision from a rehabilitation physician as well as rehabilitation nursing to implement and adjust the medical as well as functional plan of care in order to meet the patient's goals.  Bladder: Monitor closely for urinary incontinence and/or retention. Monitor urine output and encourage spontaneous voids. If unable to void spontaneously, will monitor with PVR bladder scans and initiate CIC regimen.  Skin: Monitor for breakdown, frequent turns, pressure offloading  Sleep: Encourage sleep hygiene (routine that promotes healthy circadian rhythm,avoid caffeine later in the day, quiet/dark room at night, reduce electronic before bedtime)      Wound of right ankle  Skin breakdown on R heel due to cast. Ortho removed approximately 1 inch of cast at posterior aspect on 3/11  New cast 3/21   Local wound care  Continue to monitor   Present on admission to [facility]   Wound care RN consulted:  Skin Care orders:  1-Hydraguard to sacrum, buttocks bid and prn   2-EHOB / waffle  cushion when out of bed in chair.  3-Moisturize skin daily with skin nourishing cream  4-Elevate heels to offload pressure.  5-Turn/reposition q2h for pressure re-distribution on skin  6. Right posterior heel , right medial ankle and right lateral ankle - cleanse with Normal saline then apply xeroform then top with the silicone foam nate with a T and date change every other day as needed for soilage or dislodgement   7. Left plantar aspect of the foot - apply betadine paint then a AND and olga wrap change every other day      Subjective   Tobin Kerns is a 69 year-old male, with a past medical history of hypertension, hyperlipidemia, JOVANY, impaired fasting glucose, bilateral lower extremity  neuropathy, GERD, BPH, presenting to [facility] after an elective right quadricept tendon repair. He initially underwent quad tendon repair on 7/25/24 with Dr. Foster. He was discharged home with outpatient PT. He was noted to have recurrent high-grade tear on repeat MRI with extensor lag on exam and elected for operative management. On 3/6/25, he underwent revision of quad tendon repair with allograft augmentation. He was evaluated by PT and OT and deemed an appropriate candidate for [facility] due to functional deficits.     Chief Complaint: f/u ambulatory dysfunction    Interval:   Seen and evaluated patient in room. He denies pain or any concerns. Last BM 3/25     Objective :  Temp:  [97.5 °F (36.4 °C)-97.8 °F (36.6 °C)] 97.5 °F (36.4 °C)  HR:  [70-74] 74  BP: (102-119)/(54-68) 119/68  Resp:  [16] 16  SpO2:  [96 %-100 %] 96 %  O2 Device: None (Room air)    Functional Update:  Physical Therapy Occupational Therapy   Weight Bearing Status: Toe touch (R LE)  Transfers: Independent  Bed Mobility: Independent  Amulation Distance (ft): 180 feet  Ambulation: Independent  Assistive Device for Ambulation: Roller Walker  Wheelchair Mobility Distance:  (n/a)  Wheelchair Mobility:  (n/a)  Number of Stairs: 6  Assistive Device for Stairs: Lehft Hand Rail  Stair Assistance: Independent  Ramp: Independent  Assistive Device for Ramp: Roller Walker  Discharge Recommendations: Home with:  DC Home with::  (alternative placement)   Eating: Independent  Grooming: Independent  Bathing: Independent  Bathing: Independent  Upper Body Dressing: Independent  Lower Body Dressing: Independent  Toileting: Independent  Tub/Shower Transfer:  (TBA;pt declines covering leg for showering)  Toilet Transfer: Independent  Cognition: Within Defined Limits  Orientation: Person, Situation, Place, Time           Physical Exam  Vitals and nursing note reviewed.   Constitutional:       General: He is not in acute distress.     Appearance: He is obese. He is not  ill-appearing or diaphoretic.   HENT:      Head: Normocephalic and atraumatic.      Nose: Nose normal.      Mouth/Throat:      Mouth: Mucous membranes are moist.   Cardiovascular:      Pulses:           Dorsalis pedis pulses are 2+ on the right side and 2+ on the left side.   Pulmonary:      Effort: Pulmonary effort is normal. No respiratory distress.   Abdominal:      General: There is no distension.   Skin:     General: Skin is warm and dry.   Neurological:      General: No focal deficit present.      Mental Status: He is alert.   Psychiatric:         Mood and Affect: Mood normal.         Behavior: Behavior normal.           Scheduled Meds:  Current Facility-Administered Medications   Medication Dose Route Frequency Provider Last Rate    acetaminophen  650 mg Oral Q6H PRN Modesta Lee PA-C      ascorbic acid  500 mg Oral Daily Toño Brito MD      Cholecalciferol  5,000 Units Oral Daily Toño Brito MD      enoxaparin  40 mg Subcutaneous Q24H [facility] Toño Brito MD      famotidine  20 mg Oral Daily Toño Brito MD      losartan  50 mg Oral Daily Toño Brito MD      metFORMIN  1,500 mg Oral Daily With Dinner Toño Brito MD      multivitamin stress formula  1 tablet Oral Daily Toño Brito MD      polyethylene glycol  17 g Oral Daily PRN Modesta Lee PA-C      pravastatin  40 mg Oral Daily With Dinner Toño Brito MD      tamsulosin  0.4 mg Oral Daily With Dinner Toño Brito MD           Lab Results: I have reviewed the following results:  Results from last 7 days   Lab Units 03/24/25  0516   HEMOGLOBIN g/dL 13.8   HEMATOCRIT % 42.7   WBC Thousand/uL 8.04   PLATELETS Thousands/uL 338     Results from last 7 days   Lab Units 03/24/25  0516   BUN mg/dL 22   SODIUM mmol/L 137   POTASSIUM mmol/L 4.6   CHLORIDE mmol/L 106   CREATININE mg/dL 1.12   AST U/L 17   ALT U/L 14            Modesta Lee PA-C  Physical Medicine and Rehabilitation  [facility]

## 2025-03-26 NOTE — NURSING NOTE
Patient continues to be MOD I with walker. Maintains TTWB to RLE. Reports no pain. Dressing maintained to left foot and posterior right foot. Sits up on side of bed for all meals. Will continue to monitor and follow plan of care.

## 2025-03-27 PROCEDURE — 97110 THERAPEUTIC EXERCISES: CPT

## 2025-03-27 PROCEDURE — 99232 SBSQ HOSP IP/OBS MODERATE 35: CPT | Performed by: PHYSICAL MEDICINE & REHABILITATION

## 2025-03-27 PROCEDURE — 97116 GAIT TRAINING THERAPY: CPT

## 2025-03-27 PROCEDURE — 97530 THERAPEUTIC ACTIVITIES: CPT

## 2025-03-27 PROCEDURE — 97535 SELF CARE MNGMENT TRAINING: CPT

## 2025-03-27 RX ADMIN — PRAVASTATIN SODIUM 40 MG: 40 TABLET ORAL at 16:33

## 2025-03-27 RX ADMIN — TAMSULOSIN HYDROCHLORIDE 0.4 MG: 0.4 CAPSULE ORAL at 16:33

## 2025-03-27 RX ADMIN — METFORMIN ER 500 MG 1500 MG: 500 TABLET ORAL at 16:33

## 2025-03-27 RX ADMIN — OXYCODONE HYDROCHLORIDE AND ACETAMINOPHEN 500 MG: 500 TABLET ORAL at 08:32

## 2025-03-27 RX ADMIN — FAMOTIDINE 20 MG: 20 TABLET, FILM COATED ORAL at 08:32

## 2025-03-27 RX ADMIN — LOSARTAN POTASSIUM 50 MG: 50 TABLET, FILM COATED ORAL at 08:32

## 2025-03-27 RX ADMIN — B-COMPLEX W/ C & FOLIC ACID TAB 1 TABLET: TAB at 08:32

## 2025-03-27 RX ADMIN — ENOXAPARIN SODIUM 40 MG: 40 INJECTION SUBCUTANEOUS at 08:32

## 2025-03-27 RX ADMIN — CHOLECALCIFEROL TAB 25 MCG (1000 UNIT) 5000 UNITS: 25 TAB at 08:32

## 2025-03-27 NOTE — ASSESSMENT & PLAN NOTE
Skin breakdown on R heel due to cast. Ortho removed approximately 1 inch of cast at posterior aspect on 3/11  New cast 3/21   Local wound care  Continue to monitor   Present on admission to [facility]   Wound care RN consulted:  Skin Care orders:  1-Hydraguard to sacrum, buttocks bid and prn   2-EHOB / waffle  cushion when out of bed in chair.  3-Moisturize skin daily with skin nourishing cream  4-Elevate heels to offload pressure.  5-Turn/reposition q2h for pressure re-distribution on skin  6. Right posterior heel , right medial ankle and right lateral ankle - cleanse with Normal saline then apply xeroform then top with the silicone foam nate with a T and date change every other day as needed for soilage or dislodgement   7. Left plantar aspect of the foot - apply betadine paint then a AND and olga wrap change every other day

## 2025-03-27 NOTE — NURSING NOTE
Patient resting in bed at this time. No signs of distress noted. Toe touch weight bearing to the right lower extremity and hard cast in place. Patient was successfully modified independent to the bathroom with a walker. Call bell within reach. Plan of care ongoing.

## 2025-03-27 NOTE — PROGRESS NOTES
"Progress Note - PMR   Name: Tobin Kerns 69 y.o. male I MRN: [id number]  Unit/Bed#: -01 I Date of Admission: 3/11/2025   Date of Service: 3/27/2025 I Hospital Day: 16     Assessment & Plan  Quadriceps muscle rupture, right, subsequent encounter  HPI:   He initially underwent quad tendon repair on 7/25/24 with Dr. Foster. He was discharged home with outpatient PT.  Later noted to have recurrent high-grade tear with extensor lag on exam and elected for operative management  January 2025 MRI: \"Prior quadriceps insertion repair with high-grade recurrent tear exhibiting up to 2.2 cm of tendon retraction.\"  On 3/6/25, he underwent revision of quad tendon repair with allograft augmentation  3/21: outpatient follow-up with Dr. Foster, new cast applied     Plan:   PT and OT for 3 hours a day, 5-6 days a week   TTWB RLE   Avoid moisture to cast   Pain: PRN Tylenol (monitor LFTs) and oxycodone discontinued on 3/17 due to minimal pain   Per ortho,  Lovenox for 2 more weeks for DVT prophylaxis (until 4/4)   Hypertension  Continue losartan 50 mg daily   Monitor BP   Mixed hyperlipidemia  Continue pravastatin 40 mg daily   Gastro-esophageal reflux disease without esophagitis  Continue famotidine 20 mg daily   Peripheral neuropathy  Patient is not diabetic   Monitor skin for new or worsening wounds   JOVANY on CPAP  Continue CPAP qHS  IFG (impaired fasting glucose)  Continue metformin 1500 mg daily   Seen by RD 3/12, diet adjusted to regular   3/12 A1c: 5.4   Healed ulcer of left foot  Local wound care  Present on admission   Wound care RN consulted   BPH (benign prostatic hyperplasia)  Continue tamsulosin 0.4 mg daily   Patient currently voiding without difficulty   PRN PVRs if urinary retention is suspected   Impaired mobility and ADLs  Patient was evaluated by the rehabilitation team MD and an appropriate candidate for acute inpatient rehabilitation program at this time.  The patient will tolerate 3 hours/day 5 to 7 " days/week of intensive physical and  occupational therapy   in order to obtain goals for community discharge  Due to the patient's functional Compared to their baseline level of function in addition to their ongoing medical needs, the patient would benefit from daily supervision from a rehabilitation physician as well as rehabilitation nursing to implement and adjust the medical as well as functional plan of care in order to meet the patient's goals.  Bladder: Monitor closely for urinary incontinence and/or retention. Monitor urine output and encourage spontaneous voids. If unable to void spontaneously, will monitor with PVR bladder scans and initiate CIC regimen.  Skin: Monitor for breakdown, frequent turns, pressure offloading  Sleep: Encourage sleep hygiene (routine that promotes healthy circadian rhythm,avoid caffeine later in the day, quiet/dark room at night, reduce electronic before bedtime)      Wound of right ankle  Skin breakdown on R heel due to cast. Ortho removed approximately 1 inch of cast at posterior aspect on 3/11  New cast 3/21   Local wound care  Continue to monitor   Present on admission to [facility]   Wound care RN consulted:  Skin Care orders:  1-Hydraguard to sacrum, buttocks bid and prn   2-EHOB / waffle  cushion when out of bed in chair.  3-Moisturize skin daily with skin nourishing cream  4-Elevate heels to offload pressure.  5-Turn/reposition q2h for pressure re-distribution on skin  6. Right posterior heel , right medial ankle and right lateral ankle - cleanse with Normal saline then apply xeroform then top with the silicone foam nate with a T and date change every other day as needed for soilage or dislodgement   7. Left plantar aspect of the foot - apply betadine paint then a AND and olga wrap change every other day      Subjective   Tobin Kerns is a 69 year-old male, with a past medical history of hypertension, hyperlipidemia, JOVANY, impaired fasting glucose, bilateral lower extremity  neuropathy, GERD, BPH, presenting to [facility] after an elective right quadricept tendon repair. He initially underwent quad tendon repair on 7/25/24 with Dr. Foster. He was discharged home with outpatient PT. He was noted to have recurrent high-grade tear on repeat MRI with extensor lag on exam and elected for operative management. On 3/6/25, he underwent revision of quad tendon repair with allograft augmentation. He was evaluated by PT and OT and deemed an appropriate candidate for [facility] due to functional deficits     Chief Complaint: f/u ambulatory dysfunction    Interval:   Seen and evaluated patient in room. He denies pain or any concerns. Last BM 3/27. He has a follow-up with Dr. Foster tomorrow     Objective :  Temp:  [97 °F (36.1 °C)-97.1 °F (36.2 °C)] 97.1 °F (36.2 °C)  HR:  [61-69] 61  BP: (116-123)/(66-70) 123/70  Resp:  [16] 16  SpO2:  [94 %-96 %] 96 %  O2 Device: None (Room air)    Functional Update:  Physical Therapy Occupational Therapy   Weight Bearing Status: Toe touch (R LE)  Transfers: Independent  Bed Mobility: Independent  Amulation Distance (ft): 180 feet  Ambulation: Independent  Assistive Device for Ambulation: Roller Walker  Wheelchair Mobility Distance:  (n/a)  Wheelchair Mobility:  (n/a)  Number of Stairs: 6  Assistive Device for Stairs: Lehft Hand Rail  Stair Assistance: Independent  Ramp: Independent  Assistive Device for Ramp: Roller Walker  Discharge Recommendations: Home with:  DC Home with::  (alternative placement)   Eating: Independent  Grooming: Independent  Bathing: Independent  Bathing: Independent  Upper Body Dressing: Independent  Lower Body Dressing: Independent  Toileting: Independent  Tub/Shower Transfer:  (TBA;pt declines covering leg for showering)  Toilet Transfer: Independent  Cognition: Within Defined Limits  Orientation: Person, Situation, Place, Time           Physical Exam  Vitals and nursing note reviewed.   Constitutional:       General: He is not in acute distress.      Appearance: He is obese. He is not ill-appearing or diaphoretic.   HENT:      Head: Normocephalic and atraumatic.      Nose: Nose normal.      Mouth/Throat:      Mouth: Mucous membranes are moist.   Cardiovascular:      Pulses:           Dorsalis pedis pulses are 2+ on the right side and 2+ on the left side.   Pulmonary:      Effort: Pulmonary effort is normal. No respiratory distress.   Abdominal:      General: There is no distension.   Skin:     General: Skin is warm and dry.   Neurological:      General: No focal deficit present.      Mental Status: He is alert.   Psychiatric:         Mood and Affect: Mood normal.         Behavior: Behavior normal.               Scheduled Meds:  Current Facility-Administered Medications   Medication Dose Route Frequency Provider Last Rate    acetaminophen  650 mg Oral Q6H PRN Modesta Lee PA-C      ascorbic acid  500 mg Oral Daily Toño Brito MD      Cholecalciferol  5,000 Units Oral Daily Toño Brito MD      enoxaparin  40 mg Subcutaneous Q24H Count includes the [facility] Toño Brito MD      famotidine  20 mg Oral Daily Toño Brito MD      losartan  50 mg Oral Daily Toño Brito MD      metFORMIN  1,500 mg Oral Daily With Dinner Toño Brito MD      multivitamin stress formula  1 tablet Oral Daily Toño Brito MD      polyethylene glycol  17 g Oral Daily PRN Modesta Lee PA-C      pravastatin  40 mg Oral Daily With Dinner Toño Brito MD      tamsulosin  0.4 mg Oral Daily With Dinner Toño Brito MD           Lab Results: I have reviewed the following results:  Results from last 7 days   Lab Units 03/24/25  0516   HEMOGLOBIN g/dL 13.8   HEMATOCRIT % 42.7   WBC Thousand/uL 8.04   PLATELETS Thousands/uL 338     Results from last 7 days   Lab Units 03/24/25  0516   BUN mg/dL 22   SODIUM mmol/L 137   POTASSIUM mmol/L 4.6   CHLORIDE mmol/L 106   CREATININE mg/dL 1.12   AST U/L 17   ALT U/L 14            Modesta Lee PA-C  Physical Medicine and Rehabilitation  [facility]  [facility]

## 2025-03-27 NOTE — PROGRESS NOTES
03/27/25 1030   Pain Assessment   Pain Assessment Tool 0-10   Pain Score No Pain   Restrictions/Precautions   Precautions Fall Risk   RLE Weight Bearing Per Order TTWB   Braces or Orthoses   (R leg cast)   Oral Hygiene   Type of Assistance Needed Independent   Physical Assistance Level No physical assistance   Oral Hygiene CARE Score 6   Grooming   Able To Initiate Tasks;Acquire Items;Comb/Brush Hair;Wash/Dry Face;Brush/Clean Teeth;Wash/Dry Hands   Findings stance   Shower/Bathe Self   Type of Assistance Needed Independent;Adaptive equipment   Physical Assistance Level No physical assistance   Shower/Bathe Self CARE Score 6   Bathing   Assessed Bath Style Sponge Bath   Able to Wash/Rinse/Dry (body part) Abdomen;Chest;R Lower Leg/Foot;Perineal Area;Buttocks;R Upper Leg;Right Arm;L Upper Leg;Left Arm;L Lower Leg/Foot   Limitations Noted in ROM   Positioning Seated;Standing   Adaptive Equipment Longhand Sponge   Tub/Shower Transfer   Reason Not Assessed Sponge Bath   Upper Body Dressing   Type of Assistance Needed Independent   Physical Assistance Level No physical assistance   Upper Body Dressing CARE Score 6   Lower Body Dressing   Type of Assistance Needed Independent   Physical Assistance Level No physical assistance   Lower Body Dressing CARE Score 6   Putting On/Taking Off Footwear   Type of Assistance Needed Independent;Adaptive equipment   Physical Assistance Level No physical assistance   Putting On/Taking Off Footwear CARE Score 6   Picking Up Object   Type of Assistance Needed Independent;Adaptive equipment   Physical Assistance Level No physical assistance   Picking Up Object CARE Score 6   Dressing/Undressing Clothing   Able to  Obtain Clothing;Store removed clothing   Remove UB Clothes Button Shirt   Don UB Clothes Button Shirt   Remove LB Clothes Undergarment;Shorts;Socks   Don LB Clothes Socks;Undergarment;Shorts   Limitations Noted In ROM   Adaptive Equipment Reacher;Sock Aide   Positioning  Supported Sit;Standing   Lying to Sitting on Side of Bed   Type of Assistance Needed Independent   Physical Assistance Level No physical assistance   Lying to Sitting on Side of Bed CARE Score 6   Sit to Stand   Type of Assistance Needed Independent   Physical Assistance Level No physical assistance   Sit to Stand CARE Score 6   Toileting Hygiene   Type of Assistance Needed Independent   Physical Assistance Level No physical assistance   Toileting Hygiene CARE Score 6   Toileting   Able to Pull Clothing down yes, up yes.   Able to Manage Clothing Closures Yes   Manage Hygiene Bladder;Bowel   Limitations Noted In ROM   Adaptive Equipment Grab Bar   Toilet Transfer   Type of Assistance Needed Independent;Adaptive equipment   Physical Assistance Level No physical assistance   Toilet Transfer CARE Score 6   Toilet Transfer   Surface Assessed Raised Toilet   Transfer Technique Standard   Limitations Noted In ROM   Light Housekeeping   Light Housekeeping Level Walker   Light Housekeeping Level of Assistance Modified independent   Light Housekeeping Pt able to gather laundry and transport to wash machine to successfully load   Exercise Tools   Hand Gripper eavy resistance gripper 2x40 reps B hands   Other Exercise Tool 1 red t bar 2x15 reps upward then downward   Other Exercise Tool 2 6# dowel 2x15 reps in 6 planes of motion   Cognition   Overall Cognitive Status WFL   Arousal/Participation Alert;Cooperative   Attention Within functional limits   Orientation Level Oriented X4   Memory Within functional limits   Following Commands Follows all commands and directions without difficulty   Additional Activities   Additional Activities Comments Fxnl mobility MI TTWB R LE with RW   Activity Tolerance   Activity Tolerance Patient tolerated treatment well   Assessment   Treatment Assessment ADL completed via sponge bathe level: Pt able to consistently s/u and c/u all supplies. Pt demon consistent recall of compensatory strategies  and compliance with safe techniques throughout all ADL/iADLs. Pt completed laundry by gathering items and transporting them with bag to wash machine with use of RW. Details listed in respective sections of note. Pt tolerates session well with no c/os offerred. Pt maintains TTWB during txfs and mobility consistently.  Pt's barriers to d/c to personal home environment include decreased strength RLE s/p quadricepts rupture, decreased ROM with use of leg length hard cast, and TTWB RLE. Plan is contingent upon placement due to noted barriers.   Prognosis Good   Problem List Decreased strength;Decreased range of motion;Impaired balance;Decreased mobility;Orthopedic restrictions   Plan   Treatment/Interventions ADL retraining;Functional transfer training;Therapeutic exercise;Endurance training;Patient/family training;Equipment eval/education;Bed mobility;Gait training;Compensatory technique education;Spoke to nursing;OT   Progress Progressing toward goals   OT Therapy Minutes   OT Time In 1030   OT Time Out 1200   OT Total Time (minutes) 90   OT Mode of treatment - Individual (minutes) 90   OT Mode of treatment - Concurrent (minutes) 0   OT Mode of treatment - Group (minutes) 0   OT Mode of treatment - Co-treat (minutes) 0   OT Mode of Treatment - Total time(minutes) 90 minutes   OT Cumulative Minutes 947   Therapy Time missed   Time missed? No

## 2025-03-27 NOTE — PROGRESS NOTES
03/27/25 0700   Pain Assessment   Pain Assessment Tool 0-10   Pain Score No Pain   Restrictions/Precautions   Precautions Fall Risk   RLE Weight Bearing Per Order TTWB   ROM Restrictions   (long leg cast R LE)   Braces or Orthoses   (long leg cast R LE)   Cognition   Overall Cognitive Status WFL   Arousal/Participation Alert;Responsive;Cooperative   Attention Within functional limits   Orientation Level Oriented X4   Memory Within functional limits   Following Commands Follows all commands and directions without difficulty   Subjective   Subjective Pt reports he does not know why he has another follow up appointment so soon   Roll Left and Right   Type of Assistance Needed Independent   Physical Assistance Level No physical assistance   Roll Left and Right CARE Score 6   Sit to Lying   Type of Assistance Needed Independent   Physical Assistance Level No physical assistance   Sit to Lying CARE Score 6   Lying to Sitting on Side of Bed   Type of Assistance Needed Independent   Physical Assistance Level No physical assistance   Lying to Sitting on Side of Bed CARE Score 6   Sit to Stand   Type of Assistance Needed Independent   Physical Assistance Level No physical assistance   Comment RW   Sit to Stand CARE Score 6   Bed-Chair Transfer   Type of Assistance Needed Independent   Physical Assistance Level No physical assistance   Comment RW   Chair/Bed-to-Chair Transfer CARE Score 6   Car Transfer   Reason if not Attempted Environmental limitations   Car Transfer CARE Score 10   Walk 10 Feet   Type of Assistance Needed Independent   Physical Assistance Level No physical assistance   Walk 10 Feet CARE Score 6   Walk 50 Feet with Two Turns   Type of Assistance Needed Independent   Physical Assistance Level No physical assistance   Comment RW   Walk 50 Feet with Two Turns CARE Score 6   Walk 150 Feet   Type of Assistance Needed Independent   Physical Assistance Level No physical assistance   Comment RW   Walk 150 Feet  CARE Score 6   Walking 10 Feet on Uneven Surfaces   Type of Assistance Needed Independent   Physical Assistance Level No physical assistance   Comment RW   Walking 10 Feet on Uneven Surfaces CARE Score 6   Ambulation   Primary Mode of Locomotion Prior to Admission Walk   Distance Walked (feet) 150 ft  (150', 120', household distances)   Assist Device Roller Walker   Findings maintains TTWB R LE well   Does the patient walk? 2. Yes   Wheel 50 Feet with Two Turns   Reason if not Attempted Activity not applicable   Wheel 50 Feet with Two Turns CARE Score 9   Wheel 150 Feet   Reason if not Attempted Activity not applicable   Wheel 150 Feet CARE Score 9   Wheelchair mobility   Findings N/A   Does the patient use a wheelchair? 0. No   Curb or Single Stair   Style negotiated Single stair   Type of Assistance Needed Independent   Physical Assistance Level No physical assistance   Comment TTWB R LE, up/down 6  steps, L HR, non-reciprocal   1 Step (Curb) CARE Score 6   4 Steps   Type of Assistance Needed Independent   Physical Assistance Level No physical assistance   Comment TTWB R LE, up/down 6  steps, L HR, non-reciprocal   4 Steps CARE Score 6   12 Steps   Reason if not Attempted Activity not applicable   12 Steps CARE Score 9   Picking Up Object   Type of Assistance Needed Independent;Adaptive equipment   Physical Assistance Level No physical assistance   Comment RW support,  pants from floor   Picking Up Object CARE Score 6   Toilet Transfer   Comment did not require during session   Therapeutic Interventions   Strengthening supine/seated LE TE   Equipment Use   NuStep L5, B/L UE, L LE   Assessment   Treatment Assessment Pt agreeable to PT this AM, received supine in bed. Pt continues to maintain TTWB R LE well with functional mobility. He continues to tolerate NuStep and LE TE well with improved eccentric control. He remains limited by TTWB R LE and unsafe home setup to maintain TTWB with RW due to  decreased spacing and inability to fit RW through narrow spaces in bedroom and bathroom. Will continue with current PT POC to improve deficits and promote return to PLOF.   Problem List Decreased range of motion;Decreased mobility;Impaired balance;Decreased strength;Pain   PT Barriers   Physical Impairment Decreased strength;Decreased range of motion;Decreased endurance;Impaired balance;Decreased mobility;Impaired sensation;Obesity;Decreased skin integrity;Orthopedic restrictions;Pain   Functional Limitation Walking;Transfers;Standing;Stair negotiation;Ramp negotiation;Car transfers   Plan   Treatment/Interventions Functional transfer training;LE strengthening/ROM;Therapeutic exercise;Endurance training;Patient/family training;Equipment eval/education;Bed mobility;Gait training   Progress Progressing toward goals   PT Therapy Minutes   PT Time In 0700   PT Time Out 0830   PT Total Time (minutes) 90   PT Mode of treatment - Individual (minutes) 90   PT Mode of treatment - Concurrent (minutes) 0   PT Mode of treatment - Group (minutes) 0   PT Mode of treatment - Co-treat (minutes) 0   PT Mode of Treatment - Total time(minutes) 90 minutes   PT Cumulative Minutes 1290     Seated/supine LE TE as performed previously.    Patient remains seated at EOB, all needs in reach. Encouraged use of call bell, patient verbalizes understanding.

## 2025-03-27 NOTE — PROGRESS NOTES
"Progress Note - Tobin Kerns 69 y.o. male MRN: [id number]    Unit/Bed#: [facility] 210-01 Encounter: [id number]        Subjective:   Patient seen and examined at bedside after reviewing the chart and discussing the case with the caring staff.      Patient examined at bedside.  Patient has no acute symptoms.    Patient has appointment tomorrow with ortho for cast/wound check.    Physical Exam   Vitals: Blood pressure 123/70, pulse 61, temperature (!) 97.1 °F (36.2 °C), temperature source Temporal, resp. rate 16, height 6' 5\" (1.956 m), weight 132 kg (289 lb 14.5 oz), SpO2 96%.,Body mass index is 34.38 kg/m².  Constitutional: Patient in no acute distress.  HEENT: PERR, EOMI, MMM.  Cardiovascular: Normal rate and regular rhythm.    Pulmonary/Chest: Effort normal and breath sounds normal.   Abdomen: Soft, + BS, NT.    Assessment/Plan:  Tobin Kerns is a(n) 69 y.o. year old male who is s/p quadriceps tendon repair.     Cardiac with hx of HTN, HLD.  Patient is on losartan 50 mg daily, pravastatin 40 mg daily.  Impaired fasting glucose.  Hgb a1c 5.4% on 3/12/25.  Continue metformin XR 1500 mg daily with dinner.  Regular diet and d/c accucheks as blood sugar is well controlled.    BPH.  Continue Flomax 0.4 mg daily.  GERD.  Continue Pepcid 20 mg daily.  JOVANY.  On CPAP at bedtime.   Vitamin D deficiency.  Patient is on vitamin D3 5000 units daily.  Pain and bowel management.  As per physiatrist.  Quadriceps tendon repair.  Toe touch weightbearing RLE with use of walker.  Patient is receiving intensive PT OT as per physiatrist.    Anticipated date of discharge.  TBD    The patient was discussed with Dr. Brito and he is in agreement with the above note.  "

## 2025-03-27 NOTE — NURSING NOTE
Education on wound care completed.  Patient was able to complete wound dressing on left foot with supervision and verbal cues  using appropriate technique.  Was not able to completed dressing change to right ankle due to leg cast.

## 2025-03-28 ENCOUNTER — OFFICE VISIT (OUTPATIENT)
Dept: OBGYN CLINIC | Facility: CLINIC | Age: 70
End: 2025-03-28

## 2025-03-28 VITALS — BODY MASS INDEX: 34.38 KG/M2 | HEIGHT: 77 IN

## 2025-03-28 DIAGNOSIS — S76.111D TENDON RUPTURE, TRAUMATIC. QUADRICEPS, RIGHT, SUBSEQUENT ENCOUNTER: Primary | ICD-10-CM

## 2025-03-28 PROCEDURE — 97530 THERAPEUTIC ACTIVITIES: CPT

## 2025-03-28 PROCEDURE — 97535 SELF CARE MNGMENT TRAINING: CPT

## 2025-03-28 PROCEDURE — 99024 POSTOP FOLLOW-UP VISIT: CPT | Performed by: ORTHOPAEDIC SURGERY

## 2025-03-28 PROCEDURE — 97116 GAIT TRAINING THERAPY: CPT

## 2025-03-28 PROCEDURE — 97110 THERAPEUTIC EXERCISES: CPT

## 2025-03-28 PROCEDURE — 99232 SBSQ HOSP IP/OBS MODERATE 35: CPT | Performed by: PHYSICAL MEDICINE & REHABILITATION

## 2025-03-28 RX ADMIN — CHOLECALCIFEROL TAB 25 MCG (1000 UNIT) 5000 UNITS: 25 TAB at 08:44

## 2025-03-28 RX ADMIN — OXYCODONE HYDROCHLORIDE AND ACETAMINOPHEN 500 MG: 500 TABLET ORAL at 08:43

## 2025-03-28 RX ADMIN — PRAVASTATIN SODIUM 40 MG: 40 TABLET ORAL at 17:27

## 2025-03-28 RX ADMIN — ENOXAPARIN SODIUM 40 MG: 40 INJECTION SUBCUTANEOUS at 08:43

## 2025-03-28 RX ADMIN — METFORMIN ER 500 MG 1500 MG: 500 TABLET ORAL at 17:27

## 2025-03-28 RX ADMIN — B-COMPLEX W/ C & FOLIC ACID TAB 1 TABLET: TAB at 08:43

## 2025-03-28 RX ADMIN — TAMSULOSIN HYDROCHLORIDE 0.4 MG: 0.4 CAPSULE ORAL at 17:27

## 2025-03-28 RX ADMIN — FAMOTIDINE 20 MG: 20 TABLET, FILM COATED ORAL at 08:43

## 2025-03-28 NOTE — ASSESSMENT & PLAN NOTE
Skin breakdown on R heel due to cast. Ortho removed approximately 1 inch of cast at posterior aspect on 3/11  New cast 3/21   Local wound care  Continue to monitor   Present on admission to [facility]   Wound care RN consulted:  Skin Care orders:  1-Hydraguard to sacrum, buttocks bid and prn   2-EHOB / waffle  cushion when out of bed in chair.  3-Moisturize skin daily with skin nourishing cream  4-Elevate heels to offload pressure.  5-Turn/reposition q2h for pressure re-distribution on skin  6. Right posterior heel , right medial ankle and right lateral ankle - cleanse with Normal saline then apply xeroform then top with the silicone foam nate with a T and date change every other day as needed for soilage or dislodgement   7. Left plantar aspect of the foot - apply betadine paint then a AND and olga wrap change every other day
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
## 2025-03-28 NOTE — PROGRESS NOTES
"Progress Note - PMR   Name: Tobin Kerns 69 y.o. male I MRN: [id number]  Unit/Bed#: -01 I Date of Admission: 3/11/2025   Date of Service: 3/28/2025 I Hospital Day: 17     Assessment & Plan  Quadriceps muscle rupture, right, subsequent encounter  HPI:   He initially underwent quad tendon repair on 7/25/24 with Dr. Foster. He was discharged home with outpatient PT.  Later noted to have recurrent high-grade tear with extensor lag on exam and elected for operative management  January 2025 MRI: \"Prior quadriceps insertion repair with high-grade recurrent tear exhibiting up to 2.2 cm of tendon retraction.\"  On 3/6/25, he underwent revision of quad tendon repair with allograft augmentation  3/21: outpatient follow-up with Dr. Foster, new cast applied     Plan:   PT and OT for 3 hours a day, 5-6 days a week   TTWB RLE   Avoid moisture to cast   Pain: PRN Tylenol (monitor LFTs) and oxycodone discontinued on 3/17 due to minimal pain   Per ortho,  Lovenox for 2 more weeks for DVT prophylaxis (until 4/4)   3/28: follow-up with Dr. Foster, continue TTWB and follow-up in a week   Hypertension  Continue losartan 50 mg daily   Monitor BP   Mixed hyperlipidemia  Continue pravastatin 40 mg daily   Gastro-esophageal reflux disease without esophagitis  Continue famotidine 20 mg daily   Peripheral neuropathy  Patient is not diabetic   Monitor skin for new or worsening wounds   JOVANY on CPAP  Continue CPAP qHS  IFG (impaired fasting glucose)  Continue metformin 1500 mg daily   Seen by RD 3/12, diet adjusted to regular   3/12 A1c: 5.4   Healed ulcer of left foot  Local wound care  Present on admission   Wound care RN consulted   BPH (benign prostatic hyperplasia)  Continue tamsulosin 0.4 mg daily   Patient currently voiding without difficulty   PRN PVRs if urinary retention is suspected   Impaired mobility and ADLs  Patient was evaluated by the rehabilitation team MD and an appropriate candidate for acute inpatient " rehabilitation program at this time.  The patient will tolerate 3 hours/day 5 to 7 days/week of intensive physical and  occupational therapy   in order to obtain goals for community discharge  Due to the patient's functional Compared to their baseline level of function in addition to their ongoing medical needs, the patient would benefit from daily supervision from a rehabilitation physician as well as rehabilitation nursing to implement and adjust the medical as well as functional plan of care in order to meet the patient's goals.  Bladder: Monitor closely for urinary incontinence and/or retention. Monitor urine output and encourage spontaneous voids. If unable to void spontaneously, will monitor with PVR bladder scans and initiate CIC regimen.  Skin: Monitor for breakdown, frequent turns, pressure offloading  Sleep: Encourage sleep hygiene (routine that promotes healthy circadian rhythm,avoid caffeine later in the day, quiet/dark room at night, reduce electronic before bedtime)      Wound of right ankle  Skin breakdown on R heel due to cast. Ortho removed approximately 1 inch of cast at posterior aspect on 3/11  New cast 3/21   Local wound care  Continue to monitor   Present on admission to [facility]   Wound care RN consulted:  Skin Care orders:  1-Hydraguard to sacrum, buttocks bid and prn   2-EHOB / waffle  cushion when out of bed in chair.  3-Moisturize skin daily with skin nourishing cream  4-Elevate heels to offload pressure.  5-Turn/reposition q2h for pressure re-distribution on skin  6. Right posterior heel , right medial ankle and right lateral ankle - cleanse with Normal saline then apply xeroform then top with the silicone foam nate with a T and date change every other day as needed for soilage or dislodgement   7. Left plantar aspect of the foot - apply betadine paint then a AND and olga wrap change every other day      Subjective   Tobin Kerns is a 69 year-old male, with a past medical history of  hypertension, hyperlipidemia, JOVANY, impaired fasting glucose, bilateral lower extremity neuropathy, GERD, BPH, presenting to [facility] after an elective right quadricept tendon repair. He initially underwent quad tendon repair on 7/25/24 with Dr. Foster. He was discharged home with outpatient PT. He was noted to have recurrent high-grade tear on repeat MRI with extensor lag on exam and elected for operative management. On 3/6/25, he underwent revision of quad tendon repair with allograft augmentation. He was evaluated by PT and OT and deemed an appropriate candidate for ARC due to functional deficits     Chief Complaint: f/u ambulatory dysfunction    Interval:   Seen and evaluated patient in room. He denies pain or any concerns. Last BM 3/27. He is continent of bowel and bladder.     Objective :  Temp:  [97.3 °F (36.3 °C)-97.7 °F (36.5 °C)] 97.3 °F (36.3 °C)  HR:  [71] 71  BP: ()/(58-63) 108/63  Resp:  [16] 16  SpO2:  [95 %-97 %] 97 %  O2 Device: None (Room air)    Functional Update:  Physical Therapy Occupational Therapy   Weight Bearing Status: Toe touch (R LE)  Transfers: Independent  Bed Mobility: Independent  Amulation Distance (ft): 180 feet  Ambulation: Independent  Assistive Device for Ambulation: Roller Walker  Wheelchair Mobility Distance:  (n/a)  Wheelchair Mobility:  (n/a)  Number of Stairs: 6  Assistive Device for Stairs: Lehft Hand Rail  Stair Assistance: Independent  Ramp: Independent  Assistive Device for Ramp: Roller Walker  Discharge Recommendations: Home with:  DC Home with::  (alternative placement)   Eating: Independent  Grooming: Independent  Bathing: Independent  Bathing: Independent  Upper Body Dressing: Independent  Lower Body Dressing: Independent  Toileting: Independent  Tub/Shower Transfer:  (TBA;pt declines covering leg for showering)  Toilet Transfer: Independent  Cognition: Within Defined Limits  Orientation: Person, Situation, Place, Time           Physical Exam  Vitals and nursing  note reviewed.   Constitutional:       General: He is not in acute distress.     Appearance: He is obese. He is not ill-appearing or diaphoretic.   HENT:      Head: Normocephalic and atraumatic.      Nose: Nose normal.      Mouth/Throat:      Mouth: Mucous membranes are moist.   Cardiovascular:      Pulses:           Dorsalis pedis pulses are 2+ on the right side and 2+ on the left side.   Pulmonary:      Effort: Pulmonary effort is normal. No respiratory distress.   Abdominal:      General: There is no distension.   Skin:     General: Skin is warm and dry.   Neurological:      General: No focal deficit present.      Mental Status: He is alert.   Psychiatric:         Mood and Affect: Mood normal.         Behavior: Behavior normal.           Scheduled Meds:  Current Facility-Administered Medications   Medication Dose Route Frequency Provider Last Rate    acetaminophen  650 mg Oral Q6H PRN Modesta Lee PA-C      ascorbic acid  500 mg Oral Daily Toño Brito MD      Cholecalciferol  5,000 Units Oral Daily Toño Brito MD      enoxaparin  40 mg Subcutaneous Q24H [facility] Toño Brito MD      famotidine  20 mg Oral Daily Toño Brito MD      losartan  50 mg Oral Daily Toño Brito MD      metFORMIN  1,500 mg Oral Daily With Dinner Toño Brito MD      multivitamin stress formula  1 tablet Oral Daily Toño Brito MD      polyethylene glycol  17 g Oral Daily PRN Modesta Lee PA-C      pravastatin  40 mg Oral Daily With Dinner Toño Brito MD      tamsulosin  0.4 mg Oral Daily With Dinner Toño Brito MD           Lab Results: I have reviewed the following results:  Results from last 7 days   Lab Units 03/24/25  0516   HEMOGLOBIN g/dL 13.8   HEMATOCRIT % 42.7   WBC Thousand/uL 8.04   PLATELETS Thousands/uL 338     Results from last 7 days   Lab Units 03/24/25  0516   BUN mg/dL 22   SODIUM mmol/L 137   POTASSIUM mmol/L 4.6   CHLORIDE mmol/L 106   CREATININE mg/dL 1.12   AST U/L 17   ALT U/L 14            Modesta  RYLAND Lee  Physical Medicine and Rehabilitation  [facility]

## 2025-03-28 NOTE — PROGRESS NOTES
03/28/25 0700   Pain Assessment   Pain Assessment Tool 0-10   Pain Score No Pain   Restrictions/Precautions   Precautions Fall Risk   RLE Weight Bearing Per Order TTWB   ROM Restrictions   (R LE long cast)   Braces or Orthoses   (R LE long cast)   Cognition   Overall Cognitive Status WFL   Arousal/Participation Alert;Responsive;Cooperative   Attention Within functional limits   Orientation Level Oriented X4   Memory Within functional limits   Following Commands Follows all commands and directions without difficulty   Subjective   Subjective Pt c/o no pain, curious to see what will occur at his appointment today   Roll Left and Right   Type of Assistance Needed Independent   Physical Assistance Level No physical assistance   Roll Left and Right CARE Score 6   Sit to Lying   Type of Assistance Needed Independent   Physical Assistance Level No physical assistance   Sit to Lying CARE Score 6   Lying to Sitting on Side of Bed   Type of Assistance Needed Independent   Physical Assistance Level No physical assistance   Lying to Sitting on Side of Bed CARE Score 6   Sit to Stand   Type of Assistance Needed Independent   Physical Assistance Level No physical assistance   Comment RW   Sit to Stand CARE Score 6   Bed-Chair Transfer   Type of Assistance Needed Independent   Physical Assistance Level No physical assistance   Comment RW   Chair/Bed-to-Chair Transfer CARE Score 6   Car Transfer   Reason if not Attempted Environmental limitations   Car Transfer CARE Score 10   Walk 10 Feet   Type of Assistance Needed Independent   Physical Assistance Level No physical assistance   Comment RW   Walk 10 Feet CARE Score 6   Walk 50 Feet with Two Turns   Type of Assistance Needed Independent   Physical Assistance Level No physical assistance   Comment RW   Walk 50 Feet with Two Turns CARE Score 6   Walk 150 Feet   Type of Assistance Needed Independent   Physical Assistance Level No physical assistance   Comment RW   Walk 150 Feet  CARE Score 6   Walking 10 Feet on Uneven Surfaces   Type of Assistance Needed Independent   Physical Assistance Level No physical assistance   Comment RW   Walking 10 Feet on Uneven Surfaces CARE Score 6   Ambulation   Primary Mode of Locomotion Prior to Admission Walk   Distance Walked (feet) 180 ft  (180', 120', household distances in PT gym)   Assist Device Roller Walker   Findings maintains TTWB R LE well   Does the patient walk? 2. Yes   Wheel 50 Feet with Two Turns   Reason if not Attempted Activity not applicable   Wheel 50 Feet with Two Turns CARE Score 9   Wheel 150 Feet   Reason if not Attempted Activity not applicable   Wheel 150 Feet CARE Score 9   Wheelchair mobility   Findings N/A   Does the patient use a wheelchair? 0. No   Curb or Single Stair   Style negotiated Single stair   Type of Assistance Needed Independent   Physical Assistance Level No physical assistance   Comment TTWB R LE, up/down 6  steps, L HR, non-reciprocal   1 Step (Curb) CARE Score 6   4 Steps   Type of Assistance Needed Independent   Physical Assistance Level No physical assistance   Comment TTWB R LE, up/down 6  steps, L HR, non-reciprocal   4 Steps CARE Score 6   12 Steps   Reason if not Attempted Activity not applicable   12 Steps CARE Score 9   Picking Up Object   Type of Assistance Needed Independent;Adaptive equipment   Physical Assistance Level No physical assistance   Comment RW support, pants from floor, reacher   Picking Up Object CARE Score 6   Toilet Transfer   Comment did not require during session   Therapeutic Interventions   Strengthening seated LE TE   Equipment Use   NuStep L5, B/L UE, L LE   Assessment   Treatment Assessment Pt agreeable to PT this AM, received ambulating to PT gym. Continued to challenge B/L LE strength/endurance with seated LE TE, as well as L LE strength/endurance with NuStep. Pt continues to maintain TTWB R LE well with functional mobility, improving with static standing balance  without UE support. He remains limited by TTWB R LE with long cast for safety with mobility at home due to environmental spacing concerns. Will continue with current PT POC to improve deficits and promote return to PLOF.   Problem List Decreased strength;Decreased range of motion;Impaired balance;Decreased mobility;Orthopedic restrictions   PT Barriers   Physical Impairment Decreased strength;Decreased range of motion;Decreased endurance;Impaired balance;Decreased mobility;Impaired sensation;Obesity;Decreased skin integrity;Orthopedic restrictions;Pain   Functional Limitation Walking;Transfers;Standing;Stair negotiation;Ramp negotiation;Car transfers   Plan   Treatment/Interventions Functional transfer training;LE strengthening/ROM;Therapeutic exercise;Endurance training;Patient/family training;Equipment eval/education;Bed mobility;Gait training   Progress Progressing toward goals   PT Therapy Minutes   PT Time In 0700   PT Time Out 0830   PT Total Time (minutes) 90   PT Mode of treatment - Individual (minutes) 90   PT Mode of treatment - Concurrent (minutes) 0   PT Mode of treatment - Group (minutes) 0   PT Mode of treatment - Co-treat (minutes) 0   PT Mode of Treatment - Total time(minutes) 90 minutes   PT Cumulative Minutes 1380     Seated LE TE:  3x10, B/L LEs, 2.5# L LE  March (L LE only)  LAQ (L LE only)  SLR  Hip ABd - blue T-band  Hip ADd - ball Edgar/blueT-band  GS  QS  HR  TR  HS Curls - blue T-band  Resisted DF/PF (blue T-band)    Patient remains seated at EOB, all needs in reach. Encouraged use of call bell, patient verbalizes understanding.

## 2025-03-28 NOTE — PROGRESS NOTES
03/28/25 0858   Pain Assessment   Pain Assessment Tool 0-10   Pain Score No Pain   Restrictions/Precautions   Precautions Fall Risk   RLE Weight Bearing Per Order TTWB   Braces or Orthoses   (R LE cast)   Oral Hygiene   Type of Assistance Needed Independent   Physical Assistance Level No physical assistance   Oral Hygiene CARE Score 6   Grooming   Able To Initiate Tasks;Acquire Items;Comb/Brush Hair;Wash/Dry Face;Brush/Clean Teeth;Wash/Dry Hands   Shower/Bathe Self   Type of Assistance Needed Independent;Adaptive equipment   Physical Assistance Level No physical assistance   Shower/Bathe Self CARE Score 6   Bathing   Assessed Bath Style Sponge Bath   Able to Adjust Water Temperature Yes   Able to Wash/Rinse/Dry (body part) Left Arm;Right Arm;L Upper Leg;R Upper Leg;L Lower Leg/Foot;R Lower Leg/Foot;Chest;Buttocks;Perineal Area;Abdomen   Limitations Noted in ROM   Positioning Seated;Standing   Adaptive Equipment Longhand Sponge   Upper Body Dressing   Type of Assistance Needed Independent   Physical Assistance Level No physical assistance   Upper Body Dressing CARE Score 6   Lower Body Dressing   Type of Assistance Needed Independent;Adaptive equipment   Physical Assistance Level No physical assistance   Lower Body Dressing CARE Score 6   Putting On/Taking Off Footwear   Type of Assistance Needed Independent;Adaptive equipment   Physical Assistance Level No physical assistance   Putting On/Taking Off Footwear CARE Score 6   Picking Up Object   Type of Assistance Needed Independent;Adaptive equipment   Physical Assistance Level No physical assistance   Picking Up Object CARE Score 6   Dressing/Undressing Clothing   Able to  Obtain Clothing;Store removed clothing   Remove UB Clothes Button Shirt   Don UB Clothes Button Shirt   Remove LB Clothes Shorts;Undergarment;Socks   Don LB Clothes Socks;Undergarment;Shorts   Limitations Noted In ROM   Adaptive Equipment Reacher;Sock Aide   Positioning Supported Sit;Standing    Lying to Sitting on Side of Bed   Type of Assistance Needed Independent   Physical Assistance Level No physical assistance   Lying to Sitting on Side of Bed CARE Score 6   Sit to Stand   Type of Assistance Needed Independent   Physical Assistance Level No physical assistance   Sit to Stand CARE Score 6   Toileting Hygiene   Type of Assistance Needed Independent   Physical Assistance Level No physical assistance   Toileting Hygiene CARE Score 6   Toileting   Able to Pull Clothing down yes, up yes.   Manage Hygiene Bladder   Limitations Noted In ROM   Adaptive Equipment Grab Bar   Toilet Transfer   Type of Assistance Needed Independent   Physical Assistance Level No physical assistance   Toilet Transfer CARE Score 6   Toilet Transfer   Surface Assessed Raised Toilet   Limitations Noted In ROM   Adaptive Equipment Grab Bar;Walker   Light Housekeeping   Light Housekeeping Level Walker   Light Housekeeping Level of Assistance Modified independent   Exercise Tools   Hand Gripper heavy resistance gripper 2x40 reps B hands   Other Exercise Tool 1 red t bar 2x15 reps upward then downward   Other Exercise Tool 2 6# dowel 2x15 reps in 6 planes of motion   Cognition   Overall Cognitive Status WFL   Arousal/Participation Alert;Cooperative   Attention Within functional limits   Orientation Level Oriented X4   Memory Within functional limits   Following Commands Follows all commands and directions without difficulty   Additional Activities   Additional Activities Comments Fxnl mobility MI TTWB R LE with RW   Activity Tolerance   Activity Tolerance Patient tolerated treatment well   Assessment   Treatment Assessment ADL completed via sponge bathe level: Pt able to consistently s/u and c/u all supplies. Pt demon consistent recall of compensatory strategies and compliance with safe techniques throughout all ADL/iADLs. Pt completed laundry by gathering items and transporting them with bag to wash machine with use of RW. Details  listed in respective sections of note. Pt tolerates session well with no c/os offerred. Pt maintains TTWB during txfs and mobility consistently.  Pt's barriers to d/c to personal home environment include decreased strength RLE s/p quadricepts rupture, decreased ROM with use of leg length hard cast, and TTWB RLE. Plan is contingent upon placement due to noted barriers.   Prognosis Good   Problem List Decreased strength;Decreased range of motion;Impaired balance;Decreased mobility;Orthopedic restrictions   Plan   Treatment/Interventions ADL retraining;Functional transfer training;Therapeutic exercise;Endurance training;Patient/family training;Equipment eval/education;Gait training;Compensatory technique education;OT;Spoke to nursing   Progress Progressing toward goals   OT Therapy Minutes   OT Time In 0858   OT Time Out 1028   OT Total Time (minutes) 90   OT Mode of treatment - Individual (minutes) 90   OT Mode of treatment - Concurrent (minutes) 0   OT Mode of treatment - Group (minutes) 0   OT Mode of treatment - Co-treat (minutes) 0   OT Mode of Treatment - Total time(minutes) 90 minutes   OT Cumulative Minutes 1037   Therapy Time missed   Time missed? No

## 2025-03-28 NOTE — NURSING NOTE
Patient resting in bed at this time. No signs of distress noted. Toe touch weight bearing to the right lower extremity and hard cast in place as ordered. Patient was successfully modified independent to the bathroom with a walker. Call bell within reach. Plan of care ongoing.

## 2025-03-28 NOTE — CASE MANAGEMENT
CM called patient's DOL worker's comp.  Left voice mail for examiner to call back regarding d/c disposition and if it would be covered under WC.   Per patient, he called and whomever he spoke to said they needed to speak with provider and were unable to provide him with any information.  Will continue to follow.

## 2025-03-28 NOTE — PROGRESS NOTES
"Progress Note - Tobin Kerns 69 y.o. male MRN: [id number]    Unit/Bed#: [facility] 210-01 Encounter: [id number]        Subjective:   Patient seen and examined at bedside after reviewing the chart and discussing the case with the caring staff.      Patient examined at bedside.  Patient has no acute symptoms.    Physical Exam   Vitals: Blood pressure 108/63, pulse 71, temperature (!) 97.3 °F (36.3 °C), temperature source Temporal, resp. rate 16, height 6' 5\" (1.956 m), weight 132 kg (289 lb 14.5 oz), SpO2 97%.,Body mass index is 34.38 kg/m².  Constitutional: Patient in no acute distress.  HEENT: PERR, EOMI, MMM.  Cardiovascular: Normal rate and regular rhythm.    Pulmonary/Chest: Effort normal and breath sounds normal.   Abdomen: Soft, + BS, NT.    Assessment/Plan:  Tobin Kerns is a(n) 69 y.o. year old male who is s/p quadriceps tendon repair.     Cardiac with hx of HTN, HLD.  Patient is on losartan 50 mg daily, pravastatin 40 mg daily.  Impaired fasting glucose.  Hgb a1c 5.4% on 3/12/25.  Continue metformin XR 1500 mg daily with dinner.  Regular diet and d/c accucheks as blood sugar is well controlled.    BPH.  Continue Flomax 0.4 mg daily.  GERD.  Continue Pepcid 20 mg daily.  JOVANY.  On CPAP at bedtime.   Vitamin D deficiency.  Patient is on vitamin D3 5000 units daily.  Pain and bowel management.  As per physiatrist.  Quadriceps tendon repair.  Toe touch weightbearing RLE with use of walker.  Patient is receiving intensive PT OT as per physiatrist.    Anticipated date of discharge.  TBD    The patient was discussed with Dr. Brito and he is in agreement with the above note.  "

## 2025-03-28 NOTE — PLAN OF CARE
Problem: PAIN - ADULT  Goal: Verbalizes/displays adequate comfort level or baseline comfort level  Description: Interventions:  - Encourage patient to monitor pain and request assistance  - Assess pain using appropriate pain scale  - Administer analgesics based on type and severity of pain and evaluate response  - Implement non-pharmacological measures as appropriate and evaluate response  - Consider cultural and social influences on pain and pain management  - Notify physician/advanced practitioner if interventions unsuccessful or patient reports new pain  Outcome: Progressing     Problem: SAFETY ADULT  Goal: Patient will remain free of falls  Description: INTERVENTIONS:  - Educate patient/family on patient safety including physical limitations  - Instruct patient to call for assistance with activity   - Consult OT/PT to assist with strengthening/mobility   - Keep Call bell within reach  - Keep bed low and locked with side rails adjusted as appropriate  - Keep care items and personal belongings within reach  - Initiate and maintain comfort rounds  - Make Fall Risk Sign visible to staff  SARAH  - Apply yellow socks and bracelet for high fall risk patients  - Consider moving patient to room near nurses station  Outcome: Progressing

## 2025-03-28 NOTE — ASSESSMENT & PLAN NOTE
"HPI:   He initially underwent quad tendon repair on 7/25/24 with Dr. Foster. He was discharged home with outpatient PT.  Later noted to have recurrent high-grade tear with extensor lag on exam and elected for operative management  January 2025 MRI: \"Prior quadriceps insertion repair with high-grade recurrent tear exhibiting up to 2.2 cm of tendon retraction.\"  On 3/6/25, he underwent revision of quad tendon repair with allograft augmentation  3/21: outpatient follow-up with Dr. Foster, new cast applied     Plan:   PT and OT for 3 hours a day, 5-6 days a week   TTWB RLE   Avoid moisture to cast   Pain: PRN Tylenol (monitor LFTs) and oxycodone discontinued on 3/17 due to minimal pain   Per ortho,  Lovenox for 2 more weeks for DVT prophylaxis (until 4/4)   3/28: follow-up with Dr. Foster, continue TTWB and follow-up in a week   "

## 2025-03-29 PROCEDURE — 97110 THERAPEUTIC EXERCISES: CPT

## 2025-03-29 RX ADMIN — ENOXAPARIN SODIUM 40 MG: 40 INJECTION SUBCUTANEOUS at 08:11

## 2025-03-29 RX ADMIN — OXYCODONE HYDROCHLORIDE AND ACETAMINOPHEN 500 MG: 500 TABLET ORAL at 08:11

## 2025-03-29 RX ADMIN — CHOLECALCIFEROL TAB 25 MCG (1000 UNIT) 5000 UNITS: 25 TAB at 08:11

## 2025-03-29 RX ADMIN — FAMOTIDINE 20 MG: 20 TABLET, FILM COATED ORAL at 08:11

## 2025-03-29 RX ADMIN — PRAVASTATIN SODIUM 40 MG: 40 TABLET ORAL at 17:14

## 2025-03-29 RX ADMIN — TAMSULOSIN HYDROCHLORIDE 0.4 MG: 0.4 CAPSULE ORAL at 17:14

## 2025-03-29 RX ADMIN — METFORMIN ER 500 MG 1500 MG: 500 TABLET ORAL at 17:14

## 2025-03-29 RX ADMIN — ACETAMINOPHEN 650 MG: 325 TABLET ORAL at 05:27

## 2025-03-29 RX ADMIN — B-COMPLEX W/ C & FOLIC ACID TAB 1 TABLET: TAB at 08:11

## 2025-03-29 RX ADMIN — LOSARTAN POTASSIUM 50 MG: 50 TABLET, FILM COATED ORAL at 08:11

## 2025-03-29 NOTE — NURSING NOTE
Pt resting in bed, no s/s of pain or distress. MOD I in room, TTWB to RLE. Hard cast in place with ACE wrap around part of it as applied at ortho appt yesterday 3/28. Voices no concerns overnight. Continuing to monitor and follow plan of care.

## 2025-03-29 NOTE — PROGRESS NOTES
03/29/25 1235   Pain Assessment   Pain Assessment Tool 0-10   Pain Score No Pain   Restrictions/Precautions   Precautions Fall Risk;Limb alert   RLE Weight Bearing Per Order TTWB   ROM Restrictions No   Braces or Orthoses   (ace wrap on RLE as cast removed at ortho appt yesterday)   Sit to Stand   Type of Assistance Needed Independent   Physical Assistance Level No physical assistance   Sit to Stand CARE Score 6   Kitchen Mobility   Kitchen-Mobility Level Walker   Kitchen Activity Retrieve items;Transport items   Kitchen Mobility Comments retrieved soda can and cup with ice from kitchen,placed soda can in pocket and requested OT's assist to take cup with ice to table so both hands can remain on RW when completing mobility; used RW at mod I level for mobility when gathering and transporting items   Exercise Tools   UE Ergometer 5min forward, 5min backwards   Hand Gripper x40 digit flexion/extension in bilat hands using heavy resistence hand gripper   Other Exercise Tool 1 x40 pronation/supination and internal/external rotation in bilat UE using red flexbar   Other Exercise Tool 2 UE strengthening in bilat UE using 5# therabar with 1# wrist cuff attached, 4x10: shoulder elevation/depression, elbow flexion/extension, protraction/retraction, shoulder flexion/extension 2 separate exercises, horizontal ABD/ADD 2 separate exercises   Cognition   Overall Cognitive Status WFL   Arousal/Participation Alert;Cooperative;Responsive   Attention Within functional limits   Orientation Level Oriented X4   Memory Within functional limits   Following Commands Follows all commands and directions without difficulty   Assessment   Treatment Assessment Pt agreeable to OT session this PM. Received  sidelying in bed . Pt mod I for fxl mobility using RW to and from OT room. Completed UE ROM/strength and kitchen mobility activities; see respective sections for details. Pt reported no pain in RLE and maintained TTWB effectively during  Anticoagulation Management    Unable to reach [facility] today.    Yesterday's INR result of 3.2 is supratherapeutic (goal INR of 2.0-3.0).  Result received from: Clinic Lab    Follow up required to discuss out of range INR     Left message to continue current dose of warfarin 10 mg tonight. Left message on importance of returning [facility]'s call as the warfarin dose may need to be adjusted.      Anticoagulation clinic to follow up    Cheyenne George RN     mobility/transfers. Pt is an active participant in therapy and is making positive gains towards goals. Pt's barriers to d/c include decreased strength throughout but especially RLE s/p quadriceps muscle rupture and repair, decreased ROM, decreased balance, decreased activity tolerance, decreased safety awareness, and TTWB RLE ; all affect independence in self care and fxl transfers. Pt would benefit from continued skilled OT services in order to address listed barriers and prepare for safe d/c.   Prognosis Good   Problem List Decreased strength;Decreased range of motion;Impaired balance;Decreased mobility;Orthopedic restrictions   Plan   Treatment/Interventions ADL retraining;Functional transfer training;LE strengthening/ROM;Therapeutic exercise;Endurance training;Patient/family training;Equipment eval/education;Compensatory technique education;OT   Progress Progressing toward goals   OT Therapy Minutes   OT Time In 1235   OT Time Out 1326   OT Total Time (minutes) 51   OT Mode of treatment - Individual (minutes) 0   OT Mode of treatment - Concurrent (minutes) 51

## 2025-03-29 NOTE — PLAN OF CARE
Problem: PAIN - ADULT  Goal: Verbalizes/displays adequate comfort level or baseline comfort level  Description: Interventions:  - Encourage patient to monitor pain and request assistance  - Assess pain using appropriate pain scale  - Administer analgesics based on type and severity of pain and evaluate response  - Implement non-pharmacological measures as appropriate and evaluate response  - Consider cultural and social influences on pain and pain management  - Notify physician/advanced practitioner if interventions unsuccessful or patient reports new pain  Outcome: Progressing     Problem: DISCHARGE PLANNING  Goal: Discharge to home or other facility with appropriate resources  Description: INTERVENTIONS:  - Identify barriers to discharge w/patient and caregiver  - Arrange for needed discharge resources and transportation as appropriate  - Identify discharge learning needs (meds, wound care, etc.)  - Arrange for interpretive services to assist at discharge as needed  - Refer to Case Management Department for coordinating discharge planning if the patient needs post-hospital services based on physician/advanced practitioner order or complex needs related to functional status, cognitive ability, or social support system  Outcome: Not Progressing     Problem: Nutrition/Hydration-ADULT  Goal: Nutrient/Hydration intake appropriate for improving, restoring or maintaining nutritional needs  Description: Monitor and assess patient's nutrition/hydration status for malnutrition. Collaborate with interdisciplinary team and initiate plan and interventions as ordered.  Monitor patient's weight and dietary intake as ordered or per policy. Utilize nutrition screening tool and intervene as necessary. Determine patient's food preferences and provide high-protein, high-caloric foods as appropriate. INTERVENTIONS:- Monitor oral intake, urinary output, labs, and treatment plans- Assess nutrition and hydration status and recommend  course of action- Evaluate amount of meals eaten- Assist patient with eating if necessary - Allow adequate time for meals- Recommend/ encourage appropriate diets, oral nutritional supplements, and vitamin/mineral supplements- Provide specific nutrition/hydration education as appropriate- Include patient/family/caregiver in decisions related to nutrition  Outcome: Progressing

## 2025-03-30 RX ADMIN — CHOLECALCIFEROL TAB 25 MCG (1000 UNIT) 5000 UNITS: 25 TAB at 08:24

## 2025-03-30 RX ADMIN — ENOXAPARIN SODIUM 40 MG: 40 INJECTION SUBCUTANEOUS at 08:24

## 2025-03-30 RX ADMIN — METFORMIN ER 500 MG 1500 MG: 500 TABLET ORAL at 17:12

## 2025-03-30 RX ADMIN — OXYCODONE HYDROCHLORIDE AND ACETAMINOPHEN 500 MG: 500 TABLET ORAL at 08:24

## 2025-03-30 RX ADMIN — LOSARTAN POTASSIUM 50 MG: 50 TABLET, FILM COATED ORAL at 08:24

## 2025-03-30 RX ADMIN — PRAVASTATIN SODIUM 40 MG: 40 TABLET ORAL at 17:12

## 2025-03-30 RX ADMIN — FAMOTIDINE 20 MG: 20 TABLET, FILM COATED ORAL at 08:24

## 2025-03-30 RX ADMIN — TAMSULOSIN HYDROCHLORIDE 0.4 MG: 0.4 CAPSULE ORAL at 17:12

## 2025-03-30 RX ADMIN — B-COMPLEX W/ C & FOLIC ACID TAB 1 TABLET: TAB at 08:24

## 2025-03-30 NOTE — PLAN OF CARE
Problem: PAIN - ADULT  Goal: Verbalizes/displays adequate comfort level or baseline comfort level  Description: Interventions:  - Encourage patient to monitor pain and request assistance  - Assess pain using appropriate pain scale  - Administer analgesics based on type and severity of pain and evaluate response  - Implement non-pharmacological measures as appropriate and evaluate response  - Consider cultural and social influences on pain and pain management  - Notify physician/advanced practitioner if interventions unsuccessful or patient reports new pain  Outcome: Progressing     Problem: SAFETY ADULT  Goal: Maintain or return to baseline ADL function  Description: INTERVENTIONS:  -  Assess patient's ability to carry out ADLs; assess patient's baseline for ADL function and identify physical deficits which impact ability to perform ADLs (bathing, care of mouth/teeth, toileting, grooming, dressing, etc.)  - Assess/evaluate cause of self-care deficits   - Assess range of motion  - Assess patient's mobility; develop plan if impaired  - Assess patient's need for assistive devices and provide as appropriate  - Encourage maximum independence but intervene and supervise when necessary  - Involve family in performance of ADLs  - Assess for home care needs following discharge   - Consider OT consult to assist with ADL evaluation and planning for discharge  - Provide patient education as appropriate  Outcome: Progressing     Problem: Nutrition/Hydration-ADULT  Goal: Nutrient/Hydration intake appropriate for improving, restoring or maintaining nutritional needs  Description: Monitor and assess patient's nutrition/hydration status for malnutrition. Collaborate with interdisciplinary team and initiate plan and interventions as ordered.  Monitor patient's weight and dietary intake as ordered or per policy. Utilize nutrition screening tool and intervene as necessary. Determine patient's food preferences and provide  high-protein, high-caloric foods as appropriate. INTERVENTIONS:- Monitor oral intake, urinary output, labs, and treatment plans- Assess nutrition and hydration status and recommend course of action- Evaluate amount of meals eaten- Assist patient with eating if necessary - Allow adequate time for meals- Recommend/ encourage appropriate diets, oral nutritional supplements, and vitamin/mineral supplements- Order, calculate, and assess calorie counts as needed- Recommend, monitor, and adjust tube feedings and TPN/PPN based on assessed needs- Assess need for intravenous fluids- Provide specific nutrition/hydration education as appropriate- Include patient/family/caregiver in decisions related to nutrition  Outcome: Progressing

## 2025-03-30 NOTE — NURSING NOTE
Pt resting in bed, no s/s of pain or distress. MOD I in room-TTWB to RLE maintained. Ambulated in hallway with RW HS. Voices no concerns or complaints. Continuing to monitor and follow plan of care.

## 2025-03-30 NOTE — NURSING NOTE
Remains MOD I in room. Reports  no pain. Cast in place to RLE. Dressings in place and not due for change today. Completes all tasks independently. Will continue to monitor and follow plan of care.

## 2025-03-31 ENCOUNTER — TELEPHONE (OUTPATIENT)
Age: 70
End: 2025-03-31

## 2025-03-31 ENCOUNTER — APPOINTMENT (OUTPATIENT)
Dept: PHYSICAL THERAPY | Facility: CLINIC | Age: 70
End: 2025-03-31
Payer: OTHER MISCELLANEOUS

## 2025-03-31 LAB
ALBUMIN SERPL BCG-MCNC: 3.9 G/DL (ref 3.5–5)
ALP SERPL-CCNC: 56 U/L (ref 34–104)
ALT SERPL W P-5'-P-CCNC: 15 U/L (ref 7–52)
ANION GAP SERPL CALCULATED.3IONS-SCNC: 9 MMOL/L (ref 4–13)
AST SERPL W P-5'-P-CCNC: 17 U/L (ref 13–39)
BASOPHILS # BLD AUTO: 0.04 THOUSANDS/ÂΜL (ref 0–0.1)
BASOPHILS NFR BLD AUTO: 1 % (ref 0–1)
BILIRUB SERPL-MCNC: 0.54 MG/DL (ref 0.2–1)
BUN SERPL-MCNC: 24 MG/DL (ref 5–25)
CALCIUM SERPL-MCNC: 9.2 MG/DL (ref 8.4–10.2)
CHLORIDE SERPL-SCNC: 104 MMOL/L (ref 96–108)
CO2 SERPL-SCNC: 23 MMOL/L (ref 21–32)
CREAT SERPL-MCNC: 1.22 MG/DL (ref 0.6–1.3)
EOSINOPHIL # BLD AUTO: 0.25 THOUSAND/ÂΜL (ref 0–0.61)
EOSINOPHIL NFR BLD AUTO: 3 % (ref 0–6)
ERYTHROCYTE [DISTWIDTH] IN BLOOD BY AUTOMATED COUNT: 12.5 % (ref 11.6–15.1)
GFR SERPL CREATININE-BSD FRML MDRD: 60 ML/MIN/1.73SQ M
GLUCOSE P FAST SERPL-MCNC: 86 MG/DL (ref 65–99)
GLUCOSE SERPL-MCNC: 86 MG/DL (ref 65–140)
HCT VFR BLD AUTO: 40.5 % (ref 36.5–49.3)
HGB BLD-MCNC: 13.4 G/DL (ref 12–17)
IMM GRANULOCYTES # BLD AUTO: 0.02 THOUSAND/UL (ref 0–0.2)
IMM GRANULOCYTES NFR BLD AUTO: 0 % (ref 0–2)
LYMPHOCYTES # BLD AUTO: 3.41 THOUSANDS/ÂΜL (ref 0.6–4.47)
LYMPHOCYTES NFR BLD AUTO: 41 % (ref 14–44)
MCH RBC QN AUTO: 29.5 PG (ref 26.8–34.3)
MCHC RBC AUTO-ENTMCNC: 33.1 G/DL (ref 31.4–37.4)
MCV RBC AUTO: 89 FL (ref 82–98)
MONOCYTES # BLD AUTO: 0.72 THOUSAND/ÂΜL (ref 0.17–1.22)
MONOCYTES NFR BLD AUTO: 9 % (ref 4–12)
NEUTROPHILS # BLD AUTO: 3.81 THOUSANDS/ÂΜL (ref 1.85–7.62)
NEUTS SEG NFR BLD AUTO: 46 % (ref 43–75)
NRBC BLD AUTO-RTO: 0 /100 WBCS
PLATELET # BLD AUTO: 236 THOUSANDS/UL (ref 149–390)
PMV BLD AUTO: 10.2 FL (ref 8.9–12.7)
POTASSIUM SERPL-SCNC: 4.4 MMOL/L (ref 3.5–5.3)
PROT SERPL-MCNC: 6.7 G/DL (ref 6.4–8.4)
RBC # BLD AUTO: 4.54 MILLION/UL (ref 3.88–5.62)
SODIUM SERPL-SCNC: 136 MMOL/L (ref 135–147)
WBC # BLD AUTO: 8.25 THOUSAND/UL (ref 4.31–10.16)

## 2025-03-31 PROCEDURE — 97110 THERAPEUTIC EXERCISES: CPT

## 2025-03-31 PROCEDURE — 80053 COMPREHEN METABOLIC PANEL: CPT

## 2025-03-31 PROCEDURE — 97116 GAIT TRAINING THERAPY: CPT

## 2025-03-31 PROCEDURE — 97530 THERAPEUTIC ACTIVITIES: CPT

## 2025-03-31 PROCEDURE — 97535 SELF CARE MNGMENT TRAINING: CPT

## 2025-03-31 PROCEDURE — 99232 SBSQ HOSP IP/OBS MODERATE 35: CPT | Performed by: PHYSICAL MEDICINE & REHABILITATION

## 2025-03-31 PROCEDURE — 85025 COMPLETE CBC W/AUTO DIFF WBC: CPT

## 2025-03-31 RX ADMIN — B-COMPLEX W/ C & FOLIC ACID TAB 1 TABLET: TAB at 08:21

## 2025-03-31 RX ADMIN — LOSARTAN POTASSIUM 50 MG: 50 TABLET, FILM COATED ORAL at 08:22

## 2025-03-31 RX ADMIN — METFORMIN ER 500 MG 1500 MG: 500 TABLET ORAL at 15:57

## 2025-03-31 RX ADMIN — FAMOTIDINE 20 MG: 20 TABLET, FILM COATED ORAL at 08:21

## 2025-03-31 RX ADMIN — CHOLECALCIFEROL TAB 25 MCG (1000 UNIT) 5000 UNITS: 25 TAB at 08:21

## 2025-03-31 RX ADMIN — TAMSULOSIN HYDROCHLORIDE 0.4 MG: 0.4 CAPSULE ORAL at 15:57

## 2025-03-31 RX ADMIN — PRAVASTATIN SODIUM 40 MG: 40 TABLET ORAL at 15:57

## 2025-03-31 RX ADMIN — OXYCODONE HYDROCHLORIDE AND ACETAMINOPHEN 500 MG: 500 TABLET ORAL at 08:21

## 2025-03-31 RX ADMIN — ENOXAPARIN SODIUM 40 MG: 40 INJECTION SUBCUTANEOUS at 08:21

## 2025-03-31 NOTE — ASSESSMENT & PLAN NOTE
Skin breakdown on R heel due to cast. Ortho removed approximately 1 inch of cast at posterior aspect on 3/11  New cast 3/21   Local wound care  Continue to monitor   Present on admission to [facility]   Wound care RN consulted:  Skin Care orders:  1-Hydraguard to sacrum, buttocks bid and prn   2-EHOB / waffle  cushion when out of bed in chair.  3-Moisturize skin daily with skin nourishing cream  4-Elevate heels to offload pressure.  5-Turn/reposition q2h for pressure re-distribution on skin  6. Right posterior heel , right medial ankle and right lateral ankle - cleanse with Normal saline then apply xeroform then top with the silicone foam nate with a T and date change every other day as needed for soilage or dislodgement   7. Left plantar aspect of the foot - apply betadine paint then a AND and olga wrap change every other day

## 2025-03-31 NOTE — NURSING NOTE
Patient resting in bed at this time.  No signs of distress noted.  Cast CDI to the right lower extremity.  Patient was successfully modified independent to the bathroom overnight with walker in use.  Toe touch weight bearing to the right lower extremity.  Call bell within reach.  Plan of care ongoing.

## 2025-03-31 NOTE — PROGRESS NOTES
"Progress Note - PMR   Name: Tobin Kerns 69 y.o. male I MRN: [id number]  Unit/Bed#: -01 I Date of Admission: 3/11/2025   Date of Service: 3/31/2025 I Hospital Day: 20     Assessment & Plan  Quadriceps muscle rupture, right, subsequent encounter  HPI:   He initially underwent quad tendon repair on 7/25/24 with Dr. Foster. He was discharged home with outpatient PT.  Later noted to have recurrent high-grade tear with extensor lag on exam and elected for operative management  January 2025 MRI: \"Prior quadriceps insertion repair with high-grade recurrent tear exhibiting up to 2.2 cm of tendon retraction.\"  On 3/6/25, he underwent revision of quad tendon repair with allograft augmentation  3/21: outpatient follow-up with Dr. Foster, new cast applied     Plan:   PT and OT for 3 hours a day, 5-6 days a week   TTWB RLE   Avoid moisture to cast   Pain: PRN Tylenol (monitor LFTs) and oxycodone discontinued on 3/17 due to minimal pain   Per ortho,  Lovenox for 2 more weeks for DVT prophylaxis (until 4/4)   3/28: follow-up with Dr. Foster, continue TTWB and follow-up in a week   Hypertension  Continue losartan 50 mg daily   Monitor BP   Mixed hyperlipidemia  Continue pravastatin 40 mg daily   Gastro-esophageal reflux disease without esophagitis  Continue famotidine 20 mg daily   Peripheral neuropathy  Patient is not diabetic   Monitor skin for new or worsening wounds   JOVANY on CPAP  Continue CPAP qHS  IFG (impaired fasting glucose)  Continue metformin 1500 mg daily   Seen by RD 3/12, diet adjusted to regular   3/12 A1c: 5.4   Healed ulcer of left foot  Local wound care  Present on admission   Wound care RN consulted   BPH (benign prostatic hyperplasia)  Continue tamsulosin 0.4 mg daily   Patient currently voiding without difficulty   PRN PVRs if urinary retention is suspected   Impaired mobility and ADLs  Patient was evaluated by the rehabilitation team MD and an appropriate candidate for acute inpatient " rehabilitation program at this time.  The patient will tolerate 3 hours/day 5 to 7 days/week of intensive physical and  occupational therapy   in order to obtain goals for community discharge  Due to the patient's functional Compared to their baseline level of function in addition to their ongoing medical needs, the patient would benefit from daily supervision from a rehabilitation physician as well as rehabilitation nursing to implement and adjust the medical as well as functional plan of care in order to meet the patient's goals.  Bladder: Monitor closely for urinary incontinence and/or retention. Monitor urine output and encourage spontaneous voids. If unable to void spontaneously, will monitor with PVR bladder scans and initiate CIC regimen.  Skin: Monitor for breakdown, frequent turns, pressure offloading  Sleep: Encourage sleep hygiene (routine that promotes healthy circadian rhythm,avoid caffeine later in the day, quiet/dark room at night, reduce electronic before bedtime)      Wound of right ankle  Skin breakdown on R heel due to cast. Ortho removed approximately 1 inch of cast at posterior aspect on 3/11  New cast 3/21   Local wound care  Continue to monitor   Present on admission to [facility]   Wound care RN consulted:  Skin Care orders:  1-Hydraguard to sacrum, buttocks bid and prn   2-EHOB / waffle  cushion when out of bed in chair.  3-Moisturize skin daily with skin nourishing cream  4-Elevate heels to offload pressure.  5-Turn/reposition q2h for pressure re-distribution on skin  6. Right posterior heel , right medial ankle and right lateral ankle - cleanse with Normal saline then apply xeroform then top with the silicone foam nate with a T and date change every other day as needed for soilage or dislodgement   7. Left plantar aspect of the foot - apply betadine paint then a AND and olga wrap change every other day      Subjective   Tobin Kerns is a 69 year-old male, with a past medical history of  hypertension, hyperlipidemia, JOVANY, impaired fasting glucose, bilateral lower extremity neuropathy, GERD, BPH, presenting to [facility] after an elective right quadricept tendon repair. He initially underwent quad tendon repair on 7/25/24 with Dr. Foster. He was discharged home with outpatient PT. He was noted to have recurrent high-grade tear on repeat MRI with extensor lag on exam and elected for operative management. On 3/6/25, he underwent revision of quad tendon repair with allograft augmentation. He was evaluated by PT and OT and deemed an appropriate candidate for [facility] due to functional deficits     Chief Complaint: f/u ambulatory dysfunction    Interval: Seen and evaluated patient in room. He denies any concerns. Last BM 3/28. He is continent of bowel and bladder. 3/31 labs reviewed    Objective :  Temp:  [97.1 °F (36.2 °C)-97.3 °F (36.3 °C)] 97.1 °F (36.2 °C)  HR:  [67-68] 67  BP: (104-117)/(56-64) 117/64  Resp:  [16] 16  SpO2:  [95 %-97 %] 97 %  O2 Device: None (Room air)    Functional Update:  Physical Therapy Occupational Therapy   Weight Bearing Status: Toe touch (R LE)  Transfers: Independent  Bed Mobility: Independent  Amulation Distance (ft): 180 feet  Ambulation: Independent  Assistive Device for Ambulation: Roller Walker  Wheelchair Mobility Distance:  (n/a)  Wheelchair Mobility:  (n/a)  Number of Stairs: 6  Assistive Device for Stairs: Lehft Hand Rail  Stair Assistance: Independent  Ramp: Independent  Assistive Device for Ramp: Roller Walker  Discharge Recommendations: Home with:  DC Home with::  (alternative placement)   Eating: Independent  Grooming: Independent  Bathing: Independent  Bathing: Independent  Upper Body Dressing: Independent  Lower Body Dressing: Independent  Toileting: Independent  Tub/Shower Transfer:  (full leg cast and pt declines shower)  Toilet Transfer: Independent  Cognition: Within Defined Limits  Orientation: Person, Situation, Place, Time           Physical Exam  Vitals and  nursing note reviewed.   Constitutional:       General: He is not in acute distress.     Appearance: He is obese. He is not ill-appearing or diaphoretic.   HENT:      Head: Normocephalic and atraumatic.      Nose: Nose normal.      Mouth/Throat:      Mouth: Mucous membranes are moist.   Cardiovascular:      Pulses:           Dorsalis pedis pulses are 2+ on the right side and 2+ on the left side.   Pulmonary:      Effort: Pulmonary effort is normal. No respiratory distress.   Abdominal:      General: There is no distension.   Skin:     General: Skin is warm and dry.   Neurological:      General: No focal deficit present.      Mental Status: He is alert.   Psychiatric:         Mood and Affect: Mood normal.         Behavior: Behavior normal.             Scheduled Meds:  Current Facility-Administered Medications   Medication Dose Route Frequency Provider Last Rate    acetaminophen  650 mg Oral Q6H PRN Modesta Lee PA-C      ascorbic acid  500 mg Oral Daily Toño Brito MD      Cholecalciferol  5,000 Units Oral Daily Toño Brito MD      enoxaparin  40 mg Subcutaneous Q24H [facility] Toño Brito MD      famotidine  20 mg Oral Daily Toño Brito MD      losartan  50 mg Oral Daily Toño Brito MD      metFORMIN  1,500 mg Oral Daily With Dinner Toño Brito MD      multivitamin stress formula  1 tablet Oral Daily Toño Brito MD      polyethylene glycol  17 g Oral Daily PRN Modesta Lee PA-C      pravastatin  40 mg Oral Daily With Dinner Toño Brito MD      tamsulosin  0.4 mg Oral Daily With Dinner Toño Brito MD           Lab Results: I have reviewed the following results:  Results from last 7 days   Lab Units 03/31/25  0520   HEMOGLOBIN g/dL 13.4   HEMATOCRIT % 40.5   WBC Thousand/uL 8.25   PLATELETS Thousands/uL 236     Results from last 7 days   Lab Units 03/31/25  0520   BUN mg/dL 24   SODIUM mmol/L 136   POTASSIUM mmol/L 4.4   CHLORIDE mmol/L 104   CREATININE mg/dL 1.22   AST U/L 17   ALT U/L 15             YOLANDA CarpenterC  Physical Medicine and Rehabilitation  [facility]

## 2025-03-31 NOTE — PROGRESS NOTES
03/31/25 0848   Pain Assessment   Pain Assessment Tool 0-10   Pain Score No Pain   Restrictions/Precautions   Precautions Fall Risk   RLE Weight Bearing Per Order TTWB   Braces or Orthoses   (full leg cast)   Eating   Type of Assistance Needed Independent   Physical Assistance Level No physical assistance   Eating CARE Score 6   Oral Hygiene   Type of Assistance Needed Independent   Physical Assistance Level No physical assistance   Oral Hygiene CARE Score 6   Grooming   Able To Initiate Tasks;Wash/Dry Face;Brush/Clean Teeth;Wash/Dry Hands;Comb/Brush Hair;Acquire Items   Findings stance   Shower/Bathe Self   Type of Assistance Needed Independent;Adaptive equipment   Physical Assistance Level No physical assistance   Shower/Bathe Self CARE Score 6   Bathing   Assessed Bath Style Sponge Bath   Able to Gather/Transport Yes   Able to Adjust Water Temperature Yes   Able to Wash/Rinse/Dry (body part) Left Arm;Right Arm;L Upper Leg;R Upper Leg;L Lower Leg/Foot;R Lower Leg/Foot;Chest;Abdomen;Perineal Area;Buttocks   Limitations Noted in ROM   Positioning Seated;Standing   Adaptive Equipment Longhand Sponge   Upper Body Dressing   Type of Assistance Needed Independent   Physical Assistance Level No physical assistance   Upper Body Dressing CARE Score 6   Lower Body Dressing   Type of Assistance Needed Independent;Adaptive equipment   Physical Assistance Level No physical assistance   Lower Body Dressing CARE Score 6   Putting On/Taking Off Footwear   Type of Assistance Needed Independent;Adaptive equipment   Physical Assistance Level No physical assistance   Putting On/Taking Off Footwear CARE Score 6   Picking Up Object   Type of Assistance Needed Independent;Adaptive equipment   Physical Assistance Level No physical assistance   Picking Up Object CARE Score 6   Dressing/Undressing Clothing   Able to  Obtain Clothing;Store removed clothing   Remove UB Clothes Button Shirt   Don UB Clothes Button Shirt   Remove LB Clothes  Undergarment;Socks   Don LB Clothes Socks;Undergarment;Shorts   Limitations Noted In ROM   Adaptive Equipment Reacher;Sock Aide   Positioning Supported Sit;Standing   Lying to Sitting on Side of Bed   Type of Assistance Needed Independent   Physical Assistance Level No physical assistance   Lying to Sitting on Side of Bed CARE Score 6   Sit to Stand   Type of Assistance Needed Independent   Physical Assistance Level No physical assistance   Sit to Stand CARE Score 6   Toileting Hygiene   Type of Assistance Needed Independent   Physical Assistance Level No physical assistance   Toileting Hygiene CARE Score 6   Toileting   Able to Pull Clothing down yes, up yes.   Manage Hygiene Bladder   Limitations Noted In ROM   Adaptive Equipment Grab Bar   Toilet Transfer   Type of Assistance Needed Independent;Adaptive equipment   Physical Assistance Level No physical assistance   Toilet Transfer CARE Score 6   Toilet Transfer   Surface Assessed Raised Toilet   Transfer Technique Standard   Limitations Noted In ROM   Adaptive Equipment Grab Bar;Walker   Light Housekeeping   Light Housekeeping Level Walker   Light Housekeeping Level of Assistance Modified independent   Exercise Tools   Hand Gripper B hands 2x40 reps with heavy resistance gripper   Other Exercise Tool 1 red flex bar: 2x20 reps upward then downward   Other Exercise Tool 2 2x15 reps in 6 planes of motion   Cognition   Overall Cognitive Status WFL   Arousal/Participation Alert;Cooperative   Attention Within functional limits   Orientation Level Oriented X4   Memory Within functional limits   Following Commands Follows all commands and directions without difficulty   Additional Activities   Additional Activities Comments Fxnl mobility MI TTWB R LE with RW   Activity Tolerance   Activity Tolerance Patient tolerated treatment well   Assessment   Treatment Assessment ADL completed via sponge bathe level: Pt able to consistently s/u and c/u all supplies. Pt patel  consistent recall of compensatory strategies and compliance with safe techniques throughout all ADL/iADLs. Pt completed laundry by gathering items and transporting them with bag to wash machine with use of RW. Details listed in respective sections of note. Pt tolerates session well with no c/os offerred. Pt maintains TTWB during txfs and mobility consistently.  Pt's barriers to d/c to personal home environment include decreased strength RLE s/p quadricepts rupture, decreased ROM with use of leg length hard cast, and TTWB RLE. Plan is contingent upon placement due to noted barriers.   Prognosis Good   Problem List Orthopedic restrictions;Decreased mobility;Decreased strength;Decreased range of motion   Plan   Treatment/Interventions ADL retraining;Functional transfer training;Therapeutic exercise;Endurance training;Patient/family training;Bed mobility;Equipment eval/education;Gait training;Compensatory technique education;Spoke to nursing;OT   Progress Progressing toward goals   OT Therapy Minutes   OT Time In 0848   OT Time Out 1018   OT Total Time (minutes) 90   OT Mode of treatment - Individual (minutes) 90   OT Mode of treatment - Concurrent (minutes) 0   OT Mode of treatment - Group (minutes) 0   OT Mode of treatment - Co-treat (minutes) 0   OT Mode of Treatment - Total time(minutes) 90 minutes   OT Cumulative Minutes 1127   Therapy Time missed   Time missed? No

## 2025-04-01 PROCEDURE — 99232 SBSQ HOSP IP/OBS MODERATE 35: CPT | Performed by: PHYSICAL MEDICINE & REHABILITATION

## 2025-04-01 PROCEDURE — 97535 SELF CARE MNGMENT TRAINING: CPT

## 2025-04-01 PROCEDURE — 97110 THERAPEUTIC EXERCISES: CPT

## 2025-04-01 PROCEDURE — 97530 THERAPEUTIC ACTIVITIES: CPT

## 2025-04-01 PROCEDURE — 97116 GAIT TRAINING THERAPY: CPT

## 2025-04-01 RX ADMIN — METFORMIN ER 500 MG 1500 MG: 500 TABLET ORAL at 16:44

## 2025-04-01 RX ADMIN — B-COMPLEX W/ C & FOLIC ACID TAB 1 TABLET: TAB at 09:12

## 2025-04-01 RX ADMIN — LOSARTAN POTASSIUM 50 MG: 50 TABLET, FILM COATED ORAL at 09:12

## 2025-04-01 RX ADMIN — PRAVASTATIN SODIUM 40 MG: 40 TABLET ORAL at 16:44

## 2025-04-01 RX ADMIN — CHOLECALCIFEROL TAB 25 MCG (1000 UNIT) 5000 UNITS: 25 TAB at 09:12

## 2025-04-01 RX ADMIN — ENOXAPARIN SODIUM 40 MG: 40 INJECTION SUBCUTANEOUS at 09:12

## 2025-04-01 RX ADMIN — FAMOTIDINE 20 MG: 20 TABLET, FILM COATED ORAL at 09:12

## 2025-04-01 RX ADMIN — OXYCODONE HYDROCHLORIDE AND ACETAMINOPHEN 500 MG: 500 TABLET ORAL at 09:12

## 2025-04-01 RX ADMIN — TAMSULOSIN HYDROCHLORIDE 0.4 MG: 0.4 CAPSULE ORAL at 16:44

## 2025-04-01 NOTE — PROGRESS NOTES
"Progress Note - PMR   Name: Tobin Kerns 69 y.o. male I MRN: [id number]  Unit/Bed#: -01 I Date of Admission: 3/11/2025   Date of Service: 4/1/2025 I Hospital Day: 21     Assessment & Plan  Quadriceps muscle rupture, right, subsequent encounter  HPI:   He initially underwent quad tendon repair on 7/25/24 with Dr. Foster. He was discharged home with outpatient PT.  Later noted to have recurrent high-grade tear with extensor lag on exam and elected for operative management  January 2025 MRI: \"Prior quadriceps insertion repair with high-grade recurrent tear exhibiting up to 2.2 cm of tendon retraction.\"  On 3/6/25, he underwent revision of quad tendon repair with allograft augmentation  3/21: outpatient follow-up with Dr. Foster, new cast applied     Plan:   PT and OT for 3 hours a day, 5-6 days a week   TTWB RLE   Avoid moisture to cast   Pain: PRN Tylenol (monitor LFTs) and oxycodone discontinued on 3/17 due to minimal pain   Per ortho,  Lovenox for 2 more weeks for DVT prophylaxis (until 4/4)   3/28: follow-up with Dr. Foster, continue TTWB and follow-up in a week   Hypertension  Continue losartan 50 mg daily   Monitor BP   Mixed hyperlipidemia  Continue pravastatin 40 mg daily   Gastro-esophageal reflux disease without esophagitis  Continue famotidine 20 mg daily   Peripheral neuropathy  Patient is not diabetic   Monitor skin for new or worsening wounds   JOVANY on CPAP  Continue CPAP qHS  IFG (impaired fasting glucose)  Continue metformin 1500 mg daily   Seen by RD 3/12, diet adjusted to regular   3/12 A1c: 5.4   Healed ulcer of left foot  Local wound care  Present on admission   Wound care RN consulted   BPH (benign prostatic hyperplasia)  Continue tamsulosin 0.4 mg daily   Patient currently voiding without difficulty   PRN PVRs if urinary retention is suspected   Impaired mobility and ADLs  Patient was evaluated by the rehabilitation team MD and an appropriate candidate for acute inpatient " rehabilitation program at this time.  The patient will tolerate 3 hours/day 5 to 7 days/week of intensive physical and  occupational therapy   in order to obtain goals for community discharge  Due to the patient's functional Compared to their baseline level of function in addition to their ongoing medical needs, the patient would benefit from daily supervision from a rehabilitation physician as well as rehabilitation nursing to implement and adjust the medical as well as functional plan of care in order to meet the patient's goals.  Bladder: Monitor closely for urinary incontinence and/or retention. Monitor urine output and encourage spontaneous voids. If unable to void spontaneously, will monitor with PVR bladder scans and initiate CIC regimen.  Skin: Monitor for breakdown, frequent turns, pressure offloading  Sleep: Encourage sleep hygiene (routine that promotes healthy circadian rhythm,avoid caffeine later in the day, quiet/dark room at night, reduce electronic before bedtime)      Wound of right ankle  Skin breakdown on R heel due to cast. Ortho removed approximately 1 inch of cast at posterior aspect on 3/11  New cast 3/21   Local wound care  Continue to monitor   Present on [facility]   Wound care RN consulted:  Skin Care orders:  1-Hydraguard to sacrum, buttocks bid and prn   2-EHOB / waffle  cushion when out of bed in chair.  3-Moisturize skin daily with skin nourishing cream  4-Elevate heels to offload pressure.  5-Turn/reposition q2h for pressure re-distribution on skin  6. Right posterior heel cleanse with soap and water then pat dry apply cavilon 3 M no sting then a silicone foam nate with a P and date change every other day   7. Left plantar aspect of the foot - cleanse with soap and water then apply a silicone foam nate with a P  and date change every other day     Subjective   Tobin Kerns is a 69 year-old male, with a past medical history of hypertension, hyperlipidemia, JOVANY, impaired fasting  glucose, bilateral lower extremity neuropathy, GERD, BPH, presenting to [facility] after an elective right quadricept tendon repair. He initially underwent quad tendon repair on 7/25/24 with Dr. Foster. He was discharged home with outpatient PT. He was noted to have recurrent high-grade tear on repeat MRI with extensor lag on exam and elected for operative management. On 3/6/25, he underwent revision of quad tendon repair with allograft augmentation. He was evaluated by PT and OT and deemed an appropriate candidate for [facility] due to functional deficits     Chief Complaint: f/u ambulatory dysfunction    Interval:    Seen and evaluated patient in room. He denies any concerns. Last BM 4/1, formed and large. He is continent of bowel and bladder. Wound care RN evaluated patient today.     Objective :  Temp:  [97 °F (36.1 °C)-98 °F (36.7 °C)] 97 °F (36.1 °C)  HR:  [63-71] 63  BP: (106-128)/(65-70) 128/70  Resp:  [12-16] 12  SpO2:  [96 %-98 %] 98 %  O2 Device: None (Room air)    Functional Update:  Physical Therapy Occupational Therapy   Weight Bearing Status: Toe touch (R LE)  Transfers: Independent  Bed Mobility: Independent  Amulation Distance (ft): 180 feet  Ambulation: Independent  Assistive Device for Ambulation: Roller Walker  Wheelchair Mobility Distance:  (N/A)  Wheelchair Mobility:  (N/A)  Number of Stairs: 6  Assistive Device for Stairs: Lehft Hand Rail  Stair Assistance: Independent  Ramp: Independent  Assistive Device for Ramp: Roller Walker  Discharge Recommendations: Other  DC Home with::  (alternate placement until off R LE long cast)   Eating: Independent  Grooming: Independent  Bathing: Independent  Bathing: Independent  Upper Body Dressing: Independent  Lower Body Dressing: Independent  Toileting: Independent  Tub/Shower Transfer:  (full leg cast and pt declines shower)  Toilet Transfer: Independent  Cognition: Within Defined Limits  Orientation: Person, Situation, Place, Time         Physical Exam  Vitals and  nursing note reviewed.   Constitutional:       General: He is not in acute distress.     Appearance: He is obese. He is not ill-appearing or diaphoretic.   HENT:      Head: Normocephalic and atraumatic.      Nose: Nose normal.      Mouth/Throat:      Mouth: Mucous membranes are moist.   Cardiovascular:      Pulses:           Dorsalis pedis pulses are 2+ on the right side and 2+ on the left side.   Pulmonary:      Effort: Pulmonary effort is normal. No respiratory distress.   Abdominal:      General: There is no distension.   Skin:     General: Skin is warm and dry.   Neurological:      General: No focal deficit present.      Mental Status: He is alert.   Psychiatric:         Mood and Affect: Mood normal.         Behavior: Behavior normal.           Scheduled Meds:  Current Facility-Administered Medications   Medication Dose Route Frequency Provider Last Rate    acetaminophen  650 mg Oral Q6H PRN Modesta Lee PA-C      ascorbic acid  500 mg Oral Daily Toño Brito MD      Cholecalciferol  5,000 Units Oral Daily Toño Brito MD      enoxaparin  40 mg Subcutaneous Q24H [facility] Toño Brito MD      famotidine  20 mg Oral Daily Toño Brito MD      losartan  50 mg Oral Daily Toño Brito MD      metFORMIN  1,500 mg Oral Daily With Dinner Toño Brito MD      multivitamin stress formula  1 tablet Oral Daily Toño Brito MD      polyethylene glycol  17 g Oral Daily PRN Modesta Lee PA-C      pravastatin  40 mg Oral Daily With Dinner Toño Brito MD      tamsulosin  0.4 mg Oral Daily With Dinner Toño Brito MD           Lab Results: I have reviewed the following results:  Results from last 7 days   Lab Units 03/31/25  0520   HEMOGLOBIN g/dL 13.4   HEMATOCRIT % 40.5   WBC Thousand/uL 8.25   PLATELETS Thousands/uL 236     Results from last 7 days   Lab Units 03/31/25  0520   BUN mg/dL 24   SODIUM mmol/L 136   POTASSIUM mmol/L 4.4   CHLORIDE mmol/L 104   CREATININE mg/dL 1.22   AST U/L 17   ALT U/L 15             YOLANDA CarpenterC  Physical Medicine and Rehabilitation  [facility]

## 2025-04-01 NOTE — WOUND OSTOMY CARE
Progress Note - Wound   Tobin Emmanuelsco 69 y.o. male MRN: [id number]  Unit/Bed#: [facility] 210-01 Encounter: [id number]        Assessment: Patient is seen for weekly wound care assessment today . He is in the bed for the assessment of the skin . He states that he has a orthopedic appointment on Friday and the cast maybe redone on Friday . He reports and staff report no sacral buttocks areas . Patient does move around well . He is independent with a rolling walker with TTWB on the right leg .      Assessment Findings  Right lateral and medial ankle area is dry intact scabs   Right posterior heel is pink dry and blanchable . No drainage   Left plantar ball of the foot - 2 scabbed eschar areas that are stable  . No drainage , no fluctuance .no redness       Skin Care orders:  1-Hydraguard to sacrum, buttocks bid and prn   2-EHOB / waffle  cushion when out of bed in chair.  3-Moisturize skin daily with skin nourishing cream  4-Elevate heels to offload pressure.  5-Turn/reposition q2h for pressure re-distribution on skin  6. Right posterior heel cleanse with soap and water then pat dry apply cavilon 3 M no sting then a silicone foam nate with a P and date change every other day   7. Left plantar aspect of the foot - cleanse with soap and water then apply a silicone foam nate with a P  and date change every other day       Discussed with the primary nurse of the assessment and will sign off     Wound 03/06/25 Plantar Left (Active)   Wound Image   04/01/25 0835   Wound Description Dry;Intact 04/01/25 0835   Non-staged Wound Description Not applicable 04/01/25 0835   Wound Length (cm) 0.5 cm 04/01/25 0835   Wound Width (cm) 3 cm 04/01/25 0835   Wound Depth (cm) 0 cm 04/01/25 0835   Wound Surface Area (cm^2) 1.5 cm^2 04/01/25 0835   Wound Volume (cm^3) 0 cm^3 04/01/25 0835   Calculated Wound Volume (cm^3) 0 cm^3 04/01/25 0835   Change in Wound Size % 100 04/01/25 0835   Drainage Amount None 04/01/25 0835   Drainage Description  Brown 03/18/25 0934   Audrey-wound Assessment Dry;Intact 04/01/25 0835   Treatments Site care 04/01/25 0835   Wound Site Closure None 03/29/25 1345   Dressing Foam, Silicon (eg. Allevyn, etc) 04/01/25 0835   Wound packed? No 03/23/25 0900   Dressing Changed Changed 04/01/25 0835   Patient Tolerance Tolerated well 04/01/25 0835   Dressing Status Clean;Dry;Intact 03/31/25 1930       Wound 03/10/25 Pressure Injury Ankle Posterior;Right (Active)   Wound Image   04/01/25 0837   Wound Description Dry;Intact;Pink 04/01/25 0837   Pressure Injury Stage O 03/29/25 1344   Non-staged Wound Description Not applicable 04/01/25 0837   Wound Length (cm) 0 cm 04/01/25 0837   Wound Width (cm) 0 cm 04/01/25 0837   Wound Depth (cm) 0 cm 04/01/25 0837   Wound Surface Area (cm^2) 0 cm^2 04/01/25 0837   Wound Volume (cm^3) 0 cm^3 04/01/25 0837   Calculated Wound Volume (cm^3) 0 cm^3 04/01/25 0837   Change in Wound Size % 100 04/01/25 0837   Drainage Amount None 04/01/25 0837   Drainage Description Serosanguineous 03/23/25 0900   Audrey-wound Assessment Dry;Intact 04/01/25 0837   Treatments Cleansed;Site care 03/31/25 0900   Wound Site Closure None 03/29/25 1344   Dressing Foam, Silicon (eg. Allevyn, etc) 04/01/25 0837   Dressing Changed Changed 04/01/25 0837   Patient Tolerance Tolerated well 04/01/25 0837   Dressing Status Clean;Dry;Intact 03/31/25 1930     Wound care will sign off secure chat with questions or concerns     Kymberly Snowden BSN RN CWOCN

## 2025-04-01 NOTE — TEAM CONFERENCE
Acute RehabilitationTeam Conference Note  Date: 04/01/2025   Time: 10:30AM      Patient Name:  Tobin Kerns       Medical Record Number: [id number]   YOB: 1955  Sex: Male          Room/Bed:  [facility]  Payor Info:  Payor: MEDICARE / Plan: MEDICARE A AND B / Product Type: Medicare A & B Fee for Service /      Admitting Diagnosis: Quadriceps muscle rupture [S76.119A]   Admit Date/Time:  3/11/2025  3:20 PM  Admission Comments: No comment available     Primary Diagnosis:  Quadriceps muscle rupture, right, subsequent encounter  Principal Problem: Quadriceps muscle rupture, right, subsequent encounter    Patient Active Problem List    Diagnosis Date Noted    BPH (benign prostatic hyperplasia) 03/12/2025    Impaired mobility and ADLs 03/12/2025    Wound of right ankle 03/12/2025    Quadriceps tendon rupture, right, sequela 03/07/2025    Obesity (BMI 35.0-39.9 without comorbidity) 03/04/2025    Onychomycosis 12/30/2024    Urinary retention 07/27/2024    Quadriceps muscle rupture, right, subsequent encounter 07/25/2024    Ambulatory dysfunction 07/23/2024    Abdominal aortic ectasia (HCC) 11/13/2023    Healed ulcer of left foot 02/23/2022    IFG (impaired fasting glucose) 10/01/2021    Hypertension 09/27/2021    Peripheral neuropathy 09/27/2021    Drusen (degenerative) of macula, bilateral 09/27/2021    Myopia, bilateral 09/27/2021    Pinguecula, bilateral 09/27/2021    Regular astigmatism, bilateral 09/27/2021    JOVANY on CPAP     History of colon polyps     Pure hypertriglyceridemia 08/19/2019    Mixed hyperlipidemia 05/30/2019    Gastro-esophageal reflux disease without esophagitis 05/30/2019    Presbyopia 10/11/2016       Physical Therapy:    Weight Bearing Status: Toe touch (R LE)  Transfers: Independent  Bed Mobility: Independent  Amulation Distance (ft): 180 feet  Ambulation: Independent  Assistive Device for Ambulation: Roller Walker  Wheelchair Mobility Distance:  (N/A)  Wheelchair  Mobility:  (N/A)  Number of Stairs: 6  Assistive Device for Stairs: Lehft Hand Rail  Stair Assistance: Independent  Ramp: Independent  Assistive Device for Ramp: Roller Walker  Discharge Recommendations: Other  DC Home with::  (alternate placement until off R LE long cast)    03/18/2025:  Patient being followed by PT, making good progress.  Presents, following R quadriceps muscle rupture, now TTWB R LE in cast, with decreased ROM/strength, decreased balance and safety, decreased endurance, and pain.   Patient  mod I bed mobility, mod I transfers with RW, mod I short distances, S >50' ambulation with RW, S negotiation of 6 steps with L handrail.  Patient would benefit from continued inpatient ARC PT to increase function, safety, and increased independence in prep for safe d/c to home.    3/25/2025:  Patient being followed by PT, making good progress.  Presents, following R quadriceps muscle rupture, now TTWB R LE in cast, with decreased ROM/strength, decreased balance and safety, decreased endurance, and pain.   Patient  mod I bed mobility, mod I transfers with RW, mod I ambulation with RW up to 180ft, mod I negotiation of 6 steps with L handrail.  Patient would benefit from continued ARC PT to increase function, safety, and increased independence in prep for safe d/c to home.     04/01/2025:  Patient being followed by PT, making good progress.  Presents, following R quadriceps muscle rupture, now TTWB R LE in cast, with decreased ROM/strength, decreased balance and safety, decreased endurance, and pain.   Patient  mod I bed mobility, mod I transfers with RW, mod I ambulation with RW up to 180ft, mod I negotiation of 6 steps with L handrail.  Patient would benefit from continued ARC PT to increase function, safety, and increased independence in prep for safe d/c to home.     Occupational Therapy:  Eating: Independent  Grooming: Independent  Bathing: Independent  Bathing: Independent  Upper Body Dressing:  Independent  Lower Body Dressing: Independent  Toileting: Independent  Tub/Shower Transfer:  (full leg cast and pt declines shower)  Toilet Transfer: Independent  Cognition: Within Defined Limits  Orientation: Person, Situation, Place, Time  Discharge Recommendations: Home with:  DC Home with:: Home Occupational Therapy, First Floor Setup (awaiting d/c recomm)       03/17/25 Pt participating in Adls/iADLs, safety with functional txfs and mobility, TE/TA for generalized strength and endurance. Pts LOF noted above. Pt is progressing toward OT goals. Pt's barriers to d/c include decreased strength throughout but especially RLE s/p quadricepts rupture, decreased ROM, decreased balance TTWB RLE. Pt would benefit from continued skilled OT services in order to address listed barriers and prepare for safe d/c.     03/24/25 Pt participating in Adls/iADLs, safety with functional txfs and mobility, TE/TA for generalized strength and endurance. Pts LOF noted above. Pt continues to be consistently MI with all ADLs.  Pt's barriers to d/c include decreased strength  RLE s/p quadricepts rupture, decreased ROM, decreased balance due to TTWB RLE. Plan is contingent upon placement.     03/31/25 Pt participating in Adls/iADLs, safety with functional txfs and mobility, TE/TA for generalized strength and endurance. Pts LOF noted above. Pt continues to be consistently MI with all ADLs.  Pt's barriers to d/c include decreased strength  RLE s/p quadricepts rupture, decreased ROM, decreased balance due to TTWB RLE. Plan is contingent upon placement.     Speech Therapy:           No notes on file    Nursing Notes:  Appetite: Good  Diet Type: Regular/House                      Diet Patient/Family Education Complete: Yes    Type of Wound (LDA): Wound                    Type of Wound Patient/Family Education: Yes  Bladder: Continent     Bladder Patient/Family Education: Yes  Bowel: Continent     Bowel Patient/Family Education: Yes  Pain  Location/Orientation: Orientation: Right, Location: Leg  Pain Score: 0                       Hospital Pain Intervention(s): Declines  Pain Patient/Family Education: Yes  Medication Management/Safety  Injectable: Lovenox  Safe Administration: No  Reason for non-safe administration: not attempted  Medication Patient/Family Education Complete: Yes    3/18/25 Patient was admitted to [facility] following a right quadriceps muscle rupture with revision surgery performed.  Patient with a hard cast to the right lower extremity and is to maintain toe-touch weight bearing restriction to that limb.  Lungs are clear diminished on room air.  Patient wears own CPAP unit from home overnight as ordered for sleep apnea.  Patient is continent of bowel and bladder.  Every other day dressing changes to the right lower leg where cast was rubbing and to the left plantar foot.  Patient is now modified independent to the bathroom with a walker.      3/25/2025  A/O x 4, Pt. Is Mod I TTWB RLE with long leg cast using RW. Has decreased sensation to B/L lower ext which he has had prior, continues to have dressing changes every other day to RLE where previous cast was rubbing and wounds to left plantar foot that he had PTA. Continues to wear his CPAP from home.     3/31/25 Patient remains alert and oriented.  Right lower extremity with a leg cast in place at all times as ordered.  Patient is to follow up with ortho on Friday for further cast care.  Every other day dressing changes to the right posterior ankle and left plantar foot.  Patient remains modified independent to the bathroom with a walker.  Patient to maintain toe touch weight bearing status to the right lower extremity.      Case Management:     Discharge Planning  Living Arrangements: Lives Alone  Support Systems: Self  Assistance Needed: unknown  Type of Current Residence: Private residence  Current Home Care Services: No  Patient admitted to [facility] on[facility]/11/25 after inpatient hospital  stay for right knee revision quad tendon repair on 3/6/25. Patient lives alone in a mobile home, [address],. Patient independent PTA, driving. Original injury is from July 2024, had repair with a knee immobilizer. Did not work. Revised . Workmen's comp. Patient at Mod I level, looking into alternate disposition, [facility]. Workmen's comp  3/25/25 Having difficulty getting any assistance from [employer] workmen's comp. Patient for discharge today 3/25 waiting to establish home care, where he is going on d/c, transportation.  4/1/25 Emailed and sent fax trying to get assistance with discharge planning from workmen's comp. My need to have patient pay out of pocket. Last covered day 3/31.    Is the patient actively participating in therapies? yes  List any modifications to the treatment plan: na    Barriers Interventions   TTWB right LE Cues, ADL, transfer, gait training   Skin integrity left foot Local care, medical management and oversight                 Is the patient making expected progress toward goals? yes  List any update or changes to goals: na    Medical Goals: Patient will be able to manage medical conditions and comorbid conditions with medications and follow up upon completion of rehab program    Weekly Team Goals:   Rehab Team Goals  ADL Team Goal: Patient will be independent with ADLs with least restrictive device upon completion of rehab program  Bowel/Bladder Team Goal: Patient will return to premorbid level for bladder/bowel management upon completion of rehab program  Transfer Team Goal: Patient will be independent with transfers with least restrictive device upon completion of rehab program  Locomotion Team Goal: Patient will be independent with locomotion with least restrictive device upon completion of rehab program    Pt will be Mod I self care  Pt will be Mod I bed mobility, transfers, and mobility     Health and wellness: Pt will be able to return to driving and enjoys playing video game.      Discussion: Pt participates in rehab program and is progressing towards goals. Pt has achieved all goals of Mod I for mobility/ transfers and ADLs and is currently awaiting placement versus discharge to home.  Pt will benefit RW, tub bench and BSC. Pending discharge placement pt would benefit from home health care including PT/ OT if transition is to home.    Anticipated Discharge Date:  Awaiting placement versus discharge to home  WTC Team Members Present:  The following team members are supervising care for this patient and were present during this Weekly Team Conference.    MD Lilian Vasquez, OTR/L  Jaswant Nichole, ROBSONN, RN  Raisa Brooks, PT  Gisell Mejía, OTR/L

## 2025-04-01 NOTE — CASE MANAGEMENT
3/31/25 SHILA received a phone call from Ceasar from [employer] workers [employer], who stated she has no information regarding an authorization for the patient to have surgery. Shila gave her the date of patient's surgery prior to coming to [facility]. She inquired who authorized the surgery. Ceasar stasted she will investigate with orthopedic physician's office, to find out who got the auth. Until that is straightened out,she cannot authorize anything for discharge planning.

## 2025-04-01 NOTE — ASSESSMENT & PLAN NOTE
Skin breakdown on R heel due to cast. Ortho removed approximately 1 inch of cast at posterior aspect on 3/11  New cast 3/21   Local wound care  Continue to monitor   Present on admission to [facility]   Wound care RN consulted:  Skin Care orders:  1-Hydraguard to sacrum, buttocks bid and prn   2-EHOB / waffle  cushion when out of bed in chair.  3-Moisturize skin daily with skin nourishing cream  4-Elevate heels to offload pressure.  5-Turn/reposition q2h for pressure re-distribution on skin  6. Right posterior heel cleanse with soap and water then pat dry apply cavilon 3 M no sting then a silicone foam nate with a P and date change every other day   7. Left plantar aspect of the foot - cleanse with soap and water then apply a silicone foam nate with a P  and date change every other day

## 2025-04-01 NOTE — CASE MANAGEMENT
CM spoke with patient after team meeting.    Patient at MOD I level, however cannot return to his mobile home at this time as his walker would not fit and he is unable to use his bathroom with long leg cast.  Received a voice mail from Ceasar from  regarding d/c planning.  She is stating that authorization for his surgery was never obtained.  She is awaiting authorization from Radha, a supervisor in billing authorization to push that through to her so she can approve the sx.  Only then will she be able to approve and d/c planning requests from us at the [facility].     Addendum: Radha's phone number is [phone]

## 2025-04-01 NOTE — PROGRESS NOTES
04/01/25 1000   Pain Assessment   Pain Assessment Tool 0-10   Pain Score No Pain   Restrictions/Precautions   Precautions Fall Risk   RLE Weight Bearing Per Order TTWB   Oral Hygiene   Type of Assistance Needed Independent   Physical Assistance Level No physical assistance   Oral Hygiene CARE Score 6   Grooming   Able To Wash/Dry Hands;Brush/Clean Teeth;Wash/Dry Face;Comb/Brush Hair;Initiate Tasks   Findings Stance   Shower/Bathe Self   Type of Assistance Needed Independent;Adaptive equipment   Physical Assistance Level No physical assistance   Shower/Bathe Self CARE Score 6   Bathing   Assessed Bath Style Sponge Bath   Upper Body Dressing   Type of Assistance Needed Independent   Physical Assistance Level No physical assistance   Upper Body Dressing CARE Score 6   Lower Body Dressing   Type of Assistance Needed Independent;Adaptive equipment   Physical Assistance Level No physical assistance   Lower Body Dressing CARE Score 6   Putting On/Taking Off Footwear   Type of Assistance Needed Independent;Adaptive equipment   Physical Assistance Level No physical assistance   Putting On/Taking Off Footwear CARE Score 6   Picking Up Object   Type of Assistance Needed Independent;Adaptive equipment   Physical Assistance Level No physical assistance   Picking Up Object CARE Score 6   Dressing/Undressing Clothing   Able to  Obtain Clothing;Store removed clothing   Remove UB Clothes Button Shirt   Don UB Clothes Button Shirt   Remove LB Clothes Shorts;Undergarment;Socks   Don LB Clothes Shorts;Undergarment;Socks   Limitations Noted In ROM   Adaptive Equipment Reacher;Sock Aide   Positioning Supported Sit;Standing   Sit to Stand   Type of Assistance Needed Independent   Physical Assistance Level No physical assistance   Sit to Stand CARE Score 6   Bed-Chair Transfer   Type of Assistance Needed Independent   Physical Assistance Level No physical assistance   Chair/Bed-to-Chair Transfer CARE Score 6   Toileting Hygiene   Type  of Assistance Needed Independent   Physical Assistance Level No physical assistance   Toileting Hygiene CARE Score 6   Toileting   Able to Pull Clothing down yes, up yes.   Able to Manage Clothing Closures Yes   Manage Hygiene Bladder;Bowel   Toilet Transfer   Type of Assistance Needed Independent;Adaptive equipment   Physical Assistance Level No physical assistance   Toilet Transfer CARE Score 6   Toilet Transfer   Surface Assessed Raised Toilet   Transfer Technique Standard   Adaptive Equipment Grab Bar;Walker   Kitchen Mobility   Kitchen-Mobility Level Walker   Kitchen Activity Retrieve items;Transport items   Light Housekeeping   Light Housekeeping Level Walker   Light Housekeeping Level of Assistance Modified independent   Light Housekeeping gathers laundry and transports it to wash machine to complete Indp with RW   Exercise Tools   Hand Gripper B hands 2x40 reps with heavy resistance gripper   Other Exercise Tool 1 red flex bar: 2x20 reps upward then downward   Cognition   Overall Cognitive Status WFL   Arousal/Participation Alert;Cooperative   Attention Within functional limits   Orientation Level Oriented X4   Memory Within functional limits   Following Commands Follows all commands and directions without difficulty   Additional Activities   Additional Activities Comments fxnl mobility MI TTWB R LE   Activity Tolerance   Activity Tolerance Patient tolerated treatment well   Assessment   Treatment Assessment ADL completed via sponge bathe level: Pt able to consistently s/u and c/u all supplies Independently. Pt demon consistent recall of compensatory strategies and compliance with safe techniques throughout all ADL/iADLs. Pt completed laundry by gathering items and transporting them with bag to wash machine with use of RW. Details listed in respective sections of note. Pt tolerates session well with no c/os offerred. Pt maintains TTWB during txfs and mobility consistently.  Pt's barriers to d/c to personal  home environment include decreased strength RLE s/p quadricepts rupture, decreased ROM with use of leg length hard cast, and TTWB RLE. Plan is contingent upon placement due to noted barriers.   Prognosis Good   Problem List Decreased strength;Decreased range of motion;Impaired balance;Decreased mobility;Orthopedic restrictions   Assessment Pt participated in 60 minutes of concurrent tx addressing similar goals with a pt with similar deficits.   Plan   Treatment/Interventions ADL retraining;Functional transfer training;Therapeutic exercise;Endurance training;Equipment eval/education;Gait training;Spoke to nursing;OT   Progress Progressing toward goals   OT Therapy Minutes   OT Time In 1000   OT Time Out 1130   OT Total Time (minutes) 90   OT Mode of treatment - Individual (minutes) 30   OT Mode of treatment - Concurrent (minutes) 60   OT Mode of treatment - Group (minutes) 0   OT Mode of treatment - Co-treat (minutes) 0   OT Mode of Treatment - Total time(minutes) 90 minutes   OT Cumulative Minutes 1217   Therapy Time missed   Time missed? No

## 2025-04-01 NOTE — PROGRESS NOTES
"Progress Note - Tobin Kerns 69 y.o. male MRN: [id number]    Unit/Bed#: [facility] 210-01 Encounter: [id number]        Subjective:   Patient seen and examined at bedside after reviewing the chart and discussing the case with the caring staff.      Patient examined at bedside.  Patient has no acute symptoms.    Physical Exam   Vitals: Blood pressure 128/70, pulse 63, temperature (!) 97 °F (36.1 °C), temperature source Temporal, resp. rate 12, height 6' 5\" (1.956 m), weight 132 kg (289 lb 14.5 oz), SpO2 98%.,Body mass index is 34.38 kg/m².  Constitutional: Patient in no acute distress.  HEENT: PERR, EOMI, MMM.  Cardiovascular: Normal rate and regular rhythm.    Pulmonary/Chest: Effort normal and breath sounds normal.   Abdomen: Soft, + BS, NT.    Assessment/Plan:  Tobin Kerns is a(n) 69 y.o. year old male who is s/p quadriceps tendon repair.     Cardiac with hx of HTN, HLD.  Patient is on losartan 50 mg daily, pravastatin 40 mg daily.  Impaired fasting glucose.  Hgb a1c 5.4% on 3/12/25.  Continue metformin XR 1500 mg daily with dinner.  Regular diet and d/c accucheks as blood sugar is well controlled.    BPH.  Continue Flomax 0.4 mg daily.  GERD.  Continue Pepcid 20 mg daily.  JOVANY.  On CPAP at bedtime.   Vitamin D deficiency.  Patient is on vitamin D3 5000 units daily.  Pain and bowel management.  As per physiatrist.  Quadriceps tendon repair.  Toe touch weightbearing RLE with use of walker.  Patient is receiving intensive PT OT as per physiatrist.    Anticipated date of discharge.  TBD  "

## 2025-04-01 NOTE — PROGRESS NOTES
04/01/25 0700   Pain Assessment   Pain Assessment Tool 0-10   Pain Score No Pain   Restrictions/Precautions   Precautions Fall Risk   RLE Weight Bearing Per Order TTWB   ROM Restrictions No   Braces or Orthoses   (R LE long cast)   Cognition   Overall Cognitive Status WFL   Arousal/Participation Alert;Responsive;Cooperative   Attention Within functional limits   Orientation Level Oriented X4   Memory Within functional limits   Following Commands Follows all commands and directions without difficulty   Subjective   Subjective Pt reports the PCA just woke him up   Roll Left and Right   Type of Assistance Needed Independent   Physical Assistance Level No physical assistance   Roll Left and Right CARE Score 6   Sit to Lying   Type of Assistance Needed Independent   Physical Assistance Level No physical assistance   Sit to Lying CARE Score 6   Lying to Sitting on Side of Bed   Type of Assistance Needed Independent   Physical Assistance Level No physical assistance   Lying to Sitting on Side of Bed CARE Score 6   Sit to Stand   Type of Assistance Needed Independent   Physical Assistance Level No physical assistance   Comment RW   Sit to Stand CARE Score 6   Bed-Chair Transfer   Type of Assistance Needed Independent   Physical Assistance Level No physical assistance   Comment RW   Chair/Bed-to-Chair Transfer CARE Score 6   Car Transfer   Reason if not Attempted Environmental limitations   Car Transfer CARE Score 10   Walk 10 Feet   Type of Assistance Needed Independent   Physical Assistance Level No physical assistance   Comment RW   Walk 10 Feet CARE Score 6   Walk 50 Feet with Two Turns   Type of Assistance Needed Independent   Physical Assistance Level No physical assistance   Comment RW   Walk 50 Feet with Two Turns CARE Score 6   Walk 150 Feet   Type of Assistance Needed Independent   Physical Assistance Level No physical assistance   Comment RW   Walk 150 Feet CARE Score 6   Walking 10 Feet on Uneven Surfaces    Type of Assistance Needed Independent   Physical Assistance Level No physical assistance   Comment RW   Walking 10 Feet on Uneven Surfaces CARE Score 6   Ambulation   Primary Mode of Locomotion Prior to Admission Walk   Distance Walked (feet) 180 ft  (180', 150')   Assist Device Roller Walker   Does the patient walk? 2. Yes   Wheel 50 Feet with Two Turns   Reason if not Attempted Activity not applicable   Wheel 50 Feet with Two Turns CARE Score 9   Wheel 150 Feet   Reason if not Attempted Activity not applicable   Wheel 150 Feet CARE Score 9   Wheelchair mobility   Findings N/A   Does the patient use a wheelchair? 0. No   Curb or Single Stair   Style negotiated Single stair   Type of Assistance Needed Independent   Physical Assistance Level No physical assistance   Comment TTWB R LE, up/down 6  steps, L HR, non-reciprocal   1 Step (Curb) CARE Score 6   4 Steps   Type of Assistance Needed Independent   Physical Assistance Level No physical assistance   Comment TTWB R LE, up/down 6  steps, L HR, non-reciprocal   4 Steps CARE Score 6   12 Steps   Reason if not Attempted Activity not applicable   12 Steps CARE Score 9   Picking Up Object   Type of Assistance Needed Independent;Adaptive equipment   Physical Assistance Level No physical assistance   Comment RW support, reacher   Picking Up Object CARE Score 6   Toilet Transfer   Type of Assistance Needed Independent   Physical Assistance Level No physical assistance   Comment RW   Toilet Transfer CARE Score 6   Therapeutic Interventions   Strengthening seated / standing LE TE   Equipment Use   NuStep L5, B/L UE, L LE   Assessment   Treatment Assessment Pt agreeable to PT this AM, received supine in bed. Pt continues to be limited by TTWB R LE in long cast. Continued to challenge LE strength/endurance with seated/standing LE TE and NuStep. Pt noted increased challenge with standing LE TE. He continues to maintain TTWB R LE well with functional mobility, but  remains unsafe for d/c home due to insufficient spacing to safely navigate home with RW and R LE long cast. Will continue with current PT POC to improve deficits and promote return to PLOF.   Problem List Decreased strength;Decreased range of motion;Decreased mobility;Orthopedic restrictions   PT Barriers   Physical Impairment Decreased strength;Decreased range of motion;Decreased mobility;Orthopedic restrictions   Functional Limitation Car transfers   Plan   Treatment/Interventions Functional transfer training;LE strengthening/ROM;Therapeutic exercise;Endurance training;Patient/family training;Equipment eval/education;Bed mobility;Gait training   Progress Progressing toward goals   PT Therapy Minutes   PT Time In 0700   PT Time Out 0830   PT Total Time (minutes) 90   PT Mode of treatment - Individual (minutes) 90   PT Mode of treatment - Concurrent (minutes) 0   PT Mode of treatment - Group (minutes) 0   PT Mode of treatment - Co-treat (minutes) 0   PT Mode of Treatment - Total time(minutes) 90 minutes   PT Cumulative Minutes 1560     Patient remains OOB in chair, all needs in reach. Encouraged use of call bell, patient verbalizes understanding.

## 2025-04-01 NOTE — NURSING NOTE
Patient resting in bed at this time.  No signs of distress noted.  Cast CDI to the right lower extremity.  Patient was successfully modified independent to the bathroom overnight with a walker in use.  Toe touch weight bearing to the right lower extremity.  Call bell within reach.  Plan of care ongoing.

## 2025-04-02 PROCEDURE — 97530 THERAPEUTIC ACTIVITIES: CPT

## 2025-04-02 PROCEDURE — 99232 SBSQ HOSP IP/OBS MODERATE 35: CPT | Performed by: PHYSICAL MEDICINE & REHABILITATION

## 2025-04-02 PROCEDURE — 97110 THERAPEUTIC EXERCISES: CPT

## 2025-04-02 PROCEDURE — 97535 SELF CARE MNGMENT TRAINING: CPT

## 2025-04-02 PROCEDURE — 97116 GAIT TRAINING THERAPY: CPT

## 2025-04-02 RX ADMIN — FAMOTIDINE 20 MG: 20 TABLET, FILM COATED ORAL at 08:36

## 2025-04-02 RX ADMIN — OXYCODONE HYDROCHLORIDE AND ACETAMINOPHEN 500 MG: 500 TABLET ORAL at 08:36

## 2025-04-02 RX ADMIN — ACETAMINOPHEN 650 MG: 325 TABLET ORAL at 17:28

## 2025-04-02 RX ADMIN — ENOXAPARIN SODIUM 40 MG: 40 INJECTION SUBCUTANEOUS at 08:35

## 2025-04-02 RX ADMIN — LOSARTAN POTASSIUM 50 MG: 50 TABLET, FILM COATED ORAL at 08:36

## 2025-04-02 RX ADMIN — B-COMPLEX W/ C & FOLIC ACID TAB 1 TABLET: TAB at 08:36

## 2025-04-02 RX ADMIN — PRAVASTATIN SODIUM 40 MG: 40 TABLET ORAL at 17:26

## 2025-04-02 RX ADMIN — CHOLECALCIFEROL TAB 25 MCG (1000 UNIT) 5000 UNITS: 25 TAB at 08:36

## 2025-04-02 RX ADMIN — TAMSULOSIN HYDROCHLORIDE 0.4 MG: 0.4 CAPSULE ORAL at 17:26

## 2025-04-02 RX ADMIN — METFORMIN ER 500 MG 1500 MG: 500 TABLET ORAL at 17:26

## 2025-04-02 NOTE — ASSESSMENT & PLAN NOTE
"HPI:   He initially underwent quad tendon repair on 7/25/24 with Dr. Foster. He was discharged home with outpatient PT.  Later noted to have recurrent high-grade tear with extensor lag on exam and elected for operative management  January 2025 MRI: \"Prior quadriceps insertion repair with high-grade recurrent tear exhibiting up to 2.2 cm of tendon retraction.\"  On 3/6/25, he underwent revision of quad tendon repair with allograft augmentation  3/21: outpatient follow-up with Dr. Foster, new cast applied     Plan:   PT and OT for 3 hours a day, 5-6 days a week   TTWB RLE   Avoid moisture to cast   Pain: PRN Tylenol (monitor LFTs) and oxycodone discontinued on 3/17 due to minimal pain   Per ortho,  Lovenox for 2 more weeks for DVT prophylaxis (until 4/4)   3/28: follow-up with Dr. Foster, continue TTWB and follow-up on 4/4  "

## 2025-04-02 NOTE — PLAN OF CARE
Problem: PAIN - ADULT  Goal: Verbalizes/displays adequate comfort level or baseline comfort level  Description: Interventions:  - Encourage patient to monitor pain and request assistance  - Assess pain using appropriate pain scale  - Administer analgesics based on type and severity of pain and evaluate response  - Implement non-pharmacological measures as appropriate and evaluate response  - Consider cultural and social influences on pain and pain management  - Notify physician/advanced practitioner if interventions unsuccessful or patient reports new pain  Outcome: Progressing     Problem: INFECTION - ADULT  Goal: Absence or prevention of progression during hospitalization  Description: INTERVENTIONS:  - Assess and monitor for signs and symptoms of infection  - Monitor lab/diagnostic results  - Monitor all insertion sites, i.e. indwelling lines, tubes, and drains  - Monitor endotracheal if appropriate and nasal secretions for changes in amount and color  - [place] appropriate cooling/warming therapies per order  - Administer medications as ordered  - Instruct and encourage patient and family to use good hand hygiene technique  - Identify and instruct in appropriate isolation precautions for identified infection/condition  Outcome: Progressing     Problem: SAFETY ADULT  Goal: Maintain or return to baseline ADL function  Description: INTERVENTIONS:  -  Assess patient's ability to carry out ADLs; assess patient's baseline for ADL function and identify physical deficits which impact ability to perform ADLs (bathing, care of mouth/teeth, toileting, grooming, dressing, etc.)  - Assess/evaluate cause of self-care deficits   - Assess range of motion  - Assess patient's mobility; develop plan if impaired  - Assess patient's need for assistive devices and provide as appropriate  - Encourage maximum independence but intervene and supervise when necessary  - Involve family in performance of ADLs  - Assess for home care  needs following discharge   - Consider OT consult to assist with ADL evaluation and planning for discharge  - Provide patient education as appropriate  Outcome: Progressing     Problem: DISCHARGE PLANNING  Goal: Discharge to home or other facility with appropriate resources  Description: INTERVENTIONS:  - Identify barriers to discharge w/patient and caregiver  - Arrange for needed discharge resources and transportation as appropriate  - Identify discharge learning needs (meds, wound care, etc.)  - Arrange for interpretive services to assist at discharge as needed  - Refer to Case Management Department for coordinating discharge planning if the patient needs post-hospital services based on physician/advanced practitioner order or complex needs related to functional status, cognitive ability, or social support system  Outcome: Progressing

## 2025-04-02 NOTE — PROGRESS NOTES
"Progress Note - PMR   Name: Tobin Kerns 69 y.o. male I MRN: [id number]  Unit/Bed#: -01 I Date of Admission: 3/11/2025   Date of Service: 4/2/2025 I Hospital Day: 22     Assessment & Plan  Quadriceps muscle rupture, right, subsequent encounter  HPI:   He initially underwent quad tendon repair on 7/25/24 with Dr. Foster. He was discharged home with outpatient PT.  Later noted to have recurrent high-grade tear with extensor lag on exam and elected for operative management  January 2025 MRI: \"Prior quadriceps insertion repair with high-grade recurrent tear exhibiting up to 2.2 cm of tendon retraction.\"  On 3/6/25, he underwent revision of quad tendon repair with allograft augmentation  3/21: outpatient follow-up with Dr. Foster, new cast applied     Plan:   PT and OT for 3 hours a day, 5-6 days a week   TTWB RLE   Avoid moisture to cast   Pain: PRN Tylenol (monitor LFTs) and oxycodone discontinued on 3/17 due to minimal pain   Per ortho,  Lovenox for 2 more weeks for DVT prophylaxis (until 4/4)   3/28: follow-up with Dr. Foster, continue TTWB and follow-up on 4/4  Hypertension  Continue losartan 50 mg daily   Monitor BP   Mixed hyperlipidemia  Continue pravastatin 40 mg daily   Gastro-esophageal reflux disease without esophagitis  Continue famotidine 20 mg daily   Peripheral neuropathy  Patient is not diabetic   Monitor skin for new or worsening wounds   JOVANY on CPAP  Continue CPAP qHS  IFG (impaired fasting glucose)  Continue metformin 1500 mg daily   Seen by RD 3/12, diet adjusted to regular   3/12 A1c: 5.4   Healed ulcer of left foot  Local wound care  Present on admission   Wound care RN consulted   BPH (benign prostatic hyperplasia)  Continue tamsulosin 0.4 mg daily   Patient currently voiding without difficulty   PRN PVRs if urinary retention is suspected   Impaired mobility and ADLs  Patient was evaluated by the rehabilitation team MD and an appropriate candidate for acute inpatient rehabilitation " program at this time.  The patient will tolerate 3 hours/day 5 to 7 days/week of intensive physical and  occupational therapy   in order to obtain goals for community discharge  Due to the patient's functional Compared to their baseline level of function in addition to their ongoing medical needs, the patient would benefit from daily supervision from a rehabilitation physician as well as rehabilitation nursing to implement and adjust the medical as well as functional plan of care in order to meet the patient's goals.  Bladder: Monitor closely for urinary incontinence and/or retention. Monitor urine output and encourage spontaneous voids. If unable to void spontaneously, will monitor with PVR bladder scans and initiate CIC regimen.  Skin: Monitor for breakdown, frequent turns, pressure offloading  Sleep: Encourage sleep hygiene (routine that promotes healthy circadian rhythm,avoid caffeine later in the day, quiet/dark room at night, reduce electronic before bedtime)      Wound of right ankle  Skin breakdown on R heel due to cast. Ortho removed approximately 1 inch of cast at posterior aspect on 3/11  New cast 3/21   Local wound care  Continue to monitor   Present on admission to [facility]   Wound care RN consulted:  Skin Care orders:  1-Hydraguard to sacrum, buttocks bid and prn   2-EHOB / waffle  cushion when out of bed in chair.  3-Moisturize skin daily with skin nourishing cream  4-Elevate heels to offload pressure.  5-Turn/reposition q2h for pressure re-distribution on skin  6. Right posterior heel cleanse with soap and water then pat dry apply cavilon 3 M no sting then a silicone foam nate with a P and date change every other day   7. Left plantar aspect of the foot - cleanse with soap and water then apply a silicone foam nate with a P  and date change every other day     Subjective   Tobin Kerns is a 69 year-old male, with a past medical history of hypertension, hyperlipidemia, JOVANY, impaired fasting glucose,  bilateral lower extremity neuropathy, GERD, BPH, presenting to [facility] after an elective right quadricept tendon repair. He initially underwent quad tendon repair on 7/25/24 with Dr. Foster. He was discharged home with outpatient PT. He was noted to have recurrent high-grade tear on repeat MRI with extensor lag on exam and elected for operative management. On 3/6/25, he underwent revision of quad tendon repair with allograft augmentation. He was evaluated by PT and OT and deemed an appropriate candidate for ARC due to functional deficits     Chief Complaint: f/u ambulatory dysfunction    Interval:   Seen and evaluated patient in room. He denies any concerns. Last BM 4/1, formed and large. He is continent of bowel and bladder.     Objective :  Temp:  [97.3 °F (36.3 °C)-97.4 °F (36.3 °C)] 97.4 °F (36.3 °C)  HR:  [65-67] 65  BP: (112-118)/(56-59) 112/56  Resp:  [16] 16  SpO2:  [97 %-98 %] 98 %  O2 Device: None (Room air)    Functional Update:  Physical Therapy Occupational Therapy   Weight Bearing Status: Toe touch (R LE)  Transfers: Independent  Bed Mobility: Independent  Amulation Distance (ft): 180 feet  Ambulation: Independent  Assistive Device for Ambulation: Roller Walker  Wheelchair Mobility Distance:  (N/A)  Wheelchair Mobility:  (N/A)  Number of Stairs: 6  Assistive Device for Stairs: Lehft Hand Rail  Stair Assistance: Independent  Ramp: Independent  Assistive Device for Ramp: Roller Walker  Discharge Recommendations: Other  DC Home with::  (alternate placement until off R LE long cast)   Eating: Independent  Grooming: Independent  Bathing: Independent  Bathing: Independent  Upper Body Dressing: Independent  Lower Body Dressing: Independent  Toileting: Independent  Tub/Shower Transfer:  (full leg cast and pt declines shower)  Toilet Transfer: Independent  Cognition: Within Defined Limits  Orientation: Person, Situation, Place, Time           Physical Exam  Vitals and nursing note reviewed.   Constitutional:        General: He is not in acute distress.     Appearance: He is obese. He is not ill-appearing or diaphoretic.   HENT:      Head: Normocephalic and atraumatic.      Nose: Nose normal.      Mouth/Throat:      Mouth: Mucous membranes are moist.   Cardiovascular:      Pulses:           Dorsalis pedis pulses are 2+ on the right side and 2+ on the left side.   Pulmonary:      Effort: Pulmonary effort is normal. No respiratory distress.   Abdominal:      General: There is no distension.   Skin:     General: Skin is warm and dry.   Neurological:      General: No focal deficit present.      Mental Status: He is alert.   Psychiatric:         Mood and Affect: Mood normal.         Behavior: Behavior normal.       Scheduled Meds:  Current Facility-Administered Medications   Medication Dose Route Frequency Provider Last Rate    acetaminophen  650 mg Oral Q6H PRN Modesta Lee PA-C      ascorbic acid  500 mg Oral Daily Toño Brito MD      Cholecalciferol  5,000 Units Oral Daily Toño Brito MD      enoxaparin  40 mg Subcutaneous Q24H [facility] Toño Brito MD      famotidine  20 mg Oral Daily Toño Brito MD      losartan  50 mg Oral Daily Toño Brito MD      metFORMIN  1,500 mg Oral Daily With Dinner Toño Brito MD      multivitamin stress formula  1 tablet Oral Daily Toño Brito MD      polyethylene glycol  17 g Oral Daily PRN Modesta Lee PA-C      pravastatin  40 mg Oral Daily With Dinner Toño Brito MD      tamsulosin  0.4 mg Oral Daily With Dinner Toño Brito MD           Lab Results: I have reviewed the following results:  Results from last 7 days   Lab Units 03/31/25  0520   HEMOGLOBIN g/dL 13.4   HEMATOCRIT % 40.5   WBC Thousand/uL 8.25   PLATELETS Thousands/uL 236     Results from last 7 days   Lab Units 03/31/25  0520   BUN mg/dL 24   SODIUM mmol/L 136   POTASSIUM mmol/L 4.4   CHLORIDE mmol/L 104   CREATININE mg/dL 1.22   AST U/L 17   ALT U/L 15            Modesta Lee PA-C  Physical Medicine and  Rehabilitation  [facility]

## 2025-04-02 NOTE — PROGRESS NOTES
04/02/25 1314   Pain Assessment   Pain Assessment Tool 0-10   Pain Score No Pain   Restrictions/Precautions   Precautions Limb alert   RLE Weight Bearing Per Order TTWB   Cognition   Overall Cognitive Status WFL   Orientation Level Oriented X4   Sit to Stand   Type of Assistance Needed Independent   Sit to Stand CARE Score 6   Bed-Chair Transfer   Type of Assistance Needed Independent   Chair/Bed-to-Chair Transfer CARE Score 6   Walk 10 Feet   Type of Assistance Needed Independent   Comment mod I level surfaces   Walk 10 Feet CARE Score 6   Walk 50 Feet with Two Turns   Type of Assistance Needed Independent   Comment mod I level surfaces   Walk 50 Feet with Two Turns CARE Score 6   Ambulation   Primary Mode of Locomotion Prior to Admission Walk   Distance Walked (feet) 110 ft  (in/out of bathroom; 110' x 2)   Assist Device Roller Walker   Gait Pattern Inconsistant Ghazal;Slow Ghazal;Decreased foot clearance;L foot drag;Forward Flexion;Step to;Step through;Decreased R stance;Improper weight shift   Limitations Noted In Device Management;Endurance;Heel Strike;Sequencing;Speed;Strength;Swing   Walk Assist Level Modified Independent   Findings mod I level surfaces   Does the patient walk? 2. Yes   Toilet Transfer   Type of Assistance Needed Independent   Comment RW/dannyb bar   Toilet Transfer CARE Score 6   Therapeutic Interventions   Strengthening standing ther ex   Balance gait and transfer training   Assessment   Treatment Assessment Patient agreeable to therapy session.  Remains pain free.   Maintains TTWB RLE.  Completed ther ex for general LE strengthening; gait and transfer training focusing on sequence and technique for improved balance and safety with functional mobility using walker.  Returned to room in recliner with all needs within reach.   PT Barriers   Physical Impairment Decreased strength;Decreased range of motion;Decreased endurance;Impaired balance;Decreased mobility;Orthopedic restrictions    Functional Limitation Wheelchair management;Walking;Transfers;Standing;Stair negotiation;Ramp negotiation;Car transfers   Plan   Treatment/Interventions Functional transfer training;LE strengthening/ROM;Gait training   Progress Progressing toward goals   PT Therapy Minutes   PT Time In 1314   PT Time Out 1356   PT Total Time (minutes) 42   PT Mode of treatment - Individual (minutes) 42   PT Mode of treatment - Concurrent (minutes) 0   PT Mode of treatment - Group (minutes) 0   PT Mode of treatment - Co-treat (minutes) 0   PT Mode of Treatment - Total time(minutes) 42 minutes   PT Cumulative Minutes 1692   Therapy Time missed   Time missed? No

## 2025-04-02 NOTE — PROGRESS NOTES
04/02/25 0730   Pain Assessment   Pain Assessment Tool 0-10   Pain Score No Pain   Restrictions/Precautions   Precautions Limb alert   RLE Weight Bearing Per Order TTWB   Braces or Orthoses Other (Comment)  (cast RLE)   Eating   Type of Assistance Needed Independent   Eating CARE Score 6   Oral Hygiene   Type of Assistance Needed Independent   Oral Hygiene CARE Score 6   Grooming   Able To Initiate Tasks;Comb/Brush Hair;Wash/Dry Face;Brush/Clean Teeth;Wash/Dry Hands   Findings standing at the sink   Shower/Bathe Self   Type of Assistance Needed Independent   Shower/Bathe Self CARE Score 6   Bathing   Assessed Bath Style Sponge Bath   Anticipated D/C Bath Style Sponge Bath   Able to Gather/Transport Yes   Able to Adjust Water Temperature Yes   Able to Wash/Rinse/Dry (body part) Left Arm;Right Arm;L Upper Leg;R Upper Leg;L Lower Leg/Foot;R Lower Leg/Foot;Chest;Abdomen;Perineal Area;Buttocks   Positioning Seated;Standing   Adaptive Equipment Longhand Reacher   Tub/Shower Transfer   Reason Not Assessed Sponge Bath   Upper Body Dressing   Type of Assistance Needed Independent   Upper Body Dressing CARE Score 6   Lower Body Dressing   Type of Assistance Needed Independent   Lower Body Dressing CARE Score 6   Putting On/Taking Off Footwear   Type of Assistance Needed Independent   Putting On/Taking Off Footwear CARE Score 6   Picking Up Object   Type of Assistance Needed Independent;Adaptive equipment   Picking Up Object CARE Score 6   Dressing/Undressing Clothing   Remove UB Clothes Pullover Shirt   Don UB Clothes Pullover Shirt   Remove LB Clothes Undergarment;Socks;Shorts   Don LB Clothes Socks;Undergarment;Shorts   Limitations Noted In ROM  (cast RLE)   Adaptive Equipment Reacher;Sock Aide   Positioning Supported Sit;Standing   Sit to Lying   Type of Assistance Needed Independent   Sit to Lying CARE Score 6   Lying to Sitting on Side of Bed   Type of Assistance Needed Independent   Lying to Sitting on Side of Bed  CARE Score 6   Sit to Stand   Type of Assistance Needed Independent   Sit to Stand CARE Score 6   Bed-Chair Transfer   Type of Assistance Needed Independent   Chair/Bed-to-Chair Transfer CARE Score 6   Toileting Hygiene   Type of Assistance Needed Independent   Toileting Hygiene CARE Score 6   Toileting   Able to Pull Clothing down yes, up yes.   Manage Hygiene Bladder;Bowel   Adaptive Equipment Grab Bar   Toilet Transfer   Type of Assistance Needed Independent   Toilet Transfer CARE Score 6   Toilet Transfer   Surface Assessed Standard Toilet   Transfer Technique Standard   Adaptive Equipment Grab Bar;Walker   Light Housekeeping   Light Housekeeping Level Walker   Light Housekeeping Level of Assistance Modified independent   Light Housekeeping gathers laundry and transports it to wash machine to complete Indp with RW   Exercise Tools   Hand Gripper B hands 2x40 reps with heavy resistance gripper   Other Exercise Tool 1 red flex bar: 2x20 reps upward then downward   Cognition   Orientation Level Oriented X4   Additional Activities   Additional Activities Other (Comment)   Additional Activities Comments fxnl mobility MI TTWB R LE   Other Comments   Assessment Pt participates in 45 minutes concurrent treatment focusing on ADL completion and theract/ transfers with similar goals as another patient to increase strength/ ROM and activity tolerance   Assessment   Treatment Assessment Pt presents supine agreeable to OT Session including ADLs, toielting, transfers/ mobility, light homemaking and BUE therex. Pt tolerates session without complaints and remains Mod I for all daily tasks assessed. Pt will benefit from conituned skilled OT servcies to increase independence with daily tasks preparing for discahrge to home.   Problem List Decreased strength;Decreased range of motion;Orthopedic restrictions  (TTWB with cast RLE)   Plan   Treatment/Interventions ADL retraining;Functional transfer training;Therapeutic  exercise;Endurance training;Patient/family training;Equipment eval/education;Compensatory technique education   Progress Progressing toward goals   OT Therapy Minutes   OT Time In 0730   OT Time Out 0830   OT Total Time (minutes) 60   OT Mode of treatment - Individual (minutes) 45   OT Mode of treatment - Concurrent (minutes) 15   Therapy Time missed   Time missed? No

## 2025-04-02 NOTE — ASSESSMENT & PLAN NOTE
Skin breakdown on R heel due to cast. Ortho removed approximately 1 inch of cast at posterior aspect on 3/11  New cast 3/21   Local wound care  Continue to monitor   Present on admission to [facility]   Wound care RN consulted:  Skin Care orders:  1-Hydraguard to sacrum, buttocks bid and prn   2-EHOB / waffle  cushion when out of bed in chair.  3-Moisturize skin daily with skin nourishing cream  4-Elevate heels to offload pressure.  5-Turn/reposition q2h for pressure re-distribution on skin  6. Right posterior heel cleanse with soap and water then pat dry apply cavilon 3 M no sting then a silicone foam nate with a P and date change every other day   7. Left plantar aspect of the foot - cleanse with soap and water then apply a silicone foam nate with a P  and date change every other day

## 2025-04-02 NOTE — NURSING NOTE
Patient continues to be MOD I in room with walker. Medicated once for bilateral foot pain. Sitting upright for all meals. Cast remains in place. Dressings in place to plantar left and posterior right. Will continue to monitor and follow plan of care.

## 2025-04-02 NOTE — PROGRESS NOTES
04/02/25 0900   Pain Assessment   Pain Assessment Tool 0-10   Pain Score No Pain   Restrictions/Precautions   Precautions Limb alert   RLE Weight Bearing Per Order TTWB   Cognition   Overall Cognitive Status WFL   Arousal/Participation Alert;Responsive;Cooperative   Attention Within functional limits   Orientation Level Oriented X4   Memory Within functional limits   Following Commands Follows all commands and directions without difficulty   Roll Left and Right   Type of Assistance Needed Independent   Roll Left and Right CARE Score 6   Sit to Lying   Type of Assistance Needed Independent   Sit to Lying CARE Score 6   Lying to Sitting on Side of Bed   Type of Assistance Needed Adaptive equipment   Lying to Sitting on Side of Bed CARE Score 6   Sit to Stand   Type of Assistance Needed Independent   Comment RW   Sit to Stand CARE Score 6   Bed-Chair Transfer   Type of Assistance Needed Adaptive equipment   Comment RW   Chair/Bed-to-Chair Transfer CARE Score 6   Walk 10 Feet   Type of Assistance Needed Independent   Comment mod I level and unlevel surfaces with RW; maintains TTWB RLE   Walk 10 Feet CARE Score 6   Walk 50 Feet with Two Turns   Type of Assistance Needed Independent   Comment mod I level and unlevel surfaces with RW; maintains TTWB RLE   Walk 50 Feet with Two Turns CARE Score 6   Walk 150 Feet   Type of Assistance Needed Independent   Comment mod I level and unlevel surfaces with RW; maintains TTWB RLE   Walk 150 Feet CARE Score 6   Walking 10 Feet on Uneven Surfaces   Type of Assistance Needed Independent   Comment mod I level and unlevel surfaces with RW; maintains TTWB RLE   Walking 10 Feet on Uneven Surfaces CARE Score 6   Ambulation   Primary Mode of Locomotion Prior to Admission Walk   Distance Walked (feet) 182 ft  (95' household distances around PT gym/room 182')   Assist Device Roller Walker   Gait Pattern Inconsistant Ghazal;Slow Ghazal;Decreased foot clearance;L foot drag;Forward  · On presentation was in afib RVR with -120s  · After IV lopressor x1, converted to NSR  · Seen by cardio -> cont lopressor 25 BID, switched to PO eliquis  · Will need eliquis price check at the time of discharge  · Patient remained in sinus rhythm Flexion;Step to;Step through;Decreased R stance;Improper weight shift   Limitations Noted In Device Management;Endurance;Heel Strike;Sequencing;Speed;Strength;Swing   Walk Assist Level Modified Independent   Findings mod I level and unlevel surfaces with RW; maintains TTWB RLE   Does the patient walk? 2. Yes   Wheel 50 Feet with Two Turns   Reason if not Attempted Activity not applicable   Wheel 50 Feet with Two Turns CARE Score 9   Wheel 150 Feet   Reason if not Attempted Activity not applicable   Wheel 150 Feet CARE Score 9   Curb or Single Stair   Style negotiated Single stair   Type of Assistance Needed Independent   Comment mod I 6 steps using single HR; up forward/down backward; maintains TTWB RLE   1 Step (Curb) CARE Score 6   4 Steps   Type of Assistance Needed Independent   Comment mod I 6 steps using single HR; up forward/down backward; maintains TTWB RLE   4 Steps CARE Score 6   Stairs   Type Stairs;Ramp   # of Steps 6   Weight Bearing Precautions TTWB;RLE   Assist Devices Single Rail;Roller Walker   Findings mod I 6 steps using single HR; up forward/down backward; MOD I ramp with walker; maintains TTWB RLE   Therapeutic Interventions   Strengthening seated ther ex with 2.5 lb weights   Balance gait and transfer training   Other ramp and stair negotiation   Equipment Use   NuStep L5 x 20 min B/L UE, LLE   Assessment   Treatment Assessment Patient agreeable to therapy session.  No pain reported.   Remains MOD I with functional mobility using RW.   Maintains TTWB RLE.   Completed ther ex for general LE strengthening; gait and transfer training focusing on sequence and technique for improved balance and safety with functional mobility using walker.   Negotiated steps  MOD I with single HR; ramp with RW mod I.  Patient appropriate for concurrent therapy to increase motivation and encouragement among pts with similar deficits while completing therapyt session.  Returned to room in recliner with all needs  within reach.   PT Barriers   Physical Impairment Decreased strength;Decreased range of motion;Decreased endurance;Impaired balance;Decreased mobility;Orthopedic restrictions   Functional Limitation Wheelchair management;Walking;Transfers;Standing;Stair negotiation;Ramp negotiation;Car transfers   Plan   Treatment/Interventions Functional transfer training;LE strengthening/ROM;Elevations;Therapeutic exercise;Bed mobility;Gait training   Progress Progressing toward goals   PT Therapy Minutes   PT Time In 0900   PT Time Out 1030   PT Total Time (minutes) 90   PT Mode of treatment - Individual (minutes) 0   PT Mode of treatment - Concurrent (minutes) 90   PT Mode of treatment - Group (minutes) 0   PT Mode of treatment - Co-treat (minutes) 0   PT Mode of Treatment - Total time(minutes) 90 minutes   PT Cumulative Minutes 1650   Therapy Time missed   Time missed? No

## 2025-04-02 NOTE — PROGRESS NOTES
"Progress Note - Tobin Kerns 69 y.o. male MRN: [id number]    Unit/Bed#: [facility] 210-01 Encounter: [id number]        Subjective:   Patient seen and examined at bedside after reviewing the chart and discussing the case with the caring staff.      Patient examined at bedside.  Patient has no acute symptoms.    Physical Exam   Vitals: Blood pressure 112/56, pulse 65, temperature (!) 97.4 °F (36.3 °C), temperature source Temporal, resp. rate 16, height 6' 5\" (1.956 m), weight 134 kg (295 lb 6.7 oz), SpO2 98%.,Body mass index is 35.03 kg/m².  Constitutional: Patient in no acute distress.  HEENT: PERR, EOMI, MMM.  Cardiovascular: Normal rate and regular rhythm.    Pulmonary/Chest: Effort normal and breath sounds normal.   Abdomen: Soft, + BS, NT.    Assessment/Plan:  Tobin Kerns is a(n) 69 y.o. year old male who is s/p quadriceps tendon repair.     Cardiac with hx of HTN, HLD.  Patient is on losartan 50 mg daily, pravastatin 40 mg daily.  Impaired fasting glucose.  Hgb a1c 5.4% on 3/12/25.  Continue metformin XR 1500 mg daily with dinner.  Regular diet and d/c accucheks as blood sugar is well controlled.    BPH.  Continue Flomax 0.4 mg daily.  GERD.  Continue Pepcid 20 mg daily.  JOVANY.  On CPAP at bedtime.   Vitamin D deficiency.  Patient is on vitamin D3 5000 units daily.  Pain and bowel management.  As per physiatrist.  Quadriceps tendon repair.  Toe touch weightbearing RLE with use of walker.  Patient is receiving intensive PT OT as per physiatrist.    Anticipated date of discharge.  TBD.    The patient was discussed with Dr. Brito and he is in agreement with the above note.  "

## 2025-04-02 NOTE — NUTRITION
04/02/25 1314   Biochemical Data,Medical Tests, and Procedures   Biochemical Data/Medical Tests/Procedures Lab values reviewed;Meds reviewed   Labs (Comment) 3/31 CMP & CBC WNL   Meds (Comment) Vit C, Vit D, lovenox, pepcid, cozaar, metformin, MVI, pravachol   Nutrition-Focused Physical Exam   Nutrition-Focused Physical Exam Findings RN skin assessment reviewed  (Wound right knee, wound left plantar, pressure injury right ankle per documentation)   Nutrition-Focused Physical Exam Findings trace RLE edema noted   Medical-Related Concerns PMH reviewed   Adequacy of Intake   Nutrition Modality PO   Feeding Route   PO Independent   Current PO Intake   Current Diet Order Regular diet thin liquids   Current Meal Intake %   Estimated calorie intake compared to estimated need Nutrient needs met.   PES Statement   Problem Continue previous diagnosis   Recommendations/Interventions   Malnutrition/BMI Present No  (does not meet criteria)   Summary Regular diet thin liquids. No supplements. Meal completions 100%. 4/2 295#, BMI-35.03; 18#(5.7%) loss from admission 3/11 313#. Medications reviewed. Missing teeth. Trace RLE edema. Skin integrity reviewed. LBM 4/1. He states his appetite is good. Discussed his weight loss. He states he is eating less than he would at home but is still eating until he feels satisfied. Continue current diet.   Interventions/Recommendations Continue current diet order   Education Assessment   Education Education not indicated at this time   Patient Nutrition Goals   Goal Adequate hydration;Adequate intake;Improve skin integrity   Goal Status Met;Extended   Timeframe to complete goal by next f/u   Nutrition Complexity Risk   Nutrition complexity level Sign off   Follow up date 04/11/25

## 2025-04-03 PROCEDURE — 97530 THERAPEUTIC ACTIVITIES: CPT

## 2025-04-03 PROCEDURE — 97110 THERAPEUTIC EXERCISES: CPT

## 2025-04-03 PROCEDURE — 97116 GAIT TRAINING THERAPY: CPT

## 2025-04-03 PROCEDURE — 99232 SBSQ HOSP IP/OBS MODERATE 35: CPT | Performed by: PHYSICAL MEDICINE & REHABILITATION

## 2025-04-03 PROCEDURE — 97535 SELF CARE MNGMENT TRAINING: CPT

## 2025-04-03 RX ADMIN — OXYCODONE HYDROCHLORIDE AND ACETAMINOPHEN 500 MG: 500 TABLET ORAL at 09:53

## 2025-04-03 RX ADMIN — PRAVASTATIN SODIUM 40 MG: 40 TABLET ORAL at 17:22

## 2025-04-03 RX ADMIN — CHOLECALCIFEROL TAB 25 MCG (1000 UNIT) 5000 UNITS: 25 TAB at 09:53

## 2025-04-03 RX ADMIN — FAMOTIDINE 20 MG: 20 TABLET, FILM COATED ORAL at 09:53

## 2025-04-03 RX ADMIN — B-COMPLEX W/ C & FOLIC ACID TAB 1 TABLET: TAB at 09:53

## 2025-04-03 RX ADMIN — TAMSULOSIN HYDROCHLORIDE 0.4 MG: 0.4 CAPSULE ORAL at 17:22

## 2025-04-03 RX ADMIN — ENOXAPARIN SODIUM 40 MG: 40 INJECTION SUBCUTANEOUS at 09:53

## 2025-04-03 RX ADMIN — METFORMIN ER 500 MG 1500 MG: 500 TABLET ORAL at 17:22

## 2025-04-03 RX ADMIN — LOSARTAN POTASSIUM 50 MG: 50 TABLET, FILM COATED ORAL at 09:53

## 2025-04-03 NOTE — PROGRESS NOTES
04/03/25 0900   Pain Assessment   Pain Assessment Tool 0-10   Pain Score No Pain   Restrictions/Precautions   Precautions Limb alert;Fall Risk   RLE Weight Bearing Per Order TTWB   ROM Restrictions No   Braces or Orthoses Other (Comment)  (R LE long cast)   Cognition   Overall Cognitive Status WFL   Arousal/Participation Alert;Responsive;Cooperative   Attention Within functional limits   Orientation Level Oriented X4   Memory Within functional limits   Following Commands Follows all commands and directions without difficulty   Subjective   Subjective Pt reports he needs to apply more pillows under his R foot when sitting in recliner chair   Roll Left and Right   Type of Assistance Needed Independent   Physical Assistance Level No physical assistance   Roll Left and Right CARE Score 6   Sit to Lying   Type of Assistance Needed Independent   Physical Assistance Level No physical assistance   Sit to Lying CARE Score 6   Lying to Sitting on Side of Bed   Type of Assistance Needed Independent   Physical Assistance Level No physical assistance   Lying to Sitting on Side of Bed CARE Score 6   Sit to Stand   Type of Assistance Needed Independent   Physical Assistance Level No physical assistance   Comment RW   Sit to Stand CARE Score 6   Bed-Chair Transfer   Type of Assistance Needed Independent   Physical Assistance Level No physical assistance   Comment RW   Chair/Bed-to-Chair Transfer CARE Score 6   Car Transfer   Reason if not Attempted Environmental limitations   Car Transfer CARE Score 10   Walk 10 Feet   Type of Assistance Needed Independent   Physical Assistance Level No physical assistance   Comment RW   Walk 10 Feet CARE Score 6   Walk 50 Feet with Two Turns   Type of Assistance Needed Independent   Physical Assistance Level No physical assistance   Comment RW   Walk 50 Feet with Two Turns CARE Score 6   Walk 150 Feet   Type of Assistance Needed Independent   Physical Assistance Level No physical assistance    Comment RW   Walk 150 Feet CARE Score 6   Walking 10 Feet on Uneven Surfaces   Type of Assistance Needed Independent   Physical Assistance Level No physical assistance   Comment RW   Walking 10 Feet on Uneven Surfaces CARE Score 6   Ambulation   Primary Mode of Locomotion Prior to Admission Walk   Distance Walked (feet) 150 ft  (150' x 2)   Assist Device Roller Walker   Does the patient walk? 2. Yes   Wheel 50 Feet with Two Turns   Reason if not Attempted Activity not applicable   Wheel 50 Feet with Two Turns CARE Score 9   Wheel 150 Feet   Reason if not Attempted Activity not applicable   Wheel 150 Feet CARE Score 9   Wheelchair mobility   Findings N/A   Does the patient use a wheelchair? 0. No   Curb or Single Stair   Style negotiated Single stair   Type of Assistance Needed Independent   Physical Assistance Level No physical assistance   Comment mod I 6 steps using single HR; up forward/down backward; maintains TTWB RLE   1 Step (Curb) CARE Score 6   4 Steps   Type of Assistance Needed Independent   Physical Assistance Level No physical assistance   Comment mod I 6 steps using single HR; up forward/down backward; maintains TTWB RLE   4 Steps CARE Score 6   12 Steps   Reason if not Attempted Activity not applicable   12 Steps CARE Score 9   Toilet Transfer   Comment did not require during session   Therapeutic Interventions   Strengthening supine LE TE   Other ramp negotiation, stair training, transfer training   Equipment Use   NuStep L5, 20 min, B/L UE, L LE   Assessment   Treatment Assessment Pt agreeable to PT this AM, received sitting upright at EOB. Pt continues to maintain TTWB R LE well with all functional transfers, gait, and stair activities, as well as promote good safety awareness of environment with use of RW. Continued to challenge LE strength/endurance with supine LE TE and NuStep with good tolerance. Pt remains limited by TTWB R LE in long cast, unable to safely discharge home due to inadequate  home setup and spacing concerns. Will continue with current PT POC to improve deficits and promote return to PLOF.   Problem List Decreased strength;Decreased range of motion;Orthopedic restrictions   PT Barriers   Physical Impairment Decreased strength;Decreased range of motion;Decreased endurance;Impaired balance;Decreased mobility;Orthopedic restrictions   Functional Limitation Car transfers   Plan   Treatment/Interventions Functional transfer training;LE strengthening/ROM;Therapeutic exercise;Endurance training;Patient/family training;Equipment eval/education;Bed mobility;Gait training   Progress Progressing toward goals   PT Therapy Minutes   PT Time In 0900   PT Time Out 1030   PT Total Time (minutes) 90   PT Mode of treatment - Individual (minutes) 30   PT Mode of treatment - Concurrent (minutes) 60   PT Mode of treatment - Group (minutes) 0   PT Mode of treatment - Co-treat (minutes) 0   PT Mode of Treatment - Total time(minutes) 90 minutes   PT Cumulative Minutes 1782     Patient remains OOB in chair, all needs in reach. Encouraged use of call bell, patient verbalizes understanding.

## 2025-04-03 NOTE — PROGRESS NOTES
"Progress Note - PMR   Name: Tobin Kerns 69 y.o. male I MRN: [id number]  Unit/Bed#: -01 I Date of Admission: 3/11/2025   Date of Service: 4/3/2025 I Hospital Day: 23     Assessment & Plan  Quadriceps muscle rupture, right, subsequent encounter  HPI:   He initially underwent quad tendon repair on 7/25/24 with Dr. Foster. He was discharged home with outpatient PT.  Later noted to have recurrent high-grade tear with extensor lag on exam and elected for operative management  January 2025 MRI: \"Prior quadriceps insertion repair with high-grade recurrent tear exhibiting up to 2.2 cm of tendon retraction.\"  On 3/6/25, he underwent revision of quad tendon repair with allograft augmentation  3/21: outpatient follow-up with Dr. Foster, new cast applied     Plan:   PT and OT for 3 hours a day, 5-6 days a week   TTWB RLE   Avoid moisture to cast   Pain: PRN Tylenol (monitor LFTs) and oxycodone discontinued on 3/17 due to minimal pain   Per ortho,  Lovenox for 2 more weeks for DVT prophylaxis (until 4/4)   3/28: follow-up with Dr. Foster, continue TTWB and follow-up on 4/4  Hypertension  Continue losartan 50 mg daily   Monitor BP   Mixed hyperlipidemia  Continue pravastatin 40 mg daily   Gastro-esophageal reflux disease without esophagitis  Continue famotidine 20 mg daily   Peripheral neuropathy  Patient is not diabetic   Monitor skin for new or worsening wounds   JOVANY on CPAP  Continue CPAP qHS  IFG (impaired fasting glucose)  Continue metformin 1500 mg daily   Seen by RD 3/12, diet adjusted to regular   3/12 A1c: 5.4   Healed ulcer of left foot  Local wound care  Present on admission   Wound care RN consulted   BPH (benign prostatic hyperplasia)  Continue tamsulosin 0.4 mg daily   Patient currently voiding without difficulty   PRN PVRs if urinary retention is suspected   Impaired mobility and ADLs  Patient was evaluated by the rehabilitation team MD and an appropriate candidate for acute inpatient rehabilitation " program at this time.  The patient will tolerate 3 hours/day 5 to 7 days/week of intensive physical and  occupational therapy   in order to obtain goals for community discharge  Due to the patient's functional Compared to their baseline level of function in addition to their ongoing medical needs, the patient would benefit from daily supervision from a rehabilitation physician as well as rehabilitation nursing to implement and adjust the medical as well as functional plan of care in order to meet the patient's goals.  Bladder: Monitor closely for urinary incontinence and/or retention. Monitor urine output and encourage spontaneous voids. If unable to void spontaneously, will monitor with PVR bladder scans and initiate CIC regimen.  Skin: Monitor for breakdown, frequent turns, pressure offloading  Sleep: Encourage sleep hygiene (routine that promotes healthy circadian rhythm,avoid caffeine later in the day, quiet/dark room at night, reduce electronic before bedtime)      Wound of right ankle  Skin breakdown on R heel due to cast. Ortho removed approximately 1 inch of cast at posterior aspect on 3/11  New cast 3/21   Local wound care  Continue to monitor   Present on admission to [facility]   Wound care RN consulted:  Skin Care orders:  1-Hydraguard to sacrum, buttocks bid and prn   2-EHOB / waffle  cushion when out of bed in chair.  3-Moisturize skin daily with skin nourishing cream  4-Elevate heels to offload pressure.  5-Turn/reposition q2h for pressure re-distribution on skin  6. Right posterior heel cleanse with soap and water then pat dry apply cavilon 3 M no sting then a silicone foam nate with a P and date change every other day   7. Left plantar aspect of the foot - cleanse with soap and water then apply a silicone foam nate with a P  and date change every other day     Subjective   Tobin Kerns is a 69 year-old male, with a past medical history of hypertension, hyperlipidemia, JOVANY, impaired fasting glucose,  bilateral lower extremity neuropathy, GERD, BPH, presenting to [facility] after an elective right quadricept tendon repair. He initially underwent quad tendon repair on 7/25/24 with Dr. Foster. He was discharged home with outpatient PT. He was noted to have recurrent high-grade tear on repeat MRI with extensor lag on exam and elected for operative management. On 3/6/25, he underwent revision of quad tendon repair with allograft augmentation. He was evaluated by PT and OT and deemed an appropriate candidate for [facility] due to functional deficits     Chief Complaint: f/u ambulatory dysfunction    Interval:   Seen and evaluated patient in room. He denies any concerns. Last BM 4/1, formed and large. He is continent of bowel and bladder.     Objective :  Temp:  [98 °F (36.7 °C)-98.2 °F (36.8 °C)] 98.2 °F (36.8 °C)  HR:  [61] 61  BP: (118-130)/(63-66) 118/66  Resp:  [18] 18  SpO2:  [98 %-99 %] 98 %  O2 Device: None (Room air)    Functional Update:  Physical Therapy Occupational Therapy   Weight Bearing Status: Toe touch (R LE)  Transfers: Independent  Bed Mobility: Independent  Amulation Distance (ft): 180 feet  Ambulation: Independent  Assistive Device for Ambulation: Roller Walker  Wheelchair Mobility Distance:  (N/A)  Wheelchair Mobility:  (N/A)  Number of Stairs: 6  Assistive Device for Stairs: Lehft Hand Rail  Stair Assistance: Independent  Ramp: Independent  Assistive Device for Ramp: Roller Walker  Discharge Recommendations: Other  DC Home with::  (alternate placement until off R LE long cast)   Eating: Independent  Grooming: Independent  Bathing: Independent  Bathing: Independent  Upper Body Dressing: Independent  Lower Body Dressing: Independent  Toileting: Independent  Tub/Shower Transfer:  (full leg cast and pt declines shower)  Toilet Transfer: Independent  Cognition: Within Defined Limits  Orientation: Person, Situation, Place, Time           Physical Exam  Vitals and nursing note reviewed.   Constitutional:        General: He is not in acute distress.     Appearance: He is obese. He is not ill-appearing or diaphoretic.   HENT:      Head: Normocephalic and atraumatic.      Nose: Nose normal.      Mouth/Throat:      Mouth: Mucous membranes are moist.   Pulmonary:      Effort: Pulmonary effort is normal. No respiratory distress.   Abdominal:      General: There is no distension.   Neurological:      General: No focal deficit present.      Mental Status: He is alert.   Psychiatric:         Mood and Affect: Mood normal.         Behavior: Behavior normal.               Scheduled Meds:  Current Facility-Administered Medications   Medication Dose Route Frequency Provider Last Rate    acetaminophen  650 mg Oral Q6H PRN Modesta Lee PA-C      ascorbic acid  500 mg Oral Daily Toño Brito MD      Cholecalciferol  5,000 Units Oral Daily Toño Brito MD      enoxaparin  40 mg Subcutaneous Q24H [facility] Toño Brito MD      famotidine  20 mg Oral Daily Toño Brito MD      losartan  50 mg Oral Daily Toño Brito MD      metFORMIN  1,500 mg Oral Daily With Dinner Toño Brito MD      multivitamin stress formula  1 tablet Oral Daily Toño Brito MD      polyethylene glycol  17 g Oral Daily PRN Modesta Lee PA-C      pravastatin  40 mg Oral Daily With Dinner Toño Brito MD      tamsulosin  0.4 mg Oral Daily With Dinner Toño Brito MD           Lab Results: I have reviewed the following results:  Results from last 7 days   Lab Units 03/31/25  0520   HEMOGLOBIN g/dL 13.4   HEMATOCRIT % 40.5   WBC Thousand/uL 8.25   PLATELETS Thousands/uL 236     Results from last 7 days   Lab Units 03/31/25  0520   BUN mg/dL 24   SODIUM mmol/L 136   POTASSIUM mmol/L 4.4   CHLORIDE mmol/L 104   CREATININE mg/dL 1.22   AST U/L 17   ALT U/L 15            Modesta Lee PA-C  Physical Medicine and Rehabilitation  [facility]

## 2025-04-03 NOTE — ASSESSMENT & PLAN NOTE
Skin breakdown on R heel due to cast. Ortho removed approximately 1 inch of cast at posterior aspect on 3/11  New cast 3/21   Local wound care  Continue to monitor   Present on admission to [facility]   Wound care RN consulted:  Skin Care orders:  1-Hydraguard to sacrum, buttocks bid and prn   2-EHOB / waffle  cushion when out of bed in chair.  3-Moisturize skin daily with skin nourishing cream  4-Elevate heels to offload pressure.  5-Turn/reposition q2h for pressure re-distribution on skin  6. Right posterior heel cleanse with soap and water then pat dry apply cavilon 3 M no sting then a silicone foam nate with a P and date change every other day   7. Left plantar aspect of the foot - cleanse with soap and water then apply a silicone foam nate with a P  and date change every other day

## 2025-04-03 NOTE — PROGRESS NOTES
"Progress Note - Tobin Kerns 69 y.o. male MRN: [id number]    Unit/Bed#: [facility] 210-01 Encounter: [id number]        Subjective:   Patient seen and examined at bedside after reviewing the chart and discussing the case with the caring staff.      Patient examined at bedside.  Patient has no acute symptoms.    Physical Exam   Vitals: Blood pressure 118/66, pulse 61, temperature 98.2 °F (36.8 °C), temperature source Temporal, resp. rate 18, height 6' 5\" (1.956 m), weight 134 kg (295 lb 6.7 oz), SpO2 98%.,Body mass index is 35.03 kg/m².  Constitutional: Patient in no acute distress.  HEENT: PERR, EOMI, MMM.  Cardiovascular: Normal rate and regular rhythm.    Pulmonary/Chest: Effort normal and breath sounds normal.   Abdomen: Soft, + BS, NT.    Assessment/Plan:  Tobin Kerns is a(n) 69 y.o. year old male who is s/p quadriceps tendon repair.     Cardiac with hx of HTN, HLD.  Patient is on losartan 50 mg daily, pravastatin 40 mg daily.  Impaired fasting glucose.  Hgb a1c 5.4% on 3/12/25.  Continue metformin XR 1500 mg daily with dinner.  Regular diet and d/c accucheks as blood sugar is well controlled.    BPH.  Continue Flomax 0.4 mg daily.  GERD.  Continue Pepcid 20 mg daily.  JOVANY.  On CPAP at bedtime.   Vitamin D deficiency.  Patient is on vitamin D3 5000 units daily.  Pain and bowel management.  As per physiatrist.  Quadriceps tendon repair.  Toe touch weightbearing RLE with use of walker.  Patient is receiving intensive PT OT as per physiatrist.    Anticipated date of discharge.  TBD.    The patient was discussed with Dr. Brito and he is in agreement with the above note.  "

## 2025-04-03 NOTE — NURSING NOTE
Patient remains MOD I with walker. Reports no pain this shift. Cast remains in place. Allevyn changed to bottom of left foot and right posterior foot. Area on posterior foot is very red for what can be seen. Cast remains tight at achilles area. Patient denies pain. Sitting upright for all meals. Will continue to monitor and follow plan of care.

## 2025-04-03 NOTE — PROGRESS NOTES
04/03/25 1030   Pain Assessment   Pain Assessment Tool 0-10   Pain Score No Pain   Restrictions/Precautions   Precautions Fall Risk   RLE Weight Bearing Per Order TTWB   Braces or Orthoses   (cast R LE)   Oral Hygiene   Type of Assistance Needed Independent   Physical Assistance Level No physical assistance   Oral Hygiene CARE Score 6   Grooming   Able To Initiate Tasks;Acquire Items;Comb/Brush Hair;Wash/Dry Face;Brush/Clean Teeth;Wash/Dry Hands   Findings stance   Shower/Bathe Self   Type of Assistance Needed Independent;Adaptive equipment   Physical Assistance Level No physical assistance   Shower/Bathe Self CARE Score 6   Bathing   Assessed Bath Style Sponge Bath   Able to Gather/Transport Yes   Able to Adjust Water Temperature Yes   Able to Wash/Rinse/Dry (body part) Left Arm;Right Arm;L Upper Leg;R Upper Leg;L Lower Leg/Foot;R Lower Leg/Foot;Chest;Abdomen;Perineal Area;Buttocks   Limitations Noted in ROM   Positioning Seated;Standing   Adaptive Equipment Longhand Sponge   Upper Body Dressing   Type of Assistance Needed Independent   Physical Assistance Level No physical assistance   Upper Body Dressing CARE Score 6   Lower Body Dressing   Type of Assistance Needed Independent;Adaptive equipment   Physical Assistance Level No physical assistance   Lower Body Dressing CARE Score 6   Putting On/Taking Off Footwear   Type of Assistance Needed Independent;Adaptive equipment   Putting On/Taking Off Footwear CARE Score 6   Picking Up Object   Type of Assistance Needed Independent;Adaptive equipment   Physical Assistance Level No physical assistance   Picking Up Object CARE Score 6   Dressing/Undressing Clothing   Able to  Obtain Clothing;Store removed clothing   Remove UB Clothes Pullover Shirt   Don UB Clothes Pullover Shirt   Remove LB Clothes Undergarment;Socks;Shorts   Don LB Clothes Shorts;Undergarment;Socks   Limitations Noted In ROM   Adaptive Equipment Reacher;Sock Aide   Positioning Supported Sit;Standing    Sit to Stand   Type of Assistance Needed Independent   Physical Assistance Level No physical assistance   Sit to Stand CARE Score 6   Toileting Hygiene   Type of Assistance Needed Independent   Physical Assistance Level No physical assistance   Toileting Hygiene CARE Score 6   Toileting   Able to Pull Clothing down yes, up yes.   Manage Hygiene Bladder;Bowel   Limitations Noted In ROM   Adaptive Equipment Grab Bar   Toilet Transfer   Type of Assistance Needed Independent;Adaptive equipment   Physical Assistance Level No physical assistance   Toilet Transfer CARE Score 6   Light Housekeeping   Light Housekeeping Level Walker   Light Housekeeping Level of Assistance Modified independent   Light Housekeeping gathers laundry and transports it to wash machine to complete Indp with RW   Exercise Tools   Hand Gripper B hands 2x40 reps with heavy resistance gripper   Other Exercise Tool 1 red flex bar: 2x20 reps upward then downward   Other Exercise Tool 2 2x15 reps in 6 planes of motion   Cognition   Overall Cognitive Status WFL   Arousal/Participation Alert;Cooperative   Attention Within functional limits   Orientation Level Oriented X4   Memory Within functional limits   Following Commands Follows all commands and directions without difficulty   Additional Activities   Additional Activities Comments fxnl mobility MI TTWB R LE RW   Activity Tolerance   Activity Tolerance Patient tolerated treatment well   Assessment   Treatment Assessment ADL completed via sponge bathe level: Pt able to consistently s/u and c/u all supplies Independently. Pt demon consistent recall of compensatory strategies and compliance with safe techniques throughout all ADL/iADLs. Pt completing laundry by gathering items and transporting them with bag to wash machine with use of RW Independently. Pt maintains TTWB during txfs and mobility consistently.  Pt's barriers to d/c to personal home environment include decreased strength RLE s/p quadricepts  rupture, decreased ROM with use of leg length hard cast, and TTWB RLE. Plan is contingent upon placement due to noted barriers. Ortho f/u tomorrow.   Prognosis Good   Problem List Decreased strength;Decreased range of motion;Decreased mobility;Impaired balance;Orthopedic restrictions   Plan   Treatment/Interventions Functional transfer training;ADL retraining;Therapeutic exercise;Endurance training;Patient/family training;Equipment eval/education;Gait training;Compensatory technique education;Spoke to nursing;OT   Progress Improving as expected   OT Therapy Minutes   OT Time In 1030   OT Time Out 1200   OT Total Time (minutes) 90   OT Mode of treatment - Individual (minutes) 0   OT Mode of treatment - Concurrent (minutes) 0   OT Mode of treatment - Group (minutes) 0   OT Mode of treatment - Co-treat (minutes) 0   OT Mode of Treatment - Total time(minutes) 0 minutes   OT Cumulative Minutes 1217   Therapy Time missed   Time missed? No

## 2025-04-04 ENCOUNTER — OFFICE VISIT (OUTPATIENT)
Dept: OBGYN CLINIC | Facility: CLINIC | Age: 70
End: 2025-04-04

## 2025-04-04 VITALS — HEIGHT: 77 IN | BODY MASS INDEX: 35.03 KG/M2

## 2025-04-04 DIAGNOSIS — Z98.890 S/P MUSCULOSKELETAL SYSTEM SURGERY: Primary | ICD-10-CM

## 2025-04-04 DIAGNOSIS — S76.111D QUADRICEPS TENDON RUPTURE, RIGHT, SUBSEQUENT ENCOUNTER: ICD-10-CM

## 2025-04-04 PROCEDURE — 97110 THERAPEUTIC EXERCISES: CPT

## 2025-04-04 PROCEDURE — 99024 POSTOP FOLLOW-UP VISIT: CPT | Performed by: ORTHOPAEDIC SURGERY

## 2025-04-04 PROCEDURE — 99232 SBSQ HOSP IP/OBS MODERATE 35: CPT

## 2025-04-04 PROCEDURE — 97535 SELF CARE MNGMENT TRAINING: CPT

## 2025-04-04 PROCEDURE — 97116 GAIT TRAINING THERAPY: CPT

## 2025-04-04 RX ADMIN — METFORMIN ER 500 MG 1500 MG: 500 TABLET ORAL at 16:07

## 2025-04-04 RX ADMIN — OXYCODONE HYDROCHLORIDE AND ACETAMINOPHEN 500 MG: 500 TABLET ORAL at 09:02

## 2025-04-04 RX ADMIN — B-COMPLEX W/ C & FOLIC ACID TAB 1 TABLET: TAB at 09:02

## 2025-04-04 RX ADMIN — LOSARTAN POTASSIUM 50 MG: 50 TABLET, FILM COATED ORAL at 09:02

## 2025-04-04 RX ADMIN — CHOLECALCIFEROL TAB 25 MCG (1000 UNIT) 5000 UNITS: 25 TAB at 09:02

## 2025-04-04 RX ADMIN — ACETAMINOPHEN 650 MG: 325 TABLET ORAL at 21:32

## 2025-04-04 RX ADMIN — PRAVASTATIN SODIUM 40 MG: 40 TABLET ORAL at 16:07

## 2025-04-04 RX ADMIN — TAMSULOSIN HYDROCHLORIDE 0.4 MG: 0.4 CAPSULE ORAL at 16:07

## 2025-04-04 RX ADMIN — ENOXAPARIN SODIUM 40 MG: 40 INJECTION SUBCUTANEOUS at 09:01

## 2025-04-04 RX ADMIN — FAMOTIDINE 20 MG: 20 TABLET, FILM COATED ORAL at 09:02

## 2025-04-04 NOTE — ASSESSMENT & PLAN NOTE
Skin breakdown on R heel due to cast. Ortho removed approximately 1 inch of cast at posterior aspect on 3/11  New cast 3/21   Local wound care  Continue to monitor   Present on admission to [facility]   Wound care RN consulted:  Skin Care orders:  1-Hydraguard to sacrum, buttocks bid and prn   2-EHOB / waffle  cushion when out of bed in chair.  3-Moisturize skin daily with skin nourishing cream  4-Elevate heels to offload pressure.  5-Turn/reposition q2h for pressure re-distribution on skin  6. Right posterior heel cleanse with soap and water then pat dry apply cavilon 3 M no sting then a silicone foam nate with a P and date change every other day   7. Left plantar aspect of the foot - cleanse with soap and water then apply a silicone foam nate with a P  and date change every other day

## 2025-04-04 NOTE — PROGRESS NOTES
04/04/25 0700   Pain Assessment   Pain Assessment Tool 0-10   Pain Score No Pain   Restrictions/Precautions   Precautions Fall Risk   RLE Weight Bearing Per Order TTWB   Braces or Orthoses   (R LE cast)   Oral Hygiene   Type of Assistance Needed Independent   Physical Assistance Level No physical assistance   Oral Hygiene CARE Score 6   Grooming   Able To Initiate Tasks;Acquire Items;Comb/Brush Hair;Wash/Dry Face;Brush/Clean Teeth;Wash/Dry Hands   Findings stance   Shower/Bathe Self   Type of Assistance Needed Independent;Adaptive equipment   Physical Assistance Level No physical assistance   Shower/Bathe Self CARE Score 6   Bathing   Assessed Bath Style Sponge Bath   Adaptive Equipment Longhand Sponge   Upper Body Dressing   Type of Assistance Needed Independent   Physical Assistance Level No physical assistance   Upper Body Dressing CARE Score 6   Lower Body Dressing   Type of Assistance Needed Independent;Adaptive equipment   Physical Assistance Level No physical assistance   Lower Body Dressing CARE Score 6   Putting On/Taking Off Footwear   Type of Assistance Needed Independent;Adaptive equipment   Physical Assistance Level No physical assistance   Putting On/Taking Off Footwear CARE Score 6   Picking Up Object   Type of Assistance Needed Independent;Adaptive equipment   Physical Assistance Level No physical assistance   Picking Up Object CARE Score 6   Dressing/Undressing Clothing   Able to  Obtain Clothing;Store removed clothing   Don UB Clothes Button Shirt   Remove LB Clothes Undergarment;Socks   Don LB Clothes Shorts;Undergarment;Socks   Limitations Noted In ROM   Adaptive Equipment Reacher;Sock Aide   Positioning Supported Sit;Standing   Lying to Sitting on Side of Bed   Type of Assistance Needed Independent   Physical Assistance Level No physical assistance   Lying to Sitting on Side of Bed CARE Score 6   Sit to Stand   Type of Assistance Needed Independent   Physical Assistance Level No physical  assistance   Sit to Stand CARE Score 6   Toileting Hygiene   Type of Assistance Needed Independent   Physical Assistance Level No physical assistance   Toileting Hygiene CARE Score 6   Toileting   Able to Pull Clothing down yes, up yes.   Able to Manage Clothing Closures Yes   Manage Hygiene Bladder   Limitations Noted In ROM   Adaptive Equipment Grab Bar   Toilet Transfer   Type of Assistance Needed Independent;Adaptive equipment   Physical Assistance Level No physical assistance   Toilet Transfer CARE Score 6   Toilet Transfer   Surface Assessed Standard Commode   Transfer Technique Standard   Limitations Noted In ROM   Adaptive Equipment Grab Bar;Walker   Kitchen Mobility   Kitchen-Mobility Level Walker   Kitchen Activity Retrieve items;Transport items   Light Housekeeping   Light Housekeeping Level Walker   Light Housekeeping Level of Assistance Modified independent   Light Housekeeping gathers laundry and transports it to wash machine to complete Indp with RW   Exercise Tools   Hand Gripper B hands 2x40 reps with heavy resistance gripper   Other Exercise Tool 1 red flex bar: 2x20 reps upward then downward   Other Exercise Tool 2 6# dowel 2x15 reps in 6 planes of motion   Cognition   Overall Cognitive Status WFL   Arousal/Participation Alert;Cooperative   Attention Within functional limits   Orientation Level Oriented X4   Memory Within functional limits   Following Commands Follows all commands and directions without difficulty   Additional Activities   Additional Activities Comments fxnl mobility MI RW TTWB R LE   Activity Tolerance   Activity Tolerance Patient tolerated treatment well   Assessment   Treatment Assessment ADL completed via sponge bathe level: Pt able to consistently s/u and c/u all supplies Independently. Pt demon consistent recall of compensatory strategies and compliance with safe techniques throughout all ADL/iADLs. Pt completing laundry by gathering items and transporting them with bag to  wash machine with use of RW Independently. Pt maintains TTWB during txfs and mobility consistently. Pt participated in 50 minutes of concurrent tx addressing similar goals with a pt with similar deficits.  Pt's barriers to d/c to personal home environment include decreased strength RLE s/p quadricepts rupture, decreased ROM with use of leg length hard cast, and TTWB RLE. Plan is contingent upon placement due to noted barriers. Ortho f/u today.   Prognosis Good   Problem List Decreased strength;Decreased range of motion;Decreased mobility;Impaired balance;Orthopedic restrictions   Plan   Treatment/Interventions ADL retraining;Functional transfer training;Therapeutic exercise;Endurance training;Patient/family training;Equipment eval/education;Bed mobility;Gait training;Compensatory technique education;Spoke to nursing;OT   Progress Progressing toward goals   OT Therapy Minutes   OT Time In 0700   OT Time Out 0830   OT Total Time (minutes) 90   OT Mode of treatment - Individual (minutes) 40   OT Mode of treatment - Concurrent (minutes) 50   OT Mode of treatment - Group (minutes) 0   OT Mode of treatment - Co-treat (minutes) 0   OT Mode of Treatment - Total time(minutes) 90 minutes   OT Cumulative Minutes 1307   Therapy Time missed   Time missed? No

## 2025-04-04 NOTE — NURSING NOTE
Pt transported to f/u [facility] appt by [place] ambulance via  van, accompanied by PCA.

## 2025-04-04 NOTE — CASE MANAGEMENT
SHILA received voice mail message from Ceasar,  from [facility], stating upon her investigation, the orthopedic office did not get an authorization for patient's surgery, the authorization request has gone to the billing dept of [facility]. Ventura gave contact information in billing dept for CM to contact Radha [phone]. Until the authorization for inpatient surgery is done, Ventura stated she cannot authorize any discharge needs. CM called Radha in billing, who stated she spoke with Ceasar, and does not see that the auth has been obtained. Radha stated she will send a message to the person who requests authorizations, and inquire of the progress. SHILA requested to try to have this request expedited, as patient is ready for discharge. Radha stated she will this CM when she receives any updates or information regarding the authorization request. SHILA will continue to follow.

## 2025-04-04 NOTE — PROGRESS NOTES
"Progress Note - Tobin Kerns 69 y.o. male MRN: [id number]    Unit/Bed#: [facility] 210-01 Encounter: [id number]        Subjective:   Patient seen and examined at bedside after reviewing the chart and discussing the case with the caring staff.      Patient examined at bedside.  Patient has no acute symptoms.    Physical Exam   Vitals: Blood pressure 135/67, pulse 58, temperature (!) 97.3 °F (36.3 °C), temperature source Temporal, resp. rate 16, height 6' 5\" (1.956 m), weight 134 kg (295 lb 6.7 oz), SpO2 96%.,Body mass index is 35.03 kg/m².  Constitutional: Patient in no acute distress.  HEENT: PERR, EOMI, MMM.  Cardiovascular: Normal rate and regular rhythm.    Pulmonary/Chest: Effort normal and breath sounds normal.   Abdomen: Soft, + BS, NT.    Assessment/Plan:  Tobin Kerns is a(n) 69 y.o. year old male who is s/p quadriceps tendon repair.     Cardiac with hx of HTN, HLD.  Patient is on losartan 50 mg daily, pravastatin 40 mg daily.  Impaired fasting glucose.  Hgb a1c 5.4% on 3/12/25.  Continue metformin XR 1500 mg daily with dinner.  Regular diet and d/c accucheks as blood sugar is well controlled.    BPH.  Continue Flomax 0.4 mg daily.  GERD.  Continue Pepcid 20 mg daily.  JOVANY.  On CPAP at bedtime.   Vitamin D deficiency.  Patient is on vitamin D3 5000 units daily.  Pain and bowel management.  As per physiatrist.  Quadriceps tendon repair.  Toe touch weightbearing RLE with use of walker.  Patient is receiving intensive PT OT as per physiatrist.    Anticipated date of discharge.  TBD.    The patient was discussed with Dr. Brito and he is in agreement with the above note.  "

## 2025-04-04 NOTE — PROGRESS NOTES
Name: Tobin Kerns      : 1955      MRN: [id number]  Encounter Provider: Fletcher Foster DO  Encounter Date: 2025   Encounter department: [facility] [place]  :  Assessment & Plan  S/P musculoskeletal system surgery         Quadriceps tendon rupture, right, subsequent encounter           Approximately 4 week s/p Right knee revision quad tendon repair with allograft augmentation performed on 2025, primary repair performed 2024  Overall, patient doing well s/p surgical intervention  Removed cast and reapplied long leg cast for an additional 2 weeks. Windowed cast at patella to facilitate patellar movement during quad sets.   No active straight leg raises at this time  Maintain toe touch weightbearing with use of walker for ambulation and gait training  Follow up 1 week for skin check and if existing ankle/achilles wounds worsen or development of new wounds noticed, advised patient for facility to call the office sooner    History of the Present Illness   History of Present Illness   HPI   Tobin Kerns is a 69 y.o. male approximately 4 week s/p Right knee revision quad tendon repair with allograft augmentation performed on 2025, primary repair performed 2024. Today, patient reports he has been feeling some rubbing at the ankle which started yesterday. He reports the cast is bothersome.           Review of Systems     Review of Systems   Constitutional:  Negative for chills and fever.   HENT:  Negative for ear pain and sore throat.    Eyes:  Negative for pain and visual disturbance.   Respiratory:  Negative for cough and shortness of breath.    Cardiovascular:  Negative for chest pain and palpitations.   Gastrointestinal:  Negative for abdominal pain and vomiting.   Genitourinary:  Negative for dysuria and hematuria.   Musculoskeletal:  Negative for arthralgias and back pain.   Skin:  Negative for color change and rash.   Neurological:  Negative  "for seizures and syncope.   All other systems reviewed and are negative.      Physical Exam   Objective   Ht 6' 5\" (1.956 m)   BMI 35.03 kg/m²        Right Knee  Surgical incision well healing without sign of infection  Healing wounds distal lower extremity. Maintained dressing over achilles wound after cast application  Calf soft, compressible and nontender  Sensation decreased distal extremity secondary to neuropathy       Data Review     I have personally reviewed pertinent films in PACS, and my interpretation follows.    No new images     Lab Results   Component Value Date/Time    HGBA1C 5.4 2025 05:18 AM       Social History     Tobacco Use    Smoking status: Former     Current packs/day: 0.00     Average packs/day: 1 pack/day for 15.0 years (15.0 ttl pk-yrs)     Types: Cigarettes     Start date: 1980     Quit date: 2005     Years since quittin.2     Passive exposure: Never    Smokeless tobacco: Never   Vaping Use    Vaping status: Never Used   Substance Use Topics    Alcohol use: Yes     Alcohol/week: 1.0 standard drink of alcohol     Types: 1 Cans of beer per week    Drug use: Not Currently     Types: Marijuana     Comment: Last Marijuana Use ; Other unknown drugs while in [place]           Cast application    Date/Time: 2025 10:30 AM    Performed by: America Ambrose PA-C  Authorized by: America Ambrose PA-C    Risks and benefits: Risks, benefits and alternatives were discussed    Consent given by:  Patient  Pre-procedure details:     Pre-procedure CMS: Hx neuropathy, unchanged.  Procedure details:     Location:  Leg    Leg:  R lower leg    Cast type:  Long leg  Post-procedure details:     Pain:  Unchanged    Sensation:  Unchanged    Patient tolerance of procedure:  Tolerated well, no immediate complications        America Ambrose PA-C     "

## 2025-04-04 NOTE — ASSESSMENT & PLAN NOTE
"HPI:   He initially underwent quad tendon repair on 7/25/24 with Dr. Foster. He was discharged home with outpatient PT.  Later noted to have recurrent high-grade tear with extensor lag on exam and elected for operative management  January 2025 MRI: \"Prior quadriceps insertion repair with high-grade recurrent tear exhibiting up to 2.2 cm of tendon retraction.\"  On 3/6/25, he underwent revision of quad tendon repair with allograft augmentation  3/21: outpatient follow-up with Dr. Foster, new cast applied     Plan:   PT and OT for 3 hours a day, 5-6 days a week   TTWB RLE   Avoid moisture to cast   Pain: PRN Tylenol (monitor LFTs) and oxycodone discontinued on 3/17 due to minimal pain   Per ortho,  Lovenox for 2 more weeks for DVT prophylaxis (until 4/4)   3/28: follow-up with Dr. Foster, continue TTWB   4/4: follow-up with Dr. Foster (removed cast and reapplied long leg cast for an additional 2 weeks) and continue TTWB  Follow-up scheduled for 4/11  "

## 2025-04-04 NOTE — NURSING NOTE
Patient resting in bed at this time.  No signs of distress noted.  Patient was modified independent to the bathroom overnight with a walker.  Right lower extremity cast in place at all times.  Patient was able to reposition self frequently to promote skin integrity.  Call bell within reach.  Plan of care ongoing.

## 2025-04-04 NOTE — PROGRESS NOTES
"   04/04/25 1331   Pain Assessment   Pain Assessment Tool 0-10   Pain Score No Pain   Restrictions/Precautions   Precautions Fall Risk   RLE Weight Bearing Per Order TTWB   Braces or Orthoses   (Cast R LE)   Cognition   Overall Cognitive Status WFL   Arousal/Participation Alert;Cooperative   Attention Within functional limits   Orientation Level Oriented X4   Memory Within functional limits   Following Commands Follows all commands and directions without difficulty   Subjective   Subjective \"I usually just go through my exercises on my own.\"   Sit to Lying   Type of Assistance Needed Independent   Sit to Lying CARE Score 6   Lying to Sitting on Side of Bed   Type of Assistance Needed Independent   Lying to Sitting on Side of Bed CARE Score 6   Sit to Stand   Type of Assistance Needed Independent   Sit to Stand CARE Score 6   Transfer Bed/Chair/Wheelchair   Limitations Noted In LE Strength   Adaptive Equipment Roller Walker   Sit to Stand Modified Independent   Stand to Sit Modified Independent   Supine to Sit Independent   Sit to Supine Independent   Walk 10 Feet   Type of Assistance Needed Independent   Physical Assistance Level No physical assistance   Comment RW   Walk 10 Feet CARE Score 6   Walk 50 Feet with Two Turns   Type of Assistance Needed Independent   Physical Assistance Level No physical assistance   Comment RW   Walk 50 Feet with Two Turns CARE Score 6   Walk 150 Feet   Type of Assistance Needed Independent   Physical Assistance Level No physical assistance   Comment RW   Walk 150 Feet CARE Score 6   Walking 10 Feet on Uneven Surfaces   Type of Assistance Needed Independent   Physical Assistance Level No physical assistance   Comment RW   Walking 10 Feet on Uneven Surfaces CARE Score 6   Ambulation   Primary Mode of Locomotion Prior to Admission Walk   Distance Walked (feet) 160 ft  (150')   Assist Device Roller Walker   Gait Pattern   (step to gait pattern)   Limitations Noted In Balance   Provided " Assistance with: Balance   Walk Assist Level Modified Independent   Findings Pt able to maintain TTWB R LE   Does the patient walk? 2. Yes   Wheel 50 Feet with Two Turns   Reason if not Attempted Activity not applicable   Wheel 50 Feet with Two Turns CARE Score 9   Wheel 150 Feet   Reason if not Attempted Activity not applicable   Wheel 150 Feet CARE Score 9   Curb or Single Stair   Style negotiated Single stair   Type of Assistance Needed Independent   Physical Assistance Level No physical assistance   Comment Mod I with L rail   1 Step (Curb) CARE Score 6   4 Steps   Type of Assistance Needed Independent   Physical Assistance Level No physical assistance   Comment Mod I with L rail - 6 steps   4 Steps CARE Score 6   12 Steps   Reason if not Attempted Activity not applicable   12 Steps CARE Score 9   Stairs   Type Stairs;Ramp   # of Steps 6   Weight Bearing Precautions RLE;TTWB;Fall Risk   Assist Devices Roller Walker;Single Rail   Findings Mod I to ascend/descend steps with L rail.  Pt descended steps backwards.  Pt ascended/descended a ramp with the RW with mod I.   Therapeutic Interventions   Strengthening Seated ex - blue t-band, 2.5# L LE   Balance gait, transfers   Other steps, ramp   Equipment Use   NuStep B UEs, L LE, 20 minutes, L5   Assessment   Treatment Assessment Pt motivated to participate.  Continues to be TTWB R LE.  Pt demonstrating good safety with gait, transfers, steps.  Knowledgeable with completion of exercises. Pt will benefit from continued PT to reduce risk for falls.   Problem List Decreased strength;Decreased endurance;Impaired balance;Decreased range of motion;Decreased mobility;Orthopedic restrictions   Barriers to Discharge Decreased caregiver support   PT Barriers   Physical Impairment Decreased strength;Decreased endurance;Impaired balance;Decreased range of motion;Decreased mobility;Orthopedic restrictions   Functional Limitation Car transfers   Plan   Treatment/Interventions  Functional transfer training;LE strengthening/ROM;Elevations;Therapeutic exercise;Endurance training;Patient/family training;Bed mobility;Gait training;Equipment eval/education   Progress Progressing toward goals   PT Therapy Minutes   PT Time In 1331   PT Time Out 1501   PT Total Time (minutes) 90   PT Mode of treatment - Individual (minutes) 30   PT Mode of treatment - Concurrent (minutes) 60   PT Mode of treatment - Group (minutes) 0   PT Mode of treatment - Co-treat (minutes) 0   PT Mode of Treatment - Total time(minutes) 90 minutes   PT Cumulative Minutes 1872   Therapy Time missed   Time missed? No

## 2025-04-04 NOTE — PROGRESS NOTES
"Progress Note - PMR   Name: Tobin Kerns 69 y.o. male I MRN: [id number]  Unit/Bed#: -01 I Date of Admission: 3/11/2025   Date of Service: 4/4/2025 I Hospital Day: 24     Assessment & Plan  Quadriceps muscle rupture, right, subsequent encounter  HPI:   He initially underwent quad tendon repair on 7/25/24 with Dr. Foster. He was discharged home with outpatient PT.  Later noted to have recurrent high-grade tear with extensor lag on exam and elected for operative management  January 2025 MRI: \"Prior quadriceps insertion repair with high-grade recurrent tear exhibiting up to 2.2 cm of tendon retraction.\"  On 3/6/25, he underwent revision of quad tendon repair with allograft augmentation  3/21: outpatient follow-up with Dr. Foster, new cast applied     Plan:   PT and OT for 3 hours a day, 5-6 days a week   TTWB RLE   Avoid moisture to cast   Pain: PRN Tylenol (monitor LFTs) and oxycodone discontinued on 3/17 due to minimal pain   Per ortho,  Lovenox for 2 more weeks for DVT prophylaxis (until 4/4)   3/28: follow-up with Dr. Foster, continue TTWB   4/4: follow-up with Dr. Foster (removed cast and reapplied long leg cast for an additional 2 weeks) and continue TTWB  Follow-up scheduled for 4/11  Hypertension  Continue losartan 50 mg daily   Monitor BP   Mixed hyperlipidemia  Continue pravastatin 40 mg daily   Gastro-esophageal reflux disease without esophagitis  Continue famotidine 20 mg daily   Peripheral neuropathy  Patient is not diabetic   Monitor skin for new or worsening wounds   JOVANY on CPAP  Continue CPAP qHS  IFG (impaired fasting glucose)  Continue metformin 1500 mg daily   Seen by RD 3/12, diet adjusted to regular   3/12 A1c: 5.4   Healed ulcer of left foot  Local wound care  Present on admission   Wound care RN consulted   BPH (benign prostatic hyperplasia)  Continue tamsulosin 0.4 mg daily   Patient currently voiding without difficulty   PRN PVRs if urinary retention is suspected   Impaired " mobility and ADLs  Patient was evaluated by the rehabilitation team MD and an appropriate candidate for acute inpatient rehabilitation program at this time.  The patient will tolerate 3 hours/day 5 to 7 days/week of intensive physical and  occupational therapy   in order to obtain goals for community discharge  Due to the patient's functional Compared to their baseline level of function in addition to their ongoing medical needs, the patient would benefit from daily supervision from a rehabilitation physician as well as rehabilitation nursing to implement and adjust the medical as well as functional plan of care in order to meet the patient's goals.  Bladder: Monitor closely for urinary incontinence and/or retention. Monitor urine output and encourage spontaneous voids. If unable to void spontaneously, will monitor with PVR bladder scans and initiate CIC regimen.  Skin: Monitor for breakdown, frequent turns, pressure offloading  Sleep: Encourage sleep hygiene (routine that promotes healthy circadian rhythm,avoid caffeine later in the day, quiet/dark room at night, reduce electronic before bedtime)      Wound of right ankle  Skin breakdown on R heel due to cast. Ortho removed approximately 1 inch of cast at posterior aspect on 3/11  New cast 3/21   Local wound care  Continue to monitor   Present on admission to [facility]   Wound care RN consulted:  Skin Care orders:  1-Hydraguard to sacrum, buttocks bid and prn   2-EHOB / waffle  cushion when out of bed in chair.  3-Moisturize skin daily with skin nourishing cream  4-Elevate heels to offload pressure.  5-Turn/reposition q2h for pressure re-distribution on skin  6. Right posterior heel cleanse with soap and water then pat dry apply cavilon 3 M no sting then a silicone foam nate with a P and date change every other day   7. Left plantar aspect of the foot - cleanse with soap and water then apply a silicone foam nate with a P  and date change every other day     Subjective    Tobin Kerns is a 69 year-old male, with a past medical history of hypertension, hyperlipidemia, JOVNAY, impaired fasting glucose, bilateral lower extremity neuropathy, GERD, BPH, presenting to [facility] after an elective right quadricept tendon repair. He initially underwent quad tendon repair on 7/25/24 with Dr. Foster. He was discharged home with outpatient PT. He was noted to have recurrent high-grade tear on repeat MRI with extensor lag on exam and elected for operative management. On 3/6/25, he underwent revision of quad tendon repair with allograft augmentation. He was evaluated by PT and OT and deemed an appropriate candidate for AR[facility] due to functional deficits     Chief Complaint: f/u ambulatory dysfunction    Interval:   Seen and evaluated patient in room. He denies any concerns or pain. Last BM 4/1, formed and large. He is continent of bowel and bladder.     Objective :  Temp:  [97.3 °F (36.3 °C)-97.5 °F (36.4 °C)] 97.3 °F (36.3 °C)  HR:  [58-68] 58  BP: (129-135)/(64-67) 135/67  Resp:  [16-18] 16  SpO2:  [96 %-98 %] 96 %  O2 Device: None (Room air)    Functional Update:  Physical Therapy Occupational Therapy   Weight Bearing Status: Toe touch (R LE)  Transfers: Independent  Bed Mobility: Independent  Amulation Distance (ft): 180 feet  Ambulation: Independent  Assistive Device for Ambulation: Roller Walker  Wheelchair Mobility Distance:  (N/A)  Wheelchair Mobility:  (N/A)  Number of Stairs: 6  Assistive Device for Stairs: Lehft Hand Rail  Stair Assistance: Independent  Ramp: Independent  Assistive Device for Ramp: Roller Walker  Discharge Recommendations: Other  DC Home with::  (alternate placement until off R LE long cast)   Eating: Independent  Grooming: Independent  Bathing: Independent  Bathing: Independent  Upper Body Dressing: Independent  Lower Body Dressing: Independent  Toileting: Independent  Tub/Shower Transfer:  (full leg cast and pt declines shower)  Toilet Transfer: Independent  Cognition:  Within Defined Limits  Orientation: Person, Situation, Place, Time           Physical Exam  Vitals and nursing note reviewed.   Constitutional:       General: He is not in acute distress.     Appearance: He is obese. He is not ill-appearing or diaphoretic.   HENT:      Head: Normocephalic and atraumatic.      Nose: Nose normal.      Mouth/Throat:      Mouth: Mucous membranes are moist.   Cardiovascular:      Pulses:           Dorsalis pedis pulses are 2+ on the right side.   Pulmonary:      Effort: Pulmonary effort is normal. No respiratory distress.   Abdominal:      General: There is no distension.   Skin:     General: Skin is warm and dry.   Neurological:      General: No focal deficit present.      Mental Status: He is alert.   Psychiatric:         Mood and Affect: Mood normal.         Behavior: Behavior normal.         Scheduled Meds:  Current Facility-Administered Medications   Medication Dose Route Frequency Provider Last Rate    acetaminophen  650 mg Oral Q6H PRN Modesta Lee PA-C      ascorbic acid  500 mg Oral Daily Toño Brito MD      Cholecalciferol  5,000 Units Oral Daily Toño Brito MD      enoxaparin  40 mg Subcutaneous Q24H [facility] Toño Brito MD      famotidine  20 mg Oral Daily Toño Brito MD      losartan  50 mg Oral Daily Toño Brito MD      metFORMIN  1,500 mg Oral Daily With Dinner Toño Brito MD      multivitamin stress formula  1 tablet Oral Daily Toño Brito MD      polyethylene glycol  17 g Oral Daily PRN Modesta Lee PA-C      pravastatin  40 mg Oral Daily With Dinner Toño Brito MD      tamsulosin  0.4 mg Oral Daily With Dinner Toño Brito MD           Lab Results: I have reviewed the following results:  Results from last 7 days   Lab Units 03/31/25  0520   HEMOGLOBIN g/dL 13.4   HEMATOCRIT % 40.5   WBC Thousand/uL 8.25   PLATELETS Thousands/uL 236     Results from last 7 days   Lab Units 03/31/25  0520   BUN mg/dL 24   SODIUM mmol/L 136   POTASSIUM mmol/L 4.4    CHLORIDE mmol/L 104   CREATININE mg/dL 1.22   AST U/L 17   ALT U/L 15            Modesta Lee PA-C  Physical Medicine and Rehabilitation  [facility]

## 2025-04-05 RX ADMIN — B-COMPLEX W/ C & FOLIC ACID TAB 1 TABLET: TAB at 08:10

## 2025-04-05 RX ADMIN — METFORMIN ER 500 MG 1500 MG: 500 TABLET ORAL at 17:19

## 2025-04-05 RX ADMIN — ENOXAPARIN SODIUM 40 MG: 40 INJECTION SUBCUTANEOUS at 08:10

## 2025-04-05 RX ADMIN — TAMSULOSIN HYDROCHLORIDE 0.4 MG: 0.4 CAPSULE ORAL at 17:19

## 2025-04-05 RX ADMIN — LOSARTAN POTASSIUM 50 MG: 50 TABLET, FILM COATED ORAL at 08:10

## 2025-04-05 RX ADMIN — PRAVASTATIN SODIUM 40 MG: 40 TABLET ORAL at 17:19

## 2025-04-05 RX ADMIN — CHOLECALCIFEROL TAB 25 MCG (1000 UNIT) 5000 UNITS: 25 TAB at 08:10

## 2025-04-05 RX ADMIN — ACETAMINOPHEN 650 MG: 325 TABLET ORAL at 22:51

## 2025-04-05 RX ADMIN — OXYCODONE HYDROCHLORIDE AND ACETAMINOPHEN 500 MG: 500 TABLET ORAL at 08:10

## 2025-04-05 RX ADMIN — FAMOTIDINE 20 MG: 20 TABLET, FILM COATED ORAL at 08:10

## 2025-04-05 NOTE — NURSING NOTE
Pt resting in bed, no s/s of pain or distress. Pt was medicated earlier in shift for pain in RLE medicated as ordered refer to MAR. Long leg cast in place to RLE-TTWB. MOD I with RW. Cont b&b. Continuing to monitor and follow plan of care. Items within reach.

## 2025-04-05 NOTE — PROGRESS NOTES
"Progress Note - Tobin Kerns 69 y.o. male MRN: [id number]    Unit/Bed#: [facility] 210-01 Encounter: [id number]        Subjective:   Patient seen and examined at bedside after reviewing the chart and discussing the case with the caring staff.      Patient examined at bedside.  Patient has no acute symptoms.    Physical Exam   Vitals: Blood pressure 127/61, pulse 57, temperature 98.1 °F (36.7 °C), temperature source Temporal, resp. rate 16, height 6' 5\" (1.956 m), weight 134 kg (295 lb 6.7 oz), SpO2 94%.,Body mass index is 35.03 kg/m².  Constitutional: Patient in no acute distress.  HEENT: PERR, EOMI, MMM.  Cardiovascular: Normal rate and regular rhythm.    Pulmonary/Chest: Effort normal and breath sounds normal.   Abdomen: Soft, + BS, NT.    Assessment/Plan:  Tobin Kerns is a(n) 69 y.o. year old male who is s/p quadriceps tendon repair.     Cardiac with hx of HTN, HLD.  Patient is on losartan 50 mg daily, pravastatin 40 mg daily.  Impaired fasting glucose.  Hgb a1c 5.4% on 3/12/25.  Continue metformin XR 1500 mg daily with dinner.  Regular diet and d/c accucheks as blood sugar is well controlled.    BPH.  Continue Flomax 0.4 mg daily.  GERD.  Continue Pepcid 20 mg daily.  JOVANY.  On CPAP at bedtime.   Vitamin D deficiency.  Patient is on vitamin D3 5000 units daily.  Pain and bowel management.  As per physiatrist.  Quadriceps tendon repair.  Toe touch weightbearing RLE with use of walker.  Patient is receiving intensive PT OT as per physiatrist.    Anticipated date of discharge.  TBD.  "

## 2025-04-06 PROCEDURE — 97535 SELF CARE MNGMENT TRAINING: CPT

## 2025-04-06 RX ADMIN — TAMSULOSIN HYDROCHLORIDE 0.4 MG: 0.4 CAPSULE ORAL at 17:12

## 2025-04-06 RX ADMIN — FAMOTIDINE 20 MG: 20 TABLET, FILM COATED ORAL at 08:25

## 2025-04-06 RX ADMIN — CHOLECALCIFEROL TAB 25 MCG (1000 UNIT) 5000 UNITS: 25 TAB at 08:25

## 2025-04-06 RX ADMIN — METFORMIN ER 500 MG 1500 MG: 500 TABLET ORAL at 17:12

## 2025-04-06 RX ADMIN — PRAVASTATIN SODIUM 40 MG: 40 TABLET ORAL at 17:12

## 2025-04-06 RX ADMIN — B-COMPLEX W/ C & FOLIC ACID TAB 1 TABLET: TAB at 08:25

## 2025-04-06 RX ADMIN — ENOXAPARIN SODIUM 40 MG: 40 INJECTION SUBCUTANEOUS at 08:25

## 2025-04-06 RX ADMIN — OXYCODONE HYDROCHLORIDE AND ACETAMINOPHEN 500 MG: 500 TABLET ORAL at 08:25

## 2025-04-06 RX ADMIN — LOSARTAN POTASSIUM 50 MG: 50 TABLET, FILM COATED ORAL at 08:25

## 2025-04-06 NOTE — PROGRESS NOTES
04/06/25 1045   Pain Assessment   Pain Assessment Tool 0-10   Pain Score No Pain   Restrictions/Precautions   Precautions Fall Risk   RLE Weight Bearing Per Order TTWB   Braces or Orthoses   (R LE CAST)   Oral Hygiene   Type of Assistance Needed Independent   Physical Assistance Level No physical assistance   Oral Hygiene CARE Score 6   Grooming   Able To Initiate Tasks;Acquire Items;Comb/Brush Hair;Wash/Dry Face;Brush/Clean Teeth;Wash/Dry Hands   Findings stance- pt able to shave in stance which req increase tolerance to task   Shower/Bathe Self   Type of Assistance Needed Independent;Adaptive equipment   Physical Assistance Level No physical assistance   Shower/Bathe Self CARE Score 6   Bathing   Able to Wash/Rinse/Dry (body part) Left Arm;Right Arm;L Upper Leg;R Upper Leg;L Lower Leg/Foot;R Lower Leg/Foot;Chest;Abdomen;Perineal Area;Buttocks   Adaptive Equipment Longhand Sponge   Upper Body Dressing   Type of Assistance Needed Independent   Physical Assistance Level No physical assistance   Upper Body Dressing CARE Score 6   Lower Body Dressing   Type of Assistance Needed Independent;Adaptive equipment   Physical Assistance Level No physical assistance   Lower Body Dressing CARE Score 6   Putting On/Taking Off Footwear   Type of Assistance Needed Independent;Adaptive equipment   Physical Assistance Level No physical assistance   Putting On/Taking Off Footwear CARE Score 6   Picking Up Object   Type of Assistance Needed Independent;Adaptive equipment   Physical Assistance Level No physical assistance   Picking Up Object CARE Score 6   Dressing/Undressing Clothing   Remove UB Clothes Button Shirt   Don UB Clothes Button Shirt   Remove LB Clothes Undergarment;Shorts;Socks   Don LB Clothes Socks;Shorts;Undergarment   Limitations Noted In ROM   Adaptive Equipment Reacher;Sock Aide   Lying to Sitting on Side of Bed   Type of Assistance Needed Independent   Physical Assistance Level No physical assistance   Lying to  Sitting on Side of Bed CARE Score 6   Sit to Stand   Type of Assistance Needed Independent   Physical Assistance Level No physical assistance   Sit to Stand CARE Score 6   Toileting Hygiene   Type of Assistance Needed Independent   Physical Assistance Level No physical assistance   Toileting Hygiene CARE Score 6   Toilet Transfer   Type of Assistance Needed Independent;Adaptive equipment   Physical Assistance Level No physical assistance   Toilet Transfer CARE Score 6   Cognition   Overall Cognitive Status WFL   Arousal/Participation Alert;Cooperative   Attention Within functional limits   Orientation Level Oriented X4   Memory Within functional limits   Following Commands Follows all commands and directions without difficulty   Additional Activities   Additional Activities Comments fxnl mobility MI RW TTWB R LE   Activity Tolerance   Activity Tolerance Patient tolerated treatment well   Assessment   Treatment Assessment a.m. ADL completed via sponge bathe level: Pt able to consistently s/u and c/u all supplies Independently with use of RW and while maintaining TTWB R LE. Pt demon consistent recall of compensatory strategies and compliance with safe techniques throughout all ADL/iADLs.  Pt demon ability to safely and functionally stand to shave.  Pt's barriers to d/c to personal home environment include decreased strength RLE s/p quadricepts rupture, decreased ROM with use of leg length hard cast, and TTWB RLE. Plan is contingent upon placement due to noted barriers. Ortho f/u 4/11.   Prognosis Good   Problem List Decreased strength;Decreased range of motion;Impaired balance;Decreased mobility;Orthopedic restrictions   Plan   Treatment/Interventions ADL retraining;Functional transfer training;Therapeutic exercise;Endurance training;Patient/family training;Equipment eval/education;Gait training;Bed mobility;Compensatory technique education;Spoke to nursing;OT   Progress Improving as expected   OT Therapy Minutes   OT  Time In 1045   OT Time Out 1200   OT Total Time (minutes) 75   OT Mode of treatment - Individual (minutes) 75   OT Mode of treatment - Concurrent (minutes) 0   OT Mode of treatment - Group (minutes) 0   OT Mode of treatment - Co-treat (minutes) 0   OT Mode of Treatment - Total time(minutes) 75 minutes   OT Cumulative Minutes 1382   Therapy Time missed   Time missed? No

## 2025-04-06 NOTE — PROGRESS NOTES
"Progress Note - Tobin Kerns 69 y.o. male MRN: [id number]    Unit/Bed#: [facility] 210-01 Encounter: [id number]        Subjective:   Patient seen and examined at bedside after reviewing the chart and discussing the case with the caring staff.      Patient examined at bedside.  Patient has no acute symptoms.    Physical Exam   Vitals: Blood pressure 118/57, pulse 55, temperature 98.3 °F (36.8 °C), temperature source Temporal, resp. rate 14, height 6' 5\" (1.956 m), weight 134 kg (295 lb 6.7 oz), SpO2 100%.,Body mass index is 35.03 kg/m².  Constitutional: Patient in no acute distress.  HEENT: PERR, EOMI, MMM.  Cardiovascular: Normal rate and regular rhythm.    Pulmonary/Chest: Effort normal and breath sounds normal.   Abdomen: Soft, + BS, NT.    Assessment/Plan:  Tobin Kerns is a(n) 69 y.o. year old male who is s/p quadriceps tendon repair.     Cardiac with hx of HTN, HLD.  Patient is on losartan 50 mg daily, pravastatin 40 mg daily.  Impaired fasting glucose.  Hgb a1c 5.4% on 3/12/25.  Continue metformin XR 1500 mg daily with dinner.  Regular diet and d/c accucheks as blood sugar is well controlled.    BPH.  Continue Flomax 0.4 mg daily.  GERD.  Continue Pepcid 20 mg daily.  JOVANY.  On CPAP at bedtime.   Vitamin D deficiency.  Patient is on vitamin D3 5000 units daily.  Pain and bowel management.  As per physiatrist.  Quadriceps tendon repair.  Toe touch weightbearing RLE with use of walker.  Patient is receiving intensive PT OT as per physiatrist.    Anticipated date of discharge.  TBD  "

## 2025-04-07 LAB
ALBUMIN SERPL BCG-MCNC: 3.8 G/DL (ref 3.5–5)
ALP SERPL-CCNC: 50 U/L (ref 34–104)
ALT SERPL W P-5'-P-CCNC: 11 U/L (ref 7–52)
ANION GAP SERPL CALCULATED.3IONS-SCNC: 8 MMOL/L (ref 4–13)
AST SERPL W P-5'-P-CCNC: 12 U/L (ref 13–39)
BASOPHILS # BLD AUTO: 0.04 THOUSANDS/ÂΜL (ref 0–0.1)
BASOPHILS NFR BLD AUTO: 1 % (ref 0–1)
BILIRUB SERPL-MCNC: 0.71 MG/DL (ref 0.2–1)
BUN SERPL-MCNC: 20 MG/DL (ref 5–25)
CALCIUM SERPL-MCNC: 9.2 MG/DL (ref 8.4–10.2)
CHLORIDE SERPL-SCNC: 105 MMOL/L (ref 96–108)
CO2 SERPL-SCNC: 23 MMOL/L (ref 21–32)
CREAT SERPL-MCNC: 1.16 MG/DL (ref 0.6–1.3)
EOSINOPHIL # BLD AUTO: 0.26 THOUSAND/ÂΜL (ref 0–0.61)
EOSINOPHIL NFR BLD AUTO: 3 % (ref 0–6)
ERYTHROCYTE [DISTWIDTH] IN BLOOD BY AUTOMATED COUNT: 12.8 % (ref 11.6–15.1)
GFR SERPL CREATININE-BSD FRML MDRD: 64 ML/MIN/1.73SQ M
GLUCOSE P FAST SERPL-MCNC: 88 MG/DL (ref 65–99)
GLUCOSE SERPL-MCNC: 88 MG/DL (ref 65–140)
HCT VFR BLD AUTO: 38.6 % (ref 36.5–49.3)
HGB BLD-MCNC: 12.9 G/DL (ref 12–17)
IMM GRANULOCYTES # BLD AUTO: 0.02 THOUSAND/UL (ref 0–0.2)
IMM GRANULOCYTES NFR BLD AUTO: 0 % (ref 0–2)
LYMPHOCYTES # BLD AUTO: 3.42 THOUSANDS/ÂΜL (ref 0.6–4.47)
LYMPHOCYTES NFR BLD AUTO: 44 % (ref 14–44)
MCH RBC QN AUTO: 29.7 PG (ref 26.8–34.3)
MCHC RBC AUTO-ENTMCNC: 33.4 G/DL (ref 31.4–37.4)
MCV RBC AUTO: 89 FL (ref 82–98)
MONOCYTES # BLD AUTO: 0.84 THOUSAND/ÂΜL (ref 0.17–1.22)
MONOCYTES NFR BLD AUTO: 11 % (ref 4–12)
NEUTROPHILS # BLD AUTO: 3.14 THOUSANDS/ÂΜL (ref 1.85–7.62)
NEUTS SEG NFR BLD AUTO: 41 % (ref 43–75)
NRBC BLD AUTO-RTO: 0 /100 WBCS
PLATELET # BLD AUTO: 199 THOUSANDS/UL (ref 149–390)
PMV BLD AUTO: 10.2 FL (ref 8.9–12.7)
POTASSIUM SERPL-SCNC: 4.1 MMOL/L (ref 3.5–5.3)
PROT SERPL-MCNC: 6.4 G/DL (ref 6.4–8.4)
RBC # BLD AUTO: 4.35 MILLION/UL (ref 3.88–5.62)
SODIUM SERPL-SCNC: 136 MMOL/L (ref 135–147)
WBC # BLD AUTO: 7.72 THOUSAND/UL (ref 4.31–10.16)

## 2025-04-07 PROCEDURE — 97530 THERAPEUTIC ACTIVITIES: CPT

## 2025-04-07 PROCEDURE — 97535 SELF CARE MNGMENT TRAINING: CPT

## 2025-04-07 PROCEDURE — 97110 THERAPEUTIC EXERCISES: CPT

## 2025-04-07 PROCEDURE — 80053 COMPREHEN METABOLIC PANEL: CPT

## 2025-04-07 PROCEDURE — 85025 COMPLETE CBC W/AUTO DIFF WBC: CPT

## 2025-04-07 PROCEDURE — 99232 SBSQ HOSP IP/OBS MODERATE 35: CPT | Performed by: PHYSICAL MEDICINE & REHABILITATION

## 2025-04-07 PROCEDURE — 97116 GAIT TRAINING THERAPY: CPT

## 2025-04-07 RX ORDER — SENNOSIDES 8.6 MG
1 TABLET ORAL
Status: DISCONTINUED | OUTPATIENT
Start: 2025-04-07 | End: 2025-04-08

## 2025-04-07 RX ADMIN — ENOXAPARIN SODIUM 40 MG: 40 INJECTION SUBCUTANEOUS at 08:58

## 2025-04-07 RX ADMIN — B-COMPLEX W/ C & FOLIC ACID TAB 1 TABLET: TAB at 08:58

## 2025-04-07 RX ADMIN — METFORMIN ER 500 MG 1500 MG: 500 TABLET ORAL at 17:46

## 2025-04-07 RX ADMIN — FAMOTIDINE 20 MG: 20 TABLET, FILM COATED ORAL at 08:58

## 2025-04-07 RX ADMIN — LOSARTAN POTASSIUM 50 MG: 50 TABLET, FILM COATED ORAL at 08:58

## 2025-04-07 RX ADMIN — OXYCODONE HYDROCHLORIDE AND ACETAMINOPHEN 500 MG: 500 TABLET ORAL at 08:58

## 2025-04-07 RX ADMIN — PRAVASTATIN SODIUM 40 MG: 40 TABLET ORAL at 17:46

## 2025-04-07 RX ADMIN — TAMSULOSIN HYDROCHLORIDE 0.4 MG: 0.4 CAPSULE ORAL at 17:46

## 2025-04-07 RX ADMIN — CHOLECALCIFEROL TAB 25 MCG (1000 UNIT) 5000 UNITS: 25 TAB at 08:58

## 2025-04-07 NOTE — PLAN OF CARE
Problem: PAIN - ADULT  Goal: Verbalizes/displays adequate comfort level or baseline comfort level  Description: Interventions:  - Encourage patient to monitor pain and request assistance  - Assess pain using appropriate pain scale  - Administer analgesics based on type and severity of pain and evaluate response  - Implement non-pharmacological measures as appropriate and evaluate response  - Consider cultural and social influences on pain and pain management  - Notify physician/advanced practitioner if interventions unsuccessful or patient reports new pain  Outcome: Progressing     Problem: INFECTION - ADULT  Goal: Absence or prevention of progression during hospitalization  Description: INTERVENTIONS:  - Assess and monitor for signs and symptoms of infection  - Monitor lab/diagnostic results  - Monitor all insertion sites, i.e. indwelling lines, tubes, and drains  - Monitor endotracheal if appropriate and nasal secretions for changes in amount and color  - [place] appropriate cooling/warming therapies per order  - Administer medications as ordered  - Instruct and encourage patient and family to use good hand hygiene technique  - Identify and instruct in appropriate isolation precautions for identified infection/condition  Outcome: Progressing     Problem: SAFETY ADULT  Goal: Maintain or return to baseline ADL function  Description: INTERVENTIONS:  -  Assess patient's ability to carry out ADLs; assess patient's baseline for ADL function and identify physical deficits which impact ability to perform ADLs (bathing, care of mouth/teeth, toileting, grooming, dressing, etc.)  - Assess/evaluate cause of self-care deficits   - Assess range of motion  - Assess patient's mobility; develop plan if impaired  - Assess patient's need for assistive devices and provide as appropriate  - Encourage maximum independence but intervene and supervise when necessary  - Involve family in performance of ADLs  - Assess for home care  needs following discharge   - Consider OT consult to assist with ADL evaluation and planning for discharge  - Provide patient education as appropriate  Outcome: Progressing

## 2025-04-07 NOTE — ASSESSMENT & PLAN NOTE
"HPI:   He initially underwent quad tendon repair on 7/25/24 with Dr. Foster. He was discharged home with outpatient PT.  Later noted to have recurrent high-grade tear with extensor lag on exam and elected for operative management  January 2025 MRI: \"Prior quadriceps insertion repair with high-grade recurrent tear exhibiting up to 2.2 cm of tendon retraction.\"  On 3/6/25, he underwent revision of quad tendon repair with allograft augmentation  3/21: outpatient follow-up with Dr. Foster, new cast applied   3/28: follow-up with Dr. Foster, continue TTWB   4/4: follow-up with Dr. Foster (removed cast and reapplied long leg cast for an additional 2 weeks) and continue TTWB    Plan:   PT and OT for 3 hours a day, 5-6 days a week   TTWB RLE   Avoid moisture to cast   Pain: PRN Tylenol (monitor LFTs) and oxycodone discontinued on 3/17 due to minimal pain   Per ortho,  Lovenox for 2 more weeks for DVT prophylaxis   Lovenox d/c on 4/7   Follow-up scheduled for 4/11  "

## 2025-04-07 NOTE — NURSING NOTE
Patient remains Mod I in room with walker. Reports no pain this shift. Cast remains in place. Left plantar area INDY and healed. Right posterior foot with allevyn. Patient reports no rubbing. Sitting upright for all meals. Will continue to monitor and follow plan of care.

## 2025-04-07 NOTE — PROGRESS NOTES
"Progress Note - Tobin Kerns 69 y.o. male MRN: [id number]    Unit/Bed#: [facility] 210-01 Encounter: [id number]        Subjective:   Patient seen and examined at bedside after reviewing the chart and discussing the case with the caring staff.      Patient examined at bedside.  Patient has no acute symptoms.    On review of patient's labs from 4/7/2025.  Patient's CBC and CMP were found to be within normal limits.    Physical Exam   Vitals: Blood pressure 120/72, pulse 69, temperature (!) 97 °F (36.1 °C), temperature source Temporal, resp. rate 18, height 6' 5\" (1.956 m), weight 134 kg (295 lb 6.7 oz), SpO2 98%.,Body mass index is 35.03 kg/m².  Constitutional: Patient in no acute distress.  HEENT: PERR, EOMI, MMM.  Cardiovascular: Normal rate and regular rhythm.    Pulmonary/Chest: Effort normal and breath sounds normal.   Abdomen: Soft, + BS, NT.    Assessment/Plan:  Tobin Kerns is a(n) 69 y.o. year old male who is s/p quadriceps tendon repair.     Cardiac with hx of HTN, HLD.  Patient is on losartan 50 mg daily, pravastatin 40 mg daily.  Impaired fasting glucose.  Hgb a1c 5.4% on 3/12/25.  Continue metformin XR 1500 mg daily with dinner.  Regular diet and d/c accucheks as blood sugar is well controlled.    BPH.  Continue Flomax 0.4 mg daily.  GERD.  Continue Pepcid 20 mg daily.  JOVANY.  On CPAP at bedtime.   Vitamin D deficiency.  Patient is on vitamin D3 5000 units daily.  Pain and bowel management.  As per physiatrist.  Quadriceps tendon repair.  Toe touch weightbearing RLE with use of walker.  Patient is receiving intensive PT OT as per physiatrist.    Anticipated date of discharge.  TBD  "

## 2025-04-07 NOTE — PROGRESS NOTES
04/07/25 1030   Pain Assessment   Pain Assessment Tool 0-10   Pain Score No Pain   Restrictions/Precautions   Precautions Fall Risk   Oral Hygiene   Type of Assistance Needed Independent   Physical Assistance Level No physical assistance   Oral Hygiene CARE Score 6   Grooming   Able To Initiate Tasks;Acquire Items;Comb/Brush Hair;Wash/Dry Face;Brush/Clean Teeth;Wash/Dry Hands   Findings stance   Shower/Bathe Self   Type of Assistance Needed Independent;Adaptive equipment   Physical Assistance Level No physical assistance   Shower/Bathe Self CARE Score 6   Bathing   Assessed Bath Style Sponge Bath   Able to Gather/Transport Yes   Able to Adjust Water Temperature Yes   Able to Wash/Rinse/Dry (body part) Left Arm;Right Arm;L Upper Leg;R Upper Leg;L Lower Leg/Foot;R Lower Leg/Foot;Chest;Abdomen;Perineal Area;Buttocks   Limitations Noted in ROM   Positioning Seated;Standing   Adaptive Equipment Longhand Sponge   Upper Body Dressing   Type of Assistance Needed Independent   Physical Assistance Level No physical assistance   Upper Body Dressing CARE Score 6   Lower Body Dressing   Type of Assistance Needed Independent;Adaptive equipment   Physical Assistance Level No physical assistance   Lower Body Dressing CARE Score 6   Putting On/Taking Off Footwear   Type of Assistance Needed Independent;Adaptive equipment   Physical Assistance Level No physical assistance   Putting On/Taking Off Footwear CARE Score 6   Picking Up Object   Type of Assistance Needed Independent;Adaptive equipment   Physical Assistance Level No physical assistance   Picking Up Object CARE Score 6   Dressing/Undressing Clothing   Remove UB Clothes Button Shirt   Don UB Clothes Button Shirt   Remove LB Clothes Undergarment;Shorts;Socks   Don LB Clothes Socks;Undergarment;Shorts   Limitations Noted In ROM   Adaptive Equipment Reacher;Sock Aide   Lying to Sitting on Side of Bed   Type of Assistance Needed Independent   Physical Assistance Level No  physical assistance   Lying to Sitting on Side of Bed CARE Score 6   Sit to Stand   Type of Assistance Needed Independent   Physical Assistance Level No physical assistance   Sit to Stand CARE Score 6   Toileting Hygiene   Type of Assistance Needed Independent   Physical Assistance Level No physical assistance   Toileting Hygiene CARE Score 6   Toileting   Able to Pull Clothing down yes, up yes.   Able to Manage Clothing Closures Yes   Manage Hygiene Bladder   Limitations Noted In ROM   Adaptive Equipment Grab Bar   Toilet Transfer   Type of Assistance Needed Independent;Adaptive equipment   Physical Assistance Level No physical assistance   Toilet Transfer CARE Score 6   Toilet Transfer   Surface Assessed Standard Commode   Transfer Technique Standard   Limitations Noted In ROM   Adaptive Equipment Grab Bar;Walker   Kitchen Mobility   Kitchen-Mobility Level Walker   Kitchen Activity Retrieve items;Transport items   Kitchen Mobility Comments Demon good safety with item retrieval in kitchen, able to transport items successfully;manages obstacles successfully   Light Housekeeping   Light Housekeeping Level Walker   Light Housekeeping Level of Assistance Modified independent   Light Housekeeping gathers all items for ADL and is able to clean up successfully   Exercise Tools   Hand Gripper B hands 2x40 reps with heavy resistance gripper   Other Exercise Tool 1 red flex bar: 2x20 reps upward then downward   Other Exercise Tool 2 6# dowel 2x15 reps in 6 planes of motion   Cognition   Overall Cognitive Status WFL   Arousal/Participation Cooperative   Attention Within functional limits   Orientation Level Oriented X4   Memory Within functional limits   Following Commands Follows all commands and directions without difficulty   Additional Activities   Additional Activities Comments fxnl mobility MI RW TTWB R LE   Activity Tolerance   Activity Tolerance Patient tolerated treatment well   Assessment   Treatment Assessment ADL  completed via sponge bathe level: Pt able to consistently s/u and c/u all supplies Independently with use of RW and while maintaining TTWB R LE. Pt demon consistent recall of compensatory strategies and compliance with safe techniques throughout all ADL/iADLs.  Pt demon ability to safely and functionally stand to shave.  Pt's barriers to d/c to personal home environment include decreased strength RLE s/p quadricepts rupture, decreased ROM with use of leg length hard cast, and TTWB RLE. Plan is contingent upon placement due to noted barriers. Ortho f/u 4/11.Pt participating in 30 minutes of concurrent tx addressing similar goals with a pt with similar deficits.   Prognosis Good   Problem List Decreased strength;Decreased range of motion;Impaired balance;Decreased mobility   Plan   Treatment/Interventions ADL retraining;Functional transfer training;Therapeutic exercise;Endurance training;Patient/family training;Equipment eval/education;Bed mobility;Gait training;Compensatory technique education;Spoke to nursing;OT   Progress Progressing toward goals   OT Therapy Minutes   OT Time In 1030   OT Time Out 1200   OT Total Time (minutes) 90   OT Mode of treatment - Individual (minutes) 60   OT Mode of treatment - Concurrent (minutes) 30   OT Mode of treatment - Group (minutes) 0   OT Mode of treatment - Co-treat (minutes) 0   OT Mode of Treatment - Total time(minutes) 90 minutes   OT Cumulative Minutes 1472   Therapy Time missed   Time missed? No

## 2025-04-07 NOTE — PROGRESS NOTES
04/07/25 0700   Pain Assessment   Pain Assessment Tool 0-10   Pain Score No Pain   Restrictions/Precautions   Precautions Fall Risk   RLE Weight Bearing Per Order TTWB   ROM Restrictions No   Braces or Orthoses   (R LE long cast)   Cognition   Overall Cognitive Status WFL   Arousal/Participation Alert;Responsive;Cooperative   Attention Within functional limits   Orientation Level Oriented X4   Memory Within functional limits   Following Commands Follows all commands and directions without difficulty   Subjective   Subjective Pt reports he is now able to lift his R LE higher than he has prior to his first surgery   Roll Left and Right   Type of Assistance Needed Independent   Physical Assistance Level No physical assistance   Roll Left and Right CARE Score 6   Sit to Lying   Type of Assistance Needed Independent   Physical Assistance Level No physical assistance   Sit to Lying CARE Score 6   Lying to Sitting on Side of Bed   Type of Assistance Needed Independent   Physical Assistance Level No physical assistance   Lying to Sitting on Side of Bed CARE Score 6   Sit to Stand   Type of Assistance Needed Independent   Physical Assistance Level No physical assistance   Comment RW   Sit to Stand CARE Score 6   Bed-Chair Transfer   Type of Assistance Needed Independent   Physical Assistance Level No physical assistance   Comment RW   Chair/Bed-to-Chair Transfer CARE Score 6   Car Transfer   Reason if not Attempted Environmental limitations   Car Transfer CARE Score 10   Walk 10 Feet   Type of Assistance Needed Independent   Physical Assistance Level No physical assistance   Comment RW   Walk 10 Feet CARE Score 6   Walk 50 Feet with Two Turns   Type of Assistance Needed Independent   Physical Assistance Level No physical assistance   Comment RW   Walk 50 Feet with Two Turns CARE Score 6   Walk 150 Feet   Type of Assistance Needed Independent   Physical Assistance Level No physical assistance   Comment RW   Walk 150 Feet  CARE Score 6   Walking 10 Feet on Uneven Surfaces   Type of Assistance Needed Independent   Physical Assistance Level No physical assistance   Comment RW, ramp   Walking 10 Feet on Uneven Surfaces CARE Score 6   Ambulation   Primary Mode of Locomotion Prior to Admission Walk   Distance Walked (feet) 200 ft  (200', 120')   Assist Device Roller Walker   Findings good maintenance of TTWB R LE   Does the patient walk? 2. Yes   Wheel 50 Feet with Two Turns   Reason if not Attempted Activity not applicable   Wheel 50 Feet with Two Turns CARE Score 9   Wheel 150 Feet   Reason if not Attempted Activity not applicable   Wheel 150 Feet CARE Score 9   Wheelchair mobility   Findings N/A   Does the patient use a wheelchair? 0. No   Curb or Single Stair   Style negotiated Single stair   Type of Assistance Needed Independent   Physical Assistance Level No physical assistance   Comment 6  steps, L HR, non-reciprocal, good maintenance of TTWB R LE   1 Step (Curb) CARE Score 6   4 Steps   Type of Assistance Needed Independent   Physical Assistance Level No physical assistance   Comment 6  steps, L HR, non-reciprocal, good maintenance of TTWB R LE   4 Steps CARE Score 6   12 Steps   Reason if not Attempted Activity not applicable   12 Steps CARE Score 9   Picking Up Object   Type of Assistance Needed Independent;Adaptive equipment   Physical Assistance Level No physical assistance   Comment pants from floor with reacher   Picking Up Object CARE Score 6   Toilet Transfer   Comment did not require during session   Therapeutic Interventions   Strengthening seated LE TE   Other stair training, gait training, ramp negotiation   Equipment Use   NuStep 20 min, B/L UE, L LE, L5   Assessment   Treatment Assessment Pt agreeable to PT this AM, received supine in bed. Pt reported that he is able to lift R LE through greater hip flexion with straight leg raise than before his first surgery. Continued to challenge LE  strength/endurance with seated LE TE and NuStep, with good tolerance. He continues to demonstrate TTWB R LE well and demonstrates good safety awareness with use of RW during functional mobility. Pt remains limited by TTWB R LE in long cast, barrier to d/cremains due to inadequate home environment and spacing. Will continue with current PT POC to improve deficits and promote return to PLOF.   Problem List Decreased strength;Decreased range of motion;Impaired balance;Decreased mobility;Orthopedic restrictions   Barriers to Discharge Inaccessible home environment   PT Barriers   Physical Impairment Decreased strength;Decreased endurance;Impaired balance;Decreased range of motion;Decreased mobility;Orthopedic restrictions   Functional Limitation Car transfers   Plan   Treatment/Interventions Functional transfer training;LE strengthening/ROM;Therapeutic exercise;Endurance training;Patient/family training;Equipment eval/education;Bed mobility;Gait training   Progress Improving as expected   PT Therapy Minutes   PT Time In 0700   PT Time Out 0830   PT Total Time (minutes) 90   PT Mode of treatment - Individual (minutes) 90   PT Mode of treatment - Concurrent (minutes) 0   PT Mode of treatment - Group (minutes) 0   PT Mode of treatment - Co-treat (minutes) 0   PT Mode of Treatment - Total time(minutes) 90 minutes   PT Cumulative Minutes 1962     Patient remains OOB in w/c, all needs in reach. Encouraged use of call bell, patient verbalizes understanding.

## 2025-04-07 NOTE — NURSING NOTE
Patient resting in bed at this time.  No signs of distress noted.  Patient was successfully modified independent to the bathroom overnight with a walker in use.  Patient with right lower extremity cast in place at all times as ordered.  Patient to maintain toe touch weight bearing to the right lower extremity.  Call bell within reach.  Plan of care ongoing.

## 2025-04-07 NOTE — ASSESSMENT & PLAN NOTE
Skin breakdown on R heel due to cast. Ortho removed approximately 1 inch of cast at posterior aspect on 3/11  New cast 3/21   Local wound care  Continue to monitor   Present on admission to [facility]   Wound care RN consulted:  Skin Care orders:  1-Hydraguard to sacrum, buttocks bid and prn   2-EHOB / waffle  cushion when out of bed in chair.  3-Moisturize skin daily with skin nourishing cream  4-Elevate heels to offload pressure.  5-Turn/reposition q2h for pressure re-distribution on skin  6. Right posterior heel cleanse with soap and water then pat dry apply cavilon 3 M no sting then a silicone foam nate with a P and date change every other day   7. Left plantar aspect of the foot - cleanse with soap and water then apply a silicone foam nate with a P  and date change every other day

## 2025-04-07 NOTE — PROGRESS NOTES
"Progress Note - PMR   Name: Tobin Kerns 69 y.o. male I MRN: [id number]  Unit/Bed#: -01 I Date of Admission: 3/11/2025   Date of Service: 4/7/2025 I Hospital Day: 27     Assessment & Plan  Quadriceps muscle rupture, right, subsequent encounter  HPI:   He initially underwent quad tendon repair on 7/25/24 with Dr. Foster. He was discharged home with outpatient PT.  Later noted to have recurrent high-grade tear with extensor lag on exam and elected for operative management  January 2025 MRI: \"Prior quadriceps insertion repair with high-grade recurrent tear exhibiting up to 2.2 cm of tendon retraction.\"  On 3/6/25, he underwent revision of quad tendon repair with allograft augmentation  3/21: outpatient follow-up with Dr. Foster, new cast applied   3/28: follow-up with Dr. Foster, continue TTWB   4/4: follow-up with Dr. Foster (removed cast and reapplied long leg cast for an additional 2 weeks) and continue TTWB    Plan:   PT and OT for 3 hours a day, 5-6 days a week   TTWB RLE   Avoid moisture to cast   Pain: PRN Tylenol (monitor LFTs) and oxycodone discontinued on 3/17 due to minimal pain   Per ortho,  Lovenox for 2 more weeks for DVT prophylaxis   Lovenox d/c on 4/7   Follow-up scheduled for 4/11  Hypertension  Continue losartan 50 mg daily   Monitor BP   Mixed hyperlipidemia  Continue pravastatin 40 mg daily   Gastro-esophageal reflux disease without esophagitis  Continue famotidine 20 mg daily   Peripheral neuropathy  Patient is not diabetic   Monitor skin for new or worsening wounds   JOVANY on CPAP  Continue CPAP qHS  IFG (impaired fasting glucose)  Continue metformin 1500 mg daily   Seen by RD 3/12, diet adjusted to regular   3/12 A1c: 5.4   Healed ulcer of left foot  Local wound care  Present on admission   Wound care RN consulted   BPH (benign prostatic hyperplasia)  Continue tamsulosin 0.4 mg daily   Patient currently voiding without difficulty   PRN PVRs if urinary retention is suspected "   Impaired mobility and ADLs  Patient was evaluated by the rehabilitation team MD and an appropriate candidate for acute inpatient rehabilitation program at this time.  The patient will tolerate 3 hours/day 5 to 7 days/week of intensive physical and  occupational therapy   in order to obtain goals for community discharge  Due to the patient's functional Compared to their baseline level of function in addition to their ongoing medical needs, the patient would benefit from daily supervision from a rehabilitation physician as well as rehabilitation nursing to implement and adjust the medical as well as functional plan of care in order to meet the patient's goals.  Bladder: Monitor closely for urinary incontinence and/or retention. Monitor urine output and encourage spontaneous voids. If unable to void spontaneously, will monitor with PVR bladder scans and initiate CIC regimen.  Skin: Monitor for breakdown, frequent turns, pressure offloading  Sleep: Encourage sleep hygiene (routine that promotes healthy circadian rhythm,avoid caffeine later in the day, quiet/dark room at night, reduce electronic before bedtime)      Wound of right ankle  Skin breakdown on R heel due to cast. Ortho removed approximately 1 inch of cast at posterior aspect on 3/11  New cast 3/21   Local wound care  Continue to monitor   Present on admission to [facility]   Wound care RN consulted:  Skin Care orders:  1-Hydraguard to sacrum, buttocks bid and prn   2-EHOB / waffle  cushion when out of bed in chair.  3-Moisturize skin daily with skin nourishing cream  4-Elevate heels to offload pressure.  5-Turn/reposition q2h for pressure re-distribution on skin  6. Right posterior heel cleanse with soap and water then pat dry apply cavilon 3 M no sting then a silicone foam nate with a P and date change every other day   7. Left plantar aspect of the foot - cleanse with soap and water then apply a silicone foam nate with a P  and date change every other day      Subjective   Tobin Kerns is a 69 year-old male, with a past medical history of hypertension, hyperlipidemia, JOVANY, impaired fasting glucose, bilateral lower extremity neuropathy, GERD, BPH, presenting to [facility] after an elective right quadricept tendon repair. He initially underwent quad tendon repair on 7/25/24 with Dr. Foster. He was discharged home with outpatient PT. He was noted to have recurrent high-grade tear on repeat MRI with extensor lag on exam and elected for operative management. On 3/6/25, he underwent revision of quad tendon repair with allograft augmentation. He was evaluated by PT and OT and deemed an appropriate candidate for [facility] due to functional deficits     Chief Complaint: f/u ambulatory dysfunction    Interval:   Seen and evaluated patient in room. He denies any concerns or pain. Last BM 4/1, formed and large. He refuses Miralax due to taste, states he will try senna tonight. Patient states it is normal for him to have a BM once every 1-2 weeks. He is able to pass gas. He denies abdominal pain. He is continent of bowel and bladder. 4/7 labs reviewed        Objective :  Temp:  [97 °F (36.1 °C)-98.3 °F (36.8 °C)] 97 °F (36.1 °C)  HR:  [69] 69  BP: (113-120)/(63-72) 120/72  Resp:  [16-18] 18  SpO2:  [96 %-98 %] 98 %  O2 Device: None (Room air)    Functional Update:  Physical Therapy Occupational Therapy   Weight Bearing Status: Toe touch (R LE)  Transfers: Independent  Bed Mobility: Independent  Amulation Distance (ft): 200 feet  Ambulation: Independent  Assistive Device for Ambulation: Roller Walker  Wheelchair Mobility Distance:  (n/a)  Wheelchair Mobility:  (N/A)  Number of Stairs: 6  Assistive Device for Stairs: Lehft Hand Rail  Stair Assistance: Independent  Ramp: Independent  Assistive Device for Ramp: Roller Walker  Discharge Recommendations: Other  DC Home with::  (Alternate placement until R LE long cast off)   Eating: Independent  Grooming: Independent  Bathing:  Independent  Bathing: Independent  Upper Body Dressing: Independent  Lower Body Dressing: Independent  Toileting: Independent  Tub/Shower Transfer:  (full leg cast and pt declines shower)  Toilet Transfer: Independent  Cognition: Within Defined Limits  Orientation: Person, Situation, Place, Time           Physical Exam  Vitals and nursing note reviewed.   Constitutional:       General: He is not in acute distress.     Appearance: He is obese. He is not ill-appearing or diaphoretic.   HENT:      Head: Normocephalic and atraumatic.      Nose: Nose normal.      Mouth/Throat:      Mouth: Mucous membranes are moist.   Cardiovascular:      Pulses:           Dorsalis pedis pulses are 2+ on the right side and 2+ on the left side.   Pulmonary:      Effort: Pulmonary effort is normal. No respiratory distress.   Abdominal:      General: Bowel sounds are normal. There is no distension.      Palpations: Abdomen is soft.      Tenderness: There is no abdominal tenderness. There is no guarding.   Skin:     General: Skin is warm and dry.   Neurological:      General: No focal deficit present.      Mental Status: He is alert.   Psychiatric:         Mood and Affect: Mood normal.         Behavior: Behavior normal.         Scheduled Meds:  Current Facility-Administered Medications   Medication Dose Route Frequency Provider Last Rate    acetaminophen  650 mg Oral Q6H PRN Modesta Lee PA-C      ascorbic acid  500 mg Oral Daily Toño Brito MD      Cholecalciferol  5,000 Units Oral Daily Toño Brito MD      enoxaparin  40 mg Subcutaneous Q24H [facility] Toño Brito MD      famotidine  20 mg Oral Daily Toño Brito MD      losartan  50 mg Oral Daily Toño Brito MD      metFORMIN  1,500 mg Oral Daily With Dinner Toño Brito MD      multivitamin stress formula  1 tablet Oral Daily Toño Brito MD      polyethylene glycol  17 g Oral Daily PRN Modesta Lee PA-C      pravastatin  40 mg Oral Daily With Dinner Toño Brito MD       tamsulosin  0.4 mg Oral Daily With Dinner Toño Brito MD           Lab Results: I have reviewed the following results:  Results from last 7 days   Lab Units 04/07/25  0519   HEMOGLOBIN g/dL 12.9   HEMATOCRIT % 38.6   WBC Thousand/uL 7.72   PLATELETS Thousands/uL 199     Results from last 7 days   Lab Units 04/07/25  0519   BUN mg/dL 20   SODIUM mmol/L 136   POTASSIUM mmol/L 4.1   CHLORIDE mmol/L 105   CREATININE mg/dL 1.16   AST U/L 12*   ALT U/L 11            Modesta Lee PA-C  Physical Medicine and Rehabilitation  [facility]

## 2025-04-08 PROCEDURE — 97110 THERAPEUTIC EXERCISES: CPT

## 2025-04-08 PROCEDURE — 97116 GAIT TRAINING THERAPY: CPT

## 2025-04-08 PROCEDURE — 97535 SELF CARE MNGMENT TRAINING: CPT

## 2025-04-08 PROCEDURE — 99232 SBSQ HOSP IP/OBS MODERATE 35: CPT

## 2025-04-08 RX ORDER — SENNOSIDES 8.6 MG
1 TABLET ORAL
Status: DISCONTINUED | OUTPATIENT
Start: 2025-04-08 | End: 2025-05-06 | Stop reason: HOSPADM

## 2025-04-08 RX ADMIN — FAMOTIDINE 20 MG: 20 TABLET, FILM COATED ORAL at 08:05

## 2025-04-08 RX ADMIN — PRAVASTATIN SODIUM 40 MG: 40 TABLET ORAL at 16:30

## 2025-04-08 RX ADMIN — ACETAMINOPHEN 650 MG: 325 TABLET ORAL at 10:20

## 2025-04-08 RX ADMIN — TAMSULOSIN HYDROCHLORIDE 0.4 MG: 0.4 CAPSULE ORAL at 16:30

## 2025-04-08 RX ADMIN — CHOLECALCIFEROL TAB 25 MCG (1000 UNIT) 5000 UNITS: 25 TAB at 08:05

## 2025-04-08 RX ADMIN — B-COMPLEX W/ C & FOLIC ACID TAB 1 TABLET: TAB at 08:05

## 2025-04-08 RX ADMIN — METFORMIN ER 500 MG 1500 MG: 500 TABLET ORAL at 16:30

## 2025-04-08 RX ADMIN — OXYCODONE HYDROCHLORIDE AND ACETAMINOPHEN 500 MG: 500 TABLET ORAL at 08:05

## 2025-04-08 NOTE — PROGRESS NOTES
"Progress Note - PMR   Name: Tobin Kerns 69 y.o. male I MRN: [id number]  Unit/Bed#: -01 I Date of Admission: 3/11/2025   Date of Service: 4/8/2025 I Hospital Day: 28     Assessment & Plan  Quadriceps muscle rupture, right, subsequent encounter  HPI:   He initially underwent quad tendon repair on 7/25/24 with Dr. Foster. He was discharged home with outpatient PT.  Later noted to have recurrent high-grade tear with extensor lag on exam and elected for operative management  January 2025 MRI: \"Prior quadriceps insertion repair with high-grade recurrent tear exhibiting up to 2.2 cm of tendon retraction.\"  On 3/6/25, he underwent revision of quad tendon repair with allograft augmentation  3/21: outpatient follow-up with Dr. Foster, new cast applied   3/28: follow-up with Dr. Foster, continue TTWB   4/4: follow-up with Dr. Foster (removed cast and reapplied long leg cast for an additional 2 weeks) and continue TTWB    Plan:   PT and OT for 3 hours a day, 5-6 days a week   TTWB RLE   Avoid moisture to cast   Pain: PRN Tylenol (monitor LFTs) and oxycodone discontinued on 3/17 due to minimal pain   Per ortho,  Lovenox for 2 more weeks for DVT prophylaxis   Lovenox d/c on 4/7   Follow-up scheduled for 4/11  Hypertension  Continue losartan 50 mg daily   Monitor BP   Mixed hyperlipidemia  Continue pravastatin 40 mg daily   Gastro-esophageal reflux disease without esophagitis  Continue famotidine 20 mg daily   Peripheral neuropathy  Patient is not diabetic   Monitor skin for new or worsening wounds   JOVANY on CPAP  Continue CPAP qHS  IFG (impaired fasting glucose)  Continue metformin 1500 mg daily   Seen by RD 3/12, diet adjusted to regular   3/12 A1c: 5.4   Healed ulcer of left foot  Local wound care  Present on admission   Wound care RN consulted   BPH (benign prostatic hyperplasia)  Continue tamsulosin 0.4 mg daily   Patient currently voiding without difficulty   PRN PVRs if urinary retention is suspected "   Impaired mobility and ADLs  Patient was evaluated by the rehabilitation team MD and an appropriate candidate for acute inpatient rehabilitation program at this time.  The patient will tolerate 3 hours/day 5 to 7 days/week of intensive physical and  occupational therapy   in order to obtain goals for community discharge  Due to the patient's functional Compared to their baseline level of function in addition to their ongoing medical needs, the patient would benefit from daily supervision from a rehabilitation physician as well as rehabilitation nursing to implement and adjust the medical as well as functional plan of care in order to meet the patient's goals.  Bladder: Monitor closely for urinary incontinence and/or retention. Monitor urine output and encourage spontaneous voids. If unable to void spontaneously, will monitor with PVR bladder scans and initiate CIC regimen.  Skin: Monitor for breakdown, frequent turns, pressure offloading  Sleep: Encourage sleep hygiene (routine that promotes healthy circadian rhythm,avoid caffeine later in the day, quiet/dark room at night, reduce electronic before bedtime)      Wound of right ankle  Skin breakdown on R heel due to cast. Ortho removed approximately 1 inch of cast at posterior aspect on 3/11  New cast 3/21   Local wound care  Continue to monitor   Present on admission to [facility]   Wound care RN consulted:  Skin Care orders:  1-Hydraguard to sacrum, buttocks bid and prn   2-EHOB / waffle  cushion when out of bed in chair.  3-Moisturize skin daily with skin nourishing cream  4-Elevate heels to offload pressure.  5-Turn/reposition q2h for pressure re-distribution on skin  6. Right posterior heel cleanse with soap and water then pat dry apply cavilon 3 M no sting then a silicone foam nate with a P and date change every other day   7. Left plantar aspect of the foot - cleanse with soap and water then apply a silicone foam nate with a P  and date change every other day      Subjective   Tobin Kerns is a 69 year-old male, with a past medical history of hypertension, hyperlipidemia, JOVANY, impaired fasting glucose, bilateral lower extremity neuropathy, GERD, BPH, presenting to [facility] after an elective right quadricept tendon repair. He initially underwent quad tendon repair on 7/25/24 with Dr. Foster. He was discharged home with outpatient PT. He was noted to have recurrent high-grade tear on repeat MRI with extensor lag on exam and elected for operative management. On 3/6/25, he underwent revision of quad tendon repair with allograft augmentation. He was evaluated by PT and OT and deemed an appropriate candidate for ARC due to functional deficits     Chief Complaint: f/u ambulatory dysfunction    Interval:   Seen and evaluated patient during PT. He denies any concerns or pain. He is continent of bowel and bladder. Last BM 4/7.     Objective :  Temp:  [97.1 °F (36.2 °C)-97.2 °F (36.2 °C)] 97.2 °F (36.2 °C)  HR:  [60-61] 60  BP: (107-109)/(60-62) 107/62  Resp:  [14-17] 14  SpO2:  [93 %-94 %] 94 %  O2 Device: None (Room air)    Functional Update:  Physical Therapy Occupational Therapy   Weight Bearing Status: Toe touch (R LE)  Transfers: Independent  Bed Mobility: Independent  Amulation Distance (ft): 200 feet  Ambulation: Independent  Assistive Device for Ambulation: Roller Walker  Wheelchair Mobility Distance:  (n/a)  Wheelchair Mobility:  (N/A)  Number of Stairs: 6  Assistive Device for Stairs: Lehft Hand Rail  Stair Assistance: Independent  Ramp: Independent  Assistive Device for Ramp: Roller Walker  Discharge Recommendations: Other  DC Home with::  (Alternate placement until R LE long cast off)   Eating: Independent  Grooming: Independent  Bathing: Independent  Bathing: Independent  Upper Body Dressing: Independent  Lower Body Dressing: Independent  Toileting: Independent  Tub/Shower Transfer:  (TBA)  Toilet Transfer: Independent  Cognition: Within Defined Limits  Orientation:  Person, Place, Time, Situation           Physical Exam  Vitals and nursing note reviewed.   Constitutional:       General: He is not in acute distress.     Appearance: He is obese. He is not ill-appearing or diaphoretic.   HENT:      Head: Normocephalic and atraumatic.      Nose: Nose normal.      Mouth/Throat:      Mouth: Mucous membranes are moist.   Cardiovascular:      Pulses:           Dorsalis pedis pulses are 2+ on the right side.   Pulmonary:      Effort: Pulmonary effort is normal. No respiratory distress.   Abdominal:      General: There is no distension.   Skin:     General: Skin is warm and dry.   Neurological:      General: No focal deficit present.      Mental Status: He is alert.   Psychiatric:         Mood and Affect: Mood normal.         Behavior: Behavior normal.         Scheduled Meds:  Current Facility-Administered Medications   Medication Dose Route Frequency Provider Last Rate    acetaminophen  650 mg Oral Q6H PRN Modesta Lee PA-C      ascorbic acid  500 mg Oral Daily Toño Brito MD      Cholecalciferol  5,000 Units Oral Daily Toño Brito MD      famotidine  20 mg Oral Daily Toño Brito MD      losartan  50 mg Oral Daily Toño Brito MD      metFORMIN  1,500 mg Oral Daily With Dinner Toño Brito MD      multivitamin stress formula  1 tablet Oral Daily Toño Brito MD      polyethylene glycol  17 g Oral Daily PRN Modesta Lee PA-C      pravastatin  40 mg Oral Daily With Dinner Toño Brito MD      senna  1 tablet Oral HS Modesta Lee PA-C      tamsulosin  0.4 mg Oral Daily With Dinner Toño Brito MD           Lab Results: I have reviewed the following results:  Results from last 7 days   Lab Units 04/07/25  0519   HEMOGLOBIN g/dL 12.9   HEMATOCRIT % 38.6   WBC Thousand/uL 7.72   PLATELETS Thousands/uL 199     Results from last 7 days   Lab Units 04/07/25  0519   BUN mg/dL 20   SODIUM mmol/L 136   POTASSIUM mmol/L 4.1   CHLORIDE mmol/L 105   CREATININE mg/dL 1.16   AST U/L  12*   ALT U/L 11            Modesta Lee PA-C  Physical Medicine and Rehabilitation  [facility]

## 2025-04-08 NOTE — TEAM CONFERENCE
Acute RehabilitationTeam Conference Note  Date: 04/08/2025   Time: 10:36AM      Patient Name:  Tobin Kerns       Medical Record Number: [id number]   YOB: 1955  Sex: Male          [facility]/Bed:  [facility]  Payor Info:  Payor: MEDICARE / Plan: MEDICARE A AND B / Product Type: Medicare A & B Fee for Service /      Admitting Diagnosis: Quadriceps muscle rupture [S76.119A]   Admit Date/Time:  3/11/2025  3:20 PM  Admission Comments: No comment available     Primary Diagnosis:  Quadriceps muscle rupture, right, subsequent encounter  Principal Problem: Quadriceps muscle rupture, right, subsequent encounter    Patient Active Problem List    Diagnosis Date Noted    BPH (benign prostatic hyperplasia) 03/12/2025    Impaired mobility and ADLs 03/12/2025    Wound of right ankle 03/12/2025    Quadriceps tendon rupture, right, sequela 03/07/2025    Obesity (BMI 35.0-39.9 without comorbidity) 03/04/2025    Onychomycosis 12/30/2024    Urinary retention 07/27/2024    Quadriceps muscle rupture, right, subsequent encounter 07/25/2024    Ambulatory dysfunction 07/23/2024    Abdominal aortic ectasia (HCC) 11/13/2023    Healed ulcer of left foot 02/23/2022    IFG (impaired fasting glucose) 10/01/2021    Hypertension 09/27/2021    Peripheral neuropathy 09/27/2021    Drusen (degenerative) of macula, bilateral 09/27/2021    Myopia, bilateral 09/27/2021    Pinguecula, bilateral 09/27/2021    Regular astigmatism, bilateral 09/27/2021    JOVANY on CPAP     History of colon polyps     Pure hypertriglyceridemia 08/19/2019    Mixed hyperlipidemia 05/30/2019    Gastro-esophageal reflux disease without esophagitis 05/30/2019    Presbyopia 10/11/2016       Physical Therapy:    Weight Bearing Status: Toe touch (R LE)  Transfers: Independent  Bed Mobility: Independent  Amulation Distance (ft): 200 feet  Ambulation: Independent  Assistive Device for Ambulation: Roller Walker  Wheelchair Mobility Distance:  (n/a)  Wheelchair  Mobility:  (N/A)  Number of Stairs: 6  Assistive Device for Stairs: Lehft Hand Rail  Stair Assistance: Independent  Ramp: Independent  Assistive Device for Ramp: Roller Walker  Discharge Recommendations: Other  DC Home with::  (Alternate placement until R LE long cast off)    03/18/2025:  Patient being followed by PT, making good progress.  Presents, following R quadriceps muscle rupture, now TTWB R LE in cast, with decreased ROM/strength, decreased balance and safety, decreased endurance, and pain.   Patient  mod I bed mobility, mod I transfers with RW, mod I short distances, S >50' ambulation with RW, S negotiation of 6 steps with L handrail.  Patient would benefit from continued inpatient ARC PT to increase function, safety, and increased independence in prep for safe d/c to home.    3/25/2025:  Patient being followed by PT, making good progress.  Presents, following R quadriceps muscle rupture, now TTWB R LE in cast, with decreased ROM/strength, decreased balance and safety, decreased endurance, and pain.   Patient  mod I bed mobility, mod I transfers with RW, mod I ambulation with RW up to 180ft, mod I negotiation of 6 steps with L handrail.  Patient would benefit from continued ARC PT to increase function, safety, and increased independence in prep for safe d/c to home.     04/01/2025:  Patient being followed by PT, making good progress.  Presents, following R quadriceps muscle rupture, now TTWB R LE in cast, with decreased ROM/strength, decreased balance and safety, decreased endurance, and pain.   Patient  mod I bed mobility, mod I transfers with RW, mod I ambulation with RW up to 180ft, mod I negotiation of 6 steps with L handrail.  Patient would benefit from continued ARC PT to increase function, safety, and increased independence in prep for safe d/c to home.     04/08/2025:  Patient being followed by PT, making good progress.  Presents, following R quadriceps muscle rupture, now TTWB R LE in cast, with  decreased ROM/strength, decreased balance and safety, decreased endurance, and pain.   Patient  mod I bed mobility, mod I transfers with RW, mod I ambulation with RW up to 200ft, mod I negotiation of 6 steps with L handrail.  Patient would benefit from continued ARC PT to increase function, safety, and increased independence in prep for safe d/c to home.     Occupational Therapy:  Eating: Independent  Grooming: Independent  Bathing: Independent  Bathing: Independent  Upper Body Dressing: Independent  Lower Body Dressing: Independent  Toileting: Independent  Tub/Shower Transfer:  (TBA)  Toilet Transfer: Independent  Cognition: Within Defined Limits  Orientation: Person, Place, Time, Situation  Discharge Recommendations: Home with:  DC Home with:: First Floor Setup, Family Support, Home Occupational Therapy       03/17/25 Pt participating in Adls/iADLs, safety with functional txfs and mobility, TE/TA for generalized strength and endurance. Pts LOF noted above. Pt is progressing toward OT goals. Pt's barriers to d/c include decreased strength throughout but especially RLE s/p quadricepts rupture, decreased ROM, decreased balance TTWB RLE. Pt would benefit from continued skilled OT services in order to address listed barriers and prepare for safe d/c.     03/24/25 Pt participating in Adls/iADLs, safety with functional txfs and mobility, TE/TA for generalized strength and endurance. Pts LOF noted above. Pt continues to be consistently MI with all ADLs.  Pt's barriers to d/c include decreased strength  RLE s/p quadricepts rupture, decreased ROM, decreased balance due to TTWB RLE. Plan is contingent upon placement.     03/31/25 Pt participating in Adls/iADLs, safety with functional txfs and mobility, TE/TA for generalized strength and endurance. Pts LOF noted above. Pt continues to be consistently MI with all ADLs.  Pt's barriers to d/c include decreased strength  RLE s/p quadricepts rupture, decreased ROM, decreased  balance due to TTWB RLE. Plan is contingent upon placement.     04/07/25 Pt participating in Adls/iADLs, safety with functional txfs and mobility, TE/TA for generalized strength and endurance. Pts LOF noted above. Pt continues to be consistently MI with all ADLs.  Pt's barriers to d/c include decreased strength  RLE s/p quadricepts rupture, decreased ROM, decreased balance due to TTWB RLE. Plan is contingent upon placement.     Speech Therapy:           No notes on file    Nursing Notes:  Appetite: Good  Diet Type: Regular/House                      Diet Patient/Family Education Complete: Yes    Type of Wound (LDA): Wound                    Type of Wound Patient/Family Education: Yes  Bladder: Continent     Bladder Patient/Family Education: Yes  Bowel: Continent     Bowel Patient/Family Education: Yes  Pain Location/Orientation: Orientation: Right, Location: Knee  Pain Score: 2                       Hospital Pain Intervention(s): Medication (See MAR)  Pain Patient/Family Education: Yes  Medication Management/Safety  Injectable: Lovenox  Safe Administration: No  Reason for non-safe administration: not attempted  Medication Patient/Family Education Complete: Yes    3/18/25 Patient was admitted to [facility] following a right quadriceps muscle rupture with revision surgery performed.  Patient with a hard cast to the right lower extremity and is to maintain toe-touch weight bearing restriction to that limb.  Lungs are clear diminished on room air.  Patient wears own CPAP unit from home overnight as ordered for sleep apnea.  Patient is continent of bowel and bladder.  Every other day dressing changes to the right lower leg where cast was rubbing and to the left plantar foot.  Patient is now modified independent to the bathroom with a walker.      3/25/2025  A/O x 4, Pt. Is Mod I TTWB RLE with long leg cast using RW. Has decreased sensation to B/L lower ext which he has had prior, continues to have dressing changes every other  day to RLE where previous cast was rubbing and wounds to left plantar foot that he had PTA. Continues to wear his CPAP from home.     3/31/25 Patient remains alert and oriented.  Right lower extremity with a leg cast in place at all times as ordered.  Patient is to follow up with ortho on Friday for further cast care.  Every other day dressing changes to the right posterior ankle and left plantar foot.  Patient remains modified independent to the bathroom with a walker.  Patient to maintain toe touch weight bearing status to the right lower extremity.      4/7/25 Patient remains alert and oriented.  Right lower extremity cast in place at all times.  Patient to maintain toe touch weight bearing restriction to the right lower extremity at all times.  Every other day dressing changes to the right posterior ankle.  Left plantar foot wound healed and left open to air.  Patient was modified independent to the bathroom overnight with walker in use.      Case Management:     Discharge Planning  Living Arrangements: Lives Alone  Support Systems: Self  Assistance Needed: unknown  Type of Current Residence: Private residence  Current Home Care Services: No  Patient admitted to [facility] on3/11/25 after inpatient hospital stay for right knee revision quad tendon repair on 3/6/25. Patient lives alone in a mobile home, [address],. Patient independent PTA, driving. Original injury is from July 2024, had repair with a knee immobilizer. Did not work. Revised . Workmen's comp. Patient at Mod I level, looking into alternate disposition, [facility]. Workmen's comp  3/25/25 Having difficulty getting any assistance from [employer] workmen's comp. Patient for discharge today 3/25 waiting to establish home care, where he is going on d/c, transportation.  4/1/25 Emailed and sent fax trying to get assistance with discharge planning from [employer]. My need to have patient pay out of pocket. Last covered day 3/31.  4/8/25 Waiting on [employer] Work[employer]'s  comp, who is getting auth for inpatient hospital stay, will not be able to assist with discharge planning until that is done.    Is the patient actively participating in therapies? yes  List any modifications to the treatment plan: na    Barriers Interventions   TTWB right LE Cues, ADL, transfer, gait training   Skin integrity left foot Local care, medical management and oversight                 Is the patient making expected progress toward goals? yes  List any update or changes to goals: na    Medical Goals: Patient will be able to manage medical conditions and comorbid conditions with medications and follow up upon completion of rehab program    Weekly Team Goals:   Rehab Team Goals  ADL Team Goal: Patient will be independent with ADLs with least restrictive device upon completion of rehab program  Bowel/Bladder Team Goal: Patient will return to premorbid level for bladder/bowel management upon completion of rehab program  Transfer Team Goal: Patient will be independent with transfers with least restrictive device upon completion of rehab program  Locomotion Team Goal: Patient will be independent with locomotion with least restrictive device upon completion of rehab program    Pt will be Mod I self care  Pt will be Mod I bed mobility, transfers, and mobility     Health and wellness: Pt will be able to return to driving and enjoys playing video game.     Discussion:  Pt will benefit RW, tub bench and BSC. Pending discharge placement pt would benefit from home health care including PT/ OT if transition is to home, working with workers compensation for assistance with placement.    Anticipated Discharge Date:  Awaiting placement versus discharge to home  WTC Team Members Present:  The following team members are supervising care for this patient and were present during this Weekly Team Conference.    MD Lilian Vasquez, OTR/L  Jaswant Nichole, BSN, RN  Raisa Brokos, PT  Gisell Mejía,  OTR/L

## 2025-04-08 NOTE — PROGRESS NOTES
04/08/25 1030   Pain Assessment   Pain Assessment Tool 0-10   Pain Score No Pain   Restrictions/Precautions   Precautions Fall Risk   RLE Weight Bearing Per Order TTWB   Braces or Orthoses   (R LE cast)   Oral Hygiene   Type of Assistance Needed Independent   Physical Assistance Level No physical assistance   Oral Hygiene CARE Score 6   Grooming   Able To Initiate Tasks;Acquire Items;Comb/Brush Hair;Wash/Dry Face;Brush/Clean Teeth;Wash/Dry Hands   Findings stance   Shower/Bathe Self   Type of Assistance Needed Independent;Adaptive equipment   Physical Assistance Level No physical assistance   Shower/Bathe Self CARE Score 6   Bathing   Assessed Bath Style Sponge Bath   Upper Body Dressing   Type of Assistance Needed Independent   Physical Assistance Level No physical assistance   Upper Body Dressing CARE Score 6   Lower Body Dressing   Type of Assistance Needed Independent;Adaptive equipment   Physical Assistance Level No physical assistance   Lower Body Dressing CARE Score 6   Putting On/Taking Off Footwear   Type of Assistance Needed Independent;Adaptive equipment   Physical Assistance Level No physical assistance   Putting On/Taking Off Footwear CARE Score 6   Picking Up Object   Type of Assistance Needed Independent;Adaptive equipment   Physical Assistance Level No physical assistance   Picking Up Object CARE Score 6   Dressing/Undressing Clothing   Able to  Obtain Clothing;Store removed clothing   Remove UB Clothes Button Shirt   Don UB Clothes Button Shirt   Remove LB Clothes Undergarment;Shorts;Socks   Don LB Clothes Socks;Undergarment;Shorts   Limitations Noted In ROM   Adaptive Equipment Reacher;Sock Aide   Sit to Stand   Type of Assistance Needed Independent   Physical Assistance Level No physical assistance   Sit to Stand CARE Score 6   Toileting Hygiene   Type of Assistance Needed Independent   Physical Assistance Level No physical assistance   Toileting Hygiene CARE Score 6   Toileting   Able to Pull  Clothing down yes, up yes.   Able to Manage Clothing Closures Yes   Manage Hygiene Bladder   Toilet Transfer   Type of Assistance Needed Independent;Adaptive equipment   Physical Assistance Level No physical assistance   Toilet Transfer CARE Score 6   Toilet Transfer   Surface Assessed Standard Commode   Transfer Technique Standard   Light Housekeeping   Light Housekeeping Level Walker   Light Housekeeping Level of Assistance Modified independent   Light Housekeeping gathers all items for ADL and is able to clean up successfully; Indep takes to wash machine and then later places into dryer and transports back to room; Pt is also able to Indep amb on unit to amb to kitchen for snacks/drinks.   Exercise Tools   Hand Gripper B hands 2x40 reps with heavy resistance gripper   Other Exercise Tool 1 ed flex bar: 2x20 reps upward then downward   Cognition   Overall Cognitive Status WFL   Arousal/Participation Alert;Cooperative   Attention Within functional limits   Orientation Level Oriented X4   Memory Within functional limits   Following Commands Follows all commands and directions without difficulty   Additional Activities   Additional Activities Comments fxnl mobility MI RW TTWB R LE   Activity Tolerance   Activity Tolerance Patient tolerated treatment well   Assessment   Treatment Assessment ADL completed via sponge bathe level: Pt able to consistently s/u and c/u all supplies Independently with use of RW and while maintaining TTWB R LE. Pt demon consistent recall of compensatory strategies and compliance with safe techniques throughout all ADL/iADLs.  Pt demon ability to safely and functionally stand to shave.  Pt's barriers to d/c to personal home environment include decreased strength RLE s/p quadricepts rupture, decreased ROM with use of leg length hard cast, and TTWB RLE. Plan is contingent upon placement due to noted barriers. Ortho f/u 4/11   Prognosis Good   Problem List Decreased strength;Decreased range of  motion;Decreased mobility;Impaired balance   Plan   Treatment/Interventions ADL retraining;Functional transfer training;Therapeutic exercise;Endurance training;Equipment eval/education;Gait training;Compensatory technique education;Spoke to nursing;OT   Progress Improving as expected   OT Therapy Minutes   OT Time In 1030   OT Time Out 1200   OT Total Time (minutes) 90   OT Mode of treatment - Individual (minutes) 90   OT Mode of treatment - Concurrent (minutes) 0   OT Mode of treatment - Group (minutes) 0   OT Mode of treatment - Co-treat (minutes) 0   OT Mode of Treatment - Total time(minutes) 90 minutes   OT Cumulative Minutes 1562   Therapy Time missed   Time missed? No

## 2025-04-08 NOTE — CASE MANAGEMENT
SHILA spoke with patient after team meeting today.   Awaiting call back from Radha in regards to obtaining authorization for his sx.   Continues to follow [facility] program.   Is still unable to go home as his walker will not fit and he still remains unable  to use his bathroom dur to his long leg cast.   Called placed to [facility] billing dept.  Spoke with Marquis who reports that Radha, a supervisor in billing authorization, started the process for surgery authorization and is awaiting validation to progress with obtaining surgery authorization.  Asked that she please call CM when this is completed.  Will continue to follow for all d/c planning needs/concerns.    Addendum:  Per therapy staff, Tobin reports receiving emails regarding explanation of benefits being billed to Medicare and  insurance.   Emailed Mohan Landaverde to have pt's insurance information corrected to show primary as Worker's Compensation and secondary as Medicare.

## 2025-04-08 NOTE — NURSING NOTE
Patient is awake and alert. Pleasant with interactions . Right leg cast CDI. Medicated x 1 for complaints of right leg discomfort with adequate relief obtained. Participated in therapy sessions and tolerated same well.  MOD I , safety maintained.

## 2025-04-08 NOTE — NURSING NOTE
Patient resting in bed at this time. No signs of distress noted. Patient was successfully modified independent to the bathroom overnight with a walker in use. Patient with right lower extremity cast in place at all times as ordered. Patient to maintain toe-touch weight bearing to the right lower extremity. Call bell within reach. Plan of care ongoing.

## 2025-04-08 NOTE — ASSESSMENT & PLAN NOTE
Skin breakdown on R heel due to cast. Ortho removed approximately 1 inch of cast at posterior aspect on 3/11  New cast 3/21   Local wound care  Continue to monitor   Present on admission to [facility]   Wound care RN consulted:  Skin Care orders:  1-Hydraguard to sacrum, buttocks bid and prn   2-EHOB / waffle  cushion when out of bed in chair.  3-Moisturize skin daily with skin nourishing cream  4-Elevate heels to offload pressure.  5-Turn/reposition q2h for pressure re-distribution on skin  6. Right posterior heel cleanse with soap and water then pat dry apply cavilon 3 M no sting then a silicone foam nate with a P and date change every other day   7. Left plantar aspect of the foot - cleanse with soap and water then apply a silicone foam nate with a P  and date change every other day

## 2025-04-08 NOTE — PROGRESS NOTES
"Progress Note - Tobin Kerns 69 y.o. male MRN: [id number]    Unit/Bed#: [facility] 210-01 Encounter: [id number]        Subjective:   Patient seen and examined at bedside after reviewing the chart and discussing the case with the caring staff.      Patient examined at bedside.  Patient has no acute symptoms.  Patient had a bowel movement yesterday    Physical Exam   Vitals: Blood pressure 107/62, pulse 60, temperature (!) 97.2 °F (36.2 °C), temperature source Temporal, resp. rate 14, height 6' 5\" (1.956 m), weight 134 kg (295 lb 6.7 oz), SpO2 94%.,Body mass index is 35.03 kg/m².  Constitutional: Patient in no acute distress.  HEENT: PERR, EOMI, MMM.  Cardiovascular: Normal rate and regular rhythm.    Pulmonary/Chest: Effort normal and breath sounds normal.   Abdomen: Soft, + BS, NT.    Assessment/Plan:  Tobin Kerns is a(n) 69 y.o. year old male who is s/p quadriceps tendon repair.     Cardiac with hx of HTN, HLD.  Patient is on losartan 50 mg daily, pravastatin 40 mg daily.  Impaired fasting glucose.  Hgb a1c 5.4% on 3/12/25.  Continue metformin XR 1500 mg daily with dinner.  Regular diet and d/c accucheks as blood sugar is well controlled.    BPH.  Continue Flomax 0.4 mg daily.  GERD.  Continue Pepcid 20 mg daily.  JOVANY.  On CPAP at bedtime.   Vitamin D deficiency.  Patient is on vitamin D3 5000 units daily.  Pain and bowel management.  As per physiatrist.  Quadriceps tendon repair.  Toe touch weightbearing RLE with use of walker.  Patient is receiving intensive PT OT as per physiatrist.    Anticipated date of discharge.  TBD  "

## 2025-04-09 PROCEDURE — 97116 GAIT TRAINING THERAPY: CPT

## 2025-04-09 PROCEDURE — 97535 SELF CARE MNGMENT TRAINING: CPT

## 2025-04-09 PROCEDURE — 99232 SBSQ HOSP IP/OBS MODERATE 35: CPT | Performed by: PHYSICAL MEDICINE & REHABILITATION

## 2025-04-09 PROCEDURE — 97530 THERAPEUTIC ACTIVITIES: CPT

## 2025-04-09 PROCEDURE — 97110 THERAPEUTIC EXERCISES: CPT

## 2025-04-09 RX ADMIN — FAMOTIDINE 20 MG: 20 TABLET, FILM COATED ORAL at 08:29

## 2025-04-09 RX ADMIN — LOSARTAN POTASSIUM 50 MG: 50 TABLET, FILM COATED ORAL at 08:29

## 2025-04-09 RX ADMIN — PRAVASTATIN SODIUM 40 MG: 40 TABLET ORAL at 17:08

## 2025-04-09 RX ADMIN — TAMSULOSIN HYDROCHLORIDE 0.4 MG: 0.4 CAPSULE ORAL at 17:08

## 2025-04-09 RX ADMIN — CHOLECALCIFEROL TAB 25 MCG (1000 UNIT) 5000 UNITS: 25 TAB at 08:29

## 2025-04-09 RX ADMIN — OXYCODONE HYDROCHLORIDE AND ACETAMINOPHEN 500 MG: 500 TABLET ORAL at 08:28

## 2025-04-09 RX ADMIN — METFORMIN ER 500 MG 1500 MG: 500 TABLET ORAL at 17:08

## 2025-04-09 RX ADMIN — B-COMPLEX W/ C & FOLIC ACID TAB 1 TABLET: TAB at 08:28

## 2025-04-09 NOTE — PROGRESS NOTES
04/09/25 0900   Pain Assessment   Pain Assessment Tool 0-10  (Simultaneous filing. User may not have seen previous data.)   Pain Score No Pain  (Simultaneous filing. User may not have seen previous data.)   Patient's Stated Pain Goal No pain   Hospital Pain Intervention(s) Declines   Restrictions/Precautions   Precautions Fall Risk   RLE Weight Bearing Per Order TTWB   Braces or Orthoses   (R LE cast)   Sit to Stand   Type of Assistance Needed Independent   Physical Assistance Level No physical assistance   Sit to Stand CARE Score 6   Meal Prep   Meal Prep Level Walker   Meal Prep Level of Assistance Distant supervision   Kitchen Mobility   Kitchen-Mobility Level Walker   Kitchen Activity Retrieve items;Transport items   Kitchen Mobility Comments With use of RW while maintaining TTWB to R LE; Pt prepared chili, previous session pt created a store list; pt navigated throughout kitchen to safely retrieve items from fridge/cupboards, utilize stove top, s/u and c/u all supplies, cut up onion, monitored use of oven, organized all items safely, transported items with use of walker. Stood to wash dishes. Good safety demon.   Cognition   Overall Cognitive Status WFL   Arousal/Participation Alert;Cooperative   Attention Within functional limits   Orientation Level Oriented X4  (Simultaneous filing. User may not have seen previous data.)   Memory Within functional limits   Following Commands Follows all commands and directions without difficulty   Additional Activities   Additional Activities Comments fxnl mobility MI RW TTWB R LE   Activity Tolerance   Activity Tolerance Patient tolerated treatment well   Assessment   Treatment Assessment OT session focused on kitchen safety/mobility/mgmt of meal prep. Refer to respective sections of note for details of session. Pt's barriers to d/c to personal home environment include decreased strength RLE s/p quadricepts rupture, decreased ROM with use of leg length hard cast, and TTWB  Roshni Mckinley is a 73 year old female presenting for   Chief Complaint   Patient presents with    Office Visit    Medicare Wellness Visit     Subsequent     Denies Latex allergy or sensitivity.    Medication verified and med list updated  Refills needed today: No       Health Maintenance       Respiratory Syncytial Virus (RSV) Vaccine 60+ (1 - Risk 60-74 years 1-dose series)  Never done    COVID-19 Vaccine (5 - 2024-25 season)  Overdue since 9/1/2024    Traditional Medicare- Medicare Wellness Visit (Yearly)  Due since 3/1/2025           Following review of the above:  Patient is not proceeding with: COVID-19 and Respiratory Syncytial Virus (RSV)    Note: Refer to final orders and clinician documentation.            Unaddressed Risk Adjusted HCC Categories and Diagnoses        CMS V28 127 - Dementia, Mild or Unspecified - Unaddressed Diagnosis:Late Onset Alzheimer's Dementia With Anxiety, Unspecified Dementia Severity (Cmd)                Last lab results:   Hemoglobin A1C (%)   Date Value   09/07/2022 5.7 (H)     Cholesterol (mg/dL)   Date Value   03/04/2022 185     HDL (mg/dL)   Date Value   03/04/2022 61     Triglycerides (mg/dL)   Date Value   03/04/2022 100     LDL (mg/dL)   Date Value   03/04/2022 104     No results found for: \"URMIC\", \"UCR\", \"MALBCR\"  No results found for: \"IFOB\"                 Depression Screening:  Review Flowsheet  More data exists         9/10/2024   PHQ 2/9 Score   Adult PHQ 2 Score 0   Adult PHQ 2 Interpretation No further screening needed   Little interest or pleasure in activity? Not at all   Feeling down, depressed or hopeless? Not at all      Details                    Advance Directives:  on file   RLE. Plan is contingent upon placement due to noted barriers. Ortho f/u 4/11   Prognosis Good   Problem List Decreased strength;Decreased range of motion;Decreased mobility;Impaired balance   Plan   Treatment/Interventions Functional transfer training;Compensatory technique education;Gait training;Equipment eval/education;Patient/family training;ADL retraining;OT   Progress Improving as expected   OT Therapy Minutes   OT Time In 0900   OT Time Out 1030   OT Total Time (minutes) 90   OT Mode of treatment - Individual (minutes) 90   OT Mode of treatment - Concurrent (minutes) 0   OT Mode of treatment - Group (minutes) 0   OT Mode of treatment - Co-treat (minutes) 0   OT Mode of Treatment - Total time(minutes) 90 minutes   OT Cumulative Minutes 1652   Therapy Time missed   Time missed? No

## 2025-04-09 NOTE — PROGRESS NOTES
04/09/25 1302   Pain Assessment   Pain Assessment Tool 0-10   Pain Score No Pain   Restrictions/Precautions   Precautions Fall Risk   RLE Weight Bearing Per Order TTWB   Braces or Orthoses   (R leg cast)   Oral Hygiene   Type of Assistance Needed Independent   Physical Assistance Level No physical assistance   Oral Hygiene CARE Score 6   Grooming   Able To Initiate Tasks;Acquire Items;Comb/Brush Hair;Wash/Dry Face;Brush/Clean Teeth;Wash/Dry Hands   Findings stance   Shower/Bathe Self   Type of Assistance Needed Independent;Adaptive equipment   Physical Assistance Level No physical assistance   Shower/Bathe Self CARE Score 6   Bathing   Assessed Bath Style Sponge Bath   Able to Gather/Transport Yes   Able to Adjust Water Temperature Yes   Able to Wash/Rinse/Dry (body part) Left Arm;Right Arm;L Upper Leg;R Upper Leg;L Lower Leg/Foot;R Lower Leg/Foot;Chest;Abdomen;Perineal Area;Buttocks   Limitations Noted in ROM   Positioning Seated;Standing   Adaptive Equipment Longhand Sponge   Upper Body Dressing   Type of Assistance Needed Independent   Physical Assistance Level No physical assistance   Upper Body Dressing CARE Score 6   Lower Body Dressing   Type of Assistance Needed Independent;Adaptive equipment   Physical Assistance Level No physical assistance   Lower Body Dressing CARE Score 6   Putting On/Taking Off Footwear   Type of Assistance Needed Independent;Adaptive equipment   Physical Assistance Level No physical assistance   Putting On/Taking Off Footwear CARE Score 6   Picking Up Object   Type of Assistance Needed Independent;Adaptive equipment   Physical Assistance Level No physical assistance   Picking Up Object CARE Score 6   Dressing/Undressing Clothing   Able to  Obtain Clothing;Store removed clothing   Remove UB Clothes Button Shirt   Don UB Clothes Button Shirt   Remove LB Clothes Undergarment;Shorts;Socks   Don LB Clothes Socks;Undergarment;Shorts   Limitations Noted In ROM   Adaptive Equipment  Reacher;Dressing Stick;Sock Aide   Positioning Standing;Supported Sit   Sit to Stand   Type of Assistance Needed Independent   Physical Assistance Level No physical assistance   Sit to Stand CARE Score 6   Toileting Hygiene   Type of Assistance Needed Independent   Physical Assistance Level No physical assistance   Toileting Hygiene CARE Score 6   Toileting   Able to Pull Clothing down yes, up yes.   Manage Hygiene Bladder   Toilet Transfer   Type of Assistance Needed Independent;Adaptive equipment   Physical Assistance Level No physical assistance   Toilet Transfer CARE Score 6   Toilet Transfer   Surface Assessed Standard Commode   Transfer Technique Standard   Light Housekeeping   Light Housekeeping Level Walker   Light Housekeeping Level of Assistance Modified independent   Light Housekeeping gathers supplies Indep;does laundry Indep   Cognition   Overall Cognitive Status WFL   Arousal/Participation Alert;Cooperative   Attention Within functional limits   Orientation Level Oriented X4   Memory Within functional limits   Following Commands Follows all commands and directions without difficulty   Additional Activities   Additional Activities Comments fxnl mobility MI RW TTWB R LE   Activity Tolerance   Activity Tolerance Patient tolerated treatment well   Assessment   Treatment Assessment ADL completed via sponge bathe level: Pt able to consistently s/u and c/u all supplies Independently with use of RW and while maintaining TTWB R LE. Pt demon consistent recall of compensatory strategies and compliance with safe techniques throughout all ADL/iADLs.  Pt demon ability to safely and functionally stand to shave.  Pt's barriers to d/c to personal home environment include decreased strength RLE s/p quadricepts rupture, decreased ROM with use of leg length hard cast, and TTWB RLE. Plan is contingent upon placement due to noted barriers. Ortho f/u 4/11   Prognosis Good   Problem List Decreased strength;Decreased range of  motion;Impaired balance;Decreased mobility   Plan   Treatment/Interventions ADL retraining;Functional transfer training;Patient/family training;Equipment eval/education;Gait training;Compensatory technique education;Spoke to nursing;OT   Progress Improving as expected   OT Therapy Minutes   OT Time In 1302   OT Time Out 1402   OT Total Time (minutes) 60   OT Mode of treatment - Individual (minutes) 60   OT Mode of treatment - Concurrent (minutes) 0   OT Mode of treatment - Group (minutes) 0   OT Mode of treatment - Co-treat (minutes) 0   OT Mode of Treatment - Total time(minutes) 60 minutes   OT Cumulative Minutes 1712   Therapy Time missed   Time missed? No

## 2025-04-09 NOTE — PROGRESS NOTES
"   04/09/25 1135   Pain Assessment   Pain Assessment Tool 0-10   Pain Score No Pain   Restrictions/Precautions   Precautions Fall Risk   RLE Weight Bearing Per Order TTWB   ROM Restrictions No   Braces or Orthoses   (Long leg cast R LE)   Cognition   Overall Cognitive Status WFL   Arousal/Participation Alert;Cooperative   Attention Within functional limits   Orientation Level Oriented X4   Memory Within functional limits   Following Commands Follows all commands and directions without difficulty   Subjective   Subjective \"I'm going to go back to the OT gym for lunch.\"   Sit to Stand   Type of Assistance Needed Independent   Physical Assistance Level No physical assistance   Comment RW   Sit to Stand CARE Score 6   Transfer Bed/Chair/Wheelchair   Limitations Noted In LE Strength   Adaptive Equipment Roller Walker   Sit to Stand Modified Independent   Stand to Sit Modified Independent   Supine to Sit Independent   Sit to Supine Independent   Car Transfer   Reason if not Attempted Environmental limitations   Car Transfer CARE Score 10   Walk 10 Feet   Type of Assistance Needed Independent   Physical Assistance Level No physical assistance   Comment RW   Walk 10 Feet CARE Score 6   Walk 50 Feet with Two Turns   Type of Assistance Needed Independent   Physical Assistance Level No physical assistance   Comment RW   Walk 50 Feet with Two Turns CARE Score 6   Walk 150 Feet   Type of Assistance Needed Independent   Physical Assistance Level No physical assistance   Comment RW   Walk 150 Feet CARE Score 6   Ambulation   Primary Mode of Locomotion Prior to Admission Walk   Distance Walked (feet) 150 ft  (200')   Assist Device Roller Walker   Gait Pattern Step to   Limitations Noted In Balance   Provided Assistance with: Balance   Walk Assist Level Modified Independent   Findings Good maintenance of TTWB R LE   Does the patient walk? 2. Yes   Wheel 50 Feet with Two Turns   Reason if not Attempted Activity not applicable   Wheel " 50 Feet with Two Turns CARE Score 9   Wheel 150 Feet   Reason if not Attempted Activity not applicable   Wheel 150 Feet CARE Score 9   Wheelchair mobility   Does the patient use a wheelchair? 0. No   Therapeutic Interventions   Strengthening Seated LE exercises - 2.5#, green and blue t-band   Balance gait, transfers   Assessment   Treatment Assessment Pt continues to make progress with therapy. Pt knowledgeable with exercise program. Will benefit from continued PT to maximize function and prepare pt for d/c to home.   Problem List Decreased strength;Impaired balance;Decreased range of motion   PT Barriers   Physical Impairment Decreased strength;Impaired balance;Decreased range of motion   Functional Limitation Car transfers   Plan   Treatment/Interventions Functional transfer training;LE strengthening/ROM;Elevations;Therapeutic exercise;Patient/family training;Gait training;Bed mobility   Progress Improving as expected   PT Therapy Minutes   PT Time In 1135   PT Time Out 1205   PT Total Time (minutes) 30   PT Mode of treatment - Individual (minutes) 30   PT Mode of treatment - Concurrent (minutes) 0   PT Mode of treatment - Group (minutes) 0   PT Mode of treatment - Co-treat (minutes) 0   PT Mode of Treatment - Total time(minutes) 30 minutes   PT Cumulative Minutes 2082   Therapy Time missed   Time missed? No

## 2025-04-09 NOTE — ASSESSMENT & PLAN NOTE
Skin breakdown on R heel due to cast. Ortho removed approximately 1 inch of cast at posterior aspect on 3/11  New cast 3/21   Local wound care  Continue to monitor   Present on admission to [facility]   Wound care RN consulted:  Skin Care orders:  1-Hydraguard to sacrum, buttocks bid and prn   2-EHOB / waffle  cushion when out of bed in chair.  3-Moisturize skin daily with skin nourishing cream  4-Elevate heels to offload pressure.  5-Turn/reposition q2h for pressure re-distribution on skin  6. Right posterior heel cleanse with soap and water then pat dry apply cavilon 3 M no sting then a silicone foam nate with a P and date change every other day   7. Left plantar aspect of the foot - cleanse with soap and water then apply a silicone foam nate with a P  and date change every other day

## 2025-04-09 NOTE — PROGRESS NOTES
"   04/08/25 1234   Pain Assessment   Pain Assessment Tool 0-10   Pain Score No Pain   Restrictions/Precautions   Precautions Fall Risk   RLE Weight Bearing Per Order TTWB   Braces or Orthoses   (R LE cast)   Cognition   Overall Cognitive Status WFL   Arousal/Participation Alert;Cooperative   Attention Within functional limits   Orientation Level Oriented X4   Memory Within functional limits   Following Commands Follows all commands and directions without difficulty   Subjective   Subjective \"I can walk there myself.\"   Sit to Stand   Type of Assistance Needed Independent   Physical Assistance Level No physical assistance   Comment RW   Sit to Stand CARE Score 6   Transfer Bed/Chair/Wheelchair   Limitations Noted In LE Strength   Adaptive Equipment Roller Walker   Sit to Stand Modified Independent   Stand to Sit Modified Independent   Findings Pt with increased ease with sit to stand from a higher surface.  Able to maintain TTWB R LE.   Car Transfer   Reason if not Attempted Environmental limitations   Car Transfer CARE Score 10   Walk 10 Feet   Type of Assistance Needed Independent   Physical Assistance Level No physical assistance   Comment RW   Walk 10 Feet CARE Score 6   Walk 50 Feet with Two Turns   Type of Assistance Needed Independent   Physical Assistance Level No physical assistance   Comment RW   Walk 50 Feet with Two Turns CARE Score 6   Walk 150 Feet   Type of Assistance Needed Independent   Physical Assistance Level No physical assistance   Comment RW   Walk 150 Feet CARE Score 6   Ambulation   Primary Mode of Locomotion Prior to Admission Walk   Distance Walked (feet) 150 ft   Assist Device Roller Walker   Limitations Noted In Balance   Provided Assistance with: Balance   Walk Assist Level Modified Independent   Does the patient walk? 2. Yes   Wheel 50 Feet with Two Turns   Reason if not Attempted Activity not applicable   Wheel 50 Feet with Two Turns CARE Score 9   Wheel 150 Feet   Reason if not " Attempted Activity not applicable   Wheel 150 Feet CARE Score 9   Curb or Single Stair   Style negotiated Single stair   Type of Assistance Needed Independent   Physical Assistance Level No physical assistance   Comment 6 steps - L rail - TTWB R LE   1 Step (Curb) CARE Score 6   4 Steps   Type of Assistance Needed Independent   Physical Assistance Level No physical assistance   Comment 6 steps - L rail - TTWB R LE   4 Steps CARE Score 6   12 Steps   Reason if not Attempted Activity not applicable   12 Steps CARE Score 9   Stairs   Type Stairs   # of Steps 6   Weight Bearing Precautions RLE;TTWB;Fall Risk   Assist Devices Single Rail   Findings Mod I to ascend/descend 6 steps with L rail and TTWB R LE. Good maintenance of TTWB R LE.   Therapeutic Interventions   Strengthening Seated LE exercises - 2.5# L LE, green and blue t-band   Balance gait, transfers   Other stair training   Equipment Use   NuStep NuStep - 20 minutes with B UEs, L LE - 20 minutes.  Level 5.   Assessment   Treatment Assessment Pt with good participation with therapy.  Knowledgeable with exercise program. Pt with good safety with gait, transfers, steps. Pt continues to be limited in safe return to home due to R LE TTWB. Pt will benefit from continued PT to progress towards goals set at eval, and prepare pt for d/c to home.   Problem List Decreased strength;Decreased range of motion;Impaired balance;Decreased mobility   Barriers to Discharge Decreased caregiver support;Inaccessible home environment   PT Barriers   Physical Impairment Decreased strength;Decreased range of motion;Impaired balance;Decreased mobility   Functional Limitation Car transfers   Plan   Treatment/Interventions Functional transfer training;LE strengthening/ROM;Elevations;Therapeutic exercise;Endurance training;Patient/family training;Gait training   Progress Improving as expected   PT Therapy Minutes   PT Time In 1234   PT Time Out 1404   PT Total Time (minutes) 90   PT Mode  of treatment - Individual (minutes) 90   PT Mode of treatment - Concurrent (minutes) 0   PT Mode of treatment - Group (minutes) 0   PT Mode of treatment - Co-treat (minutes) 0   PT Mode of Treatment - Total time(minutes) 90 minutes   PT Cumulative Minutes 2052   Therapy Time missed   Time missed? No

## 2025-04-09 NOTE — PROGRESS NOTES
"   04/09/25 1230   Pain Assessment   Pain Assessment Tool 0-10   Pain Score No Pain   Restrictions/Precautions   Precautions Fall Risk   RLE Weight Bearing Per Order TTWB   Braces or Orthoses   (R long leg cast)   Cognition   Overall Cognitive Status WFL   Arousal/Participation Alert;Responsive   Attention Within functional limits   Orientation Level Oriented X4   Memory Within functional limits   Following Commands Follows all commands and directions without difficulty   Subjective   Subjective \"I didn't miss anything with lunch.\"   Sit to Stand   Type of Assistance Needed Independent   Physical Assistance Level No physical assistance   Comment RW   Sit to Stand CARE Score 6   Transfer Bed/Chair/Wheelchair   Limitations Noted In LE Strength   Adaptive Equipment Roller Walker   Sit to Stand Modified Independent   Stand to Sit Modified Independent   Car Transfer   Reason if not Attempted Environmental limitations   Car Transfer CARE Score 10   Walk 10 Feet   Type of Assistance Needed Independent   Physical Assistance Level No physical assistance   Comment RW   Walk 10 Feet CARE Score 6   Walk 50 Feet with Two Turns   Type of Assistance Needed Independent   Physical Assistance Level No physical assistance   Comment RW   Walk 50 Feet with Two Turns CARE Score 6   Walk 150 Feet   Type of Assistance Needed Independent   Physical Assistance Level No physical assistance   Comment RW   Walk 150 Feet CARE Score 6   Walking 10 Feet on Uneven Surfaces   Type of Assistance Needed Independent   Physical Assistance Level No physical assistance   Comment foam mat, ramp   Walking 10 Feet on Uneven Surfaces CARE Score 6   Ambulation   Primary Mode of Locomotion Prior to Admission Walk   Distance Walked (feet) 235 ft  (200')   Assist Device Roller Walker   Gait Pattern Step to   Limitations Noted In Balance   Provided Assistance with: Balance   Walk Assist Level Modified Independent   Findings Able to maintain TTWB R LE during gait. "   Does the patient walk? 2. Yes   Wheelchair mobility   Does the patient use a wheelchair? 0. No   Curb or Single Stair   Style negotiated Single stair   Type of Assistance Needed Independent   Physical Assistance Level No physical assistance   Comment 6 steps, L rail, TTWB maintained, descends backwards   1 Step (Curb) CARE Score 6   4 Steps   Type of Assistance Needed Independent   Physical Assistance Level No physical assistance   Comment 6 steps, L rail, TTWB maintained, descends backwards   4 Steps CARE Score 6   12 Steps   Reason if not Attempted Activity not applicable   12 Steps CARE Score 9   Stairs   Type Stairs;Ramp   # of Steps 6   Weight Bearing Precautions RLE;Fall Risk;TTWB   Assist Devices Single Rail   Findings Mod I to ascend/descend 6 steps with L rail with TTWB R LE.  Pt descends steps backwards.  Pt was able to ascend/descend ramp with the RW with mod I.   Therapeutic Interventions   Balance ramp, foam mat   Other gait, transfers   Equipment Use   NuStep NuStep - B UEs, L LE - 20 minutes, resistance level 5   Assessment   Treatment Assessment Pt motivated to participate. Demonstrates good endurance with completion of NuStep. Demonstrates good safety with completion of steps and ascending/descending ramp with the RW.  Will continue to progress therapy in order to maximize function and prepare pt for d/c to home.   Problem List Decreased strength;Decreased range of motion;Impaired balance   PT Barriers   Physical Impairment Decreased strength;Decreased range of motion;Impaired balance   Functional Limitation Car transfers   Plan   Treatment/Interventions Functional transfer training;LE strengthening/ROM;Elevations;Therapeutic exercise;Gait training   Progress Improving as expected   PT Therapy Minutes   PT Time In 1230   PT Time Out 1300   PT Total Time (minutes) 30   PT Mode of treatment - Individual (minutes) 30   PT Mode of treatment - Concurrent (minutes) 0   PT Mode of treatment - Group  (minutes) 0   PT Mode of treatment - Co-treat (minutes) 0   PT Mode of Treatment - Total time(minutes) 30 minutes   PT Cumulative Minutes 2112   Therapy Time missed   Time missed? No

## 2025-04-09 NOTE — PROGRESS NOTES
"Progress Note - PMR   Name: Tobin Kerns 69 y.o. male I MRN: [id number]  Unit/Bed#: -01 I Date of Admission: 3/11/2025   Date of Service: 4/9/2025 I Hospital Day: 29     Assessment & Plan  Quadriceps muscle rupture, right, subsequent encounter  HPI:   He initially underwent quad tendon repair on 7/25/24 with Dr. Foster. He was discharged home with outpatient PT.  Later noted to have recurrent high-grade tear with extensor lag on exam and elected for operative management  January 2025 MRI: \"Prior quadriceps insertion repair with high-grade recurrent tear exhibiting up to 2.2 cm of tendon retraction.\"  On 3/6/25, he underwent revision of quad tendon repair with allograft augmentation  3/21: outpatient follow-up with Dr. Foster, new cast applied   3/28: follow-up with Dr. Foster, continue TTWB   4/4: follow-up with Dr. Foster (removed cast and reapplied long leg cast for an additional 2 weeks) and continue TTWB    Plan:   PT and OT for 3 hours a day, 5-6 days a week   TTWB RLE   Avoid moisture to cast   Pain: PRN Tylenol (monitor LFTs) and oxycodone discontinued on 3/17 due to minimal pain   Per ortho,  Lovenox for 2 more weeks for DVT prophylaxis   Lovenox d/c on 4/7   Follow-up scheduled for 4/11  Hypertension  Continue losartan 50 mg daily   Monitor BP   Mixed hyperlipidemia  Continue pravastatin 40 mg daily   Gastro-esophageal reflux disease without esophagitis  Continue famotidine 20 mg daily   Peripheral neuropathy  Patient is not diabetic   Monitor skin for new or worsening wounds   JOVANY on CPAP  Continue CPAP qHS  IFG (impaired fasting glucose)  Continue metformin 1500 mg daily   Seen by RD 3/12, diet adjusted to regular   3/12 A1c: 5.4   Healed ulcer of left foot  Local wound care  Present on admission   Wound care RN consulted   BPH (benign prostatic hyperplasia)  Continue tamsulosin 0.4 mg daily   Patient currently voiding without difficulty   PRN PVRs if urinary retention is suspected "   Impaired mobility and ADLs  Patient was evaluated by the rehabilitation team MD and an appropriate candidate for acute inpatient rehabilitation program at this time.  The patient will tolerate 3 hours/day 5 to 7 days/week of intensive physical and  occupational therapy   in order to obtain goals for community discharge  Due to the patient's functional Compared to their baseline level of function in addition to their ongoing medical needs, the patient would benefit from daily supervision from a rehabilitation physician as well as rehabilitation nursing to implement and adjust the medical as well as functional plan of care in order to meet the patient's goals.  Bladder: Monitor closely for urinary incontinence and/or retention. Monitor urine output and encourage spontaneous voids. If unable to void spontaneously, will monitor with PVR bladder scans and initiate CIC regimen.  Skin: Monitor for breakdown, frequent turns, pressure offloading  Sleep: Encourage sleep hygiene (routine that promotes healthy circadian rhythm,avoid caffeine later in the day, quiet/dark room at night, reduce electronic before bedtime)      Wound of right ankle  Skin breakdown on R heel due to cast. Ortho removed approximately 1 inch of cast at posterior aspect on 3/11  New cast 3/21   Local wound care  Continue to monitor   Present on admission to [facility]   Wound care RN consulted:  Skin Care orders:  1-Hydraguard to sacrum, buttocks bid and prn   2-EHOB / waffle  cushion when out of bed in chair.  3-Moisturize skin daily with skin nourishing cream  4-Elevate heels to offload pressure.  5-Turn/reposition q2h for pressure re-distribution on skin  6. Right posterior heel cleanse with soap and water then pat dry apply cavilon 3 M no sting then a silicone foam nate with a P and date change every other day   7. Left plantar aspect of the foot - cleanse with soap and water then apply a silicone foam nate with a P  and date change every other day      Subjective   Tobin Kerns is a 69 year-old male, with a past medical history of hypertension, hyperlipidemia, JOVANY, impaired fasting glucose, bilateral lower extremity neuropathy, GERD, BPH, presenting to [facility] after an elective right quadricept tendon repair. He initially underwent quad tendon repair on 7/25/24 with Dr. Foster. He was discharged home with outpatient PT. He was noted to have recurrent high-grade tear on repeat MRI with extensor lag on exam and elected for operative management. On 3/6/25, he underwent revision of quad tendon repair with allograft augmentation. He was evaluated by PT and OT and deemed an appropriate candidate for ARC due to functional deficits     Chief Complaint: f/u ambulatory dysfunction    Interval: Seen and evaluated patient during PT. He denies any concerns or pain. He is continent of bowel and bladder. Last BM 4/7.     Objective :  Temp:  [97.3 °F (36.3 °C)-98.3 °F (36.8 °C)] 98.3 °F (36.8 °C)  HR:  [60-76] 60  BP: (117-133)/(62-72) 117/62  Resp:  [16-17] 16  SpO2:  [94 %-96 %] 96 %  O2 Device: None (Room air)    Functional Update:  Physical Therapy Occupational Therapy   Weight Bearing Status: Toe touch (R LE)  Transfers: Independent  Bed Mobility: Independent  Amulation Distance (ft): 200 feet  Ambulation: Independent  Assistive Device for Ambulation: Roller Walker  Wheelchair Mobility Distance:  (n/a)  Wheelchair Mobility:  (N/A)  Number of Stairs: 6  Assistive Device for Stairs: Lehft Hand Rail  Stair Assistance: Independent  Ramp: Independent  Assistive Device for Ramp: Roller Walker  Discharge Recommendations: Other  DC Home with::  (Alternate placement until R LE long cast off)   Eating: Independent  Grooming: Independent  Bathing: Independent  Bathing: Independent  Upper Body Dressing: Independent  Lower Body Dressing: Independent  Toileting: Independent  Tub/Shower Transfer:  (TBA)  Toilet Transfer: Independent  Cognition: Within Defined Limits  Orientation:  Person, Place, Time, Situation           Physical Exam  Vitals and nursing note reviewed.   Constitutional:       General: He is not in acute distress.     Appearance: He is obese. He is not ill-appearing or diaphoretic.   HENT:      Head: Normocephalic and atraumatic.      Nose: Nose normal.      Mouth/Throat:      Mouth: Mucous membranes are moist.   Cardiovascular:      Pulses:           Dorsalis pedis pulses are 2+ on the right side.   Pulmonary:      Effort: Pulmonary effort is normal. No respiratory distress.   Abdominal:      General: There is no distension.   Skin:     General: Skin is warm and dry.   Neurological:      General: No focal deficit present.      Mental Status: He is alert.   Psychiatric:         Mood and Affect: Mood normal.         Behavior: Behavior normal.         Scheduled Meds:  Current Facility-Administered Medications   Medication Dose Route Frequency Provider Last Rate    acetaminophen  650 mg Oral Q6H PRN Modesta Lee PA-C      ascorbic acid  500 mg Oral Daily Toño Brito MD      Cholecalciferol  5,000 Units Oral Daily Toño Brito MD      famotidine  20 mg Oral Daily Toño Brito MD      losartan  50 mg Oral Daily Toño Brito MD      metFORMIN  1,500 mg Oral Daily With Dinner Toño Brito MD      multivitamin stress formula  1 tablet Oral Daily Toño Brito MD      polyethylene glycol  17 g Oral Daily PRN Modesta Lee PA-C      pravastatin  40 mg Oral Daily With Dinner Toño Brito MD      senna  1 tablet Oral HS PRN Modesta Lee PA-C      tamsulosin  0.4 mg Oral Daily With Dinner Toño Brito MD           Lab Results: I have reviewed the following results:  Results from last 7 days   Lab Units 04/07/25  0519   HEMOGLOBIN g/dL 12.9   HEMATOCRIT % 38.6   WBC Thousand/uL 7.72   PLATELETS Thousands/uL 199     Results from last 7 days   Lab Units 04/07/25  0519   BUN mg/dL 20   SODIUM mmol/L 136   POTASSIUM mmol/L 4.1   CHLORIDE mmol/L 105   CREATININE mg/dL 1.16   AST  U/L 12*   ALT U/L 11            Modesta Lee PA-C  Physical Medicine and Rehabilitation  [facility]

## 2025-04-10 PROCEDURE — 97110 THERAPEUTIC EXERCISES: CPT

## 2025-04-10 PROCEDURE — 97530 THERAPEUTIC ACTIVITIES: CPT

## 2025-04-10 PROCEDURE — 99232 SBSQ HOSP IP/OBS MODERATE 35: CPT | Performed by: PHYSICAL MEDICINE & REHABILITATION

## 2025-04-10 PROCEDURE — 97116 GAIT TRAINING THERAPY: CPT

## 2025-04-10 PROCEDURE — 97535 SELF CARE MNGMENT TRAINING: CPT

## 2025-04-10 RX ADMIN — CHOLECALCIFEROL TAB 25 MCG (1000 UNIT) 5000 UNITS: 25 TAB at 09:37

## 2025-04-10 RX ADMIN — FAMOTIDINE 20 MG: 20 TABLET, FILM COATED ORAL at 09:36

## 2025-04-10 RX ADMIN — TAMSULOSIN HYDROCHLORIDE 0.4 MG: 0.4 CAPSULE ORAL at 16:10

## 2025-04-10 RX ADMIN — ACETAMINOPHEN 650 MG: 325 TABLET ORAL at 09:38

## 2025-04-10 RX ADMIN — PRAVASTATIN SODIUM 40 MG: 40 TABLET ORAL at 16:10

## 2025-04-10 RX ADMIN — B-COMPLEX W/ C & FOLIC ACID TAB 1 TABLET: TAB at 09:36

## 2025-04-10 RX ADMIN — OXYCODONE HYDROCHLORIDE AND ACETAMINOPHEN 500 MG: 500 TABLET ORAL at 09:36

## 2025-04-10 RX ADMIN — METFORMIN ER 500 MG 1500 MG: 500 TABLET ORAL at 16:10

## 2025-04-10 NOTE — PROGRESS NOTES
04/10/25 0900   Pain Assessment   Pain Assessment Tool 0-10   Pain Score 2   Pain Location/Orientation Orientation: Right;Location: Knee   Restrictions/Precautions   Precautions Fall Risk   RLE Weight Bearing Per Order TTWB   ROM Restrictions No   Braces or Orthoses   (R LE long cast)   Cognition   Overall Cognitive Status WFL   Arousal/Participation Alert;Responsive;Cooperative   Attention Within functional limits   Orientation Level Oriented X4   Memory Within functional limits   Following Commands Follows all commands and directions without difficulty   Subjective   Subjective Pt reports slight pain in R knee this morning, attributes it to sitting in the wheelchair   Roll Left and Right   Type of Assistance Needed Independent   Physical Assistance Level No physical assistance   Roll Left and Right CARE Score 6   Sit to Lying   Type of Assistance Needed Independent   Physical Assistance Level No physical assistance   Sit to Lying CARE Score 6   Lying to Sitting on Side of Bed   Type of Assistance Needed Independent   Physical Assistance Level No physical assistance   Lying to Sitting on Side of Bed CARE Score 6   Sit to Stand   Type of Assistance Needed Independent   Physical Assistance Level No physical assistance   Comment RW   Sit to Stand CARE Score 6   Bed-Chair Transfer   Type of Assistance Needed Independent   Physical Assistance Level No physical assistance   Comment RW   Chair/Bed-to-Chair Transfer CARE Score 6   Car Transfer   Reason if not Attempted Environmental limitations   Car Transfer CARE Score 10   Walk 10 Feet   Type of Assistance Needed Independent   Physical Assistance Level No physical assistance   Comment RW   Walk 10 Feet CARE Score 6   Walk 50 Feet with Two Turns   Type of Assistance Needed Independent   Physical Assistance Level No physical assistance   Comment RW   Walk 50 Feet with Two Turns CARE Score 6   Walk 150 Feet   Type of Assistance Needed Independent   Physical Assistance  Level No physical assistance   Comment RW   Walk 150 Feet CARE Score 6   Walking 10 Feet on Uneven Surfaces   Type of Assistance Needed Independent   Physical Assistance Level No physical assistance   Comment RW, ramp   Walking 10 Feet on Uneven Surfaces CARE Score 6   Ambulation   Primary Mode of Locomotion Prior to Admission Walk   Distance Walked (feet) 160 ft  (160', 150')   Assist Device Roller Walker   Findings good maintenance of TTWB R LE   Does the patient walk? 2. Yes   Wheel 50 Feet with Two Turns   Reason if not Attempted Activity not applicable   Wheel 50 Feet with Two Turns CARE Score 9   Wheel 150 Feet   Reason if not Attempted Activity not applicable   Wheel 150 Feet CARE Score 9   Wheelchair mobility   Findings N/A   Does the patient use a wheelchair? 0. No   Curb or Single Stair   Style negotiated Single stair   Type of Assistance Needed Independent   Physical Assistance Level No physical assistance   Comment 6  steps, L HR, non-reciprocal   1 Step (Curb) CARE Score 6   4 Steps   Type of Assistance Needed Independent   Physical Assistance Level No physical assistance   Comment 6  steps, L HR, non-reciprocal   4 Steps CARE Score 6   12 Steps   Reason if not Attempted Activity not applicable   12 Steps CARE Score 9   Toilet Transfer   Comment did not require during session   Therapeutic Interventions   Strengthening supine LE TE   Other transfer training, gait training, stair training, ramp negotiation   Equipment Use   NuStep 20 min, L5, B/L UE, L LE   Assessment   Treatment Assessment Pt agreeable to PT this AM, received sitting upright in w/c. Pt continues to demonstrate proper adherence with TTWB R LE with all functional mobility using RW. Continued to challenge LE strength/endurance with supine LE TE and NuStep with good tolerance. Pt remains limited by TTWB R LE in long cast, remains unsafe for return home at this time due to inadequate spacing for RW, lack of family support, and  unable to safely navigating environment with R LE long cast. Will continue with current PT POC to improve deficits and promote return to PLOF.   Problem List Decreased strength;Decreased range of motion;Impaired balance;Decreased mobility   PT Barriers   Physical Impairment Decreased strength;Decreased range of motion;Impaired balance   Functional Limitation Car transfers   Plan   Treatment/Interventions Functional transfer training;LE strengthening/ROM;Therapeutic exercise;Endurance training;Patient/family training;Equipment eval/education;Bed mobility;Gait training   Progress Improving as expected   PT Therapy Minutes   PT Time In 0900   PT Time Out 1030   PT Total Time (minutes) 90   PT Mode of treatment - Individual (minutes) 90   PT Mode of treatment - Concurrent (minutes) 0   PT Mode of treatment - Group (minutes) 0   PT Mode of treatment - Co-treat (minutes) 0   PT Mode of Treatment - Total time(minutes) 90 minutes   PT Cumulative Minutes 2202     Patient remains supine in bed, all needs in reach.  Alarm in place and activated.  Encouraged use of call bell, patient verbalizes understanding.

## 2025-04-10 NOTE — ASSESSMENT & PLAN NOTE
"HPI:   He initially underwent quad tendon repair on 7/25/24 with Dr. Foster. He was discharged home with outpatient PT.  Later noted to have recurrent high-grade tear with extensor lag on exam and elected for operative management  January 2025 MRI: \"Prior quadriceps insertion repair with high-grade recurrent tear exhibiting up to 2.2 cm of tendon retraction.\"  On 3/6/25, he underwent revision of quad tendon repair with allograft augmentation  3/21: outpatient follow-up with Dr. Foster, new cast applied   3/28: follow-up with Dr. Foster, continue TTWB   4/4: follow-up with Dr. Foster (removed cast and reapplied long leg cast for an additional 2 weeks) and continue TTWB    Plan:   PT and OT for 3 hours a day, 5-6 days a week   TTWB RLE   Avoid moisture to cast   Pain: PRN Tylenol (monitor LFTs) and oxycodone discontinued on 3/17 due to minimal pain   Lovenox d/c on 4/7   Follow-up scheduled for 4/11  "

## 2025-04-10 NOTE — ASSESSMENT & PLAN NOTE
10/05/23 1118   Service Assessment   Patient Orientation Alert and Oriented   Cognition Alert   History Provided By Patient   Primary [facility]   Patient's Healthcare Decision Maker is: Legal Next of Kin   PCP Verified by CM No  (Unsure of PCP)   Prior Functional Level Independent in ADLs/IADLs   Current Functional Level Independent in ADLs/IADLs   Can patient return to prior living arrangement Yes   Ability to make needs known: Good   Family able to assist with home care needs: Yes   Would you like for me to discuss the discharge plan with any other family members/significant others, and if so, who? Yes  (Kia Saha)   Financial Resources Medicare   Social/Functional History   Lives With Alone   Type of [address] [facility] [place]  One level   Home Access Ramped entrance   [address] Assistance Independent   Transfer Assistance Independent   Active  Yes   Mode of Transportation Car   Occupation Retired   Discharge Planning   Living Arrangements Alone   Services At/After Discharge   Transition of [address] [place] [address] Center  ([facility]) Discharge Planning     CM met with patient at bedside. Demos confirmed as correct. Patient lives alone in one story home with ramped entrance. Independent in ADLs. DME consists of cane, walker and rollator. Her daughter helps her at times. No HH/IRF in the past. Reports SNF admission some years ago. Unable to recall facility name. Utilizes [facility] in [facility]. She has not seen a PCP for greater than one year. Unable to provide PCP name. DCP: home. She will consider home health if needed at discharge. Continue pravastatin 40 mg daily

## 2025-04-10 NOTE — PLAN OF CARE
Problem: PAIN - ADULT  Goal: Verbalizes/displays adequate comfort level or baseline comfort level  Description: Interventions:  - Encourage patient to monitor pain and request assistance  - Assess pain using appropriate pain scale  - Administer analgesics based on type and severity of pain and evaluate response  - Implement non-pharmacological measures as appropriate and evaluate response  - Consider cultural and social influences on pain and pain management  - Notify physician/advanced practitioner if interventions unsuccessful or patient reports new pain  Outcome: Progressing     Problem: INFECTION - ADULT  Goal: Absence or prevention of progression during hospitalization  Description: INTERVENTIONS:  - Assess and monitor for signs and symptoms of infection  - Monitor lab/diagnostic results  - Monitor all insertion sites, i.e. indwelling lines, tubes, and drains  - Monitor endotracheal if appropriate and nasal secretions for changes in amount and color  - [place] appropriate cooling/warming therapies per order  - Administer medications as ordered  - Instruct and encourage patient and family to use good hand hygiene technique  - Identify and instruct in appropriate isolation precautions for identified infection/condition  Outcome: Progressing  Goal: Absence of fever/infection during neutropenic period  Description: INTERVENTIONS:  - Monitor WBC    Outcome: Progressing     Problem: SAFETY ADULT  Goal: Patient will remain free of falls  Description: INTERVENTIONS:  - Educate patient/family on patient safety including physical limitations  - Instruct patient to call for assistance with activity   - Consult OT/PT to assist with strengthening/mobility   - Keep Call bell within reach  - Keep bed low and locked with side rails adjusted as appropriate  - Keep care items and personal belongings within reach  - Initiate and maintain comfort rounds  - Make Fall Risk Sign visible to staff  - Offer Toileting every 2 Hours,  in advance of need  - Initiate/Maintain bed/chair alarm  - Apply yellow socks and bracelet for high fall risk patients  - Consider moving patient to room near nurses station  Outcome: Progressing     Problem: Nutrition/Hydration-ADULT  Goal: Nutrient/Hydration intake appropriate for improving, restoring or maintaining nutritional needs  Description: Monitor and assess patient's nutrition/hydration status for malnutrition. Collaborate with interdisciplinary team and initiate plan and interventions as ordered.  Monitor patient's weight and dietary intake as ordered or per policy. Utilize nutrition screening tool and intervene as necessary. Determine patient's food preferences and provide high-protein, high-caloric foods as appropriate. INTERVENTIONS:- Monitor oral intake, urinary output, labs, and treatment plans- Assess nutrition and hydration status and recommend course of action- Evaluate amount of meals eaten- Assist patient with eating if necessary - Allow adequate time for meals- Recommend/ encourage appropriate diets, oral nutritional supplements, and vitamin/mineral supplements- Order, calculate, and assess calorie counts as needed- Recommend, monitor, and adjust tube feedings and TPN/PPN based on assessed needs- Assess need for intravenous fluids- Provide specific nutrition/hydration education as appropriate- Include patient/family/caregiver in decisions related to nutrition  Outcome: Progressing

## 2025-04-10 NOTE — PROGRESS NOTES
04/10/25 3445   Pain Assessment   Pain Assessment Tool 0-10   Pain Score No Pain   Restrictions/Precautions   Precautions Fall Risk   Oral Hygiene   Type of Assistance Needed Independent   Physical Assistance Level No physical assistance   Comment stance   Oral Hygiene CARE Score 6   Grooming   Able To Initiate Tasks;Acquire Items;Comb/Brush Hair;Wash/Dry Face;Brush/Clean Teeth;Wash/Dry Hands   Findings stance   Shower/Bathe Self   Type of Assistance Needed Independent;Adaptive equipment   Physical Assistance Level No physical assistance   Shower/Bathe Self CARE Score 6   Bathing   Assessed Bath Style Sponge Bath   Able to Wash/Rinse/Dry (body part) Buttocks;Perineal Area;Abdomen;Chest;R Lower Leg/Foot;L Lower Leg/Foot;R Upper Leg;L Upper Leg;Right Arm;Left Arm   Limitations Noted in ROM   Upper Body Dressing   Type of Assistance Needed Independent   Physical Assistance Level No physical assistance   Upper Body Dressing CARE Score 6   Lower Body Dressing   Type of Assistance Needed Independent;Adaptive equipment   Physical Assistance Level No physical assistance   Lower Body Dressing CARE Score 6   Putting On/Taking Off Footwear   Type of Assistance Needed Independent;Adaptive equipment   Physical Assistance Level No physical assistance   Putting On/Taking Off Footwear CARE Score 6   Picking Up Object   Type of Assistance Needed Independent;Adaptive equipment   Physical Assistance Level No physical assistance   Picking Up Object CARE Score 6   Dressing/Undressing Clothing   Able to  Obtain Clothing;Store removed clothing   Remove UB Clothes Button Shirt   Don UB Clothes Button Shirt   Remove LB Clothes Undergarment;Socks;Shorts   Don LB Clothes Shorts;Undergarment;Socks   Limitations Noted In ROM   Adaptive Equipment Reacher;Dressing Stick;Sock Aide   Positioning Standing;Supported Sit   Lying to Sitting on Side of Bed   Type of Assistance Needed Independent   Physical Assistance Level No physical assistance    Lying to Sitting on Side of Bed CARE Score 6   Sit to Stand   Type of Assistance Needed Independent   Physical Assistance Level No physical assistance   Sit to Stand CARE Score 6   Toileting Hygiene   Type of Assistance Needed Independent   Physical Assistance Level No physical assistance   Toileting Hygiene CARE Score 6   Toileting   Able to Pull Clothing down yes, up yes.   Able to Manage Clothing Closures Yes   Limitations Noted In ROM   Toilet Transfer   Type of Assistance Needed Independent;Adaptive equipment   Physical Assistance Level No physical assistance   Toilet Transfer CARE Score 6   Toilet Transfer   Surface Assessed Standard Toilet   Transfer Technique Standard   Light Housekeeping   Light Housekeeping Level Walker   Light Housekeeping Level of Assistance Modified independent   Light Housekeeping gathers supplies Indep;does laundry Indep   Exercise Tools   Hand Gripper B hands 2x40 reps with heavy resistance gripper   Other Exercise Tool 1 red flex bar 2x20 reps upward/downward   Other Exercise Tool 2 6# dowel 2x15 reps in 6 planes of motion   Cognition   Overall Cognitive Status WFL   Arousal/Participation Alert;Cooperative   Attention Within functional limits   Orientation Level Oriented X4   Memory Within functional limits   Following Commands Follows all commands and directions without difficulty   Additional Activities   Additional Activities Comments fxnl mobility MI RW TTWB R LE   Activity Tolerance   Activity Tolerance Patient tolerated treatment well   Assessment   Treatment Assessment ADL completed via sponge bathe level: Pt able to consistently s/u and c/u all supplies Independently with use of RW and while maintaining TTWB R LE. Pt demon consistent recall of compensatory strategies and compliance with safe techniques throughout all ADL/iADLs.  Pt demon ability to safely and functionally stand to shave.  Pt's barriers to d/c to personal home environment include decreased strength RLE s/p  quadricepts rupture, decreased ROM with use of leg length hard cast, and TTWB RLE. Plan is contingent upon placement due to noted barriers. Ortho f/u 4/11   Prognosis Good   Problem List Decreased strength;Decreased range of motion;Impaired balance;Decreased mobility   Assessment Pt participated in 50 minutes of concurrent tx addressing simialr goals with a pt with similar deficits.   Plan   Treatment/Interventions ADL retraining;Functional transfer training;Therapeutic exercise;Endurance training;Patient/family training;Equipment eval/education;Gait training;Compensatory technique education;Spoke to nursing;OT   Progress Progressing toward goals   OT Therapy Minutes   OT Time In 1305   OT Time Out 1435   OT Total Time (minutes) 90   OT Mode of treatment - Individual (minutes) 40   OT Mode of treatment - Concurrent (minutes) 50   OT Mode of treatment - Group (minutes) 0   OT Mode of treatment - Co-treat (minutes) 0   OT Mode of Treatment - Total time(minutes) 90 minutes   OT Cumulative Minutes 1802   Therapy Time missed   Time missed? No

## 2025-04-10 NOTE — ASSESSMENT & PLAN NOTE
Skin breakdown on R heel due to cast. Ortho removed approximately 1 inch of cast at posterior aspect on 3/11  New cast 3/21   Local wound care  Continue to monitor   Present on admission to [facility]   Wound care RN consulted:  Skin Care orders:  1-Hydraguard to sacrum, buttocks bid and prn   2-EHOB / waffle  cushion when out of bed in chair.  3-Moisturize skin daily with skin nourishing cream  4-Elevate heels to offload pressure.  5-Turn/reposition q2h for pressure re-distribution on skin  6. Right posterior heel cleanse with soap and water then pat dry apply cavilon 3 M no sting then a silicone foam nate with a P and date change every other day   7. Left plantar aspect of the foot - cleanse with soap and water then apply a silicone foam nate with a P  and date change every other day

## 2025-04-10 NOTE — PROGRESS NOTES
"Progress Note - Tobin Kerns 69 y.o. male MRN: [id number]    Unit/Bed#: [facility] 210-01 Encounter: [id number]        Subjective:   Patient seen and examined at bedside after reviewing the chart and discussing the case with the caring staff.      Patient examined at bedside.  Patient denies any acute symptoms.     BP on low side this morning 90/51.  Asymptomatic.  Losartan held.     Physical Exam   Vitals: Blood pressure 90/51, pulse 57, temperature 97.6 °F (36.4 °C), temperature source Temporal, resp. rate 18, height 6' 5\" (1.956 m), weight (!) 136 kg (300 lb 0.7 oz), SpO2 95%.,Body mass index is 35.58 kg/m².  Constitutional: Patient in no acute distress.  HEENT: PERR, EOMI, MMM.  Cardiovascular: Normal rate and regular rhythm.    Pulmonary/Chest: Effort normal and breath sounds normal.   Abdomen: Soft, + BS, NT.    Assessment/Plan:  Tobin Kerns is a(n) 69 y.o. year old male who is s/p quadriceps tendon repair.     Cardiac with hx of HTN, HLD.  Patient is on losartan 50 mg daily, pravastatin 40 mg daily.  Impaired fasting glucose.  Hgb a1c 5.4% on 3/12/25.  Continue metformin XR 1500 mg daily with dinner.  Regular diet and d/c accucheks as blood sugar is well controlled.    BPH.  Continue Flomax 0.4 mg daily.  GERD.  Continue Pepcid 20 mg daily.  JVOANY.  On CPAP at bedtime.   Vitamin D deficiency.  Patient is on vitamin D3 5000 units daily.  Pain and bowel management.  As per physiatrist.  Quadriceps tendon repair.  Toe touch weightbearing RLE with use of walker.  Patient is receiving intensive PT OT as per physiatrist.    Anticipated date of discharge.  TBD.    The patient was discussed with Dr. Brito and he is in agreement with the above note.  "

## 2025-04-10 NOTE — PROGRESS NOTES
"Progress Note - PMR   Name: Tobin Kerns 69 y.o. male I MRN: [id number]  Unit/Bed#: -01 I Date of Admission: 3/11/2025   Date of Service: 4/10/2025 I Hospital Day: 30     Assessment & Plan  Quadriceps muscle rupture, right, subsequent encounter  HPI:   He initially underwent quad tendon repair on 7/25/24 with Dr. Fosetr. He was discharged home with outpatient PT.  Later noted to have recurrent high-grade tear with extensor lag on exam and elected for operative management  January 2025 MRI: \"Prior quadriceps insertion repair with high-grade recurrent tear exhibiting up to 2.2 cm of tendon retraction.\"  On 3/6/25, he underwent revision of quad tendon repair with allograft augmentation  3/21: outpatient follow-up with Dr. Foster, new cast applied   3/28: follow-up with Dr. Foster, continue TTWB   4/4: follow-up with Dr. Foster (removed cast and reapplied long leg cast for an additional 2 weeks) and continue TTWB    Plan:   PT and OT for 3 hours a day, 5-6 days a week   TTWB RLE   Avoid moisture to cast   Pain: PRN Tylenol (monitor LFTs) and oxycodone discontinued on 3/17 due to minimal pain   Lovenox d/c on 4/7   Follow-up scheduled for 4/11  Hypertension  Continue losartan 50 mg daily   Monitor BP   Mixed hyperlipidemia  Continue pravastatin 40 mg daily   Gastro-esophageal reflux disease without esophagitis  Continue famotidine 20 mg daily   Peripheral neuropathy  Patient is not diabetic   Monitor skin for new or worsening wounds   JOVANY on CPAP  Continue CPAP qHS  IFG (impaired fasting glucose)  Continue metformin 1500 mg daily   Seen by RD 3/12, diet adjusted to regular   3/12 A1c: 5.4   Healed ulcer of left foot  Local wound care  Present on admission   Wound care RN consulted   BPH (benign prostatic hyperplasia)  Continue tamsulosin 0.4 mg daily   Patient currently voiding without difficulty   PRN PVRs if urinary retention is suspected   Impaired mobility and ADLs  Patient was evaluated by the " rehabilitation team MD and an appropriate candidate for acute inpatient rehabilitation program at this time.  The patient will tolerate 3 hours/day 5 to 7 days/week of intensive physical and  occupational therapy   in order to obtain goals for community discharge  Due to the patient's functional Compared to their baseline level of function in addition to their ongoing medical needs, the patient would benefit from daily supervision from a rehabilitation physician as well as rehabilitation nursing to implement and adjust the medical as well as functional plan of care in order to meet the patient's goals.  Bladder: Monitor closely for urinary incontinence and/or retention. Monitor urine output and encourage spontaneous voids. If unable to void spontaneously, will monitor with PVR bladder scans and initiate CIC regimen.  Skin: Monitor for breakdown, frequent turns, pressure offloading  Sleep: Encourage sleep hygiene (routine that promotes healthy circadian rhythm,avoid caffeine later in the day, quiet/dark room at night, reduce electronic before bedtime)      Wound of right ankle  Skin breakdown on R heel due to cast. Ortho removed approximately 1 inch of cast at posterior aspect on 3/11  New cast 3/21   Local wound care  Continue to monitor   Present on admission to [facility]   Wound care RN consulted:  Skin Care orders:  1-Hydraguard to sacrum, buttocks bid and prn   2-EHOB / waffle  cushion when out of bed in chair.  3-Moisturize skin daily with skin nourishing cream  4-Elevate heels to offload pressure.  5-Turn/reposition q2h for pressure re-distribution on skin  6. Right posterior heel cleanse with soap and water then pat dry apply cavilon 3 M no sting then a silicone foam nate with a P and date change every other day   7. Left plantar aspect of the foot - cleanse with soap and water then apply a silicone foam nate with a P  and date change every other day     Subjective   Tobin Kerns is a 69 year-old male, with a  past medical history of hypertension, hyperlipidemia, JOVANY, impaired fasting glucose, bilateral lower extremity neuropathy, GERD, BPH, presenting to [facility] after an elective right quadricept tendon repair. He initially underwent quad tendon repair on 7/25/24 with Dr. Foster. He was discharged home with outpatient PT. He was noted to have recurrent high-grade tear on repeat MRI with extensor lag on exam and elected for operative management. On 3/6/25, he underwent revision of quad tendon repair with allograft augmentation. He was evaluated by PT and OT and deemed an appropriate candidate for [facility] due to functional deficits     Chief Complaint: f/u ambulatory dysfunction    Interval:     Seen and evaluated patient during PT. He endorses some mild right knee pain last night that has since resolved. He does not have any concerns at this time. Last BM 4/7, he is continent of bowel and bladder.     Objective :  Temp:  [97.6 °F (36.4 °C)-97.7 °F (36.5 °C)] 97.6 °F (36.4 °C)  HR:  [57-63] 57  BP: ()/(51-52) 90/51  Resp:  [16-18] 18  SpO2:  [95 %-96 %] 95 %  O2 Device: None (Room air)    Functional Update:  Physical Therapy Occupational Therapy   Weight Bearing Status: Toe touch (R LE)  Transfers: Independent  Bed Mobility: Independent  Amulation Distance (ft): 200 feet  Ambulation: Independent  Assistive Device for Ambulation: Roller Walker  Wheelchair Mobility Distance:  (n/a)  Wheelchair Mobility:  (N/A)  Number of Stairs: 6  Assistive Device for Stairs: Lehft Hand Rail  Stair Assistance: Independent  Ramp: Independent  Assistive Device for Ramp: Roller Walker  Discharge Recommendations: Other  DC Home with::  (Alternate placement until R LE long cast off)   Eating: Independent  Grooming: Independent  Bathing: Independent  Bathing: Independent  Upper Body Dressing: Independent  Lower Body Dressing: Independent  Toileting: Independent  Tub/Shower Transfer:  (TBA)  Toilet Transfer: Independent  Cognition: Within Defined  Limits  Orientation: Person, Place, Time, Situation         Physical Exam  Vitals and nursing note reviewed.   Constitutional:       General: He is not in acute distress.     Appearance: He is obese. He is not ill-appearing or diaphoretic.   HENT:      Head: Normocephalic and atraumatic.      Nose: Nose normal.      Mouth/Throat:      Mouth: Mucous membranes are moist.   Cardiovascular:      Pulses:           Dorsalis pedis pulses are 2+ on the right side.   Pulmonary:      Effort: Pulmonary effort is normal. No respiratory distress.   Abdominal:      General: There is no distension.   Skin:     General: Skin is warm and dry.   Neurological:      General: No focal deficit present.      Mental Status: He is alert.   Psychiatric:         Mood and Affect: Mood normal.         Behavior: Behavior normal.             Scheduled Meds:  Current Facility-Administered Medications   Medication Dose Route Frequency Provider Last Rate    acetaminophen  650 mg Oral Q6H PRN Modesta Lee PA-C      ascorbic acid  500 mg Oral Daily Toño Brito MD      Cholecalciferol  5,000 Units Oral Daily Toño Brito MD      famotidine  20 mg Oral Daily Toño Brito MD      losartan  50 mg Oral Daily Toño Brito MD      metFORMIN  1,500 mg Oral Daily With Dinner Toño Brito MD      multivitamin stress formula  1 tablet Oral Daily Toño Brito MD      polyethylene glycol  17 g Oral Daily PRN Modesta Lee PA-C      pravastatin  40 mg Oral Daily With Dinner Toño Brito MD      senna  1 tablet Oral HS PRN Modesta Lee PA-C      tamsulosin  0.4 mg Oral Daily With Dinner Toño Brito MD           Lab Results: I have reviewed the following results:  Results from last 7 days   Lab Units 04/07/25  0519   HEMOGLOBIN g/dL 12.9   HEMATOCRIT % 38.6   WBC Thousand/uL 7.72   PLATELETS Thousands/uL 199     Results from last 7 days   Lab Units 04/07/25  0519   BUN mg/dL 20   SODIUM mmol/L 136   POTASSIUM mmol/L 4.1   CHLORIDE mmol/L 105    CREATININE mg/dL 1.16   AST U/L 12*   ALT U/L 11            Modesta Lee PA-C  Physical Medicine and Rehabilitation  [facility]

## 2025-04-11 ENCOUNTER — OFFICE VISIT (OUTPATIENT)
Dept: OBGYN CLINIC | Facility: CLINIC | Age: 70
End: 2025-04-11

## 2025-04-11 VITALS — RESPIRATION RATE: 18 BRPM | HEIGHT: 77 IN | BODY MASS INDEX: 35.58 KG/M2

## 2025-04-11 DIAGNOSIS — Z98.890 S/P MUSCULOSKELETAL SYSTEM SURGERY: Primary | ICD-10-CM

## 2025-04-11 PROCEDURE — 99024 POSTOP FOLLOW-UP VISIT: CPT | Performed by: ORTHOPAEDIC SURGERY

## 2025-04-11 PROCEDURE — 97116 GAIT TRAINING THERAPY: CPT

## 2025-04-11 PROCEDURE — 97535 SELF CARE MNGMENT TRAINING: CPT

## 2025-04-11 PROCEDURE — 97530 THERAPEUTIC ACTIVITIES: CPT

## 2025-04-11 PROCEDURE — 99232 SBSQ HOSP IP/OBS MODERATE 35: CPT | Performed by: PHYSICAL MEDICINE & REHABILITATION

## 2025-04-11 PROCEDURE — 97110 THERAPEUTIC EXERCISES: CPT

## 2025-04-11 RX ADMIN — B-COMPLEX W/ C & FOLIC ACID TAB 1 TABLET: TAB at 08:47

## 2025-04-11 RX ADMIN — OXYCODONE HYDROCHLORIDE AND ACETAMINOPHEN 500 MG: 500 TABLET ORAL at 08:47

## 2025-04-11 RX ADMIN — CHOLECALCIFEROL TAB 25 MCG (1000 UNIT) 5000 UNITS: 25 TAB at 08:47

## 2025-04-11 RX ADMIN — LOSARTAN POTASSIUM 50 MG: 50 TABLET, FILM COATED ORAL at 08:47

## 2025-04-11 RX ADMIN — TAMSULOSIN HYDROCHLORIDE 0.4 MG: 0.4 CAPSULE ORAL at 17:22

## 2025-04-11 RX ADMIN — FAMOTIDINE 20 MG: 20 TABLET, FILM COATED ORAL at 08:47

## 2025-04-11 RX ADMIN — PRAVASTATIN SODIUM 40 MG: 40 TABLET ORAL at 17:22

## 2025-04-11 RX ADMIN — METFORMIN ER 500 MG 1500 MG: 500 TABLET ORAL at 17:21

## 2025-04-11 NOTE — NUTRITION
04/11/25 1214   Biochemical Data,Medical Tests, and Procedures   Biochemical Data/Medical Tests/Procedures Lab values reviewed;Meds reviewed   Labs (Comment) 4/7 AST:12, CBC WNL   Meds (Comment) Vit C, Vit D3, pepcid, cozaar, metformin, MVI, pravachol, senna   Nutrition-Focused Physical Exam   Nutrition-Focused Physical Exam Findings RN skin assessment reviewed  (Wound right knee, wound left plantar, pressure injury right ankle per documentation)   Medical-Related Concerns PMH reviewed   Adequacy of Intake   Nutrition Modality PO   Feeding Route   PO Independent   Current PO Intake   Current Diet Order Regular diet thin liquids   Current Meal Intake %   Estimated calorie intake compared to estimated need Nutrient needs met.   PES Statement   Problem Continue previous diagnosis   Recommendations/Interventions   Malnutrition/BMI Present No  (does not meet criteria)   Summary Regular diet thin liquids. No nutrition supplements. Meal completions 100%. 4/9 300#, BMI=35.58; 6# weight loss from from 3/12 306#. Weight ranging 289#-306# since admission. Medications reviewed. Missing teeth. Trace RLE edema noted. Skin integrity reviewed. LBM 4/10. No issues reported.   Interventions/Recommendations Continue current diet order   Education Assessment   Education Education not indicated at this time   Patient Nutrition Goals   Goal Adequate hydration;Adequate intake;Improve skin integrity   Goal Status Met;Extended   Timeframe to complete goal by next f/u   Nutrition Complexity Risk   Nutrition complexity level Sign off   Follow up date 04/21/25

## 2025-04-11 NOTE — PROGRESS NOTES
04/11/25 0645   Pain Assessment   Pain Assessment Tool 0-10   Pain Score No Pain   Restrictions/Precautions   Precautions Fall Risk   RLE Weight Bearing Per Order TTWB   ROM Restrictions No   Braces or Orthoses   (R LE long cast)   Cognition   Overall Cognitive Status WFL   Arousal/Participation Alert;Responsive;Cooperative   Attention Within functional limits   Orientation Level Oriented X4   Memory Within functional limits   Following Commands Follows all commands and directions without difficulty   Subjective   Subjective Pt reports he slept well, no pain this AM   Roll Left and Right   Type of Assistance Needed Independent   Physical Assistance Level No physical assistance   Roll Left and Right CARE Score 6   Sit to Lying   Type of Assistance Needed Independent   Physical Assistance Level No physical assistance   Sit to Lying CARE Score 6   Lying to Sitting on Side of Bed   Type of Assistance Needed Independent   Physical Assistance Level No physical assistance   Lying to Sitting on Side of Bed CARE Score 6   Sit to Stand   Type of Assistance Needed Independent   Physical Assistance Level No physical assistance   Comment RW   Sit to Stand CARE Score 6   Bed-Chair Transfer   Type of Assistance Needed Independent   Physical Assistance Level No physical assistance   Comment RW   Chair/Bed-to-Chair Transfer CARE Score 6   Car Transfer   Reason if not Attempted Environmental limitations   Car Transfer CARE Score 10   Walk 10 Feet   Type of Assistance Needed Independent   Physical Assistance Level No physical assistance   Comment RW   Walk 10 Feet CARE Score 6   Walk 50 Feet with Two Turns   Type of Assistance Needed Independent   Physical Assistance Level No physical assistance   Comment RW   Walk 50 Feet with Two Turns CARE Score 6   Walk 150 Feet   Type of Assistance Needed Independent   Physical Assistance Level No physical assistance   Comment RW   Walk 150 Feet CARE Score 6   Walking 10 Feet on Uneven  Surfaces   Type of Assistance Needed Independent   Physical Assistance Level No physical assistance   Comment RW, green foam   Walking 10 Feet on Uneven Surfaces CARE Score 6   Ambulation   Primary Mode of Locomotion Prior to Admission Walk   Distance Walked (feet) 150 ft  (150', 120')   Assist Device Roller Walker   Does the patient walk? 2. Yes   Wheel 50 Feet with Two Turns   Reason if not Attempted Activity not applicable   Wheel 50 Feet with Two Turns CARE Score 9   Wheel 150 Feet   Reason if not Attempted Activity not applicable   Wheel 150 Feet CARE Score 9   Wheelchair mobility   Findings N/A   Does the patient use a wheelchair? 0. No   Curb or Single Stair   Style negotiated Single stair   Type of Assistance Needed Independent   Physical Assistance Level No physical assistance   Comment 6  steps, L HR, non-reciprocal   1 Step (Curb) CARE Score 6   4 Steps   Type of Assistance Needed Independent   Physical Assistance Level No physical assistance   Comment 6  steps, L HR, non-reciprocal   4 Steps CARE Score 6   12 Steps   Reason if not Attempted Activity not applicable   12 Steps CARE Score 9   Picking Up Object   Type of Assistance Needed Independent;Adaptive equipment   Physical Assistance Level No physical assistance   Picking Up Object CARE Score 6   Toilet Transfer   Type of Assistance Needed Independent   Physical Assistance Level No physical assistance   Comment RW   Toilet Transfer CARE Score 6   Therapeutic Interventions   Strengthening seated LE TE   Other transfer training, gait training, stair training   Equipment Use   NuStep 20 min, L5, B/L UE, L LE   Assessment   Treatment Assessment Pt agreeable to PT this AM, received sitting upright in chair. Pt c/o no knee pain this session. Pt continues to demonstrate good safety, sequencing, and maintenance of TTWB R LE with all functional mobility. Continued to challenge LE strength/endurance with NuStep and seated LE TE with good  tolerance. Pt remains unsafe for d/c home due to environmental limittations for R LE long cast. Pt has ortho follow up appointment later this AM. Will continue with current PT POC to improve deficits and promote return to PLOF.   Problem List Decreased strength;Decreased range of motion;Impaired balance;Decreased mobility   Barriers to Discharge Decreased caregiver support;Inaccessible home environment   PT Barriers   Physical Impairment Decreased strength;Decreased range of motion;Impaired balance   Functional Limitation Car transfers   Plan   Treatment/Interventions Functional transfer training;LE strengthening/ROM;Therapeutic exercise;Endurance training;Patient/family training;Equipment eval/education;Bed mobility;Gait training   Progress Progressing toward goals   PT Therapy Minutes   PT Time In 0645   PT Time Out 0815   PT Total Time (minutes) 90   PT Mode of treatment - Individual (minutes) 37   PT Mode of treatment - Concurrent (minutes) 53   PT Mode of treatment - Group (minutes) 0   PT Mode of treatment - Co-treat (minutes) 0   PT Mode of Treatment - Total time(minutes) 90 minutes   PT Cumulative Minutes 2292     Patient remains OOB in chair, all needs in reach. Encouraged use of call bell, patient verbalizes understanding.

## 2025-04-11 NOTE — ASSESSMENT & PLAN NOTE
"HPI:   He initially underwent quad tendon repair on 7/25/24 with Dr. Foster. He was discharged home with outpatient PT.  Later noted to have recurrent high-grade tear with extensor lag on exam and elected for operative management  January 2025 MRI: \"Prior quadriceps insertion repair with high-grade recurrent tear exhibiting up to 2.2 cm of tendon retraction.\"  On 3/6/25, he underwent revision of quad tendon repair with allograft augmentation  3/21: outpatient follow-up with Dr. Foster, new cast applied   3/28: follow-up with Dr. Foster, continue TTWB   4/4: follow-up with Dr. Foster (removed cast and reapplied long leg cast for an additional 2 weeks) and continue TTWB  4/11, no changes in plan. Follow-up next week for cast removal     Plan:   PT and OT for 3 hours a day, 5-6 days a week   TTWB RLE   Avoid moisture to cast   Pain: PRN Tylenol (monitor LFTs) and oxycodone discontinued on 3/17 due to minimal pain   Lovenox d/c on 4/7     "

## 2025-04-11 NOTE — NURSING NOTE
Patient continues to be MOD I in room with walker. Reports no pain. Cast in place and cut at bottom to prevent rubbing. Dressing to be changed daily to right posterior foot. Sitting upright for all meals. Will continue to monitor and follow plan of care.

## 2025-04-11 NOTE — PROGRESS NOTES
Name: Tobin Kerns      : 1955      MRN: [id number]  Encounter Provider: Fletcher Foster DO  Encounter Date: 2025   Encounter department: [facility] [place]  :  Assessment & Plan  S/P musculoskeletal system surgery  Approximately 5 week s/p Right knee revision quad tendon repair with allograft augmentation performed on 2025, primary repair performed 2024         Approximately 5 week s/p Right knee revision quad tendon repair with allograft augmentation performed on 2025, primary repair performed 2024  Patient doing well overall s/p operative management  Cast trimmed over posterior ankle secondary to wounds. Mole skin was applied to the cast in this area  Posterior ankle wounds dressed with xeroform, guaze, and tegaderm. Advised facility to check wounds daily.  Plan for cast removal next week. Patient was instructed to bring brace to next visit  Follow up 1 week for reevaluation and cast removal    History of the Present Illness   History of Present Illness   HPI   Tobin Kerns is a 69 y.o. male approximately 5 week s/p Right knee revision quad tendon repair with allograft augmentation performed on 2025, primary repair performed 2024. Today, patient reports he is doing well overall. He has maintained his cast as directed. He has been working on straight leg raises in his cast. He presents today in a wheelchair but has been toe touch WB with a walker.          Review of Systems     Review of Systems   Constitutional:  Negative for chills and fever.   HENT:  Negative for ear pain and sore throat.    Eyes:  Negative for pain and visual disturbance.   Respiratory:  Negative for cough and shortness of breath.    Cardiovascular:  Negative for chest pain and palpitations.   Gastrointestinal:  Negative for abdominal pain and vomiting.   Genitourinary:  Negative for dysuria and hematuria.   Musculoskeletal:  Negative for arthralgias and  "back pain.   Skin:  Negative for color change and rash.   Neurological:  Negative for seizures and syncope.   All other systems reviewed and are negative.      Physical Exam   Objective   Resp 18   Ht 6' 5\" (1.956 m)   BMI 35.58 kg/m²        Right Knee  Surgical incision well healing without sign of infection  Healing wounds distal lower extremity no signs of infection  Calf soft, compressible and nontender  Sensation decreased distal extremity secondary to neuropathy       Data Review     I have personally reviewed pertinent films in PACS, and my interpretation follows.    No new images     Lab Results   Component Value Date/Time    HGBA1C 5.4 2025 05:18 AM       Social History     Tobacco Use    Smoking status: Former     Current packs/day: 0.00     Average packs/day: 1 pack/day for 15.0 years (15.0 ttl pk-yrs)     Types: Cigarettes     Start date: 1980     Quit date: 2005     Years since quittin.2     Passive exposure: Never    Smokeless tobacco: Never   Vaping Use    Vaping status: Never Used   Substance Use Topics    Alcohol use: Yes     Alcohol/week: 1.0 standard drink of alcohol     Types: 1 Cans of beer per week    Drug use: Not Currently     Types: Marijuana     Comment: Last Marijuana Use ; Other unknown drugs while in [place]           Procedures      Amirah Chavez   Scribe Attestation      I,:  Amirah Chavez am acting as a scribe while in the presence of the attending physician.:       I,:  Fletcher Foster, DO personally performed the services described in this documentation    as scribed in my presence.:             "

## 2025-04-11 NOTE — CMS CERTIFICATION NOTE
Patient with next ortho appt 4/18/2025 @ 0930.   Will need transportation set up for this appt and he is to bring brace that is in his room   Still waiting for return call that authorization for pt's surgery has gone through.   Billing dept to let CM know when this is complete to begin authorization for d/c planning services.

## 2025-04-11 NOTE — PLAN OF CARE
Problem: PAIN - ADULT  Goal: Verbalizes/displays adequate comfort level or baseline comfort level  Description: Interventions:  - Encourage patient to monitor pain and request assistance  - Assess pain using appropriate pain scale  - Administer analgesics based on type and severity of pain and evaluate response  - Implement non-pharmacological measures as appropriate and evaluate response  - Consider cultural and social influences on pain and pain management  - Notify physician/advanced practitioner if interventions unsuccessful or patient reports new pain  Outcome: Progressing     Problem: SAFETY ADULT  Goal: Patient will remain free of falls  Description: INTERVENTIONS:  - Educate patient/family on patient safety including physical limitations  - Instruct patient to call for assistance with activity   - Consult OT/PT to assist with strengthening/mobility   - Keep Call bell within reach  - Keep bed low and locked with side rails adjusted as appropriate  - Keep care items and personal belongings within reach  - Initiate and maintain comfort rounds  - Make Fall Risk Sign visible to staff  -   -   - Obtain necessary fall risk management equipment:  - Apply yellow socks and bracelet for high fall risk patients  - Consider moving patient to room near nurses station  Outcome: Progressing     Problem: Nutrition/Hydration-ADULT  Goal: Nutrient/Hydration intake appropriate for improving, restoring or maintaining nutritional needs  Description: Monitor and assess patient's nutrition/hydration status for malnutrition. Collaborate with interdisciplinary team and initiate plan and interventions as ordered.  Monitor patient's weight and dietary intake as ordered or per policy. Utilize nutrition screening tool and intervene as necessary. Determine patient's food preferences and provide high-protein, high-caloric foods as appropriate. INTERVENTIONS:- Monitor oral intake, urinary output, labs, and treatment plans- Assess  nutrition and hydration status and recommend course of action- Evaluate amount of meals eaten- Assist patient with eating if necessary - Allow adequate time for meals- Recommend/ encourage appropriate diets, oral nutritional supplements, and vitamin/mineral supplements- Order, calculate, and assess calorie counts as needed- Recommend, monitor, and adjust tube feedings and TPN/PPN based on assessed needs- Assess need for intravenous fluids- Provide specific nutrition/hydration education as appropriate- Include patient/family/caregiver in decisions related to nutrition  Outcome: Progressing

## 2025-04-11 NOTE — NURSING NOTE
Patient returned from orth appt with no complaints. Next appt April 18 at 9:30 AM. Patient will need transportation set up.

## 2025-04-11 NOTE — PROGRESS NOTES
04/11/25 1230   Pain Assessment   Pain Assessment Tool 0-10   Pain Score No Pain   Restrictions/Precautions   Precautions Fall Risk   RLE Weight Bearing Per Order TTWB   Braces or Orthoses   (R LE long cast)   Oral Hygiene   Type of Assistance Needed Independent   Physical Assistance Level No physical assistance   Oral Hygiene CARE Score 6   Grooming   Able To Initiate Tasks;Acquire Items;Comb/Brush Hair;Wash/Dry Face;Wash/Dry Hands   Shower/Bathe Self   Type of Assistance Needed Independent;Adaptive equipment   Physical Assistance Level No physical assistance   Shower/Bathe Self CARE Score 6   Bathing   Assessed Bath Style Sponge Bath   Anticipated D/C Bath Style Sponge Bath   Able to Gather/Transport Yes   Able to Adjust Water Temperature Yes   Able to Wash/Rinse/Dry (body part) Left Arm;Right Arm;L Upper Leg;R Upper Leg;L Lower Leg/Foot;R Lower Leg/Foot;Chest;Abdomen;Perineal Area;Buttocks   Limitations Noted in ROM   Positioning Seated;Standing   Adaptive Equipment Longhand Sponge;Longhand Reacher   Tub/Shower Transfer   Reason Not Assessed Sponge Bath   Upper Body Dressing   Type of Assistance Needed Independent   Physical Assistance Level No physical assistance   Upper Body Dressing CARE Score 6   Lower Body Dressing   Type of Assistance Needed Independent;Adaptive equipment   Physical Assistance Level No physical assistance   Lower Body Dressing CARE Score 6   Putting On/Taking Off Footwear   Type of Assistance Needed Independent;Adaptive equipment   Physical Assistance Level No physical assistance   Putting On/Taking Off Footwear CARE Score 6   Dressing/Undressing Clothing   Able to  Obtain Clothing;Store removed clothing   Remove UB Clothes Button Shirt   Don UB Clothes Button Shirt   Remove LB Clothes Shorts;Undergarment;Socks   Don LB Clothes Shorts;Undergarment;Socks   Limitations Noted In ROM   Adaptive Equipment Reacher;Dressing Stick;Sock Aide   Positioning Supported Sit;Standing   Sit to Stand    Type of Assistance Needed Independent   Comment RW   Sit to Stand CARE Score 6   Toileting Hygiene   Type of Assistance Needed Independent   Physical Assistance Level No physical assistance   Toileting Hygiene CARE Score 6   Toileting   Able to Pull Clothing down yes, up yes.   Able to Manage Clothing Closures Yes   Manage Hygiene Bladder;Bowel   Limitations Noted In ROM   Adaptive Equipment Grab Bar   Toilet Transfer   Type of Assistance Needed Independent   Physical Assistance Level No physical assistance   Comment RW   Toilet Transfer CARE Score 6   Toilet Transfer   Surface Assessed Standard Toilet   Transfer Technique Standard   Limitations Noted In ROM   Adaptive Equipment Grab Bar;Walker   Kitchen Mobility   Kitchen-Mobility Level Walker   Kitchen Activity Retrieve items;Transport items   Kitchen Mobility Comments Functional mobility with RW and maintained R LE TTWB to access soda from fridge and fill cup with ice and trasport items to table with MI   Light Housekeeping   Light Housekeeping Level Walker   Light Housekeeping Level of Assistance Modified independent   Light Housekeeping Functional mobility with RW to gather laundry and load washer with MI   Exercise Tools   Other Exercise Tool 1 R and L hand grasp/release with heavy resistance gripper, 2x40   Other Exercise Tool 2 B/L UE sup/pro with Red Flexbar, 2x20   Other Exercise Tool 3 B/L UE AROM in available planes with 6# bar, 2x15   Cognition   Overall Cognitive Status WFL   Arousal/Participation Alert;Responsive;Cooperative   Attention Within functional limits   Orientation Level Oriented X4   Memory Within functional limits   Following Commands Follows all commands and directions without difficulty   Additional Activities   Additional Activities Other (Comment)   Additional Activities Comments Functional mobility with RW R LE TTWB MI   Assessment   Treatment Assessment Pt received sitting in WC and agreeable to participation with OT session. Pt  completed functional mobility with RW with MI, functional transfers and UE/LE dressing and sponge bath with MI and maintained R LE TTWB and effective use with AD. Pt with f/u Ortho appt earlier with continued R LE long cast in place. Good tolerence with UE exercises. Pt would benefit from continued participation with OT services to address barriers with R LE leg length hard cast, R LE TTWB and limited ROM and help support improved safety and independence and help facilitate progress towards safe discharge.   Plan   Treatment/Interventions ADL retraining;Functional transfer training;Therapeutic exercise;Endurance training;Patient/family training;Equipment eval/education;Bed mobility;Compensatory technique education   Progress Progressing toward goals   OT Therapy Minutes   OT Time In 1230   OT Time Out 1400   OT Total Time (minutes) 90   OT Mode of treatment - Individual (minutes) 90

## 2025-04-11 NOTE — PROGRESS NOTES
"Progress Note - Tobin Kerns 69 y.o. male MRN: [id number]    Unit/Bed#: [facility] 210-01 Encounter: [id number]        Subjective:   Patient seen and examined at bedside after reviewing the chart and discussing the case with the caring staff.      Patient examined at bedside.  Patient denies any acute symptoms.     Physical Exam   Vitals: Blood pressure 112/65, pulse 63, temperature 97.6 °F (36.4 °C), temperature source Temporal, resp. rate 16, height 6' 5\" (1.956 m), weight (!) 136 kg (300 lb 0.7 oz), SpO2 95%.,Body mass index is 35.58 kg/m².  Constitutional: Patient in no acute distress.  HEENT: PERR, EOMI, MMM.  Cardiovascular: Normal rate and regular rhythm.    Pulmonary/Chest: Effort normal and breath sounds normal.   Abdomen: Soft, + BS, NT.    Assessment/Plan:  Tobin Kerns is a(n) 69 y.o. year old male who is s/p quadriceps tendon repair.     Cardiac with hx of HTN, HLD.  Patient is on losartan 50 mg daily, pravastatin 40 mg daily.  Impaired fasting glucose.  Hgb a1c 5.4% on 3/12/25.  Continue metformin XR 1500 mg daily with dinner.  Regular diet and d/c accucheks as blood sugar is well controlled.    BPH.  Continue Flomax 0.4 mg daily.  GERD.  Continue Pepcid 20 mg daily.  JOVANY.  On CPAP at bedtime.   Vitamin D deficiency.  Patient is on vitamin D3 5000 units daily.  Pain and bowel management.  As per physiatrist.  Quadriceps tendon repair.  Toe touch weightbearing RLE with use of walker.  Patient is receiving intensive PT OT as per physiatrist.    Anticipated date of discharge.  TBD.    The patient was discussed with Dr. Brito and he is in agreement with the above note.  "

## 2025-04-11 NOTE — PROGRESS NOTES
"Progress Note - PMR   Name: Tobin Kerns 69 y.o. male I MRN: [id number]  Unit/Bed#: -01 I Date of Admission: 3/11/2025   Date of Service: 4/11/2025 I Hospital Day: 31     Assessment & Plan  Quadriceps muscle rupture, right, subsequent encounter  HPI:   He initially underwent quad tendon repair on 7/25/24 with Dr. Foster. He was discharged home with outpatient PT.  Later noted to have recurrent high-grade tear with extensor lag on exam and elected for operative management  January 2025 MRI: \"Prior quadriceps insertion repair with high-grade recurrent tear exhibiting up to 2.2 cm of tendon retraction.\"  On 3/6/25, he underwent revision of quad tendon repair with allograft augmentation  3/21: outpatient follow-up with Dr. Foster, new cast applied   3/28: follow-up with Dr. Foster, continue TTWB   4/4: follow-up with Dr. Foster (removed cast and reapplied long leg cast for an additional 2 weeks) and continue TTWB  4/11, no changes in plan. Follow-up next week for cast removal     Plan:   PT and OT for 3 hours a day, 5-6 days a week   TTWB RLE   Avoid moisture to cast   Pain: PRN Tylenol (monitor LFTs) and oxycodone discontinued on 3/17 due to minimal pain   Lovenox d/c on 4/7     Hypertension  Continue losartan 50 mg daily   Monitor BP   Mixed hyperlipidemia  Continue pravastatin 40 mg daily   Gastro-esophageal reflux disease without esophagitis  Continue famotidine 20 mg daily   Peripheral neuropathy  Patient is not diabetic   Monitor skin for new or worsening wounds   JOVANY on CPAP  Continue CPAP qHS  IFG (impaired fasting glucose)  Continue metformin 1500 mg daily   Seen by RD 3/12, diet adjusted to regular   3/12 A1c: 5.4   Healed ulcer of left foot  Local wound care  Present on admission   Wound care RN consulted   BPH (benign prostatic hyperplasia)  Continue tamsulosin 0.4 mg daily   Patient currently voiding without difficulty   PRN PVRs if urinary retention is suspected   Impaired mobility and " ADLs  Patient was evaluated by the rehabilitation team MD and an appropriate candidate for acute inpatient rehabilitation program at this time.  The patient will tolerate 3 hours/day 5 to 7 days/week of intensive physical and  occupational therapy   in order to obtain goals for community discharge  Due to the patient's functional Compared to their baseline level of function in addition to their ongoing medical needs, the patient would benefit from daily supervision from a rehabilitation physician as well as rehabilitation nursing to implement and adjust the medical as well as functional plan of care in order to meet the patient's goals.  Bladder: Monitor closely for urinary incontinence and/or retention. Monitor urine output and encourage spontaneous voids. If unable to void spontaneously, will monitor with PVR bladder scans and initiate CIC regimen.  Skin: Monitor for breakdown, frequent turns, pressure offloading  Sleep: Encourage sleep hygiene (routine that promotes healthy circadian rhythm,avoid caffeine later in the day, quiet/dark room at night, reduce electronic before bedtime)      Wound of right ankle  Skin breakdown on R heel due to cast. Ortho removed approximately 1 inch of cast at posterior aspect on 3/11  New cast 3/21   Local wound care  Continue to monitor   Present on admission to [facility]   Wound care RN consulted:  Skin Care orders:  1-Hydraguard to sacrum, buttocks bid and prn   2-EHOB / waffle  cushion when out of bed in chair.  3-Moisturize skin daily with skin nourishing cream  4-Elevate heels to offload pressure.  5-Turn/reposition q2h for pressure re-distribution on skin  6. Right posterior heel cleanse with soap and water then pat dry apply cavilon 3 M no sting then a silicone foam nate with a P and date change every other day   7. Left plantar aspect of the foot - cleanse with soap and water then apply a silicone foam nate with a P  and date change every other day     Ronald PATE  Saumya is a 69 year-old male, with a past medical history of hypertension, hyperlipidemia, JOVANY, impaired fasting glucose, bilateral lower extremity neuropathy, GERD, BPH, presenting to [facility] after an elective right quadricept tendon repair. He initially underwent quad tendon repair on 7/25/24 with Dr. Foster. He was discharged home with outpatient PT. He was noted to have recurrent high-grade tear on repeat MRI with extensor lag on exam and elected for operative management. On 3/6/25, he underwent revision of quad tendon repair with allograft augmentation. He was evaluated by PT and OT and deemed an appropriate candidate for ARC due to functional deficits     Chief Complaint: f/u ambulatory dysfunction    Interval:   Seen and evaluated patient in room. He does not have any concerns at this time. Last BM 4/10, he is continent of bowel and bladder.        Objective :  Temp:  [96.8 °F (36 °C)-97.6 °F (36.4 °C)] 97.6 °F (36.4 °C)  HR:  [58-66] 63  BP: (108-131)/(58-73) 112/65  Resp:  [16-18] 18  SpO2:  [95 %-98 %] 95 %  O2 Device: None (Room air)    Functional Update:  Physical Therapy Occupational Therapy   Weight Bearing Status: Toe touch (R LE)  Transfers: Independent  Bed Mobility: Independent  Amulation Distance (ft): 200 feet  Ambulation: Independent  Assistive Device for Ambulation: Roller Walker  Wheelchair Mobility Distance:  (n/a)  Wheelchair Mobility:  (N/A)  Number of Stairs: 6  Assistive Device for Stairs: Lehft Hand Rail  Stair Assistance: Independent  Ramp: Independent  Assistive Device for Ramp: Roller Walker  Discharge Recommendations: Other  DC Home with::  (Alternate placement until R LE long cast off)   Eating: Independent  Grooming: Independent  Bathing: Independent  Bathing: Independent  Upper Body Dressing: Independent  Lower Body Dressing: Independent  Toileting: Independent  Tub/Shower Transfer:  (TBA)  Toilet Transfer: Independent  Cognition: Within Defined Limits  Orientation: Person, Place,  Time, Situation           Physical Exam  Vitals and nursing note reviewed.   Constitutional:       General: He is not in acute distress.     Appearance: He is obese. He is not ill-appearing or diaphoretic.   HENT:      Head: Normocephalic and atraumatic.      Nose: Nose normal.      Mouth/Throat:      Mouth: Mucous membranes are moist.   Pulmonary:      Effort: Pulmonary effort is normal. No respiratory distress.   Abdominal:      General: There is no distension.   Skin:     General: Skin is warm and dry.   Neurological:      General: No focal deficit present.      Mental Status: He is alert.   Psychiatric:         Mood and Affect: Mood normal.         Behavior: Behavior normal.           Scheduled Meds:  Current Facility-Administered Medications   Medication Dose Route Frequency Provider Last Rate    acetaminophen  650 mg Oral Q6H PRN Modesta Lee PA-C      ascorbic acid  500 mg Oral Daily Toño Brito MD      Cholecalciferol  5,000 Units Oral Daily Toño Brito MD      famotidine  20 mg Oral Daily Toño Brito MD      losartan  50 mg Oral Daily Toño Brito MD      metFORMIN  1,500 mg Oral Daily With Dinner Toño Brito MD      multivitamin stress formula  1 tablet Oral Daily Toño Brito MD      polyethylene glycol  17 g Oral Daily PRN Modesta Lee PA-C      pravastatin  40 mg Oral Daily With Dinner Toño Brito MD      senna  1 tablet Oral HS PRN Modesta Lee PA-C      tamsulosin  0.4 mg Oral Daily With Dinner Toño Brito MD           Lab Results: I have reviewed the following results:  Results from last 7 days   Lab Units 04/07/25  0519   HEMOGLOBIN g/dL 12.9   HEMATOCRIT % 38.6   WBC Thousand/uL 7.72   PLATELETS Thousands/uL 199     Results from last 7 days   Lab Units 04/07/25  0519   BUN mg/dL 20   SODIUM mmol/L 136   POTASSIUM mmol/L 4.1   CHLORIDE mmol/L 105   CREATININE mg/dL 1.16   AST U/L 12*   ALT U/L 11            Modesta Lee PA-C  Physical Medicine and Rehabilitation  [facility]  [facility]

## 2025-04-12 RX ADMIN — PRAVASTATIN SODIUM 40 MG: 40 TABLET ORAL at 17:30

## 2025-04-12 RX ADMIN — FAMOTIDINE 20 MG: 20 TABLET, FILM COATED ORAL at 08:20

## 2025-04-12 RX ADMIN — LOSARTAN POTASSIUM 50 MG: 50 TABLET, FILM COATED ORAL at 08:19

## 2025-04-12 RX ADMIN — CHOLECALCIFEROL TAB 25 MCG (1000 UNIT) 5000 UNITS: 25 TAB at 08:19

## 2025-04-12 RX ADMIN — OXYCODONE HYDROCHLORIDE AND ACETAMINOPHEN 500 MG: 500 TABLET ORAL at 08:19

## 2025-04-12 RX ADMIN — ACETAMINOPHEN 650 MG: 325 TABLET ORAL at 13:36

## 2025-04-12 RX ADMIN — B-COMPLEX W/ C & FOLIC ACID TAB 1 TABLET: TAB at 08:20

## 2025-04-12 RX ADMIN — TAMSULOSIN HYDROCHLORIDE 0.4 MG: 0.4 CAPSULE ORAL at 17:30

## 2025-04-12 RX ADMIN — METFORMIN ER 500 MG 1500 MG: 500 TABLET ORAL at 17:30

## 2025-04-12 NOTE — PLAN OF CARE
Problem: PAIN - ADULT  Goal: Verbalizes/displays adequate comfort level or baseline comfort level  Description: Interventions:  - Encourage patient to monitor pain and request assistance  - Assess pain using appropriate pain scale  - Administer analgesics based on type and severity of pain and evaluate response  - Implement non-pharmacological measures as appropriate and evaluate response  - Consider cultural and social influences on pain and pain management  - Notify physician/advanced practitioner if interventions unsuccessful or patient reports new pain  Outcome: Progressing     Problem: INFECTION - ADULT  Goal: Absence or prevention of progression during hospitalization  Description: INTERVENTIONS:  - Assess and monitor for signs and symptoms of infection  - Monitor lab/diagnostic results  - Monitor all insertion sites, i.e. indwelling lines, tubes, and drains  - Monitor endotracheal if appropriate and nasal secretions for changes in amount and color  - [place] appropriate cooling/warming therapies per order  - Administer medications as ordered  - Instruct and encourage patient and family to use good hand hygiene technique  - Identify and instruct in appropriate isolation precautions for identified infection/condition  Outcome: Progressing     Problem: SAFETY ADULT  Goal: Maintain or return to baseline ADL function  Description: INTERVENTIONS:  -  Assess patient's ability to carry out ADLs; assess patient's baseline for ADL function and identify physical deficits which impact ability to perform ADLs (bathing, care of mouth/teeth, toileting, grooming, dressing, etc.)  - Assess/evaluate cause of self-care deficits   - Assess range of motion  - Assess patient's mobility; develop plan if impaired  - Assess patient's need for assistive devices and provide as appropriate  - Encourage maximum independence but intervene and supervise when necessary  - Involve family in performance of ADLs  - Assess for home care  needs following discharge   - Consider OT consult to assist with ADL evaluation and planning for discharge  - Provide patient education as appropriate  Outcome: Progressing

## 2025-04-12 NOTE — NURSING NOTE
Patient remains MOD I with walker on floor. Medicated once for pain in right knee with relief noted. Cast remains in place. Dressing changed to posterior right foot. New allevyn applied. No rubbing noted from cast at this time on area. Sitting upright for all meals. Continued education on safety with being MOD I. Will continue to monitor and follow plan of care.

## 2025-04-13 PROCEDURE — 97110 THERAPEUTIC EXERCISES: CPT

## 2025-04-13 PROCEDURE — 97116 GAIT TRAINING THERAPY: CPT

## 2025-04-13 PROCEDURE — 97530 THERAPEUTIC ACTIVITIES: CPT

## 2025-04-13 RX ADMIN — B-COMPLEX W/ C & FOLIC ACID TAB 1 TABLET: TAB at 09:56

## 2025-04-13 RX ADMIN — OXYCODONE HYDROCHLORIDE AND ACETAMINOPHEN 500 MG: 500 TABLET ORAL at 09:56

## 2025-04-13 RX ADMIN — TAMSULOSIN HYDROCHLORIDE 0.4 MG: 0.4 CAPSULE ORAL at 17:06

## 2025-04-13 RX ADMIN — LOSARTAN POTASSIUM 50 MG: 50 TABLET, FILM COATED ORAL at 09:56

## 2025-04-13 RX ADMIN — FAMOTIDINE 20 MG: 20 TABLET, FILM COATED ORAL at 09:56

## 2025-04-13 RX ADMIN — PRAVASTATIN SODIUM 40 MG: 40 TABLET ORAL at 17:06

## 2025-04-13 RX ADMIN — METFORMIN ER 500 MG 1500 MG: 500 TABLET ORAL at 17:06

## 2025-04-13 RX ADMIN — CHOLECALCIFEROL TAB 25 MCG (1000 UNIT) 5000 UNITS: 25 TAB at 09:56

## 2025-04-13 NOTE — PROGRESS NOTES
04/13/25 0645   Pain Assessment   Pain Assessment Tool 0-10   Pain Score 1   Pain Location/Orientation Orientation: Right;Location: Knee   Restrictions/Precautions   Precautions Fall Risk   Weight Bearing Restrictions Yes   RLE Weight Bearing Per Order TTWB   LLE Weight Bearing Per Order WBAT   ROM Restrictions No   Cognition   Overall Cognitive Status WFL   Arousal/Participation Alert;Responsive;Cooperative   Attention Within functional limits   Orientation Level Oriented X4   Memory Within functional limits   Following Commands Follows all commands and directions without difficulty   Roll Left and Right   Type of Assistance Needed Independent   Roll Left and Right CARE Score 6   Sit to Lying   Type of Assistance Needed Independent   Sit to Lying CARE Score 6   Lying to Sitting on Side of Bed   Type of Assistance Needed Independent   Lying to Sitting on Side of Bed CARE Score 6   Sit to Stand   Type of Assistance Needed Independent   Sit to Stand CARE Score 6   Bed-Chair Transfer   Type of Assistance Needed Independent   Chair/Bed-to-Chair Transfer CARE Score 6   Walk 10 Feet   Type of Assistance Needed Independent;Adaptive equipment   Physical Assistance Level No physical assistance   Comment RW   Walk 10 Feet CARE Score 6   Walk 50 Feet with Two Turns   Type of Assistance Needed Independent   Physical Assistance Level No physical assistance   Comment RW   Walk 50 Feet with Two Turns CARE Score 6   Walk 150 Feet   Type of Assistance Needed Independent   Physical Assistance Level No physical assistance   Comment RW   Walk 150 Feet CARE Score 6   Ambulation   Primary Mode of Locomotion Prior to Admission Walk   Assist Device Roller Walker   Gait Pattern Slow Ghazal;Decreased foot clearance;[place] DIAMOND;Step to;Improper weight shift   Limitations Noted In Balance   Provided Assistance with: Balance   Walk Assist Level Modified Independent   Does the patient walk? 2. Yes   Wheelchair mobility   Does the patient use  a wheelchair? 0. No   Therapeutic Interventions   Strengthening seated LE TE   Other transfers, gait   Assessment   Treatment Assessment Pt supine in bed to start session, pleasant and agreeable to therapy session. Pt shweta to complete all activity with mod I, needing minimal set up. Pt amb 150' and 75' with RW and Mod I. Pt seated in bed to end session, all needs with in reach. Pt continues to maintain MOD I status with no complications. Pt is static with improvements in mobility status due to RLE cast and WBS, no functional improvements shown at this time due to physical barriers from previous sessions. Will continue to monitor with pt WBS progress and cast is removed. Ortho apt. on 4/18/2025   PT Barriers   Physical Impairment Decreased strength;Decreased range of motion;Impaired balance   Functional Limitation Car transfers   Plan   Treatment/Interventions Functional transfer training;LE strengthening/ROM;Elevations;Therapeutic exercise;Endurance training;Bed mobility;Gait training   Progress No functional improvements  (Pt is Mod I with all activity, limity in function d/t WBS)   PT Therapy Minutes   PT Time In 0645   PT Time Out 0800   PT Total Time (minutes) 75   PT Mode of treatment - Individual (minutes) 75   PT Mode of treatment - Concurrent (minutes) 0   PT Mode of treatment - Group (minutes) 0   PT Mode of treatment - Co-treat (minutes) 0   PT Mode of Treatment - Total time(minutes) 75 minutes   PT Cumulative Minutes 2157   Therapy Time missed   Time missed? No

## 2025-04-13 NOTE — PLAN OF CARE
Problem: PAIN - ADULT  Goal: Verbalizes/displays adequate comfort level or baseline comfort level  Description: Interventions:  - Encourage patient to monitor pain and request assistance  - Assess pain using appropriate pain scale  - Administer analgesics based on type and severity of pain and evaluate response  - Implement non-pharmacological measures as appropriate and evaluate response  - Consider cultural and social influences on pain and pain management  - Notify physician/advanced practitioner if interventions unsuccessful or patient reports new pain  Outcome: Progressing     Problem: INFECTION - ADULT  Goal: Absence or prevention of progression during hospitalization  Description: INTERVENTIONS:  - Assess and monitor for signs and symptoms of infection  - Monitor lab/diagnostic results  - Monitor all insertion sites, i.e. indwelling lines, tubes, and drains  - Monitor endotracheal if appropriate and nasal secretions for changes in amount and color  - [place] appropriate cooling/warming therapies per order  - Administer medications as ordered  - Instruct and encourage patient and family to use good hand hygiene technique  - Identify and instruct in appropriate isolation precautions for identified infection/condition  Outcome: Progressing     Problem: SAFETY ADULT  Goal: Patient will remain free of falls  Description: INTERVENTIONS:  - Educate patient/family on patient safety including physical limitations  - Instruct patient to call for assistance with activity   - Consult OT/PT to assist with strengthening/mobility   - Keep Call bell within reach  - Keep bed low and locked with side rails adjusted as appropriate  - Keep care items and personal belongings within reach  - Initiate and maintain comfort rounds  - Make Fall Risk Sign visible to staff  - Offer Toileting every 2 Hours, in advance of need  - Initiate/Maintain bed/chair alarm  - Apply yellow socks and bracelet for high fall risk patients  -  Consider moving patient to room near nurses station  Outcome: Progressing     Problem: Nutrition/Hydration-ADULT  Goal: Nutrient/Hydration intake appropriate for improving, restoring or maintaining nutritional needs  Description: Monitor and assess patient's nutrition/hydration status for malnutrition. Collaborate with interdisciplinary team and initiate plan and interventions as ordered.  Monitor patient's weight and dietary intake as ordered or per policy. Utilize nutrition screening tool and intervene as necessary. Determine patient's food preferences and provide high-protein, high-caloric foods as appropriate. INTERVENTIONS:- Monitor oral intake, urinary output, labs, and treatment plans- Assess nutrition and hydration status and recommend course of action- Evaluate amount of meals eaten- Assist patient with eating if necessary - Allow adequate time for meals- Recommend/ encourage appropriate diets, oral nutritional supplements, and vitamin/mineral supplements- Order, calculate, and assess calorie counts as needed- Recommend, monitor, and adjust tube feedings and TPN/PPN based on assessed needs- Assess need for intravenous fluids- Provide specific nutrition/hydration education as appropriate- Include patient/family/caregiver in decisions related to nutrition  Outcome: Progressing     Problem: DISCHARGE PLANNING  Goal: Discharge to home or other facility with appropriate resources  Description: INTERVENTIONS:  - Identify barriers to discharge w/patient and caregiver  - Arrange for needed discharge resources and transportation as appropriate  - Identify discharge learning needs (meds, wound care, etc.)  - Arrange for interpretive services to assist at discharge as needed  - Refer to Case Management Department for coordinating discharge planning if the patient needs post-hospital services based on physician/advanced practitioner order or complex needs related to functional status, cognitive ability, or social  support system  Outcome: Progressing

## 2025-04-14 LAB
ALBUMIN SERPL BCG-MCNC: 3.8 G/DL (ref 3.5–5)
ALP SERPL-CCNC: 51 U/L (ref 34–104)
ALT SERPL W P-5'-P-CCNC: 11 U/L (ref 7–52)
ANION GAP SERPL CALCULATED.3IONS-SCNC: 9 MMOL/L (ref 4–13)
AST SERPL W P-5'-P-CCNC: 13 U/L (ref 13–39)
BASOPHILS # BLD AUTO: 0.04 THOUSANDS/ÂΜL (ref 0–0.1)
BASOPHILS NFR BLD AUTO: 1 % (ref 0–1)
BILIRUB SERPL-MCNC: 0.48 MG/DL (ref 0.2–1)
BUN SERPL-MCNC: 20 MG/DL (ref 5–25)
CALCIUM SERPL-MCNC: 9.2 MG/DL (ref 8.4–10.2)
CHLORIDE SERPL-SCNC: 105 MMOL/L (ref 96–108)
CO2 SERPL-SCNC: 23 MMOL/L (ref 21–32)
CREAT SERPL-MCNC: 1.19 MG/DL (ref 0.6–1.3)
EOSINOPHIL # BLD AUTO: 0.25 THOUSAND/ÂΜL (ref 0–0.61)
EOSINOPHIL NFR BLD AUTO: 3 % (ref 0–6)
ERYTHROCYTE [DISTWIDTH] IN BLOOD BY AUTOMATED COUNT: 12.9 % (ref 11.6–15.1)
GFR SERPL CREATININE-BSD FRML MDRD: 62 ML/MIN/1.73SQ M
GLUCOSE P FAST SERPL-MCNC: 93 MG/DL (ref 65–99)
GLUCOSE SERPL-MCNC: 93 MG/DL (ref 65–140)
HCT VFR BLD AUTO: 38.2 % (ref 36.5–49.3)
HGB BLD-MCNC: 12.8 G/DL (ref 12–17)
IMM GRANULOCYTES # BLD AUTO: 0.03 THOUSAND/UL (ref 0–0.2)
IMM GRANULOCYTES NFR BLD AUTO: 0 % (ref 0–2)
LYMPHOCYTES # BLD AUTO: 3.14 THOUSANDS/ÂΜL (ref 0.6–4.47)
LYMPHOCYTES NFR BLD AUTO: 38 % (ref 14–44)
MCH RBC QN AUTO: 29.8 PG (ref 26.8–34.3)
MCHC RBC AUTO-ENTMCNC: 33.5 G/DL (ref 31.4–37.4)
MCV RBC AUTO: 89 FL (ref 82–98)
MONOCYTES # BLD AUTO: 0.64 THOUSAND/ÂΜL (ref 0.17–1.22)
MONOCYTES NFR BLD AUTO: 8 % (ref 4–12)
NEUTROPHILS # BLD AUTO: 4.19 THOUSANDS/ÂΜL (ref 1.85–7.62)
NEUTS SEG NFR BLD AUTO: 50 % (ref 43–75)
NRBC BLD AUTO-RTO: 0 /100 WBCS
PLATELET # BLD AUTO: 222 THOUSANDS/UL (ref 149–390)
PMV BLD AUTO: 10.1 FL (ref 8.9–12.7)
POTASSIUM SERPL-SCNC: 4.5 MMOL/L (ref 3.5–5.3)
PROT SERPL-MCNC: 6.3 G/DL (ref 6.4–8.4)
RBC # BLD AUTO: 4.3 MILLION/UL (ref 3.88–5.62)
SODIUM SERPL-SCNC: 137 MMOL/L (ref 135–147)
WBC # BLD AUTO: 8.29 THOUSAND/UL (ref 4.31–10.16)

## 2025-04-14 PROCEDURE — 80053 COMPREHEN METABOLIC PANEL: CPT

## 2025-04-14 PROCEDURE — 85025 COMPLETE CBC W/AUTO DIFF WBC: CPT

## 2025-04-14 PROCEDURE — 97530 THERAPEUTIC ACTIVITIES: CPT

## 2025-04-14 PROCEDURE — 97116 GAIT TRAINING THERAPY: CPT

## 2025-04-14 PROCEDURE — 97535 SELF CARE MNGMENT TRAINING: CPT

## 2025-04-14 PROCEDURE — 97110 THERAPEUTIC EXERCISES: CPT

## 2025-04-14 PROCEDURE — 99232 SBSQ HOSP IP/OBS MODERATE 35: CPT | Performed by: PHYSICAL MEDICINE & REHABILITATION

## 2025-04-14 RX ORDER — POLYETHYLENE GLYCOL 3350 17 G/17G
17 POWDER, FOR SOLUTION ORAL DAILY PRN
Status: DISCONTINUED | OUTPATIENT
Start: 2025-04-14 | End: 2025-05-06 | Stop reason: HOSPADM

## 2025-04-14 RX ADMIN — FAMOTIDINE 20 MG: 20 TABLET, FILM COATED ORAL at 09:14

## 2025-04-14 RX ADMIN — LOSARTAN POTASSIUM 50 MG: 50 TABLET, FILM COATED ORAL at 09:14

## 2025-04-14 RX ADMIN — B-COMPLEX W/ C & FOLIC ACID TAB 1 TABLET: TAB at 09:14

## 2025-04-14 RX ADMIN — TAMSULOSIN HYDROCHLORIDE 0.4 MG: 0.4 CAPSULE ORAL at 17:14

## 2025-04-14 RX ADMIN — METFORMIN ER 500 MG 1500 MG: 500 TABLET ORAL at 17:14

## 2025-04-14 RX ADMIN — PRAVASTATIN SODIUM 40 MG: 40 TABLET ORAL at 17:14

## 2025-04-14 RX ADMIN — OXYCODONE HYDROCHLORIDE AND ACETAMINOPHEN 500 MG: 500 TABLET ORAL at 09:14

## 2025-04-14 RX ADMIN — CHOLECALCIFEROL TAB 25 MCG (1000 UNIT) 5000 UNITS: 25 TAB at 09:14

## 2025-04-14 NOTE — NURSING NOTE
Patient resting in bed at this time.  No signs of distress noted.  Patient was successfully modified independent to the bathroom with a walker overnight.  Patient with cast CDI to the right lower extremity.  Patient to maintain toe touch weight bearing restriction to the right lower extremity at all times.  Call bell within reach.  Plan of care ongoing.

## 2025-04-14 NOTE — PROGRESS NOTES
04/14/25 1030   Pain Assessment   Pain Assessment Tool 0-10   Pain Score No Pain   Restrictions/Precautions   Precautions Fall Risk   RLE Weight Bearing Per Order TTWB   Braces or Orthoses   (R LE CAST)   Oral Hygiene   Type of Assistance Needed Independent   Physical Assistance Level No physical assistance   Comment stance   Oral Hygiene CARE Score 6   Shower/Bathe Self   Type of Assistance Needed Independent;Adaptive equipment   Physical Assistance Level No physical assistance   Shower/Bathe Self CARE Score 6   Bathing   Assessed Bath Style Sponge Bath   Upper Body Dressing   Type of Assistance Needed Independent   Physical Assistance Level No physical assistance   Upper Body Dressing CARE Score 6   Lower Body Dressing   Type of Assistance Needed Independent;Adaptive equipment   Physical Assistance Level No physical assistance   Lower Body Dressing CARE Score 6   Putting On/Taking Off Footwear   Type of Assistance Needed Independent;Adaptive equipment   Physical Assistance Level No physical assistance   Putting On/Taking Off Footwear CARE Score 6   Picking Up Object   Type of Assistance Needed Independent;Adaptive equipment   Physical Assistance Level No physical assistance   Picking Up Object CARE Score 6   Dressing/Undressing Clothing   Able to  Obtain Clothing   Sit to Stand   Type of Assistance Needed Independent   Physical Assistance Level No physical assistance   Sit to Stand CARE Score 6   Toileting Hygiene   Type of Assistance Needed Independent   Physical Assistance Level No physical assistance   Toileting Hygiene CARE Score 6   Toilet Transfer   Type of Assistance Needed Independent;Adaptive equipment   Physical Assistance Level No physical assistance   Toilet Transfer CARE Score 6   Kitchen Mobility   Kitchen-Mobility Level Walker   Kitchen Activity Retrieve items;Transport items   Kitchen Mobility Comments pt able to safely manage in kitchen and heat up chili   Light Housekeeping   Light  Housekeeping Level Walker   Light Housekeeping Level of Assistance Modified independent   Light Housekeeping Functional mobility with RW to gather laundry and load washer with MI; gathers all supplies as well as clean up MI   Exercise Tools   Hand Gripper B hands 2x40 heavy resistance gripper   Other Exercise Tool 1 red flex bar 2x40 upward then downward   Cognition   Overall Cognitive Status WFL   Arousal/Participation Alert;Cooperative   Attention Within functional limits   Orientation Level Oriented X4   Memory Within functional limits   Following Commands Follows all commands and directions without difficulty   Additional Activities   Additional Activities Comments Functional mobility with RW R LE TTWB MI   Activity Tolerance   Activity Tolerance Patient tolerated treatment well   Assessment   Treatment Assessment ADL completed via sponge bathe level: Pt able to consistently s/u and c/u all supplies Independently with use of RW and while maintaining TTWB R LE. Pt demon consistent recall of compensatory strategies and compliance with safe techniques throughout all ADL/iADLs. Pt's barriers to d/c to personal home environment include decreased strength RLE s/p quadricepts rupture, decreased ROM with use of leg length hard cast, and TTWB RLE. Plan is contingent upon placement due to noted barriers. Ortho f/u 4/18   Prognosis Good   Problem List Decreased strength;Decreased range of motion;Impaired balance;Decreased mobility   Plan   Treatment/Interventions ADL retraining;Functional transfer training;Therapeutic exercise;Endurance training;Patient/family training;Compensatory technique education;OT;Gait training;Equipment eval/education   Progress Progressing toward goals   OT Therapy Minutes   OT Time In 1030   OT Time Out 1200   OT Total Time (minutes) 90   OT Mode of treatment - Individual (minutes) 90   OT Mode of treatment - Concurrent (minutes) 0   OT Mode of treatment - Group (minutes) 0   OT Mode of treatment  - Co-treat (minutes) 0   OT Mode of Treatment - Total time(minutes) 90 minutes   OT Cumulative Minutes 1772   Therapy Time missed   Time missed? No

## 2025-04-14 NOTE — CASE MANAGEMENT
SHILA called billing office to follow up on my call from last week regarding authorization for pt's surgery back in March.  Spoke to Regina, as Radha, remains out of the office.  Regina reports that the request sent by Radha last week is still awaiting validation to progress with authorization for the surgery.   Regina to reach out to Radha to see if anything was sent to her inbox regarding this matter.    Requested a call back to this CM when something is known so we can move forward with d/c plans.

## 2025-04-14 NOTE — PROGRESS NOTES
"Progress Note - Tobin Kerns 69 y.o. male MRN: [id number]    Unit/Bed#: [facility] 210-01 Encounter: [id number]        Subjective:   Patient seen and examined at bedside after reviewing the chart and discussing the case with the caring staff.      Patient examined at bedside.  Patient with no new concerns or symptoms.  Patient had ortho appointment Friday where cast was trimmed further and revealed new posterior ankle wound.  Next appointment 4/18 with plans for cast removal.      Labs reviewed and WNL.     Physical Exam   Vitals: Blood pressure 123/58, pulse 55, temperature (!) 97 °F (36.1 °C), temperature source Temporal, resp. rate 16, height 6' 5\" (1.956 m), weight (!) 136 kg (300 lb 0.7 oz), SpO2 94%.,Body mass index is 35.58 kg/m².  Constitutional: Patient in no acute distress.  HEENT: PERR, EOMI, MMM.  Cardiovascular: Normal rate and regular rhythm.    Pulmonary/Chest: Effort normal and breath sounds normal.   Abdomen: Soft, + BS, NT.    Assessment/Plan:  Tobin Kerns is a(n) 69 y.o. year old male who is s/p quadriceps tendon repair.     Cardiac with hx of HTN, HLD.  Patient is on losartan 50 mg daily, pravastatin 40 mg daily.  Impaired fasting glucose.  Hgb a1c 5.4% on 3/12/25.  Continue metformin XR 1500 mg daily with dinner.  Regular diet and d/c accucheks as blood sugar is well controlled.    BPH.  Continue Flomax 0.4 mg daily.  GERD.  Continue Pepcid 20 mg daily.  JOVANY.  On CPAP at bedtime.   Vitamin D deficiency.  Patient is on vitamin D3 5000 units daily.  Pain and bowel management.  As per physiatrist.  Quadriceps tendon repair.  Toe touch weightbearing RLE with use of walker.  Patient is receiving intensive PT OT as per physiatrist.    Anticipated date of discharge.  TBD.    The patient was discussed with Dr. Brito and he is in agreement with the above note.  "

## 2025-04-14 NOTE — ASSESSMENT & PLAN NOTE
"HPI:   He initially underwent quad tendon repair on 7/25/24 with Dr. Foster. He was discharged home with outpatient PT.  Later noted to have recurrent high-grade tear with extensor lag on exam and elected for operative management  January 2025 MRI: \"Prior quadriceps insertion repair with high-grade recurrent tear exhibiting up to 2.2 cm of tendon retraction.\"  On 3/6/25, he underwent revision of quad tendon repair with allograft augmentation  3/21: outpatient follow-up with Dr. Foster, new cast applied   3/28: follow-up with Dr. Foster, continue TTWB   4/4: follow-up with Dr. Foster (removed cast and reapplied long leg cast for an additional 2 weeks) and continue TTWB  4/11, no changes in plan. Follow-up on 4/18 for cast removal     Plan:   PT and OT for 3 hours a day, 5-6 days a week   TTWB RLE   Avoid moisture to cast   Pain: PRN Tylenol (monitor LFTs) and oxycodone discontinued on 3/17 due to minimal pain   Lovenox d/c on 4/7     "

## 2025-04-14 NOTE — PLAN OF CARE
Problem: PAIN - ADULT  Goal: Verbalizes/displays adequate comfort level or baseline comfort level  Description: Interventions:  - Encourage patient to monitor pain and request assistance  - Assess pain using appropriate pain scale  - Administer analgesics based on type and severity of pain and evaluate response  - Implement non-pharmacological measures as appropriate and evaluate response  - Consider cultural and social influences on pain and pain management  - Notify physician/advanced practitioner if interventions unsuccessful or patient reports new pain  Outcome: Progressing     Problem: INFECTION - ADULT  Goal: Absence or prevention of progression during hospitalization  Description: INTERVENTIONS:  - Assess and monitor for signs and symptoms of infection  - Monitor lab/diagnostic results  - Monitor all insertion sites, i.e. indwelling lines, tubes, and drains  - Monitor endotracheal if appropriate and nasal secretions for changes in amount and color  - [place] appropriate cooling/warming therapies per order  - Administer medications as ordered  - Instruct and encourage patient and family to use good hand hygiene technique  - Identify and instruct in appropriate isolation precautions for identified infection/condition  Outcome: Progressing     Problem: SAFETY ADULT  Goal: Patient will remain free of falls  Description: INTERVENTIONS:  - Educate patient/family on patient safety including physical limitations  - Instruct patient to call for assistance with activity   - Consult OT/PT to assist with strengthening/mobility   - Keep Call bell within reach  - Keep bed low and locked with side rails adjusted as appropriate  - Keep care items and personal belongings within reach  - Initiate and maintain comfort rounds  - Make Fall Risk Sign visible to staff  - Offer Toileting every 2 Hours, in advance of need  - Initiate/Maintain bed/chair alarm  - Apply yellow socks and bracelet for high fall risk patients  -  Consider moving patient to room near nurses station  Outcome: Progressing     Problem: Nutrition/Hydration-ADULT  Goal: Nutrient/Hydration intake appropriate for improving, restoring or maintaining nutritional needs  Description: Monitor and assess patient's nutrition/hydration status for malnutrition. Collaborate with interdisciplinary team and initiate plan and interventions as ordered.  Monitor patient's weight and dietary intake as ordered or per policy. Utilize nutrition screening tool and intervene as necessary. Determine patient's food preferences and provide high-protein, high-caloric foods as appropriate. INTERVENTIONS:- Monitor oral intake, urinary output, labs, and treatment plans- Assess nutrition and hydration status and recommend course of action- Evaluate amount of meals eaten- Assist patient with eating if necessary - Allow adequate time for meals- Recommend/ encourage appropriate diets, oral nutritional supplements, and vitamin/mineral supplements- Order, calculate, and assess calorie counts as needed- Recommend, monitor, and adjust tube feedings and TPN/PPN based on assessed needs- Assess need for intravenous fluids- Provide specific nutrition/hydration education as appropriate- Include patient/family/caregiver in decisions related to nutrition  Outcome: Progressing

## 2025-04-14 NOTE — PROGRESS NOTES
04/14/25 0700   Pain Assessment   Pain Assessment Tool 0-10   Pain Score 2   Pain Location/Orientation Orientation: Right;Location: Knee   Restrictions/Precautions   Precautions Fall Risk   RLE Weight Bearing Per Order TTWB   ROM Restrictions No   Braces or Orthoses   (R LE long cast)   Cognition   Overall Cognitive Status WFL   Arousal/Participation Alert;Responsive;Cooperative   Attention Within functional limits   Orientation Level Oriented X4   Memory Within functional limits   Following Commands Follows all commands and directions without difficulty   Subjective   Subjective Pt reports he slept well, slight pain in his R knee   Roll Left and Right   Type of Assistance Needed Independent   Physical Assistance Level No physical assistance   Roll Left and Right CARE Score 6   Sit to Lying   Type of Assistance Needed Independent   Physical Assistance Level No physical assistance   Sit to Lying CARE Score 6   Lying to Sitting on Side of Bed   Type of Assistance Needed Independent   Physical Assistance Level No physical assistance   Lying to Sitting on Side of Bed CARE Score 6   Sit to Stand   Type of Assistance Needed Independent   Physical Assistance Level No physical assistance   Comment RW   Sit to Stand CARE Score 6   Bed-Chair Transfer   Type of Assistance Needed Independent   Physical Assistance Level No physical assistance   Comment RW   Chair/Bed-to-Chair Transfer CARE Score 6   Car Transfer   Reason if not Attempted Environmental limitations   Car Transfer CARE Score 10   Walk 10 Feet   Type of Assistance Needed Independent   Physical Assistance Level No physical assistance   Comment RW   Walk 10 Feet CARE Score 6   Walk 50 Feet with Two Turns   Type of Assistance Needed Independent   Physical Assistance Level No physical assistance   Comment RW   Walk 50 Feet with Two Turns CARE Score 6   Walk 150 Feet   Type of Assistance Needed Independent   Physical Assistance Level No physical assistance   Comment  RW   Walk 150 Feet CARE Score 6   Walking 10 Feet on Uneven Surfaces   Type of Assistance Needed Independent   Physical Assistance Level No physical assistance   Comment RW, ramp   Walking 10 Feet on Uneven Surfaces CARE Score 6   Ambulation   Primary Mode of Locomotion Prior to Admission Walk   Distance Walked (feet) 160 ft  (110')   Assist Device Roller Walker   Findings maintains TTWB R LE well, demonstrates reciprocal pattern   Does the patient walk? 2. Yes   Wheel 50 Feet with Two Turns   Reason if not Attempted Activity not applicable   Wheel 50 Feet with Two Turns CARE Score 9   Wheel 150 Feet   Reason if not Attempted Activity not applicable   Wheel 150 Feet CARE Score 9   Wheelchair mobility   Findings N/A   Does the patient use a wheelchair? 0. No   Curb or Single Stair   Style negotiated Single stair   Type of Assistance Needed Independent   Physical Assistance Level No physical assistance   Comment 6  steps, L HR   1 Step (Curb) CARE Score 6   4 Steps   Type of Assistance Needed Independent   Physical Assistance Level No physical assistance   Comment 6  steps, L HR   4 Steps CARE Score 6   12 Steps   Reason if not Attempted Activity not applicable   12 Steps CARE Score 9   Picking Up Object   Type of Assistance Needed Independent;Adaptive equipment   Physical Assistance Level No physical assistance   Comment pants from floor   Picking Up Object CARE Score 6   Toilet Transfer   Comment did not require during session   Therapeutic Interventions   Strengthening supine LE TE   Other transfer training, gait training, stair training   Equipment Use   NuStep L5, 20 min, B/L UE, L LE only   Assessment   Treatment Assessment Pt agreeable to PT this AM, received supine in bed. Pt continues to be functionally limited by WBS and R LE long cast, as he remains functional independent using RW, maintains TTWB R LE well with all activity. Continued to challenge LE strength/endurance within capabilities with  supine LE TE and NuStep with good tolerance. Barriers to discharge home remain due to decreased home accessibility due to R LE long cast, as well as decreased support. Pt has ortho follow up on Friday. Will continue with current PT POC to improve deficits and promote return to PLOF.   Problem List Decreased strength;Decreased range of motion;Impaired balance;Decreased mobility   Barriers to Discharge Inaccessible home environment;Decreased caregiver support   PT Barriers   Physical Impairment Decreased strength;Decreased range of motion;Impaired balance   Functional Limitation Car transfers   Plan   Treatment/Interventions Functional transfer training;LE strengthening/ROM;Therapeutic exercise;Endurance training;Patient/family training;Equipment eval/education;Bed mobility;Gait training   Progress No functional improvements  (limited by TTWB R LE, R LE long cast; mod I with all functional mobility while maintaining TTWB R LE)   PT Therapy Minutes   PT Time In 0700   PT Time Out 0830   PT Total Time (minutes) 90   PT Mode of treatment - Individual (minutes) 90   PT Mode of treatment - Concurrent (minutes) 0   PT Mode of treatment - Group (minutes) 0   PT Mode of treatment - Co-treat (minutes) 0   PT Mode of Treatment - Total time(minutes) 90 minutes   PT Cumulative Minutes 2127     Patient remains OOB in w/c, all needs in reach. Encouraged use of call bell, patient verbalizes understanding.

## 2025-04-14 NOTE — ASSESSMENT & PLAN NOTE
Skin breakdown on R heel due to cast. Ortho removed approximately 1 inch of cast at posterior aspect on 3/11. Cast further trimmed on 4/11, revealing a new posterior ankle wound.   New cast 3/21   Local wound care  Continue to monitor   Present on admission to [facility]   Wound care RN consulted:  Skin Care orders:  1-Hydraguard to sacrum, buttocks bid and prn   2-EHOB / waffle  cushion when out of bed in chair.  3-Moisturize skin daily with skin nourishing cream  4-Elevate heels to offload pressure.  5-Turn/reposition q2h for pressure re-distribution on skin  6. Right posterior heel cleanse with soap and water then pat dry apply cavilon 3 M no sting then a silicone foam nate with a P and date change every other day   7. Left plantar aspect of the foot - cleanse with soap and water then apply a silicone foam nate with a P  and date change every other day

## 2025-04-14 NOTE — PROGRESS NOTES
"Progress Note - PMR   Name: Tobin Kerns 69 y.o. male I MRN: [id number]  Unit/Bed#: -01 I Date of Admission: 3/11/2025   Date of Service: 4/14/2025 I Hospital Day: 34     Assessment & Plan  Quadriceps muscle rupture, right, subsequent encounter  HPI:   He initially underwent quad tendon repair on 7/25/24 with Dr. Foster. He was discharged home with outpatient PT.  Later noted to have recurrent high-grade tear with extensor lag on exam and elected for operative management  January 2025 MRI: \"Prior quadriceps insertion repair with high-grade recurrent tear exhibiting up to 2.2 cm of tendon retraction.\"  On 3/6/25, he underwent revision of quad tendon repair with allograft augmentation  3/21: outpatient follow-up with Dr. Foster, new cast applied   3/28: follow-up with Dr. Foster, continue TTWB   4/4: follow-up with Dr. Foster (removed cast and reapplied long leg cast for an additional 2 weeks) and continue TTWB  4/11, no changes in plan. Follow-up on 4/18 for cast removal     Plan:   PT and OT for 3 hours a day, 5-6 days a week   TTWB RLE   Avoid moisture to cast   Pain: PRN Tylenol (monitor LFTs) and oxycodone discontinued on 3/17 due to minimal pain   Lovenox d/c on 4/7     Hypertension  Continue losartan 50 mg daily   Monitor BP   Mixed hyperlipidemia  Continue pravastatin 40 mg daily   Gastro-esophageal reflux disease without esophagitis  Continue famotidine 20 mg daily   Peripheral neuropathy  Patient is not diabetic   Monitor skin for new or worsening wounds   JOVANY on CPAP  Continue CPAP qHS  IFG (impaired fasting glucose)  Continue metformin 1500 mg daily   Seen by RD 3/12, diet adjusted to regular   3/12 A1c: 5.4   Healed ulcer of left foot  Local wound care  Present on admission   Wound care RN consulted   BPH (benign prostatic hyperplasia)  Continue tamsulosin 0.4 mg daily   Patient currently voiding without difficulty   PRN PVRs if urinary retention is suspected   Impaired mobility and " ADLs  Patient was evaluated by the rehabilitation team MD and an appropriate candidate for acute inpatient rehabilitation program at this time.  The patient will tolerate 3 hours/day 5 to 7 days/week of intensive physical and  occupational therapy   in order to obtain goals for community discharge  Due to the patient's functional Compared to their baseline level of function in addition to their ongoing medical needs, the patient would benefit from daily supervision from a rehabilitation physician as well as rehabilitation nursing to implement and adjust the medical as well as functional plan of care in order to meet the patient's goals.  Bladder: Monitor closely for urinary incontinence and/or retention. Monitor urine output and encourage spontaneous voids. If unable to void spontaneously, will monitor with PVR bladder scans and initiate CIC regimen.  Skin: Monitor for breakdown, frequent turns, pressure offloading  Sleep: Encourage sleep hygiene (routine that promotes healthy circadian rhythm,avoid caffeine later in the day, quiet/dark room at night, reduce electronic before bedtime)  Wound of right ankle  Skin breakdown on R heel due to cast. Ortho removed approximately 1 inch of cast at posterior aspect on 3/11. Cast further trimmed on 4/11, revealing a new posterior ankle wound.   New cast 3/21   Local wound care  Continue to monitor   Present on admission to [facility]   Wound care RN consulted:  Skin Care orders:  1-Hydraguard to sacrum, buttocks bid and prn   2-EHOB / waffle  cushion when out of bed in chair.  3-Moisturize skin daily with skin nourishing cream  4-Elevate heels to offload pressure.  5-Turn/reposition q2h for pressure re-distribution on skin  6. Right posterior heel cleanse with soap and water then pat dry apply cavilon 3 M no sting then a silicone foam nate with a P and date change every other day   7. Left plantar aspect of the foot - cleanse with soap and water then apply a silicone foam nate with  a P  and date change every other day     Subjective   Tobin Kerns is a 69 year-old male, with a past medical history of hypertension, hyperlipidemia, JOVANY, impaired fasting glucose, bilateral lower extremity neuropathy, GERD, BPH, presenting to [facility] after an elective right quadricept tendon repair. He initially underwent quad tendon repair on 7/25/24 with Dr. Foster. He was discharged home with outpatient PT. He was noted to have recurrent high-grade tear on repeat MRI with extensor lag on exam and elected for operative management. On 3/6/25, he underwent revision of quad tendon repair with allograft augmentation. He was evaluated by PT and OT and deemed an appropriate candidate for [facility] due to functional deficits     Chief Complaint: f/u ambulatory dysfunction    Interval: Seen and evaluated patient in room. He does not have any concerns at this time. He denies any pain. Last BM 4/14, he is continent of bowel and bladder. 4/14 labs reviewed.     Objective :  Temp:  [97 °F (36.1 °C)-98.1 °F (36.7 °C)] 97 °F (36.1 °C)  HR:  [55-63] 55  BP: (112-123)/(58-63) 123/58  Resp:  [16-18] 16  SpO2:  [94 %-95 %] 94 %  O2 Device: None (Room air)    Functional Update:  Physical Therapy Occupational Therapy   Weight Bearing Status: Toe touch (R LE)  Transfers: Independent  Bed Mobility: Independent  Amulation Distance (ft): 200 feet  Ambulation: Independent  Assistive Device for Ambulation: Roller Walker  Wheelchair Mobility Distance:  (n/a)  Wheelchair Mobility:  (N/A)  Number of Stairs: 7  Assistive Device for Stairs: Lehft Hand Rail  Stair Assistance: Independent  Ramp: Independent  Assistive Device for Ramp: Roller Walker  Discharge Recommendations: Other  DC Home with::  (Alternative placement until R LE long leg cast discontinued and allowed for knee ROM)   Eating: Independent  Grooming: Independent  Bathing: Independent  Bathing: Independent  Upper Body Dressing: Independent  Lower Body Dressing: Independent  Toileting:  Independent  Tub/Shower Transfer:  (Not completed due to long leg cast)  Toilet Transfer: Independent  Cognition: Within Defined Limits  Orientation: Person, Situation, Place, Time           Physical Exam  Vitals and nursing note reviewed.   Constitutional:       General: He is not in acute distress.     Appearance: He is obese. He is not ill-appearing, toxic-appearing or diaphoretic.   HENT:      Head: Normocephalic and atraumatic.      Nose: Nose normal.      Mouth/Throat:      Mouth: Mucous membranes are moist.   Cardiovascular:      Pulses:           Dorsalis pedis pulses are 2+ on the right side.   Pulmonary:      Effort: Pulmonary effort is normal. No respiratory distress.   Abdominal:      General: There is no distension.   Skin:     General: Skin is warm and dry.   Neurological:      General: No focal deficit present.      Mental Status: He is alert.   Psychiatric:         Mood and Affect: Mood normal.         Behavior: Behavior normal.         Scheduled Meds:  Current Facility-Administered Medications   Medication Dose Route Frequency Provider Last Rate    acetaminophen  650 mg Oral Q6H PRN Modesta Lee PA-C      ascorbic acid  500 mg Oral Daily Toño Brito MD      Cholecalciferol  5,000 Units Oral Daily Toño Brito MD      famotidine  20 mg Oral Daily Toño Brito MD      losartan  50 mg Oral Daily Toño Brito MD      metFORMIN  1,500 mg Oral Daily With Dinner Toño Brito MD      multivitamin stress formula  1 tablet Oral Daily Toño Brito MD      polyethylene glycol  17 g Oral Daily PRN Toño Brito MD      pravastatin  40 mg Oral Daily With Dinner Toño Brito MD      senna  1 tablet Oral HS PRN Modesta Lee PA-C      tamsulosin  0.4 mg Oral Daily With Dinner Toño Brito MD           Lab Results: I have reviewed the following results:  Results from last 7 days   Lab Units 04/14/25  0535   HEMOGLOBIN g/dL 12.8   HEMATOCRIT % 38.2   WBC Thousand/uL 8.29   PLATELETS Thousands/uL 222      Results from last 7 days   Lab Units 04/14/25  0535   BUN mg/dL 20   SODIUM mmol/L 137   POTASSIUM mmol/L 4.5   CHLORIDE mmol/L 105   CREATININE mg/dL 1.19   AST U/L 13   ALT U/L 11              Modesta Lee PA-C  Physical Medicine and Rehabilitation  [facility]

## 2025-04-15 PROCEDURE — 97530 THERAPEUTIC ACTIVITIES: CPT

## 2025-04-15 PROCEDURE — 99232 SBSQ HOSP IP/OBS MODERATE 35: CPT | Performed by: PHYSICAL MEDICINE & REHABILITATION

## 2025-04-15 PROCEDURE — 97116 GAIT TRAINING THERAPY: CPT | Performed by: PHYSICAL THERAPIST

## 2025-04-15 PROCEDURE — 97110 THERAPEUTIC EXERCISES: CPT | Performed by: PHYSICAL THERAPIST

## 2025-04-15 RX ADMIN — PRAVASTATIN SODIUM 40 MG: 40 TABLET ORAL at 17:05

## 2025-04-15 RX ADMIN — FAMOTIDINE 20 MG: 20 TABLET, FILM COATED ORAL at 08:23

## 2025-04-15 RX ADMIN — TAMSULOSIN HYDROCHLORIDE 0.4 MG: 0.4 CAPSULE ORAL at 17:04

## 2025-04-15 RX ADMIN — CHOLECALCIFEROL TAB 25 MCG (1000 UNIT) 5000 UNITS: 25 TAB at 08:23

## 2025-04-15 RX ADMIN — B-COMPLEX W/ C & FOLIC ACID TAB 1 TABLET: TAB at 08:23

## 2025-04-15 RX ADMIN — METFORMIN ER 500 MG 1500 MG: 500 TABLET ORAL at 17:04

## 2025-04-15 RX ADMIN — OXYCODONE HYDROCHLORIDE AND ACETAMINOPHEN 500 MG: 500 TABLET ORAL at 08:23

## 2025-04-15 RX ADMIN — LOSARTAN POTASSIUM 50 MG: 50 TABLET, FILM COATED ORAL at 08:23

## 2025-04-15 NOTE — PROGRESS NOTES
04/15/25 1235   Pain Assessment   Pain Assessment Tool 0-10   Pain Score No Pain   Restrictions/Precautions   Precautions Fall Risk   RLE Weight Bearing Per Order TTWB   Braces or Orthoses   (R LE cast)   Sit to Stand   Type of Assistance Needed Independent   Physical Assistance Level No physical assistance   Sit to Stand CARE Score 6   Meal Prep   Meal Prep Level Walker   Meal Prep Level of Assistance Modified independent   Kitchen Mobility   Kitchen-Mobility Level Walker   Kitchen Activity Retrieve items;Transport items   Light Housekeeping   Light Housekeeping Level Walker   Light Housekeeping Level of Assistance Modified independent   Light Housekeeping transported plate of food via RW with use of walker tray to a staff member and demon good safety   Cognition   Overall Cognitive Status WFL   Orientation Level Oriented X4   Additional Activities   Additional Activities Comments Functional mobility with RW R LE TTWB MI   Activity Tolerance   Activity Tolerance Patient tolerated treatment well   Assessment   Treatment Assessment OT session focused on kitchen safety/mobility/mgmt of meal prep with use of RW while maintaining TTWB to R LE; Pt prepared hamburger casserole. Pt stood for entirety of session. Pt navigated throughout kitchen to safely retrieve items from fridge/cupboards, utilize stove top/oven drawer, s/u and c/u all supplies, cut up onions, monitored use of oven while frying hamburger, cooking pasta and organized all items safely, transported items with use of walker. Stood to wash dishes. Good safety patel.Pt's barriers to d/c to personal home environment include decreased strength RLE s/p quadricepts rupture, decreased ROM with use of leg length hard cast, and TTWB RLE. Plan is contingent upon placement due to noted barriers. Ortho f/u April 18.   Prognosis Good   Problem List Decreased strength;Decreased range of motion;Impaired balance;Decreased mobility   Plan   Treatment/Interventions ADL  retraining;Functional transfer training;Patient/family training;OT   Progress Progressing toward goals   OT Therapy Minutes   OT Time In 1235   OT Time Out 1405   OT Total Time (minutes) 90   OT Mode of treatment - Individual (minutes) 90   OT Mode of treatment - Concurrent (minutes) 0   OT Mode of treatment - Group (minutes) 0   OT Mode of treatment - Co-treat (minutes) 0   OT Mode of Treatment - Total time(minutes) 90 minutes   OT Cumulative Minutes 1862   Therapy Time missed   Time missed? No

## 2025-04-15 NOTE — NURSING NOTE
Patient remains MOD I with walker. Reports no pain. Cast remains in place. Dressing to right posterior foot changed by wound nurse this shift. Dressing remains in place. Sitting upright for all meals. Will continue to monitor and follow plan of care.

## 2025-04-15 NOTE — PROGRESS NOTES
04/15/25 0900   Pain Assessment   Pain Assessment Tool 0-10   Pain Score No Pain   Restrictions/Precautions   Precautions Fall Risk   Weight Bearing Restrictions Yes   RLE Weight Bearing Per Order TTWB   LLE Weight Bearing Per Order WBAT   ROM Restrictions No   Braces or Orthoses   (R LE CAST)   Cognition   Arousal/Participation Alert;Cooperative   Attention Within functional limits   Following Commands Follows all commands and directions without difficulty   Subjective   Subjective Pt reports no pain   Roll Left and Right   Type of Assistance Needed Independent   Physical Assistance Level No physical assistance   Roll Left and Right CARE Score 6   Sit to Lying   Type of Assistance Needed Independent   Physical Assistance Level No physical assistance   Sit to Lying CARE Score 6   Lying to Sitting on Side of Bed   Type of Assistance Needed Independent   Physical Assistance Level No physical assistance   Lying to Sitting on Side of Bed CARE Score 6   Sit to Stand   Type of Assistance Needed Independent   Physical Assistance Level No physical assistance   Sit to Stand CARE Score 6   Bed-Chair Transfer   Type of Assistance Needed Independent   Physical Assistance Level No physical assistance   Chair/Bed-to-Chair Transfer CARE Score 6   Car Transfer   Type of Assistance Needed Independent   Physical Assistance Level No physical assistance   Comment Stimulated on nustep   Car Transfer CARE Score 6   Walk 10 Feet   Type of Assistance Needed Independent   Physical Assistance Level No physical assistance   Comment RW   Walk 10 Feet CARE Score 6   Walk 50 Feet with Two Turns   Type of Assistance Needed Independent   Physical Assistance Level No physical assistance   Comment RW   Walk 50 Feet with Two Turns CARE Score 6   Walk 150 Feet   Type of Assistance Needed Independent   Physical Assistance Level No physical assistance   Comment RW   Walk 150 Feet CARE Score 6   Walking 10 Feet on Uneven Surfaces   Type of Assistance  Needed Independent   Physical Assistance Level No physical assistance   Comment RW   Walking 10 Feet on Uneven Surfaces CARE Score 6   Ambulation   Primary Mode of Locomotion Prior to Admission Walk   Assist Device Roller Walker   Does the patient walk? 2. Yes   Wheel 50 Feet with Two Turns   Reason if not Attempted Activity not applicable   Wheel 50 Feet with Two Turns CARE Score 9   Wheel 150 Feet   Reason if not Attempted Activity not applicable   Wheel 150 Feet CARE Score 9   Wheelchair mobility   Findings N/A   Does the patient use a wheelchair? 0. No   Curb or Single Stair   Style negotiated Single stair   Type of Assistance Needed Independent   Physical Assistance Level No physical assistance   Comment L handrail ascend   1 Step (Curb) CARE Score 6   4 Steps   Type of Assistance Needed Independent   Physical Assistance Level No physical assistance   Comment L HR ascend   4 Steps CARE Score 6   12 Steps   Reason if not Attempted Activity not applicable   12 Steps CARE Score 9   Stairs   Type Stairs   # of Steps 8   Weight Bearing Precautions RLE;TTWB;Fall Risk   Assist Devices Single Rail   Toilet Transfer   Comment did not require this session   Therapeutic Interventions   Strengthening Seated LE TE   Balance foam   Other transfer training, gait training, stair training   Equipment Use   NuStep L5, 15 min, B/L UE, L LE only   Assessment   Treatment Assessment Pt agreeable to PT this AM, received supine in bed. Pt continues to be functionally limited by WBS and R LE long cast, as he remains functional independent using RW, maintains TTWB R LE well with all activity. Pt is Mod I with all activity and is able to run through all exercises. Pt completed  FF of steps independently utilizing single HR. Barriers to discharge home remain due to decreased home accessibility due to R LE long cast, as well as decreased support. Pt has ortho follow up on Friday. Continue with POC and progress as able.   Problem List  Decreased strength;Decreased range of motion;Impaired balance;Decreased mobility   Barriers to Discharge Inaccessible home environment;Decreased caregiver support   PT Barriers   Physical Impairment Decreased strength;Decreased range of motion;Impaired balance   Functional Limitation Car transfers   Plan   Treatment/Interventions Functional transfer training;LE strengthening/ROM;Elevations;Therapeutic exercise;Endurance training;Cognitive reorientation;Patient/family training;Equipment eval/education;Bed mobility;Gait training   Progress Progressing toward goals   PT Therapy Minutes   PT Time In 0900   PT Time Out 1030   PT Total Time (minutes) 90   PT Mode of treatment - Individual (minutes) 90   PT Mode of treatment - Concurrent (minutes) 0   PT Mode of treatment - Group (minutes) 0   PT Mode of treatment - Co-treat (minutes) 0   PT Mode of Treatment - Total time(minutes) 90 minutes   PT Cumulative Minutes [id number]

## 2025-04-15 NOTE — PROGRESS NOTES
"Progress Note - Tobin Kerns 69 y.o. male MRN: [id number]    Unit/Bed#: [facility] 210-01 Encounter: [id number]        Subjective:   Patient seen and examined at bedside after reviewing the chart and discussing the case with the caring staff.      Patient examined at bedside.  Patient denies any acute symptoms.     Physical Exam   Vitals: Blood pressure 116/60, pulse 62, temperature 98.7 °F (37.1 °C), temperature source Temporal, resp. rate 12, height 6' 5\" (1.956 m), weight (!) 136 kg (300 lb 0.7 oz), SpO2 95%.,Body mass index is 35.58 kg/m².  Constitutional: Patient in no acute distress.  HEENT: PERR, EOMI, MMM.  Cardiovascular: Normal rate and regular rhythm.    Pulmonary/Chest: Effort normal and breath sounds normal.   Abdomen: Soft, + BS, NT.    Assessment/Plan:  Tobin Kerns is a(n) 69 y.o. year old male who is s/p quadriceps tendon repair.     Cardiac with hx of HTN, HLD.  Patient is on losartan 50 mg daily, pravastatin 40 mg daily.  Impaired fasting glucose.  Hgb a1c 5.4% on 3/12/25.  Continue metformin XR 1500 mg daily with dinner.  Regular diet and d/c accucheks as blood sugar is well controlled.    BPH.  Continue Flomax 0.4 mg daily.  GERD.  Continue Pepcid 20 mg daily.  JOVANY.  On CPAP at bedtime.   Vitamin D deficiency.  Patient is on vitamin D3 5000 units daily.  Pain and bowel management.  As per physiatrist.  Quadriceps tendon repair.  Toe touch weightbearing RLE with use of walker.  Patient is receiving intensive PT OT as per physiatrist.    Anticipated date of discharge.  TBD.    The patient was discussed with Dr. Brito and he is in agreement with the above note.  "

## 2025-04-15 NOTE — PLAN OF CARE
Problem: PAIN - ADULT  Goal: Verbalizes/displays adequate comfort level or baseline comfort level  Description: Interventions:  - Encourage patient to monitor pain and request assistance  - Assess pain using appropriate pain scale  - Administer analgesics based on type and severity of pain and evaluate response  - Implement non-pharmacological measures as appropriate and evaluate response  - Consider cultural and social influences on pain and pain management  - Notify physician/advanced practitioner if interventions unsuccessful or patient reports new pain  Outcome: Progressing     Problem: INFECTION - ADULT  Goal: Absence or prevention of progression during hospitalization  Description: INTERVENTIONS:  - Assess and monitor for signs and symptoms of infection  - Monitor lab/diagnostic results  - Monitor all insertion sites, i.e. indwelling lines, tubes, and drains  - Monitor endotracheal if appropriate and nasal secretions for changes in amount and color  - [place] appropriate cooling/warming therapies per order  - Administer medications as ordered  - Instruct and encourage patient and family to use good hand hygiene technique  - Identify and instruct in appropriate isolation precautions for identified infection/condition  Outcome: Progressing     Problem: SAFETY ADULT  Goal: Maintain or return to baseline ADL function  Description: INTERVENTIONS:  -  Assess patient's ability to carry out ADLs; assess patient's baseline for ADL function and identify physical deficits which impact ability to perform ADLs (bathing, care of mouth/teeth, toileting, grooming, dressing, etc.)  - Assess/evaluate cause of self-care deficits   - Assess range of motion  - Assess patient's mobility; develop plan if impaired  - Assess patient's need for assistive devices and provide as appropriate  - Encourage maximum independence but intervene and supervise when necessary  - Involve family in performance of ADLs  - Assess for home care  needs following discharge   - Consider OT consult to assist with ADL evaluation and planning for discharge  - Provide patient education as appropriate  Outcome: Progressing

## 2025-04-15 NOTE — WOUND OSTOMY CARE
Consult Note - Wound   Tobin Emmanuelsco 69 y.o. male MRN: [id number]  Unit/Bed#: [facility] 210-01 Encounter: [id number]      History and Present Illness: Patient is seen for wound care consult today of the right heel . Patient is a 69 year old male at the [facility] for therapy . He is pleasant for the assessment of the skin . Patient on  3/6/25 underwent a repair of the a quad tendon repair from a quadriceps muscle rupture . He has a full length cast on the the right leg from thigh to the ankle area . PMH includes HTN, Mixed hyperlipidemia , GERD , peripheral neuropathy and not a diabetic , JOVANY on CPAP , IFG , Left foot dry ulcer areas on the plantar aspect , BPH , right heel and ankle wounds . Continent of bowel and bladder . In bed and rolling independently in the bed . Independent with walker for ambulating as well . Patient had a right heel DTI on admission and now the cast has been further cut back and able to visualize more of the area .         Assessment Findings:   Right heel - POA evolving DTI that is able to visualize better with more of the cast cut away . This is on the proximal area that was located under the cast. Area is a related to pressure and abrasion due to the sharp edge of the casted area . Wound bed is 70% slough and 30%  pink on the edges. Small serosanguinous drainage   Left plantar aspect is all resolved and intact   No noted sacral buttocks areas .     Patient is to have the cast removed this Friday by orthopedics . Discussed the assessment and treatment with the nurse and the PA-C     Skin Care orders:  1-Hydraguard to sacrum, buttocks bid and prn   2-EHOB / waffle  cushion when out of bed in chair.  3-Moisturize skin daily with skin nourishing cream  4-Elevate heels to offload pressure.  5. Right posterior heel cleanse with Normal saline then pat dry apply melgisorb then a small silicone foam nate with a T and date change every other day .            Wound 03/06/25 Plantar Left (Active)   Wound  Image   04/15/25 0903   Wound Description Clean;Dry;Intact 04/15/25 0903   Pressure Injury Stage O 04/15/25 0903   Non-staged Wound Description Not applicable 04/15/25 0903   Wound Length (cm) 0 cm 04/15/25 0903   Wound Width (cm) 0 cm 04/15/25 0903   Wound Depth (cm) 0 cm 04/15/25 0903   Wound Surface Area (cm^2) 0 cm^2 04/15/25 0903   Wound Volume (cm^3) 0 cm^3 04/15/25 0903   Calculated Wound Volume (cm^3) 0 cm^3 04/15/25 0903   Change in Wound Size % 100 04/15/25 0903   Drainage Amount None 04/15/25 0903   Drainage Description Brown 03/18/25 0934   Audrey-wound Assessment Clean;Dry;Intact 04/15/25 0903   Treatments Site care 04/15/25 0903   Wound Site Closure None 03/29/25 1345   Dressing Open to air 04/15/25 0903   Wound packed? No 03/23/25 0900   Dressing Changed Changed 04/03/25 0953   Patient Tolerance Tolerated well 04/15/25 0903   Dressing Status Clean;Dry;Intact 04/14/25 1931       Wound 03/10/25 Pressure Injury Ankle Posterior;Right (Active)   Wound Image   04/15/25 0855   Wound Description Fragile;Pink;Slough 04/15/25 0855   Pressure Injury Stage DTPI 04/15/25 0855   Non-staged Wound Description Full thickness 04/15/25 0855   Wound Length (cm) 0.6 cm 04/15/25 0855   Wound Width (cm) 1 cm 04/15/25 0855   Wound Depth (cm) 0.2 cm 04/15/25 0855   Wound Surface Area (cm^2) 0.6 cm^2 04/15/25 0855   Wound Volume (cm^3) 0.12 cm^3 04/15/25 0855   Calculated Wound Volume (cm^3) 0.12 cm^3 04/15/25 0855   Change in Wound Size % 40 04/15/25 0855   Drainage Amount Small 04/15/25 0855   Drainage Description Serosanguineous 04/15/25 0855   Audrey-wound Assessment Clean;Dry;Intact 04/15/25 0855   Treatments Site care;Cleansed 04/14/25 1831   Wound Site Closure None 03/29/25 1344   Dressing Calcium Alginate;Foam, Silicon (eg. Allevyn, etc) 04/15/25 0855   Dressing Changed Changed 04/15/25 0855   Patient Tolerance Tolerated well 04/15/25 0855   Dressing Status Clean;Dry;Intact 04/14/25 1931     Wound care will follow weekly  secure chat with questions or concerns -     Kymberly CHANCEN RN CWOCN

## 2025-04-15 NOTE — TEAM CONFERENCE
Acute RehabilitationTeam Conference Note  Date: 04/15/2025   Time: 10:35AM      Patient Name:  Tobin Kerns       Medical Record Number: [id number]   YOB: 1955  Sex: Male          Room/Bed:  /[facility] 210-01  Payor Info:  Payor: WORKERS COMPENSATION / Plan: [facility] DEPTMENT OF LABOR / Product Type: Workers Compensation /      Admitting Diagnosis: Quadriceps muscle rupture [S76.119A]   Admit Date/Time:  3/11/2025  3:20 PM  Admission Comments: No comment available     Primary Diagnosis:  Quadriceps muscle rupture, right, subsequent encounter  Principal Problem: Quadriceps muscle rupture, right, subsequent encounter    Patient Active Problem List    Diagnosis Date Noted    BPH (benign prostatic hyperplasia) 03/12/2025    Impaired mobility and ADLs 03/12/2025    Wound of right ankle 03/12/2025    Quadriceps tendon rupture, right, sequela 03/07/2025    Obesity (BMI 35.0-39.9 without comorbidity) 03/04/2025    Onychomycosis 12/30/2024    Urinary retention 07/27/2024    Quadriceps muscle rupture, right, subsequent encounter 07/25/2024    Ambulatory dysfunction 07/23/2024    Abdominal aortic ectasia (HCC) 11/13/2023    Healed ulcer of left foot 02/23/2022    IFG (impaired fasting glucose) 10/01/2021    Hypertension 09/27/2021    Peripheral neuropathy 09/27/2021    Drusen (degenerative) of macula, bilateral 09/27/2021    Myopia, bilateral 09/27/2021    Pinguecula, bilateral 09/27/2021    Regular astigmatism, bilateral 09/27/2021    JOVANY on CPAP     History of colon polyps     Pure hypertriglyceridemia 08/19/2019    Mixed hyperlipidemia 05/30/2019    Gastro-esophageal reflux disease without esophagitis 05/30/2019    Presbyopia 10/11/2016       Physical Therapy:    Weight Bearing Status: Toe touch (R LE)  Transfers: Independent  Bed Mobility: Independent  Amulation Distance (ft): 200 feet  Ambulation: Independent  Assistive Device for Ambulation: Roller Walker  Wheelchair Mobility Distance:  (n/a)  Wheelchair  Mobility:  (N/A)  Number of Stairs: 7  Assistive Device for Stairs: Lehft Hand Rail  Stair Assistance: Independent  Ramp: Independent  Assistive Device for Ramp: Roller Walker  Discharge Recommendations: Other  DC Home with::  (Alternative placement until R LE long leg cast discontinued and allowed for knee ROM)    03/18/2025:  Patient being followed by PT, making good progress.  Presents, following R quadriceps muscle rupture, now TTWB R LE in cast, with decreased ROM/strength, decreased balance and safety, decreased endurance, and pain.   Patient  mod I bed mobility, mod I transfers with RW, mod I short distances, S >50' ambulation with RW, S negotiation of 6 steps with L handrail.  Patient would benefit from continued inpatient ARC PT to increase function, safety, and increased independence in prep for safe d/c to home.    3/25/2025:  Patient being followed by PT, making good progress.  Presents, following R quadriceps muscle rupture, now TTWB R LE in cast, with decreased ROM/strength, decreased balance and safety, decreased endurance, and pain.   Patient  mod I bed mobility, mod I transfers with RW, mod I ambulation with RW up to 180ft, mod I negotiation of 6 steps with L handrail.  Patient would benefit from continued ARC PT to increase function, safety, and increased independence in prep for safe d/c to home.     04/01/2025:  Patient being followed by PT, making good progress.  Presents, following R quadriceps muscle rupture, now TTWB R LE in cast, with decreased ROM/strength, decreased balance and safety, decreased endurance, and pain.   Patient  mod I bed mobility, mod I transfers with RW, mod I ambulation with RW up to 180ft, mod I negotiation of 6 steps with L handrail.  Patient would benefit from continued ARC PT to increase function, safety, and increased independence in prep for safe d/c to home.     04/08/2025:  Patient being followed by PT, making good progress.  Presents, following R quadriceps  muscle rupture, now TTWB R LE in cast, with decreased ROM/strength, decreased balance and safety, decreased endurance, and pain.   Patient  mod I bed mobility, mod I transfers with RW, mod I ambulation with RW up to 200ft, mod I negotiation of 6 steps with L handrail.  Patient would benefit from continued ARC PT to increase function, safety, and increased independence in prep for safe d/c to home.     04/15/2025:  Patient being followed by PT, making good progress.  Presents, following R quadriceps muscle rupture, now TTWB R LE in cast, with decreased ROM/strength, decreased balance and safety, decreased endurance, and pain.   Patient  mod I bed mobility, mod I transfers with RW, mod I ambulation with RW up to 200ft, mod I negotiation of 7 steps with L handrail.  Patient would benefit from continued ARC PT to increase function, safety, and increased independence in prep for safe d/c to home.     Occupational Therapy:  Eating: Independent  Grooming: Independent  Bathing: Independent  Bathing: Independent  Upper Body Dressing: Independent  Lower Body Dressing: Independent  Toileting: Independent  Tub/Shower Transfer:  (Not completed due to long leg cast)  Toilet Transfer: Independent  Cognition: Within Defined Limits  Orientation: Person, Situation, Place, Time  Discharge Recommendations: Home with:  DC Home with:: First Floor Setup, Home Occupational Therapy       03/17/25 Pt participating in Adls/iADLs, safety with functional txfs and mobility, TE/TA for generalized strength and endurance. Pts LOF noted above. Pt is progressing toward OT goals. Pt's barriers to d/c include decreased strength throughout but especially RLE s/p quadricepts rupture, decreased ROM, decreased balance TTWB RLE. Pt would benefit from continued skilled OT services in order to address listed barriers and prepare for safe d/c.     03/24/25 Pt participating in Adls/iADLs, safety with functional txfs and mobility, TE/TA for generalized strength  and endurance. Pts LOF noted above. Pt continues to be consistently MI with all ADLs.  Pt's barriers to d/c include decreased strength  RLE s/p quadricepts rupture, decreased ROM, decreased balance due to TTWB RLE. Plan is contingent upon placement.     03/31/25 Pt participating in Adls/iADLs, safety with functional txfs and mobility, TE/TA for generalized strength and endurance. Pts LOF noted above. Pt continues to be consistently MI with all ADLs.  Pt's barriers to d/c include decreased strength  RLE s/p quadricepts rupture, decreased ROM, decreased balance due to TTWB RLE. Plan is contingent upon placement.     04/07/25 Pt participating in Adls/iADLs, safety with functional txfs and mobility, TE/TA for generalized strength and endurance. Pts LOF noted above. Pt continues to be consistently MI with all ADLs.  Pt's barriers to d/c include decreased strength  RLE s/p quadricepts rupture, decreased ROM, decreased balance due to TTWB RLE. Plan is contingent upon placement.     04/14/25 Pt participating in Adls/iADLs, safety with functional txfs and mobility, TE/TA for generalized strength and endurance. Pts LOF noted above. Pt continues to be consistently MI with all ADLs.  Progress has plateaued due to the following limitations: decreased strength and ROM RLE s/p quadricepts rupture, TTWB RLE. Plan is contingent upon placement.     Speech Therapy:           No notes on file    Nursing Notes:  Appetite: Good  Diet Type: Regular/House                      Diet Patient/Family Education Complete: Yes    Type of Wound (LDA): Wound (right posterior foot wound from cast rubbing)                    Type of Wound Patient/Family Education: Yes  Bladder: 6 - Modified [place]k     Bladder Patient/Family Education: Yes  Bowel: 6 - Modified [place]     Bowel Patient/Family Education: Yes  Pain Location/Orientation: Orientation: Right, Location: Knee  Pain Score: 0                       Hospital Pain Intervention(s):  Medication (See MAR)  Pain Patient/Family Education: Yes  Medication Management/Safety  Injectable: Lovenox  Safe Administration: No  Reason for non-safe administration: not attempted  Medication Patient/Family Education Complete: Yes    3/18/25 Patient was admitted to [facility] following a right quadriceps muscle rupture with revision surgery performed.  Patient with a hard cast to the right lower extremity and is to maintain toe-touch weight bearing restriction to that limb.  Lungs are clear diminished on room air.  Patient wears own CPAP unit from home overnight as ordered for sleep apnea.  Patient is continent of bowel and bladder.  Every other day dressing changes to the right lower leg where cast was rubbing and to the left plantar foot.  Patient is now modified independent to the bathroom with a walker.      3/25/2025  A/O x 4, Pt. Is Mod I TTWB RLE with long leg cast using RW. Has decreased sensation to B/L lower ext which he has had prior, continues to have dressing changes every other day to RLE where previous cast was rubbing and wounds to left plantar foot that he had PTA. Continues to wear his CPAP from home.     3/31/25 Patient remains alert and oriented.  Right lower extremity with a leg cast in place at all times as ordered.  Patient is to follow up with ortho on Friday for further cast care.  Every other day dressing changes to the right posterior ankle and left plantar foot.  Patient remains modified independent to the bathroom with a walker.  Patient to maintain toe touch weight bearing status to the right lower extremity.      4/7/25 Patient remains alert and oriented.  Right lower extremity cast in place at all times.  Patient to maintain toe touch weight bearing restriction to the right lower extremity at all times.  Every other day dressing changes to the right posterior ankle.  Left plantar foot wound healed and left open to air.  Patient was modified independent to the bathroom overnight with  walker in use.      4/15/25 Patient remains alert and oriented.  Right lower extremity cast in place at all times.  Patient to follow up with ortho this Friday.  Patient to maintain toe touch weight bearing restriction to the right lower extremity at all times.  Daily dressing changes to the right heel wound.  Patient remains modified independent to the bathroom with walker in use.      Case Management:     Discharge Planning  Living Arrangements: Lives Alone  Support Systems: Self  Assistance Needed: unknown  Type of Current Residence: Private residence  Current Home Care Services: No  Patient admitted to [facility] on3/11/25 after inpatient hospital stay for right knee revision quad tendon repair on 3/6/25. Patient lives alone in a mobile home, [address],. Patient independent PTA, driving. Original injury is from July 2024, had repair with a knee immobilizer. Did not work. Revised . Workmen's comp. Patient at Mod I level, looking into alternate disposition, [facility] [employer]  3/25/25 Having difficulty getting any assistance from [employer] workmen's comp. Patient for discharge today 3/25 waiting to establish home care, where he is going on d/c, transportation.  4/1/25 Emailed and sent fax trying to get assistance with discharge planning from workmen's comp. My need to have patient pay out of pocket. Last covered day 3/31.  4/8/25 Waiting on [employer] comp, who is getting auth for inpatient hospital stay, will not be able to assist with discharge planning until that is done.  0415/2025: Call placed last week to [facility] billing office to inquire if authorization was obtained for pt's surgery in March.   Per Radha Cho had started the process and was waiting for validation to progress with surgery auth.    Recalled office today and spoke with Regina, who reports they are still waiting for validation.  Until this is completed, the [employer] comp Rep, Ceasar, cannot assist with d/c planning from [facility].   Patient with ortho appt 4/18/2025 at 0930.  Transportation set up for 0815.    Is the patient actively participating in therapies? yes  List any modifications to the treatment plan: na    Barriers Interventions   TTWB right LE, R distal achillis shearing injury Cues, ADL, transfer, gait training, wound management and follow up appointments weekly   Skin integrity left foot Local care, medical management and oversight                 Is the patient making expected progress toward goals? yes  List any update or changes to goals: na    Medical Goals: Patient will be able to manage medical conditions and comorbid conditions with medications and follow up upon completion of rehab program    Weekly Team Goals:   Rehab Team Goals  ADL Team Goal: Patient will be independent with ADLs with least restrictive device upon completion of rehab program  Bowel/Bladder Team Goal: Patient will return to premorbid level for bladder/bowel management upon completion of rehab program  Transfer Team Goal: Patient will be independent with transfers with least restrictive device upon completion of rehab program  Locomotion Team Goal: Patient will be independent with locomotion with least restrictive device upon completion of rehab program    Pt will be Mod I self care  Pt will be Mod I bed mobility, transfers, and mobility     Health and wellness: Pt will be able to return to driving and enjoys playing video game.     Discussion:  Pt is mod I with all ADLs and mobility using RW in accessible environment although continues to wait on discharge planning pending validation of surgical plan. Pt will benefit RW, tub bench and BSC. Pending discharge placement pt would benefit from home health care including PT/ OT if transition is to home, working with workers compensation for assistance with placement.    Anticipated Discharge Date:  Awaiting placement versus discharge to home  WTC Team Members Present:  The following team members are  supervising care for this patient and were present during this Weekly Team Conference.    The following team members are supervising care for this patient and were present during this Weekly Team Conference.    MD Onelia Vasquez, RN Maren Schoenberger, RN Emily Alexander, PT  Gisell Mejía, OTR/L

## 2025-04-15 NOTE — PROGRESS NOTES
"Progress Note - PMR   Name: Tobin Kerns 69 y.o. male I MRN: [id number]  Unit/Bed#: -01 I Date of Admission: 3/11/2025   Date of Service: 4/15/2025 I Hospital Day: 35     Assessment & Plan  Quadriceps muscle rupture, right, subsequent encounter  HPI:   He initially underwent quad tendon repair on 7/25/24 with Dr. Foster. He was discharged home with outpatient PT.  Later noted to have recurrent high-grade tear with extensor lag on exam and elected for operative management  January 2025 MRI: \"Prior quadriceps insertion repair with high-grade recurrent tear exhibiting up to 2.2 cm of tendon retraction.\"  On 3/6/25, he underwent revision of quad tendon repair with allograft augmentation  3/21: outpatient follow-up with Dr. Foster, new cast applied   3/28: follow-up with Dr. Foster, continue TTWB   4/4: follow-up with Dr. Foster (removed cast and reapplied long leg cast for an additional 2 weeks) and continue TTWB  4/11, no changes in plan. Follow-up on 4/18 for cast removal     Plan:   PT and OT for 3 hours a day, 5-6 days a week   TTWB RLE   Avoid moisture to cast   Pain: PRN Tylenol (monitor LFTs) and oxycodone discontinued on 3/17 due to minimal pain   Lovenox d/c on 4/7     Hypertension  Continue losartan 50 mg daily   Monitor BP   Mixed hyperlipidemia  Continue pravastatin 40 mg daily   Gastro-esophageal reflux disease without esophagitis  Continue famotidine 20 mg daily   Peripheral neuropathy  Patient is not diabetic   Monitor skin for new or worsening wounds   JOVANY on CPAP  Continue CPAP qHS  IFG (impaired fasting glucose)  Continue metformin 1500 mg daily   Seen by RD 3/12, diet adjusted to regular   3/12 A1c: 5.4   Healed ulcer of left foot  Local wound care  Present on admission   Wound care RN consulted   BPH (benign prostatic hyperplasia)  Continue tamsulosin 0.4 mg daily   Patient currently voiding without difficulty   PRN PVRs if urinary retention is suspected   Impaired mobility and " ADLs  Patient was evaluated by the rehabilitation team MD and an appropriate candidate for acute inpatient rehabilitation program at this time.  The patient will tolerate 3 hours/day 5 to 7 days/week of intensive physical and  occupational therapy   in order to obtain goals for community discharge  Due to the patient's functional Compared to their baseline level of function in addition to their ongoing medical needs, the patient would benefit from daily supervision from a rehabilitation physician as well as rehabilitation nursing to implement and adjust the medical as well as functional plan of care in order to meet the patient's goals.  Bladder: Monitor closely for urinary incontinence and/or retention. Monitor urine output and encourage spontaneous voids. If unable to void spontaneously, will monitor with PVR bladder scans and initiate CIC regimen.  Skin: Monitor for breakdown, frequent turns, pressure offloading  Sleep: Encourage sleep hygiene (routine that promotes healthy circadian rhythm,avoid caffeine later in the day, quiet/dark room at night, reduce electronic before bedtime)  Wound of right ankle  Skin breakdown on R heel due to cast. Ortho removed approximately 1 inch of cast at posterior aspect on 3/11. Cast further trimmed on 4/11, revealing a new posterior ankle wound.   New cast 3/21   Local wound care  Continue to monitor   Present on admission to [facility]   Wound care RN consulted:  Skin Care orders:  1-Hydraguard to sacrum, buttocks bid and prn   2-EHOB / waffle  cushion when out of bed in chair.  3-Moisturize skin daily with skin nourishing cream  4-Elevate heels to offload pressure.  5-Turn/reposition q2h for pressure re-distribution on skin  6. Right posterior heel cleanse with soap and water then pat dry apply cavilon 3 M no sting then a silicone foam nate with a P and date change every other day   7. Left plantar aspect of the foot - cleanse with soap and water then apply a silicone foam nate with  a P  and date change every other day     Subjective   Tobin Kerns is a 69 year-old male, with a past medical history of hypertension, hyperlipidemia, JOVANY, impaired fasting glucose, bilateral lower extremity neuropathy, GERD, BPH, presenting to [facility] after an elective right quadricept tendon repair. He initially underwent quad tendon repair on 7/25/24 with Dr. Foster. He was discharged home with outpatient PT. He was noted to have recurrent high-grade tear on repeat MRI with extensor lag on exam and elected for operative management. On 3/6/25, he underwent revision of quad tendon repair with allograft augmentation. He was evaluated by PT and OT and deemed an appropriate candidate for ARC due to functional deficits     Chief Complaint: f/u ambulatory dysfunction    Interval: Seen and evaluated patient in room. He does not have any concerns at this time. He denies any pain. Last BM 4/14, he is continent of bowel and bladder.     Objective :  Temp:  [97.8 °F (36.6 °C)-98.7 °F (37.1 °C)] 98.7 °F (37.1 °C)  HR:  [62-67] 62  BP: (105-116)/(52-60) 116/60  Resp:  [12-14] 12  SpO2:  [94 %-95 %] 95 %  O2 Device: None (Room air)    Functional Update:  Physical Therapy Occupational Therapy   Weight Bearing Status: Toe touch (R LE)  Transfers: Independent  Bed Mobility: Independent  Amulation Distance (ft): 200 feet  Ambulation: Independent  Assistive Device for Ambulation: Roller Walker  Wheelchair Mobility Distance:  (n/a)  Wheelchair Mobility:  (N/A)  Number of Stairs: 7  Assistive Device for Stairs: Lehft Hand Rail  Stair Assistance: Independent  Ramp: Independent  Assistive Device for Ramp: Roller Walker  Discharge Recommendations: Other  DC Home with::  (Alternative placement until R LE long leg cast discontinued and allowed for knee ROM)   Eating: Independent  Grooming: Independent  Bathing: Independent  Bathing: Independent  Upper Body Dressing: Independent  Lower Body Dressing: Independent  Toileting:  Independent  Tub/Shower Transfer:  (Not completed due to long leg cast)  Toilet Transfer: Independent  Cognition: Within Defined Limits  Orientation: Person, Situation, Place, Time           Physical Exam  Vitals and nursing note reviewed.   Constitutional:       General: He is not in acute distress.     Appearance: He is obese. He is not ill-appearing, toxic-appearing or diaphoretic.   HENT:      Head: Normocephalic and atraumatic.      Nose: Nose normal.      Mouth/Throat:      Mouth: Mucous membranes are moist.   Cardiovascular:      Pulses:           Dorsalis pedis pulses are 2+ on the right side.   Pulmonary:      Effort: Pulmonary effort is normal. No respiratory distress.   Abdominal:      General: There is no distension.   Skin:     General: Skin is warm and dry.   Neurological:      General: No focal deficit present.      Mental Status: He is alert.   Psychiatric:         Mood and Affect: Mood normal.         Behavior: Behavior normal.         Scheduled Meds:  Current Facility-Administered Medications   Medication Dose Route Frequency Provider Last Rate    acetaminophen  650 mg Oral Q6H PRN Modesta Lee PA-C      ascorbic acid  500 mg Oral Daily Toño Brito MD      Cholecalciferol  5,000 Units Oral Daily Toño Brito MD      famotidine  20 mg Oral Daily Toño Brito MD      losartan  50 mg Oral Daily Toño Brito MD      metFORMIN  1,500 mg Oral Daily With Dinner Toño Brito MD      multivitamin stress formula  1 tablet Oral Daily Toño Brito MD      polyethylene glycol  17 g Oral Daily PRN Toño Brito MD      pravastatin  40 mg Oral Daily With Dinner Toño Brito MD      senna  1 tablet Oral HS PRN Modesta Lee PA-C      tamsulosin  0.4 mg Oral Daily With Dinner Toño Brito MD           Lab Results: I have reviewed the following results:  Results from last 7 days   Lab Units 04/14/25  0535   HEMOGLOBIN g/dL 12.8   HEMATOCRIT % 38.2   WBC Thousand/uL 8.29   PLATELETS Thousands/uL 222      Results from last 7 days   Lab Units 04/14/25  0535   BUN mg/dL 20   SODIUM mmol/L 137   POTASSIUM mmol/L 4.5   CHLORIDE mmol/L 105   CREATININE mg/dL 1.19   AST U/L 13   ALT U/L 11            Modesta Lee PA-C  Physical Medicine and Rehabilitation  [facility]

## 2025-04-15 NOTE — ASSESSMENT & PLAN NOTE
Skin breakdown on R heel due to cast. Ortho removed approximately 1 inch of cast at posterior aspect on 3/11. Cast further trimmed on 4/11, revealing a new posterior ankle wound.   New cast 3/21   Local wound care  Continue to monitor   Present on admission to [facility]   Wound care RN consulted:  Skin Care orders:  1-Hydraguard to sacrum, buttocks bid and prn   2-EHOB / waffle  cushion when out of bed in chair.  3-Moisturize skin daily with skin nourishing cream  4-Elevate heels to offload pressure.  5-Turn/reposition q2h for pressure re-distribution on skin  6. Right posterior heel cleanse with soap and water then pat dry apply cavilon 3 M no sting then a silicone foam nate with a P and date change every other day   7. Left plantar aspect of the foot - cleanse with soap and water then apply a silicone foam nate with a P  and date change every other day

## 2025-04-16 PROCEDURE — 97116 GAIT TRAINING THERAPY: CPT

## 2025-04-16 PROCEDURE — 97530 THERAPEUTIC ACTIVITIES: CPT

## 2025-04-16 PROCEDURE — 99232 SBSQ HOSP IP/OBS MODERATE 35: CPT | Performed by: PHYSICAL MEDICINE & REHABILITATION

## 2025-04-16 PROCEDURE — 97110 THERAPEUTIC EXERCISES: CPT

## 2025-04-16 PROCEDURE — 97535 SELF CARE MNGMENT TRAINING: CPT

## 2025-04-16 RX ADMIN — PRAVASTATIN SODIUM 40 MG: 40 TABLET ORAL at 17:32

## 2025-04-16 RX ADMIN — B-COMPLEX W/ C & FOLIC ACID TAB 1 TABLET: TAB at 08:21

## 2025-04-16 RX ADMIN — CHOLECALCIFEROL TAB 25 MCG (1000 UNIT) 5000 UNITS: 25 TAB at 08:21

## 2025-04-16 RX ADMIN — OXYCODONE HYDROCHLORIDE AND ACETAMINOPHEN 500 MG: 500 TABLET ORAL at 08:21

## 2025-04-16 RX ADMIN — TAMSULOSIN HYDROCHLORIDE 0.4 MG: 0.4 CAPSULE ORAL at 17:32

## 2025-04-16 RX ADMIN — LOSARTAN POTASSIUM 50 MG: 50 TABLET, FILM COATED ORAL at 08:21

## 2025-04-16 RX ADMIN — METFORMIN ER 500 MG 1500 MG: 500 TABLET ORAL at 17:32

## 2025-04-16 RX ADMIN — FAMOTIDINE 20 MG: 20 TABLET, FILM COATED ORAL at 08:21

## 2025-04-16 NOTE — TELEPHONE ENCOUNTER
Caller: Tessa from       Doctor: Dr. Fsoter     Reason for call: Tessa is requesting a callback as soon as possible. She sated she has no record of the patients surgery being approved and the facility where he is getting PO care is calling requesting orders that she can approve.      Call back#: [phone]    SENT TO PEC    
Caller: Tessa from      Doctor: Dr. Foster    Reason for call: calling to state she received a request to approve post op care for patient - however she never initially received any requests for surgery or has approved anything and would like to know if we have an authorization number somewhere to show approval for surgery     Call back#: [phone]    
Caller: letha from acute rehab nurse      Doctor: Dr. Foster    Reason for call: requesting a call back regarding the patients sx on 3/6/24 there is no record of sx being approved and needs clarification    Call back#: [phone]    
Yes

## 2025-04-16 NOTE — PROGRESS NOTES
"Progress Note - Tobin Kerns 69 y.o. male MRN: [id number]    Unit/Bed#: [facility] 210-01 Encounter: [id number]        Subjective:   Patient seen and examined at bedside after reviewing the chart and discussing the case with the caring staff.      Patient examined at bedside.  Patient denies any acute symptoms.     Physical Exam   Vitals: Blood pressure 110/63, pulse 67, temperature 97.9 °F (36.6 °C), temperature source Temporal, resp. rate 16, height 6' 5\" (1.956 m), weight (!) 137 kg (302 lb 4 oz), SpO2 98%.,Body mass index is 35.84 kg/m².  Constitutional: Patient in no acute distress.  HEENT: PERR, EOMI, MMM.  Cardiovascular: Normal rate and regular rhythm.    Pulmonary/Chest: Effort normal and breath sounds normal.   Abdomen: Soft, + BS, NT.    Assessment/Plan:  Tobin Kerns is a(n) 69 y.o. year old male who is s/p quadriceps tendon repair.     Cardiac with hx of HTN, HLD.  Patient is on losartan 50 mg daily, pravastatin 40 mg daily.  Impaired fasting glucose.  Hgb a1c 5.4% on 3/12/25.  Continue metformin XR 1500 mg daily with dinner.  Regular diet and d/c accucheks as blood sugar is well controlled.    BPH.  Continue Flomax 0.4 mg daily.  GERD.  Continue Pepcid 20 mg daily.  JOVANY.  On CPAP at bedtime.   Vitamin D deficiency.  Patient is on vitamin D3 5000 units daily.  Pain and bowel management.  As per physiatrist.  Quadriceps tendon repair.  Toe touch weightbearing RLE with use of walker.  Patient is receiving intensive PT OT as per physiatrist.    Anticipated date of discharge.  TBD.    The patient was discussed with Dr. Brito and he is in agreement with the above note.  "

## 2025-04-16 NOTE — PLAN OF CARE
Problem: PAIN - ADULT  Goal: Verbalizes/displays adequate comfort level or baseline comfort level  Description: Interventions:  - Encourage patient to monitor pain and request assistance  - Assess pain using appropriate pain scale  - Administer analgesics based on type and severity of pain and evaluate response  - Implement non-pharmacological measures as appropriate and evaluate response  - Consider cultural and social influences on pain and pain management  - Notify physician/advanced practitioner if interventions unsuccessful or patient reports new pain  Outcome: Progressing     Problem: INFECTION - ADULT  Goal: Absence or prevention of progression during hospitalization  Description: INTERVENTIONS:  - Assess and monitor for signs and symptoms of infection  - Monitor lab/diagnostic results  - Monitor all insertion sites, i.e. indwelling lines, tubes, and drains  - Monitor endotracheal if appropriate and nasal secretions for changes in amount and color  - [place] appropriate cooling/warming therapies per order  - Administer medications as ordered  - Instruct and encourage patient and family to use good hand hygiene technique  - Identify and instruct in appropriate isolation precautions for identified infection/condition  Outcome: Progressing     Problem: SAFETY ADULT  Goal: Maintain or return to baseline ADL function  Description: INTERVENTIONS:  -  Assess patient's ability to carry out ADLs; assess patient's baseline for ADL function and identify physical deficits which impact ability to perform ADLs (bathing, care of mouth/teeth, toileting, grooming, dressing, etc.)  - Assess/evaluate cause of self-care deficits   - Assess range of motion  - Assess patient's mobility; develop plan if impaired  - Assess patient's need for assistive devices and provide as appropriate  - Encourage maximum independence but intervene and supervise when necessary  - Involve family in performance of ADLs  - Assess for home care  needs following discharge   - Consider OT consult to assist with ADL evaluation and planning for discharge  - Provide patient education as appropriate  Outcome: Progressing

## 2025-04-16 NOTE — CASE MANAGEMENT
SHILA called [facility] [phone], spoke with Ricky, inquired if additional clinical information was needed for patient's acute rehab stay. Ricky stated any approval for additional time would need to go through the . SHILA explained the difficulty we have had reaching the , as noted in  SHILA 's previous notes. SHILA explained we are waiting for the authorization for the surgery performed on 3/6, as we found out it had not been authorized, and SHILA was told by Ceasar the , that she cannot authorize anything for discharge needs, until the surgical authorization has been obtained. SHILA asked to speak with Tobin, the person named as authorizing the rehab stay. Ricky looked in the computer records and stated Ramirez is assigned to the patient, the injury the injury  . SHILA made Ricky aware CM spoke with Ramirez earnestine, and was told he has nothing to do with the authorizations, and referred CM to the . SHILA requested to speak with Khris. Ricky stated the Khris that worked on this case is Khris Boss and he is retired. Ricky was very helpful, went over all authorizations  with auth numbers,requested in detail from September 2024 until 3/5-3/31.  These authorizations were for outpt therapy. Ricky stated he saw a call was made on 4/2/25 from Yennifer Samfrom [facility] , who spoke to Renee at [facility] orthopedics office, and gave Renee the [facility] [email or web address] to submit for authorization for the surgery. Ricky stated  there was no further communications. .  SHILA called [facility] orthopedics, left message with Lexy, requesting to speak with Renee, regarding billing for patient Tobin Kerns. Lexy stated she is sending Renee a message, with SHILA's contact information, and request for phone call.  SHILA called [facility] billing office to speak with Radha, [phone], spoke with Silver, who stated Radha was on break. Silver assisted this CM by reading notes on this case. Silver stated Radha  sent a message to UR person to request portal access, in order to submit for the authorization. Silver stated Radha checked on it again , with no return communication or portal access.  CM called [employer], spoke with Victorina, inquired about how to get portal access. With much searching , Victorina was able to find the system administrators here at [facility], these are the people to request portal access from.Victorina gave this CM their names and emails.; [email or web address], [email or web address], [email or web address] and [email or web address]. SHILA sent an email to the system administrators with CM introduction, and explanation of the billing dilemma. CM received a phone call back from Radha  in billing office, who stated the UR person she sent the message to was Deanne Martinez. CM received return email from Sirena Roberto, offering to set up Radha for portal access. It was then we discovered the portal access request was for Deanne, not Radha. Sirena communicated  she will get Deanne's portal access, once she receives Deanne's .SHILA called Deanne Martinez, left message with contact information, and request for return phone call to discuss billing for this patient. Deanne communicated she will send her information to Sirena Roberto.    SHILA met with patient, reviewed above attempts to get authorization for surgery, in order to get authorization for discharge needs.SHILA will continue to follow.

## 2025-04-16 NOTE — PROGRESS NOTES
04/16/25 1030   Pain Assessment   Pain Assessment Tool 0-10   Pain Score No Pain   Restrictions/Precautions   Precautions Fall Risk   RLE Weight Bearing Per Order TTWB   Oral Hygiene   Type of Assistance Needed Independent   Physical Assistance Level No physical assistance   Oral Hygiene CARE Score 6   Grooming   Able To Initiate Tasks;Comb/Brush Hair;Wash/Dry Face;Brush/Clean Teeth;Wash/Dry Hands   Shower/Bathe Self   Type of Assistance Needed Independent;Adaptive equipment   Physical Assistance Level No physical assistance   Shower/Bathe Self CARE Score 6   Bathing   Assessed Bath Style Sponge Bath   Able to Adjust Water Temperature Yes   Able to Wash/Rinse/Dry (body part) Left Arm;Right Arm;L Upper Leg;R Upper Leg;L Lower Leg/Foot;R Lower Leg/Foot;Chest;Abdomen;Perineal Area;Buttocks   Limitations Noted in ROM   Positioning Seated;Standing   Adaptive Equipment Longhand Sponge   Upper Body Dressing   Type of Assistance Needed Independent   Physical Assistance Level No physical assistance   Upper Body Dressing CARE Score 6   Lower Body Dressing   Type of Assistance Needed Independent;Adaptive equipment   Physical Assistance Level No physical assistance   Lower Body Dressing CARE Score 6   Putting On/Taking Off Footwear   Type of Assistance Needed Adaptive equipment;Independent   Physical Assistance Level No physical assistance   Putting On/Taking Off Footwear CARE Score 6   Picking Up Object   Type of Assistance Needed Independent;Adaptive equipment   Picking Up Object CARE Score 6   Sit to Stand   Type of Assistance Needed Independent   Physical Assistance Level No physical assistance   Sit to Stand CARE Score 6   Toileting Hygiene   Type of Assistance Needed Independent   Physical Assistance Level No physical assistance   Toileting Hygiene CARE Score 6   Toileting   Able to Pull Clothing down yes, up yes.   Manage Hygiene Bladder   Limitations Noted In ROM   Toilet Transfer   Type of Assistance Needed  Independent   Physical Assistance Level No physical assistance   Toilet Transfer CARE Score 6   Toilet Transfer   Surface Assessed Standard Commode   Transfer Technique Standard   Limitations Noted In ROM   Adaptive Equipment Grab Bar;Walker   Light Housekeeping   Light Housekeeping Level Walker   Light Housekeeping Level of Assistance Modified independent   Exercise Tools   Hand Gripper B hands 2x40 heavy resistance gripper   Other Exercise Tool 1 red flex bar 2x40 upward then downward   Other Exercise Tool 2 6# dowel 3x10 reps in 4 planes   Cognition   Overall Cognitive Status WFL   Arousal/Participation Alert;Cooperative   Attention Within functional limits   Orientation Level Oriented X4   Memory Within functional limits   Following Commands Follows all commands and directions without difficulty   Additional Activities   Additional Activities Comments Functional mobility with RW R LE TTWB MI   Activity Tolerance   Activity Tolerance Patient tolerated treatment well   Assessment   Treatment Assessment ADL completed via sponge bathe level: Pt able to consistently s/u and c/u all supplies Independently with use of RW and while maintaining TTWB R LE. Pt demon consistent recall of compensatory strategies and compliance with safe techniques throughout all ADL/iADLs. Pt's barriers to d/c to personal home environment include decreased strength RLE s/p quadricepts rupture, decreased ROM with use of leg length hard cast, and TTWB RLE. Plan is contingent upon placement due to noted barriers. Ortho f/u 4/18   Prognosis Good   Problem List Decreased strength;Decreased range of motion;Impaired balance;Decreased mobility;Orthopedic restrictions   Assessment Pt participated in 30 minutes of concurrent tx addressing similar goals with a pt with similar deficits.   Plan   Treatment/Interventions ADL retraining;Functional transfer training;Therapeutic exercise;Endurance training;Patient/family training;Equipment  eval/education;Gait training;Continued evaluation;Spoke to nursing;OT   Progress Improving as expected   OT Therapy Minutes   OT Time In 1030   OT Time Out 1200   OT Total Time (minutes) 90   OT Mode of treatment - Individual (minutes) 60   OT Mode of treatment - Concurrent (minutes) 30   OT Mode of treatment - Group (minutes) 0   OT Mode of treatment - Co-treat (minutes) 0   OT Mode of Treatment - Total time(minutes) 90 minutes   OT Cumulative Minutes 1952   Therapy Time missed   Time missed? No

## 2025-04-16 NOTE — NURSING NOTE
Patient continues to be MOD I with walker. Reports no pain. Cast remains in place. Dressing not due to change on right posterior achilles. Sitting upright for all meals. Continued education on safety with ambulation. Will continue to monitor and follow plan of care.

## 2025-04-16 NOTE — PROGRESS NOTES
"Progress Note - PMR   Name: Tobin Kerns 69 y.o. male I MRN: [id number]  Unit/Bed#: -01 I Date of Admission: 3/11/2025   Date of Service: 4/16/2025 I Hospital Day: 36     Assessment & Plan  Quadriceps muscle rupture, right, subsequent encounter  HPI:   He initially underwent quad tendon repair on 7/25/24 with Dr. Foster. He was discharged home with outpatient PT.  Later noted to have recurrent high-grade tear with extensor lag on exam and elected for operative management  January 2025 MRI: \"Prior quadriceps insertion repair with high-grade recurrent tear exhibiting up to 2.2 cm of tendon retraction.\"  On 3/6/25, he underwent revision of quad tendon repair with allograft augmentation  3/21: outpatient follow-up with Dr. Foster, new cast applied   3/28: follow-up with Dr. Foster, continue TTWB   4/4: follow-up with Dr. Foster (removed cast and reapplied long leg cast for an additional 2 weeks) and continue TTWB  4/11, no changes in plan. Follow-up on 4/18 for cast removal     Plan:   PT and OT for 3 hours a day, 5-6 days a week   TTWB RLE   Avoid moisture to cast   Pain: PRN Tylenol (monitor LFTs) and oxycodone discontinued on 3/17 due to minimal pain   Lovenox d/c on 4/7     Hypertension  Continue losartan 50 mg daily   Monitor BP   Mixed hyperlipidemia  Continue pravastatin 40 mg daily   Gastro-esophageal reflux disease without esophagitis  Continue famotidine 20 mg daily   Peripheral neuropathy  Patient is not diabetic   Monitor skin for new or worsening wounds   JOVANY on CPAP  Continue CPAP qHS  IFG (impaired fasting glucose)  Continue metformin 1500 mg daily   Seen by RD 3/12, diet adjusted to regular   3/12 A1c: 5.4   Healed ulcer of left foot  Local wound care  Present on admission   Wound care RN consulted   BPH (benign prostatic hyperplasia)  Continue tamsulosin 0.4 mg daily   Patient currently voiding without difficulty   PRN PVRs if urinary retention is suspected   Impaired mobility and " ADLs  Patient was evaluated by the rehabilitation team MD and an appropriate candidate for acute inpatient rehabilitation program at this time.  The patient will tolerate 3 hours/day 5 to 7 days/week of intensive physical and  occupational therapy   in order to obtain goals for community discharge  Due to the patient's functional Compared to their baseline level of function in addition to their ongoing medical needs, the patient would benefit from daily supervision from a rehabilitation physician as well as rehabilitation nursing to implement and adjust the medical as well as functional plan of care in order to meet the patient's goals.  Bladder: Monitor closely for urinary incontinence and/or retention. Monitor urine output and encourage spontaneous voids. If unable to void spontaneously, will monitor with PVR bladder scans and initiate CIC regimen.  Skin: Monitor for breakdown, frequent turns, pressure offloading  Sleep: Encourage sleep hygiene (routine that promotes healthy circadian rhythm,avoid caffeine later in the day, quiet/dark room at night, reduce electronic before bedtime)  Wound of right ankle  Skin breakdown on R heel due to cast. Ortho removed approximately 1 inch of cast at posterior aspect on 3/11. Cast further trimmed on 4/11, revealing a new posterior ankle wound.   New cast 3/21   Local wound care  Continue to monitor   Present on admission to [facility]   Wound care RN consulted (last seen 4/15):  Skin Care orders:  1-Hydraguard to sacrum, buttocks bid and prn   2-EHOB / waffle  cushion when out of bed in chair.  3-Moisturize skin daily with skin nourishing cream  4-Elevate heels to offload pressure.  5. Right posterior heel cleanse with Normal saline then pat dry apply melgisorb then a small silicone foam nate with a T and date change every other day .      Subjective   Tobin Kerns is a 69 year-old male, with a past medical history of hypertension, hyperlipidemia, JOVANY, impaired fasting glucose,  bilateral lower extremity neuropathy, GERD, BPH, presenting to [facility] after an elective right quadricept tendon repair. He initially underwent quad tendon repair on 7/25/24 with Dr. Foster. He was discharged home with outpatient PT. He was noted to have recurrent high-grade tear on repeat MRI with extensor lag on exam and elected for operative management. On 3/6/25, he underwent revision of quad tendon repair with allograft augmentation. He was evaluated by PT and OT and deemed an appropriate candidate for AR[facility] due to functional deficits     Chief Complaint: f/u ambulatory dysfunction    Interval: Seen and evaluated patient in PT. He does not have any concerns at this time. Last BM 4/16, he is continent of bowel and bladder.        Objective :  Temp:  [97.5 °F (36.4 °C)-97.9 °F (36.6 °C)] 97.9 °F (36.6 °C)  HR:  [45-74] 67  BP: (100-110)/(54-63) 110/63  Resp:  [16-17] 16  SpO2:  [96 %-98 %] 98 %  O2 Device: None (Room air)    Functional Update:  Physical Therapy Occupational Therapy   Weight Bearing Status: Toe touch (R LE)  Transfers: Independent  Bed Mobility: Independent  Amulation Distance (ft): 200 feet  Ambulation: Independent  Assistive Device for Ambulation: Roller Walker  Wheelchair Mobility Distance:  (n/a)  Wheelchair Mobility:  (N/A)  Number of Stairs: 7  Assistive Device for Stairs: Lehft Hand Rail  Stair Assistance: Independent  Ramp: Independent  Assistive Device for Ramp: Roller Walker  Discharge Recommendations: Other  DC Home with::  (Alternative placement until R LE long leg cast discontinued and allowed for knee ROM)   Eating: Independent  Grooming: Independent  Bathing: Independent  Bathing: Independent  Upper Body Dressing: Independent  Lower Body Dressing: Independent  Toileting: Independent  Tub/Shower Transfer:  (Not completed due to long leg cast)  Toilet Transfer: Independent  Cognition: Within Defined Limits  Orientation: Person, Situation, Place, Time         Physical Exam  Vitals and  nursing note reviewed.   Constitutional:       General: He is not in acute distress.     Appearance: He is obese. He is not ill-appearing, toxic-appearing or diaphoretic.   HENT:      Head: Normocephalic and atraumatic.      Nose: Nose normal.      Mouth/Throat:      Mouth: Mucous membranes are moist.   Cardiovascular:      Pulses:           Dorsalis pedis pulses are 2+ on the right side.   Pulmonary:      Effort: Pulmonary effort is normal. No respiratory distress.   Abdominal:      General: There is no distension.   Skin:     General: Skin is warm and dry.   Neurological:      General: No focal deficit present.      Mental Status: He is alert.   Psychiatric:         Mood and Affect: Mood normal.         Behavior: Behavior normal.             Scheduled Meds:  Current Facility-Administered Medications   Medication Dose Route Frequency Provider Last Rate    acetaminophen  650 mg Oral Q6H PRN Modesta Lee PA-C      ascorbic acid  500 mg Oral Daily Toño Brito MD      Cholecalciferol  5,000 Units Oral Daily Toño Brito MD      famotidine  20 mg Oral Daily Toño Brito MD      losartan  50 mg Oral Daily Toño Brito MD      metFORMIN  1,500 mg Oral Daily With Dinner Toño Brito MD      multivitamin stress formula  1 tablet Oral Daily Toño Brito MD      polyethylene glycol  17 g Oral Daily PRN Toño Brito MD      pravastatin  40 mg Oral Daily With Dinner Toño Brito MD      senna  1 tablet Oral HS PRN oMdesta Lee PA-C      tamsulosin  0.4 mg Oral Daily With Dinner Toño Brito MD           Lab Results: I have reviewed the following results:  Results from last 7 days   Lab Units 04/14/25  0535   HEMOGLOBIN g/dL 12.8   HEMATOCRIT % 38.2   WBC Thousand/uL 8.29   PLATELETS Thousands/uL 222     Results from last 7 days   Lab Units 04/14/25  0535   BUN mg/dL 20   SODIUM mmol/L 137   POTASSIUM mmol/L 4.5   CHLORIDE mmol/L 105   CREATININE mg/dL 1.19   AST U/L 13   ALT U/L 11            Modesta Lee  PA-C  Physical Medicine and Rehabilitation  [facility]

## 2025-04-16 NOTE — ASSESSMENT & PLAN NOTE
Skin breakdown on R heel due to cast. Ortho removed approximately 1 inch of cast at posterior aspect on 3/11. Cast further trimmed on 4/11, revealing a new posterior ankle wound.   New cast 3/21   Local wound care  Continue to monitor   Present on admission to [facility]   Wound care RN consulted (last seen 4/15):  Skin Care orders:  1-Hydraguard to sacrum, buttocks bid and prn   2-EHOB / waffle  cushion when out of bed in chair.  3-Moisturize skin daily with skin nourishing cream  4-Elevate heels to offload pressure.  5. Right posterior heel cleanse with Normal saline then pat dry apply melgisorb then a small silicone foam nate with a T and date change every other day .

## 2025-04-16 NOTE — PROGRESS NOTES
04/16/25 0900   Pain Assessment   Pain Assessment Tool 0-10   Pain Score No Pain   Restrictions/Precautions   Precautions Fall Risk   RLE Weight Bearing Per Order TTWB   ROM Restrictions No   Braces or Orthoses   (R LE long cast)   Cognition   Overall Cognitive Status WFL   Arousal/Participation Alert;Responsive;Cooperative   Attention Within functional limits   Orientation Level Oriented X4   Memory Within functional limits   Following Commands Follows all commands and directions without difficulty   Subjective   Subjective Pt reports no pain or complaints this AM   Roll Left and Right   Type of Assistance Needed Independent   Physical Assistance Level No physical assistance   Roll Left and Right CARE Score 6   Sit to Lying   Type of Assistance Needed Independent   Physical Assistance Level No physical assistance   Sit to Lying CARE Score 6   Lying to Sitting on Side of Bed   Type of Assistance Needed Independent   Physical Assistance Level No physical assistance   Lying to Sitting on Side of Bed CARE Score 6   Sit to Stand   Type of Assistance Needed Independent   Physical Assistance Level No physical assistance   Comment RW   Sit to Stand CARE Score 6   Bed-Chair Transfer   Type of Assistance Needed Independent   Physical Assistance Level No physical assistance   Comment RW   Chair/Bed-to-Chair Transfer CARE Score 6   Car Transfer   Reason if not Attempted Environmental limitations   Car Transfer CARE Score 10   Walk 10 Feet   Type of Assistance Needed Independent   Physical Assistance Level No physical assistance   Comment RW   Walk 10 Feet CARE Score 6   Walk 50 Feet with Two Turns   Type of Assistance Needed Independent   Physical Assistance Level No physical assistance   Comment RW   Walk 50 Feet with Two Turns CARE Score 6   Walk 150 Feet   Type of Assistance Needed Independent   Physical Assistance Level No physical assistance   Comment RW   Walk 150 Feet CARE Score 6   Walking 10 Feet on Uneven Surfaces    Type of Assistance Needed Independent   Physical Assistance Level No physical assistance   Comment RW   Walking 10 Feet on Uneven Surfaces CARE Score 6   Ambulation   Primary Mode of Locomotion Prior to Admission Walk   Distance Walked (feet) 150 ft  (150' x 3, 120', household distances in gym)   Assist Device Roller Walker   Findings maintains TTWB R LE, reciprocal gait pattern   Does the patient walk? 2. Yes   Wheel 50 Feet with Two Turns   Reason if not Attempted Activity not applicable   Wheel 50 Feet with Two Turns CARE Score 9   Wheel 150 Feet   Reason if not Attempted Activity not applicable   Wheel 150 Feet CARE Score 9   Wheelchair mobility   Findings N/A   Does the patient use a wheelchair? 0. No   Curb or Single Stair   Style negotiated Single stair   Type of Assistance Needed Independent   Physical Assistance Level No physical assistance   Comment L HR ascending forward, descending backward   1 Step (Curb) CARE Score 6   4 Steps   Type of Assistance Needed Independent   Physical Assistance Level No physical assistance   Comment L HR ascending forward, descending backward   4 Steps CARE Score 6   12 Steps   Reason if not Attempted Activity not applicable   12 Steps CARE Score 9   Toilet Transfer   Comment did not require during session   Therapeutic Interventions   Strengthening supine LE TE   Other transfer training, gait training, gait training   Equipment Use   NuStep L5, 20 min, B/L UE, L LE only   Assessment   Treatment Assessment Pt agreeable to PT this AM, received ambulating in hallway. Pt continues to be functionally limited by WBS and R LE long cast in place, as pt is mod I with RW while maintaining TTWB R LE, as well as independently able to complete all TE without cueing. Pt remains unsafe for d/c home at this time due to environmental constraints at home with R LE long cast, as well as decreased caregiver support. Will continue with current PT POC to improve deficits and promote return to  [facility].   Problem List Decreased strength;Decreased range of motion;Impaired balance;Decreased mobility   PT Barriers   Physical Impairment Decreased strength;Decreased range of motion;Impaired balance   Functional Limitation Car transfers   Plan   Treatment/Interventions Functional transfer training;LE strengthening/ROM;Therapeutic exercise;Endurance training;Patient/family training;Equipment eval/education;Bed mobility;Gait training   Progress Progressing toward goals   PT Therapy Minutes   PT Time In 0900   PT Time Out 1030   PT Total Time (minutes) 90   PT Mode of treatment - Individual (minutes) 0   PT Mode of treatment - Concurrent (minutes) 90   PT Mode of treatment - Group (minutes) 0   PT Mode of treatment - Co-treat (minutes) 0   PT Mode of Treatment - Total time(minutes) 90 minutes   PT Cumulative Minutes 2247     Patient remains OOB in chair, all needs in reach. Encouraged use of call bell, patient verbalizes understanding.

## 2025-04-17 PROCEDURE — 99232 SBSQ HOSP IP/OBS MODERATE 35: CPT | Performed by: PHYSICAL MEDICINE & REHABILITATION

## 2025-04-17 PROCEDURE — 97110 THERAPEUTIC EXERCISES: CPT | Performed by: PHYSICAL THERAPIST

## 2025-04-17 PROCEDURE — 97112 NEUROMUSCULAR REEDUCATION: CPT | Performed by: PHYSICAL THERAPIST

## 2025-04-17 PROCEDURE — 97530 THERAPEUTIC ACTIVITIES: CPT

## 2025-04-17 PROCEDURE — 97535 SELF CARE MNGMENT TRAINING: CPT

## 2025-04-17 PROCEDURE — 97110 THERAPEUTIC EXERCISES: CPT

## 2025-04-17 PROCEDURE — 97530 THERAPEUTIC ACTIVITIES: CPT | Performed by: PHYSICAL THERAPIST

## 2025-04-17 PROCEDURE — 97116 GAIT TRAINING THERAPY: CPT | Performed by: PHYSICAL THERAPIST

## 2025-04-17 RX ADMIN — CHOLECALCIFEROL TAB 25 MCG (1000 UNIT) 5000 UNITS: 25 TAB at 08:49

## 2025-04-17 RX ADMIN — FAMOTIDINE 20 MG: 20 TABLET, FILM COATED ORAL at 08:49

## 2025-04-17 RX ADMIN — LOSARTAN POTASSIUM 50 MG: 50 TABLET, FILM COATED ORAL at 08:49

## 2025-04-17 RX ADMIN — METFORMIN ER 500 MG 1500 MG: 500 TABLET ORAL at 16:57

## 2025-04-17 RX ADMIN — TAMSULOSIN HYDROCHLORIDE 0.4 MG: 0.4 CAPSULE ORAL at 16:57

## 2025-04-17 RX ADMIN — B-COMPLEX W/ C & FOLIC ACID TAB 1 TABLET: TAB at 08:49

## 2025-04-17 RX ADMIN — OXYCODONE HYDROCHLORIDE AND ACETAMINOPHEN 500 MG: 500 TABLET ORAL at 08:49

## 2025-04-17 RX ADMIN — PRAVASTATIN SODIUM 40 MG: 40 TABLET ORAL at 16:57

## 2025-04-17 NOTE — PROGRESS NOTES
Physical Medicine and Rehabilitation Progress Note  Tobin Kerns 69 y.o. male MRN: [id number]  Unit/Bed#: -01 Encounter: [id number]    Etiology/Reason for Admission: Impairment of mobility, safety and Activities of Daily Living (ADLs) due to Orthopedic Disorders:  08.9  Other Orthopedic Rupture of right quadriceps tendon    Interval History: Seen face-to-face. No acute events overnight. Voiding spontaneously, continent. Last BM 4/16/25, continent.     Functional Update:  Physical Therapy Occupational Therapy   Weight Bearing Status: Toe touch (R LE)  Transfers: Independent  Bed Mobility: Independent  Amulation Distance (ft): 200 feet  Ambulation: Independent  Assistive Device for Ambulation: Roller Walker  Wheelchair Mobility Distance:  (n/a)  Wheelchair Mobility:  (N/A)  Number of Stairs: 7  Assistive Device for Stairs: Lehft Hand Rail  Stair Assistance: Independent  Ramp: Independent  Assistive Device for Ramp: Roller Walker  Discharge Recommendations: Other  FL Home with::  (Alternative placement until R LE long leg cast discontinued and allowed for knee ROM)   Eating: Independent  Grooming: Independent  Bathing: Independent  Bathing: Independent  Upper Body Dressing: Independent  Lower Body Dressing: Independent  Toileting: Independent  Tub/Shower Transfer:  (Not completed due to long leg cast)  Toilet Transfer: Independent  Cognition: Within Defined Limits  Orientation: Person, Situation, Place, Time       Assessment/Plan -     Mr. Tobin Kerns is a 68 yo gentleman who is s/p revision for a recurrent, high-grade tear of his right quadriceps tendon. He initially sustained the injury on 7/23/24 and underwent repeat on 7/25/24. On follow-up he was found to have the above, recurrent tear. His revision surgery was performed on 3/6/25 with Dr. Foster.     - Quadriceps muscle rupture, right, subsequent encounter: s/p repair on 7/25/24 with revision surgery for subsequent, recurrent tear on 3/5/25 with   [facility]; analgesia as below; PT, OT; TTWB RLE; casted, avoid moisture to cast; saw orthopedic surgery in follow-up on 3/21, surgical staples removed, cast removed and replaced; seen again on 3/28, 4/4, 4/11 and scheduled next for 4/18 for cast checks and re-casting, continues TTWB; further outpatient follow-up with orthopedic surgery after [facility] discharge  - Acute pain: Tylenol 650 mg q6h PRN  - HTN: Home losartan; appreciate IM management  - Diabetes mellitus: Metformin; appreciate IM management  - Wound of skin: initially did sustain some skin shearing injury along the right heel and right anterior ankle due to cast presence, orthopedic surgery removed portion of the distal cast on 3/11 for the right heel, but developed further sheer injury on anterior ankle, does have some shearing injury along the distal Achilles as well which is healing; local wound care; wound care RN consulted to follow  - Therapies: PT, OT  - Bowel: Monitor closely for constipation and/or incontinence. Will follow BMs and adjust bowel regimen to target soft, formed stools q1-2 days, per pt's regular schedule.  - Bladder: Monitor closely for urinary incontinence and/or retention. Will follow UOP and encourage spontaneous voids. If unable to void spontaneously, will monitor with PVR bladder scans and initiate CIC regimen.  - Skin: Monitor for breakdown, frequent turns, pressure offloading  - Sleep: Practice effective sleep hygiene (e.g., consistent night and morning routine, quiet, dark room at night, no electronic devices/screens in bed, avoid large meals/caffeine before bed)  - VTE prophylaxis: Mobility for prevention; now off enoxaparin for VTE chemoprophylaxis; per orthopedic surgery, recommended for enoxaparin for 28 days total post-operatively (completed on 4/4)  - Disposition: Anticipate discharge home alone with home PT, OT and RN services pending suitable available disposition due to poor accessibility of current available housing,  alternative housing options being explored by CM currently    Review of Systems:  A 10 point ROS was performed; negative except as noted above.       Current Facility-Administered Medications:     acetaminophen (TYLENOL) tablet 650 mg, 650 mg, Oral, Q6H PRN, Modesta Lee PA-C, 650 mg at 04/12/25 1336    ascorbic acid (VITAMIN C) tablet 500 mg, 500 mg, Oral, Daily, Toño Brito MD, 500 mg at 04/17/25 0849    Cholecalciferol (VITAMIN D3) tablet 5,000 Units, 5,000 Units, Oral, Daily, Toño Brito MD, 5,000 Units at 04/17/25 0849    famotidine (PEPCID) tablet 20 mg, 20 mg, Oral, Daily, Toño Brito MD, 20 mg at 04/17/25 0849    losartan (COZAAR) tablet 50 mg, 50 mg, Oral, Daily, Toño Brito MD, 50 mg at 04/17/25 0849    metFORMIN (GLUCOPHAGE-XR) 24 hr tablet 1,500 mg, 1,500 mg, Oral, Daily With Dinner, Toño Brito MD, 1,500 mg at 04/16/25 1732    multivitamin stress formula tablet 1 tablet, 1 tablet, Oral, Daily, Toño Brito MD, 1 tablet at 04/17/25 0849    polyethylene glycol (MIRALAX) packet 17 g, 17 g, Oral, Daily PRN, Toño Brito MD    pravastatin (PRAVACHOL) tablet 40 mg, 40 mg, Oral, Daily With Dinner, Toño Brito MD, 40 mg at 04/16/25 1732    senna (SENOKOT) tablet 8.6 mg, 1 tablet, Oral, HS PRN, Modesta Lee PA-C    tamsulosin (FLOMAX) capsule 0.4 mg, 0.4 mg, Oral, Daily With Dinner, Toño Brito MD, 0.4 mg at 04/16/25 1732    Physical Exam:  Temp:  [96.6 °F (35.9 °C)-97 °F (36.1 °C)] 97 °F (36.1 °C)  HR:  [57-59] 59  Resp:  [16-17] 17  BP: (102-119)/(60-67) 102/60  SpO2:  [98 %] 98 %    GEN: Patient seen resting comfortably in chair, NAD  HEENT: NCAT; mucous membranes moist  CV: No extremity edema  PULM: Breathing comfortably on room air  ABD: Non-distended  SKIN: No obvious rashes or lesions on exposed skin  MSK: RLE in cast.  NEURO:  Awake and alert, answering questions appropriately to context; able to follow simple commands with clear consistency  Speech is fluent and organized  Moving  all extremities spontaneously in chair    Laboratory:  Labs reviewed, none new  Results from last 7 days   Lab Units 04/14/25  0535   HEMOGLOBIN g/dL 12.8   HEMATOCRIT % 38.2   WBC Thousand/uL 8.29     Results from last 7 days   Lab Units 04/14/25  0535   BUN mg/dL 20   SODIUM mmol/L 137   POTASSIUM mmol/L 4.5   CHLORIDE mmol/L 105   CREATININE mg/dL 1.19   AST U/L 13   ALT U/L 11            Diagnostic Studies: Reviewed, no new imaging   No orders to display     Total visit time: 25 minutes, with more than 50% spent counseling/coordinating care. Counseling includes discussion with patient re: progress in therapies, functional issues observed by therapy staff, and discussion with patient regarding their current medical state and wellbeing. Coordination of patient's care was performed in conjunction with Internal Medicine service to monitor patient's labs, vitals, and management of their comorbidities.    Christopher Gomez MD  Attending Physician  Physical Medicine and Rehabilitation  [facility]

## 2025-04-17 NOTE — PROGRESS NOTES
Knee immobilizer entered in error; R LE cast    04/17/25 1230   Pain Assessment   Pain Assessment Tool 0-10   Pain Score No Pain   Restrictions/Precautions   Precautions Fall Risk   RLE Weight Bearing Per Order TTWB   Braces or Orthoses Knee immobilizer   Oral Hygiene   Type of Assistance Needed Independent   Physical Assistance Level No physical assistance   Oral Hygiene CARE Score 6   Shower/Bathe Self   Type of Assistance Needed Independent;Adaptive equipment   Physical Assistance Level No physical assistance   Shower/Bathe Self CARE Score 6   Upper Body Dressing   Type of Assistance Needed Independent   Physical Assistance Level No physical assistance   Upper Body Dressing CARE Score 6   Lower Body Dressing   Type of Assistance Needed Independent;Adaptive equipment   Physical Assistance Level No physical assistance   Lower Body Dressing CARE Score 6   Putting On/Taking Off Footwear   Type of Assistance Needed Independent;Adaptive equipment   Physical Assistance Level No physical assistance   Putting On/Taking Off Footwear CARE Score 6   Picking Up Object   Type of Assistance Needed Independent;Adaptive equipment   Physical Assistance Level No physical assistance   Picking Up Object CARE Score 6   Dressing/Undressing Clothing   Remove UB Clothes Button Shirt   Don UB Clothes Button Shirt   Remove LB Clothes Undergarment;Socks;Shorts   Don LB Clothes Shorts;Undergarment;Socks   Limitations Noted In ROM   Sit to Stand   Type of Assistance Needed Independent   Physical Assistance Level No physical assistance   Sit to Stand CARE Score 6   Toileting Hygiene   Type of Assistance Needed Independent;Adaptive equipment   Physical Assistance Level No physical assistance   Toileting Hygiene CARE Score 6   Toilet Transfer   Type of Assistance Needed Independent;Adaptive equipment   Physical Assistance Level No physical assistance   Toilet Transfer CARE Score 6   Kitchen Mobility   Kitchen-Mobility Level Walker   Kitchen  Activity Retrieve items;Transport items   Kitchen Mobility Comments MI   Light Housekeeping   Light Housekeeping Level Walker   Light Housekeeping Level of Assistance Modified independent   Light Housekeeping Indep completes s/u and c/u and also laundry task   Exercise Tools   Hand Gripper B hands 2x40 heavy resistance gripper   Other Exercise Tool 1 red flex bar 2x40 upward then downward   Cognition   Overall Cognitive Status WFL   Arousal/Participation Alert;Cooperative   Attention Within functional limits   Orientation Level Oriented X4   Memory Within functional limits   Following Commands Follows all commands and directions without difficulty   Additional Activities   Additional Activities Comments Functional mobility with RW R LE TTWB MI   Activity Tolerance   Activity Tolerance Patient tolerated treatment well   Assessment   Treatment Assessment ADL completed via sponge bathe level: Pt able to consistently s/u and c/u all supplies Independently with use of RW and while maintaining TTWB R LE. Pt demon consistent recall of compensatory strategies and compliance with safe techniques throughout all ADL/iADLs. Pt's barriers to d/c to personal home environment include decreased strength RLE s/p quadricepts rupture, decreased ROM with use of leg length hard cast, and TTWB RLE. Plan is contingent upon placement due to noted barriers. Ortho f/u 4/18   Prognosis Good   Problem List Decreased strength;Decreased range of motion;Impaired balance;Decreased mobility   Assessment pt participated in 60 minutes of concurrent tx addressing simialr goals with a pt with similar deficits.   Plan   Treatment/Interventions ADL retraining;Functional transfer training;OT   Progress Progressing toward goals   OT Therapy Minutes   OT Time In 1230   OT Time Out 1340   OT Total Time (minutes) 70   OT Mode of treatment - Individual (minutes) 10   OT Mode of treatment - Concurrent (minutes) 60   OT Mode of treatment - Group (minutes) 0   OT  Mode of treatment - Co-treat (minutes) 0   OT Mode of Treatment - Total time(minutes) 70 minutes   OT Cumulative Minutes 1872   Therapy Time missed   Time missed? No

## 2025-04-17 NOTE — PROGRESS NOTES
"Progress Note - Tobin Kerns 69 y.o. male MRN: [id number]    Unit/Bed#: [facility] 210-01 Encounter: [id number]        Subjective:   Patient seen and examined at bedside after reviewing the chart and discussing the case with the caring staff.      Patient examined at bedside.  Patient denies any acute symptoms.     Appointment tomorrow with ortho for cast removal.     Physical Exam   Vitals: Blood pressure 102/60, pulse 59, temperature (!) 97 °F (36.1 °C), temperature source Temporal, resp. rate 17, height 6' 5\" (1.956 m), weight (!) 137 kg (302 lb 4 oz), SpO2 98%.,Body mass index is 35.84 kg/m².  Constitutional: Patient in no acute distress.  HEENT: PERR, EOMI, MMM.  Cardiovascular: Normal rate and regular rhythm.    Pulmonary/Chest: Effort normal and breath sounds normal.   Abdomen: Soft, + BS, NT.    Assessment/Plan:  Tobin Kerns is a(n) 69 y.o. year old male who is s/p quadriceps tendon repair.     Cardiac with hx of HTN, HLD.  Patient is on losartan 50 mg daily, pravastatin 40 mg daily.  Impaired fasting glucose.  Hgb a1c 5.4% on 3/12/25.  Continue metformin XR 1500 mg daily with dinner.  Regular diet and d/c accucheks as blood sugar is well controlled.    BPH.  Continue Flomax 0.4 mg daily.  GERD.  Continue Pepcid 20 mg daily.  JOVANY.  On CPAP at bedtime.   Vitamin D deficiency.  Patient is on vitamin D3 5000 units daily.  Pain and bowel management.  As per physiatrist.  Quadriceps tendon repair.  Toe touch weightbearing RLE with use of walker.  Patient is receiving intensive PT OT as per physiatrist.    Anticipated date of discharge.  TBD.    The patient was discussed with Dr. Brito and he is in agreement with the above note.  "

## 2025-04-17 NOTE — PROGRESS NOTES
04/17/25 1000   Pain Assessment   Pain Assessment Tool 0-10   Pain Score No Pain   Restrictions/Precautions   Precautions Fall Risk   RLE Weight Bearing Per Order TTWB   Braces or Orthoses Knee immobilizer  (Long leg cast)   General   Change In Medical/Functional Status Follow up ortho appointment on Friday 4/18   Cognition   Orientation Level Oriented X4   Subjective   Subjective Reports looking forward to appointLevine, Susan. \Hospital Has a New Name and Outlook.\""bt tomorrow, hopes for progression   Roll Left and Right   Type of Assistance Needed Independent   Roll Left and Right CARE Score 6   Sit to Lying   Type of Assistance Needed Independent   Sit to Lying CARE Score 6   Lying to Sitting on Side of Bed   Type of Assistance Needed Independent   Lying to Sitting on Side of Bed CARE Score 6   Sit to Stand   Type of Assistance Needed Independent   Comment RW   Sit to Stand CARE Score 6   Bed-Chair Transfer   Type of Assistance Needed Independent   Comment RW   Chair/Bed-to-Chair Transfer CARE Score 6   Car Transfer   Reason if not Attempted Environmental limitations   Car Transfer CARE Score 10   Walk 10 Feet   Type of Assistance Needed Independent   Comment RW   Walk 10 Feet CARE Score 6   Walk 50 Feet with Two Turns   Type of Assistance Needed Independent   Comment RW   Walk 50 Feet with Two Turns CARE Score 6   Walk 150 Feet   Type of Assistance Needed Independent   Comment RW   Walk 150 Feet CARE Score 6   Walking 10 Feet on Uneven Surfaces   Type of Assistance Needed Independent   Comment RW   Walking 10 Feet on Uneven Surfaces CARE Score 6   Ambulation   Primary Mode of Locomotion Prior to Admission Walk   Distance Walked (feet) 200 ft   Assist Device Roller Walker   Does the patient walk? 2. Yes   Wheel 50 Feet with Two Turns   Reason if not Attempted Activity not applicable   Wheel 50 Feet with Two Turns CARE Score 9   Wheel 150 Feet   Reason if not Attempted Activity not applicable   Wheel 150 Feet CARE Score 9   Wheelchair mobility   Does the  patient use a wheelchair? 0. No   Curb or Single Stair   Style negotiated Single stair   Type of Assistance Needed Independent   Physical Assistance Level No physical assistance   Comment L HR, ascending fwd/retro; TTWB R LE   1 Step (Curb) CARE Score 6   4 Steps   Type of Assistance Needed Independent   Physical Assistance Level No physical assistance   Comment L HR, ascending fwd/retro; TTWB R LE   4 Steps CARE Score 6   Picking Up Object   Type of Assistance Needed Independent   Comment RW, reacher, bean bag retrieval   Picking Up Object CARE Score 6   Toilet Transfer   Type of Assistance Needed Independent   Toilet Transfer CARE Score 6   Therapeutic Interventions   Strengthening Seated and supine LE TE   Balance Item retrieval from floor and bean bag toss   Equipment Use   NuStep L5, 10 mins, B/L UEs and left LE   Assessment   Treatment Assessment Pt presents agreeable to session.  Pt  continues to make steady progress.  Pt remains motivated to become more independent, no LOB noted.  Continued progression toward independence.  Cont per POC and progress as tolerated.   Problem List Decreased strength;Decreased range of motion;Impaired balance;Decreased mobility;Orthopedic restrictions   PT Barriers   Physical Impairment Decreased strength;Decreased range of motion;Impaired balance   Functional Limitation Car transfers   Plan   Treatment/Interventions ADL retraining;Functional transfer training;LE strengthening/ROM;Elevations;Therapeutic exercise;Endurance training;Cognitive reorientation;Patient/family training;Equipment eval/education;Bed mobility;Gait training   Progress Progressing toward goals   PT Therapy Minutes   PT Time In 1000   PT Time Out 1200   PT Total Time (minutes) 120   PT Mode of treatment - Individual (minutes) 0   PT Mode of treatment - Concurrent (minutes) 120   PT Mode of treatment - Group (minutes) 0   PT Mode of treatment - Co-treat (minutes) 0   PT Mode of Treatment - Total time(minutes)  120 minutes   PT Cumulative Minutes 2187     Seated LE TE:  3x10, L LEs, 2.5#  March  LAQ  Hip ABd - blue T-band  Hip ADd -  blue T-band  GS  HR  TR  HS Curls - blue T-band    Supine LE TE:  3x10, B/L LEs, 2.5# on left LE  SLR - flexion - AROM on right with cast in place  SLR - ABd - anti-gravity  GS  QS  APs  SAQ on left only   Hip ADd - emmanuelle  Heel slides on left only

## 2025-04-17 NOTE — PLAN OF CARE
Problem: SAFETY ADULT  Goal: Patient will remain free of falls  Description: INTERVENTIONS:  - Educate patient/family on patient safety including physical limitations  - Instruct patient to call for assistance with activity   - Consult OT/PT to assist with strengthening/mobility   - Keep Call bell within reach  - Keep bed low and locked with side rails adjusted as appropriate  - Keep care items and personal belongings within reach  - Initiate and maintain comfort rounds  - Make Fall Risk Sign visible to staff  MOD-I  - Apply yellow socks and bracelet for high fall risk patients    Outcome: Progressing     Problem: Nutrition/Hydration-ADULT  Goal: Nutrient/Hydration intake appropriate for improving, restoring or maintaining nutritional needs  Description: Monitor and assess patient's nutrition/hydration status for malnutrition. Collaborate with interdisciplinary team and initiate plan and interventions as ordered.  Monitor patient's weight and dietary intake as ordered or per policy. Utilize nutrition screening tool and intervene as necessary. Determine patient's food preferences and provide high-protein, high-caloric foods as appropriate. INTERVENTIONS:- Monitor oral intake, urinary output, labs, and treatment plans- Assess nutrition and hydration status and recommend course of action- Evaluate amount of meals eaten- Assist patient with eating if necessary - Allow adequate time for meals- Recommend/ encourage appropriate diets, oral nutritional supplements, and vitamin/mineral supplements- Provide specific nutrition/hydration education as appropriate- Include patient/family/caregiver in decisions related to nutrition  Outcome: Progressing

## 2025-04-18 ENCOUNTER — OFFICE VISIT (OUTPATIENT)
Dept: OBGYN CLINIC | Facility: CLINIC | Age: 70
End: 2025-04-18

## 2025-04-18 VITALS — BODY MASS INDEX: 35.84 KG/M2 | HEIGHT: 77 IN

## 2025-04-18 DIAGNOSIS — Z98.890 S/P MUSCULOSKELETAL SYSTEM SURGERY: Primary | ICD-10-CM

## 2025-04-18 PROCEDURE — 97116 GAIT TRAINING THERAPY: CPT | Performed by: PHYSICAL THERAPIST

## 2025-04-18 PROCEDURE — 99024 POSTOP FOLLOW-UP VISIT: CPT | Performed by: ORTHOPAEDIC SURGERY

## 2025-04-18 PROCEDURE — 99232 SBSQ HOSP IP/OBS MODERATE 35: CPT | Performed by: PHYSICAL MEDICINE & REHABILITATION

## 2025-04-18 PROCEDURE — 97110 THERAPEUTIC EXERCISES: CPT

## 2025-04-18 PROCEDURE — 97110 THERAPEUTIC EXERCISES: CPT | Performed by: PHYSICAL THERAPIST

## 2025-04-18 PROCEDURE — 97535 SELF CARE MNGMENT TRAINING: CPT

## 2025-04-18 PROCEDURE — 97530 THERAPEUTIC ACTIVITIES: CPT | Performed by: PHYSICAL THERAPIST

## 2025-04-18 RX ADMIN — LOSARTAN POTASSIUM 50 MG: 50 TABLET, FILM COATED ORAL at 08:16

## 2025-04-18 RX ADMIN — METFORMIN ER 500 MG 1500 MG: 500 TABLET ORAL at 17:08

## 2025-04-18 RX ADMIN — OXYCODONE HYDROCHLORIDE AND ACETAMINOPHEN 500 MG: 500 TABLET ORAL at 08:16

## 2025-04-18 RX ADMIN — TAMSULOSIN HYDROCHLORIDE 0.4 MG: 0.4 CAPSULE ORAL at 17:08

## 2025-04-18 RX ADMIN — PRAVASTATIN SODIUM 40 MG: 40 TABLET ORAL at 17:08

## 2025-04-18 RX ADMIN — CHOLECALCIFEROL TAB 25 MCG (1000 UNIT) 5000 UNITS: 25 TAB at 08:16

## 2025-04-18 RX ADMIN — B-COMPLEX W/ C & FOLIC ACID TAB 1 TABLET: TAB at 08:16

## 2025-04-18 RX ADMIN — FAMOTIDINE 20 MG: 20 TABLET, FILM COATED ORAL at 08:16

## 2025-04-18 NOTE — PROGRESS NOTES
04/18/25 1230   Pain Assessment   Pain Assessment Tool 0-10   Pain Score 1   Pain Location/Orientation Orientation: Right;Location: Knee   Restrictions/Precautions   Precautions Fall Risk   RLE Weight Bearing Per Order WBAT   Braces or Orthoses Knee brace   General   Change In Medical/Functional Status (S)  TROM brace to allow for 10 degrees knee flexion with nonambulatory exercises. Maintain in full knee extension at all times with ambulation. Can remove for hygenic purposes if needed.   No forced or aggressive knee flexion  No straight leg raises at this time    Continue to ambulate with walker   Cognition   Orientation Level Oriented X4   Subjective   Subjective Pt reports 10 degrees doesn't change much   Roll Left and Right   Comment Pt OOB   Sit to Lying   Comment Pt OOB   Lying to Sitting on Side of Bed   Comment Pt OOB   Sit to Stand   Type of Assistance Needed Independent   Physical Assistance Level No physical assistance   Comment RW   Sit to Stand CARE Score 6   Bed-Chair Transfer   Type of Assistance Needed Independent   Physical Assistance Level No physical assistance   Comment RW   Chair/Bed-to-Chair Transfer CARE Score 6   Car Transfer   Reason if not Attempted Environmental limitations   Car Transfer CARE Score 10   Walk 10 Feet   Type of Assistance Needed Independent   Physical Assistance Level No physical assistance   Comment RW   Walk 10 Feet CARE Score 6   Walk 50 Feet with Two Turns   Type of Assistance Needed Independent   Physical Assistance Level No physical assistance   Comment RW   Walk 50 Feet with Two Turns CARE Score 6   Walk 150 Feet   Type of Assistance Needed Independent   Physical Assistance Level No physical assistance   Comment RW   Walk 150 Feet CARE Score 6   Walking 10 Feet on Uneven Surfaces   Type of Assistance Needed Independent   Physical Assistance Level No physical assistance   Comment RW   Walking 10 Feet on Uneven Surfaces CARE Score 6   Ambulation   Primary Mode of  Locomotion Prior to Admission Walk   Does the patient walk? 2. Yes   Wheel 50 Feet with Two Turns   Reason if not Attempted Activity not applicable   Wheel 50 Feet with Two Turns CARE Score 9   Wheel 150 Feet   Reason if not Attempted Activity not applicable   Wheel 150 Feet CARE Score 9   Curb or Single Stair   Style negotiated Single stair   Type of Assistance Needed Independent   Physical Assistance Level No physical assistance   1 Step (Curb) CARE Score 6   4 Steps   Type of Assistance Needed Independent   Physical Assistance Level No physical assistance   Comment L HR, 12 steps   4 Steps CARE Score 6   12 Steps   Type of Assistance Needed Independent   Physical Assistance Level No physical assistance   Comment L HR, 12 steps   12 Steps CARE Score 6   Therapeutic Interventions   Strengthening Seated LE TE, ROM allowed to 10 degrees right LE in TROM   Equipment Use   NuStep L5, 20 mins B/L UEs and left LE   Assessment   Treatment Assessment Pt continues to demonstrate ongoing improvement iwth mobility.  Now with new restrictions as limited above as well as progression as above. Pt remains challenged due to ongoing limitations in ROM.  Will benefit from continued progression according to protocol set forth by ortho in updated note.  Cont per POC.   Problem List Decreased strength;Decreased range of motion   PT Barriers   Physical Impairment Decreased strength;Decreased range of motion;Impaired balance   Functional Limitation Car transfers   Plan   Treatment/Interventions ADL retraining;Functional transfer training;LE strengthening/ROM;Elevations;Therapeutic exercise;Endurance training;Cognitive reorientation;Patient/family training;Equipment eval/education;Bed mobility;Gait training   Progress Progressing toward goals   PT Therapy Minutes   PT Time In 1230   PT Time Out 1400   PT Total Time (minutes) 90   PT Mode of treatment - Individual (minutes) 90   PT Mode of treatment - Concurrent (minutes) 0   PT Mode of  treatment - Group (minutes) 0   PT Mode of treatment - Co-treat (minutes) 0   PT Mode of Treatment - Total time(minutes) 90 minutes   PT Cumulative Minutes 2187     Patient remains OOB in chair, all needs in reach.  Encouraged use of call bell, patient verbalizes understanding.

## 2025-04-18 NOTE — PROGRESS NOTES
"Progress Note - PMR   Name: Tobin Kerns 69 y.o. male I MRN: [id number]  Unit/Bed#: -01 I Date of Admission: 3/11/2025   Date of Service: 4/18/2025 I Hospital Day: 38     Assessment & Plan  Quadriceps muscle rupture, right, subsequent encounter  HPI:   He initially underwent quad tendon repair on 7/25/24 with Dr. Foster. He was discharged home with outpatient PT.  Later noted to have recurrent high-grade tear with extensor lag on exam and elected for operative management  January 2025 MRI: \"Prior quadriceps insertion repair with high-grade recurrent tear exhibiting up to 2.2 cm of tendon retraction.\"  On 3/6/25, he underwent revision of quad tendon repair with allograft augmentation  3/21: outpatient follow-up with Dr. Foster, new cast applied   3/28: follow-up with Dr. Foster, continue TTWB   4/4: follow-up with Dr. Foster (removed cast and reapplied long leg cast for an additional 2 weeks) and continue TTWB  4/11, no changes in plan  4/18 follow-up: cast removed, TROM with allowance of 10 degrees flexion per ortho   Plan:   PT and OT for 3 hours a day, 5-6 days a week   TTWB RLE   Pain: PRN Tylenol (monitor LFTs) and oxycodone discontinued on 3/17 due to minimal pain   Lovenox d/c on 4/7     Hypertension  Continue losartan 50 mg daily   Monitor BP   Mixed hyperlipidemia  Continue pravastatin 40 mg daily   Gastro-esophageal reflux disease without esophagitis  Continue famotidine 20 mg daily   Peripheral neuropathy  Patient is not diabetic   Monitor skin for new or worsening wounds   JOVANY on CPAP  Continue CPAP qHS  IFG (impaired fasting glucose)  Continue metformin 1500 mg daily   Seen by RD 3/12, diet adjusted to regular   3/12 A1c: 5.4   Healed ulcer of left foot  Local wound care  Present on admission   Wound care RN consulted   BPH (benign prostatic hyperplasia)  Continue tamsulosin 0.4 mg daily   Patient currently voiding without difficulty   PRN PVRs if urinary retention is suspected "   Impaired mobility and ADLs  Patient was evaluated by the rehabilitation team MD and an appropriate candidate for acute inpatient rehabilitation program at this time.  The patient will tolerate 3 hours/day 5 to 7 days/week of intensive physical and  occupational therapy   in order to obtain goals for community discharge  Due to the patient's functional Compared to their baseline level of function in addition to their ongoing medical needs, the patient would benefit from daily supervision from a rehabilitation physician as well as rehabilitation nursing to implement and adjust the medical as well as functional plan of care in order to meet the patient's goals.  Bladder: Monitor closely for urinary incontinence and/or retention. Monitor urine output and encourage spontaneous voids. If unable to void spontaneously, will monitor with PVR bladder scans and initiate CIC regimen.  Skin: Monitor for breakdown, frequent turns, pressure offloading  Sleep: Encourage sleep hygiene (routine that promotes healthy circadian rhythm,avoid caffeine later in the day, quiet/dark room at night, reduce electronic before bedtime)  Discharge date TBD  Wound of right ankle  Skin breakdown on R heel due to cast. Ortho removed approximately 1 inch of cast at posterior aspect on 3/11. Cast further trimmed on 4/11, revealing a new posterior ankle wound.   Local wound care  Continue to monitor   Present on admission to [facility]   Wound care RN consulted (last seen 4/15):  Skin Care orders:  1-Hydraguard to sacrum, buttocks bid and prn   2-EHOB / waffle  cushion when out of bed in chair.  3-Moisturize skin daily with skin nourishing cream  4-Elevate heels to offload pressure.  5. Right posterior heel cleanse with Normal saline then pat dry apply melgisorb then a small silicone foam nate with a T and date change every other day .      Subjective   Tobin Kerns is a 69 year-old male, with a past medical history of hypertension, hyperlipidemia, JOVANY,  impaired fasting glucose, bilateral lower extremity neuropathy, GERD, BPH, presenting to [facility] after an elective right quadricept tendon repair. He initially underwent quad tendon repair on 7/25/24 with Dr. Foster. He was discharged home with outpatient PT. He was noted to have recurrent high-grade tear on repeat MRI with extensor lag on exam and elected for operative management. On 3/6/25, he underwent revision of quad tendon repair with allograft augmentation. He was evaluated by PT and OT and deemed an appropriate candidate for ARC due to functional deficits     Chief Complaint: f/u ambulatory dysfunction    Interval: Seen and evaluated patient in PT. He does not have any concerns at this time. Last BM 4/16, he is continent of bowel and bladder.     Objective :  Temp:  [97.4 °F (36.3 °C)-97.8 °F (36.6 °C)] 97.8 °F (36.6 °C)  HR:  [72-75] 75  BP: (114-124)/(55-61) 124/61  Resp:  [16-18] 16  SpO2:  [90 %-98 %] 90 %  O2 Device: None (Room air)    Functional Update:  Physical Therapy Occupational Therapy   Weight Bearing Status: Toe touch (R LE)  Transfers: Independent  Bed Mobility: Independent  Amulation Distance (ft): 200 feet  Ambulation: Independent  Assistive Device for Ambulation: Roller Walker  Wheelchair Mobility Distance:  (n/a)  Wheelchair Mobility:  (N/A)  Number of Stairs: 7  Assistive Device for Stairs: Lehft Hand Rail  Stair Assistance: Independent  Ramp: Independent  Assistive Device for Ramp: Roller Walker  Discharge Recommendations: Other  DC Home with::  (Alternative placement until R LE long leg cast discontinued and allowed for knee ROM)   Eating: Independent  Grooming: Independent  Bathing: Independent  Bathing: Independent  Upper Body Dressing: Independent  Lower Body Dressing: Independent  Toileting: Independent  Tub/Shower Transfer:  (Not completed due to long leg cast)  Toilet Transfer: Independent  Cognition: Within Defined Limits  Orientation: Person, Situation, Place, Time            Physical Exam  Vitals and nursing note reviewed.   Constitutional:       General: He is not in acute distress.     Appearance: He is obese. He is not ill-appearing, toxic-appearing or diaphoretic.   HENT:      Head: Normocephalic and atraumatic.      Nose: Nose normal.      Mouth/Throat:      Mouth: Mucous membranes are moist.   Cardiovascular:      Pulses:           Dorsalis pedis pulses are 2+ on the right side.   Pulmonary:      Effort: Pulmonary effort is normal. No respiratory distress.   Abdominal:      General: There is no distension.   Skin:     General: Skin is warm and dry.   Neurological:      General: No focal deficit present.      Mental Status: He is alert.   Psychiatric:         Mood and Affect: Mood normal.         Behavior: Behavior normal.       Scheduled Meds:  Current Facility-Administered Medications   Medication Dose Route Frequency Provider Last Rate    acetaminophen  650 mg Oral Q6H PRN Modesta Lee PA-C      ascorbic acid  500 mg Oral Daily Toño Brito MD      Cholecalciferol  5,000 Units Oral Daily Toño Brito MD      famotidine  20 mg Oral Daily Toño Brito MD      losartan  50 mg Oral Daily Toño Brito MD      metFORMIN  1,500 mg Oral Daily With Dinner Toño Brito MD      multivitamin stress formula  1 tablet Oral Daily Toño Brito MD      polyethylene glycol  17 g Oral Daily PRN Toño Brito MD      pravastatin  40 mg Oral Daily With Dinner Toño Brito MD      senna  1 tablet Oral HS PRN Modesta Lee PA-C      tamsulosin  0.4 mg Oral Daily With Dinner Toño Brito MD           Lab Results: I have reviewed the following results:  Results from last 7 days   Lab Units 04/14/25  0535   HEMOGLOBIN g/dL 12.8   HEMATOCRIT % 38.2   WBC Thousand/uL 8.29   PLATELETS Thousands/uL 222     Results from last 7 days   Lab Units 04/14/25  0535   BUN mg/dL 20   SODIUM mmol/L 137   POTASSIUM mmol/L 4.5   CHLORIDE mmol/L 105   CREATININE mg/dL 1.19   AST U/L 13   ALT U/L 11             YOLANDA CarpenterC  Physical Medicine and Rehabilitation  [facility]

## 2025-04-18 NOTE — ASSESSMENT & PLAN NOTE
"HPI:   He initially underwent quad tendon repair on 7/25/24 with Dr. Foster. He was discharged home with outpatient PT.  Later noted to have recurrent high-grade tear with extensor lag on exam and elected for operative management  January 2025 MRI: \"Prior quadriceps insertion repair with high-grade recurrent tear exhibiting up to 2.2 cm of tendon retraction.\"  On 3/6/25, he underwent revision of quad tendon repair with allograft augmentation  3/21: outpatient follow-up with Dr. Foster, new cast applied   3/28: follow-up with Dr. Foster, continue TTWB   4/4: follow-up with Dr. Foster (removed cast and reapplied long leg cast for an additional 2 weeks) and continue TTWB  4/11, no changes in plan  4/18 follow-up: cast removed, TROM with allowance of 10 degrees flexion per ortho   Plan:   PT and OT for 3 hours a day, 5-6 days a week   TTWB RLE   Pain: PRN Tylenol (monitor LFTs) and oxycodone discontinued on 3/17 due to minimal pain   Lovenox d/c on 4/7     "

## 2025-04-18 NOTE — PROGRESS NOTES
"Name: Tobin Kerns      : 1955      MRN: [id number]  Encounter Provider: Fletcher Foster DO  Encounter Date: 2025   Encounter department: [facility] [place]  :  Assessment & Plan  S/P musculoskeletal system surgery           Approximately 6 week s/p Right knee quad tendon revision repair with allograft augmentation performed on 2025  Overall, patient doing well  Removed cast and reapplied TROM brace to allow for 10 degrees knee flexion with nonambulatory exercises. Maintain in full knee extension at all times with ambulation. Can remove for hygenic purposes if needed.  No forced or aggressive knee flexion  No straight leg raises at this time   Continue to ambulate with walker   Follow up 3 weeks     History of the Present Illness   History of Present Illness   HPI   Tobin Kerns is a 69 y.o. male approximately 6 week s/p Right knee quad tendon revision repair with allograft augmentation performed on 2025. Today, patient reports he is doing well. No complaints of pain.            Review of Systems     Review of Systems   Constitutional:  Negative for chills and fever.   HENT:  Negative for ear pain and sore throat.    Eyes:  Negative for pain and visual disturbance.   Respiratory:  Negative for cough and shortness of breath.    Cardiovascular:  Negative for chest pain and palpitations.   Gastrointestinal:  Negative for abdominal pain and vomiting.   Genitourinary:  Negative for dysuria and hematuria.   Musculoskeletal:  Negative for arthralgias and back pain.   Skin:  Negative for color change and rash.   Neurological:  Negative for seizures and syncope.   All other systems reviewed and are negative.      Physical Exam   Objective   Ht 6' 5\" (1.956 m)   BMI 35.84 kg/m²        Right Knee  Surgical incision well healing without sign of infection  Posterior ankle/achilles wound healing without sign of drainage. Replaced dressing with 2x2 and tegaderm  Quad " contraction appreciated  Calf soft, compressible and nontender   The patient is neurovascular intact distally.      Data Review     No new images     Lab Results   Component Value Date/Time    HGBA1C 5.4 2025 05:18 AM       Social History     Tobacco Use    Smoking status: Former     Current packs/day: 0.00     Average packs/day: 1 pack/day for 15.0 years (15.0 ttl pk-yrs)     Types: Cigarettes     Start date: 1980     Quit date: 2005     Years since quittin.3     Passive exposure: Never    Smokeless tobacco: Never   Vaping Use    Vaping status: Never Used   Substance Use Topics    Alcohol use: Yes     Alcohol/week: 1.0 standard drink of alcohol     Types: 1 Cans of beer per week    Drug use: Not Currently     Types: Marijuana     Comment: Last Marijuana Use 's; Other unknown drugs while in [place]           Procedures  None     America Ambrose PA-C

## 2025-04-18 NOTE — ASSESSMENT & PLAN NOTE
Skin breakdown on R heel due to cast. Ortho removed approximately 1 inch of cast at posterior aspect on 3/11. Cast further trimmed on 4/11, revealing a new posterior ankle wound.   Local wound care  Continue to monitor   Present on admission to [facility]   Wound care RN consulted (last seen 4/15):  Skin Care orders:  1-Hydraguard to sacrum, buttocks bid and prn   2-EHOB / waffle  cushion when out of bed in chair.  3-Moisturize skin daily with skin nourishing cream  4-Elevate heels to offload pressure.  5. Right posterior heel cleanse with Normal saline then pat dry apply melgisorb then a small silicone foam nate with a T and date change every other day .

## 2025-04-18 NOTE — PROGRESS NOTES
04/18/25 1120   Pain Assessment   Pain Assessment Tool 0-10   Pain Score No Pain   Restrictions/Precautions   Precautions Fall Risk   RLE Weight Bearing Per Order WBAT   Braces or Orthoses Knee immobilizer   Sit to Stand   Type of Assistance Needed Independent   Physical Assistance Level No physical assistance   Sit to Stand CARE Score 6   Kitchen Mobility   Kitchen-Mobility Level Walker   Kitchen Activity Retrieve items;Transport items   Exercise Tools   Hand Gripper B hands 2x40 heavy resistance gripper   Other Exercise Tool 1 red flex bar 2x40 upward then downward   Other Exercise Tool 2 6# dowel 3x10 reps in 6 planes   Cognition   Overall Cognitive Status WFL   Orientation Level Oriented X4   Additional Activities   Additional Activities Comments Functional mobility with RW R LE WBAT MI   Activity Tolerance   Activity Tolerance Patient tolerated treatment well   Assessment   Treatment Assessment Pt now able to WBAT to R LE and has immobilizer in place. Pt brief session addressed B UE TE with concurrent session completed x30 minutes addressing similar goals with a pt with similar deficits. Barriers remain same as previous session. Plan is to continue with skilled OT.   Prognosis Good   Problem List Decreased strength;Decreased range of motion   Assessment pt participated in 30 minutes of concurrent tx addressing simialr goals with a pt with similar deficits.   Plan   Treatment/Interventions Functional transfer training;Therapeutic exercise;Endurance training;Patient/family training;Gait training;Compensatory technique education;Spoke to nursing;OT   Progress Progressing toward goals   OT Therapy Minutes   OT Time In 1120   OT Time Out 1150   OT Total Time (minutes) 30   OT Mode of treatment - Individual (minutes) 0   OT Mode of treatment - Concurrent (minutes) 30   OT Mode of treatment - Group (minutes) 0   OT Mode of treatment - Co-treat (minutes) 0   OT Mode of Treatment - Total time(minutes) 30 minutes   OT  Cumulative Minutes 1773

## 2025-04-18 NOTE — ASSESSMENT & PLAN NOTE
Patient was evaluated by the rehabilitation team MD and an appropriate candidate for acute inpatient rehabilitation program at this time.  The patient will tolerate 3 hours/day 5 to 7 days/week of intensive physical and  occupational therapy   in order to obtain goals for community discharge  Due to the patient's functional Compared to their baseline level of function in addition to their ongoing medical needs, the patient would benefit from daily supervision from a rehabilitation physician as well as rehabilitation nursing to implement and adjust the medical as well as functional plan of care in order to meet the patient's goals.  Bladder: Monitor closely for urinary incontinence and/or retention. Monitor urine output and encourage spontaneous voids. If unable to void spontaneously, will monitor with PVR bladder scans and initiate CIC regimen.  Skin: Monitor for breakdown, frequent turns, pressure offloading  Sleep: Encourage sleep hygiene (routine that promotes healthy circadian rhythm,avoid caffeine later in the day, quiet/dark room at night, reduce electronic before bedtime)  Discharge date TBD

## 2025-04-18 NOTE — PLAN OF CARE
Problem: PAIN - ADULT  Goal: Verbalizes/displays adequate comfort level or baseline comfort level  Description: Interventions:  - Encourage patient to monitor pain and request assistance  - Assess pain using appropriate pain scale  - Administer analgesics based on type and severity of pain and evaluate response  - Implement non-pharmacological measures as appropriate and evaluate response  - Consider cultural and social influences on pain and pain management  - Notify physician/advanced practitioner if interventions unsuccessful or patient reports new pain  Outcome: Progressing     Problem: SAFETY ADULT  Goal: Patient will remain free of falls  Description: INTERVENTIONS:  - Educate patient/family on patient safety including physical limitations  - Instruct patient to call for assistance with activity   - Consult OT/PT to assist with strengthening/mobility   - Keep Call bell within reach  - Keep bed low and locked with side rails adjusted as appropriate  - Keep care items and personal belongings within reach  - Initiate and maintain comfort rounds  - Make Fall Risk Sign visible to staff  - MOD-I  - Apply yellow socks and bracelet for high fall risk patients    Outcome: Progressing

## 2025-04-18 NOTE — PROGRESS NOTES
04/18/25 0700   Pain Assessment   Pain Assessment Tool 0-10   Pain Score No Pain   Restrictions/Precautions   Precautions Fall Risk   RLE Weight Bearing Per Order TTWB   Braces or Orthoses   (R LE cast)   Oral Hygiene   Type of Assistance Needed Independent   Physical Assistance Level No physical assistance   Oral Hygiene CARE Score 6   Grooming   Able To Initiate Tasks;Acquire Items;Comb/Brush Hair;Wash/Dry Face;Brush/Clean Teeth;Wash/Dry Hands   Findings stance   Shower/Bathe Self   Type of Assistance Needed Independent;Adaptive equipment   Physical Assistance Level No physical assistance   Shower/Bathe Self CARE Score 6   Bathing   Assessed Bath Style Sponge Bath   Able to Gather/Transport Yes   Able to Adjust Water Temperature Yes   Able to Wash/Rinse/Dry (body part) Left Arm;L Upper Leg;Right Arm;R Upper Leg;L Lower Leg/Foot;R Lower Leg/Foot;Chest;Abdomen;Perineal Area;Buttocks   Limitations Noted in ROM   Positioning Seated;Standing   Adaptive Equipment Longhand Sponge   Upper Body Dressing   Type of Assistance Needed Independent   Physical Assistance Level No physical assistance   Upper Body Dressing CARE Score 6   Lower Body Dressing   Type of Assistance Needed Independent;Adaptive equipment   Physical Assistance Level No physical assistance   Lower Body Dressing CARE Score 6   Putting On/Taking Off Footwear   Type of Assistance Needed Independent;Adaptive equipment   Physical Assistance Level No physical assistance   Putting On/Taking Off Footwear CARE Score 6   Picking Up Object   Type of Assistance Needed Independent;Adaptive equipment   Physical Assistance Level No physical assistance   Picking Up Object CARE Score 6   Dressing/Undressing Clothing   Able to  Obtain Clothing;Store removed clothing   Remove UB Clothes Button Shirt   Don UB Clothes Button Shirt   Remove LB Clothes Undergarment;Socks   Don LB Clothes Shorts;Undergarment;Socks   Limitations Noted In ROM   Adaptive Equipment Reacher;Sock  Aide;Dressing Stick   Positioning Supported Sit;Standing   Lying to Sitting on Side of Bed   Type of Assistance Needed Independent   Physical Assistance Level No physical assistance   Lying to Sitting on Side of Bed CARE Score 6   Sit to Stand   Type of Assistance Needed Independent   Physical Assistance Level No physical assistance   Sit to Stand CARE Score 6   Toileting Hygiene   Type of Assistance Needed Independent   Physical Assistance Level No physical assistance   Toileting Hygiene CARE Score 6   Toileting   Able to Pull Clothing down yes, up yes.   Able to Manage Clothing Closures Yes   Manage Hygiene Bladder   Limitations Noted In ROM   Toilet Transfer   Type of Assistance Needed Independent;Adaptive equipment   Physical Assistance Level No physical assistance   Toilet Transfer CARE Score 6   Toilet Transfer   Surface Assessed Standard Toilet   Transfer Technique Standard   Limitations Noted In ROM   Adaptive Equipment Grab Bar;Walker   Kitchen Mobility   Kitchen-Mobility Level Walker   Kitchen Activity Retrieve items;Transport items   Light Housekeeping   Light Housekeeping Level Walker   Light Housekeeping Level of Assistance Modified independent   Light Housekeeping Indep completes s/u and c/u and also laundry task   Cognition   Overall Cognitive Status WFL   Arousal/Participation Alert;Cooperative   Attention Within functional limits   Orientation Level Oriented X4   Memory Within functional limits   Following Commands Follows all commands and directions without difficulty   Activity Tolerance   Activity Tolerance Patient tolerated treatment well   Assessment   Treatment Assessment ADL completed via sponge bathe level: Pt able to consistently s/u and c/u all supplies Independently with use of RW and while maintaining TTWB R LE. Pt demon consistent recall of compensatory strategies and compliance with safe techniques throughout all ADL/iADLs. Pt's barriers to d/c to personal home environment include  decreased strength RLE s/p quadricepts rupture, decreased ROM with use of leg length hard cast, and TTWB RLE. Plan is contingent upon placement due to noted barriers. Ortho f/u 4/18   Prognosis Good   Problem List Decreased strength;Decreased range of motion;Impaired balance;Decreased mobility   Plan   Treatment/Interventions ADL retraining;Functional transfer training;OT;Spoke to nursing;Gait training;Patient/family training;Equipment eval/education;Bed mobility   Progress Progressing toward goals   OT Therapy Minutes   OT Time In 0700   OT Time Out 0805   OT Total Time (minutes) 65   Therapy Time missed   Time missed? No

## 2025-04-18 NOTE — PROGRESS NOTES
"Progress Note - Tobin Kerns 69 y.o. male MRN: [id number]    Unit/Bed#: [facility] 210-01 Encounter: [id number]        Subjective:   Patient seen and examined at bedside after reviewing the chart and discussing the case with the caring staff.      Patient examined at bedside.  Patient denies any acute symptoms.     Physical Exam   Vitals: Blood pressure 124/61, pulse 75, temperature 97.8 °F (36.6 °C), temperature source Temporal, resp. rate 16, height 6' 5\" (1.956 m), weight (!) 137 kg (302 lb 4 oz), SpO2 90%.,Body mass index is 35.84 kg/m².  Constitutional: Patient in no acute distress.  HEENT: PERR, EOMI, MMM.  Cardiovascular: Normal rate and regular rhythm.    Pulmonary/Chest: Effort normal and breath sounds normal.   Abdomen: Soft, + BS, NT.    Assessment/Plan:  Tobin Kerns is a(n) 69 y.o. year old male who is s/p quadriceps tendon repair.     Cardiac with hx of HTN, HLD.  Patient is on losartan 50 mg daily, pravastatin 40 mg daily.  Impaired fasting glucose.  Hgb a1c 5.4% on 3/12/25.  Continue metformin XR 1500 mg daily with dinner.  Regular diet and d/c accucheks as blood sugar is well controlled.    BPH.  Continue Flomax 0.4 mg daily.  GERD.  Continue Pepcid 20 mg daily.  JOVANY.  On CPAP at bedtime.   Vitamin D deficiency.  Patient is on vitamin D3 5000 units daily.  Pain and bowel management.  As per physiatrist.  Quadriceps tendon repair.  Patient is receiving intensive PT OT as per physiatrist.  Orthopedic surgery follow-up 4/18 - cast was removed and TROM reapplied to allow for 10 degree knee flexion with nonambulatory exercises.  He is to be in full knee extension at all times with ambulation.  Can remove for hygenic purposes if needed.  No forced or aggressive knee flexion.  No straight leg raises.  Ambulate with walker.  He is to follow-up with orthopedic surgery in 3 weeks (5/12/25).    Anticipated date of discharge.  TBD.    The patient was discussed with Dr. Brito and he is in agreement with the above " note.

## 2025-04-19 PROCEDURE — 97530 THERAPEUTIC ACTIVITIES: CPT

## 2025-04-19 PROCEDURE — 97110 THERAPEUTIC EXERCISES: CPT

## 2025-04-19 RX ADMIN — TAMSULOSIN HYDROCHLORIDE 0.4 MG: 0.4 CAPSULE ORAL at 17:11

## 2025-04-19 RX ADMIN — FAMOTIDINE 20 MG: 20 TABLET, FILM COATED ORAL at 08:18

## 2025-04-19 RX ADMIN — B-COMPLEX W/ C & FOLIC ACID TAB 1 TABLET: TAB at 08:18

## 2025-04-19 RX ADMIN — CHOLECALCIFEROL TAB 25 MCG (1000 UNIT) 5000 UNITS: 25 TAB at 08:18

## 2025-04-19 RX ADMIN — PRAVASTATIN SODIUM 40 MG: 40 TABLET ORAL at 17:11

## 2025-04-19 RX ADMIN — ACETAMINOPHEN 650 MG: 325 TABLET ORAL at 09:28

## 2025-04-19 RX ADMIN — OXYCODONE HYDROCHLORIDE AND ACETAMINOPHEN 500 MG: 500 TABLET ORAL at 08:18

## 2025-04-19 RX ADMIN — METFORMIN ER 500 MG 1500 MG: 500 TABLET ORAL at 17:11

## 2025-04-19 NOTE — NURSING NOTE
Continues to be MOD I with walker. Brace in place to RLE. Healed incision on knee. Right posterior achilles area left INDY. Area intact. Medicated once for pain in right knee with relief noted. Will continue to monitor and follow plan of care.

## 2025-04-19 NOTE — PLAN OF CARE
Problem: PAIN - ADULT  Goal: Verbalizes/displays adequate comfort level or baseline comfort level  Description: Interventions:  - Encourage patient to monitor pain and request assistance  - Assess pain using appropriate pain scale  - Administer analgesics based on type and severity of pain and evaluate response  - Implement non-pharmacological measures as appropriate and evaluate response  - Consider cultural and social influences on pain and pain management  - Notify physician/advanced practitioner if interventions unsuccessful or patient reports new pain  Outcome: Progressing     Problem: INFECTION - ADULT  Goal: Absence or prevention of progression during hospitalization  Description: INTERVENTIONS:  - Assess and monitor for signs and symptoms of infection  - Monitor lab/diagnostic results  - Monitor all insertion sites, i.e. indwelling lines, tubes, and drains  - Monitor endotracheal if appropriate and nasal secretions for changes in amount and color  - [place] appropriate cooling/warming therapies per order  - Administer medications as ordered  - Instruct and encourage patient and family to use good hand hygiene technique  - Identify and instruct in appropriate isolation precautions for identified infection/condition  Outcome: Progressing     Problem: SAFETY ADULT  Goal: Maintain or return to baseline ADL function  Description: INTERVENTIONS:  -  Assess patient's ability to carry out ADLs; assess patient's baseline for ADL function and identify physical deficits which impact ability to perform ADLs (bathing, care of mouth/teeth, toileting, grooming, dressing, etc.)  - Assess/evaluate cause of self-care deficits   - Assess range of motion  - Assess patient's mobility; develop plan if impaired  - Assess patient's need for assistive devices and provide as appropriate  - Encourage maximum independence but intervene and supervise when necessary  - Involve family in performance of ADLs  - Assess for home care  needs following discharge   - Consider OT consult to assist with ADL evaluation and planning for discharge  - Provide patient education as appropriate  Outcome: Progressing

## 2025-04-19 NOTE — PROGRESS NOTES
04/19/25 0929   Pain Assessment   Pain Assessment Tool 0-10   Pain Score 2   Pain Location/Orientation Orientation: Right;Location: Knee   Restrictions/Precautions   Precautions Fall Risk   Weight Bearing Restrictions Yes   RLE Weight Bearing Per Order WBAT   LLE Weight Bearing Per Order WBAT   Braces or Orthoses Knee brace   Sit to Stand   Type of Assistance Needed Independent   Sit to Stand CARE Score 6   Kitchen Mobility   Kitchen-Mobility Level Walker   Kitchen Activity Retrieve items;Transport items  (Retrieved drink from fridge and filled ice in cup from machine)   Kitchen Mobility Comments MI   Exercise Tools   Exercise Tools Yes   Hand Gripper R and L hands grasp/release with heavy resistance gripper, 40x   Other Exercise Tool 1 R and L UE sup/pro and wrist flex/ext, 2x40   Other Exercise Tool 2 B/L UE AROM in available planes with 6# bar, 3x10   Other Exercise Tool 3 B/L UE AROM in available planes with Blue Theraband, 3x10   Cognition   Overall Cognitive Status WFL   Arousal/Participation Alert;Responsive;Cooperative   Attention Within functional limits   Additional Activities   Additional Activities Other (Comment)   Additional Activities Comments Functional mobility with RW with R LE knee brace in place with MI   Assessment   Treatment Assessment Pt receieved with functional mobilty with RW and agreeable to participation with OT session. Pt completed functional mobilty and kitchen mobility with RW with overall MI. Good tolerence with UE exercises. Pt R LE WBAT with TROM in place. Pt would benefit from contiued particiaption with OT services to address barriers with R LE WBAT with TROM in place, overall strength, balance and activity tolerence and help support progress towards goals.   Plan   Treatment/Interventions ADL retraining;Functional transfer training;Therapeutic exercise;Endurance training;Patient/family training;Equipment eval/education;Bed mobility;Compensatory technique education   Progress  Progressing toward goals   OT Therapy Minutes   OT Time In 0929   OT Time Out 1029   OT Total Time (minutes) 60   OT Mode of treatment - Individual (minutes) 60

## 2025-04-20 RX ADMIN — CHOLECALCIFEROL TAB 25 MCG (1000 UNIT) 5000 UNITS: 25 TAB at 08:34

## 2025-04-20 RX ADMIN — OXYCODONE HYDROCHLORIDE AND ACETAMINOPHEN 500 MG: 500 TABLET ORAL at 08:34

## 2025-04-20 RX ADMIN — LOSARTAN POTASSIUM 50 MG: 50 TABLET, FILM COATED ORAL at 08:34

## 2025-04-20 RX ADMIN — METFORMIN ER 500 MG 1500 MG: 500 TABLET ORAL at 17:06

## 2025-04-20 RX ADMIN — TAMSULOSIN HYDROCHLORIDE 0.4 MG: 0.4 CAPSULE ORAL at 17:06

## 2025-04-20 RX ADMIN — B-COMPLEX W/ C & FOLIC ACID TAB 1 TABLET: TAB at 08:34

## 2025-04-20 RX ADMIN — PRAVASTATIN SODIUM 40 MG: 40 TABLET ORAL at 17:06

## 2025-04-20 RX ADMIN — FAMOTIDINE 20 MG: 20 TABLET, FILM COATED ORAL at 08:34

## 2025-04-21 LAB
ALBUMIN SERPL BCG-MCNC: 3.8 G/DL (ref 3.5–5)
ALP SERPL-CCNC: 51 U/L (ref 34–104)
ALT SERPL W P-5'-P-CCNC: 9 U/L (ref 7–52)
ANION GAP SERPL CALCULATED.3IONS-SCNC: 10 MMOL/L (ref 4–13)
AST SERPL W P-5'-P-CCNC: 13 U/L (ref 13–39)
BASOPHILS # BLD AUTO: 0.05 THOUSANDS/ÂΜL (ref 0–0.1)
BASOPHILS NFR BLD AUTO: 1 % (ref 0–1)
BILIRUB SERPL-MCNC: 0.58 MG/DL (ref 0.2–1)
BUN SERPL-MCNC: 20 MG/DL (ref 5–25)
CALCIUM SERPL-MCNC: 9.3 MG/DL (ref 8.4–10.2)
CHLORIDE SERPL-SCNC: 104 MMOL/L (ref 96–108)
CO2 SERPL-SCNC: 23 MMOL/L (ref 21–32)
CREAT SERPL-MCNC: 1.23 MG/DL (ref 0.6–1.3)
EOSINOPHIL # BLD AUTO: 0.26 THOUSAND/ÂΜL (ref 0–0.61)
EOSINOPHIL NFR BLD AUTO: 3 % (ref 0–6)
ERYTHROCYTE [DISTWIDTH] IN BLOOD BY AUTOMATED COUNT: 13.2 % (ref 11.6–15.1)
GFR SERPL CREATININE-BSD FRML MDRD: 60 ML/MIN/1.73SQ M
GLUCOSE P FAST SERPL-MCNC: 90 MG/DL (ref 65–99)
GLUCOSE SERPL-MCNC: 90 MG/DL (ref 65–140)
HCT VFR BLD AUTO: 39.1 % (ref 36.5–49.3)
HGB BLD-MCNC: 13 G/DL (ref 12–17)
IMM GRANULOCYTES # BLD AUTO: 0.02 THOUSAND/UL (ref 0–0.2)
IMM GRANULOCYTES NFR BLD AUTO: 0 % (ref 0–2)
LYMPHOCYTES # BLD AUTO: 2.61 THOUSANDS/ÂΜL (ref 0.6–4.47)
LYMPHOCYTES NFR BLD AUTO: 34 % (ref 14–44)
MCH RBC QN AUTO: 29.8 PG (ref 26.8–34.3)
MCHC RBC AUTO-ENTMCNC: 33.2 G/DL (ref 31.4–37.4)
MCV RBC AUTO: 90 FL (ref 82–98)
MONOCYTES # BLD AUTO: 0.67 THOUSAND/ÂΜL (ref 0.17–1.22)
MONOCYTES NFR BLD AUTO: 9 % (ref 4–12)
NEUTROPHILS # BLD AUTO: 4 THOUSANDS/ÂΜL (ref 1.85–7.62)
NEUTS SEG NFR BLD AUTO: 53 % (ref 43–75)
NRBC BLD AUTO-RTO: 0 /100 WBCS
PLATELET # BLD AUTO: 211 THOUSANDS/UL (ref 149–390)
PMV BLD AUTO: 10 FL (ref 8.9–12.7)
POTASSIUM SERPL-SCNC: 4.4 MMOL/L (ref 3.5–5.3)
PROT SERPL-MCNC: 6.3 G/DL (ref 6.4–8.4)
RBC # BLD AUTO: 4.36 MILLION/UL (ref 3.88–5.62)
SODIUM SERPL-SCNC: 137 MMOL/L (ref 135–147)
WBC # BLD AUTO: 7.61 THOUSAND/UL (ref 4.31–10.16)

## 2025-04-21 PROCEDURE — 97530 THERAPEUTIC ACTIVITIES: CPT

## 2025-04-21 PROCEDURE — 80053 COMPREHEN METABOLIC PANEL: CPT

## 2025-04-21 PROCEDURE — 90471 IMMUNIZATION ADMIN: CPT | Performed by: PHYSICAL MEDICINE & REHABILITATION

## 2025-04-21 PROCEDURE — 85025 COMPLETE CBC W/AUTO DIFF WBC: CPT

## 2025-04-21 PROCEDURE — 99232 SBSQ HOSP IP/OBS MODERATE 35: CPT | Performed by: PHYSICAL MEDICINE & REHABILITATION

## 2025-04-21 PROCEDURE — 90677 PCV20 VACCINE IM: CPT | Performed by: PHYSICAL MEDICINE & REHABILITATION

## 2025-04-21 PROCEDURE — 97535 SELF CARE MNGMENT TRAINING: CPT

## 2025-04-21 PROCEDURE — 97110 THERAPEUTIC EXERCISES: CPT

## 2025-04-21 RX ADMIN — TAMSULOSIN HYDROCHLORIDE 0.4 MG: 0.4 CAPSULE ORAL at 16:43

## 2025-04-21 RX ADMIN — PRAVASTATIN SODIUM 40 MG: 40 TABLET ORAL at 16:43

## 2025-04-21 RX ADMIN — CHOLECALCIFEROL TAB 25 MCG (1000 UNIT) 5000 UNITS: 25 TAB at 08:35

## 2025-04-21 RX ADMIN — METFORMIN ER 500 MG 1500 MG: 500 TABLET ORAL at 16:43

## 2025-04-21 RX ADMIN — OXYCODONE HYDROCHLORIDE AND ACETAMINOPHEN 500 MG: 500 TABLET ORAL at 08:35

## 2025-04-21 RX ADMIN — B-COMPLEX W/ C & FOLIC ACID TAB 1 TABLET: TAB at 08:35

## 2025-04-21 RX ADMIN — FAMOTIDINE 20 MG: 20 TABLET, FILM COATED ORAL at 08:35

## 2025-04-21 RX ADMIN — PNEUMOCOCCAL 20-VALENT CONJUGATE VACCINE 0.5 ML
2.2; 2.2; 2.2; 2.2; 2.2; 2.2; 2.2; 2.2; 2.2; 2.2; 2.2; 2.2; 2.2; 2.2; 2.2; 2.2; 4.4; 2.2; 2.2; 2.2 INJECTION, SUSPENSION INTRAMUSCULAR at 15:37

## 2025-04-21 NOTE — ASSESSMENT & PLAN NOTE
"HPI:   He initially underwent quad tendon repair on 7/25/24 with Dr. Foster. He was discharged home with outpatient PT.  Later noted to have recurrent high-grade tear with extensor lag on exam and elected for operative management  January 2025 MRI: \"Prior quadriceps insertion repair with high-grade recurrent tear exhibiting up to 2.2 cm of tendon retraction.\"  On 3/6/25, he underwent revision of quad tendon repair with allograft augmentation  3/21: outpatient follow-up with Dr. Foster, new cast applied   3/28: follow-up with Dr. Foster, continue TTWB   4/4: follow-up with Dr. Foster (removed cast and reapplied long leg cast for an additional 2 weeks) and continue TTWB  4/11, no changes in plan  4/18 follow-up: cast removed, TROM with allowance of 10 degrees flexion per ortho, advanced to  WBAT   Plan:   PT and OT for 3 hours a day, 5-6 days a week   WBAT with TROM locked in extension (advanced on 4/18 with ortho, not included in documentation but verified with PA-C via secure chat)   Pain: PRN Tylenol (monitor LFTs) and oxycodone discontinued on 3/17 due to minimal pain   Lovenox d/c on 4/7     "

## 2025-04-21 NOTE — PROGRESS NOTES
04/21/25 1030   Pain Assessment   Pain Assessment Tool 0-10   Pain Score No Pain   Restrictions/Precautions   Precautions Fall Risk   RLE Weight Bearing Per Order WBAT   Braces or Orthoses Knee brace  (TROM locked at 10*)   Oral Hygiene   Type of Assistance Needed Independent   Physical Assistance Level No physical assistance   Oral Hygiene CARE Score 6   Grooming   Able To Initiate Tasks;Acquire Items;Comb/Brush Hair;Wash/Dry Face;Brush/Clean Teeth;Wash/Dry Hands   Shower/Bathe Self   Type of Assistance Needed Independent;Adaptive equipment   Physical Assistance Level No physical assistance   Shower/Bathe Self CARE Score 6   Bathing   Assessed Bath Style Shower   Able to Gather/Transport Yes   Able to Adjust Water Temperature Yes   Able to Wash/Rinse/Dry (body part) Left Arm;Right Arm;L Upper Leg;R Upper Leg;L Lower Leg/Foot;R Lower Leg/Foot;Chest;Abdomen;Perineal Area;Buttocks   Limitations Noted in ROM   Positioning Seated   Adaptive Equipment Longhand Sponge;Shower Bars;Shower Seat;Hand Held Shower   Findings  pt doffed immob once seated on shower seat and leg remained extended for task, weight shifted for buttock hygiene   Tub/Shower Transfer   Limitations Noted In ROM   Adaptive Equipment Grab Bars;Seat with out Back   Assessed Shower   Findings S   Upper Body Dressing   Type of Assistance Needed Independent   Physical Assistance Level No physical assistance   Upper Body Dressing CARE Score 6   Lower Body Dressing   Type of Assistance Needed Independent;Adaptive equipment   Physical Assistance Level No physical assistance   Lower Body Dressing CARE Score 6   Putting On/Taking Off Footwear   Type of Assistance Needed Independent;Adaptive equipment   Physical Assistance Level No physical assistance   Putting On/Taking Off Footwear CARE Score 6   Picking Up Object   Type of Assistance Needed Independent;Adaptive equipment   Physical Assistance Level No physical assistance   Picking Up Object CARE Score 6    Dressing/Undressing Clothing   Able to  Obtain Clothing;Store removed clothing   Remove UB Clothes Button Shirt   Don UB Clothes Button Shirt   Remove LB Clothes Shorts;Undergarment;Socks   Don LB Clothes Socks;Undergarment;Shorts   Limitations Noted In ROM   Adaptive Equipment Reacher;Dressing Stick;Sock Aide   Positioning Supported Sit;Standing   Findings Indep with don/doff immobilizer   Sit to Stand   Type of Assistance Needed Independent   Physical Assistance Level No physical assistance   Sit to Stand CARE Score 6   Kitchen Mobility   Kitchen-Mobility Level Walker   Kitchen Activity Retrieve items;Transport items   Kitchen Mobility Comments MI   Light Housekeeping   Light Housekeeping Level Walker   Light Housekeeping Level of Assistance Modified independent   Exercise Tools   Hand Gripper B hands: heavy resistance gripper 2x20 reps   Other Exercise Tool 1 red flex bar 2x15 reps downward then upward   Cognition   Overall Cognitive Status WFL   Arousal/Participation Alert;Cooperative   Attention Within functional limits   Orientation Level Oriented X4   Memory Within functional limits   Following Commands Follows all commands and directions without difficulty   Additional Activities   Additional Activities Comments fxnl mobility MI RW WBAT   Activity Tolerance   Activity Tolerance Patient tolerated treatment well   Assessment   Treatment Assessment Pt tx addressed ADL via shower level with pt able to don/doff immobilizer Indep while seated on shower seat and maintain extension at knee for task. Pt continues to manage all of ADL/iADL Indep with use of walker. Pt demon good safety with pt tolerating WBAT. Barriers include: R LE s/p quadricep rupture with surgical repair, Decreased ROM with use of immobilizer WBAT. Plan is contingent upon R LE ROM increasing in order for pt to be able to manage in personal home environment. Ortho f/u April 14.   Prognosis Good   Problem List Decreased strength;Decreased range of  motion;Impaired balance;Decreased mobility   Plan   Treatment/Interventions ADL retraining;Functional transfer training;Patient/family training;Equipment eval/education;Gait training;Compensatory technique education;Spoke to nursing;OT   Progress Progressing toward goals   OT Therapy Minutes   OT Time In 1030   OT Time Out 1200   OT Total Time (minutes) 90   OT Mode of treatment - Individual (minutes) 90   OT Mode of treatment - Concurrent (minutes) 0   OT Mode of treatment - Group (minutes) 0   OT Mode of treatment - Co-treat (minutes) 0   OT Mode of Treatment - Total time(minutes) 90 minutes   OT Cumulative Minutes 6015

## 2025-04-21 NOTE — PROGRESS NOTES
"Progress Note - PMR   Name: Tobin Kerns 69 y.o. male I MRN: [id number]  Unit/Bed#: -01 I Date of Admission: 3/11/2025   Date of Service: 4/21/2025 I Hospital Day: 41     Assessment & Plan  Quadriceps muscle rupture, right, subsequent encounter  HPI:   He initially underwent quad tendon repair on 7/25/24 with Dr. Foster. He was discharged home with outpatient PT.  Later noted to have recurrent high-grade tear with extensor lag on exam and elected for operative management  January 2025 MRI: \"Prior quadriceps insertion repair with high-grade recurrent tear exhibiting up to 2.2 cm of tendon retraction.\"  On 3/6/25, he underwent revision of quad tendon repair with allograft augmentation  3/21: outpatient follow-up with Dr. Foster, new cast applied   3/28: follow-up with Dr. Foster, continue TTWB   4/4: follow-up with Dr. Foster (removed cast and reapplied long leg cast for an additional 2 weeks) and continue TTWB  4/11, no changes in plan  4/18 follow-up: cast removed, TROM with allowance of 10 degrees flexion per ortho, advanced to  WBAT   Plan:   PT and OT for 3 hours a day, 5-6 days a week   WBAT with TROM locked in extension (advanced on 4/18 with ortho, not included in documentation but verified with PA-C via secure chat)   Pain: PRN Tylenol (monitor LFTs) and oxycodone discontinued on 3/17 due to minimal pain   Lovenox d/c on 4/7     Hypertension  Continue losartan 50 mg daily   Monitor BP   Mixed hyperlipidemia  Continue pravastatin 40 mg daily   Gastro-esophageal reflux disease without esophagitis  Continue famotidine 20 mg daily   Peripheral neuropathy  Patient is not diabetic   Monitor skin for new or worsening wounds   JOVANY on CPAP  Continue CPAP qHS  IFG (impaired fasting glucose)  Continue metformin 1500 mg daily   Seen by RD 3/12, diet adjusted to regular   3/12 A1c: 5.4   Healed ulcer of left foot  Local wound care  Present on admission   Wound care RN consulted   BPH (benign prostatic " hyperplasia)  Continue tamsulosin 0.4 mg daily   Patient currently voiding without difficulty   PRN PVRs if urinary retention is suspected   Impaired mobility and ADLs  Patient was evaluated by the rehabilitation team MD and an appropriate candidate for acute inpatient rehabilitation program at this time.  The patient will tolerate 3 hours/day 5 to 7 days/week of intensive physical and  occupational therapy   in order to obtain goals for community discharge  Due to the patient's functional Compared to their baseline level of function in addition to their ongoing medical needs, the patient would benefit from daily supervision from a rehabilitation physician as well as rehabilitation nursing to implement and adjust the medical as well as functional plan of care in order to meet the patient's goals.  Bladder: Monitor closely for urinary incontinence and/or retention. Monitor urine output and encourage spontaneous voids. If unable to void spontaneously, will monitor with PVR bladder scans and initiate CIC regimen.  Skin: Monitor for breakdown, frequent turns, pressure offloading  Sleep: Encourage sleep hygiene (routine that promotes healthy circadian rhythm,avoid caffeine later in the day, quiet/dark room at night, reduce electronic before bedtime)  Discharge date TBD  Wound of right ankle  Skin breakdown on R heel due to cast. Ortho removed approximately 1 inch of cast at posterior aspect on 3/11. Cast further trimmed on 4/11, revealing a new posterior ankle wound.   Local wound care  Continue to monitor   Present on admission to [facility]   Wound care RN consulted (last seen 4/15):  Skin Care orders:  1-Hydraguard to sacrum, buttocks bid and prn   2-EHOB / waffle  cushion when out of bed in chair.  3-Moisturize skin daily with skin nourishing cream  4-Elevate heels to offload pressure.  5. Right posterior heel cleanse with Normal saline then pat dry apply melgisorb then a small silicone foam nate with a T and date change  every other day .      Subjective   Tobin Kerns is a 69 year-old male, with a past medical history of hypertension, hyperlipidemia, JOVANY, impaired fasting glucose, bilateral lower extremity neuropathy, GERD, BPH, presenting to [facility] after an elective right quadricept tendon repair. He initially underwent quad tendon repair on 7/25/24 with Dr. Foster. He was discharged home with outpatient PT. He was noted to have recurrent high-grade tear on repeat MRI with extensor lag on exam and elected for operative management. On 3/6/25, he underwent revision of quad tendon repair with allograft augmentation. He was evaluated by PT and OT and deemed an appropriate candidate for AR[facility] due to functional deficits     Chief Complaint: f/u ambulatory dysfunction    Interval:   Seen and evaluated patient in OT gym/during PT. He denies any concerns or pain. Last BM 4/20, continent of bowel and bladder.     Objective :  Temp:  [97.5 °F (36.4 °C)-98.3 °F (36.8 °C)] 97.5 °F (36.4 °C)  HR:  [64] 64  BP: (105-112)/(58) 105/58  Resp:  [16-18] 16  SpO2:  [96 %] 96 %  O2 Device: None (Room air)    Functional Update:  Physical Therapy Occupational Therapy   Weight Bearing Status: Toe touch (R LE)  Transfers: Independent  Bed Mobility: Independent  Amulation Distance (ft): 200 feet  Ambulation: Independent  Assistive Device for Ambulation: Roller Walker  Wheelchair Mobility Distance:  (n/a)  Wheelchair Mobility:  (N/A)  Number of Stairs: 7  Assistive Device for Stairs: Lehft Hand Rail  Stair Assistance: Independent  Ramp: Independent  Assistive Device for Ramp: Roller Walker  Discharge Recommendations: Other  DC Home with::  (Alternative placement until R LE long leg cast discontinued and allowed for knee ROM)   Eating: Independent  Grooming: Independent  Bathing: Independent  Bathing: Independent  Upper Body Dressing: Independent  Lower Body Dressing: Independent  Toileting: Independent  Tub/Shower Transfer:  (Not completed due to long leg  cast)  Toilet Transfer: Independent  Cognition: Within Defined Limits  Orientation: Person, Situation, Place, Time     '      Physical Exam  Vitals and nursing note reviewed.   Constitutional:       General: He is not in acute distress.     Appearance: He is obese. He is not ill-appearing, toxic-appearing or diaphoretic.   HENT:      Head: Normocephalic and atraumatic.      Nose: Nose normal.      Mouth/Throat:      Mouth: Mucous membranes are moist.   Cardiovascular:      Pulses:           Dorsalis pedis pulses are 2+ on the right side.   Pulmonary:      Effort: Pulmonary effort is normal. No respiratory distress.   Abdominal:      General: There is no distension.   Skin:     General: Skin is warm and dry.   Neurological:      General: No focal deficit present.      Mental Status: He is alert.   Psychiatric:         Mood and Affect: Mood normal.         Behavior: Behavior normal.       Scheduled Meds:  Current Facility-Administered Medications   Medication Dose Route Frequency Provider Last Rate    acetaminophen  650 mg Oral Q6H PRN Modesta Lee PA-C      ascorbic acid  500 mg Oral Daily Toño Brito MD      Cholecalciferol  5,000 Units Oral Daily Toño Brito MD      famotidine  20 mg Oral Daily Toño Brito MD      losartan  50 mg Oral Daily Toño Brito MD      metFORMIN  1,500 mg Oral Daily With Dinner Toño Brito MD      multivitamin stress formula  1 tablet Oral Daily Toño Brito MD      polyethylene glycol  17 g Oral Daily PRN Toño Brito MD      pravastatin  40 mg Oral Daily With Dinner Toño Brito MD      senna  1 tablet Oral HS PRN Modesta Lee PA-C      tamsulosin  0.4 mg Oral Daily With Dinner Toño Brito MD           Lab Results: I have reviewed the following results:                 Modesta Lee PA-C  Physical Medicine and Rehabilitation  [facility]

## 2025-04-21 NOTE — ASSESSMENT & PLAN NOTE
Skin breakdown on R heel due to cast. Ortho removed approximately 1 inch of cast at posterior aspect on 3/11. Cast further trimmed on 4/11, revealing a new posterior ankle wound.   Local wound care  Continue to monitor   Present on admission to [facility]   Wound care RN consulted (last seen 4/15):  Skin Care orders:  1-Hydraguard to sacrum, buttocks bid and prn   2-EHOB / waffle  cushion when out of bed in chair.  3-Moisturize skin daily with skin nourishing cream  4-Elevate heels to offload pressure.  5. Right posterior heel cleanse with Normal saline then pat dry apply melgisorb then a small silicone foam nate with a T and date change every other day .

## 2025-04-21 NOTE — PROGRESS NOTES
04/21/25 1300   Pain Assessment   Pain Assessment Tool 0-10   Pain Score No Pain   Restrictions/Precautions   Precautions Fall Risk   RLE Weight Bearing Per Order WBAT   ROM Restrictions No   Braces or Orthoses Knee brace  (TROM locked at full extension with ambulatory activities)   General   Change In Medical/Functional Status TROM brace to allow for 10 degrees knee flexion with nonambulatory exercises. Maintain in full knee extension at all times with ambulation. Can remove for hygenic purposes if needed. No forced or aggressive knee flexion No straight leg raises at this time Continue to ambulate with walker   Cognition   Overall Cognitive Status WFL   Arousal/Participation Alert;Responsive;Cooperative   Attention Within functional limits   Orientation Level Oriented X4   Memory Within functional limits   Following Commands Follows all commands and directions without difficulty   Subjective   Subjective Pt reports of no complaints or pain   Roll Left and Right   Type of Assistance Needed Independent   Physical Assistance Level No physical assistance   Roll Left and Right CARE Score 6   Sit to Lying   Type of Assistance Needed Independent   Physical Assistance Level No physical assistance   Sit to Lying CARE Score 6   Lying to Sitting on Side of Bed   Type of Assistance Needed Independent   Physical Assistance Level No physical assistance   Lying to Sitting on Side of Bed CARE Score 6   Sit to Stand   Type of Assistance Needed Independent   Physical Assistance Level No physical assistance   Comment RW   Sit to Stand CARE Score 6   Bed-Chair Transfer   Type of Assistance Needed Independent   Physical Assistance Level No physical assistance   Comment RW   Chair/Bed-to-Chair Transfer CARE Score 6   Car Transfer   Reason if not Attempted Environmental limitations   Car Transfer CARE Score 10   Walk 10 Feet   Type of Assistance Needed Independent   Physical Assistance Level No physical assistance   Comment RW   Walk  10 Feet CARE Score 6   Walk 50 Feet with Two Turns   Type of Assistance Needed Independent   Physical Assistance Level No physical assistance   Comment RW   Walk 50 Feet with Two Turns CARE Score 6   Walk 150 Feet   Type of Assistance Needed Independent   Physical Assistance Level No physical assistance   Comment RW   Walk 150 Feet CARE Score 6   Walking 10 Feet on Uneven Surfaces   Type of Assistance Needed Independent   Physical Assistance Level No physical assistance   Comment RW, green foam   Walking 10 Feet on Uneven Surfaces CARE Score 6   Ambulation   Primary Mode of Locomotion Prior to Admission Walk   Distance Walked (feet) 150 ft  (150' x 2)   Assist Device Roller Walker   Findings TROM locked in extension for ambulation with RW   Does the patient walk? 2. Yes   Wheel 50 Feet with Two Turns   Reason if not Attempted Activity not applicable   Wheel 50 Feet with Two Turns CARE Score 9   Wheel 150 Feet   Reason if not Attempted Activity not applicable   Wheel 150 Feet CARE Score 9   Wheelchair mobility   Findings N/A   Does the patient use a wheelchair? 0. No   Curb or Single Stair   Comment not focus of session   Toilet Transfer   Comment did not require during session   Therapeutic Interventions   Strengthening supine LE TE   Other transfer training, gait training   Equipment Use   NuStep L5, 10 min, B/L UE, L LE   Assessment   Treatment Assessment Pt agreeable to PT this PM, received ambulating into PT gym. Continued to challenge LE strength/ednurance within L LE restrictions with supine LE TE and NuStep with good tolerance. Pt continues to maintain ortho restrictions well, remains highly motivated to move onto next step in rehabilitation procress once able to increase R LE ROM. Pt remains limited by R LE locked in extension during ambulatory activities, which leaves pt unable and unsafe to return home due to environmental limittations from home setup. Will continue with current PT POC to improve deficits  and promote return to PLOF.   Problem List Decreased strength;Decreased range of motion;Impaired balance;Decreased mobility   PT Barriers   Physical Impairment Decreased strength;Decreased range of motion;Impaired balance   Functional Limitation Car transfers   Plan   Treatment/Interventions Functional transfer training;LE strengthening/ROM;Therapeutic exercise;Endurance training;Patient/family training;Equipment eval/education;Bed mobility;Gait training   Progress Progressing toward goals   PT Therapy Minutes   PT Time In 1300   PT Time Out 1400   PT Total Time (minutes) 60   PT Mode of treatment - Individual (minutes) 0   PT Mode of treatment - Concurrent (minutes) 60   PT Mode of treatment - Group (minutes) 0   PT Mode of treatment - Co-treat (minutes) 0   PT Mode of Treatment - Total time(minutes) 60 minutes   PT Cumulative Minutes 2007     Patient remains OOB in chair, all needs in reach. Encouraged use of call bell, patient verbalizes understanding.

## 2025-04-21 NOTE — CASE MANAGEMENT
CM called UR person Deanne Martinez [phone], left message on voice mail, requesting update with getting portal access and authorization for surgical procedure done on 3/6. CM received a return phone call from Deanne, who stated authorization was obtained, had just sent an email to this CM. Deanne stated the auth # is [id number].   CM called DOL  Susan [phone], left message with CM's contact information, requesting return phone call to discuss and obtain approval for discharge planning,equipment and disposition.

## 2025-04-21 NOTE — PLAN OF CARE
Problem: PAIN - ADULT  Goal: Verbalizes/displays adequate comfort level or baseline comfort level  Description: Interventions:  - Encourage patient to monitor pain and request assistance  - Assess pain using appropriate pain scale  - Administer analgesics based on type and severity of pain and evaluate response  - Implement non-pharmacological measures as appropriate and evaluate response  - Consider cultural and social influences on pain and pain management  - Notify physician/advanced practitioner if interventions unsuccessful or patient reports new pain  4/21/2025 1207 by Mehnaz Bach RN  Outcome: Progressing  4/21/2025 1206 by Mehnaz Bach RN  Outcome: Progressing     Problem: INFECTION - ADULT  Goal: Absence or prevention of progression during hospitalization  Description: INTERVENTIONS:  - Assess and monitor for signs and symptoms of infection  - Monitor lab/diagnostic results  - Monitor all insertion sites, i.e. indwelling lines, tubes, and drains  - Monitor endotracheal if appropriate and nasal secretions for changes in amount and color  - [place] appropriate cooling/warming therapies per order  - Administer medications as ordered  - Instruct and encourage patient and family to use good hand hygiene technique  - Identify and instruct in appropriate isolation precautions for identified infection/condition  4/21/2025 1207 by Mehnaz Bach RN  Outcome: Progressing  4/21/2025 1206 by Mehnaz Bach RN  Outcome: Progressing     Problem: SAFETY ADULT  Goal: Patient will remain free of falls  Description: INTERVENTIONS:  - Educate patient/family on patient safety including physical limitations  - Instruct patient to call for assistance with activity   - Consult OT/PT to assist with strengthening/mobility   - Keep Call bell within reach  - Keep bed low and locked with side rails adjusted as appropriate  - Keep care items and personal belongings within reach  - Initiate and maintain comfort  rounds  - Make Fall Risk Sign visible to staff  - Apply yellow socks and bracelet for high fall risk patients  - Consider moving patient to room near nurses station  4/21/2025 1207 by Mehnaz Bach RN  Outcome: Progressing  4/21/2025 1206 by Mehnaz Bach RN  Outcome: Progressing     Problem: Nutrition/Hydration-ADULT  Goal: Nutrient/Hydration intake appropriate for improving, restoring or maintaining nutritional needs  Description: Monitor and assess patient's nutrition/hydration status for malnutrition. Collaborate with interdisciplinary team and initiate plan and interventions as ordered.  Monitor patient's weight and dietary intake as ordered or per policy. Utilize nutrition screening tool and intervene as necessary. Determine patient's food preferences and provide high-protein, high-caloric foods as appropriate. INTERVENTIONS:- Monitor oral intake, urinary output, labs, and treatment plans- Assess nutrition and hydration status and recommend course of action- Evaluate amount of meals eaten- Assist patient with eating if necessary - Allow adequate time for meals- Recommend/ encourage appropriate diets, oral nutritional supplements, and vitamin/mineral supplements- Order, calculate, and assess calorie counts as needed- Recommend, monitor, and adjust tube feedings and TPN/PPN based on assessed needs- Assess need for intravenous fluids- Provide specific nutrition/hydration education as appropriate- Include patient/family/caregiver in decisions related to nutrition  4/21/2025 1207 by Mehnaz Bach RN  Outcome: Progressing  4/21/2025 1206 by Mehnaz Bach RN  Outcome: Progressing

## 2025-04-21 NOTE — PROGRESS NOTES
04/21/25 1000   Pain Assessment   Pain Assessment Tool 0-10   Pain Score No Pain   Restrictions/Precautions   Precautions Fall Risk   RLE Weight Bearing Per Order WBAT   ROM Restrictions No   Braces or Orthoses Knee brace  (TROM brace locked in full extension with ambulatory activities)   General   Change In Medical/Functional Status TROM brace to allow for 10 degrees knee flexion with nonambulatory exercises. Maintain in full knee extension at all times with ambulation. Can remove for hygenic purposes if needed. No forced or aggressive knee flexion No straight leg raises at this time Continue to ambulate with walker   Cognition   Overall Cognitive Status WFL   Arousal/Participation Alert;Responsive;Cooperative   Attention Within functional limits   Orientation Level Oriented X4   Memory Within functional limits   Following Commands Follows all commands and directions without difficulty   Subjective   Subjective Pt reports he has been remembering his restrictions this weekend   Roll Left and Right   Comment Pt OOB   Sit to Lying   Comment Pt OOB   Lying to Sitting on Side of Bed   Comment Pt OOB   Sit to Stand   Type of Assistance Needed Independent   Physical Assistance Level No physical assistance   Comment RW   Sit to Stand CARE Score 6   Bed-Chair Transfer   Type of Assistance Needed Independent   Physical Assistance Level No physical assistance   Comment RW   Chair/Bed-to-Chair Transfer CARE Score 6   Car Transfer   Reason if not Attempted Environmental limitations   Car Transfer CARE Score 10   Walk 10 Feet   Type of Assistance Needed Independent   Physical Assistance Level No physical assistance   Comment RW   Walk 10 Feet CARE Score 6   Walk 50 Feet with Two Turns   Type of Assistance Needed Independent   Physical Assistance Level No physical assistance   Comment RW   Walk 50 Feet with Two Turns CARE Score 6   Walk 150 Feet   Type of Assistance Needed Independent   Physical Assistance Level No physical  assistance   Comment RW   Walk 150 Feet CARE Score 6   Walking 10 Feet on Uneven Surfaces   Comment not focus of session   Ambulation   Primary Mode of Locomotion Prior to Admission Walk   Distance Walked (feet) 150 ft   Assist Device Roller Walker   Findings TROM locked in extension for ambulatory activities   Does the patient walk? 2. Yes   Wheel 50 Feet with Two Turns   Reason if not Attempted Activity not applicable   Wheel 50 Feet with Two Turns CARE Score 9   Wheel 150 Feet   Reason if not Attempted Activity not applicable   Wheel 150 Feet CARE Score 9   Wheelchair mobility   Findings N/A   Does the patient use a wheelchair? 0. No   Curb or Single Stair   Comment not focus of short session   Toilet Transfer   Comment did not require during session   Equipment Use   NuStep L5, 20 min, B/L UE, L LE   Assessment   Treatment Assessment Pt agreeable to PT this AM, received sitting upright in w/c. Focused short AM session on NuStep to continue to challenge L LE strength/endurance with good tolerance. Pt continues to maintain new restrictions well, which include no active leg raises with R LE, TROM locked in extension for ambulatory activities, and TROM unlocked in rest at 10 degrees knee flexion, no forceful or agreessive knee flexion. He remains limited by above restrictions for safe d/c home. Will continue with current PT POC to improve deficits and promote return to PLOF.   Problem List Decreased strength;Decreased range of motion   PT Barriers   Physical Impairment Decreased strength;Decreased range of motion;Impaired balance   Functional Limitation Car transfers   Plan   Treatment/Interventions Functional transfer training;LE strengthening/ROM;Therapeutic exercise;Endurance training;Patient/family training;Equipment eval/education;Bed mobility;Gait training   Progress Progressing toward goals   PT Therapy Minutes   PT Time In 1000   PT Time Out 1030   PT Total Time (minutes) 30   PT Mode of treatment -  Individual (minutes) 30   PT Mode of treatment - Concurrent (minutes) 0   PT Mode of treatment - Group (minutes) 0   PT Mode of treatment - Co-treat (minutes) 0   PT Mode of Treatment - Total time(minutes) 30 minutes   PT Cumulative Minutes 1947     Patient remains ambulating with OT for OT session, all needs within reach.

## 2025-04-21 NOTE — PROGRESS NOTES
"Progress Note - Tobin Kerns 69 y.o. male MRN: [id number]    Unit/Bed#: [facility] 210-01 Encounter: [id number]        Subjective:   Patient seen and examined at bedside after reviewing the chart and discussing the case with the caring staff.      Patient examined at bedside.  Patient denies any acute symptoms.     On review of patient's labs from 4/21/2025.  Patient's CMP and CBC were found to be within normal limits.    Physical Exam   Vitals: Blood pressure 105/58, pulse 64, temperature 97.5 °F (36.4 °C), temperature source Temporal, resp. rate 16, height 6' 5\" (1.956 m), weight (!) 137 kg (302 lb 4 oz), SpO2 96%.,Body mass index is 35.84 kg/m².  Constitutional: Patient in no acute distress.  HEENT: PERR, EOMI, MMM.  Cardiovascular: Normal rate and regular rhythm.    Pulmonary/Chest: Effort normal and breath sounds normal.   Abdomen: Soft, + BS, NT.    Assessment/Plan:  Tobin Kerns is a(n) 69 y.o. year old male who is s/p quadriceps tendon repair.     Cardiac with hx of HTN, HLD.  Patient is on losartan 50 mg daily, pravastatin 40 mg daily.  Impaired fasting glucose.  Hgb a1c 5.4% on 3/12/25.  Continue metformin XR 1500 mg daily with dinner.  Regular diet and d/c accucheks as blood sugar is well controlled.    BPH.  Continue Flomax 0.4 mg daily.  GERD.  Continue Pepcid 20 mg daily.  JOVANY.  On CPAP at bedtime.   Vitamin D deficiency.  Patient is on vitamin D3 5000 units daily.  Pain and bowel management.  As per physiatrist.  Quadriceps tendon repair.  Patient is receiving intensive PT OT as per physiatrist.  Orthopedic surgery follow-up 4/18 - cast was removed and TROM reapplied to allow for 10 degree knee flexion with nonambulatory exercises.  He is to be in full knee extension at all times with ambulation.  Can remove for hygenic purposes if needed.  No forced or aggressive knee flexion.  No straight leg raises.  Ambulate with walker.  He is to follow-up with orthopedic surgery in 3 weeks " (5/12/25).    Anticipated date of discharge.  TBD

## 2025-04-21 NOTE — NUTRITION
04/21/25 1533   Biochemical Data,Medical Tests, and Procedures   Biochemical Data/Medical Tests/Procedures Lab values reviewed;Meds reviewed   Labs (Comment) 4/21 pro:6.3, CBC WNL   Meds (Comment) Vit C, Vit D3, pepcid, cozaar, metformin, MVI, pravachol, senna   Nutrition-Focused Physical Exam   Nutrition-Focused Physical Exam Findings RN skin assessment reviewed  (Wound right knee, wound left plantar, pressure injury right ankle per documentation)   Nutrition-Focused Physical Exam Findings No edema.   Medical-Related Concerns PMH reviewed.   Adequacy of Intake   Nutrition Modality PO   Feeding Route   PO Independent   Current PO Intake   Current Diet Order Regular diet thin liquids.   Current Meal Intake %   Estimated calorie intake compared to estimated need Nutrient needs met.   PES Statement   Problem Continue previous diagnosis   Recommendations/Interventions   Malnutrition/BMI Present No  (does not meet criteria)   Summary Regular diet thin liquids. No supplements. Meal completions 100%. 4/16 302#, BMI=35.84; weight fluctuating 289#-313# since admission 3/11 313#. Medications reviewed. Missing teeth. No edema noted. Skin integrity reviewed. LBM 4/20. Reports no issues with his appetite or meals.   Interventions/Recommendations Continue current diet order   Education Assessment   Education Education not indicated at this time   Patient Nutrition Goals   Goal Adequate hydration;Adequate intake;Improve skin integrity   Goal Status Met;Extended   Timeframe to complete goal by next f/u   Nutrition Complexity Risk   Nutrition complexity level Sign off   Follow up date 05/05/25

## 2025-04-22 PROCEDURE — 99232 SBSQ HOSP IP/OBS MODERATE 35: CPT | Performed by: PHYSICAL MEDICINE & REHABILITATION

## 2025-04-22 PROCEDURE — 97110 THERAPEUTIC EXERCISES: CPT

## 2025-04-22 PROCEDURE — 97116 GAIT TRAINING THERAPY: CPT

## 2025-04-22 PROCEDURE — 97535 SELF CARE MNGMENT TRAINING: CPT

## 2025-04-22 PROCEDURE — 97530 THERAPEUTIC ACTIVITIES: CPT

## 2025-04-22 RX ADMIN — B-COMPLEX W/ C & FOLIC ACID TAB 1 TABLET: TAB at 08:08

## 2025-04-22 RX ADMIN — METFORMIN ER 500 MG 1500 MG: 500 TABLET ORAL at 17:18

## 2025-04-22 RX ADMIN — OXYCODONE HYDROCHLORIDE AND ACETAMINOPHEN 500 MG: 500 TABLET ORAL at 08:08

## 2025-04-22 RX ADMIN — PRAVASTATIN SODIUM 40 MG: 40 TABLET ORAL at 17:18

## 2025-04-22 RX ADMIN — LOSARTAN POTASSIUM 50 MG: 50 TABLET, FILM COATED ORAL at 08:08

## 2025-04-22 RX ADMIN — TAMSULOSIN HYDROCHLORIDE 0.4 MG: 0.4 CAPSULE ORAL at 17:18

## 2025-04-22 RX ADMIN — CHOLECALCIFEROL TAB 25 MCG (1000 UNIT) 5000 UNITS: 25 TAB at 08:08

## 2025-04-22 RX ADMIN — FAMOTIDINE 20 MG: 20 TABLET, FILM COATED ORAL at 08:08

## 2025-04-22 NOTE — TEAM CONFERENCE
Acute RehabilitationTeam Conference Note  Date: 04/22/2025   Time: 10:54AM      Patient Name:  Tobin Kerns       Medical Record Number: [id number]   YOB: 1955  Sex: Male          Room/Bed:  /[facility] 210-01  Payor Info:  Payor: WORKERS COMPENSATION / Plan: [facility] DEPTMENT OF LABOR / Product Type: Workers Compensation /      Admitting Diagnosis: Quadriceps muscle rupture [S76.119A]   Admit Date/Time:  3/11/2025  3:20 PM  Admission Comments: No comment available     Primary Diagnosis:  Quadriceps muscle rupture, right, subsequent encounter  Principal Problem: Quadriceps muscle rupture, right, subsequent encounter    Patient Active Problem List    Diagnosis Date Noted    BPH (benign prostatic hyperplasia) 03/12/2025    Impaired mobility and ADLs 03/12/2025    Wound of right ankle 03/12/2025    Quadriceps tendon rupture, right, sequela 03/07/2025    Obesity (BMI 35.0-39.9 without comorbidity) 03/04/2025    Onychomycosis 12/30/2024    Urinary retention 07/27/2024    Quadriceps muscle rupture, right, subsequent encounter 07/25/2024    Ambulatory dysfunction 07/23/2024    Abdominal aortic ectasia (HCC) 11/13/2023    Healed ulcer of left foot 02/23/2022    IFG (impaired fasting glucose) 10/01/2021    Hypertension 09/27/2021    Peripheral neuropathy 09/27/2021    Drusen (degenerative) of macula, bilateral 09/27/2021    Myopia, bilateral 09/27/2021    Pinguecula, bilateral 09/27/2021    Regular astigmatism, bilateral 09/27/2021    JOVANY on CPAP     History of colon polyps     Pure hypertriglyceridemia 08/19/2019    Mixed hyperlipidemia 05/30/2019    Gastro-esophageal reflux disease without esophagitis 05/30/2019    Presbyopia 10/11/2016       Physical Therapy:    Weight Bearing Status: Weight Bearing as Tolerated (with TROM locked in extension with ambulation)  Transfers: Independent  Bed Mobility: Independent  Amulation Distance (ft): 200 feet  Ambulation: Independent  Assistive Device for Ambulation:  Roller Walker  Wheelchair Mobility Distance:  (N/A)  Wheelchair Mobility:  (N/A)  Number of Stairs: 12  Assistive Device for Stairs: Lehft Hand Rail  Stair Assistance: Independent  Ramp: Independent  Assistive Device for Ramp: Roller Walker  Discharge Recommendations: Other  DC Home with::  (alternate placement)    03/18/2025:  Patient being followed by PT, making good progress.  Presents, following R quadriceps muscle rupture, now TTWB R LE in cast, with decreased ROM/strength, decreased balance and safety, decreased endurance, and pain.   Patient  mod I bed mobility, mod I transfers with RW, mod I short distances, S >50' ambulation with RW, S negotiation of 6 steps with L handrail.  Patient would benefit from continued inpatient [facility] PT to increase function, safety, and increased independence in prep for safe d/c to home.    3/25/2025:  Patient being followed by PT, making good progress.  Presents, following R quadriceps muscle rupture, now TTWB R LE in cast, with decreased ROM/strength, decreased balance and safety, decreased endurance, and pain.   Patient  mod I bed mobility, mod I transfers with RW, mod I ambulation with RW up to 180ft, mod I negotiation of 6 steps with L handrail.  Patient would benefit from continued [facility] PT to increase function, safety, and increased independence in prep for safe d/c to home.     04/01/2025:  Patient being followed by PT, making good progress.  Presents, following R quadriceps muscle rupture, now TTWB R LE in cast, with decreased ROM/strength, decreased balance and safety, decreased endurance, and pain.   Patient  mod I bed mobility, mod I transfers with RW, mod I ambulation with RW up to 180ft, mod I negotiation of 6 steps with L handrail.  Patient would benefit from continued [facility] PT to increase function, safety, and increased independence in prep for safe d/c to home.     04/08/2025:  Patient being followed by PT, making good progress.  Presents, following R quadriceps  muscle rupture, now TTWB R LE in cast, with decreased ROM/strength, decreased balance and safety, decreased endurance, and pain.   Patient  mod I bed mobility, mod I transfers with RW, mod I ambulation with RW up to 200ft, mod I negotiation of 6 steps with L handrail.  Patient would benefit from continued [facility] PT to increase function, safety, and increased independence in prep for safe d/c to home.     04/15/2025:  Patient being followed by PT, making good progress.  Presents, following R quadriceps muscle rupture, now TTWB R LE in cast, with decreased ROM/strength, decreased balance and safety, decreased endurance, and pain.   Patient  mod I bed mobility, mod I transfers with RW, mod I ambulation with RW up to 200ft, mod I negotiation of 7 steps with L handrail.  Patient would benefit from continued [facility] PT to increase function, safety, and increased independence in prep for safe d/c to home.     04/22/2025:  Patient being followed by PT, making good progress.  Presents, following R quadriceps muscle rupture, now WBAT R LE in TROM locked in extension with ambulation, unlocked to 10 degress with rest, no forceful/aggressive knee flexion, no active straight leg raise R LE, with decreased ROM/strength, decreased balance and safety, decreased endurance, and pain.   Patient  mod I bed mobility, mod I transfers with RW, mod I ambulation with RW up to 200ft, mod I negotiation of 7 steps with L handrail.  Patient would benefit from continued [facility] PT to increase function, safety, and increased independence in prep for safe d/c to home.     Occupational Therapy:  Eating: Independent  Grooming: Independent  Bathing: Independent  Bathing: Independent  Upper Body Dressing: Independent  Lower Body Dressing: Independent  Toileting: Independent  Tub/Shower Transfer: Supervision  Toilet Transfer: Independent  Cognition: Within Defined Limits  Orientation: Person, Place, Time, Situation  Discharge Recommendations:  (OPT PT)  IA Home  with:: First Floor Setup       03/17/25 Pt participating in Adls/iADLs, safety with functional txfs and mobility, TE/TA for generalized strength and endurance. Pts LOF noted above. Pt is progressing toward OT goals. Pt's barriers to d/c include decreased strength throughout but especially RLE s/p quadricepts rupture, decreased ROM, decreased balance TTWB RLE. Pt would benefit from continued skilled OT services in order to address listed barriers and prepare for safe d/c.     03/24/25 Pt participating in Adls/iADLs, safety with functional txfs and mobility, TE/TA for generalized strength and endurance. Pts LOF noted above. Pt continues to be consistently MI with all ADLs.  Pt's barriers to d/c include decreased strength  RLE s/p quadricepts rupture, decreased ROM, decreased balance due to TTWB RLE. Plan is contingent upon placement.     03/31/25 Pt participating in Adls/iADLs, safety with functional txfs and mobility, TE/TA for generalized strength and endurance. Pts LOF noted above. Pt continues to be consistently MI with all ADLs.  Pt's barriers to d/c include decreased strength  RLE s/p quadricepts rupture, decreased ROM, decreased balance due to TTWB RLE. Plan is contingent upon placement.     04/07/25 Pt participating in Adls/iADLs, safety with functional txfs and mobility, TE/TA for generalized strength and endurance. Pts LOF noted above. Pt continues to be consistently MI with all ADLs.  Pt's barriers to d/c include decreased strength  RLE s/p quadricepts rupture, decreased ROM, decreased balance due to TTWB RLE. Plan is contingent upon placement.     04/14/25 Pt participating in Adls/iADLs, safety with functional txfs and mobility, TE/TA for generalized strength and endurance. Pts LOF noted above. Pt continues to be consistently MI with all ADLs.  Progress has plateaued due to the following limitations: decreased strength and ROM RLE s/p quadricepts rupture, TTWB RLE. Plan is contingent upon placement.      04/21/25 Pt participating in ADLs/iADLS, functional txfs and mobility: Effective 4/18 pt allowed to WBAT with  immobilizer set at 10* to R LE. Pt tolerating change in WB status without difficulty for ADL/iADL purposes. Pt continues to manage at MI level. Pt also completing TE/TA for generalized strength and endurance. Pts LOF noted above. Plan is to continue with OT, pt now awaiting increase in ROM with use of immobilizer and d/c to home planned.     Speech Therapy:           No notes on file    Nursing Notes:  Appetite: Good  Diet Type: Regular/House                      Diet Patient/Family Education Complete: Yes    Type of Wound (LDA):  (healed incision left knee, right posterior achilles scab INDY)                    Type of Wound Patient/Family Education: Yes  Bladder: 6 - Modified [place]     Bladder Patient/Family Education: Yes  Bowel: 6 - Modified [place]     Bowel Patient/Family Education: Yes  Pain Location/Orientation: Orientation: Right, Location: Knee  Pain Score: 0                       Hospital Pain Intervention(s): Medication (See MAR)  Pain Patient/Family Education: Yes  Medication Management/Safety  Injectable: Lovenox  Safe Administration: No  Reason for non-safe administration: not attempted  Medication Patient/Family Education Complete: Yes    3/18/25 Patient was admitted to [facility] following a right quadriceps muscle rupture with revision surgery performed.  Patient with a hard cast to the right lower extremity and is to maintain toe-touch weight bearing restriction to that limb.  Lungs are clear diminished on room air.  Patient wears own CPAP unit from home overnight as ordered for sleep apnea.  Patient is continent of bowel and bladder.  Every other day dressing changes to the right lower leg where cast was rubbing and to the left plantar foot.  Patient is now modified independent to the bathroom with a walker.      3/25/2025  A/O x 4, Pt. Is Mod I TTWB RLE with long leg cast using  RW. Has decreased sensation to B/L lower ext which he has had prior, continues to have dressing changes every other day to RLE where previous cast was rubbing and wounds to left plantar foot that he had PTA. Continues to wear his CPAP from home.     3/31/25 Patient remains alert and oriented.  Right lower extremity with a leg cast in place at all times as ordered.  Patient is to follow up with ortho on Friday for further cast care.  Every other day dressing changes to the right posterior ankle and left plantar foot.  Patient remains modified independent to the bathroom with a walker.  Patient to maintain toe touch weight bearing status to the right lower extremity.      4/7/25 Patient remains alert and oriented.  Right lower extremity cast in place at all times.  Patient to maintain toe touch weight bearing restriction to the right lower extremity at all times.  Every other day dressing changes to the right posterior ankle.  Left plantar foot wound healed and left open to air.  Patient was modified independent to the bathroom overnight with walker in use.      4/15/25 Patient remains alert and oriented.  Right lower extremity cast in place at all times.  Patient to follow up with ortho this Friday.  Patient to maintain toe touch weight bearing restriction to the right lower extremity at all times.  Daily dressing changes to the right heel wound.  Patient remains modified independent to the bathroom with walker in use.      4/21/25 Alert and oriented. Lungs clear on RA. HR regular. RLE cast was removed and immobilizer now in place at 10 degrees. R ankle area healed and INDY. L plantar foot wound healed and INDY. Continent of bowel and bladder. Pt is Mod I. LC    Case Management:     Discharge Planning  Living Arrangements: Lives Alone  Support Systems: Self  Assistance Needed: unknown  Type of Current Residence: Private residence  Current Home Care Services: No  Patient admitted to [facility] on3/11/25 after inpatient  hospital stay for right knee revision quad tendon repair on 3/6/25. Patient lives alone in a mobile home, [address],. Patient independent PTA, driving. Original injury is from July 2024, had repair with a knee immobilizer. Did not work. Revised . Workmen's comp. Patient at Mod I level, looking into alternate disposition, [facility]. Workmen's comp  3/25/25 Having difficulty getting any assistance from [employer] comp. Patient for discharge today 3/25 waiting to establish home care, where he is going on d/c, transportation.  4/1/25 Emailed and sent fax trying to get assistance with discharge planning from [employer] comp. My need to have patient pay out of pocket. Last covered day 3/31.  4/8/25 Waiting on [employer] comp, who is getting auth for inpatient hospital stay, will not be able to assist with discharge planning until that is done.  0415/2025: Call placed last week to [facility] billing office to inquire if authorization was obtained for pt's surgery in March.   Per Radha Cho had started the process and was waiting for validation to progress with surgery auth.    Recalled office today and spoke with Regina, who reports they are still waiting for validation.  Until this is completed, the [employer] comp Rep, Ceasar, cannot assist with d/c planning from [facility].  Patient with ortho appt 4/18/2025 at 0930.  Transportation set up for 0815.  04/22/2025:  Received phone call from Deanne Martinez that authorization had been obtained for pt's 03/06/2025 surgery.   CM then placed call to [employer] with request to return call to discuss getting approval for d/c planning, DME, and disposition.   Patient's next ortho visit is 5/12/2025 at 2:15pm.  Will need transport set up.      Is the patient actively participating in therapies? yes  List any modifications to the treatment plan: na    Barriers Interventions   Increased WBAT right LE with hinged brace locked in ext ambulating following ortho  follow up, R distal achillis shearing injury ADL training, transfer, gait training, wound management and follow up appointments weekly   Skin integrity left foot resolved, incision site open to air Local wound care monitoring, medical management and oversight                 Is the patient making expected progress toward goals? yes  List any update or changes to goals: na    Medical Goals: Patient will be able to manage medical conditions and comorbid conditions with medications and follow up upon completion of rehab program    Weekly Team Goals:   Rehab Team Goals  ADL Team Goal: Patient will be independent with ADLs with least restrictive device upon completion of rehab program  Bowel/Bladder Team Goal: Patient will return to premorbid level for bladder/bowel management upon completion of rehab program  Transfer Team Goal: Patient will be independent with transfers with least restrictive device upon completion of rehab program  Locomotion Team Goal: Patient will be independent with locomotion with least restrictive device upon completion of rehab program    Pt will be Mod I self care  Pt will be Mod I bed mobility, transfers, and mobility     Health and wellness: Pt will be able to return to driving and enjoys playing video game.     Discussion:  Pt is mod I with all ADLs and mobility using RW in accessible environment although continues to wait on discharge planning pending validation of surgical plan. Pt has increased WBAT right LE with hinged brace locked in ext ambulating and able to complete active ROM 10* without WB. Pt will benefit RW, tub bench and BSC. Pending discharge placement pt would benefit from home health care including PT/ OT if transition is to home, working with workers compensation and Department of Labor for assistance with placement.    Anticipated Discharge Date:  Awaiting respite placement versus discharge to home  WTC Team Members Present:  The following team members are supervising care  for this patient and were present during this Weekly Team Conference.    The following team members are supervising care for this patient and were present during this Weekly Team Conference.  The following team members are supervising care for this patient and were present during this Weekly Team Conference.    MD Lilian Vasquez, OTR/L  Maren Schoenberger, [place], DPT  Gisell Mejía, OTR/L

## 2025-04-22 NOTE — PROGRESS NOTES
04/22/25 1030   Pain Assessment   Pain Assessment Tool 0-10   Pain Score No Pain   Restrictions/Precautions   Precautions Fall Risk   RLE Weight Bearing Per Order WBAT   Braces or Orthoses Knee brace  (TROM locked at extension with ambulation)   Oral Hygiene   Type of Assistance Needed Independent   Physical Assistance Level No physical assistance   Comment stance   Oral Hygiene CARE Score 6   Grooming   Able To Initiate Tasks;Acquire Items;Comb/Brush Hair;Brush/Clean Teeth;Wash/Dry Face;Wash/Dry Hands   Shower/Bathe Self   Type of Assistance Needed Independent;Adaptive equipment   Physical Assistance Level No physical assistance   Shower/Bathe Self CARE Score 6   Bathing   Assessed Bath Style Sponge Bath   Able to Wash/Rinse/Dry (body part) Left Arm;Right Arm;L Upper Leg;R Upper Leg;L Lower Leg/Foot;R Lower Leg/Foot;Chest;Abdomen;Perineal Area;Buttocks   Limitations Noted in ROM   Positioning Seated   Adaptive Equipment Longhand Sponge   Upper Body Dressing   Type of Assistance Needed Independent   Physical Assistance Level No physical assistance   Upper Body Dressing CARE Score 6   Lower Body Dressing   Type of Assistance Needed Independent;Adaptive equipment   Physical Assistance Level No physical assistance   Lower Body Dressing CARE Score 6   Putting On/Taking Off Footwear   Type of Assistance Needed Independent;Adaptive equipment   Physical Assistance Level No physical assistance   Putting On/Taking Off Footwear CARE Score 6   Picking Up Object   Type of Assistance Needed Independent;Adaptive equipment   Physical Assistance Level No physical assistance   Picking Up Object CARE Score 6   Dressing/Undressing Clothing   Able to  Obtain Clothing;Store removed clothing   Remove UB Clothes Button Shirt   Don UB Clothes Button Shirt   Remove LB Clothes Shorts;Undergarment;Socks   Don LB Clothes Socks;Undergarment;Shorts   Limitations Noted In ROM   Adaptive Equipment Reacher;Dressing Stick;Sock Aide   Positioning  Supported Sit;Standing   Sit to Stand   Type of Assistance Needed Independent   Physical Assistance Level No physical assistance   Sit to Stand CARE Score 6   Bed-Chair Transfer   Type of Assistance Needed Independent;Adaptive equipment   Physical Assistance Level No physical assistance   Chair/Bed-to-Chair Transfer CARE Score 6   Toileting Hygiene   Type of Assistance Needed Independent   Physical Assistance Level No physical assistance   Toileting Hygiene CARE Score 6   Toileting   Able to Pull Clothing down yes, up yes.   Manage Hygiene Bladder   Limitations Noted In ROM   Adaptive Equipment Grab Bar   Toilet Transfer   Type of Assistance Needed Independent;Adaptive equipment   Physical Assistance Level No physical assistance   Toilet Transfer CARE Score 6   Toilet Transfer   Surface Assessed Standard Toilet   Transfer Technique Standard   Light Housekeeping   Light Housekeeping Level Walker   Light Housekeeping Level of Assistance Modified independent   Light Housekeeping Indep completes s/u and c/u and also laundry task   Exercise Tools   Theraband blue 3x10 reps in 3 planes of motion   Hand Gripper B hands: heavy resistance gripper 2x20 reps   Cognition   Overall Cognitive Status WFL   Orientation Level Oriented X4   Additional Activities   Additional Activities Comments fxnl mobility MI RW WBAT   Activity Tolerance   Activity Tolerance Patient tolerated treatment well   Assessment   Treatment Assessment Tx addressed ADL via sponge bathe level with pt able to adjust immobilizer Indep while seated on toilet seat.  Pt continues to manage all of ADL/iADL Indep with use of walker.  Pt is Indep with completing laundry tasks on unit as well as manage in kitchen throughout the day. Pt demon good safety with pt tolerating WBAT. Barriers include: R LE s/p quadricep rupture with surgical repair, Decreased ROM with use of immobilizer WBAT. Plan is contingent upon R LE ROM increasing in order for pt to be able to manage  in personal home environment. Ortho f/u May 14.   Prognosis Good   Problem List Decreased endurance;Decreased strength;Decreased range of motion   Plan   Treatment/Interventions Functional transfer training;ADL retraining;Therapeutic exercise;Endurance training;OT   Progress Improving as expected   OT Therapy Minutes   OT Time In 1030   OT Time Out 1200   OT Total Time (minutes) 90   OT Mode of treatment - Individual (minutes) 90   OT Mode of treatment - Concurrent (minutes) 0   OT Mode of treatment - Group (minutes) 0   OT Mode of treatment - Co-treat (minutes) 0   OT Mode of Treatment - Total time(minutes) 90 minutes   OT Cumulative Minutes 1773   Therapy Time missed   Time missed? No

## 2025-04-22 NOTE — CASE MANAGEMENT
CM received voice mail message from Kymberly   from [employer]. CM called Kymberly [phone], left voice mail message , and contact information, with request for return phone call to this CM to discuss discharge planning. CM briefly described authorization issue on message. Awaiting return phone call.  CM received a phone call from Ceasar  from [facility] . CM made her aware auth and approval for surgical procedure done on 3/6 has been obtained, CM is requesting authorization for discharge needs; DME, respite care while patient continues to be touch toe weight bearing, patient lives in a camper, without a shower,  walker will not fit in camper, and shower is located in [facility], located approx 100-200 feet away from [facility]. Patient  also has an apt in [place], but it is on the third floor, with 3 flights of stairs to get to the apt. Patient needing respite stay at a facility or first floor hotel room. Ceasar stated the DME auth request needs to be uploaded onto [employer] : [email or web address] , provider section, authorization request. She is unsure if the respite stay or hotel stay will need to be uploaded there also. She is checking with her supervisor; if a physician script , and medical necessity will also need to be uploaded. Ceasar stated it usually takes 24 hours from request upload until she sees the request. Ceasar is checking with her supervisor, and stated  she will call this CM back with protocol and process for the request. SHILA also inquired about need for additional clinical information , for approval for the extended stay past the 3/31 auth approval. Ceasar stated the additional clinical information also needs to be uploaded on the [employer] website. CM logged onto [employer] website, had some difficulties, called [phone], spoke with Ben, who stated this CM needs portal access. CM sent emails to Sirena Roberto (Finance) <[email or web address]>; Elizabeth Horn  <[email or web address]>; Sheila Bae <[email or web address]>; Gena Harper<[email or web address]>; Deborah Arango <[email or web address]> to request portal access.   Ben also instructed CM to download forms needs for auth request and fax them to [employer], He stated it could take up to 48hours for the fax to be uploaded into the system, stating it may be faster to get portal access, and go through the portal.  6:00 PM. SHILA received a return call back from Ceasar  following up from previous phone call. Ceasar stated she spoke with her supervisor, and was told they cannot authorize facility or hotel stay, as it is not their responsibility for patient's housing issues, stating there has to be amedical necessity .SHILA explained his housing issues present a safety risk, and it would be an unsafe discharge. Ceasar stated she went through the protocols regarding this, and determined they are not responsible if patient does not have a home to go home to. SHILA explained in detail, the need for patient to go to a facility or hotel,made her aware patient is now weight bearing as tolerated, however he is now in a locked knee immobilizer, which is only unlocked for 10 degrees of movement with therapy, when not ambulating. SHILA explained about the bathroom in patient's camper without a shower, patient would have to walk a long distance or drive to get to the shower house. Patient is unable to drive his car to  get there. Ceasar mentioned patient's 3rd floor apt. SHILA stated there are 3 flights of stairs to get to the apt, patient is unable to do that safely, he is at risk for falling, and causing further injury. Ceasar inquired of family members, or friends , how will he get his medications, who will pick patient up on discharge. SHILA explained, we could have patient's medications delivered to his room prior to discharge, and set up delivery of medications from a local pharmacy, and if no one can  pick him up, we will have to arrange transportation.SHILA made her aware that patient maybe has had 1 visitor his entire stay here at the [facility]. Some people just do not have anyone in their life that can help them. SHILA suggested her supervisor speaking with this CM manager, Ceasar stated she will discuss this with her supervisor. SHILA inquired if they have a physician there that our physician can discuss this with. Ceaasr stated they only have a physician available for second opinions, not for decisions regarding authorizations, no peer to peer .  During the conversation, Ceasar mentioned someone she knew in a facility, that had no where to go, and the facility had to keep the patient, and insurance paid for it. SHILA made her aware CM will be sending clinical information, to get the stay approved from the 3/31 date.SHILA inquired if workmen's comp would be willing to pay for the extended stay untilpatient would have a safe discharge plan?Ceasar stated if there was medical necessity, it would be paid.  SHILA made her aware CM will have portal access tomorrow, and will be sending auth requests for DME, auth for safe housing in hotel or respite in a facility, and clinical information documentation of need for continued stay here at [facility] from 3/31 on.  SHILA also discussed conversation with Ricky , from DOL compensation, who stated the initial auth for this [facility] stay was approved by a person named Tobin, who is now retired. Ceasar stated she is unsure who Tobin is,however, the auth for the surgical procedure may flow right into the rehab stay.SHILA made her aware CM will be looking back to the patient's first surgery in July 2024, to see if he discharged home, because he did have a fall, it may have contributed to the need for this second surgery, making her aware if patient goes home to an unsafe  home environment,for example:unable to move around in camper with knee locked in extension, and ambulating with a RW, which will not  fit in the camper, shower inaccessible, he is at risk for injury, , which may lead to potential need for third surgery, and would be additional cost to the workmen's comp. Kelsey stated she will speak with her supervisor again.  SHILA made her aware CM will uploading information into the portal for auth requests and clinical information,and will talk on Friday,  which will be approx 24 hours, and gives the sytem time to get the information to her.

## 2025-04-22 NOTE — CASE MANAGEMENT
CM met with patient to review team recommendations from weekly team meeting.    Received word yesterday that authorization had been obtained for pt's 03/06/2025 surgery.   CM now requesting authorization for d/c planning needs including DME, respite stay or first floor hotel room until able to return home.  Patient lives in a trailer and reports that walker will not fit and is unable to fit into bathroom as he is still prohibited from bending his R knee.   The shower facilities are located in a bathhouse.  Patient reports he drives to the bath house normally, but is prohibited from driving at this time.   Per MD, patient is now WBAT on RLE TROM brace locked in extension for ambulation/transfers.   Awaiting call back from DOL examiner for how to proceed with obtaining DME and discharge disposition through Worker's Comp.  Patient will need bed side commode.  Has all other DME.   Will continue to follow for all d/c planning needs and concerns.

## 2025-04-22 NOTE — WOUND OSTOMY CARE
Progress Note - Wound   Tobin PATE Scisco 69 y.o. male MRN: [id number]  Unit/Bed#: [facility] 210-01 Encounter: [id number]        Assessment:   Patient is seen for weekly wound care assessment today . The patient is out of the long leg cast and has a TROM brace on for ambulation using a rolling walker . He is independent for ambulation . Continent of bowel and bladder . Alert and pleasant for the assessment . He is WBAT with allowance of 10 degrees flexion per ortho .    Assessment Findings  Bilateral heels dry and intact   Right knee incisional area healed well   Right posterior heel area is a closed scabbed area that is intact . No fluctuance or lifting noted will change to moisture barrier . The new TROM brace strap is located above the area as well .       Skin Care orders:  1-Hydraguard to sacrum, buttocks bid and prn   2-EHOB / waffle  cushion when out of bed in chair.  3-Moisturize skin daily with skin nourishing cream  4-Elevate heels to offload pressure.  5. Hydraguard to bilateral heels bid and prn     No noted wounds and discussed with the Nurse of the change in the plan for the right heel .     Wound 03/06/25 Knee Right (Active)   Wound Image   04/22/25 1031   Wound Description Clean;Dry;Intact 04/22/25 1031   Non-staged Wound Description Not applicable 04/22/25 1031   Wound Length (cm) 0 cm 04/22/25 1031   Wound Width (cm) 0 cm 04/22/25 1031   Wound Depth (cm) 0 cm 04/22/25 1031   Wound Surface Area (cm^2) 0 cm^2 04/22/25 1031   Wound Volume (cm^3) 0 cm^3 04/22/25 1031   Calculated Wound Volume (cm^3) 0 cm^3 04/22/25 1031   Drainage Amount None 04/22/25 1031   Audrey-wound Assessment Clean;Dry;Intact 04/22/25 1031   Treatments Site care 04/22/25 1031   Wound Site Closure Open to air 04/19/25 1915   Dressing Open to air 04/22/25 1031   Patient Tolerance Tolerated well 04/22/25 1031   Dressing Status Clean;Dry;Intact 04/19/25 1915       Wound 03/10/25 Pressure Injury Ankle Posterior;Right (Active)   Wound Image    04/22/25 1033   Wound Description Clean;Dry;Intact 04/22/25 1033   Pressure Injury Stage O 04/22/25 1033   Non-staged Wound Description Not applicable 04/22/25 1033   Wound Length (cm) 0 cm 04/22/25 1033   Wound Width (cm) 0 cm 04/22/25 1033   Wound Depth (cm) 0 cm 04/22/25 1033   Wound Surface Area (cm^2) 0 cm^2 04/22/25 1033   Wound Volume (cm^3) 0 cm^3 04/22/25 1033   Calculated Wound Volume (cm^3) 0 cm^3 04/22/25 1033   Change in Wound Size % 100 04/22/25 1033   Drainage Amount None 04/22/25 1033   Drainage Description Serosanguineous 04/15/25 0855   Audrey-wound Assessment Clean;Dry;Intact 04/22/25 1033   Treatments Site care 04/22/25 1033   Wound Site Closure None 03/29/25 1344   Dressing Moisture barrier;Open to air 04/22/25 1033   Dressing Changed Changed 04/15/25 0855   Patient Tolerance Tolerated well 04/22/25 1033   Dressing Status Clean;Dry;Intact 04/14/25 1931       Wound care will sign off secure chat with questions and concerns     Kymberly Snowden BSN RN CWOCN

## 2025-04-22 NOTE — ASSESSMENT & PLAN NOTE
Skin breakdown on R heel due to cast. Ortho removed approximately 1 inch of cast at posterior aspect on 3/11. Cast further trimmed on 4/11, revealing a new posterior ankle wound.   Local wound care  Continue to monitor   Present on admission to [facility]   Wound care RN consulted (last seen 4/15):  Skin Care orders:  1-Hydraguard to sacrum, buttocks bid and prn   2-EHOB / waffle  cushion when out of bed in chair.  3-Moisturize skin daily with skin nourishing cream  4-Elevate heels to offload pressure.  5. Right posterior heel cleanse with Normal saline then pat dry apply melgisorb then a small silicone foam nate with a T and date change every other day .

## 2025-04-22 NOTE — PROGRESS NOTES
04/22/25 0900   Pain Assessment   Pain Assessment Tool 0-10   Pain Score No Pain   Restrictions/Precautions   Precautions Fall Risk   RLE Weight Bearing Per Order WBAT   ROM Restrictions No   Braces or Orthoses Knee brace  (TROM locked at extension with ambulation)   General   Change In Medical/Functional Status TROM brace to allow for 10 degrees knee flexion with nonambulatory exercises. Maintain in full knee extension at all times with ambulation. Can remove for hygenic purposes if needed. No forced or aggressive knee flexion No straight leg raises at this time Continue to ambulate with walker   Cognition   Overall Cognitive Status WFL   Arousal/Participation Alert;Responsive;Cooperative   Attention Within functional limits   Orientation Level Oriented X4   Memory Within functional limits   Following Commands Follows all commands and directions without difficulty   Subjective   Subjective Pt has no updates this AM, no c/o pain or discomfort   Roll Left and Right   Comment Pt OOB   Sit to Lying   Comment Pt OOB   Lying to Sitting on Side of Bed   Comment Pt OOB   Sit to Stand   Type of Assistance Needed Independent   Physical Assistance Level No physical assistance   Comment RW   Sit to Stand CARE Score 6   Bed-Chair Transfer   Type of Assistance Needed Independent   Physical Assistance Level No physical assistance   Comment RW   Chair/Bed-to-Chair Transfer CARE Score 6   Car Transfer   Reason if not Attempted Environmental limitations   Car Transfer CARE Score 10   Walk 10 Feet   Type of Assistance Needed Independent   Physical Assistance Level No physical assistance   Comment RW   Walk 10 Feet CARE Score 6   Walk 50 Feet with Two Turns   Type of Assistance Needed Independent   Physical Assistance Level No physical assistance   Comment RW   Walk 50 Feet with Two Turns CARE Score 6   Walk 150 Feet   Type of Assistance Needed Independent   Physical Assistance Level No physical assistance   Comment RW   Walk 150  Feet CARE Score 6   Walking 10 Feet on Uneven Surfaces   Type of Assistance Needed Independent   Physical Assistance Level No physical assistance   Comment RW, ramp   Walking 10 Feet on Uneven Surfaces CARE Score 6   Ambulation   Primary Mode of Locomotion Prior to Admission Walk   Distance Walked (feet) 150 ft  (150' x 2)   Assist Device Roller Walker   Findings TROM locked in extension for ambulation with RW   Does the patient walk? 2. Yes   Wheel 50 Feet with Two Turns   Reason if not Attempted Activity not applicable   Wheel 50 Feet with Two Turns CARE Score 9   Wheel 150 Feet   Reason if not Attempted Activity not applicable   Wheel 150 Feet CARE Score 9   Wheelchair mobility   Findings N/A   Does the patient use a wheelchair? 0. No   Curb or Single Stair   Style negotiated Single stair   Type of Assistance Needed Independent   Physical Assistance Level No physical assistance   Comment L HR, 12 stairs, non-reciprocal   1 Step (Curb) CARE Score 6   4 Steps   Type of Assistance Needed Independent   Physical Assistance Level No physical assistance   Comment L HR, 12 stairs, non-reciprocal   4 Steps CARE Score 6   12 Steps   Type of Assistance Needed Independent   Physical Assistance Level No physical assistance   Comment L HR, 12 stairs, non-reciprocal   12 Steps CARE Score 6   Toilet Transfer   Comment did not require during session   Therapeutic Interventions   Strengthening seated LE TE   Other transfer training, gait training, stair training   Equipment Use   NuStep L5, 20 min, B/L UE, L LE only   Assessment   Treatment Assessment Pt agreeable to PT this AM, received standing in pt's room. Pt continues to be limited by orthopedic restrictions, with R LE TROM locked in extension with ambulation, no active SLR, no forceful/aggressive knee flexion. He remains mod I with functional mobility with RW. Continued to challenge LE strength/endurance with seated LE TE and NuStep with good tolerance. He remains an unsafe  d/c home due to environmental spacing and unsafe home setup for current functional levels with TROM brace. Pt is appropriate for concurrent treatment among similar deficits to increase motivation and encouragement to participate in PT. Will continue with current PT POC to improve deficits and promote return to PLOF.   Problem List Decreased strength;Decreased range of motion;Impaired balance;Decreased mobility   PT Barriers   Physical Impairment Decreased strength;Decreased range of motion;Impaired balance   Functional Limitation Car transfers   Plan   Treatment/Interventions Functional transfer training;LE strengthening/ROM;Therapeutic exercise;Endurance training;Patient/family training;Equipment eval/education;Bed mobility;Gait training   Progress Progressing toward goals   PT Therapy Minutes   PT Time In 0900   PT Time Out 1030   PT Total Time (minutes) 90   PT Mode of treatment - Individual (minutes) 0   PT Mode of treatment - Concurrent (minutes) 90   PT Mode of treatment - Group (minutes) 0   PT Mode of treatment - Co-treat (minutes) 0   PT Mode of Treatment - Total time(minutes) 90 minutes   PT Cumulative Minutes 2097     Patient remains OOB in chair, all needs in reach. Encouraged use of call bell, patient verbalizes understanding.

## 2025-04-22 NOTE — PROGRESS NOTES
"Progress Note - PMR   Name: Tobin Kerns 69 y.o. male I MRN: [id number]  Unit/Bed#: -01 I Date of Admission: 3/11/2025   Date of Service: 4/22/2025 I Hospital Day: 42     Assessment & Plan  Quadriceps muscle rupture, right, subsequent encounter  HPI:   He initially underwent quad tendon repair on 7/25/24 with Dr. Foster. He was discharged home with outpatient PT.  Later noted to have recurrent high-grade tear with extensor lag on exam and elected for operative management  January 2025 MRI: \"Prior quadriceps insertion repair with high-grade recurrent tear exhibiting up to 2.2 cm of tendon retraction.\"  On 3/6/25, he underwent revision of quad tendon repair with allograft augmentation  3/21: outpatient follow-up with Dr. Foster, new cast applied   3/28: follow-up with Dr. Foster, continue TTWB   4/4: follow-up with Dr. Foster (removed cast and reapplied long leg cast for an additional 2 weeks) and continue TTWB  4/11, no changes in plan  4/18 follow-up: cast removed, TROM with allowance of 10 degrees flexion per ortho, advanced to  WBAT   Plan:   PT and OT for 3 hours a day, 5-6 days a week   WBAT with TROM locked in extension (advanced on 4/18 with ortho, not included in documentation but verified with PA-C via secure chat)   Pain: PRN Tylenol (monitor LFTs) and oxycodone discontinued on 3/17 due to minimal pain   Lovenox d/c on 4/7     Hypertension  Continue losartan 50 mg daily   Monitor BP   Mixed hyperlipidemia  Continue pravastatin 40 mg daily   Gastro-esophageal reflux disease without esophagitis  Continue famotidine 20 mg daily   Peripheral neuropathy  Patient is not diabetic   Monitor skin for new or worsening wounds   JOVANY on CPAP  Continue CPAP qHS  IFG (impaired fasting glucose)  Continue metformin 1500 mg daily   Seen by RD 3/12, diet adjusted to regular   3/12 A1c: 5.4   Healed ulcer of left foot  Local wound care  Present on admission   Wound care RN consulted   BPH (benign prostatic " hyperplasia)  Continue tamsulosin 0.4 mg daily   Patient currently voiding without difficulty   PRN PVRs if urinary retention is suspected   Impaired mobility and ADLs  Patient was evaluated by the rehabilitation team MD and an appropriate candidate for acute inpatient rehabilitation program at this time.  The patient will tolerate 3 hours/day 5 to 7 days/week of intensive physical and  occupational therapy   in order to obtain goals for community discharge  Due to the patient's functional Compared to their baseline level of function in addition to their ongoing medical needs, the patient would benefit from daily supervision from a rehabilitation physician as well as rehabilitation nursing to implement and adjust the medical as well as functional plan of care in order to meet the patient's goals.  Bladder: Monitor closely for urinary incontinence and/or retention. Monitor urine output and encourage spontaneous voids. If unable to void spontaneously, will monitor with PVR bladder scans and initiate CIC regimen.  Skin: Monitor for breakdown, frequent turns, pressure offloading  Sleep: Encourage sleep hygiene (routine that promotes healthy circadian rhythm,avoid caffeine later in the day, quiet/dark room at night, reduce electronic before bedtime)  Discharge date TBD  Wound of right ankle  Skin breakdown on R heel due to cast. Ortho removed approximately 1 inch of cast at posterior aspect on 3/11. Cast further trimmed on 4/11, revealing a new posterior ankle wound.   Local wound care  Continue to monitor   Present on admission to [facility]   Wound care RN consulted (last seen 4/15):  Skin Care orders:  1-Hydraguard to sacrum, buttocks bid and prn   2-EHOB / waffle  cushion when out of bed in chair.  3-Moisturize skin daily with skin nourishing cream  4-Elevate heels to offload pressure.  5. Right posterior heel cleanse with Normal saline then pat dry apply melgisorb then a small silicone foam nate with a T and date change  every other day .      Subjective   Tobin Kerns is a 69 year-old male, with a past medical history of hypertension, hyperlipidemia, JOVANY, impaired fasting glucose, bilateral lower extremity neuropathy, GERD, BPH, presenting to [facility] after an elective right quadricept tendon repair. He initially underwent quad tendon repair on 7/25/24 with Dr. Foster. He was discharged home with outpatient PT. He was noted to have recurrent high-grade tear on repeat MRI with extensor lag on exam and elected for operative management. On 3/6/25, he underwent revision of quad tendon repair with allograft augmentation. He was evaluated by PT and OT and deemed an appropriate candidate for ARC due to functional deficits     Chief Complaint: f/u ambulatory dysfunction    Interval: Seen and evaluated patient in PT gym. He denies any concerns or pain. Last BM 4/22, continent of bowel and bladder.     Objective :  Temp:  [98.2 °F (36.8 °C)-98.7 °F (37.1 °C)] 98.2 °F (36.8 °C)  HR:  [60-74] 60  BP: (114-124)/(63-67) 114/63  Resp:  [14-18] 18  SpO2:  [96 %-98 %] 96 %  O2 Device: None (Room air)    Functional Update:  Physical Therapy Occupational Therapy   Weight Bearing Status: Toe touch (R LE)  Transfers: Independent  Bed Mobility: Independent  Amulation Distance (ft): 200 feet  Ambulation: Independent  Assistive Device for Ambulation: Roller Walker  Wheelchair Mobility Distance:  (n/a)  Wheelchair Mobility:  (N/A)  Number of Stairs: 7  Assistive Device for Stairs: Lehft Hand Rail  Stair Assistance: Independent  Ramp: Independent  Assistive Device for Ramp: Roller Walker  Discharge Recommendations: Other  DC Home with::  (Alternative placement until R LE long leg cast discontinued and allowed for knee ROM)   Eating: Independent  Grooming: Independent  Bathing: Independent  Bathing: Independent  Upper Body Dressing: Independent  Lower Body Dressing: Independent  Toileting: Independent  Tub/Shower Transfer: Supervision  Toilet Transfer:  Independent  Cognition: Within Defined Limits  Orientation: Person, Place, Time, Situation           Physical Exam  Vitals and nursing note reviewed.   Constitutional:       General: He is not in acute distress.     Appearance: He is obese. He is not ill-appearing, toxic-appearing or diaphoretic.   HENT:      Head: Normocephalic and atraumatic.      Nose: Nose normal.      Mouth/Throat:      Mouth: Mucous membranes are moist.   Pulmonary:      Effort: Pulmonary effort is normal. No respiratory distress.   Abdominal:      General: There is no distension.   Skin:     General: Skin is warm and dry.   Neurological:      General: No focal deficit present.      Mental Status: He is alert.   Psychiatric:         Mood and Affect: Mood normal.         Behavior: Behavior normal.         Scheduled Meds:  Current Facility-Administered Medications   Medication Dose Route Frequency Provider Last Rate    acetaminophen  650 mg Oral Q6H PRN Modesta Lee PA-C      ascorbic acid  500 mg Oral Daily Toño Brito MD      Cholecalciferol  5,000 Units Oral Daily Toño Brito MD      famotidine  20 mg Oral Daily Toño Brito MD      losartan  50 mg Oral Daily Toño Brito MD      metFORMIN  1,500 mg Oral Daily With Dinner Toño Brito MD      multivitamin stress formula  1 tablet Oral Daily Toño Brito MD      polyethylene glycol  17 g Oral Daily PRN Toño Brito MD      pravastatin  40 mg Oral Daily With Dinner Toño Brito MD      senna  1 tablet Oral HS PRN Modesta Lee PA-C      tamsulosin  0.4 mg Oral Daily With Dinner Toño Brito MD           Lab Results: I have reviewed the following results:  Results from last 7 days   Lab Units 04/21/25  0504   HEMOGLOBIN g/dL 13.0   HEMATOCRIT % 39.1   WBC Thousand/uL 7.61   PLATELETS Thousands/uL 211     Results from last 7 days   Lab Units 04/21/25  0504   BUN mg/dL 20   SODIUM mmol/L 137   POTASSIUM mmol/L 4.4   CHLORIDE mmol/L 104   CREATININE mg/dL 1.23   AST U/L 13   ALT U/L  9            Modesta Lee PA-C  Physical Medicine and Rehabilitation  [facility]

## 2025-04-22 NOTE — PROGRESS NOTES
"Progress Note - Tobin Kerns 69 y.o. male MRN: [id number]    Unit/Bed#: [facility] 210-01 Encounter: [id number]        Subjective:   Patient seen and examined at bedside after reviewing the chart and discussing the case with the caring staff.      Patient examined at bedside.  Patient denies any acute symptoms.       Physical Exam   Vitals: Blood pressure 114/63, pulse 60, temperature 98.2 °F (36.8 °C), temperature source Tympanic, resp. rate 18, height 6' 5\" (1.956 m), weight (!) 137 kg (302 lb 4 oz), SpO2 96%.,Body mass index is 35.84 kg/m².  Constitutional: Patient in no acute distress.  HEENT: PERR, EOMI, MMM.  Cardiovascular: Normal rate and regular rhythm.    Pulmonary/Chest: Effort normal and breath sounds normal.   Abdomen: Soft, + BS, NT.    Assessment/Plan:  Tobin Krens is a(n) 69 y.o. year old male who is s/p quadriceps tendon repair.     Cardiac with hx of HTN, HLD.  Patient is on losartan 50 mg daily, pravastatin 40 mg daily.  Impaired fasting glucose.  Hgb a1c 5.4% on 3/12/25.  Continue metformin XR 1500 mg daily with dinner.  Regular diet and d/c accucheks as blood sugar is well controlled.    BPH.  Continue Flomax 0.4 mg daily.  GERD.  Continue Pepcid 20 mg daily.  JOVANY.  On CPAP at bedtime.   Vitamin D deficiency.  Patient is on vitamin D3 5000 units daily.  Pain and bowel management.  As per physiatrist.  Quadriceps tendon repair.  Patient is receiving intensive PT OT as per physiatrist.  Orthopedic surgery follow-up 4/18 - cast was removed and TROM reapplied to allow for 10 degree knee flexion with nonambulatory exercises.  He is to be in full knee extension at all times with ambulation.  Can remove for hygenic purposes if needed.  No forced or aggressive knee flexion.  No straight leg raises.  Ambulate with walker.  He is to follow-up with orthopedic surgery in 3 weeks (5/12/25).    Anticipated date of discharge.  TBD  "

## 2025-04-22 NOTE — NURSING NOTE
Patient MOD I on unit with walker. Reports no pain. Brace in place to RLE. Healing scab to right posterior achilles with hydraguard applied. Sitting upright for meals. Continued education on safety. Will continue to monitor and follow plan of care.

## 2025-04-22 NOTE — PLAN OF CARE
Problem: PAIN - ADULT  Goal: Verbalizes/displays adequate comfort level or baseline comfort level  Description: Interventions:  - Encourage patient to monitor pain and request assistance  - Assess pain using appropriate pain scale  - Administer analgesics based on type and severity of pain and evaluate response  - Implement non-pharmacological measures as appropriate and evaluate response  - Consider cultural and social influences on pain and pain management  - Notify physician/advanced practitioner if interventions unsuccessful or patient reports new pain  Outcome: Progressing     Problem: INFECTION - ADULT  Goal: Absence or prevention of progression during hospitalization  Description: INTERVENTIONS:  - Assess and monitor for signs and symptoms of infection  - Monitor lab/diagnostic results  - Monitor all insertion sites, i.e. indwelling lines, tubes, and drains  - Monitor endotracheal if appropriate and nasal secretions for changes in amount and color  - [place] appropriate cooling/warming therapies per order  - Administer medications as ordered  - Instruct and encourage patient and family to use good hand hygiene technique  - Identify and instruct in appropriate isolation precautions for identified infection/condition  Outcome: Progressing     Problem: SAFETY ADULT  Goal: Maintain or return to baseline ADL function  Description: INTERVENTIONS:  -  Assess patient's ability to carry out ADLs; assess patient's baseline for ADL function and identify physical deficits which impact ability to perform ADLs (bathing, care of mouth/teeth, toileting, grooming, dressing, etc.)  - Assess/evaluate cause of self-care deficits   - Assess range of motion  - Assess patient's mobility; develop plan if impaired  - Assess patient's need for assistive devices and provide as appropriate  - Encourage maximum independence but intervene and supervise when necessary  - Involve family in performance of ADLs  - Assess for home care  needs following discharge   - Consider OT consult to assist with ADL evaluation and planning for discharge  - Provide patient education as appropriate  Outcome: Progressing     Problem: DISCHARGE PLANNING  Goal: Discharge to home or other facility with appropriate resources  Description: INTERVENTIONS:  - Identify barriers to discharge w/patient and caregiver  - Arrange for needed discharge resources and transportation as appropriate  - Identify discharge learning needs (meds, wound care, etc.)  - Arrange for interpretive services to assist at discharge as needed  - Refer to Case Management Department for coordinating discharge planning if the patient needs post-hospital services based on physician/advanced practitioner order or complex needs related to functional status, cognitive ability, or social support system  Outcome: Progressing      (M6) obeys commands

## 2025-04-23 PROCEDURE — 97530 THERAPEUTIC ACTIVITIES: CPT

## 2025-04-23 PROCEDURE — 97116 GAIT TRAINING THERAPY: CPT

## 2025-04-23 PROCEDURE — 97110 THERAPEUTIC EXERCISES: CPT

## 2025-04-23 PROCEDURE — 99232 SBSQ HOSP IP/OBS MODERATE 35: CPT | Performed by: PHYSICAL MEDICINE & REHABILITATION

## 2025-04-23 PROCEDURE — 97535 SELF CARE MNGMENT TRAINING: CPT

## 2025-04-23 RX ADMIN — CHOLECALCIFEROL TAB 25 MCG (1000 UNIT) 5000 UNITS: 25 TAB at 08:01

## 2025-04-23 RX ADMIN — OXYCODONE HYDROCHLORIDE AND ACETAMINOPHEN 500 MG: 500 TABLET ORAL at 08:01

## 2025-04-23 RX ADMIN — TAMSULOSIN HYDROCHLORIDE 0.4 MG: 0.4 CAPSULE ORAL at 17:13

## 2025-04-23 RX ADMIN — ACETAMINOPHEN 650 MG: 325 TABLET ORAL at 17:13

## 2025-04-23 RX ADMIN — PRAVASTATIN SODIUM 40 MG: 40 TABLET ORAL at 17:13

## 2025-04-23 RX ADMIN — B-COMPLEX W/ C & FOLIC ACID TAB 1 TABLET: TAB at 08:01

## 2025-04-23 RX ADMIN — METFORMIN ER 500 MG 1500 MG: 500 TABLET ORAL at 17:13

## 2025-04-23 RX ADMIN — FAMOTIDINE 20 MG: 20 TABLET, FILM COATED ORAL at 08:01

## 2025-04-23 RX ADMIN — LOSARTAN POTASSIUM 50 MG: 50 TABLET, FILM COATED ORAL at 08:01

## 2025-04-23 NOTE — ASSESSMENT & PLAN NOTE
Skin breakdown on R heel due to cast. Ortho removed approximately 1 inch of cast at posterior aspect on 3/11. Cast further trimmed on 4/11, revealing a new posterior ankle wound.   Local wound care  Continue to monitor   Present on admission to [facility]   Wound care RN consulted (last seen 4/22):  Skin Care orders:  1-Hydraguard to sacrum, buttocks bid and prn   2-EHOB / waffle  cushion when out of bed in chair.  3-Moisturize skin daily with skin nourishing cream  4-Elevate heels to offload pressure.  5. Hydraguard to bilateral heels bid and prn

## 2025-04-23 NOTE — PROGRESS NOTES
"Progress Note - PMR   Name: Tobin Kerns 69 y.o. male I MRN: [id number]  Unit/Bed#: -01 I Date of Admission: 3/11/2025   Date of Service: 4/23/2025 I Hospital Day: 43     Assessment & Plan  Quadriceps muscle rupture, right, subsequent encounter  HPI:   He initially underwent quad tendon repair on 7/25/24 with Dr. Foster. He was discharged home with outpatient PT.  Later noted to have recurrent high-grade tear with extensor lag on exam and elected for operative management  January 2025 MRI: \"Prior quadriceps insertion repair with high-grade recurrent tear exhibiting up to 2.2 cm of tendon retraction.\"  On 3/6/25, he underwent revision of quad tendon repair with allograft augmentation  3/21: outpatient follow-up with Dr. Foster, new cast applied   3/28: follow-up with Dr. Foster, continue TTWB   4/4: follow-up with Dr. Foster (removed cast and reapplied long leg cast for an additional 2 weeks) and continue TTWB  4/11, no changes in plan  4/18 follow-up: cast removed, TROM with allowance of 10 degrees flexion per ortho, advanced to  WBAT   Plan:   PT and OT for 3 hours a day, 5-6 days a week   WBAT with TROM locked in extension (advanced on 4/18 with ortho, not included in documentation but verified with PA-C via secure chat)   Pain: PRN Tylenol (monitor LFTs) and oxycodone discontinued on 3/17 due to minimal pain   Lovenox d/c on 4/7     Hypertension  Continue losartan 50 mg daily   Monitor BP   Mixed hyperlipidemia  Continue pravastatin 40 mg daily   Gastro-esophageal reflux disease without esophagitis  Continue famotidine 20 mg daily   Peripheral neuropathy  Patient is not diabetic   Monitor skin for new or worsening wounds   JOVANY on CPAP  Continue CPAP qHS  IFG (impaired fasting glucose)  Continue metformin 1500 mg daily   Seen by RD 3/12, diet adjusted to regular   3/12 A1c: 5.4   Healed ulcer of left foot  Local wound care  Present on admission   Wound care RN consulted   BPH (benign prostatic " hyperplasia)  Continue tamsulosin 0.4 mg daily   Patient currently voiding without difficulty   PRN PVRs if urinary retention is suspected   Impaired mobility and ADLs  Patient was evaluated by the rehabilitation team MD and an appropriate candidate for acute inpatient rehabilitation program at this time.  The patient will tolerate 3 hours/day 5 to 7 days/week of intensive physical and  occupational therapy   in order to obtain goals for community discharge  Due to the patient's functional Compared to their baseline level of function in addition to their ongoing medical needs, the patient would benefit from daily supervision from a rehabilitation physician as well as rehabilitation nursing to implement and adjust the medical as well as functional plan of care in order to meet the patient's goals.  Bladder: Monitor closely for urinary incontinence and/or retention. Monitor urine output and encourage spontaneous voids. If unable to void spontaneously, will monitor with PVR bladder scans and initiate CIC regimen.  Skin: Monitor for breakdown, frequent turns, pressure offloading  Sleep: Encourage sleep hygiene (routine that promotes healthy circadian rhythm,avoid caffeine later in the day, quiet/dark room at night, reduce electronic before bedtime)  Discharge date TBD  Wound of right ankle  Skin breakdown on R heel due to cast. Ortho removed approximately 1 inch of cast at posterior aspect on 3/11. Cast further trimmed on 4/11, revealing a new posterior ankle wound.   Local wound care  Continue to monitor   Present on admission to [facility]   Wound care RN consulted (last seen 4/22):  Skin Care orders:  1-Hydraguard to sacrum, buttocks bid and prn   2-EHOB / waffle  cushion when out of bed in chair.  3-Moisturize skin daily with skin nourishing cream  4-Elevate heels to offload pressure.  5. Hydraguard to bilateral heels bid and prn       Subjective   Tobin Kerns is a 69 year-old male, with a past medical history of  hypertension, hyperlipidemia, JOVANY, impaired fasting glucose, bilateral lower extremity neuropathy, GERD, BPH, presenting to [facility] after an elective right quadricept tendon repair. He initially underwent quad tendon repair on 7/25/24 with Dr. Foster. He was discharged home with outpatient PT. He was noted to have recurrent high-grade tear on repeat MRI with extensor lag on exam and elected for operative management. On 3/6/25, he underwent revision of quad tendon repair with allograft augmentation. He was evaluated by PT and OT and deemed an appropriate candidate for ARC due to functional deficits     Chief Complaint: f/u ambulatory dysfunction    Interval: Seen and evaluated patient in PT gym. He denies any concerns or pain. Last BM 4/22, continent of bowel and bladder.     Objective :  Temp:  [97 °F (36.1 °C)-97.5 °F (36.4 °C)] 97 °F (36.1 °C)  HR:  [62-69] 69  BP: (124-132)/(61-66) 124/61  Resp:  [18] 18  SpO2:  [94 %-99 %] 94 %  O2 Device: None (Room air)    Functional Update:  Physical Therapy Occupational Therapy   Weight Bearing Status: Weight Bearing as Tolerated (with TROM locked in extension with ambulation)  Transfers: Independent  Bed Mobility: Independent  Amulation Distance (ft): 200 feet  Ambulation: Independent  Assistive Device for Ambulation: Roller Walker  Wheelchair Mobility Distance:  (N/A)  Wheelchair Mobility:  (N/A)  Number of Stairs: 12  Assistive Device for Stairs: Lehft Hand Rail  Stair Assistance: Independent  Ramp: Independent  Assistive Device for Ramp: Roller Walker  Discharge Recommendations: Other  DC Home with::  (alternate placement)   Eating: Independent  Grooming: Independent  Bathing: Independent  Bathing: Independent  Upper Body Dressing: Independent  Lower Body Dressing: Independent  Toileting: Independent  Tub/Shower Transfer: Supervision  Toilet Transfer: Independent  Cognition: Within Defined Limits  Orientation: Person, Place, Time, Situation           Physical Exam  Vitals  and nursing note reviewed.   Constitutional:       General: He is not in acute distress.     Appearance: He is obese. He is not ill-appearing, toxic-appearing or diaphoretic.   HENT:      Head: Normocephalic and atraumatic.      Nose: Nose normal.      Mouth/Throat:      Mouth: Mucous membranes are moist.   Pulmonary:      Effort: Pulmonary effort is normal. No respiratory distress.   Abdominal:      General: There is no distension.   Skin:     General: Skin is warm and dry.   Neurological:      General: No focal deficit present.      Mental Status: He is alert.   Psychiatric:         Mood and Affect: Mood normal.         Behavior: Behavior normal.           Scheduled Meds:  Current Facility-Administered Medications   Medication Dose Route Frequency Provider Last Rate    acetaminophen  650 mg Oral Q6H PRN Modesta Lee PA-C      ascorbic acid  500 mg Oral Daily Toño Brito MD      Cholecalciferol  5,000 Units Oral Daily Toño Brito MD      famotidine  20 mg Oral Daily Toño Brito MD      losartan  50 mg Oral Daily Toño Brito MD      metFORMIN  1,500 mg Oral Daily With Dinner Toño Brito MD      multivitamin stress formula  1 tablet Oral Daily Toño Brito MD      polyethylene glycol  17 g Oral Daily PRN Toño Brito MD      pravastatin  40 mg Oral Daily With Dinner Toño Brito MD      senna  1 tablet Oral HS PRN Modesta Lee PA-C      tamsulosin  0.4 mg Oral Daily With Dinner Toño Brito MD           Lab Results: I have reviewed the following results:  Results from last 7 days   Lab Units 04/21/25  0504   HEMOGLOBIN g/dL 13.0   HEMATOCRIT % 39.1   WBC Thousand/uL 7.61   PLATELETS Thousands/uL 211     Results from last 7 days   Lab Units 04/21/25  0504   BUN mg/dL 20   SODIUM mmol/L 137   POTASSIUM mmol/L 4.4   CHLORIDE mmol/L 104   CREATININE mg/dL 1.23   AST U/L 13   ALT U/L 9            Modesta Lee PA-C  Physical Medicine and Rehabilitation  [facility]

## 2025-04-23 NOTE — PROGRESS NOTES
"Progress Note - Tobin Kerns 69 y.o. male MRN: [id number]    Unit/Bed#: [facility] 210-01 Encounter: [id number]        Subjective:   Patient seen and examined at bedside after reviewing the chart and discussing the case with the caring staff.      Patient examined at bedside.  Patient denies any acute symptoms.     Physical Exam   Vitals: Blood pressure 124/61, pulse 69, temperature (!) 97 °F (36.1 °C), temperature source Temporal, resp. rate 18, height 6' 5\" (1.956 m), weight (!) 137 kg (302 lb 4 oz), SpO2 94%.,Body mass index is 35.84 kg/m².  Constitutional: Patient in no acute distress.  HEENT: PERR, EOMI, MMM.  Cardiovascular: Normal rate and regular rhythm.    Pulmonary/Chest: Effort normal and breath sounds normal.   Abdomen: Soft, + BS, NT.    Assessment/Plan:  Tobin Kerns is a(n) 69 y.o. year old male who is s/p quadriceps tendon repair.     Cardiac with hx of HTN, HLD.  Patient is on losartan 50 mg daily, pravastatin 40 mg daily.  Impaired fasting glucose.  Hgb a1c 5.4% on 3/12/25.  Continue metformin XR 1500 mg daily with dinner.  Regular diet and d/c accucheks as blood sugar is well controlled.    BPH.  Continue Flomax 0.4 mg daily.  GERD.  Continue Pepcid 20 mg daily.  JOVANY.  On CPAP at bedtime.   Vitamin D deficiency.  Patient is on vitamin D3 5000 units daily.  Pain and bowel management.  As per physiatrist.  Quadriceps tendon repair.  Patient is receiving intensive PT OT as per physiatrist.  Orthopedic surgery follow-up 4/18 - cast was removed and TROM reapplied to allow for 10 degree knee flexion with nonambulatory exercises.  He is to be in full knee extension at all times with ambulation.  Can remove for hygenic purposes if needed.  No forced or aggressive knee flexion.  No straight leg raises.  Ambulate with walker.  He is to follow-up with orthopedic surgery in 3 weeks (5/12/25).    Anticipated date of discharge.  TBD    The patient was discussed with Dr. Brito and he is in agreement with the above " note.

## 2025-04-23 NOTE — PROGRESS NOTES
04/23/25 0900   Pain Assessment   Pain Assessment Tool 0-10   Pain Score No Pain   Restrictions/Precautions   Precautions Fall Risk   RLE Weight Bearing Per Order WBAT   ROM Restrictions No   Braces or Orthoses Knee brace  (TROM locked at extension with ambulation)   General   Change In Medical/Functional Status TROM brace to allow for 10 degrees knee flexion with nonambulatory exercises. Maintain in full knee extension at all times with ambulation. Can remove for hygenic purposes if needed. No forced or aggressive knee flexion No straight leg raises at this time Continue to ambulate with walker   Cognition   Overall Cognitive Status WFL   Arousal/Participation Alert;Responsive;Cooperative   Attention Within functional limits   Orientation Level Oriented X4   Memory Within functional limits   Following Commands Follows all commands and directions without difficulty   Subjective   Subjective Pt reports he needed someone to help him to tie his shoes this morning   Roll Left and Right   Comment Pt OOB   Sit to Lying   Comment Pt OOB   Lying to Sitting on Side of Bed   Comment Pt OOB   Sit to Stand   Type of Assistance Needed Independent   Physical Assistance Level No physical assistance   Comment RW   Sit to Stand CARE Score 6   Bed-Chair Transfer   Type of Assistance Needed Independent   Physical Assistance Level No physical assistance   Comment RW   Chair/Bed-to-Chair Transfer CARE Score 6   Car Transfer   Reason if not Attempted Environmental limitations   Car Transfer CARE Score 10   Walk 10 Feet   Type of Assistance Needed Independent   Physical Assistance Level No physical assistance   Comment RW   Walk 10 Feet CARE Score 6   Walk 50 Feet with Two Turns   Type of Assistance Needed Independent   Physical Assistance Level No physical assistance   Comment RW   Walk 50 Feet with Two Turns CARE Score 6   Walk 150 Feet   Type of Assistance Needed Independent   Physical Assistance Level No physical assistance    Comment RW   Walk 150 Feet CARE Score 6   Walking 10 Feet on Uneven Surfaces   Type of Assistance Needed Independent   Physical Assistance Level No physical assistance   Comment RW, uneven surfaces outside   Walking 10 Feet on Uneven Surfaces CARE Score 6   Ambulation   Primary Mode of Locomotion Prior to Admission Walk   Distance Walked (feet) 1000 ft  (continuous ambulation with no rest break ~30 min, increased time due to TROM brace; 100' x 2)   Assist Device Roller Walker   Findings mod I level and unlevel surfaces with RW, donned shoes this AM to ambulate on uneven surfaces outside this session   Does the patient walk? 2. Yes   Wheel 50 Feet with Two Turns   Reason if not Attempted Activity not applicable   Wheel 50 Feet with Two Turns CARE Score 9   Wheel 150 Feet   Reason if not Attempted Activity not applicable   Wheel 150 Feet CARE Score 9   Wheelchair mobility   Findings N/A   Does the patient use a wheelchair? 0. No   Curb or Single Stair   Style negotiated Single stair   Type of Assistance Needed Independent   Physical Assistance Level No physical assistance   Comment 6 outdoor stairs, L HR, nonreciprocal pattern   1 Step (Curb) CARE Score 6   4 Steps   Type of Assistance Needed Independent   Physical Assistance Level No physical assistance   Comment 6 outdoor stairs, L HR, nonreciprocal pattern   4 Steps CARE Score 6   Toilet Transfer   Comment did not require during session   Therapeutic Interventions   Strengthening seated/standing LE TE   Other gait training on uneven surfaces outside, stair training, transfer training   Assessment   Treatment Assessment Pt agreeable to PT this AM, received sitting upright in w/c. Pt donned shoes, focused on prolonged ambulation with RW on level and unlevel surfaces. Pt able to ambulate consecutively for ~30 min with RW, intermittent standing rest breaks, with good safety and sequencing with RW on unlevel surfaces outside. Continued to challenge LE  strength/endurance within orthopedic restrictions with good tolerance. He remains unsafe for d/c home due to environmental and home setup concerns. with decreased spacing with TROM locked in extension. Will continue with current PT POC to improve deficits and promote return to PLOF.   Problem List Decreased endurance;Decreased strength;Decreased range of motion   PT Barriers   Physical Impairment Decreased strength;Decreased range of motion;Impaired balance   Functional Limitation Car transfers   Plan   Treatment/Interventions Functional transfer training;LE strengthening/ROM;Therapeutic exercise;Endurance training;Patient/family training;Equipment eval/education;Bed mobility;Gait training   Progress Improving as expected   PT Therapy Minutes   PT Time In 0900   PT Time Out 1030   PT Total Time (minutes) 90   PT Mode of treatment - Individual (minutes) 90   PT Mode of treatment - Concurrent (minutes) 0   PT Mode of treatment - Group (minutes) 0   PT Mode of treatment - Co-treat (minutes) 0   PT Mode of Treatment - Total time(minutes) 90 minutes   PT Cumulative Minutes 2187     Patient remains OOB in w/c, all needs in reach. Encouraged use of call bell, patient verbalizes understanding.

## 2025-04-23 NOTE — PROGRESS NOTES
04/23/25 1030   Pain Assessment   Pain Assessment Tool 0-10   Pain Score No Pain   Restrictions/Precautions   Precautions Fall Risk   RLE Weight Bearing Per Order WBAT   Braces or Orthoses Knee brace   Oral Hygiene   Type of Assistance Needed Independent   Physical Assistance Level No physical assistance   Oral Hygiene CARE Score 6   Grooming   Able To Initiate Tasks;Acquire Items;Comb/Brush Hair;Wash/Dry Face;Brush/Clean Teeth;Wash/Dry Hands   Findings stance   Shower/Bathe Self   Type of Assistance Needed Independent   Physical Assistance Level No physical assistance   Shower/Bathe Self CARE Score 6   Bathing   Anticipated D/C Bath Style Sponge Bath   Able to Gather/Transport Yes   Able to Adjust Water Temperature Yes   Able to Wash/Rinse/Dry (body part) Left Arm;Right Arm;L Upper Leg;R Upper Leg;Chest;Abdomen;Perineal Area;Buttocks   Findings  pt did not doff socks and boots   Upper Body Dressing   Type of Assistance Needed Independent   Physical Assistance Level No physical assistance   Upper Body Dressing CARE Score 6   Lower Body Dressing   Type of Assistance Needed Independent;Adaptive equipment   Physical Assistance Level No physical assistance   Lower Body Dressing CARE Score 6   Putting On/Taking Off Footwear   Comment pt declined doffing boots and socks   Picking Up Object   Type of Assistance Needed Independent;Adaptive equipment   Physical Assistance Level No physical assistance   Picking Up Object CARE Score 6   Dressing/Undressing Clothing   Able to  Obtain Clothing;Store removed clothing   Remove UB Clothes Button Shirt   Don UB Clothes Button Shirt   Remove LB Clothes Shorts;Undergarment;Socks;Shoes   Don LB Clothes Shoes;Socks;Undergarment;Shorts   Limitations Noted In ROM   Adaptive Equipment Reacher;Dressing Stick;Sock Aide   Positioning Supported Sit;Standing   Sit to Stand   Type of Assistance Needed Independent   Physical Assistance Level No physical assistance   Sit to Stand CARE Score 6    Toileting Hygiene   Type of Assistance Needed Independent   Physical Assistance Level No physical assistance   Toileting Hygiene CARE Score 6   Toilet Transfer   Type of Assistance Needed Independent;Adaptive equipment   Physical Assistance Level No physical assistance   Toilet Transfer CARE Score 6   Meal Prep   Meal Prep Level Walker   Meal Prep Level of Assistance Modified independent   Kitchen Mobility   Kitchen-Mobility Level Wheelchair   Kitchen Activity Retrieve items;Transport items   Light Housekeeping   Light Housekeeping Level Walker   Light Housekeeping Level of Assistance Modified independent   Light Housekeeping INdep with s/u and c/u, mgmt of all laundry tasks daily on the unit and functionality in the kitchen with use of RW   Exercise Tools   Theraband blue 2x20 reps in 3 planes of motion   Hand Gripper hands: heavy resistance gripper 2x20 reps   Other Exercise Tool 1 red flex bar 2x15 reps downward then upward   Cognition   Overall Cognitive Status WFL   Arousal/Participation Alert;Cooperative   Attention Within functional limits   Orientation Level Oriented X4   Memory Within functional limits   Following Commands Follows all commands and directions without difficulty   Additional Activities   Additional Activities Comments fxnl mobility MI RW   Activity Tolerance   Activity Tolerance Patient tolerated treatment well   Assessment   Treatment Assessment OT tx addressed ADL routine via sponge bathe level with pt able to adjust immobilizer Indep while seated on toilet seat. Pt manages all aspects of this on hiw own. Pt had boots donned for first time this date, OT and pt problem solved ideas for how to manage laces on R boot due to elastic laces are ineffective with style of boot.  Pt continues to manage all of ADL/iADL Indep with use of walker.  Pt is Indep with completing laundry tasks on unit as well as manage in kitchen throughout the day. Pt demon good safety with pt tolerating WBAT. D/C  planning ongoing. Barriers include: R LE s/p quadricep rupture with surgical repair, Decreased ROM with use of immobilizer WBAT. Plan is contingent upon R LE ROM increasing in order for pt to be able to manage in personal home environment. Ortho f/u May 14.   Prognosis Good   Problem List Decreased strength;Decreased range of motion;Impaired balance;Decreased mobility   Plan   Treatment/Interventions ADL retraining;Functional transfer training;Therapeutic exercise;Endurance training;Patient/family training;Equipment eval/education;Gait training;Compensatory technique education;Spoke to nursing;OT   Progress Progressing toward goals   OT Therapy Minutes   OT Time In 1030   OT Time Out 1200   OT Total Time (minutes) 90   OT Mode of treatment - Individual (minutes) 0   OT Mode of treatment - Concurrent (minutes) 0   OT Mode of treatment - Group (minutes) 0   OT Mode of treatment - Co-treat (minutes) 0   OT Mode of Treatment - Total time(minutes) 0 minutes   OT Cumulative Minutes 1675   Therapy Time missed   Time missed? No      04/23/25 1030   Pain Assessment   Pain Assessment Tool 0-10   Pain Score No Pain   Restrictions/Precautions   Precautions Fall Risk   RLE Weight Bearing Per Order WBAT   Braces or Orthoses Knee brace   Oral Hygiene   Type of Assistance Needed Independent   Physical Assistance Level No physical assistance   Oral Hygiene CARE Score 6   Grooming   Able To Initiate Tasks;Acquire Items;Comb/Brush Hair;Wash/Dry Face;Brush/Clean Teeth;Wash/Dry Hands   Findings stance   Shower/Bathe Self   Type of Assistance Needed Independent   Physical Assistance Level No physical assistance   Shower/Bathe Self CARE Score 6   Bathing   Anticipated D/C Bath Style Sponge Bath   Able to Gather/Transport Yes   Able to Adjust Water Temperature Yes   Able to Wash/Rinse/Dry (body part) Left Arm;Right Arm;L Upper Leg;R Upper Leg;Chest;Abdomen;Perineal Area;Buttocks   Findings  pt did not doff socks and boots   Upper Body  Dressing   Type of Assistance Needed Independent   Physical Assistance Level No physical assistance   Upper Body Dressing CARE Score 6   Lower Body Dressing   Type of Assistance Needed Independent;Adaptive equipment   Physical Assistance Level No physical assistance   Lower Body Dressing CARE Score 6   Putting On/Taking Off Footwear   Comment pt declined doffing boots and socks   Picking Up Object   Type of Assistance Needed Independent;Adaptive equipment   Physical Assistance Level No physical assistance   Picking Up Object CARE Score 6   Dressing/Undressing Clothing   Able to  Obtain Clothing;Store removed clothing   Remove UB Clothes Button Shirt   Don UB Clothes Button Shirt   Remove LB Clothes Shorts;Undergarment;Socks;Shoes   Don LB Clothes Shoes;Socks;Undergarment;Shorts   Limitations Noted In ROM   Adaptive Equipment Reacher;Dressing Stick;Sock Aide   Positioning Supported Sit;Standing   Sit to Stand   Type of Assistance Needed Independent   Physical Assistance Level No physical assistance   Sit to Stand CARE Score 6   Toileting Hygiene   Type of Assistance Needed Independent   Physical Assistance Level No physical assistance   Toileting Hygiene CARE Score 6   Toilet Transfer   Type of Assistance Needed Independent;Adaptive equipment   Physical Assistance Level No physical assistance   Toilet Transfer CARE Score 6   Meal Prep   Meal Prep Level Walker   Meal Prep Level of Assistance Modified independent   Kitchen Mobility   Kitchen-Mobility Level Wheelchair   Kitchen Activity Retrieve items;Transport items   Light Housekeeping   Light Housekeeping Level Walker   Light Housekeeping Level of Assistance Modified independent   Light Housekeeping INdep with s/u and c/u, mgmt of all laundry tasks daily on the unit and functionality in the kitchen with use of RW   Exercise Tools   Theraband blue 2x20 reps in 3 planes of motion   Hand Gripper hands: heavy resistance gripper 2x20 reps   Other Exercise Tool 1 red flex  bar 2x15 reps downward then upward   Cognition   Overall Cognitive Status WFL   Arousal/Participation Alert;Cooperative   Attention Within functional limits   Orientation Level Oriented X4   Memory Within functional limits   Following Commands Follows all commands and directions without difficulty   Additional Activities   Additional Activities Comments fxnl mobility MI RW   Activity Tolerance   Activity Tolerance Patient tolerated treatment well   Assessment   Treatment Assessment OT tx addressed ADL routine via sponge bathe level with pt able to adjust immobilizer Indep while seated on toilet seat. Pt manages all aspects of this on hiw own. Pt had boots donned for first time this date, OT and pt problem solved ideas for how to manage laces on R boot due to elastic laces are ineffective with style of boot.  Pt continues to manage all of ADL/iADL Indep with use of walker.  Pt is Indep with completing laundry tasks on unit as well as manage in kitchen throughout the day. Pt demon good safety with pt tolerating WBAT. D/C planning ongoing. Barriers include: R LE s/p quadricep rupture with surgical repair, Decreased ROM with use of immobilizer WBAT. Plan is contingent upon R LE ROM increasing in order for pt to be able to manage in personal home environment. Ortho f/u May 14.   Prognosis Good   Problem List Decreased strength;Decreased range of motion;Impaired balance;Decreased mobility   Plan   Treatment/Interventions ADL retraining;Functional transfer training;Therapeutic exercise;Endurance training;Patient/family training;Equipment eval/education;Gait training;Compensatory technique education;Spoke to nursing;OT   Progress Progressing toward goals   OT Therapy Minutes   OT Time In 1030   OT Time Out 1200   OT Total Time (minutes) 90   OT Mode of treatment - Individual (minutes) 0   OT Mode of treatment - Concurrent (minutes) 0   OT Mode of treatment - Group (minutes) 0   OT Mode of treatment - Co-treat (minutes) 0    OT Mode of Treatment - Total time(minutes) 0 minutes   OT Cumulative Minutes 1675   Therapy Time missed   Time missed? No

## 2025-04-24 PROCEDURE — 97116 GAIT TRAINING THERAPY: CPT

## 2025-04-24 PROCEDURE — 97530 THERAPEUTIC ACTIVITIES: CPT

## 2025-04-24 PROCEDURE — 97110 THERAPEUTIC EXERCISES: CPT

## 2025-04-24 PROCEDURE — 99232 SBSQ HOSP IP/OBS MODERATE 35: CPT | Performed by: PHYSICAL MEDICINE & REHABILITATION

## 2025-04-24 RX ADMIN — METFORMIN ER 500 MG 1500 MG: 500 TABLET ORAL at 17:10

## 2025-04-24 RX ADMIN — B-COMPLEX W/ C & FOLIC ACID TAB 1 TABLET: TAB at 08:43

## 2025-04-24 RX ADMIN — LOSARTAN POTASSIUM 50 MG: 50 TABLET, FILM COATED ORAL at 08:43

## 2025-04-24 RX ADMIN — TAMSULOSIN HYDROCHLORIDE 0.4 MG: 0.4 CAPSULE ORAL at 17:10

## 2025-04-24 RX ADMIN — PRAVASTATIN SODIUM 40 MG: 40 TABLET ORAL at 17:10

## 2025-04-24 RX ADMIN — OXYCODONE HYDROCHLORIDE AND ACETAMINOPHEN 500 MG: 500 TABLET ORAL at 08:43

## 2025-04-24 RX ADMIN — FAMOTIDINE 20 MG: 20 TABLET, FILM COATED ORAL at 08:43

## 2025-04-24 RX ADMIN — CHOLECALCIFEROL TAB 25 MCG (1000 UNIT) 5000 UNITS: 25 TAB at 08:43

## 2025-04-24 NOTE — PROGRESS NOTES
04/24/25 0700   Pain Assessment   Pain Assessment Tool 0-10   Pain Score No Pain   Restrictions/Precautions   Precautions Fall Risk   RLE Weight Bearing Per Order WBAT   ROM Restrictions No   Braces or Orthoses Knee brace  (TROM locked in extension with ambulation)   General   Change In Medical/Functional Status TROM brace to allow for 10 degrees knee flexion with nonambulatory exercises. Maintain in full knee extension at all times with ambulation. Can remove for hygenic purposes if needed. No forced or aggressive knee flexion No straight leg raises at this time Continue to ambulate with walker   Cognition   Overall Cognitive Status WFL   Arousal/Participation Alert;Responsive;Cooperative   Attention Within functional limits   Orientation Level Oriented X4   Memory Within functional limits   Following Commands Follows all commands and directions without difficulty   Subjective   Subjective Pt reports he was up at about 5:00 to use the bathroom, won't need it for awhile longer   Roll Left and Right   Type of Assistance Needed Independent   Physical Assistance Level No physical assistance   Roll Left and Right CARE Score 6   Sit to Lying   Type of Assistance Needed Independent   Physical Assistance Level No physical assistance   Sit to Lying CARE Score 6   Lying to Sitting on Side of Bed   Type of Assistance Needed Independent   Physical Assistance Level No physical assistance   Lying to Sitting on Side of Bed CARE Score 6   Sit to Stand   Type of Assistance Needed Independent   Physical Assistance Level No physical assistance   Comment RW   Sit to Stand CARE Score 6   Bed-Chair Transfer   Type of Assistance Needed Independent   Physical Assistance Level No physical assistance   Comment RW   Chair/Bed-to-Chair Transfer CARE Score 6   Car Transfer   Reason if not Attempted Environmental limitations   Car Transfer CARE Score 10   Walk 10 Feet   Type of Assistance Needed Independent   Physical Assistance Level No  physical assistance   Comment RW   Walk 10 Feet CARE Score 6   Walk 50 Feet with Two Turns   Type of Assistance Needed Independent   Physical Assistance Level No physical assistance   Comment RW   Walk 50 Feet with Two Turns CARE Score 6   Walk 150 Feet   Type of Assistance Needed Independent   Physical Assistance Level No physical assistance   Comment RW   Walk 150 Feet CARE Score 6   Walking 10 Feet on Uneven Surfaces   Type of Assistance Needed Independent   Physical Assistance Level No physical assistance   Comment RW, ramp   Walking 10 Feet on Uneven Surfaces CARE Score 6   Ambulation   Primary Mode of Locomotion Prior to Admission Walk   Distance Walked (feet) 150 ft  (150' x 2)   Assist Device Roller Walker   Findings mod I level and unlevel surfaces with RW, WBAT with TROM locked in extension   Does the patient walk? 2. Yes   Wheel 50 Feet with Two Turns   Reason if not Attempted Activity not applicable   Wheel 50 Feet with Two Turns CARE Score 9   Wheel 150 Feet   Reason if not Attempted Activity not applicable   Wheel 150 Feet CARE Score 9   Wheelchair mobility   Findings N/A   Does the patient use a wheelchair? 0. No   Curb or Single Stair   Style negotiated Single stair   Type of Assistance Needed Independent   Physical Assistance Level No physical assistance   Comment 8 FF stairs, L HR, non-reciprocal   1 Step (Curb) CARE Score 6   4 Steps   Type of Assistance Needed Independent   Physical Assistance Level No physical assistance   Comment 8 FF stairs, L HR, non-reciprocal   4 Steps CARE Score 6   Toilet Transfer   Comment did not require during session   Therapeutic Interventions   Strengthening standing LE TE   Other transfer training, gait training, stair training   Equipment Use   NuStep L6, 10 min, L5 15 min B/L UE, L LE   Assessment   Treatment Assessment Pt agreeable to PT this AM, received sitting upright at EOB. Challenged LE strength/endurance and R LE WB tolerance in stance with standing LE TE  with good tolerance, requiring increased rest breaks between exercises, no active SLR performed with R LE, R LE maintained in full extension with TROM. Pt remains unsafe for d/c home due to TROM locked in extension and required use of RW, with inadequate home setup to access smaller/confined areas of home. Will continue with current PT POC to improve deficits and promote return to PLOF.   Problem List Decreased strength;Decreased range of motion;Impaired balance;Decreased mobility   PT Barriers   Physical Impairment Decreased strength;Decreased range of motion;Impaired balance   Functional Limitation Car transfers   Plan   Treatment/Interventions Functional transfer training;LE strengthening/ROM;Therapeutic exercise;Endurance training;Patient/family training;Equipment eval/education;Bed mobility;Gait training   Progress Progressing toward goals   PT Therapy Minutes   PT Time In 0700   PT Time Out 0830   PT Total Time (minutes) 90   PT Mode of treatment - Individual (minutes) 90   PT Mode of treatment - Concurrent (minutes) 0   PT Mode of treatment - Group (minutes) 0   PT Mode of treatment - Co-treat (minutes) 0   PT Mode of Treatment - Total time(minutes) 90 minutes   PT Cumulative Minutes 2097     Standing LE TE:  3x10, B/L LEs, 2.5# L LE  March (L LE only)  Hip ABd  Hip ADd (L LE only)  HR  TR  HS Curls (L LE only)  Hip extension  SLR (L LE only)    Patient remains OOB in w/c, all needs in reach. Encouraged use of call bell, patient verbalizes understanding.

## 2025-04-24 NOTE — ASSESSMENT & PLAN NOTE
"HPI:   He initially underwent quad tendon repair on 7/25/24 with Dr. Foster. He was discharged home with outpatient PT.  Later noted to have recurrent high-grade tear with extensor lag on exam and elected for operative management  January 2025 MRI: \"Prior quadriceps insertion repair with high-grade recurrent tear exhibiting up to 2.2 cm of tendon retraction.\"  On 3/6/25, he underwent revision of quad tendon repair with allograft augmentation  3/21: outpatient follow-up with Dr. Foster, new cast applied   3/28: follow-up with Dr. Foster, continue TTWB   4/4: follow-up with Dr. Foster (removed cast and reapplied long leg cast for an additional 2 weeks) and continue TTWB  4/11, no changes in plan  4/18 follow-up: cast removed, TROM with allowance of 10 degrees flexion per ortho, advanced to  WBAT   Plan:   PT and OT for 3 hours a day, 5-6 days a week   WBAT with TROM locked in extension (advanced on 4/18 with ortho, not included in documentation but verified with PA-C via secure chat)   Currently allowed 10 degrees of flexion  Advance to 20 degrees on 4/25, 30 degrees on 5/2, 40 degrees on 5/9 (verified with ortho PA-C on 4/24)   Pain: PRN Tylenol (monitor LFTs) and oxycodone discontinued on 3/17 due to minimal pain   Lovenox d/c on 4/7     "

## 2025-04-24 NOTE — CASE MANAGEMENT
CM received prtoa;l access to [employer] , attempted to start authorization requests, was unable to, called help desk, was given some instructions. CM will try again on the website.

## 2025-04-24 NOTE — PROGRESS NOTES
"Progress Note - PMR   Name: Tobin Kerns 69 y.o. male I MRN: [id number]  Unit/Bed#: -01 I Date of Admission: 3/11/2025   Date of Service: 4/24/2025 I Hospital Day: 44     Assessment & Plan  Quadriceps muscle rupture, right, subsequent encounter  HPI:   He initially underwent quad tendon repair on 7/25/24 with Dr. Foster. He was discharged home with outpatient PT.  Later noted to have recurrent high-grade tear with extensor lag on exam and elected for operative management  January 2025 MRI: \"Prior quadriceps insertion repair with high-grade recurrent tear exhibiting up to 2.2 cm of tendon retraction.\"  On 3/6/25, he underwent revision of quad tendon repair with allograft augmentation  3/21: outpatient follow-up with Dr. Foster, new cast applied   3/28: follow-up with Dr. Foster, continue TTWB   4/4: follow-up with Dr. Foster (removed cast and reapplied long leg cast for an additional 2 weeks) and continue TTWB  4/11, no changes in plan  4/18 follow-up: cast removed, TROM with allowance of 10 degrees flexion per ortho, advanced to  WBAT   Plan:   PT and OT for 3 hours a day, 5-6 days a week   WBAT with TROM locked in extension (advanced on 4/18 with ortho, not included in documentation but verified with PA-C via secure chat)   Currently allowed 10 degrees of flexion  Advance to 20 degrees on 4/25, 30 degrees on 5/2, 40 degrees on 5/9 (verified with ortho PA-C on 4/24)   Pain: PRN Tylenol (monitor LFTs) and oxycodone discontinued on 3/17 due to minimal pain   Lovenox d/c on 4/7     Hypertension  Continue losartan 50 mg daily   Monitor BP   Mixed hyperlipidemia  Continue pravastatin 40 mg daily   Gastro-esophageal reflux disease without esophagitis  Continue famotidine 20 mg daily   Peripheral neuropathy  Patient is not diabetic   Monitor skin for new or worsening wounds   JOVANY on CPAP  Continue CPAP qHS  IFG (impaired fasting glucose)  Continue metformin 1500 mg daily   Seen by RD 3/12, diet adjusted " to regular   3/12 A1c: 5.4   Healed ulcer of left foot  Local wound care  Present on admission   Wound care RN consulted   BPH (benign prostatic hyperplasia)  Continue tamsulosin 0.4 mg daily   Patient currently voiding without difficulty   PRN PVRs if urinary retention is suspected   Impaired mobility and ADLs  Patient was evaluated by the rehabilitation team MD and an appropriate candidate for acute inpatient rehabilitation program at this time.  The patient will tolerate 3 hours/day 5 to 7 days/week of intensive physical and  occupational therapy   in order to obtain goals for community discharge  Due to the patient's functional Compared to their baseline level of function in addition to their ongoing medical needs, the patient would benefit from daily supervision from a rehabilitation physician as well as rehabilitation nursing to implement and adjust the medical as well as functional plan of care in order to meet the patient's goals.  Bladder: Monitor closely for urinary incontinence and/or retention. Monitor urine output and encourage spontaneous voids. If unable to void spontaneously, will monitor with PVR bladder scans and initiate CIC regimen.  Skin: Monitor for breakdown, frequent turns, pressure offloading  Sleep: Encourage sleep hygiene (routine that promotes healthy circadian rhythm,avoid caffeine later in the day, quiet/dark room at night, reduce electronic before bedtime)  Discharge date TBD  Wound of right ankle  Skin breakdown on R heel due to cast. Ortho removed approximately 1 inch of cast at posterior aspect on 3/11. Cast further trimmed on 4/11, revealing a new posterior ankle wound.   Local wound care  Continue to monitor   Present on admission to [facility]   Wound care RN consulted (last seen 4/22):  Skin Care orders:  1-Hydraguard to sacrum, buttocks bid and prn   2-EHOB / waffle  cushion when out of bed in chair.  3-Moisturize skin daily with skin nourishing cream  4-Elevate heels to offload  pressure.  5. Hydraguard to bilateral heels bid and prn       Subjective   Tobin Kerns is a 69 year-old male, with a past medical history of hypertension, hyperlipidemia, JOVANY, impaired fasting glucose, bilateral lower extremity neuropathy, GERD, BPH, presenting to [facility] after an elective right quadricept tendon repair. He initially underwent quad tendon repair on 7/25/24 with Dr. Foster. He was discharged home with outpatient PT. He was noted to have recurrent high-grade tear on repeat MRI with extensor lag on exam and elected for operative management. On 3/6/25, he underwent revision of quad tendon repair with allograft augmentation. He was evaluated by PT and OT and deemed an appropriate candidate for ARC due to functional deficits     Chief Complaint: f/u ambulatory dysfunction    Interval:   Seen and evaluated patient in OT gym. He denies any concerns or pain. Last BM 4/22, continent of bowel and bladder.     Objective :  Temp:  [97.3 °F (36.3 °C)-97.7 °F (36.5 °C)] 97.3 °F (36.3 °C)  HR:  [73-76] 73  BP: (114-129)/(57-89) 114/57  Resp:  [16-18] 18  SpO2:  [96 %] 96 %  O2 Device: None (Room air)    Functional Update:  Physical Therapy Occupational Therapy   Weight Bearing Status: Weight Bearing as Tolerated (with TROM locked in extension with ambulation)  Transfers: Independent  Bed Mobility: Independent  Amulation Distance (ft): 200 feet  Ambulation: Independent  Assistive Device for Ambulation: Roller Walker  Wheelchair Mobility Distance:  (N/A)  Wheelchair Mobility:  (N/A)  Number of Stairs: 12  Assistive Device for Stairs: Lehft Hand Rail  Stair Assistance: Independent  Ramp: Independent  Assistive Device for Ramp: Roller Walker  Discharge Recommendations: Other  DC Home with::  (alternate placement)   Eating: Independent  Grooming: Independent  Bathing: Independent  Bathing: Independent  Upper Body Dressing: Independent  Lower Body Dressing: Independent  Toileting: Independent  Tub/Shower Transfer:  Supervision  Toilet Transfer: Independent  Cognition: Within Defined Limits  Orientation: Person, Place, Time, Situation           Physical Exam  Vitals and nursing note reviewed.   Constitutional:       General: He is not in acute distress.     Appearance: He is obese. He is not ill-appearing, toxic-appearing or diaphoretic.   HENT:      Head: Normocephalic and atraumatic.      Nose: Nose normal.      Mouth/Throat:      Mouth: Mucous membranes are moist.   Pulmonary:      Effort: Pulmonary effort is normal. No respiratory distress.   Abdominal:      General: There is no distension.   Skin:     General: Skin is warm and dry.   Neurological:      General: No focal deficit present.      Mental Status: He is alert.   Psychiatric:         Mood and Affect: Mood normal.         Behavior: Behavior normal.         Scheduled Meds:  Current Facility-Administered Medications   Medication Dose Route Frequency Provider Last Rate    acetaminophen  650 mg Oral Q6H PRN Modesta Lee PA-C      ascorbic acid  500 mg Oral Daily Toño Brito MD      Cholecalciferol  5,000 Units Oral Daily Toño Brito MD      famotidine  20 mg Oral Daily Toño Brito MD      losartan  50 mg Oral Daily Toño Brito MD      metFORMIN  1,500 mg Oral Daily With Dinner Toño Brito MD      multivitamin stress formula  1 tablet Oral Daily Toño Brito MD      polyethylene glycol  17 g Oral Daily PRN Toño Brito MD      pravastatin  40 mg Oral Daily With Dinner Toño Brito MD      senna  1 tablet Oral HS PRN Modesta Lee PA-C      tamsulosin  0.4 mg Oral Daily With Dinner Toño Brito MD           Lab Results: I have reviewed the following results:  Results from last 7 days   Lab Units 04/21/25  0504   HEMOGLOBIN g/dL 13.0   HEMATOCRIT % 39.1   WBC Thousand/uL 7.61   PLATELETS Thousands/uL 211     Results from last 7 days   Lab Units 04/21/25  0504   BUN mg/dL 20   SODIUM mmol/L 137   POTASSIUM mmol/L 4.4   CHLORIDE mmol/L 104   CREATININE  mg/dL 1.23   AST U/L 13   ALT U/L 9            Modesta Lee PA-C  Physical Medicine and Rehabilitation  [facility]

## 2025-04-24 NOTE — NURSING NOTE
Patient continues to be MOD I with walker and ambulate in room and on unit. Per PT can ambulate off unit to cafeteria for snack if needs. Reports no pain. Immobilizer remains in place. Incision healed to right knee. Dry scab to right posterior achilles area. Sitting upright for all meals. Will continue to monitor and follow plan of care.

## 2025-04-24 NOTE — ASSESSMENT & PLAN NOTE
Skin breakdown on R heel due to cast. Ortho removed approximately 1 inch of cast at posterior aspect on 3/11. Cast further trimmed on 4/11, revealing a new posterior ankle wound.   Local wound care  Continue to monitor   Present on admission to [facility]   Wound care RN consulted (last seen 4/22):  Skin Care orders:  1-Hydraguard to sacrum, buttocks bid and prn   2-EHOB / waffle  cushion when out of bed in chair.  3-Moisturize skin daily with skin nourishing cream  4-Elevate heels to offload pressure.  5. Hydraguard to bilateral heels bid and prn

## 2025-04-24 NOTE — PROGRESS NOTES
04/24/25 0930   Pain Assessment   Pain Assessment Tool 0-10   Pain Score No Pain   Restrictions/Precautions   Precautions Fall Risk   Braces or Orthoses Knee brace  (locked 10*)   Sit to Stand   Type of Assistance Needed Independent   Physical Assistance Level No physical assistance   Sit to Stand CARE Score 6   Meal Prep   Meal Prep Level Walker   Meal Prep Level of Assistance Modified independent   Meal Preparation Pt indep gathered all items from drawers, cabinets, fridge;utilized stove top, prepped items prior to stove use, washed and dried dishes; with and without walker occasionally used counter top for support.   Kitchen Mobility   Kitchen-Mobility Level Walker   Kitchen Activity Retrieve items;Transport items   Light Housekeeping   Light Housekeeping Level Walker   Light Housekeeping Level of Assistance Modified independent   Commmunity Re-entry   Community Re-entry Pt is Indep for mobility to/from cafeteria   Exercise Tools   Theraband blue t band 4x10 reps in 4 planes of motion   Cognition   Overall Cognitive Status WFL   Arousal/Participation Alert;Cooperative   Attention Within functional limits   Orientation Level Oriented X4   Memory Within functional limits   Following Commands Follows all commands and directions without difficulty   Additional Activities   Additional Activities Comments fxnl mobility MI RW   Activity Tolerance   Activity Tolerance Patient tolerated treatment well   Assessment   Treatment Assessment Pt tx completed kitchen mgmt with use of RW and B UE TE for generalized strength and endurance for functional activity performance. Refer to respective sections of note for details of session. Pt demon good safety with pt tolerating WBAT. D/C planning ongoing. Barriers include: R LE s/p quadricep rupture with surgical repair, Decreased ROM with use of immobilizer WBAT. Plan is contingent upon R LE ROM increasing in order for pt to be able to manage in personal home environment. Ortho  f/u May 14.   Prognosis Good   Problem List Decreased strength;Decreased range of motion;Impaired balance;Decreased mobility   Assessment Pt participated in 55 minutes of concurrent tx addressing similar goals with a pt with similar deficits.   Plan   Treatment/Interventions ADL retraining;Functional transfer training;Therapeutic exercise;Endurance training;Patient/family training;OT   Progress Progressing toward goals   OT Therapy Minutes   OT Time In 0930   OT Time Out 1100   OT Total Time (minutes) 90   OT Mode of treatment - Individual (minutes) 35   OT Mode of treatment - Concurrent (minutes) 55   OT Mode of treatment - Group (minutes) 0   OT Mode of treatment - Co-treat (minutes) 0   OT Mode of Treatment - Total time(minutes) 90 minutes   OT Cumulative Minutes 1765   Therapy Time missed   Time missed? No

## 2025-04-25 ENCOUNTER — TELEPHONE (OUTPATIENT)
Dept: OBGYN CLINIC | Facility: HOSPITAL | Age: 70
End: 2025-04-25

## 2025-04-25 PROCEDURE — 99232 SBSQ HOSP IP/OBS MODERATE 35: CPT | Performed by: PHYSICAL MEDICINE & REHABILITATION

## 2025-04-25 PROCEDURE — 97110 THERAPEUTIC EXERCISES: CPT

## 2025-04-25 PROCEDURE — 97535 SELF CARE MNGMENT TRAINING: CPT

## 2025-04-25 PROCEDURE — 97530 THERAPEUTIC ACTIVITIES: CPT

## 2025-04-25 PROCEDURE — 97116 GAIT TRAINING THERAPY: CPT

## 2025-04-25 RX ADMIN — LOSARTAN POTASSIUM 50 MG: 50 TABLET, FILM COATED ORAL at 08:49

## 2025-04-25 RX ADMIN — PRAVASTATIN SODIUM 40 MG: 40 TABLET ORAL at 17:44

## 2025-04-25 RX ADMIN — B-COMPLEX W/ C & FOLIC ACID TAB 1 TABLET: TAB at 08:49

## 2025-04-25 RX ADMIN — OXYCODONE HYDROCHLORIDE AND ACETAMINOPHEN 500 MG: 500 TABLET ORAL at 08:49

## 2025-04-25 RX ADMIN — METFORMIN ER 500 MG 1500 MG: 500 TABLET ORAL at 17:44

## 2025-04-25 RX ADMIN — CHOLECALCIFEROL TAB 25 MCG (1000 UNIT) 5000 UNITS: 25 TAB at 08:49

## 2025-04-25 RX ADMIN — TAMSULOSIN HYDROCHLORIDE 0.4 MG: 0.4 CAPSULE ORAL at 17:44

## 2025-04-25 RX ADMIN — FAMOTIDINE 20 MG: 20 TABLET, FILM COATED ORAL at 08:49

## 2025-04-25 NOTE — PROGRESS NOTES
04/25/25 1020   Pain Assessment   Pain Assessment Tool 0-10   Pain Score No Pain   Restrictions/Precautions   Precautions Fall Risk   RLE Weight Bearing Per Order WBAT   LLE Weight Bearing Per Order WBAT   Braces or Orthoses Knee brace  (TROM locked in extension with ambulation)   Oral Hygiene   Type of Assistance Needed Independent   Physical Assistance Level No physical assistance   Oral Hygiene CARE Score 6   Grooming   Able To Initiate Tasks;Comb/Brush Hair;Wash/Dry Face;Brush/Clean Teeth;Wash/Dry Hands   Findings stance   Shower/Bathe Self   Type of Assistance Needed Independent;Adaptive equipment   Physical Assistance Level No physical assistance   Shower/Bathe Self CARE Score 6   Bathing   Assessed Bath Style Sponge Bath   Able to Gather/Transport Yes   Able to Adjust Water Temperature Yes   Able to Wash/Rinse/Dry (body part) Left Arm;Right Arm;L Upper Leg;R Upper Leg;L Lower Leg/Foot;R Lower Leg/Foot;Chest;Abdomen;Perineal Area;Buttocks   Limitations Noted in ROM   Positioning Seated;Standing   Adaptive Equipment Longhand Sponge   Upper Body Dressing   Type of Assistance Needed Independent   Physical Assistance Level No physical assistance   Upper Body Dressing CARE Score 6   Lower Body Dressing   Type of Assistance Needed Independent;Adaptive equipment   Physical Assistance Level No physical assistance   Lower Body Dressing CARE Score 6   Putting On/Taking Off Footwear   Type of Assistance Needed Independent;Adaptive equipment   Physical Assistance Level No physical assistance   Putting On/Taking Off Footwear CARE Score 6   Picking Up Object   Type of Assistance Needed Independent;Adaptive equipment   Physical Assistance Level No physical assistance   Picking Up Object CARE Score 6   Dressing/Undressing Clothing   Able to  Obtain Clothing;Store removed clothing   Remove UB Clothes Button Shirt   Don UB Clothes Button Shirt   Remove LB Clothes Undergarment;Shorts;Socks   Don LB Clothes  Socks;Undergarment;Shorts   Limitations Noted In ROM   Adaptive Equipment Reacher;Dressing Stick;Sock Aide   Positioning Supported Sit;Standing   Sit to Stand   Type of Assistance Needed Independent   Physical Assistance Level No physical assistance   Sit to Stand CARE Score 6   Toileting Hygiene   Type of Assistance Needed Independent   Physical Assistance Level No physical assistance   Toileting Hygiene CARE Score 6   Toileting   Able to Pull Clothing down yes, up yes.   Manage Hygiene Bladder   Limitations Noted In ROM   Toilet Transfer   Type of Assistance Needed Independent;Adaptive equipment   Physical Assistance Level No physical assistance   Toilet Transfer CARE Score 6   Toilet Transfer   Surface Assessed Standard Toilet   Transfer Technique Standard   Limitations Noted In ROM   Adaptive Equipment Grab Bar;Walker   Light Housekeeping   Light Housekeeping Level Walker   Light Housekeeping Level of Assistance Modified independent   Cognition   Overall Cognitive Status WFL   Arousal/Participation Alert;Cooperative   Attention Within functional limits   Orientation Level Oriented X4   Memory Within functional limits   Following Commands Follows all commands and directions without difficulty   Additional Activities   Additional Activities Comments fxnl mobility RW MI with immobilizer donned   Activity Tolerance   Activity Tolerance Patient tolerated treatment well   Assessment   Treatment Assessment OT tx addressed ADL via sponge bathe level with pt able to adjust immobilizer Indep while seated on toilet seat.  Pt continues to manage all of ADL/iADL Indep with use of walker. Pt managed donning regular sock with sock aide, followed by boot with LHS, then side sitting on bed to lace hooks.  Pt is Indep with completing laundry tasks.  Pt demon good safety with pt tolerating WBAT. Barriers include: R LE s/p quadricep rupture with surgical repair, Decreased ROM with use of immobilizer WBAT. Plan is contingent upon R  LE ROM increasing in order for pt to be able to manage in personal home environment. Ortho f/u May 14.   Prognosis Good   Problem List Decreased strength;Decreased range of motion   Plan   Treatment/Interventions ADL retraining;Functional transfer training;Therapeutic exercise;Endurance training;Patient/family training;OT   Progress Progressing toward goals   OT Therapy Minutes   OT Time In 1020   OT Time Out 1150   OT Total Time (minutes) 90   OT Mode of treatment - Individual (minutes) 90   OT Mode of treatment - Concurrent (minutes) 0   OT Mode of treatment - Group (minutes) 0   OT Mode of treatment - Co-treat (minutes) 0   OT Mode of Treatment - Total time(minutes) 90 minutes   OT Cumulative Minutes 1675   Therapy Time missed   Time missed? No

## 2025-04-25 NOTE — PROGRESS NOTES
"Progress Note - PMR   Name: Tobin Kerns 69 y.o. male I MRN: [id number]  Unit/Bed#: -01 I Date of Admission: 3/11/2025   Date of Service: 4/25/2025 I Hospital Day: 45     Assessment & Plan  Quadriceps muscle rupture, right, subsequent encounter  HPI:   He initially underwent quad tendon repair on 7/25/24 with Dr. Foster. He was discharged home with outpatient PT.  Later noted to have recurrent high-grade tear with extensor lag on exam and elected for operative management  January 2025 MRI: \"Prior quadriceps insertion repair with high-grade recurrent tear exhibiting up to 2.2 cm of tendon retraction.\"  On 3/6/25, he underwent revision of quad tendon repair with allograft augmentation  3/21: outpatient follow-up with Dr. Foster, new cast applied   3/28: follow-up with Dr. Foster, continue TTWB   4/4: follow-up with Dr. Foster (removed cast and reapplied long leg cast for an additional 2 weeks) and continue TTWB  4/11, no changes in plan  4/18 follow-up: cast removed, TROM with allowance of 10 degrees flexion per ortho, advanced to  WBAT   Plan:   PT and OT for 3 hours a day, 5-6 days a week   WBAT with TROM locked in extension (advanced on 4/18 with ortho, not included in documentation but verified with PA-C via secure chat)   Currently allowed 20 degrees of flexion  Advance to  30 degrees on 5/2, 40 degrees on 5/9 (verified with ortho PA-C on 4/24)   Pain: PRN Tylenol (monitor LFTs) and oxycodone discontinued on 3/17 due to minimal pain   Lovenox d/c on 4/7     Hypertension  Continue losartan 50 mg daily   Monitor BP   Mixed hyperlipidemia  Continue pravastatin 40 mg daily   Gastro-esophageal reflux disease without esophagitis  Continue famotidine 20 mg daily   Peripheral neuropathy  Patient is not diabetic   Monitor skin for new or worsening wounds   JOVANY on CPAP  Continue CPAP qHS  IFG (impaired fasting glucose)  Continue metformin 1500 mg daily   Seen by RD 3/12, diet adjusted to regular   3/12 " A1c: 5.4   Healed ulcer of left foot  Local wound care  Present on admission   Wound care RN consulted   BPH (benign prostatic hyperplasia)  Continue tamsulosin 0.4 mg daily   Patient currently voiding without difficulty   PRN PVRs if urinary retention is suspected   Impaired mobility and ADLs  Patient was evaluated by the rehabilitation team MD and an appropriate candidate for acute inpatient rehabilitation program at this time.  The patient will tolerate 3 hours/day 5 to 7 days/week of intensive physical and  occupational therapy   in order to obtain goals for community discharge  Due to the patient's functional Compared to their baseline level of function in addition to their ongoing medical needs, the patient would benefit from daily supervision from a rehabilitation physician as well as rehabilitation nursing to implement and adjust the medical as well as functional plan of care in order to meet the patient's goals.  Bladder: Monitor closely for urinary incontinence and/or retention. Monitor urine output and encourage spontaneous voids. If unable to void spontaneously, will monitor with PVR bladder scans and initiate CIC regimen.  Skin: Monitor for breakdown, frequent turns, pressure offloading  Sleep: Encourage sleep hygiene (routine that promotes healthy circadian rhythm,avoid caffeine later in the day, quiet/dark room at night, reduce electronic before bedtime)  Discharge date TBD  Wound of right ankle  Skin breakdown on R heel due to cast. Ortho removed approximately 1 inch of cast at posterior aspect on 3/11. Cast further trimmed on 4/11, revealing a new posterior ankle wound.   Local wound care  Continue to monitor   Present on admission to [facility]   Wound care RN consulted (last seen 4/22):  Skin Care orders:  1-Hydraguard to sacrum, buttocks bid and prn   2-EHOB / waffle  cushion when out of bed in chair.  3-Moisturize skin daily with skin nourishing cream  4-Elevate heels to offload pressure.  5.  Hydraguard to bilateral heels bid and prn       Subjective   Tobin Kerns is a 69 year-old male, with a past medical history of hypertension, hyperlipidemia, JOVANY, impaired fasting glucose, bilateral lower extremity neuropathy, GERD, BPH, presenting to [facility] after an elective right quadricept tendon repair. He initially underwent quad tendon repair on 7/25/24 with Dr. Foster. He was discharged home with outpatient PT. He was noted to have recurrent high-grade tear on repeat MRI with extensor lag on exam and elected for operative management. On 3/6/25, he underwent revision of quad tendon repair with allograft augmentation. He was evaluated by PT and OT and deemed an appropriate candidate for ARC due to functional deficits     Chief Complaint: f/u ambulatory dysfunction    Interval: Seen and evaluated patient in OT gym. He denies any concerns. Last BM 4/22, continent of bowel and bladder.     Objective :  Temp:  [97.2 °F (36.2 °C)-97.5 °F (36.4 °C)] 97.2 °F (36.2 °C)  HR:  [66-75] 75  BP: (109-134)/(60-62) 109/62  Resp:  [16-18] 18  SpO2:  [95 %-97 %] 95 %  O2 Device: None (Room air)    Functional Update:  Physical Therapy Occupational Therapy   Weight Bearing Status: Weight Bearing as Tolerated (with TROM locked in extension with ambulation)  Transfers: Independent  Bed Mobility: Independent  Amulation Distance (ft): 200 feet  Ambulation: Independent  Assistive Device for Ambulation: Roller Walker  Wheelchair Mobility Distance:  (N/A)  Wheelchair Mobility:  (N/A)  Number of Stairs: 12  Assistive Device for Stairs: Lehft Hand Rail  Stair Assistance: Independent  Ramp: Independent  Assistive Device for Ramp: Roller Walker  Discharge Recommendations: Other  DC Home with::  (alternate placement)   Eating: Independent  Grooming: Independent  Bathing: Independent  Bathing: Independent  Upper Body Dressing: Independent  Lower Body Dressing: Independent  Toileting: Independent  Tub/Shower Transfer: Supervision  Toilet  Transfer: Independent  Cognition: Within Defined Limits  Orientation: Person, Place, Time, Situation           Physical Exam  Vitals and nursing note reviewed.   Constitutional:       General: He is not in acute distress.     Appearance: He is obese. He is not ill-appearing, toxic-appearing or diaphoretic.   HENT:      Head: Normocephalic and atraumatic.      Nose: Nose normal.      Mouth/Throat:      Mouth: Mucous membranes are moist.   Pulmonary:      Effort: Pulmonary effort is normal. No respiratory distress.   Abdominal:      General: There is no distension.   Skin:     General: Skin is warm and dry.   Neurological:      General: No focal deficit present.      Mental Status: He is alert.   Psychiatric:         Mood and Affect: Mood normal.         Behavior: Behavior normal.           Scheduled Meds:  Current Facility-Administered Medications   Medication Dose Route Frequency Provider Last Rate    acetaminophen  650 mg Oral Q6H PRN Modesta Lee PA-C      ascorbic acid  500 mg Oral Daily Toño Brito MD      Cholecalciferol  5,000 Units Oral Daily Toño Brito MD      famotidine  20 mg Oral Daily Toño Brito MD      losartan  50 mg Oral Daily Toño Brito MD      metFORMIN  1,500 mg Oral Daily With Dinner Toño Brito MD      multivitamin stress formula  1 tablet Oral Daily Toño Brito MD      polyethylene glycol  17 g Oral Daily PRN Toño Brito MD      pravastatin  40 mg Oral Daily With Dinner Toño Brito MD      senna  1 tablet Oral HS PRN Modesta Lee PA-C      tamsulosin  0.4 mg Oral Daily With Dinner Toño Brito MD           Lab Results: I have reviewed the following results:  Results from last 7 days   Lab Units 04/21/25  0504   HEMOGLOBIN g/dL 13.0   HEMATOCRIT % 39.1   WBC Thousand/uL 7.61   PLATELETS Thousands/uL 211     Results from last 7 days   Lab Units 04/21/25  0504   BUN mg/dL 20   SODIUM mmol/L 137   POTASSIUM mmol/L 4.4   CHLORIDE mmol/L 104   CREATININE mg/dL 1.23   AST U/L  13   ALT U/L 9            Modesta Lee PA-C  Physical Medicine and Rehabilitation  [facility]

## 2025-04-25 NOTE — TELEPHONE ENCOUNTER
Caller: [place] ()    Doctor/Office: Dr Foster    CB#: [phone]      What needs to be faxed: JUSTIN 04/18/25    ATTN to: [place]-     Fax#: [phone]      Documents were successfully e-faxed

## 2025-04-25 NOTE — ASSESSMENT & PLAN NOTE
Continue famotidine 20 mg daily    Spine appears normal, range of motion is not limited, no muscle or joint tenderness

## 2025-04-25 NOTE — PLAN OF CARE
Problem: PAIN - ADULT  Goal: Verbalizes/displays adequate comfort level or baseline comfort level  Description: Interventions:  - Encourage patient to monitor pain and request assistance  - Assess pain using appropriate pain scale  - Administer analgesics based on type and severity of pain and evaluate response  - Implement non-pharmacological measures as appropriate and evaluate response  - Consider cultural and social influences on pain and pain management  - Notify physician/advanced practitioner if interventions unsuccessful or patient reports new pain  Outcome: Progressing     Problem: INFECTION - ADULT  Goal: Absence or prevention of progression during hospitalization  Description: INTERVENTIONS:  - Assess and monitor for signs and symptoms of infection  - Monitor lab/diagnostic results  - Monitor all insertion sites, i.e. indwelling lines, tubes, and drains  - Monitor endotracheal if appropriate and nasal secretions for changes in amount and color  - [place] appropriate cooling/warming therapies per order  - Administer medications as ordered  - Instruct and encourage patient and family to use good hand hygiene technique  - Identify and instruct in appropriate isolation precautions for identified infection/condition  Outcome: Progressing  Goal: Absence of fever/infection during neutropenic period  Description: INTERVENTIONS:  - Monitor WBC    Outcome: Progressing     Problem: SAFETY ADULT  Goal: Patient will remain free of falls  Description: INTERVENTIONS:  - Educate patient/family on patient safety including physical limitations  - Instruct patient to call for assistance with activity   - Consult OT/PT to assist with strengthening/mobility   - Keep Call bell within reach  - Keep bed low and locked with side rails adjusted as appropriate  - Keep care items and personal belongings within reach  - Initiate and maintain comfort rounds  - Make Fall Risk Sign visible to staff  - Apply yellow socks and bracelet  for high fall risk patients  - Consider moving patient to room near nurses station  Outcome: Progressing  Goal: Maintain or return to baseline ADL function  Description: INTERVENTIONS:  -  Assess patient's ability to carry out ADLs; assess patient's baseline for ADL function and identify physical deficits which impact ability to perform ADLs (bathing, care of mouth/teeth, toileting, grooming, dressing, etc.)  - Assess/evaluate cause of self-care deficits   - Assess range of motion  - Assess patient's mobility; develop plan if impaired  - Assess patient's need for assistive devices and provide as appropriate  - Encourage maximum independence but intervene and supervise when necessary  - Involve family in performance of ADLs  - Assess for home care needs following discharge   - Consider OT consult to assist with ADL evaluation and planning for discharge  - Provide patient education as appropriate  Outcome: Progressing    Problem: DISCHARGE PLANNING  Goal: Discharge to home or other facility with appropriate resources  Description: INTERVENTIONS:  - Identify barriers to discharge w/patient and caregiver  - Arrange for needed discharge resources and transportation as appropriate  - Identify discharge learning needs (meds, wound care, etc.)  - Arrange for interpretive services to assist at discharge as needed  - Refer to Case Management Department for coordinating discharge planning if the patient needs post-hospital services based on physician/advanced practitioner order or complex needs related to functional status, cognitive ability, or social support system  Outcome: Progressing     Problem: Nutrition/Hydration-ADULT  Goal: Nutrient/Hydration intake appropriate for improving, restoring or maintaining nutritional needs  Description: Monitor and assess patient's nutrition/hydration status for malnutrition. Collaborate with interdisciplinary team and initiate plan and interventions as ordered.  Monitor patient's weight  and dietary intake as ordered or per policy. Utilize nutrition screening tool and intervene as necessary. Determine patient's food preferences and provide high-protein, high-caloric foods as appropriate. INTERVENTIONS:- Monitor oral intake, urinary output, labs, and treatment plans- Assess nutrition and hydration status and recommend course of action- Evaluate amount of meals eaten- Assist patient with eating if necessary - Allow adequate time for meals- Recommend/ encourage appropriate diets, oral nutritional supplements, and vitamin/mineral supplements- Order, calculate, and assess calorie counts as needed- Recommend, monitor, and adjust tube feedings and TPN/PPN based on assessed needs- Assess need for intravenous fluids- Provide specific nutrition/hydration education as appropriate- Include patient/family/caregiver in decisions related to nutrition  Outcome: Progressing

## 2025-04-25 NOTE — PROGRESS NOTES
"Progress Note - Tobin Kerns 69 y.o. male MRN: [id number]    Unit/Bed#: [facility] 210-01 Encounter: [id number]        Subjective:   Patient seen and examined at bedside after reviewing the chart and discussing the case with the caring staff.      Patient examined at bedside.  Patient denies any acute symptoms.     Physical Exam   Vitals: Blood pressure 109/62, pulse 75, temperature (!) 97.2 °F (36.2 °C), temperature source Temporal, resp. rate 18, height 6' 5\" (1.956 m), weight (!) 137 kg (302 lb 4 oz), SpO2 95%.,Body mass index is 35.84 kg/m².  Constitutional: Patient in no acute distress.  HEENT: PERR, EOMI, MMM.  Cardiovascular: Normal rate and regular rhythm.    Pulmonary/Chest: Effort normal and breath sounds normal.   Abdomen: Soft, + BS, NT.    Assessment/Plan:  Tobin Kerns is a(n) 69 y.o. year old male who is s/p quadriceps tendon repair.     Cardiac with hx of HTN, HLD.  Patient is on losartan 50 mg daily, pravastatin 40 mg daily.  Impaired fasting glucose.  Hgb a1c 5.4% on 3/12/25.  Continue metformin XR 1500 mg daily with dinner.  Regular diet and d/c accucheks as blood sugar is well controlled.    BPH.  Continue Flomax 0.4 mg daily.  GERD.  Continue Pepcid 20 mg daily.  JOVANY.  On CPAP at bedtime.   Vitamin D deficiency.  Patient is on vitamin D3 5000 units daily.  Pain and bowel management.  As per physiatrist.  Quadriceps tendon repair.  Patient is receiving intensive PT OT as per physiatrist.  Orthopedic surgery follow-up 4/18 - cast was removed and TROM reapplied to allow for 10 degree knee flexion with nonambulatory exercises.  He is to be in full knee extension at all times with ambulation.  Can remove for hygenic purposes if needed.  No forced or aggressive knee flexion.  No straight leg raises.  Ambulate with walker.  He is to follow-up with orthopedic surgery in 3 weeks (5/12/25).    Anticipated date of discharge.  TBD    The patient was discussed with Dr. Brito and he is in agreement with the " above note.

## 2025-04-25 NOTE — PROGRESS NOTES
04/25/25 0700   Pain Assessment   Pain Assessment Tool 0-10   Pain Score No Pain   Restrictions/Precautions   Precautions Fall Risk   RLE Weight Bearing Per Order WBAT   ROM Restrictions No   Braces or Orthoses Knee brace  (TROM locked in extension with ambulation)   General   Change In Medical/Functional Status TROM brace to allow for 20 degrees knee flexion with nonambulatory exercises. Maintain in full knee extension at all times with ambulation. Can remove for hygenic purposes if needed. No forced or aggressive knee flexion No straight leg raises at this time Continue to ambulate with walker   Cognition   Overall Cognitive Status WFL   Arousal/Participation Alert;Responsive;Cooperative   Attention Within functional limits   Orientation Level Oriented X4   Memory Within functional limits   Following Commands Follows all commands and directions without difficulty   Subjective   Subjective Pt reports he thought he could do 30 degrees today   Roll Left and Right   Type of Assistance Needed Independent   Physical Assistance Level No physical assistance   Roll Left and Right CARE Score 6   Sit to Lying   Type of Assistance Needed Independent   Physical Assistance Level No physical assistance   Sit to Lying CARE Score 6   Lying to Sitting on Side of Bed   Type of Assistance Needed Independent   Physical Assistance Level No physical assistance   Lying to Sitting on Side of Bed CARE Score 6   Sit to Stand   Type of Assistance Needed Independent   Physical Assistance Level No physical assistance   Comment RW   Sit to Stand CARE Score 6   Bed-Chair Transfer   Type of Assistance Needed Independent   Physical Assistance Level No physical assistance   Comment RW   Chair/Bed-to-Chair Transfer CARE Score 6   Car Transfer   Reason if not Attempted Environmental limitations   Car Transfer CARE Score 10   Walk 10 Feet   Type of Assistance Needed Independent   Physical Assistance Level No physical assistance   Comment RW   Walk  10 Feet CARE Score 6   Walk 50 Feet with Two Turns   Type of Assistance Needed Independent   Physical Assistance Level No physical assistance   Comment RW   Walk 50 Feet with Two Turns CARE Score 6   Walk 150 Feet   Type of Assistance Needed Independent   Physical Assistance Level No physical assistance   Comment RW   Walk 150 Feet CARE Score 6   Walking 10 Feet on Uneven Surfaces   Type of Assistance Needed Independent   Physical Assistance Level No physical assistance   Comment RW, green foam   Walking 10 Feet on Uneven Surfaces CARE Score 6   Ambulation   Primary Mode of Locomotion Prior to Admission Walk   Distance Walked (feet) 150 ft  (150', 120')   Assist Device Roller Walker   Findings mod I level and unlevel surfaces with RW and TROM locked in extension   Does the patient walk? 2. Yes   Wheel 50 Feet with Two Turns   Reason if not Attempted Activity not applicable   Wheel 50 Feet with Two Turns CARE Score 9   Wheel 150 Feet   Reason if not Attempted Activity not applicable   Wheel 150 Feet CARE Score 9   Wheelchair mobility   Findings N/A   Does the patient use a wheelchair? 0. No   Curb or Single Stair   Style negotiated Single stair   Type of Assistance Needed Independent   Physical Assistance Level No physical assistance   Comment 8 stairs, L HR   1 Step (Curb) CARE Score 6   4 Steps   Type of Assistance Needed Independent   Physical Assistance Level No physical assistance   Comment 8 stairs, L HR   4 Steps CARE Score 6   12 Steps   Reason if not Attempted Activity not applicable   12 Steps CARE Score 9   Picking Up Object   Type of Assistance Needed Independent;Adaptive equipment   Physical Assistance Level No physical assistance   Comment RW, reacher   Picking Up Object CARE Score 6   Toilet Transfer   Type of Assistance Needed Independent   Physical Assistance Level No physical assistance   Comment RW   Toilet Transfer CARE Score 6   Therapeutic Interventions   Strengthening supine LE TE (added gentle  active L knee flexion to 20 degrees per ortho)   Other transfer training, gait training, stair training   Equipment Use   NuStep L6 15 min, B/L UE, L LE   Assessment   Treatment Assessment Pt agreeable to PT this AM, received sitting upright on toilet. Per ortho pt now able to progress to 20 degrees of L knee flexion with nonambulatory exercises with TROM unlocked at 20 degree limit. Added gentle L knee flexion with TROM locked to 20 degrees in supine with good tolerance, no noted pain or discomfort. TROM still remains locked at extension for ambulatory activities. He remains functionally independent with RW for all functional tasks, but unsafe for d/c home due to unsafe home setup and limited spacing to allow for TROM to be locked in extension. Will continue with current PT POC to improve deficits and promote return to PLOF.   Problem List Decreased strength;Decreased range of motion;Impaired balance;Decreased mobility   PT Barriers   Physical Impairment Decreased strength;Decreased range of motion;Impaired balance   Functional Limitation Car transfers   Plan   Treatment/Interventions Functional transfer training;LE strengthening/ROM;Therapeutic exercise;Endurance training;Patient/family training;Equipment eval/education;Bed mobility;Gait training   Progress Progressing toward goals   PT Therapy Minutes   PT Time In 0700   PT Time Out 0830   PT Total Time (minutes) 90   PT Mode of treatment - Individual (minutes) 90   PT Mode of treatment - Concurrent (minutes) 0   PT Mode of treatment - Group (minutes) 0   PT Mode of treatment - Co-treat (minutes) 0   PT Mode of Treatment - Total time(minutes) 90 minutes   PT Cumulative Minutes 2187     Patient remains OOB in w/c, all needs in reach. Encouraged use of call bell, patient verbalizes understanding.

## 2025-04-25 NOTE — ASSESSMENT & PLAN NOTE
Skin breakdown on R heel due to cast. Ortho removed approximately 1 inch of cast at posterior aspect on 3/11. Cast further trimmed on 4/11, revealing a new posterior ankle wound.   Local wound care  Continue to monitor   Present on admission to [facility]   Wound care RN consulted (last seen 4/22):  Skin Care orders:  1-Hydraguard to sacrum, buttocks bid and prn   2-EHOB / waffle  cushion when out of bed in chair.  3-Moisturize skin daily with skin nourishing cream  4-Elevate heels to offload pressure.  5. Hydraguard to bilateral heels bid and prn

## 2025-04-25 NOTE — ASSESSMENT & PLAN NOTE
"HPI:   He initially underwent quad tendon repair on 7/25/24 with Dr. Foster. He was discharged home with outpatient PT.  Later noted to have recurrent high-grade tear with extensor lag on exam and elected for operative management  January 2025 MRI: \"Prior quadriceps insertion repair with high-grade recurrent tear exhibiting up to 2.2 cm of tendon retraction.\"  On 3/6/25, he underwent revision of quad tendon repair with allograft augmentation  3/21: outpatient follow-up with Dr. Foster, new cast applied   3/28: follow-up with Dr. Foster, continue TTWB   4/4: follow-up with Dr. Foster (removed cast and reapplied long leg cast for an additional 2 weeks) and continue TTWB  4/11, no changes in plan  4/18 follow-up: cast removed, TROM with allowance of 10 degrees flexion per ortho, advanced to  WBAT   Plan:   PT and OT for 3 hours a day, 5-6 days a week   WBAT with TROM locked in extension (advanced on 4/18 with ortho, not included in documentation but verified with PA-C via secure chat)   Currently allowed 20 degrees of flexion  Advance to  30 degrees on 5/2, 40 degrees on 5/9 (verified with ortho PA-C on 4/24)   Pain: PRN Tylenol (monitor LFTs) and oxycodone discontinued on 3/17 due to minimal pain   Lovenox d/c on 4/7     "

## 2025-04-26 PROCEDURE — 97530 THERAPEUTIC ACTIVITIES: CPT

## 2025-04-26 PROCEDURE — 97535 SELF CARE MNGMENT TRAINING: CPT

## 2025-04-26 RX ADMIN — CHOLECALCIFEROL TAB 25 MCG (1000 UNIT) 5000 UNITS: 25 TAB at 09:37

## 2025-04-26 RX ADMIN — FAMOTIDINE 20 MG: 20 TABLET, FILM COATED ORAL at 09:37

## 2025-04-26 RX ADMIN — METFORMIN ER 500 MG 1500 MG: 500 TABLET ORAL at 16:16

## 2025-04-26 RX ADMIN — TAMSULOSIN HYDROCHLORIDE 0.4 MG: 0.4 CAPSULE ORAL at 16:16

## 2025-04-26 RX ADMIN — PRAVASTATIN SODIUM 40 MG: 40 TABLET ORAL at 16:16

## 2025-04-26 RX ADMIN — LOSARTAN POTASSIUM 50 MG: 50 TABLET, FILM COATED ORAL at 09:37

## 2025-04-26 RX ADMIN — OXYCODONE HYDROCHLORIDE AND ACETAMINOPHEN 500 MG: 500 TABLET ORAL at 09:37

## 2025-04-26 RX ADMIN — B-COMPLEX W/ C & FOLIC ACID TAB 1 TABLET: TAB at 09:37

## 2025-04-26 NOTE — PLAN OF CARE
Problem: PAIN - ADULT  Goal: Verbalizes/displays adequate comfort level or baseline comfort level  Description: Interventions:  - Encourage patient to monitor pain and request assistance  - Assess pain using appropriate pain scale  - Administer analgesics based on type and severity of pain and evaluate response  - Implement non-pharmacological measures as appropriate and evaluate response  - Consider cultural and social influences on pain and pain management  - Notify physician/advanced practitioner if interventions unsuccessful or patient reports new pain  Outcome: Progressing     Problem: INFECTION - ADULT  Goal: Absence or prevention of progression during hospitalization  Description: INTERVENTIONS:  - Assess and monitor for signs and symptoms of infection  - Monitor lab/diagnostic results  - Monitor all insertion sites, i.e. indwelling lines, tubes, and drains  - Monitor endotracheal if appropriate and nasal secretions for changes in amount and color  - [place] appropriate cooling/warming therapies per order  - Administer medications as ordered  - Instruct and encourage patient and family to use good hand hygiene technique  - Identify and instruct in appropriate isolation precautions for identified infection/condition  Outcome: Progressing  Goal: Absence of fever/infection during neutropenic period  Description: INTERVENTIONS:  - Monitor WBC    Outcome: Progressing     Problem: SAFETY ADULT  Goal: Patient will remain free of falls  Description: INTERVENTIONS:  - Educate patient/family on patient safety including physical limitations  - Instruct patient to call for assistance with activity   - Consult OT/PT to assist with strengthening/mobility   - Keep Call bell within reach  - Keep bed low and locked with side rails adjusted as appropriate  - Keep care items and personal belongings within reach  - Initiate and maintain comfort rounds  - Make Fall Risk Sign visible to staff  - Apply yellow socks and bracelet  for high fall risk patients  - Consider moving patient to room near nurses station  Outcome: Progressing  Goal: Maintain or return to baseline ADL function  Description: INTERVENTIONS:  -  Assess patient's ability to carry out ADLs; assess patient's baseline for ADL function and identify physical deficits which impact ability to perform ADLs (bathing, care of mouth/teeth, toileting, grooming, dressing, etc.)  - Assess/evaluate cause of self-care deficits   - Assess range of motion  - Assess patient's mobility; develop plan if impaired  - Assess patient's need for assistive devices and provide as appropriate  - Encourage maximum independence but intervene and supervise when necessary  - Involve family in performance of ADLs  - Assess for home care needs following discharge   - Consider OT consult to assist with ADL evaluation and planning for discharge  - Provide patient education as appropriate  Outcome: Progressing       Problem: DISCHARGE PLANNING  Goal: Discharge to home or other facility with appropriate resources  Description: INTERVENTIONS:  - Identify barriers to discharge w/patient and caregiver  - Arrange for needed discharge resources and transportation as appropriate  - Identify discharge learning needs (meds, wound care, etc.)  - Arrange for interpretive services to assist at discharge as needed  - Refer to Case Management Department for coordinating discharge planning if the patient needs post-hospital services based on physician/advanced practitioner order or complex needs related to functional status, cognitive ability, or social support system  Outcome: Progressing     Problem: Nutrition/Hydration-ADULT  Goal: Nutrient/Hydration intake appropriate for improving, restoring or maintaining nutritional needs  Description: Monitor and assess patient's nutrition/hydration status for malnutrition. Collaborate with interdisciplinary team and initiate plan and interventions as ordered.  Monitor patient's  weight and dietary intake as ordered or per policy. Utilize nutrition screening tool and intervene as necessary. Determine patient's food preferences and provide high-protein, high-caloric foods as appropriate. INTERVENTIONS:- Monitor oral intake, urinary output, labs, and treatment plans- Assess nutrition and hydration status and recommend course of action- Evaluate amount of meals eaten- Assist patient with eating if necessary - Allow adequate time for meals- Recommend/ encourage appropriate diets, oral nutritional supplements, and vitamin/mineral supplements- Order, calculate, and assess calorie counts as needed- Recommend, monitor, and adjust tube feedings and TPN/PPN based on assessed needs- Assess need for intravenous fluids- Provide specific nutrition/hydration education as appropriate- Include patient/family/caregiver in decisions related to nutrition  Outcome: Progressing

## 2025-04-26 NOTE — PROGRESS NOTES
04/26/25 0829   Pain Assessment   Pain Assessment Tool 0-10   Pain Score 2   Pain Location/Orientation Orientation: Right;Location: Leg   Restrictions/Precautions   Precautions Fall Risk   RLE Weight Bearing Per Order WBAT   LLE Weight Bearing Per Order WBAT   Braces or Orthoses Knee brace  (TROM ocked in extension with ambulation)   Oral Hygiene   Type of Assistance Needed Independent   Oral Hygiene CARE Score 6   Grooming   Able To Initiate Tasks;Comb/Brush Hair;Wash/Dry Face;Brush/Clean Teeth;Wash/Dry Hands   Shower/Bathe Self   Type of Assistance Needed Independent;Adaptive equipment   Physical Assistance Level No physical assistance   Shower/Bathe Self CARE Score 6   Bathing   Assessed Bath Style Sponge Bath   Anticipated D/C Bath Style Sponge Bath   Able to Gather/Transport Yes   Able to Adjust Water Temperature Yes   Able to Wash/Rinse/Dry (body part) Left Arm;Right Arm;L Upper Leg;R Upper Leg;L Lower Leg/Foot;R Lower Leg/Foot;Chest;Abdomen;Perineal Area;Buttocks   Limitations Noted in ROM   Positioning Seated;Standing   Adaptive Equipment Longhand Sponge;Longhand Reacher   Tub/Shower Transfer   Reason Not Assessed Sponge Bath   Upper Body Dressing   Type of Assistance Needed Independent   Upper Body Dressing CARE Score 6   Lower Body Dressing   Type of Assistance Needed Independent;Adaptive equipment   Lower Body Dressing CARE Score 6   Putting On/Taking Off Footwear   Type of Assistance Needed Independent;Adaptive equipment   Physical Assistance Level No physical assistance   Putting On/Taking Off Footwear CARE Score 6   Dressing/Undressing Clothing   Able to  Obtain Clothing;Store removed clothing   Remove UB Clothes Other  (Hospital gown)   Don UB Clothes Button Shirt   Remove LB Clothes Undergarment;Shorts;Socks   Don LB Clothes Shorts;Undergarment;Socks   Limitations Noted In ROM   Adaptive Equipment Reacher;Dressing Stick;Sock Aide   Positioning Supported Sit;Standing   Lying to Sitting on Side of  Bed   Type of Assistance Needed Independent   Lying to Sitting on Side of Bed CARE Score 6   Sit to Stand   Type of Assistance Needed Independent   Comment RW   Sit to Stand CARE Score 6   Toileting Hygiene   Type of Assistance Needed Independent   Physical Assistance Level No physical assistance   Toileting Hygiene CARE Score 6   Toileting   Able to Pull Clothing down yes, up yes.   Able to Manage Clothing Closures Yes   Manage Hygiene Bladder;Bowel   Limitations Noted In ROM   Adaptive Equipment Grab Bar   Toilet Transfer   Type of Assistance Needed Independent   Physical Assistance Level No physical assistance   Comment RW   Toilet Transfer CARE Score 6   Toilet Transfer   Surface Assessed Standard Toilet   Transfer Technique Standard   Limitations Noted In ROM   Adaptive Equipment Grab Bar;Walker   Cognition   Overall Cognitive Status WFL   Arousal/Participation Alert;Responsive;Cooperative   Attention Within functional limits   Orientation Level Oriented X4   Memory Within functional limits   Following Commands Follows all commands and directions without difficulty   Additional Activities   Additional Activities Other (Comment)   Additional Activities Comments Functional mobility completed with RW with MI with knee immobilizer in place   Assessment   Treatment Assessment Pt received sitting in bed and agreeable to participation with OT session. Pt completed sponge bath and dresing ADL routine with overall MI and effective use AD. Good tolerence with functional mobiltiy with RW with MI and maintained safety with R knee immobilizer. Pt would benefit from continued participation with OT services to address limitations with R LE limited ROM with immobilizer and help support continued progress towards goals.   Plan   Treatment/Interventions ADL retraining;Functional transfer training;Therapeutic exercise;Endurance training;Patient/family training;Equipment eval/education;Bed mobility;Compensatory technique education    Progress Progressing toward goals   OT Therapy Minutes   OT Time In 0829   OT Time Out 0929   OT Total Time (minutes) 60   OT Mode of treatment - Individual (minutes) 60

## 2025-04-26 NOTE — PROGRESS NOTES
"Progress Note - Tobin Kerns 69 y.o. male MRN: [id number]    Unit/Bed#: [facility] 210-01 Encounter: [id number]        Subjective:   Patient seen and examined at bedside after reviewing the chart and discussing the case with the caring staff.      Patient examined at bedside.  Patient denies any acute symptoms.     Physical Exam   Vitals: Blood pressure 112/58, pulse 62, temperature (!) 97.4 °F (36.3 °C), temperature source Temporal, resp. rate 17, height 6' 5\" (1.956 m), weight (!) 137 kg (302 lb 4 oz), SpO2 96%.,Body mass index is 35.84 kg/m².  Constitutional: Patient in no acute distress.  HEENT: PERR, EOMI, MMM.  Cardiovascular: Normal rate and regular rhythm.    Pulmonary/Chest: Effort normal and breath sounds normal.   Abdomen: Soft, + BS, NT.    Assessment/Plan:  Tobin Kerns is a(n) 69 y.o. year old male who is s/p quadriceps tendon repair.     Cardiac with hx of HTN, HLD.  Patient is on losartan 50 mg daily, pravastatin 40 mg daily.  Impaired fasting glucose.  Hgb a1c 5.4% on 3/12/25.  Continue metformin XR 1500 mg daily with dinner.  Regular diet and d/c accucheks as blood sugar is well controlled.    BPH.  Continue Flomax 0.4 mg daily.  GERD.  Continue Pepcid 20 mg daily.  JOVANY.  On CPAP at bedtime.   Vitamin D deficiency.  Patient is on vitamin D3 5000 units daily.  Pain and bowel management.  As per physiatrist.  Quadriceps tendon repair.  Patient is receiving intensive PT OT as per physiatrist.  Orthopedic surgery follow-up 4/18 - cast was removed and TROM reapplied to allow for 10 degree knee flexion with nonambulatory exercises.  He is to be in full knee extension at all times with ambulation.  Can remove for hygenic purposes if needed.  No forced or aggressive knee flexion.  No straight leg raises.  Ambulate with walker.  He is to follow-up with orthopedic surgery in 3 weeks (5/12/25).    Anticipated date of discharge.  TBD  "

## 2025-04-27 RX ADMIN — TAMSULOSIN HYDROCHLORIDE 0.4 MG: 0.4 CAPSULE ORAL at 17:33

## 2025-04-27 RX ADMIN — CHOLECALCIFEROL TAB 25 MCG (1000 UNIT) 5000 UNITS: 25 TAB at 08:11

## 2025-04-27 RX ADMIN — METFORMIN ER 500 MG 1500 MG: 500 TABLET ORAL at 17:33

## 2025-04-27 RX ADMIN — OXYCODONE HYDROCHLORIDE AND ACETAMINOPHEN 500 MG: 500 TABLET ORAL at 08:11

## 2025-04-27 RX ADMIN — B-COMPLEX W/ C & FOLIC ACID TAB 1 TABLET: TAB at 08:11

## 2025-04-27 RX ADMIN — PRAVASTATIN SODIUM 40 MG: 40 TABLET ORAL at 17:33

## 2025-04-27 RX ADMIN — LOSARTAN POTASSIUM 50 MG: 50 TABLET, FILM COATED ORAL at 08:11

## 2025-04-27 RX ADMIN — FAMOTIDINE 20 MG: 20 TABLET, FILM COATED ORAL at 08:11

## 2025-04-27 NOTE — PROGRESS NOTES
"Progress Note - Tobin Kerns 69 y.o. male MRN: [id number]    Unit/Bed#: [facility] 210-01 Encounter: [id number]        Subjective:   Patient seen and examined at bedside after reviewing the chart and discussing the case with the caring staff.      Patient examined at bedside.  Patient denies any acute symptoms.     Physical Exam   Vitals: Blood pressure 120/57, pulse 61, temperature (!) 96.8 °F (36 °C), temperature source Temporal, resp. rate 16, height 6' 5\" (1.956 m), weight (!) 137 kg (302 lb 4 oz), SpO2 98%.,Body mass index is 35.84 kg/m².  Constitutional: Patient in no acute distress.  HEENT: PERR, EOMI, MMM.  Cardiovascular: Normal rate and regular rhythm.    Pulmonary/Chest: Effort normal and breath sounds normal.   Abdomen: Soft, + BS, NT.    Assessment/Plan:  Tobin Kerns is a(n) 69 y.o. year old male who is s/p quadriceps tendon repair.     Cardiac with hx of HTN, HLD.  Patient is on losartan 50 mg daily, pravastatin 40 mg daily.  Impaired fasting glucose.  Hgb a1c 5.4% on 3/12/25.  Continue metformin XR 1500 mg daily with dinner.  Regular diet and d/c accucheks as blood sugar is well controlled.    BPH.  Continue Flomax 0.4 mg daily.  GERD.  Continue Pepcid 20 mg daily.  JOVANY.  On CPAP at bedtime.   Vitamin D deficiency.  Patient is on vitamin D3 5000 units daily.  Pain and bowel management.  As per physiatrist.  Quadriceps tendon repair.  Patient is receiving intensive PT OT as per physiatrist.  Orthopedic surgery follow-up 4/18 - cast was removed and TROM reapplied to allow for 10 degree knee flexion with nonambulatory exercises.  He is to be in full knee extension at all times with ambulation.  Can remove for hygenic purposes if needed.  No forced or aggressive knee flexion.  No straight leg raises.  Ambulate with walker.  He is to follow-up with orthopedic surgery in 3 weeks (5/12/25).    Anticipated date of discharge.  TBD  "

## 2025-04-27 NOTE — PLAN OF CARE
Problem: PAIN - ADULT  Goal: Verbalizes/displays adequate comfort level or baseline comfort level  Description: Interventions:  - Encourage patient to monitor pain and request assistance  - Assess pain using appropriate pain scale  - Administer analgesics based on type and severity of pain and evaluate response  - Implement non-pharmacological measures as appropriate and evaluate response  - Consider cultural and social influences on pain and pain management  - Notify physician/advanced practitioner if interventions unsuccessful or patient reports new pain  Outcome: Progressing     Problem: INFECTION - ADULT  Goal: Absence or prevention of progression during hospitalization  Description: INTERVENTIONS:  - Assess and monitor for signs and symptoms of infection  - Monitor lab/diagnostic results  - Monitor all insertion sites, i.e. indwelling lines, tubes, and drains  - Monitor endotracheal if appropriate and nasal secretions for changes in amount and color  - [place] appropriate cooling/warming therapies per order  - Administer medications as ordered  - Instruct and encourage patient and family to use good hand hygiene technique  - Identify and instruct in appropriate isolation precautions for identified infection/condition  Outcome: Progressing  Goal: Absence of fever/infection during neutropenic period  Description: INTERVENTIONS:  - Monitor WBC    Outcome: Progressing     Problem: SAFETY ADULT  Goal: Patient will remain free of falls  Description: INTERVENTIONS:  - Educate patient/family on patient safety including physical limitations  - Instruct patient to call for assistance with activity   - Consult OT/PT to assist with strengthening/mobility   - Keep Call bell within reach  - Keep bed low and locked with side rails adjusted as appropriate  - Keep care items and personal belongings within reach  - Initiate and maintain comfort rounds  - Make Fall Risk Sign visible to staff  - Apply yellow socks and bracelet  for high fall risk patients  - Consider moving patient to room near nurses station  Outcome: Progressing  Goal: Maintain or return to baseline ADL function  Description: INTERVENTIONS:  -  Assess patient's ability to carry out ADLs; assess patient's baseline for ADL function and identify physical deficits which impact ability to perform ADLs (bathing, care of mouth/teeth, toileting, grooming, dressing, etc.)  - Assess/evaluate cause of self-care deficits   - Assess range of motion  - Assess patient's mobility; develop plan if impaired  - Assess patient's need for assistive devices and provide as appropriate  - Encourage maximum independence but intervene and supervise when necessary  - Involve family in performance of ADLs  - Assess for home care needs following discharge   - Consider OT consult to assist with ADL evaluation and planning for discharge  - Provide patient education as appropriate  Outcome: Progressing    Problem: DISCHARGE PLANNING  Goal: Discharge to home or other facility with appropriate resources  Description: INTERVENTIONS:  - Identify barriers to discharge w/patient and caregiver  - Arrange for needed discharge resources and transportation as appropriate  - Identify discharge learning needs (meds, wound care, etc.)  - Arrange for interpretive services to assist at discharge as needed  - Refer to Case Management Department for coordinating discharge planning if the patient needs post-hospital services based on physician/advanced practitioner order or complex needs related to functional status, cognitive ability, or social support system  Outcome: Progressing     Problem: Nutrition/Hydration-ADULT  Goal: Nutrient/Hydration intake appropriate for improving, restoring or maintaining nutritional needs  Description: Monitor and assess patient's nutrition/hydration status for malnutrition. Collaborate with interdisciplinary team and initiate plan and interventions as ordered.  Monitor patient's weight  and dietary intake as ordered or per policy. Utilize nutrition screening tool and intervene as necessary. Determine patient's food preferences and provide high-protein, high-caloric foods as appropriate. INTERVENTIONS:- Monitor oral intake, urinary output, labs, and treatment plans- Assess nutrition and hydration status and recommend course of action- Evaluate amount of meals eaten- Assist patient with eating if necessary - Allow adequate time for meals- Recommend/ encourage appropriate diets, oral nutritional supplements, and vitamin/mineral supplements- Order, calculate, and assess calorie counts as needed- Recommend, monitor, and adjust tube feedings and TPN/PPN based on assessed needs- Assess need for intravenous fluids- Provide specific nutrition/hydration education as appropriate- Include patient/family/caregiver in decisions related to nutrition  Outcome: Progressing

## 2025-04-28 LAB
ALBUMIN SERPL BCG-MCNC: 3.6 G/DL (ref 3.5–5)
ALP SERPL-CCNC: 45 U/L (ref 34–104)
ALT SERPL W P-5'-P-CCNC: 10 U/L (ref 7–52)
ANION GAP SERPL CALCULATED.3IONS-SCNC: 6 MMOL/L (ref 4–13)
AST SERPL W P-5'-P-CCNC: 12 U/L (ref 13–39)
BASOPHILS # BLD AUTO: 0.04 THOUSANDS/ÂΜL (ref 0–0.1)
BASOPHILS NFR BLD AUTO: 1 % (ref 0–1)
BILIRUB SERPL-MCNC: 0.47 MG/DL (ref 0.2–1)
BUN SERPL-MCNC: 19 MG/DL (ref 5–25)
CALCIUM SERPL-MCNC: 9.1 MG/DL (ref 8.4–10.2)
CHLORIDE SERPL-SCNC: 106 MMOL/L (ref 96–108)
CO2 SERPL-SCNC: 26 MMOL/L (ref 21–32)
CREAT SERPL-MCNC: 1.16 MG/DL (ref 0.6–1.3)
EOSINOPHIL # BLD AUTO: 0.3 THOUSAND/ÂΜL (ref 0–0.61)
EOSINOPHIL NFR BLD AUTO: 4 % (ref 0–6)
ERYTHROCYTE [DISTWIDTH] IN BLOOD BY AUTOMATED COUNT: 13.2 % (ref 11.6–15.1)
GFR SERPL CREATININE-BSD FRML MDRD: 64 ML/MIN/1.73SQ M
GLUCOSE P FAST SERPL-MCNC: 92 MG/DL (ref 65–99)
GLUCOSE SERPL-MCNC: 92 MG/DL (ref 65–140)
HCT VFR BLD AUTO: 38.2 % (ref 36.5–49.3)
HGB BLD-MCNC: 12.6 G/DL (ref 12–17)
IMM GRANULOCYTES # BLD AUTO: 0.04 THOUSAND/UL (ref 0–0.2)
IMM GRANULOCYTES NFR BLD AUTO: 1 % (ref 0–2)
LYMPHOCYTES # BLD AUTO: 3.02 THOUSANDS/ÂΜL (ref 0.6–4.47)
LYMPHOCYTES NFR BLD AUTO: 35 % (ref 14–44)
MCH RBC QN AUTO: 29.7 PG (ref 26.8–34.3)
MCHC RBC AUTO-ENTMCNC: 33 G/DL (ref 31.4–37.4)
MCV RBC AUTO: 90 FL (ref 82–98)
MONOCYTES # BLD AUTO: 0.72 THOUSAND/ÂΜL (ref 0.17–1.22)
MONOCYTES NFR BLD AUTO: 8 % (ref 4–12)
NEUTROPHILS # BLD AUTO: 4.52 THOUSANDS/ÂΜL (ref 1.85–7.62)
NEUTS SEG NFR BLD AUTO: 51 % (ref 43–75)
NRBC BLD AUTO-RTO: 0 /100 WBCS
PLATELET # BLD AUTO: 229 THOUSANDS/UL (ref 149–390)
PMV BLD AUTO: 9.7 FL (ref 8.9–12.7)
POTASSIUM SERPL-SCNC: 4.3 MMOL/L (ref 3.5–5.3)
PROT SERPL-MCNC: 6.1 G/DL (ref 6.4–8.4)
RBC # BLD AUTO: 4.24 MILLION/UL (ref 3.88–5.62)
SODIUM SERPL-SCNC: 138 MMOL/L (ref 135–147)
WBC # BLD AUTO: 8.64 THOUSAND/UL (ref 4.31–10.16)

## 2025-04-28 PROCEDURE — 97530 THERAPEUTIC ACTIVITIES: CPT

## 2025-04-28 PROCEDURE — 99232 SBSQ HOSP IP/OBS MODERATE 35: CPT | Performed by: PHYSICAL MEDICINE & REHABILITATION

## 2025-04-28 PROCEDURE — 97116 GAIT TRAINING THERAPY: CPT

## 2025-04-28 PROCEDURE — 97535 SELF CARE MNGMENT TRAINING: CPT

## 2025-04-28 PROCEDURE — 80053 COMPREHEN METABOLIC PANEL: CPT

## 2025-04-28 PROCEDURE — 85025 COMPLETE CBC W/AUTO DIFF WBC: CPT

## 2025-04-28 PROCEDURE — 97110 THERAPEUTIC EXERCISES: CPT

## 2025-04-28 RX ADMIN — LOSARTAN POTASSIUM 50 MG: 50 TABLET, FILM COATED ORAL at 09:19

## 2025-04-28 RX ADMIN — PRAVASTATIN SODIUM 40 MG: 40 TABLET ORAL at 17:16

## 2025-04-28 RX ADMIN — CHOLECALCIFEROL TAB 25 MCG (1000 UNIT) 5000 UNITS: 25 TAB at 09:19

## 2025-04-28 RX ADMIN — FAMOTIDINE 20 MG: 20 TABLET, FILM COATED ORAL at 09:19

## 2025-04-28 RX ADMIN — TAMSULOSIN HYDROCHLORIDE 0.4 MG: 0.4 CAPSULE ORAL at 17:16

## 2025-04-28 RX ADMIN — METFORMIN ER 500 MG 1500 MG: 500 TABLET ORAL at 17:15

## 2025-04-28 RX ADMIN — OXYCODONE HYDROCHLORIDE AND ACETAMINOPHEN 500 MG: 500 TABLET ORAL at 09:19

## 2025-04-28 RX ADMIN — B-COMPLEX W/ C & FOLIC ACID TAB 1 TABLET: TAB at 09:19

## 2025-04-28 NOTE — PROGRESS NOTES
"Progress Note - Tobin Kerns 69 y.o. male MRN: [id number]    Unit/Bed#: [facility] 210-01 Encounter: [id number]        Subjective:   Patient seen and examined at bedside after reviewing the chart and discussing the case with the caring staff.      Patient examined at bedside.  Patient denies any acute symptoms.     On review of patient's labs from 4/28/2025.  Patient's CMP and CBC were within normal limits.    Physical Exam   Vitals: Blood pressure 118/64, pulse 63, temperature 97.7 °F (36.5 °C), temperature source Temporal, resp. rate 16, height 6' 5\" (1.956 m), weight (!) 137 kg (302 lb 4 oz), SpO2 97%.,Body mass index is 35.84 kg/m².  Constitutional: Patient in no acute distress.  HEENT: PERR, EOMI, MMM.  Cardiovascular: Normal rate and regular rhythm.    Pulmonary/Chest: Effort normal and breath sounds normal.   Abdomen: Soft, + BS, NT.    Assessment/Plan:  Tobin Kerns is a(n) 69 y.o. year old male who is s/p quadriceps tendon repair.     Cardiac with hx of HTN, HLD.  Patient is on losartan 50 mg daily, pravastatin 40 mg daily.  Impaired fasting glucose.  Hgb a1c 5.4% on 3/12/25.  Continue metformin XR 1500 mg daily with dinner.  Regular diet and d/c accucheks as blood sugar is well controlled.    BPH.  Continue Flomax 0.4 mg daily.  GERD.  Continue Pepcid 20 mg daily.  JOVANY.  On CPAP at bedtime.   Vitamin D deficiency.  Patient is on vitamin D3 5000 units daily.  Pain and bowel management.  As per physiatrist.  Quadriceps tendon repair.  Patient is receiving intensive PT OT as per physiatrist.  Orthopedic surgery follow-up 4/18 - cast was removed and TROM reapplied to allow for 10 degree knee flexion with nonambulatory exercises.  He is to be in full knee extension at all times with ambulation.  Can remove for hygenic purposes if needed.  No forced or aggressive knee flexion.  No straight leg raises.  Ambulate with walker.  He is to follow-up with orthopedic surgery in 3 weeks (5/12/25).    Anticipated date of " discharge.  TBD

## 2025-04-28 NOTE — NURSING NOTE
Patient continues to be MOD I on unit with walker. Reports no pain. Sitting upright for all meals. Right posterior achilles with scabbed area. Educated on safety being MOD I. Will continue to monitor and follow plan of care.

## 2025-04-28 NOTE — PROGRESS NOTES
04/28/25 1030   Pain Assessment   Pain Assessment Tool 0-10   Pain Score No Pain   Restrictions/Precautions   Precautions Fall Risk   RLE Weight Bearing Per Order WBAT   LLE Weight Bearing Per Order WBAT   Braces or Orthoses Knee brace  (TROM locked in extension with ambulation)   Oral Hygiene   Type of Assistance Needed Independent   Physical Assistance Level No physical assistance   Oral Hygiene CARE Score 6   Grooming   Able To Initiate Tasks;Comb/Brush Hair;Wash/Dry Face;Brush/Clean Teeth;Wash/Dry Hands   Findings stance   Shower/Bathe Self   Type of Assistance Needed Independent;Adaptive equipment   Physical Assistance Level No physical assistance   Comment doffed immobilizer and maintained extension of LE;used LHS and weight shifted   Shower/Bathe Self CARE Score 6   Bathing   Assessed Bath Style Shower   Able to Gather/Transport Yes   Able to Adjust Water Temperature Yes   Able to Wash/Rinse/Dry (body part) Left Arm;Right Arm;L Upper Leg;R Upper Leg;L Lower Leg/Foot;R Lower Leg/Foot;Chest;Abdomen;Perineal Area;Buttocks   Limitations Noted in ROM   Positioning Seated;Standing   Adaptive Equipment Longhand Sponge;Shower Seat;Hand Held Shower;Tub Bench   Tub/Shower Transfer   Limitations Noted In ROM   Adaptive Equipment Transfer Bench;Grab Bars   Assessed Tub/shower combo   Findings MI   Upper Body Dressing   Type of Assistance Needed Independent   Physical Assistance Level No physical assistance   Upper Body Dressing CARE Score 6   Lower Body Dressing   Type of Assistance Needed Independent;Adaptive equipment   Physical Assistance Level No physical assistance   Lower Body Dressing CARE Score 6   Putting On/Taking Off Footwear   Type of Assistance Needed Independent;Adaptive equipment   Physical Assistance Level No physical assistance   Putting On/Taking Off Footwear CARE Score 6   Picking Up Object   Type of Assistance Needed Independent;Adaptive equipment   Physical Assistance Level No physical assistance    Picking Up Object CARE Score 6   Dressing/Undressing Clothing   Able to  Obtain Clothing;Store removed clothing   Remove UB Clothes Button Shirt   Don UB Clothes Button Shirt   Remove LB Clothes Undergarment;Shorts;Socks   Don LB Clothes Shorts;Undergarment;Socks   Limitations Noted In ROM   Adaptive Equipment Reacher;Dressing Stick;Sock Aide   Positioning Supported Sit;Standing   Findings declined shoes   Sit to Stand   Type of Assistance Needed Independent   Physical Assistance Level No physical assistance   Sit to Stand CARE Score 6   Toileting Hygiene   Type of Assistance Needed Independent   Physical Assistance Level No physical assistance   Toileting Hygiene CARE Score 6   Toileting   Able to Pull Clothing down yes, up yes.   Able to Manage Clothing Closures Yes   Manage Hygiene Bladder;Bowel   Limitations Noted In ROM   Adaptive Equipment Grab Bar   Toilet Transfer   Type of Assistance Needed Independent;Adaptive equipment   Physical Assistance Level No physical assistance   Toilet Transfer CARE Score 6   Kitchen Mobility   Kitchen-Mobility Level Walker   Kitchen Activity Retrieve items;Transport items   Light Housekeeping   Light Housekeeping Level Walker   Light Housekeeping Level of Assistance Modified independent   Light Housekeeping completed laundry task; stripped bed   Cognition   Overall Cognitive Status WFL   Arousal/Participation Alert;Cooperative   Attention Within functional limits   Orientation Level Oriented X4   Memory Within functional limits   Following Commands Follows all commands and directions without difficulty   Additional Activities   Additional Activities Comments Functional mobility completed with RW with MI with knee immobilizer in place   Activity Tolerance   Activity Tolerance Patient tolerated treatment well   Assessment   Treatment Assessment OT session addressed ADL via shower level. Pt mamaged s/u and c/u Indep. Use of tub bench at MI level. Pt managed doff/don of  immobilizer Indep. Pt in need of a platform commode which will need to be placed outside of BR in pts environment due to spacial restrictions with current barriers of ROM deficits in R LE. Pt is currently unable to manage use of toilet in his BR due to use of TROM brace which is locked in extension for mobility.  (20 degrees knee flexion with nonambulatory exercises with PT)  Barriers include: R LE s/p quadricep rupture with surgical repair, Decreased ROM with use of immobilizer. Plan is contingent upon R LE ROM increasing in order for pt to be able to manage in personal home environment. Ortho f/u May 14.   Prognosis Good   Problem List Decreased strength;Decreased range of motion;Impaired balance   Plan   Treatment/Interventions ADL retraining;Functional transfer training;Patient/family training;Equipment eval/education;Compensatory technique education;OT   Progress Progressing toward goals   OT Therapy Minutes   OT Time In 1030   OT Time Out 1200   OT Total Time (minutes) 90   OT Mode of treatment - Individual (minutes) 90   OT Mode of treatment - Concurrent (minutes) 0   OT Mode of treatment - Group (minutes) 0   OT Mode of treatment - Co-treat (minutes) 0   OT Mode of Treatment - Total time(minutes) 90 minutes   OT Cumulative Minutes 1585   Therapy Time missed   Time missed? No

## 2025-04-28 NOTE — CASE MANAGEMENT
CM logged onto [email or web address], submitted authorization request for DME for bedside commode, and clinical information for further continued stay review.CM called  [employer], left a message of auth request submission via portal.

## 2025-04-28 NOTE — PROGRESS NOTES
"Progress Note - PMR   Name: Tobin Kerns 69 y.o. male I MRN: [id number]  Unit/Bed#: -01 I Date of Admission: 3/11/2025   Date of Service: 4/28/2025 I Hospital Day: 48     Assessment & Plan  Quadriceps muscle rupture, right, subsequent encounter  HPI:   He initially underwent quad tendon repair on 7/25/24 with Dr. Foster. He was discharged home with outpatient PT.  Later noted to have recurrent high-grade tear with extensor lag on exam and elected for operative management  January 2025 MRI: \"Prior quadriceps insertion repair with high-grade recurrent tear exhibiting up to 2.2 cm of tendon retraction.\"  On 3/6/25, he underwent revision of quad tendon repair with allograft augmentation  3/21: outpatient follow-up with Dr. Foster, new cast applied   3/28: follow-up with Dr. Foster, continue TTWB   4/4: follow-up with Dr. Foster (removed cast and reapplied long leg cast for an additional 2 weeks) and continue TTWB  4/11, no changes in plan  4/18 follow-up: cast removed, TROM with allowance of 10 degrees flexion per ortho, advanced to  WBAT   Plan:   PT and OT for 3 hours a day, 5-6 days a week   WBAT with TROM locked in extension (advanced on 4/18 with ortho, not included in documentation but verified with PA-C via secure chat)   Currently allowed 20 degrees of flexion  Advance to  30 degrees on 5/2, 40 degrees on 5/9 (verified with ortho PA-C on 4/24)   Pain: PRN Tylenol (monitor LFTs) and oxycodone discontinued on 3/17 due to minimal pain   Lovenox d/c on 4/7     Hypertension  Continue losartan 50 mg daily   Monitor BP   Mixed hyperlipidemia  Continue pravastatin 40 mg daily   Gastro-esophageal reflux disease without esophagitis  Continue famotidine 20 mg daily   Peripheral neuropathy  Patient is not diabetic   Monitor skin for new or worsening wounds   JOVANY on CPAP  Continue CPAP qHS  IFG (impaired fasting glucose)  Continue metformin 1500 mg daily   Seen by RD 3/12, diet adjusted to regular   3/12 " A1c: 5.4   Healed ulcer of left foot  Local wound care  Present on admission   Wound care RN consulted   BPH (benign prostatic hyperplasia)  Continue tamsulosin 0.4 mg daily   Patient currently voiding without difficulty   PRN PVRs if urinary retention is suspected   Impaired mobility and ADLs  Patient was evaluated by the rehabilitation team MD and an appropriate candidate for acute inpatient rehabilitation program at this time.  The patient will tolerate 3 hours/day 5 to 7 days/week of intensive physical and  occupational therapy   in order to obtain goals for community discharge  Due to the patient's functional Compared to their baseline level of function in addition to their ongoing medical needs, the patient would benefit from daily supervision from a rehabilitation physician as well as rehabilitation nursing to implement and adjust the medical as well as functional plan of care in order to meet the patient's goals.  Bladder: Monitor closely for urinary incontinence and/or retention. Monitor urine output and encourage spontaneous voids. If unable to void spontaneously, will monitor with PVR bladder scans and initiate CIC regimen.  Skin: Monitor for breakdown, frequent turns, pressure offloading  Sleep: Encourage sleep hygiene (routine that promotes healthy circadian rhythm,avoid caffeine later in the day, quiet/dark room at night, reduce electronic before bedtime)  Discharge date TBD  Wound of right ankle  Skin breakdown on R heel due to cast. Ortho removed approximately 1 inch of cast at posterior aspect on 3/11. Cast further trimmed on 4/11, revealing a new posterior ankle wound.   Local wound care  Continue to monitor   Present on admission to [facility]   Wound care RN consulted (last seen 4/22):  Skin Care orders:  1-Hydraguard to sacrum, buttocks bid and prn   2-EHOB / waffle  cushion when out of bed in chair.  3-Moisturize skin daily with skin nourishing cream  4-Elevate heels to offload pressure.  5.  Hydraguard to bilateral heels bid and prn       Subjective   Tobin Kerns is a 69 year-old male, with a past medical history of hypertension, hyperlipidemia, JOVANY, impaired fasting glucose, bilateral lower extremity neuropathy, GERD, BPH, presenting to [facility] after an elective right quadricept tendon repair. He initially underwent quad tendon repair on 7/25/24 with Dr. Foster. He was discharged home with outpatient PT. He was noted to have recurrent high-grade tear on repeat MRI with extensor lag on exam and elected for operative management. On 3/6/25, he underwent revision of quad tendon repair with allograft augmentation. He was evaluated by PT and OT and deemed an appropriate candidate for ARC due to functional deficits     Chief Complaint: f/u ambulatory dysfunction    Interval: Seen and evaluated patient in room. He denies any concerns. Last BM 4/26, continent of bowel and bladder. 4/28 labs reviewed.     Objective :  Temp:  [97.4 °F (36.3 °C)-97.7 °F (36.5 °C)] 97.7 °F (36.5 °C)  HR:  [59-63] 63  BP: (118)/(58-64) 118/64  Resp:  [16] 16  SpO2:  [96 %-97 %] 97 %  O2 Device: None (Room air)    Functional Update:  Physical Therapy Occupational Therapy   Weight Bearing Status: Weight Bearing as Tolerated (with TROM locked in extension with ambulation)  Transfers: Independent  Bed Mobility: Independent  Amulation Distance (ft): 200 feet  Ambulation: Independent  Assistive Device for Ambulation: Roller Walker  Wheelchair Mobility Distance:  (N/A)  Wheelchair Mobility:  (N/A)  Number of Stairs: 12  Assistive Device for Stairs: Lehft Hand Rail  Stair Assistance: Independent  Ramp: Independent  Assistive Device for Ramp: Roller Walker  Discharge Recommendations: Other  DC Home with::  (alternate placement)   Eating: Independent  Grooming: Independent  Bathing: Independent  Bathing: Independent  Upper Body Dressing: Independent  Lower Body Dressing: Independent  Toileting: Independent  Tub/Shower Transfer:  Independent  Toilet Transfer: Independent  Cognition: Within Defined Limits  Orientation: Person, Place, Time, Situation           Physical Exam  Vitals and nursing note reviewed.   Constitutional:       General: He is not in acute distress.     Appearance: He is obese. He is not ill-appearing, toxic-appearing or diaphoretic.   HENT:      Head: Normocephalic and atraumatic.      Nose: Nose normal.      Mouth/Throat:      Mouth: Mucous membranes are moist.   Pulmonary:      Effort: Pulmonary effort is normal. No respiratory distress.   Abdominal:      General: There is no distension.   Skin:     General: Skin is dry.   Neurological:      General: No focal deficit present.      Mental Status: He is alert.   Psychiatric:         Mood and Affect: Mood normal.         Behavior: Behavior normal.             Scheduled Meds:  Current Facility-Administered Medications   Medication Dose Route Frequency Provider Last Rate    acetaminophen  650 mg Oral Q6H PRN Modesta Lee PA-C      ascorbic acid  500 mg Oral Daily Toño Brito MD      Cholecalciferol  5,000 Units Oral Daily Toño Brito MD      famotidine  20 mg Oral Daily Toño Brito MD      losartan  50 mg Oral Daily Toño Brito MD      metFORMIN  1,500 mg Oral Daily With Dinner Toño Birto MD      multivitamin stress formula  1 tablet Oral Daily Tñoo Brito MD      polyethylene glycol  17 g Oral Daily PRN Toño Brito MD      pravastatin  40 mg Oral Daily With Dinner Toño Brito MD      senna  1 tablet Oral HS PRN Modesta Lee PA-C      tamsulosin  0.4 mg Oral Daily With Dinner Toño Brito MD           Lab Results: I have reviewed the following results:  Results from last 7 days   Lab Units 04/28/25  0529   HEMOGLOBIN g/dL 12.6   HEMATOCRIT % 38.2   WBC Thousand/uL 8.64   PLATELETS Thousands/uL 229     Results from last 7 days   Lab Units 04/28/25  0529   BUN mg/dL 19   SODIUM mmol/L 138   POTASSIUM mmol/L 4.3   CHLORIDE mmol/L 106   CREATININE mg/dL  1.16   AST U/L 12*   ALT U/L 10            Modesta Lee PA-C  Physical Medicine and Rehabilitation  [facility]

## 2025-04-28 NOTE — PLAN OF CARE
Problem: PAIN - ADULT  Goal: Verbalizes/displays adequate comfort level or baseline comfort level  Description: Interventions:  - Encourage patient to monitor pain and request assistance  - Assess pain using appropriate pain scale  - Administer analgesics based on type and severity of pain and evaluate response  - Implement non-pharmacological measures as appropriate and evaluate response  - Consider cultural and social influences on pain and pain management  - Notify physician/advanced practitioner if interventions unsuccessful or patient reports new pain  Outcome: Progressing     Problem: INFECTION - ADULT  Goal: Absence or prevention of progression during hospitalization  Description: INTERVENTIONS:  - Assess and monitor for signs and symptoms of infection  - Monitor lab/diagnostic results  - Monitor all insertion sites, i.e. indwelling lines, tubes, and drains  - Monitor endotracheal if appropriate and nasal secretions for changes in amount and color  - Manor appropriate cooling/warming therapies per order  - Administer medications as ordered  - Instruct and encourage patient and family to use good hand hygiene technique  - Identify and instruct in appropriate isolation precautions for identified infection/condition  Outcome: Progressing     Problem: SAFETY ADULT  Goal: Maintain or return to baseline ADL function  Description: INTERVENTIONS:  -  Assess patient's ability to carry out ADLs; assess patient's baseline for ADL function and identify physical deficits which impact ability to perform ADLs (bathing, care of mouth/teeth, toileting, grooming, dressing, etc.)  - Assess/evaluate cause of self-care deficits   - Assess range of motion  - Assess patient's mobility; develop plan if impaired  - Assess patient's need for assistive devices and provide as appropriate  - Encourage maximum independence but intervene and supervise when necessary  - Involve family in performance of ADLs  - Assess for home care  needs following discharge   - Consider OT consult to assist with ADL evaluation and planning for discharge  - Provide patient education as appropriate  Outcome: Progressing     Problem: Nutrition/Hydration-ADULT  Goal: Nutrient/Hydration intake appropriate for improving, restoring or maintaining nutritional needs  Description: Monitor and assess patient's nutrition/hydration status for malnutrition. Collaborate with interdisciplinary team and initiate plan and interventions as ordered.  Monitor patient's weight and dietary intake as ordered or per policy. Utilize nutrition screening tool and intervene as necessary. Determine patient's food preferences and provide high-protein, high-caloric foods as appropriate. INTERVENTIONS:- Monitor oral intake, urinary output, labs, and treatment plans- Assess nutrition and hydration status and recommend course of action- Evaluate amount of meals eaten- Assist patient with eating if necessary - Allow adequate time for meals- Recommend/ encourage appropriate diets, oral nutritional supplements, and vitamin/mineral supplements- Order, calculate, and assess calorie counts as needed- Recommend, monitor, and adjust tube feedings and TPN/PPN based on assessed needs- Assess need for intravenous fluids- Provide specific nutrition/hydration education as appropriate- Include patient/family/caregiver in decisions related to nutrition  Outcome: Progressing

## 2025-04-28 NOTE — PROGRESS NOTES
"   04/28/25 0700   Pain Assessment   Pain Assessment Tool 0-10   Pain Score No Pain   Restrictions/Precautions   Precautions Fall Risk   RLE Weight Bearing Per Order WBAT   ROM Restrictions No   Braces or Orthoses Knee brace  (TROM locked in extension with ambulation)   General   Change In Medical/Functional Status TROM brace to allow for 20 degrees knee flexion with nonambulatory exercises. Maintain in full knee extension at all times with ambulation. Can remove for hygenic purposes if needed. No forced or aggressive knee flexion No straight leg raises at this time Continue to ambulate with walker   Cognition   Overall Cognitive Status WFL   Arousal/Participation Alert;Responsive;Cooperative   Attention Within functional limits   Orientation Level Oriented X4   Memory Within functional limits   Following Commands Follows all commands and directions without difficulty   Subjective   Subjective \"It's Monday\"   Roll Left and Right   Type of Assistance Needed Independent   Physical Assistance Level No physical assistance   Roll Left and Right CARE Score 6   Sit to Lying   Type of Assistance Needed Independent   Physical Assistance Level No physical assistance   Sit to Lying CARE Score 6   Lying to Sitting on Side of Bed   Type of Assistance Needed Independent   Physical Assistance Level No physical assistance   Lying to Sitting on Side of Bed CARE Score 6   Sit to Stand   Type of Assistance Needed Independent   Physical Assistance Level No physical assistance   Comment RW   Sit to Stand CARE Score 6   Bed-Chair Transfer   Type of Assistance Needed Independent   Physical Assistance Level No physical assistance   Comment RW   Chair/Bed-to-Chair Transfer CARE Score 6   Car Transfer   Reason if not Attempted Environmental limitations   Car Transfer CARE Score 10   Walk 10 Feet   Type of Assistance Needed Independent   Physical Assistance Level No physical assistance   Comment RW   Walk 10 Feet CARE Score 6   Walk 50 Feet " with Two Turns   Type of Assistance Needed Independent   Physical Assistance Level No physical assistance   Comment RW   Walk 50 Feet with Two Turns CARE Score 6   Walk 150 Feet   Type of Assistance Needed Independent   Physical Assistance Level No physical assistance   Comment RW   Walk 150 Feet CARE Score 6   Walking 10 Feet on Uneven Surfaces   Type of Assistance Needed Independent   Physical Assistance Level No physical assistance   Comment RW, green foam   Walking 10 Feet on Uneven Surfaces CARE Score 6   Ambulation   Primary Mode of Locomotion Prior to Admission Walk   Distance Walked (feet) 180 ft  (180', 110')   Assist Device Roller Walker   Findings mod I level and unlevel surfaces with RW   Does the patient walk? 2. Yes   Wheel 50 Feet with Two Turns   Reason if not Attempted Activity not applicable   Wheel 50 Feet with Two Turns CARE Score 9   Wheel 150 Feet   Reason if not Attempted Activity not applicable   Wheel 150 Feet CARE Score 9   Wheelchair mobility   Findings N/A   Does the patient use a wheelchair? 0. No   Curb or Single Stair   Style negotiated Single stair   Type of Assistance Needed Independent   Physical Assistance Level No physical assistance   Comment 8 stairs, L HR   1 Step (Curb) CARE Score 6   4 Steps   Type of Assistance Needed Independent   Physical Assistance Level No physical assistance   Comment 8 stairs, L HR   4 Steps CARE Score 6   12 Steps   Reason if not Attempted Activity not applicable   12 Steps CARE Score 9   Toilet Transfer   Type of Assistance Needed Independent   Physical Assistance Level No physical assistance   Comment RW   Toilet Transfer CARE Score 6   Therapeutic Interventions   Strengthening seated LE TE (added gentle heel slides with TROM unlocked to 20 degrees knee flexion)   Other transfer training, gait training, stair training   Equipment Use   NuStep L6, 25 min, L LE only, B/L UE   Assessment   Treatment Assessment Pt agreeable to PT this AM, received  sitting upright at EOB. Added gentle seated heel slides with TROM at 20 degrees knee flexion with good tolerance, no pain or discomfort noted. Continued to challenge LE strength/endurance with seated LE TE and NuStep with good tolerance. Pt continues to be mod I for all functional mobility with RW. He remains limited by TROM and decreased R LE strength/ROM, unsafe for d/c home at this time due to inadequate home environment for TROM locked in extension. Will continue with current PT POC to improve deficits and promote return to PLOF.   Problem List Decreased strength;Decreased range of motion   PT Barriers   Physical Impairment Decreased strength;Decreased range of motion;Impaired balance   Functional Limitation Car transfers   Plan   Treatment/Interventions Functional transfer training;LE strengthening/ROM;Therapeutic exercise;Endurance training;Patient/family training;Equipment eval/education;Bed mobility;Gait training   Progress Progressing toward goals   PT Therapy Minutes   PT Time In 0700   PT Time Out 0830   PT Total Time (minutes) 90   PT Mode of treatment - Individual (minutes) 90   PT Mode of treatment - Concurrent (minutes) 0   PT Mode of treatment - Group (minutes) 0   PT Mode of treatment - Co-treat (minutes) 0   PT Mode of Treatment - Total time(minutes) 90 minutes   PT Cumulative Minutes 2007     Patient remains OOB in w/c, all needs in reach. Encouraged use of call bell, patient verbalizes understanding.

## 2025-04-28 NOTE — ASSESSMENT & PLAN NOTE
Skin breakdown on R heel due to cast. Ortho removed approximately 1 inch of cast at posterior aspect on 3/11. Cast further trimmed on 4/11, revealing a new posterior ankle wound.   Local wound care  Continue to monitor   Present on admission to [facility]   Wound care RN consulted (last seen 4/22):  Skin Care orders:  1-Hydraguard to sacrum, buttocks bid and prn   2-EHOB / waffle  cushion when out of bed in chair.  3-Moisturize skin daily with skin nourishing cream  4-Elevate heels to offload pressure.  5. Hydraguard to bilateral heels bid and prn

## 2025-04-29 PROCEDURE — 97535 SELF CARE MNGMENT TRAINING: CPT

## 2025-04-29 PROCEDURE — 97116 GAIT TRAINING THERAPY: CPT

## 2025-04-29 PROCEDURE — 97110 THERAPEUTIC EXERCISES: CPT

## 2025-04-29 PROCEDURE — 99232 SBSQ HOSP IP/OBS MODERATE 35: CPT

## 2025-04-29 PROCEDURE — 97530 THERAPEUTIC ACTIVITIES: CPT

## 2025-04-29 RX ADMIN — LOSARTAN POTASSIUM 50 MG: 50 TABLET, FILM COATED ORAL at 08:02

## 2025-04-29 RX ADMIN — TAMSULOSIN HYDROCHLORIDE 0.4 MG: 0.4 CAPSULE ORAL at 17:09

## 2025-04-29 RX ADMIN — METFORMIN ER 500 MG 1500 MG: 500 TABLET ORAL at 17:09

## 2025-04-29 RX ADMIN — FAMOTIDINE 20 MG: 20 TABLET, FILM COATED ORAL at 08:02

## 2025-04-29 RX ADMIN — CHOLECALCIFEROL TAB 25 MCG (1000 UNIT) 5000 UNITS: 25 TAB at 08:02

## 2025-04-29 RX ADMIN — B-COMPLEX W/ C & FOLIC ACID TAB 1 TABLET: TAB at 08:02

## 2025-04-29 RX ADMIN — OXYCODONE HYDROCHLORIDE AND ACETAMINOPHEN 500 MG: 500 TABLET ORAL at 08:02

## 2025-04-29 RX ADMIN — PRAVASTATIN SODIUM 40 MG: 40 TABLET ORAL at 17:09

## 2025-04-29 NOTE — PROGRESS NOTES
04/29/25 0900   Pain Assessment   Pain Assessment Tool 0-10   Pain Score No Pain   Restrictions/Precautions   Precautions Fall Risk   Oral Hygiene   Type of Assistance Needed Independent   Physical Assistance Level No physical assistance   Oral Hygiene CARE Score 6   Grooming   Able To Initiate Tasks;Acquire Items;Wash/Dry Face;Comb/Brush Hair;Brush/Clean Teeth;Wash/Dry Hands   Findings stance   Shower/Bathe Self   Type of Assistance Needed Independent;Adaptive equipment   Physical Assistance Level No physical assistance   Shower/Bathe Self CARE Score 6   Bathing   Assessed Bath Style Sponge Bath   Able to Gather/Transport Yes   Able to Adjust Water Temperature Yes   Able to Wash/Rinse/Dry (body part) Left Arm;Right Arm;L Upper Leg;R Upper Leg;L Lower Leg/Foot;R Lower Leg/Foot;Chest;Abdomen;Perineal Area;Buttocks   Limitations Noted in ROM   Positioning Seated;Standing   Adaptive Equipment Longhand Sponge   Upper Body Dressing   Type of Assistance Needed Independent   Physical Assistance Level No physical assistance   Upper Body Dressing CARE Score 6   Lower Body Dressing   Type of Assistance Needed Independent;Adaptive equipment   Physical Assistance Level No physical assistance   Lower Body Dressing CARE Score 6   Putting On/Taking Off Footwear   Type of Assistance Needed Independent;Adaptive equipment   Physical Assistance Level No physical assistance   Comment boot and sock   Putting On/Taking Off Footwear CARE Score 6   Picking Up Object   Type of Assistance Needed Independent;Adaptive equipment   Physical Assistance Level No physical assistance   Picking Up Object CARE Score 6   Dressing/Undressing Clothing   Able to  Obtain Clothing;Store removed clothing   Remove UB Clothes Button Shirt   Don UB Clothes Button Shirt   Remove LB Clothes Undergarment;Shorts;Socks   Don LB Clothes Shorts;Undergarment;Socks   Limitations Noted In ROM   Adaptive Equipment Reacher;Dressing Stick;Sock Aide   Positioning  Supported Sit;Standing   Sit to Stand   Type of Assistance Needed Independent   Physical Assistance Level No physical assistance   Sit to Stand CARE Score 6   Toileting Hygiene   Type of Assistance Needed Independent   Physical Assistance Level No physical assistance   Toileting Hygiene CARE Score 6   Toileting   Able to Pull Clothing down yes, up yes.   Able to Manage Clothing Closures Yes   Manage Hygiene Bladder   Limitations Noted In ROM   Adaptive Equipment Grab Bar   Toilet Transfer   Type of Assistance Needed Independent   Physical Assistance Level No physical assistance   Toilet Transfer CARE Score 6   Toilet Transfer   Surface Assessed Standard Toilet   Transfer Technique Standard   Limitations Noted In ROM   Adaptive Equipment Grab Bar;Walker   Light Housekeeping   Light Housekeeping Level Walker   Light Housekeeping Level of Assistance Modified independent   Cognition   Overall Cognitive Status WFL   Arousal/Participation Alert;Cooperative   Attention Within functional limits   Orientation Level Oriented X4   Memory Within functional limits   Following Commands Follows all commands and directions without difficulty   Additional Activities   Additional Activities Comments Functional mobility completed with RW with MI with knee immobilizer in place   Activity Tolerance   Activity Tolerance Patient tolerated treatment well   Assessment   Treatment Assessment OT session addressed ADL via sponge bathe level. Pt managed s/u and c/u Indep. Pt managed doff/don of immobilizer Indep.  Current barriers of ROM deficits in R LE, use of TROM brace which is locked in extension for mobility.  (20 degrees knee flexion with nonambulatory exercises with PT)  Plan is contingent upon R LE ROM increasing in order for pt to be able to manage in personal home environment. Ortho f/u May 14.   Prognosis Good   Problem List Decreased strength;Decreased range of motion;Impaired balance   Plan   Treatment/Interventions ADL  retraining;Functional transfer training;Gait training;OT;Patient/family training;Equipment eval/education   Progress Progressing toward goals   OT Therapy Minutes   OT Time In 0900   OT Time Out 1015   OT Total Time (minutes) 75   OT Mode of treatment - Individual (minutes) 75   OT Mode of treatment - Concurrent (minutes) 0   OT Mode of treatment - Group (minutes) 0   OT Mode of treatment - Co-treat (minutes) 0   OT Mode of Treatment - Total time(minutes) 75 minutes   OT Cumulative Minutes 1660   Therapy Time missed   Time missed? No

## 2025-04-29 NOTE — PROGRESS NOTES
"PT TREATMENT       04/29/25 1030   Pain Assessment   Pain Assessment Tool 0-10   Pain Score No Pain   Restrictions/Precautions   Precautions Fall Risk   Weight Bearing Restrictions Yes   RLE Weight Bearing Per Order WBAT   Braces or Orthoses Knee brace  (TROM locked in extension with ambulation)   General   Change In Medical/Functional Status Per chart review: TROM brace to allow for 20 degrees knee flexion with nonambulatory exercises. Maintain in full knee extension at all times with ambulation. Can remove for hygenic purposes if needed. No forced or aggressive knee flexion No straight leg raises at this time Continue to ambulate with walker   Cognition   Overall Cognitive Status WFL   Arousal/Participation Alert;Responsive;Cooperative   Attention Within functional limits   Orientation Level Oriented X4   Memory Within functional limits   Following Commands Follows all commands and directions without difficulty   Comments Pt agreeable to PT session   Subjective   Subjective \"I do pretty good\"   Roll Left and Right   Comment not tested this date as pt out of bed at start/end of session   Sit to Lying   Comment not tested this date as pt out of bed at start/end of session   Lying to Sitting on Side of Bed   Comment not tested this date as pt out of bed at start/end of session   Sit to Stand   Type of Assistance Needed Independent   Physical Assistance Level No physical assistance   Comment RW   Sit to Stand CARE Score 6   Bed-Chair Transfer   Comment not tested this date as pt out of bed at start/end of session   Transfer Bed/Chair/Wheelchair   Limitations Noted In LE Strength   Adaptive Equipment Roller Walker   Sit to Stand Modified Independent   Stand to Sit Modified Independent   Car Transfer   Reason if not Attempted Environmental limitations   Car Transfer CARE Score 10   Walk 10 Feet   Type of Assistance Needed Independent   Physical Assistance Level No physical assistance   Comment RW   Walk 10 Feet CARE " Score 6   Walk 50 Feet with Two Turns   Type of Assistance Needed Independent   Physical Assistance Level No physical assistance   Comment RW   Walk 50 Feet with Two Turns CARE Score 6   Walk 150 Feet   Type of Assistance Needed Independent   Physical Assistance Level No physical assistance   Comment RW   Walk 150 Feet CARE Score 6   Walking 10 Feet on Uneven Surfaces   Type of Assistance Needed Independent   Physical Assistance Level No physical assistance   Comment RW. Uneven surfaces outside (incline, decline, cobble stone) no overt LOB in any direction   Walking 10 Feet on Uneven Surfaces CARE Score 6   Ambulation   Primary Mode of Locomotion Prior to Admission Walk   Distance Walked (feet)   (150' x2 level surfaces)   Assist Device Roller Walker   Gait Pattern Slow Ghazal  (R LE maintained in extension with TROM brace)   Walk Assist Level Modified Independent   Does the patient walk? 2. Yes   Wheel 50 Feet with Two Turns   Reason if not Attempted Activity not applicable   Wheel 50 Feet with Two Turns CARE Score 9   Wheel 150 Feet   Reason if not Attempted Activity not applicable   Wheel 150 Feet CARE Score 9   Wheelchair mobility   Does the patient use a wheelchair? 0. No   Curb or Single Stair   Comment not tested this date   4 Steps   Type of Assistance Needed Independent   Physical Assistance Level No physical assistance   Comment L hand rail, 8 steps. non recopricol   Reason if not Attempted    12 Steps CARE Score Activity not applicable   Stairs   Type Stairs   # of Steps 8   Weight Bearing Precautions WBAT  (RLE with TROM brace locked in extension)   Assist Devices Single Rail   Therapeutic Interventions   Strengthening Seated LE TE 3x10 reps.Performed on LLE only. Exercises included: hamstring curls (blue TB), LAQ, hip abduction, hip adduction, seated march   Assessment   Treatment Assessment Pt s/p Right knee quad tendon revision repair with allograft augmentation performed on 03/06/202; pt now WBAT  RLE with TROM brace locked in extension during ambulation. Pt participatory throughout session and tolerated well with 0/10 pain at rest and with mobility. Pt demonstrates good understanding and importance of maintaining brace in extension during standing mobility. Pt participated in ambulation on level and unlevel surfaces this date with use of rolling walker and complete at [place] level without overt LOB in any direction. Pt requires skilled PT intervention to address impairments, decrease fall risk, and maximize functional mobility.   Family/Caregiver Present No   Problem List Decreased strength;Decreased endurance;Decreased range of motion;Impaired balance;Decreased mobility;Orthopedic restrictions   Barriers to Discharge Inaccessible home environment;Decreased caregiver support   PT Barriers   Physical Impairment Decreased range of motion;Decreased strength;Decreased endurance;Impaired balance;Decreased mobility;Orthopedic restrictions   Functional Limitation Car transfers   Plan   Treatment/Interventions Functional transfer training;Elevations;Therapeutic exercise;Endurance training;Patient/family training;Bed mobility;Gait training   Progress Progressing toward goals   PT Therapy Minutes   PT Time In 1030   PT Time Out 1200   PT Total Time (minutes) 90   PT Mode of treatment - Individual (minutes) 90   PT Mode of treatment - Concurrent (minutes) 0   PT Mode of treatment - Group (minutes) 0   PT Mode of treatment - Co-treat (minutes) 0   PT Mode of Treatment - Total time(minutes) 90 minutes   PT Cumulative Minutes 2097   Therapy Time missed   Time missed? No      Apixaban/Eliquis - Compliance/Apixaban/Eliquis - Dietary Advice/Apixaban/Eliquis - Follow up monitoring/Apixaban/Eliquis - Potential for adverse drug reactions and interactions

## 2025-04-29 NOTE — TEAM CONFERENCE
Acute RehabilitationTeam Conference Note  Date: 04/22/2025   Time: 10:54AM      Patient Name:  Tobin Kerns       Medical Record Number: [id number]   YOB: 1955  Sex: Male          Room/Bed:  /[facility] 210-01  Payor Info:  Payor: WORKERS COMPENSATION / Plan: [facility] DEPTMENT OF LABOR / Product Type: Workers Compensation /      Admitting Diagnosis: Quadriceps muscle rupture [S76.119A]   Admit Date/Time:  3/11/2025  3:20 PM  Admission Comments: No comment available     Primary Diagnosis:  Quadriceps muscle rupture, right, subsequent encounter  Principal Problem: Quadriceps muscle rupture, right, subsequent encounter    Patient Active Problem List    Diagnosis Date Noted    BPH (benign prostatic hyperplasia) 03/12/2025    Impaired mobility and ADLs 03/12/2025    Wound of right ankle 03/12/2025    Quadriceps tendon rupture, right, sequela 03/07/2025    Obesity (BMI 35.0-39.9 without comorbidity) 03/04/2025    Onychomycosis 12/30/2024    Urinary retention 07/27/2024    Quadriceps muscle rupture, right, subsequent encounter 07/25/2024    Ambulatory dysfunction 07/23/2024    Abdominal aortic ectasia (HCC) 11/13/2023    Healed ulcer of left foot 02/23/2022    IFG (impaired fasting glucose) 10/01/2021    Hypertension 09/27/2021    Peripheral neuropathy 09/27/2021    Drusen (degenerative) of macula, bilateral 09/27/2021    Myopia, bilateral 09/27/2021    Pinguecula, bilateral 09/27/2021    Regular astigmatism, bilateral 09/27/2021    JOVANY on CPAP     History of colon polyps     Pure hypertriglyceridemia 08/19/2019    Mixed hyperlipidemia 05/30/2019    Gastro-esophageal reflux disease without esophagitis 05/30/2019    Presbyopia 10/11/2016       Physical Therapy:    Weight Bearing Status: Weight Bearing as Tolerated (TROM in extn for gait; 20 degrees ROM in TROM)  Transfers: Independent  Bed Mobility: Independent  Amulation Distance (ft): 500 feet  Ambulation: Independent  Assistive Device for Ambulation:  Roller Walker  Wheelchair Mobility Distance:  (N/A)  Wheelchair Mobility:  (N/A)  Number of Stairs: 16  Assistive Device for Stairs: Lehft Hand Rail  Stair Assistance: Independent  Ramp: Independent  Assistive Device for Ramp: Roller Walker  Discharge Recommendations: Home with:  DC Home with::  (Home vs alternative placement)    03/18/2025:  Patient being followed by PT, making good progress.  Presents, following R quadriceps muscle rupture, now TTWB R LE in cast, with decreased ROM/strength, decreased balance and safety, decreased endurance, and pain.   Patient  mod I bed mobility, mod I transfers with RW, mod I short distances, S >50' ambulation with RW, S negotiation of 6 steps with L handrail.  Patient would benefit from continued inpatient ARC PT to increase function, safety, and increased independence in prep for safe d/c to home.    3/25/2025:  Patient being followed by PT, making good progress.  Presents, following R quadriceps muscle rupture, now TTWB R LE in cast, with decreased ROM/strength, decreased balance and safety, decreased endurance, and pain.   Patient  mod I bed mobility, mod I transfers with RW, mod I ambulation with RW up to 180ft, mod I negotiation of 6 steps with L handrail.  Patient would benefit from continued ARC PT to increase function, safety, and increased independence in prep for safe d/c to home.     04/01/2025:  Patient being followed by PT, making good progress.  Presents, following R quadriceps muscle rupture, now TTWB R LE in cast, with decreased ROM/strength, decreased balance and safety, decreased endurance, and pain.   Patient  mod I bed mobility, mod I transfers with RW, mod I ambulation with RW up to 180ft, mod I negotiation of 6 steps with L handrail.  Patient would benefit from continued ARC PT to increase function, safety, and increased independence in prep for safe d/c to home.     04/08/2025:  Patient being followed by PT, making good progress.  Presents, following R  quadriceps muscle rupture, now TTWB R LE in cast, with decreased ROM/strength, decreased balance and safety, decreased endurance, and pain.   Patient  mod I bed mobility, mod I transfers with RW, mod I ambulation with RW up to 200ft, mod I negotiation of 6 steps with L handrail.  Patient would benefit from continued ARC PT to increase function, safety, and increased independence in prep for safe d/c to home.     04/15/2025:  Patient being followed by PT, making good progress.  Presents, following R quadriceps muscle rupture, now TTWB R LE in cast, with decreased ROM/strength, decreased balance and safety, decreased endurance, and pain.   Patient  mod I bed mobility, mod I transfers with RW, mod I ambulation with RW up to 200ft, mod I negotiation of 7 steps with L handrail.  Patient would benefit from continued ARC PT to increase function, safety, and increased independence in prep for safe d/c to home.     04/22/2025:  Patient being followed by PT, making good progress.  Presents, following R quadriceps muscle rupture, now WBAT R LE in TROM locked in extension with ambulation, unlocked to 10 degress with rest, no forceful/aggressive knee flexion, no active straight leg raise R LE, with decreased ROM/strength, decreased balance and safety, decreased endurance, and pain.   Patient  mod I bed mobility, mod I transfers with RW, mod I ambulation with RW up to 200ft, mod I negotiation of 7 steps with L handrail.  Patient would benefit from continued ARC PT to increase function, safety, and increased independence in prep for safe d/c to home.     04/29/2025:  Patient being followed by PT, making good progress.  Presents, following R quadriceps muscle rupture, now WBAT R LE in TROM locked in extension with ambulation, unlocked to 20 degress with rest, no forceful/aggressive knee flexion, no active straight leg raise R LE, with decreased ROM/strength, decreased balance and safety, decreased endurance, and pain.   Patient   mod I bed mobility, mod I transfers with RW, mod I ambulation with RW up to 200ft, mod I negotiation of 7 steps with L handrail.  Patient would benefit from continued ARC PT to increase function, safety, and increased independence in prep for safe d/c to home.     Occupational Therapy:  Eating: Independent  Grooming: Independent  Bathing: Independent  Bathing: Independent  Upper Body Dressing: Independent  Lower Body Dressing: Independent  Toileting: Independent  Tub/Shower Transfer: Independent  Toilet Transfer: Independent  Cognition: Within Defined Limits  Orientation: Person, Place, Time, Situation  Discharge Recommendations: Home with: (OPT PT)  DC Home with:: First Floor Setup       03/17/25 Pt participating in Adls/iADLs, safety with functional txfs and mobility, TE/TA for generalized strength and endurance. Pts LOF noted above. Pt is progressing toward OT goals. Pt's barriers to d/c include decreased strength throughout but especially RLE s/p quadricepts rupture, decreased ROM, decreased balance TTWB RLE. Pt would benefit from continued skilled OT services in order to address listed barriers and prepare for safe d/c.     03/24/25 Pt participating in Adls/iADLs, safety with functional txfs and mobility, TE/TA for generalized strength and endurance. Pts LOF noted above. Pt continues to be consistently MI with all ADLs.  Pt's barriers to d/c include decreased strength  RLE s/p quadricepts rupture, decreased ROM, decreased balance due to TTWB RLE. Plan is contingent upon placement.     03/31/25 Pt participating in Adls/iADLs, safety with functional txfs and mobility, TE/TA for generalized strength and endurance. Pts LOF noted above. Pt continues to be consistently MI with all ADLs.  Pt's barriers to d/c include decreased strength  RLE s/p quadricepts rupture, decreased ROM, decreased balance due to TTWB RLE. Plan is contingent upon placement.     04/07/25 Pt participating in Adls/iADLs, safety with functional  txfs and mobility, TE/TA for generalized strength and endurance. Pts LOF noted above. Pt continues to be consistently MI with all ADLs.  Pt's barriers to d/c include decreased strength  RLE s/p quadricepts rupture, decreased ROM, decreased balance due to TTWB RLE. Plan is contingent upon placement.     04/14/25 Pt participating in Adls/iADLs, safety with functional txfs and mobility, TE/TA for generalized strength and endurance. Pts LOF noted above. Pt continues to be consistently MI with all ADLs.  Progress has plateaued due to the following limitations: decreased strength and ROM RLE s/p quadricepts rupture, TTWB RLE. Plan is contingent upon placement.     04/21/25 Pt participating in ADLs/iADLS, functional txfs and mobility: Effective 4/18 pt allowed to WBAT with  immobilizer set at 10* to R LE. Pt tolerating change in WB status without difficulty for ADL/iADL purposes. Pt continues to manage at MI level. Pt also completing TE/TA for generalized strength and endurance. Pts LOF noted above. Plan is to continue with OT, pt now awaiting increase in ROM with use of immobilizer and d/c to home planned.  (Addendum: brace remains locked in extension)     04/28/25 Pt participating in ADLs/iADLS, functional txfs and mobility, TE/TA.  Pt continues to have R LE  immobilizer locked in extension. Pt is tolerating  WBAT status without difficulty for ADL/iADL purposes. Pt continues to manage at MI level. Pt also completing TE/TA for generalized strength and endurance. Pts LOF noted above. Plan is to continue with OT awaiting increase in ROM with use of immobilizer and d/c to home planned.         Speech Therapy:           No notes on file    Nursing Notes:  Appetite: Good  Diet Type: Regular/House                      Diet Patient/Family Education Complete: Yes    Type of Wound (LDA):  (healed incision left knee, right posterior achilles scab INDY)                    Type of Wound Patient/Family Education: No (skin  intact)  Bladder: Continent     Bladder Patient/Family Education: Yes  Bowel: Continent     Bowel Patient/Family Education: Yes  Pain Location/Orientation: Orientation: Right, Location: Leg  Pain Score: 0                       Hospital Pain Intervention(s): Declines  Pain Patient/Family Education: Yes  Medication Management/Safety  Injectable: Lovenox  Safe Administration:  (no injections)  Reason for non-safe administration: not attempted  Medication Patient/Family Education Complete: Yes    3/18/25 Patient was admitted to [facility] following a right quadriceps muscle rupture with revision surgery performed.  Patient with a hard cast to the right lower extremity and is to maintain toe-touch weight bearing restriction to that limb.  Lungs are clear diminished on room air.  Patient wears own CPAP unit from home overnight as ordered for sleep apnea.  Patient is continent of bowel and bladder.  Every other day dressing changes to the right lower leg where cast was rubbing and to the left plantar foot.  Patient is now modified independent to the bathroom with a walker.      3/25/2025  A/O x 4, Pt. Is Mod I TTWB RLE with long leg cast using RW. Has decreased sensation to B/L lower ext which he has had prior, continues to have dressing changes every other day to RLE where previous cast was rubbing and wounds to left plantar foot that he had PTA. Continues to wear his CPAP from home.     3/31/25 Patient remains alert and oriented.  Right lower extremity with a leg cast in place at all times as ordered.  Patient is to follow up with ortho on Friday for further cast care.  Every other day dressing changes to the right posterior ankle and left plantar foot.  Patient remains modified independent to the bathroom with a walker.  Patient to maintain toe touch weight bearing status to the right lower extremity.      4/7/25 Patient remains alert and oriented.  Right lower extremity cast in place at all times.  Patient to maintain toe  touch weight bearing restriction to the right lower extremity at all times.  Every other day dressing changes to the right posterior ankle.  Left plantar foot wound healed and left open to air.  Patient was modified independent to the bathroom overnight with walker in use.      4/15/25 Patient remains alert and oriented.  Right lower extremity cast in place at all times.  Patient to follow up with ortho this Friday.  Patient to maintain toe touch weight bearing restriction to the right lower extremity at all times.  Daily dressing changes to the right heel wound.  Patient remains modified independent to the bathroom with walker in use.      4/21/25 Alert and oriented. Lungs clear on RA. HR regular. RLE cast was removed and immobilizer now in place at 10 degrees. R ankle area healed and INDY. L plantar foot wound healed and INDY. Continent of bowel and bladder. Pt is Mod I. LC    4/28/25 Alert and oriented. Lungs clear on RA. HR regular. Immobilizer in place to RLE. Skin is intact. Continent of bowel and bladder. Pt is mod I on unit.     Case Management:     Discharge Planning  Living Arrangements: Lives Alone  Support Systems: Self  Assistance Needed: unknown  Type of Current Residence: Private residence  Current Home Care Services: No  Patient [facility] after inpatient hospital stay for right knee revision quad tendon repair on 3/6/25. Patient lives alone in a mobile home, 4 [address],. Patient independent PTA, driving. Original injury is from July 2024, had repair with a knee immobilizer. Did not work. Revised . Workmen's comp. Patient at Mod I level, looking into alternate disposition, [facility]. Workmen's comp  3/25/25 Having difficulty getting any assistance from [employer] workmen's comp. Patient for discharge today 3/25 waiting to establish home care, where he is going on d/c, transportation.  4/1/25 Emailed and sent fax trying to get assistance with discharge planning from workmen's comp. My  need to have patient pay out of pocket. Last covered day 3/31.  4/8/25 Waiting on [facility] babbels comp, who is getting auth for inpatient hospital stay, will not be able to assist with discharge planning until that is done.  0415/2025: Call placed last week to [facility] billing office to inquire if authorization was obtained for pt's surgery in March.   Per Radha Cho had started the process and was waiting for validation to progress with surgery auth.    Recalled office today and spoke with Regina, who reports they are still waiting for validation.  Until this is completed, the [facility] babbels comp Rep, Ceasar, cannot assist with d/c planning from [facility].  Patient with ortho appt 4/18/2025 at 0930.  Transportation set up for 0815.  04/22/2025:  Received phone call from Deanne Martinez that authorization had been obtained for pt's 03/06/2025 surgery.   CM then placed call to [facility] examiner, Ceasar with request to return call to discuss getting approval for d/c planning, DME, and disposition.   Patient's next ortho visit is 5/12/2025 at 2:15pm.  Will need transport set up.  4/28/25 CM submitted auth request for DME, and updated clinical information, for stay here at [facility].      Is the patient actively participating in therapies? yes  List any modifications to the treatment plan: na    Barriers Interventions   Increased WBAT right LE with hinged brace locked in ext ambulating following ortho follow up, R distal achillis shearing injury ADL training, transfer, gait training, wound management and follow up appointments weekly   Decreased ROM due to brace Orthopedic following   3 flights of steps to enter house Stair training              Is the patient making expected progress toward goals? yes  List any update or changes to goals: na    Medical Goals: Patient will be able to manage medical conditions and comorbid conditions with medications and follow up upon completion of rehab program    Weekly Team Goals:   Rehab Team  Goals  ADL Team Goal: Patient will be independent with ADLs with least restrictive device upon completion of rehab program  Bowel/Bladder Team Goal: Patient will return to premorbid level for bladder/bowel management upon completion of rehab program  Transfer Team Goal: Patient will be independent with transfers with least restrictive device upon completion of rehab program  Locomotion Team Goal: Patient will be independent with locomotion with least restrictive device upon completion of rehab program    Mod I self care  Mod I bed mobility, transfers, and mobility  Mod I 3 flights of stairs     Health and wellness: Pt will be able to return to driving and enjoys playing video game.     Discussion:  Pt is mod I with all ADLs and mobility using RW in accessible environment although continues to wait on discharge planning for a safe transition to home to [facility] vs apartment in [place].  Now WBAT right LE with hinged brace locked in ext ambulating and able to complete active ROM 10* without WB. Transition to [facility] limited by small areas and limitations of hinged leg brace.  Patient is required to be able to complete three flights of stairs to enter apartment in [place].  Pt will benefit RW, [facility] bench and BSC. Recommend [facility] for PT and OT at [place].  Patient will benefit from assistance for transport home.    Anticipated Discharge Date:  May 5, 2025  [facility] Team Members Present:  The following team members are supervising care for this patient and were present during this Weekly Team Conference.    MD Onelia Vasquez, RN  Mehnaz Bach, [place] and ROBSON ZhouN, RN  Mikayla Naylor, PT  Gisell Mejía, OTR/L and Lilian Fitch OTR/L

## 2025-04-29 NOTE — PROGRESS NOTES
"Progress Note - PMR   Name: Tobin Kerns 69 y.o. male I MRN: [id number]  Unit/Bed#: -01 I Date of Admission: 3/11/2025   Date of Service: 4/29/2025 I Hospital Day: 49     Assessment & Plan  Quadriceps muscle rupture, right, subsequent encounter  HPI:   He initially underwent quad tendon repair on 7/25/24 with Dr. Foster. He was discharged home with outpatient PT.  Later noted to have recurrent high-grade tear with extensor lag on exam and elected for operative management  January 2025 MRI: \"Prior quadriceps insertion repair with high-grade recurrent tear exhibiting up to 2.2 cm of tendon retraction.\"  On 3/6/25, he underwent revision of quad tendon repair with allograft augmentation  3/21: outpatient follow-up with Dr. Foster, new cast applied   3/28: follow-up with Dr. Foster, continue TTWB   4/4: follow-up with Dr. Foster (removed cast and reapplied long leg cast for an additional 2 weeks) and continue TTWB  4/11, no changes in plan  4/18 follow-up: cast removed, TROM with allowance of 10 degrees flexion per ortho, advanced to  WBAT   Plan:   PT and OT for 3 hours a day, 5-6 days a week   WBAT with TROM locked in extension (advanced on 4/18 with ortho, not included in documentation but verified with PA-C via secure chat)   Currently allowed 20 degrees of flexion  Advance to  30 degrees on 5/2, 40 degrees on 5/9 (verified with ortho PA-C on 4/24)   Pain: PRN Tylenol (monitor LFTs) and oxycodone discontinued on 3/17 due to minimal pain   Lovenox d/c on 4/7     Hypertension  Continue losartan 50 mg daily   Monitor BP   Mixed hyperlipidemia  Continue pravastatin 40 mg daily   Gastro-esophageal reflux disease without esophagitis  Continue famotidine 20 mg daily   Peripheral neuropathy  Patient is not diabetic   Monitor skin for new or worsening wounds   JOVANY on CPAP  Continue CPAP qHS  IFG (impaired fasting glucose)  Continue metformin 1500 mg daily   Seen by RD 3/12, diet adjusted to regular   3/12 " A1c: 5.4   Healed ulcer of left foot  Local wound care  Present on admission   Wound care RN consulted   BPH (benign prostatic hyperplasia)  Continue tamsulosin 0.4 mg daily   Patient currently voiding without difficulty   PRN PVRs if urinary retention is suspected   Impaired mobility and ADLs  Patient was evaluated by the rehabilitation team MD and an appropriate candidate for acute inpatient rehabilitation program at this time.  The patient will tolerate 3 hours/day 5 to 7 days/week of intensive physical and  occupational therapy   in order to obtain goals for community discharge  Due to the patient's functional Compared to their baseline level of function in addition to their ongoing medical needs, the patient would benefit from daily supervision from a rehabilitation physician as well as rehabilitation nursing to implement and adjust the medical as well as functional plan of care in order to meet the patient's goals.  Bladder: Monitor closely for urinary incontinence and/or retention. Monitor urine output and encourage spontaneous voids. If unable to void spontaneously, will monitor with PVR bladder scans and initiate CIC regimen.  Skin: Monitor for breakdown, frequent turns, pressure offloading  Sleep: Encourage sleep hygiene (routine that promotes healthy circadian rhythm,avoid caffeine later in the day, quiet/dark room at night, reduce electronic before bedtime)  Anticipated Discharge Date:  May 5, 2025 with [facility] PT and OT   Wound of right ankle  Skin breakdown on R heel due to cast. Ortho removed approximately 1 inch of cast at posterior aspect on 3/11. Cast further trimmed on 4/11, revealing a new posterior ankle wound.   Local wound care  Continue to monitor   Present on admission to [facility]   Wound care RN consulted (last seen 4/22):  Skin Care orders:  1-Hydraguard to sacrum, buttocks bid and prn   2-EHOB / waffle  cushion when out of bed in chair.  3-Moisturize skin daily with skin nourishing  cream  4-Elevate heels to offload pressure.  5. Hydraguard to bilateral heels bid and prn       Subjective   Tobin Kerns is a 69 year-old male, with a past medical history of hypertension, hyperlipidemia, JOVANY, impaired fasting glucose, bilateral lower extremity neuropathy, GERD, BPH, presenting to [facility] after an elective right quadricept tendon repair. He initially underwent quad tendon repair on 7/25/24 with Dr. Foster. He was discharged home with outpatient PT. He was noted to have recurrent high-grade tear on repeat MRI with extensor lag on exam and elected for operative management. On 3/6/25, he underwent revision of quad tendon repair with allograft augmentation. He was evaluated by PT and OT and deemed an appropriate candidate for ARC due to functional deficits        Chief Complaint: f/u ambulatory dysfunction    Interval:   Seen and evaluated patient in room. He denies any concerns. Last BM 4/26, continent of bowel and bladder.     Objective :  Temp:  [97.4 °F (36.3 °C)-97.7 °F (36.5 °C)] 97.4 °F (36.3 °C)  HR:  [62-73] 73  BP: (101-127)/(60-73) 127/73  Resp:  [16] 16  SpO2:  [96 %] 96 %  O2 Device: None (Room air)    Functional Update:  Physical Therapy Occupational Therapy   Weight Bearing Status: Weight Bearing as Tolerated (TROM in extn for gait; 20 degrees ROM in TROM)  Transfers: Independent  Bed Mobility: Independent  Amulation Distance (ft): 500 feet  Ambulation: Independent  Assistive Device for Ambulation: Roller Walker  Wheelchair Mobility Distance:  (N/A)  Wheelchair Mobility:  (N/A)  Number of Stairs: 16  Assistive Device for Stairs: Lehft Hand Rail  Stair Assistance: Independent  Ramp: Independent  Assistive Device for Ramp: Roller Walker  Discharge Recommendations: Home with:  DC Home with::  (Home vs alternative placement)   Eating: Independent  Grooming: Independent  Bathing: Independent  Bathing: Independent  Upper Body Dressing: Independent  Lower Body Dressing: Independent  Toileting:  Independent  Tub/Shower Transfer: Independent  Toilet Transfer: Independent  Cognition: Within Defined Limits  Orientation: Person, Place, Time, Situation           Physical Exam  Vitals and nursing note reviewed.   Constitutional:       General: He is not in acute distress.     Appearance: He is obese. He is not ill-appearing, toxic-appearing or diaphoretic.   HENT:      Head: Normocephalic and atraumatic.      Nose: Nose normal.      Mouth/Throat:      Mouth: Mucous membranes are moist.   Pulmonary:      Effort: Pulmonary effort is normal. No respiratory distress.   Abdominal:      General: There is no distension.   Skin:     General: Skin is dry.   Neurological:      General: No focal deficit present.      Mental Status: He is alert.   Psychiatric:         Mood and Affect: Mood normal.         Behavior: Behavior normal.           Scheduled Meds:  Current Facility-Administered Medications   Medication Dose Route Frequency Provider Last Rate    acetaminophen  650 mg Oral Q6H PRN Modesta Lee PA-C      ascorbic acid  500 mg Oral Daily Toño Brito MD      Cholecalciferol  5,000 Units Oral Daily Toño Brito MD      famotidine  20 mg Oral Daily Toño Brito MD      losartan  50 mg Oral Daily Toño Brito MD      metFORMIN  1,500 mg Oral Daily With Dinner Toño Brito MD      multivitamin stress formula  1 tablet Oral Daily Toño Brito MD      polyethylene glycol  17 g Oral Daily PRN Toño rBito MD      pravastatin  40 mg Oral Daily With Dinner Toño Brito MD      senna  1 tablet Oral HS PRN Modesta Lee PA-C      tamsulosin  0.4 mg Oral Daily With Dinner Toño Brito MD           Lab Results: I have reviewed the following results:  Results from last 7 days   Lab Units 04/28/25  0529   HEMOGLOBIN g/dL 12.6   HEMATOCRIT % 38.2   WBC Thousand/uL 8.64   PLATELETS Thousands/uL 229     Results from last 7 days   Lab Units 04/28/25  0529   BUN mg/dL 19   SODIUM mmol/L 138   POTASSIUM mmol/L 4.3   CHLORIDE  mmol/L 106   CREATININE mg/dL 1.16   AST U/L 12*   ALT U/L 10            Modesta Lee PA-C  Physical Medicine and Rehabilitation  [facility]

## 2025-04-29 NOTE — PROGRESS NOTES
"Progress Note - Tobin Kerns 69 y.o. male MRN: [id number]    Unit/Bed#: [facility] 210-01 Encounter: [id number]        Subjective:   Patient seen and examined at bedside after reviewing the chart and discussing the case with the caring staff.      Patient examined at bedside.  Patient denies any acute symptoms.     Physical Exam   Vitals: Blood pressure 127/73, pulse 73, temperature (!) 97.4 °F (36.3 °C), temperature source Temporal, resp. rate 16, height 6' 5\" (1.956 m), weight (!) 137 kg (302 lb 4 oz), SpO2 96%.,Body mass index is 35.84 kg/m².  Constitutional: Patient in no acute distress.  HEENT: PERR, EOMI, MMM.  Cardiovascular: Normal rate and regular rhythm.    Pulmonary/Chest: Effort normal and breath sounds normal.   Abdomen: Soft, + BS, NT.    Assessment/Plan:  Tobin Kerns is a(n) 69 y.o. year old male who is s/p quadriceps tendon repair.     Cardiac with hx of HTN, HLD.  Patient is on losartan 50 mg daily, pravastatin 40 mg daily.  Impaired fasting glucose.  Hgb a1c 5.4% on 3/12/25.  Continue metformin XR 1500 mg daily with dinner.  Regular diet and d/c accucheks as blood sugar is well controlled.    BPH.  Continue Flomax 0.4 mg daily.  GERD.  Continue Pepcid 20 mg daily.  JOVANY.  On CPAP at bedtime.   Vitamin D deficiency.  Patient is on vitamin D3 5000 units daily.  Pain and bowel management.  As per physiatrist.  Quadriceps tendon repair.  Patient is receiving intensive PT OT as per physiatrist.  Orthopedic surgery follow-up 4/18 - cast was removed and TROM reapplied to allow for 10 degree knee flexion with nonambulatory exercises.  He is to be in full knee extension at all times with ambulation.  Can remove for hygenic purposes if needed.  No forced or aggressive knee flexion.  No straight leg raises.  Ambulate with walker.  He is to follow-up with orthopedic surgery in 3 weeks (5/12/25).    Anticipated date of discharge.  Monday, 5/5/2025  "

## 2025-04-29 NOTE — ASSESSMENT & PLAN NOTE
Skin breakdown on R heel due to cast. Ortho removed approximately 1 inch of cast at posterior aspect on 3/11. Cast further trimmed on 4/11, revealing a new posterior ankle wound.   Local wound care  Continue to monitor   Present on admission to [facility]   Wound care RN consulted (last seen 4/22):  Skin Care orders:  1-Hydraguard to sacrum, buttocks bid and prn   2-EHOB / waffle  cushion when out of bed in chair.  3-Moisturize skin daily with skin nourishing cream  4-Elevate heels to offload pressure.  5. Hydraguard to bilateral heels bid and prn

## 2025-04-29 NOTE — ASSESSMENT & PLAN NOTE
Patient was evaluated by the rehabilitation team MD and an appropriate candidate for acute inpatient rehabilitation program at this time.  The patient will tolerate 3 hours/day 5 to 7 days/week of intensive physical and  occupational therapy   in order to obtain goals for community discharge  Due to the patient's functional Compared to their baseline level of function in addition to their ongoing medical needs, the patient would benefit from daily supervision from a rehabilitation physician as well as rehabilitation nursing to implement and adjust the medical as well as functional plan of care in order to meet the patient's goals.  Bladder: Monitor closely for urinary incontinence and/or retention. Monitor urine output and encourage spontaneous voids. If unable to void spontaneously, will monitor with PVR bladder scans and initiate CIC regimen.  Skin: Monitor for breakdown, frequent turns, pressure offloading  Sleep: Encourage sleep hygiene (routine that promotes healthy circadian rhythm,avoid caffeine later in the day, quiet/dark room at night, reduce electronic before bedtime)  Anticipated Discharge Date:  May 5, 2025 with [facility] PT and OT

## 2025-04-29 NOTE — CASE MANAGEMENT
CM  received return voice mail message from Jocelin at [facility] [employer], stating the DME has been approved auth # [id number]. She also stated she was unsure what the additional information was for. To clarify call  again. CM met  with patient reviewed,team meeting information, including plan for patient to d/c to [place] apt on 5/5, with home care for PT/OT.   CM sent home care referral to [facility] in [place] through [employer] via Epic, CM received response, cannot accept due to insurance. Cm sent message to [facility], asking if they could use patient's secondary medicare for the home care, awaiting response.  CM called [employer] , left detailed message with request for return phone call to discuss new discharge plan, need for home care and transportation to [place] apt.

## 2025-04-29 NOTE — PROGRESS NOTES
04/29/25 1240   Pain Assessment   Pain Assessment Tool 0-10   Pain Score No Pain   Restrictions/Precautions   Precautions Fall Risk   Exercise Tools   Hand Gripper B hands: heavy resistance gripper 2x20 reps   Other Exercise Tool 1 red flex bar 2x15 reps downward then upward   Other Exercise Tool 2 6# dowel x 30 reps in 6 planes of motion   Cognition   Overall Cognitive Status WFL   Arousal/Participation Alert;Cooperative   Attention Within functional limits   Orientation Level Oriented X4   Additional Activities   Additional Activities Comments Functional mobility completed with RW with MI with knee immobilizer in place   Activity Tolerance   Activity Tolerance Patient tolerated treatment well   Assessment   Treatment Assessment Pt second session addressed TE for generalized strength and endurance for functional activity performance. Pt tolerated well. Discussed recomm for use of tray/basket/bag for use on RW for item transport and pt reports he will not need either attachment. Pt reporting he uses a computer chair to navigate around home. Barriers remain same as previous same. Awaiting d/c plan.   Prognosis Good   Problem List Decreased strength;Decreased range of motion;Impaired balance   Plan   Treatment/Interventions Functional transfer training;Therapeutic exercise;Endurance training;Patient/family training;Gait training;Compensatory technique education;OT;Equipment eval/education   Progress Improving as expected   OT Therapy Minutes   OT Time In 1240   OT Time Out 1318   OT Total Time (minutes) 38   OT Mode of treatment - Individual (minutes) 38   OT Mode of treatment - Concurrent (minutes) 0   OT Mode of treatment - Group (minutes) 0   OT Mode of treatment - Co-treat (minutes) 0   OT Mode of Treatment - Total time(minutes) 38 minutes   OT Cumulative Minutes 1698   Therapy Time missed   Time missed? No

## 2025-04-30 PROCEDURE — 97530 THERAPEUTIC ACTIVITIES: CPT

## 2025-04-30 PROCEDURE — 97116 GAIT TRAINING THERAPY: CPT

## 2025-04-30 PROCEDURE — 97110 THERAPEUTIC EXERCISES: CPT

## 2025-04-30 PROCEDURE — 99232 SBSQ HOSP IP/OBS MODERATE 35: CPT | Performed by: PHYSICAL MEDICINE & REHABILITATION

## 2025-04-30 RX ADMIN — LOSARTAN POTASSIUM 50 MG: 50 TABLET, FILM COATED ORAL at 09:14

## 2025-04-30 RX ADMIN — OXYCODONE HYDROCHLORIDE AND ACETAMINOPHEN 500 MG: 500 TABLET ORAL at 09:14

## 2025-04-30 RX ADMIN — B-COMPLEX W/ C & FOLIC ACID TAB 1 TABLET: TAB at 09:14

## 2025-04-30 RX ADMIN — PRAVASTATIN SODIUM 40 MG: 40 TABLET ORAL at 16:39

## 2025-04-30 RX ADMIN — METFORMIN ER 500 MG 1500 MG: 500 TABLET ORAL at 16:39

## 2025-04-30 RX ADMIN — TAMSULOSIN HYDROCHLORIDE 0.4 MG: 0.4 CAPSULE ORAL at 16:39

## 2025-04-30 RX ADMIN — FAMOTIDINE 20 MG: 20 TABLET, FILM COATED ORAL at 09:14

## 2025-04-30 RX ADMIN — CHOLECALCIFEROL TAB 25 MCG (1000 UNIT) 5000 UNITS: 25 TAB at 09:14

## 2025-04-30 NOTE — PROGRESS NOTES
"Progress Note - PMR   Name: Tobin Kerns 69 y.o. male I MRN: [id number]  Unit/Bed#: -01 I Date of Admission: 3/11/2025   Date of Service: 4/30/2025 I Hospital Day: 50     Assessment & Plan  Quadriceps muscle rupture, right, subsequent encounter  HPI:   He initially underwent quad tendon repair on 7/25/24 with Dr. Foster. He was discharged home with outpatient PT.  Later noted to have recurrent high-grade tear with extensor lag on exam and elected for operative management  January 2025 MRI: \"Prior quadriceps insertion repair with high-grade recurrent tear exhibiting up to 2.2 cm of tendon retraction.\"  On 3/6/25, he underwent revision of quad tendon repair with allograft augmentation  3/21: outpatient follow-up with Dr. Foster, new cast applied   3/28: follow-up with Dr. Foster, continue TTWB   4/4: follow-up with Dr. Foster (removed cast and reapplied long leg cast for an additional 2 weeks) and continue TTWB  4/11, no changes in plan  4/18 follow-up: cast removed, TROM with allowance of 10 degrees flexion per ortho, advanced to  WBAT   Plan:   PT and OT for 3 hours a day, 5-6 days a week   WBAT with TROM locked in extension (advanced on 4/18 with ortho, not included in documentation but verified with PA-C via secure chat)   Currently allowed 20 degrees of flexion  Advance to  30 degrees on 5/2, 40 degrees on 5/9 (verified with ortho PA-C on 4/24)   Pain: PRN Tylenol (monitor LFTs) and oxycodone discontinued on 3/17 due to minimal pain   Lovenox d/c on 4/7     Hypertension  Continue losartan 50 mg daily   Monitor BP   Mixed hyperlipidemia  Continue pravastatin 40 mg daily   Gastro-esophageal reflux disease without esophagitis  Continue famotidine 20 mg daily   Peripheral neuropathy  Patient is not diabetic   Monitor skin for new or worsening wounds   JOVANY on CPAP  Continue CPAP qHS  IFG (impaired fasting glucose)  Continue metformin 1500 mg daily   Seen by RD 3/12, diet adjusted to regular   3/12 " A1c: 5.4   Healed ulcer of left foot  Local wound care  Present on admission   Wound care RN consulted   BPH (benign prostatic hyperplasia)  Continue tamsulosin 0.4 mg daily   Patient currently voiding without difficulty   PRN PVRs if urinary retention is suspected   Impaired mobility and ADLs  Patient was evaluated by the rehabilitation team MD and an appropriate candidate for acute inpatient rehabilitation program at this time.  The patient will tolerate 3 hours/day 5 to 7 days/week of intensive physical and  occupational therapy   in order to obtain goals for community discharge  Due to the patient's functional Compared to their baseline level of function in addition to their ongoing medical needs, the patient would benefit from daily supervision from a rehabilitation physician as well as rehabilitation nursing to implement and adjust the medical as well as functional plan of care in order to meet the patient's goals.  Bladder: Monitor closely for urinary incontinence and/or retention. Monitor urine output and encourage spontaneous voids. If unable to void spontaneously, will monitor with PVR bladder scans and initiate CIC regimen.  Skin: Monitor for breakdown, frequent turns, pressure offloading  Sleep: Encourage sleep hygiene (routine that promotes healthy circadian rhythm,avoid caffeine later in the day, quiet/dark room at night, reduce electronic before bedtime)  Anticipated Discharge Date:  May 5, 2025 with [facility] PT and OT   Wound of right ankle  Skin breakdown on R heel due to cast. Ortho removed approximately 1 inch of cast at posterior aspect on 3/11. Cast further trimmed on 4/11, revealing a new posterior ankle wound.   Local wound care  Continue to monitor   Present on admission to [facility]   Wound care RN consulted (last seen 4/22):  Skin Care orders:  1-Hydraguard to sacrum, buttocks bid and prn   2-EHOB / waffle  cushion when out of bed in chair.  3-Moisturize skin daily with skin nourishing  cream  4-Elevate heels to offload pressure.  5. Hydraguard to bilateral heels bid and prn       Subjective   Tobin Kerns is a 69 year-old male, with a past medical history of hypertension, hyperlipidemia, JOVANY, impaired fasting glucose, bilateral lower extremity neuropathy, GERD, BPH, presenting to [facility] after an elective right quadricept tendon repair. He initially underwent quad tendon repair on 7/25/24 with Dr. Foster. He was discharged home with outpatient PT. He was noted to have recurrent high-grade tear on repeat MRI with extensor lag on exam and elected for operative management. On 3/6/25, he underwent revision of quad tendon repair with allograft augmentation. He was evaluated by PT and OT and deemed an appropriate candidate for ARC due to functional deficits     Chief Complaint: f/u ambulatory dysfunction    Interval: Seen and evaluated patient in room. He denies any concerns. Last BM 4/29, continent of bowel and bladder.        Objective :  Temp:  [97.2 °F (36.2 °C)-97.7 °F (36.5 °C)] 97.7 °F (36.5 °C)  HR:  [67-74] 74  BP: (125-126)/(64-67) 125/64  Resp:  [16] 16  SpO2:  [96 %-97 %] 97 %  O2 Device: None (Room air)    Functional Update:  Physical Therapy Occupational Therapy   Weight Bearing Status: Weight Bearing as Tolerated (TROM in extn for gait; 20 degrees ROM in TROM)  Transfers: Independent  Bed Mobility: Independent  Amulation Distance (ft): 500 feet  Ambulation: Independent  Assistive Device for Ambulation: Roller Walker  Wheelchair Mobility Distance:  (N/A)  Wheelchair Mobility:  (N/A)  Number of Stairs: 16  Assistive Device for Stairs: Lehft Hand Rail  Stair Assistance: Independent  Ramp: Independent  Assistive Device for Ramp: Roller Walker  Discharge Recommendations: Home with:  DC Home with::  (Home vs alternative placement)   Eating: Independent  Grooming: Independent  Bathing: Independent  Bathing: Independent  Upper Body Dressing: Independent  Lower Body Dressing:  Independent  Toileting: Independent  Tub/Shower Transfer: Independent  Toilet Transfer: Independent  Cognition: Within Defined Limits  Orientation: Person, Place, Time, Situation           Physical Exam  Vitals and nursing note reviewed.   Constitutional:       General: He is not in acute distress.     Appearance: He is obese. He is not ill-appearing, toxic-appearing or diaphoretic.   HENT:      Head: Normocephalic and atraumatic.      Nose: Nose normal.      Mouth/Throat:      Mouth: Mucous membranes are moist.   Pulmonary:      Effort: Pulmonary effort is normal. No respiratory distress.   Abdominal:      General: There is no distension.   Skin:     General: Skin is dry.   Neurological:      General: No focal deficit present.      Mental Status: He is alert.   Psychiatric:         Mood and Affect: Mood normal.         Behavior: Behavior normal.           Scheduled Meds:  Current Facility-Administered Medications   Medication Dose Route Frequency Provider Last Rate    acetaminophen  650 mg Oral Q6H PRN Modesta Lee PA-C      ascorbic acid  500 mg Oral Daily Toño Brito MD      Cholecalciferol  5,000 Units Oral Daily Toño Brito MD      famotidine  20 mg Oral Daily Toño Brito MD      losartan  50 mg Oral Daily Toño Brito MD      metFORMIN  1,500 mg Oral Daily With Dinner Toño Brito MD      multivitamin stress formula  1 tablet Oral Daily Toño Brito MD      polyethylene glycol  17 g Oral Daily PRN Toño Brito MD      pravastatin  40 mg Oral Daily With Dinner Toño Brito MD      senna  1 tablet Oral HS PRN Modesta Lee PA-C      tamsulosin  0.4 mg Oral Daily With Dinner Toño Brito MD           Lab Results: I have reviewed the following results:  Results from last 7 days   Lab Units 04/28/25  0529   HEMOGLOBIN g/dL 12.6   HEMATOCRIT % 38.2   WBC Thousand/uL 8.64   PLATELETS Thousands/uL 229     Results from last 7 days   Lab Units 04/28/25  0529   BUN mg/dL 19   SODIUM mmol/L 138    POTASSIUM mmol/L 4.3   CHLORIDE mmol/L 106   CREATININE mg/dL 1.16   AST U/L 12*   ALT U/L 10            Modesta Lee PA-C  Physical Medicine and Rehabilitation  [facility]

## 2025-04-30 NOTE — CASE MANAGEMENT
CM called [facility], spoke with French, inquired if they could accept the patient for home care by using patient's medicare A&B. French stated no, they have to use the primary insurance.  CM received a phone call from Deya [employer] worker comp, returning phone call from yesterday. Deya referred this CM to call [facility] , who handles home care and transportation for the worker's comp.Deya explained the process for sending authorization request for continued stay here at [facility] via the portal. CM inquired about requested procedure and revenue codes on the authorization request. Deya suggested contacting the coding dept at [facility]. CM called [facility], spoke with Herminia, who gave CM email address to the coding team. CM sent and email to [email or web address], requesting assistance with obtaining codes.CM called [facility], [phone], spoke with Naldo and Nithya, who requested clinical information to be emailed to her. CM emailed facesheet, H&P, current physician progress notes and current therapy notes to Nithya Miller <[email or web address]>, as requested. Nithya stated she can start the referral for home care and transport., awaiting return email.   CM received return email from Fortunato in coding team. CM called Fortunato [phone], inquired about CPT codes needed to compete authorization request for extended stay in [facility].CM will continue to follow.

## 2025-04-30 NOTE — PROGRESS NOTES
04/30/25 0900   Pain Assessment   Pain Assessment Tool 0-10   Pain Score No Pain   Restrictions/Precautions   Precautions Fall Risk   RLE Weight Bearing Per Order WBAT   LLE Weight Bearing Per Order WBAT   Braces or Orthoses Knee brace  (TROM locked in extension with ambulation)   Sit to Stand   Type of Assistance Needed Independent   Physical Assistance Level No physical assistance   Sit to Stand CARE Score 6   Meal Prep   Meal Prep Level Walker   Meal Prep Level of Assistance Modified independent   Kitchen Mobility   Kitchen-Mobility Level Walker   Kitchen Activity Retrieve items;Transport items   Light Housekeeping   Light Housekeeping Level Walker   Light Housekeeping Level of Assistance Modified independent   Cognition   Overall Cognitive Status WFL   Orientation Level Oriented X4   Activity Tolerance   Activity Tolerance Patient tolerated treatment well   Assessment   Treatment Assessment Pt demon overall good safety with all aspects of kitchen mgmt with meal prep today. Pt using counter top surface for mobility and support. Pt gathered all items, used stove top/sink, managed time all on his own. D/C to home is deemed safe and warranted for early next week as evident by pt consistently managing laundry/household chores/kitchen tasks and dressing and bathing and mgmt of brace while at [facility]. Pt will purchase tray or basket for personal walker if needed once he is home. Link to device forwarded to pts phone. Current barriers of ROM deficits in R LE, use of TROM brace which is locked in extension for mobility.  (20 degrees knee flexion with nonambulatory exercises with PT)  Plan is contingent upon R LE ROM increasing in order for pt to be able to manage in personal home environment. Ortho f/u May 14.   Prognosis Good   Problem List Decreased strength;Decreased range of motion   Plan   Treatment/Interventions Functional transfer training;OT;Spoke to nursing;Spoke to case management;Equipment  eval/education;Patient/family training   Progress Improving as expected   OT Therapy Minutes   OT Time In 0900   OT Time Out 1030   OT Total Time (minutes) 90   OT Mode of treatment - Individual (minutes) 90   OT Mode of treatment - Concurrent (minutes) 0   OT Mode of treatment - Group (minutes) 0   OT Mode of treatment - Co-treat (minutes) 0   OT Mode of Treatment - Total time(minutes) 90 minutes   OT Cumulative Minutes 1698   Therapy Time missed   Time missed? No

## 2025-04-30 NOTE — ASSESSMENT & PLAN NOTE
Skin breakdown on R heel due to cast. Ortho removed approximately 1 inch of cast at posterior aspect on 3/11. Cast further trimmed on 4/11, revealing a new posterior ankle wound.   Local wound care  Continue to monitor   Present on admission to [facility]   Wound care RN consulted (last seen 4/22):  Skin Care orders:  1-Hydraguard to sacrum, buttocks bid and prn   2-EHOB / waffle  cushion when out of bed in chair.  3-Moisturize skin daily with skin nourishing cream  4-Elevate heels to offload pressure.  5. Hydraguard to bilateral heels bid and prn

## 2025-04-30 NOTE — ASSESSMENT & PLAN NOTE
Patient was evaluated by the rehabilitation team MD and an appropriate candidate for acute inpatient rehabilitation program at this time.  The patient will tolerate 3 hours/day 5 to 7 days/week of intensive physical and  occupational therapy   in order to obtain goals for community discharge  Due to the patient's functional Compared to their baseline level of function in addition to their ongoing medical needs, the patient would benefit from daily supervision from a rehabilitation physician as well as rehabilitation nursing to implement and adjust the medical as well as functional plan of care in order to meet the patient's goals.  Bladder: Monitor closely for urinary incontinence and/or retention. Monitor urine output and encourage spontaneous voids. If unable to void spontaneously, will monitor with PVR bladder scans and initiate CIC regimen.  Skin: Monitor for breakdown, frequent turns, pressure offloading  Sleep: Encourage sleep hygiene (routine that promotes healthy circadian rhythm,avoid caffeine later in the day, quiet/dark room at night, reduce electronic before bedtime)  Anticipated Discharge Date:  May 5, 2025 with [facility] PT and OT

## 2025-04-30 NOTE — PROGRESS NOTES
04/30/25 0700   Pain Assessment   Pain Assessment Tool 0-10   Pain Score No Pain   Restrictions/Precautions   Precautions Fall Risk   RLE Weight Bearing Per Order WBAT   ROM Restrictions No   Braces or Orthoses Knee brace  (TROM locked in extension with ambulation)   General   Change In Medical/Functional Status TROM brace to allow for 20 degrees knee flexion with nonambulatory exercises. Maintain in full knee extension at all times with ambulation. Can remove for hygenic purposes if needed. No forced or aggressive knee flexion No straight leg raises at this time Continue to ambulate with walker   Cognition   Overall Cognitive Status WFL   Arousal/Participation Alert;Responsive;Cooperative   Attention Within functional limits   Orientation Level Oriented X4   Memory Within functional limits   Following Commands Follows all commands and directions without difficulty   Subjective   Subjective Pt reports he needs to discuss new discharge plan with    Roll Left and Right   Type of Assistance Needed Independent   Physical Assistance Level No physical assistance   Roll Left and Right CARE Score 6   Sit to Lying   Type of Assistance Needed Independent   Physical Assistance Level No physical assistance   Sit to Lying CARE Score 6   Lying to Sitting on Side of Bed   Type of Assistance Needed Independent   Physical Assistance Level No physical assistance   Lying to Sitting on Side of Bed CARE Score 6   Sit to Stand   Type of Assistance Needed Independent   Physical Assistance Level No physical assistance   Comment RW   Sit to Stand CARE Score 6   Bed-Chair Transfer   Type of Assistance Needed Independent   Physical Assistance Level No physical assistance   Comment RW   Chair/Bed-to-Chair Transfer CARE Score 6   Car Transfer   Reason if not Attempted Environmental limitations   Car Transfer CARE Score 10   Walk 10 Feet   Type of Assistance Needed Independent   Physical Assistance Level No physical assistance    Comment RW   Walk 10 Feet CARE Score 6   Walk 50 Feet with Two Turns   Type of Assistance Needed Independent   Physical Assistance Level No physical assistance   Comment RW   Walk 50 Feet with Two Turns CARE Score 6   Walk 150 Feet   Type of Assistance Needed Independent   Physical Assistance Level No physical assistance   Comment RW   Walk 150 Feet CARE Score 6   Walking 10 Feet on Uneven Surfaces   Type of Assistance Needed Independent   Physical Assistance Level No physical assistance   Comment RW   Walking 10 Feet on Uneven Surfaces CARE Score 6   Ambulation   Primary Mode of Locomotion Prior to Admission Walk   Distance Walked (feet) 200 ft  (200', 120')   Assist Device Roller Walker   Findings mod I level and unlevel surfaces with RW, mod I carrying bag with RW   Does the patient walk? 2. Yes   Wheel 50 Feet with Two Turns   Reason if not Attempted Activity not applicable   Wheel 50 Feet with Two Turns CARE Score 9   Wheel 150 Feet   Reason if not Attempted Activity not applicable   Wheel 150 Feet CARE Score 9   Wheelchair mobility   Findings N/A   Does the patient use a wheelchair? 0. No   Curb or Single Stair   Style negotiated Single stair   Type of Assistance Needed Independent   Physical Assistance Level No physical assistance   Comment mod I 17 stairs, single HR, carrying RW and bag full of objects in opposite UE to simulate carrying mail/packages up stairs at home in [place]   1 Step (Curb) CARE Score 6   4 Steps   Type of Assistance Needed Independent   Physical Assistance Level No physical assistance   Comment mod I 17 stairs, single HR, carrying RW and bag full of objects in opposite UE to simulate carrying mail/packages up stairs at home in [place]   4 Steps CARE Score 6   12 Steps   Type of Assistance Needed Independent   Physical Assistance Level No physical assistance   Comment mod I 17 stairs, single HR, carrying RW and bag full of objects in opposite UE to simulate carrying mail/packages up stairs at  home in [place]   12 Steps CARE Score 6   Toilet Transfer   Type of Assistance Needed Independent   Physical Assistance Level No physical assistance   Comment RW   Toilet Transfer CARE Score 6   Therapeutic Interventions   Strengthening supine LE TE   Other transfer training, gait training, stair training   Equipment Use   NuStep L6, 20 min, B/L UE, L LE   Assessment   Treatment Assessment Pt agreeable to PT this AM, received supine in bed. Continued to challenge LE strength/endurance within orthopedic restrictions with supine LE TE and NuStep with good tolerance. Discussed functional mobility at his home in [place], recommending tray with RW to be able to independently transport objects within home, verbalized he will obtain upon d/c home. Pt expressed he has a few neighbors who would be able to take out his trash, as well as collect his mail. He furthermore stated he could get groceries delivered to home. He has 12 + 8 CRISTAL with single HR, trialed negotiation of 17 steps with single HR while carrying RW and bag to simulate mail/trash bag/medications. Pt was able to complete independently with good safety and sequencing. Pt IS SAFE for a future d/c to home in [place] using RW for functional mobility, following future R LE orthopedic protocol with home PT services. Will continue with current PT POC to improve deficits and promote return to PLOF.   Problem List Decreased strength;Decreased range of motion;Impaired balance   PT Barriers   Physical Impairment Decreased range of motion;Decreased strength;Decreased endurance;Impaired balance;Decreased mobility;Orthopedic restrictions   Functional Limitation Car transfers   Plan   Treatment/Interventions Functional transfer training;LE strengthening/ROM;Therapeutic exercise;Endurance training;Patient/family training;Equipment eval/education;Bed mobility;Gait training   Progress Improving as expected   PT Therapy Minutes   PT Time In 0700   PT Time Out 0840   PT Total Time (minutes) 100    PT Mode of treatment - Individual (minutes) 10   PT Mode of treatment - Concurrent (minutes) 90   PT Mode of treatment - Group (minutes) 0   PT Mode of treatment - Co-treat (minutes) 0   PT Mode of Treatment - Total time(minutes) 100 minutes   PT Cumulative Minutes 2197     Patient remains OOB in w/c, all needs in reach. Encouraged use of call bell, patient verbalizes understanding.

## 2025-04-30 NOTE — PROGRESS NOTES
"Progress Note - Tobin Kerns 69 y.o. male MRN: [id number]    Unit/Bed#: [facility] 210-01 Encounter: [id number]        Subjective:   Patient seen and examined at bedside after reviewing the chart and discussing the case with the caring staff.      Patient examined at bedside.  Patient denies any acute symptoms.     Physical Exam   Vitals: Blood pressure 125/64, pulse 74, temperature 97.7 °F (36.5 °C), temperature source Temporal, resp. rate 16, height 6' 5\" (1.956 m), weight (!) 137 kg (302 lb 4 oz), SpO2 97%.,Body mass index is 35.84 kg/m².  Constitutional: Patient in no acute distress.  HEENT: PERR, EOMI, MMM.  Cardiovascular: Normal rate and regular rhythm.    Pulmonary/Chest: Effort normal and breath sounds normal.   Abdomen: Soft, + BS, NT.    Assessment/Plan:  Tobin Kerns is a(n) 69 y.o. year old male who is s/p quadriceps tendon repair.     Cardiac with hx of HTN, HLD.  Patient is on losartan 50 mg daily, pravastatin 40 mg daily.  Impaired fasting glucose.  Hgb a1c 5.4% on 3/12/25.  Continue metformin XR 1500 mg daily with dinner.  Regular diet and d/c accucheks as blood sugar is well controlled.    BPH.  Continue Flomax 0.4 mg daily.  GERD.  Continue Pepcid 20 mg daily.  JOVANY.  On CPAP at bedtime.   Vitamin D deficiency.  Patient is on vitamin D3 5000 units daily.  Pain and bowel management.  As per physiatrist.  Quadriceps tendon repair.  Patient is receiving intensive PT OT as per physiatrist.  Orthopedic surgery follow-up 4/18 - cast was removed and TROM reapplied to allow for 10 degree knee flexion with nonambulatory exercises.  He is to be in full knee extension at all times with ambulation.  Can remove for hygenic purposes if needed.  No forced or aggressive knee flexion.  No straight leg raises.  Ambulate with walker.  He is to follow-up with orthopedic surgery in 3 weeks (5/12/25).    Anticipated date of discharge.  Monday, 5/5/2025    The patient was discussed with Dr. Brito and he is in agreement " with the above note.

## 2025-05-01 PROCEDURE — 99232 SBSQ HOSP IP/OBS MODERATE 35: CPT | Performed by: PHYSICAL MEDICINE & REHABILITATION

## 2025-05-01 PROCEDURE — 97535 SELF CARE MNGMENT TRAINING: CPT

## 2025-05-01 PROCEDURE — 97530 THERAPEUTIC ACTIVITIES: CPT

## 2025-05-01 PROCEDURE — 97116 GAIT TRAINING THERAPY: CPT

## 2025-05-01 PROCEDURE — 97110 THERAPEUTIC EXERCISES: CPT

## 2025-05-01 RX ADMIN — B-COMPLEX W/ C & FOLIC ACID TAB 1 TABLET: TAB at 08:37

## 2025-05-01 RX ADMIN — TAMSULOSIN HYDROCHLORIDE 0.4 MG: 0.4 CAPSULE ORAL at 17:32

## 2025-05-01 RX ADMIN — OXYCODONE HYDROCHLORIDE AND ACETAMINOPHEN 500 MG: 500 TABLET ORAL at 08:37

## 2025-05-01 RX ADMIN — FAMOTIDINE 20 MG: 20 TABLET, FILM COATED ORAL at 08:37

## 2025-05-01 RX ADMIN — CHOLECALCIFEROL TAB 25 MCG (1000 UNIT) 5000 UNITS: 25 TAB at 08:37

## 2025-05-01 RX ADMIN — METFORMIN ER 500 MG 1500 MG: 500 TABLET ORAL at 17:32

## 2025-05-01 RX ADMIN — PRAVASTATIN SODIUM 40 MG: 40 TABLET ORAL at 17:32

## 2025-05-01 NOTE — NURSING NOTE
Continues to be MOD I on unit with walker. Immobilizer in place. Incision healed to right knee. Posterior achilles on right foot scabbed and hydraguard applied. Noted with scab to right 2nd toe due from shoes rubbing. Patient educated to not wear shoes or remove inserts so foot does not rub. Sitting upright for all meals. Reports no pain. Will continue to monitor and follow plan of care.

## 2025-05-01 NOTE — PROGRESS NOTES
05/01/25 1300   Pain Assessment   Pain Assessment Tool 0-10   Pain Score No Pain   Restrictions/Precautions   Precautions Fall Risk   Braces or Orthoses Knee immobilizer  (TROM locked in extension with ambulation)   Oral Hygiene   Type of Assistance Needed Independent   Physical Assistance Level No physical assistance   Comment stance   Oral Hygiene CARE Score 6   Grooming   Able To Initiate Tasks;Acquire Items;Comb/Brush Hair;Wash/Dry Face;Brush/Clean Teeth;Wash/Dry Hands   Shower/Bathe Self   Type of Assistance Needed Independent;Adaptive equipment   Physical Assistance Level No physical assistance   Shower/Bathe Self CARE Score 6   Bathing   Assessed Bath Style Sponge Bath   Able to Gather/Transport Yes   Able to Adjust Water Temperature Yes   Able to Wash/Rinse/Dry (body part) Left Arm;Right Arm;L Upper Leg;R Upper Leg;L Lower Leg/Foot;R Lower Leg/Foot;Chest;Abdomen;Perineal Area;Buttocks   Limitations Noted in ROM   Positioning Seated;Standing   Upper Body Dressing   Type of Assistance Needed Independent   Physical Assistance Level No physical assistance   Upper Body Dressing CARE Score 6   Lower Body Dressing   Type of Assistance Needed Independent;Adaptive equipment   Physical Assistance Level No physical assistance   Lower Body Dressing CARE Score 6   Putting On/Taking Off Footwear   Type of Assistance Needed Independent;Adaptive equipment   Physical Assistance Level No physical assistance   Putting On/Taking Off Footwear CARE Score 6   Picking Up Object   Type of Assistance Needed Independent;Adaptive equipment   Physical Assistance Level No physical assistance   Picking Up Object CARE Score 6   Dressing/Undressing Clothing   Able to  Obtain Clothing;Store removed clothing   Remove UB Clothes Button Shirt   Don UB Clothes Button Shirt   Remove LB Clothes Shorts;Undergarment;Socks;Shoes   Don LB Clothes Shoes;Socks;Undergarment;Shorts   Limitations Noted In ROM   Adaptive Equipment Reacher;Dressing Stick    Positioning Supported Sit;Standing   Sit to Stand   Type of Assistance Needed Independent   Physical Assistance Level No physical assistance   Sit to Stand CARE Score 6   Toileting Hygiene   Type of Assistance Needed Independent   Physical Assistance Level No physical assistance   Toileting Hygiene CARE Score 6   Toileting   Able to Pull Clothing down yes, up yes.   Manage Hygiene Bladder;Bowel   Limitations Noted In ROM   Adaptive Equipment Grab Bar   Toilet Transfer   Type of Assistance Needed Independent;Adaptive equipment   Physical Assistance Level No physical assistance   Toilet Transfer CARE Score 6   Toilet Transfer   Surface Assessed Standard Toilet   Transfer Technique Standard   Limitations Noted In ROM   Kitchen Mobility   Kitchen-Mobility Level Walker   Kitchen Activity Retrieve items;Transport items   Light Housekeeping   Light Housekeeping Level Walker   Cognition   Overall Cognitive Status WFL   Arousal/Participation Alert;Cooperative   Attention Within functional limits   Orientation Level Oriented X4   Memory Within functional limits   Following Commands Follows all commands and directions without difficulty   Additional Activities   Additional Activities Comments Functional mobility completed with RW with MI with knee immobilizer in place   Activity Tolerance   Activity Tolerance Patient tolerated treatment well   Assessment   Treatment Assessment OT session addressed ADL via sponge bathe level. Pt managed s/u and c/u Indep. Pt managed doff/don of immobilizer Indep. He completed laundry Indep with gathering and placing into wash machine with use of RW. D/C planning in prep for d/c to home. Current barriers of ROM deficits in R LE, use of TROM brace which is locked in extension for mobility.  (20 degrees knee flexion with nonambulatory exercises with PT)  Plan is for d/c to home in [place] 5/5.  Ortho f/u May 14.   Prognosis Good   Problem List Decreased strength;Decreased range of motion;Impaired  balance   Assessment Pt participated in 60 minutes of concurrent tx adderssing simialr goals with a pt with similar deficits.   Plan   Treatment/Interventions ADL retraining;Functional transfer training;Gait training;Compensatory technique education;Spoke to nursing;OT;Patient/family training;Equipment eval/education   Progress Improving as expected   OT Therapy Minutes   OT Time In 1300   OT Time Out 1430   OT Total Time (minutes) 90   OT Mode of treatment - Individual (minutes) 30   OT Mode of treatment - Concurrent (minutes) 60   OT Mode of treatment - Group (minutes) 0   OT Mode of treatment - Co-treat (minutes) 0   OT Mode of Treatment - Total time(minutes) 90 minutes   OT Cumulative Minutes 1698   Therapy Time missed   Time missed? No

## 2025-05-01 NOTE — ASSESSMENT & PLAN NOTE
Patient was evaluated by the rehabilitation team MD and an appropriate candidate for acute inpatient rehabilitation program at this time.  The patient will tolerate 3 hours/day 5 to 7 days/week of intensive physical and  occupational therapy   in order to obtain goals for community discharge  Due to the patient's functional Compared to their baseline level of function in addition to their ongoing medical needs, the patient would benefit from daily supervision from a rehabilitation physician as well as rehabilitation nursing to implement and adjust the medical as well as functional plan of care in order to meet the patient's goals.  Bladder: Monitor closely for urinary incontinence and/or retention. Monitor urine output and encourage spontaneous voids. If unable to void spontaneously, will monitor with PVR bladder scans and initiate CIC regimen.  Skin: Monitor for breakdown, frequent turns, pressure offloading  Sleep: Encourage sleep hygiene (routine that promotes healthy circadian rhythm,avoid caffeine later in the day, quiet/dark room at night, reduce electronic before bedtime)  Anticipated Discharge Date:  May 5, 2025 with [facility] PT and OT

## 2025-05-01 NOTE — CASE MANAGEMENT
CM completed authorization request for extended stay at [facility] on [employer] portal, awaiting communication with claims examiners.SHILA received a phone call from Grey at [employer], who is handling request for authorization for [facility], and transportation. SHILA explained need for patient to be transported to his camper in [place] to get his medications and clothing, and then transported to his discharged destination of his apt in [place]. SHILA gave Grey both addresses. He stated the patient would just have a 2 stops transport. Grey inquired of 's last name, Shila told him, CM was only given an initial for her last name. He stated he will try to continue on, with the request with the initial to her last name. SHILA made him aware of planned discharge date of 5/5

## 2025-05-01 NOTE — PROGRESS NOTES
05/01/25 0830   Pain Assessment   Pain Assessment Tool 0-10   Pain Score No Pain   Restrictions/Precautions   Precautions Fall Risk   RLE Weight Bearing Per Order WBAT   ROM Restrictions   (TROM locked in extension with ambulation, unlocked to 20 degrees with non-ambulatory activities)   Braces or Orthoses Knee brace  (TROM locked in extension with ambulation)   General   Change In Medical/Functional Status TROM brace to allow for 20 degrees knee flexion with nonambulatory exercises. Maintain in full knee extension at all times with ambulation. Can remove for hygenic purposes if needed. No forced or aggressive knee flexion No straight leg raises at this time Continue to ambulate with walker   Cognition   Overall Cognitive Status WFL   Arousal/Participation Alert;Responsive;Cooperative   Attention Within functional limits   Orientation Level Oriented X4   Memory Within functional limits   Following Commands Follows all commands and directions without difficulty   Subjective   Subjective Pt reports no pain or any functional changes this morning   Roll Left and Right   Comment Pt OOB   Sit to Lying   Comment Pt OOB   Lying to Sitting on Side of Bed   Comment Pt OOB   Sit to Stand   Type of Assistance Needed Independent   Physical Assistance Level No physical assistance   Comment RW   Sit to Stand CARE Score 6   Bed-Chair Transfer   Type of Assistance Needed Independent   Physical Assistance Level No physical assistance   Comment RW   Chair/Bed-to-Chair Transfer CARE Score 6   Car Transfer   Reason if not Attempted Environmental limitations   Car Transfer CARE Score 10   Walk 10 Feet   Type of Assistance Needed Independent   Physical Assistance Level No physical assistance   Comment RW   Walk 10 Feet CARE Score 6   Walk 50 Feet with Two Turns   Type of Assistance Needed Independent   Physical Assistance Level No physical assistance   Comment RW   Walk 50 Feet with Two Turns CARE Score 6   Walk 150 Feet   Type of  Assistance Needed Independent   Physical Assistance Level No physical assistance   Comment RW   Walk 150 Feet CARE Score 6   Walking 10 Feet on Uneven Surfaces   Type of Assistance Needed Independent   Physical Assistance Level No physical assistance   Comment RW, outside on uneven surfaces   Walking 10 Feet on Uneven Surfaces CARE Score 6   Ambulation   Primary Mode of Locomotion Prior to Admission Walk   Distance Walked (feet) 500 ft  (500' x 2, 100')   Assist Device Roller Walker   Findings mod I level and unlevel surfaces with RW, ambulation outside on uneven surfaces this session   Does the patient walk? 2. Yes   Wheel 50 Feet with Two Turns   Reason if not Attempted Activity not applicable   Wheel 50 Feet with Two Turns CARE Score 9   Wheel 150 Feet   Reason if not Attempted Activity not applicable   Wheel 150 Feet CARE Score 9   Wheelchair mobility   Findings N/A   Does the patient use a wheelchair? 0. No   Curb or Single Stair   Style negotiated Single stair   Type of Assistance Needed Independent   Physical Assistance Level No physical assistance   Comment 7 outdoor stairs, carrying RW up/down, single HR   1 Step (Curb) CARE Score 6   4 Steps   Type of Assistance Needed Independent   Physical Assistance Level No physical assistance   Comment 7 outdoor stairs, carrying RW up/down, single HR   4 Steps CARE Score 6   Toilet Transfer   Comment did not require during session   Therapeutic Interventions   Strengthening seated LE TE   Other gait training outside on uneven surfaces, stair training, transfers from uneven surfaces outside   Equipment Use   NuStep L6, 10 min, B/L UE, L LE   Assessment   Treatment Assessment Pt agreeable to PT this AM, received sitting upright at EOB. Continued to challenge LE strength/endurance within orthopedic limittations with seated LE TE with good tolerance. Pt consistently demonstrates functional independence with RW, able to ambulate uneven surfaces outdoors and negotiate  outdoor stairs while transporting RW. Pt remains limited by orthopedic restrictions of TROM brace locked in extension with ambulation, unlocked to 20 degrees with nonambulatory activities. Pt remains functionally safe for d/c home to [place] home with use of RW. Will continue with current PT POC to improve deficits and promote return to PLOF.   Problem List Decreased strength;Decreased range of motion   PT Barriers   Physical Impairment Decreased range of motion;Decreased strength;Decreased endurance;Impaired balance;Decreased mobility;Orthopedic restrictions   Functional Limitation Car transfers   Plan   Treatment/Interventions Functional transfer training;LE strengthening/ROM;Therapeutic exercise;Endurance training;Patient/family training;Equipment eval/education;Bed mobility;Gait training   Progress Improving as expected   PT Therapy Minutes   PT Time In 0830   PT Time Out 1000   PT Total Time (minutes) 90   PT Mode of treatment - Individual (minutes) 90   PT Mode of treatment - Concurrent (minutes) 0   PT Mode of treatment - Group (minutes) 0   PT Mode of treatment - Co-treat (minutes) 0   PT Mode of Treatment - Total time(minutes) 90 minutes   PT Cumulative Minutes 2107     Patient remains ambulating with RW back to room, as pt is mod I with RW. Encouraged use of call bell when pt returns to room, patient verbalizes understanding.

## 2025-05-01 NOTE — PROGRESS NOTES
"Progress Note - Tobin Kerns 69 y.o. male MRN: [id number]    Unit/Bed#: [facility] 210-01 Encounter: [id number]        Subjective:   Patient seen and examined at bedside after reviewing the chart and discussing the case with the caring staff.      Patient examined at bedside.  Patient denies any acute symptoms.     Physical Exam   Vitals: Blood pressure 122/75, pulse 60, temperature 98.4 °F (36.9 °C), temperature source Temporal, resp. rate 17, height 6' 5\" (1.956 m), weight (!) 137 kg (302 lb 4 oz), SpO2 94%.,Body mass index is 35.84 kg/m².  Constitutional: Patient in no acute distress.  HEENT: PERR, EOMI, MMM.  Cardiovascular: Normal rate and regular rhythm.    Pulmonary/Chest: Effort normal and breath sounds normal.   Abdomen: Soft, + BS, NT.    Assessment/Plan:  Tobin Kerns is a(n) 69 y.o. year old male who is s/p quadriceps tendon repair.     Cardiac with hx of HTN, HLD.  Patient is on losartan 50 mg daily, pravastatin 40 mg daily.  Impaired fasting glucose.  Hgb a1c 5.4% on 3/12/25.  Continue metformin XR 1500 mg daily with dinner.  Regular diet and d/c accucheks as blood sugar is well controlled.    BPH.  Continue Flomax 0.4 mg daily.  GERD.  Continue Pepcid 20 mg daily.  JOVANY.  On CPAP at bedtime.   Vitamin D deficiency.  Patient is on vitamin D3 5000 units daily.  Pain and bowel management.  As per physiatrist.  Quadriceps tendon repair.  Patient is receiving intensive PT OT as per physiatrist.  Orthopedic surgery follow-up 4/18 - cast was removed and TROM reapplied to allow for 10 degree knee flexion with nonambulatory exercises.  He is to be in full knee extension at all times with ambulation.  Can remove for hygenic purposes if needed.  No forced or aggressive knee flexion.  No straight leg raises.  Ambulate with walker.  He is to follow-up with orthopedic surgery in 3 weeks (5/12/25).    Anticipated date of discharge.  Monday, 5/5/2025    The patient was discussed with Dr. Brito and he is in agreement " with the above note.

## 2025-05-01 NOTE — ASSESSMENT & PLAN NOTE
Skin breakdown on R heel due to cast. Ortho removed approximately 1 inch of cast at posterior aspect on 3/11. Cast further trimmed on 4/11, revealing a new posterior ankle wound.   Local wound care  Continue to monitor   Present on admission to [facility]   Wound care RN consulted (last seen 4/22):  Skin Care orders:  1-Hydraguard to sacrum, buttocks bid and prn   2-EHOB / waffle  cushion when out of bed in chair.  3-Moisturize skin daily with skin nourishing cream  4-Elevate heels to offload pressure.  5. Hydraguard to bilateral heels bid and prn

## 2025-05-01 NOTE — ASSESSMENT & PLAN NOTE
Continue CPAP qHS   Thank you for allowing Dr ASHLEY Garcia and our  team to participate in your care. Please call our office at [phone] with scheduling questions or if you need to cancel or change your appointment. With any other questions or concerns you may call cardiology nurse at  [phone].       If you experience chest pain, chest pressure, chest tightness, shortness of breath, fainting, lightheadedness, nausea, vomiting, or other concerning symptoms, please report to the Emergency Department or call 911. These symptoms may be emergent, and best treated in the Emergency Department.

## 2025-05-01 NOTE — PROGRESS NOTES
"Progress Note - PMR   Name: Tobin Kerns 69 y.o. male I MRN: [id number]  Unit/Bed#: -01 I Date of Admission: 3/11/2025   Date of Service: 5/1/2025 I Hospital Day: 51     Assessment & Plan  Quadriceps muscle rupture, right, subsequent encounter  HPI:   He initially underwent quad tendon repair on 7/25/24 with Dr. Foster. He was discharged home with outpatient PT.  Later noted to have recurrent high-grade tear with extensor lag on exam and elected for operative management  January 2025 MRI: \"Prior quadriceps insertion repair with high-grade recurrent tear exhibiting up to 2.2 cm of tendon retraction.\"  On 3/6/25, he underwent revision of quad tendon repair with allograft augmentation  3/21: outpatient follow-up with Dr. Foster, new cast applied   3/28: follow-up with Dr. Foster, continue TTWB   4/4: follow-up with Dr. Foster (removed cast and reapplied long leg cast for an additional 2 weeks) and continue TTWB  4/11, no changes in plan  4/18 follow-up: cast removed, TROM with allowance of 10 degrees flexion per ortho, advanced to  WBAT   Plan:   PT and OT for 3 hours a day, 5-6 days a week   WBAT with TROM locked in extension (advanced on 4/18 with ortho, not included in documentation but verified with PA-C via secure chat)   Currently allowed 20 degrees of flexion  Advance to  30 degrees on 5/2, 40 degrees on 5/9 (verified with ortho PA-C on 4/24)   Pain: PRN Tylenol (monitor LFTs) and oxycodone discontinued on 3/17 due to minimal pain   Lovenox d/c on 4/7     Hypertension  Continue losartan 50 mg daily   Monitor BP   Mixed hyperlipidemia  Continue pravastatin 40 mg daily   Gastro-esophageal reflux disease without esophagitis  Continue famotidine 20 mg daily   Peripheral neuropathy  Patient is not diabetic   Monitor skin for new or worsening wounds   JOVANY on CPAP  Continue CPAP qHS  IFG (impaired fasting glucose)  Continue metformin 1500 mg daily   Seen by RD 3/12, diet adjusted to regular   3/12 " A1c: 5.4   Healed ulcer of left foot  Local wound care  Present on admission   Wound care RN consulted   BPH (benign prostatic hyperplasia)  Continue tamsulosin 0.4 mg daily   Patient currently voiding without difficulty   PRN PVRs if urinary retention is suspected   Impaired mobility and ADLs  Patient was evaluated by the rehabilitation team MD and an appropriate candidate for acute inpatient rehabilitation program at this time.  The patient will tolerate 3 hours/day 5 to 7 days/week of intensive physical and  occupational therapy   in order to obtain goals for community discharge  Due to the patient's functional Compared to their baseline level of function in addition to their ongoing medical needs, the patient would benefit from daily supervision from a rehabilitation physician as well as rehabilitation nursing to implement and adjust the medical as well as functional plan of care in order to meet the patient's goals.  Bladder: Monitor closely for urinary incontinence and/or retention. Monitor urine output and encourage spontaneous voids. If unable to void spontaneously, will monitor with PVR bladder scans and initiate CIC regimen.  Skin: Monitor for breakdown, frequent turns, pressure offloading  Sleep: Encourage sleep hygiene (routine that promotes healthy circadian rhythm,avoid caffeine later in the day, quiet/dark room at night, reduce electronic before bedtime)  Anticipated Discharge Date:  May 5, 2025 with [facility] PT and OT   Wound of right ankle  Skin breakdown on R heel due to cast. Ortho removed approximately 1 inch of cast at posterior aspect on 3/11. Cast further trimmed on 4/11, revealing a new posterior ankle wound.   Local wound care  Continue to monitor   Present on admission to [facility]   Wound care RN consulted (last seen 4/22):  Skin Care orders:  1-Hydraguard to sacrum, buttocks bid and prn   2-EHOB / waffle  cushion when out of bed in chair.  3-Moisturize skin daily with skin nourishing  cream  4-Elevate heels to offload pressure.  5. Hydraguard to bilateral heels bid and prn       Subjective   Tobin Kerns is a 69 year-old male, with a past medical history of hypertension, hyperlipidemia, JOVANY, impaired fasting glucose, bilateral lower extremity neuropathy, GERD, BPH, presenting to [facility] after an elective right quadricept tendon repair. He initially underwent quad tendon repair on 7/25/24 with Dr. Foster. He was discharged home with outpatient PT. He was noted to have recurrent high-grade tear on repeat MRI with extensor lag on exam and elected for operative management. On 3/6/25, he underwent revision of quad tendon repair with allograft augmentation. He was evaluated by PT and OT and deemed an appropriate candidate for [facility] due to functional deficits     Chief Complaint: f/u ambulatory dysfunction    Interval: Seen and evaluated patient in room. He denies any concerns. Last BM 4/29, continent of bowel and bladder.     Objective :  Temp:  [97.4 °F (36.3 °C)-98.4 °F (36.9 °C)] 98.4 °F (36.9 °C)  HR:  [56-60] 60  BP: (102-122)/(59-75) 122/75  Resp:  [17-18] 17  SpO2:  [94 %-98 %] 94 %  O2 Device: None (Room air)    Functional Update:  Physical Therapy Occupational Therapy   Weight Bearing Status: Weight Bearing as Tolerated (TROM in extn for gait; 20 degrees ROM in TROM)  Transfers: Independent  Bed Mobility: Independent  Amulation Distance (ft): 500 feet  Ambulation: Independent  Assistive Device for Ambulation: Roller Walker  Wheelchair Mobility Distance:  (N/A)  Wheelchair Mobility:  (N/A)  Number of Stairs: 16  Assistive Device for Stairs: Lehft Hand Rail  Stair Assistance: Independent  Ramp: Independent  Assistive Device for Ramp: Roller Walker  Discharge Recommendations: Home with:  DC Home with::  (Home vs alternative placement)   Eating: Independent  Grooming: Independent  Bathing: Independent  Bathing: Independent  Upper Body Dressing: Independent  Lower Body Dressing:  Independent  Toileting: Independent  Tub/Shower Transfer: Independent  Toilet Transfer: Independent  Cognition: Within Defined Limits  Orientation: Person, Place, Time, Situation     \    Physical Exam  Vitals and nursing note reviewed.   Constitutional:       General: He is not in acute distress.     Appearance: He is obese. He is not ill-appearing, toxic-appearing or diaphoretic.   HENT:      Head: Normocephalic and atraumatic.      Nose: Nose normal.      Mouth/Throat:      Mouth: Mucous membranes are moist.   Pulmonary:      Effort: Pulmonary effort is normal. No respiratory distress.   Abdominal:      General: There is no distension.   Skin:     General: Skin is dry.   Neurological:      General: No focal deficit present.      Mental Status: He is alert.   Psychiatric:         Mood and Affect: Mood normal.         Behavior: Behavior normal.       Scheduled Meds:  Current Facility-Administered Medications   Medication Dose Route Frequency Provider Last Rate    acetaminophen  650 mg Oral Q6H PRN Modesta Lee PA-C      ascorbic acid  500 mg Oral Daily Toño Brito MD      Cholecalciferol  5,000 Units Oral Daily Toño Brito MD      famotidine  20 mg Oral Daily Toño Brito MD      losartan  50 mg Oral Daily Toño Brito MD      metFORMIN  1,500 mg Oral Daily With Dinner Toño Brito MD      multivitamin stress formula  1 tablet Oral Daily Toño Brito MD      polyethylene glycol  17 g Oral Daily PRN Toño Brito MD      pravastatin  40 mg Oral Daily With Dinner Toño Brito MD      senna  1 tablet Oral HS PRN Modesta Lee PA-C      tamsulosin  0.4 mg Oral Daily With Dinner Toño Brito MD           Lab Results: I have reviewed the following results:  Results from last 7 days   Lab Units 04/28/25  0529   HEMOGLOBIN g/dL 12.6   HEMATOCRIT % 38.2   WBC Thousand/uL 8.64   PLATELETS Thousands/uL 229     Results from last 7 days   Lab Units 04/28/25  0529   BUN mg/dL 19   SODIUM mmol/L 138   POTASSIUM  mmol/L 4.3   CHLORIDE mmol/L 106   CREATININE mg/dL 1.16   AST U/L 12*   ALT U/L 10            Modesta Lee PA-C  Physical Medicine and Rehabilitation  [facility]

## 2025-05-02 PROCEDURE — 97110 THERAPEUTIC EXERCISES: CPT

## 2025-05-02 PROCEDURE — 97537 COMMUNITY/WORK REINTEGRATION: CPT

## 2025-05-02 PROCEDURE — 97535 SELF CARE MNGMENT TRAINING: CPT

## 2025-05-02 PROCEDURE — 97530 THERAPEUTIC ACTIVITIES: CPT

## 2025-05-02 PROCEDURE — 97116 GAIT TRAINING THERAPY: CPT

## 2025-05-02 PROCEDURE — 99232 SBSQ HOSP IP/OBS MODERATE 35: CPT | Performed by: PHYSICAL MEDICINE & REHABILITATION

## 2025-05-02 RX ADMIN — METFORMIN ER 500 MG 1500 MG: 500 TABLET ORAL at 17:38

## 2025-05-02 RX ADMIN — TAMSULOSIN HYDROCHLORIDE 0.4 MG: 0.4 CAPSULE ORAL at 17:38

## 2025-05-02 RX ADMIN — FAMOTIDINE 20 MG: 20 TABLET, FILM COATED ORAL at 08:45

## 2025-05-02 RX ADMIN — B-COMPLEX W/ C & FOLIC ACID TAB 1 TABLET: TAB at 08:45

## 2025-05-02 RX ADMIN — OXYCODONE HYDROCHLORIDE AND ACETAMINOPHEN 500 MG: 500 TABLET ORAL at 08:45

## 2025-05-02 RX ADMIN — PRAVASTATIN SODIUM 40 MG: 40 TABLET ORAL at 17:38

## 2025-05-02 RX ADMIN — LOSARTAN POTASSIUM 50 MG: 50 TABLET, FILM COATED ORAL at 08:45

## 2025-05-02 RX ADMIN — CHOLECALCIFEROL TAB 25 MCG (1000 UNIT) 5000 UNITS: 25 TAB at 08:45

## 2025-05-02 NOTE — PROGRESS NOTES
"Progress Note - PMR   Name: Tobin Kerns 69 y.o. male I MRN: [id number]  Unit/Bed#: -01 I Date of Admission: 3/11/2025   Date of Service: 5/2/2025 I Hospital Day: 52     Assessment & Plan  Quadriceps muscle rupture, right, subsequent encounter  HPI:   He initially underwent quad tendon repair on 7/25/24 with Dr. Foster. He was discharged home with outpatient PT.  Later noted to have recurrent high-grade tear with extensor lag on exam and elected for operative management  January 2025 MRI: \"Prior quadriceps insertion repair with high-grade recurrent tear exhibiting up to 2.2 cm of tendon retraction.\"  On 3/6/25, he underwent revision of quad tendon repair with allograft augmentation  3/21: outpatient follow-up with Dr. Foster, new cast applied   3/28: follow-up with Dr. Foster, continue TTWB   4/4: follow-up with Dr. Foster (removed cast and reapplied long leg cast for an additional 2 weeks) and continue TTWB  4/11, no changes in plan  4/18 follow-up: cast removed, TROM with allowance of 10 degrees flexion per ortho, advanced to  WBAT   Plan:   PT and OT for 3 hours a day, 5-6 days a week   WBAT with TROM locked in extension (advanced on 4/18 with ortho, not included in documentation but verified with PA-C via secure chat)   Currently allowed 30 degrees of flexion  Advance to 40 degrees on 5/9 (verified with ortho PA-C on 4/24)   Pain: PRN Tylenol (monitor LFTs) and oxycodone discontinued on 3/17 due to minimal pain   Lovenox d/c on 4/7     Hypertension  Continue losartan 50 mg daily   Monitor BP   Mixed hyperlipidemia  Continue pravastatin 40 mg daily   Gastro-esophageal reflux disease without esophagitis  Continue famotidine 20 mg daily   Peripheral neuropathy  Patient is not diabetic   Monitor skin for new or worsening wounds   JOVANY on CPAP  Continue CPAP qHS  IFG (impaired fasting glucose)  Continue metformin 1500 mg daily   Seen by RD 3/12, diet adjusted to regular   3/12 A1c: 5.4   Healed ulcer " of left foot  Local wound care  Present on admission   Wound care RN consulted   BPH (benign prostatic hyperplasia)  Continue tamsulosin 0.4 mg daily   Patient currently voiding without difficulty   PRN PVRs if urinary retention is suspected   Impaired mobility and ADLs  Patient was evaluated by the rehabilitation team MD and an appropriate candidate for acute inpatient rehabilitation program at this time.  The patient will tolerate 3 hours/day 5 to 7 days/week of intensive physical and  occupational therapy   in order to obtain goals for community discharge  Due to the patient's functional Compared to their baseline level of function in addition to their ongoing medical needs, the patient would benefit from daily supervision from a rehabilitation physician as well as rehabilitation nursing to implement and adjust the medical as well as functional plan of care in order to meet the patient's goals.  Bladder: Monitor closely for urinary incontinence and/or retention. Monitor urine output and encourage spontaneous voids. If unable to void spontaneously, will monitor with PVR bladder scans and initiate CIC regimen.  Skin: Monitor for breakdown, frequent turns, pressure offloading  Sleep: Encourage sleep hygiene (routine that promotes healthy circadian rhythm,avoid caffeine later in the day, quiet/dark room at night, reduce electronic before bedtime)  Anticipated Discharge Date:  May 5, 2025 with [facility] PT and OT   Wound of right ankle  Skin breakdown on R heel due to cast. Ortho removed approximately 1 inch of cast at posterior aspect on 3/11. Cast further trimmed on 4/11, revealing a new posterior ankle wound.   Local wound care  Continue to monitor   Present on admission to [facility]   Wound care RN consulted (last seen 4/22):  Skin Care orders:  1-Hydraguard to sacrum, buttocks bid and prn   2-EHOB / waffle  cushion when out of bed in chair.  3-Moisturize skin daily with skin nourishing cream  4-Elevate heels to offload  pressure.  5. Hydraguard to bilateral heels bid and prn       Subjective   Tobin Kerns is a 69 year-old male, with a past medical history of hypertension, hyperlipidemia, JOVANY, impaired fasting glucose, bilateral lower extremity neuropathy, GERD, BPH, presenting to [facility] after an elective right quadricept tendon repair. He initially underwent quad tendon repair on 7/25/24 with Dr. Foster. He was discharged home with outpatient PT. He was noted to have recurrent high-grade tear on repeat MRI with extensor lag on exam and elected for operative management. On 3/6/25, he underwent revision of quad tendon repair with allograft augmentation. He was evaluated by PT and OT and deemed an appropriate candidate for ARC due to functional deficits     Chief Complaint: f/u ambulatory dysfunction    Interval: Seen and evaluated patient in room. He denies any concerns. Last BM 5/1, continent of bowel and bladder. Plan for discharge home on Monday, 5/5.     Objective :  Temp:  [97.6 °F (36.4 °C)-98 °F (36.7 °C)] 97.6 °F (36.4 °C)  HR:  [65] 65  BP: (114-128)/(56-70) 128/70  Resp:  [16] 16  SpO2:  [93 %-96 %] 93 %  O2 Device: None (Room air)    Functional Update:  Physical Therapy Occupational Therapy   Weight Bearing Status: Weight Bearing as Tolerated (TROM in extn for gait; 20 degrees ROM in TROM)  Transfers: Independent  Bed Mobility: Independent  Amulation Distance (ft): 500 feet  Ambulation: Independent  Assistive Device for Ambulation: Roller Walker  Wheelchair Mobility Distance:  (N/A)  Wheelchair Mobility:  (N/A)  Number of Stairs: 16  Assistive Device for Stairs: Lehft Hand Rail  Stair Assistance: Independent  Ramp: Independent  Assistive Device for Ramp: Roller Walker  Discharge Recommendations: Home with:  DC Home with::  (Home vs alternative placement)   Eating: Independent  Grooming: Independent  Bathing: Independent  Bathing: Independent  Upper Body Dressing: Independent  Lower Body Dressing: Independent  Toileting:  Independent  Tub/Shower Transfer: Independent  Toilet Transfer: Independent  Cognition: Within Defined Limits  Orientation: Person, Place, Time, Situation           Physical Exam  Vitals and nursing note reviewed.   Constitutional:       General: He is not in acute distress.     Appearance: He is obese. He is not ill-appearing, toxic-appearing or diaphoretic.   HENT:      Head: Normocephalic and atraumatic.      Nose: Nose normal.      Mouth/Throat:      Mouth: Mucous membranes are moist.   Pulmonary:      Effort: Pulmonary effort is normal. No respiratory distress.   Abdominal:      General: There is no distension.   Skin:     General: Skin is dry.   Neurological:      General: No focal deficit present.      Mental Status: He is alert.   Psychiatric:         Mood and Affect: Mood normal.         Behavior: Behavior normal.         Scheduled Meds:  Current Facility-Administered Medications   Medication Dose Route Frequency Provider Last Rate    acetaminophen  650 mg Oral Q6H PRN Modesta Lee PA-C      ascorbic acid  500 mg Oral Daily Toño Brito MD      Cholecalciferol  5,000 Units Oral Daily Toño Brito MD      famotidine  20 mg Oral Daily Toño Brito MD      losartan  50 mg Oral Daily Toño Brito MD      metFORMIN  1,500 mg Oral Daily With Dinner Toño Brito MD      multivitamin stress formula  1 tablet Oral Daily Toño Brito MD      polyethylene glycol  17 g Oral Daily PRN Toño Brito MD      pravastatin  40 mg Oral Daily With Dinner Toño Brito MD      senna  1 tablet Oral HS PRN Modesta Lee PA-C      tamsulosin  0.4 mg Oral Daily With Dinner Toño Brito MD           Lab Results: I have reviewed the following results:  Results from last 7 days   Lab Units 04/28/25  0529   HEMOGLOBIN g/dL 12.6   HEMATOCRIT % 38.2   WBC Thousand/uL 8.64   PLATELETS Thousands/uL 229     Results from last 7 days   Lab Units 04/28/25  0529   BUN mg/dL 19   SODIUM mmol/L 138   POTASSIUM mmol/L 4.3   CHLORIDE  mmol/L 106   CREATININE mg/dL 1.16   AST U/L 12*   ALT U/L 10              Modesta Lee PA-C  Physical Medicine and Rehabilitation  [facility]

## 2025-05-02 NOTE — PROGRESS NOTES
05/02/25 1235   Pain Assessment   Pain Assessment Tool 0-10   Pain Score No Pain   Restrictions/Precautions   Precautions Fall Risk   Weight Bearing Restrictions Yes   RLE Weight Bearing Per Order WBAT   LLE Weight Bearing Per Order WBAT   Braces or Orthoses Knee immobilizer  (TROM locked in extension)   Oral Hygiene   Type of Assistance Needed Independent   Oral Hygiene CARE Score 6   Grooming   Able To Initiate Tasks;Comb/Brush Hair;Wash/Dry Face;Brush/Clean Teeth;Wash/Dry Hands   Limitation Noted In Safety;Strength   Shower/Bathe Self   Type of Assistance Needed Independent;Adaptive equipment   Shower/Bathe Self CARE Score 6   Bathing   Assessed Bath Style Sponge Bath   Anticipated D/C Bath Style Sponge Bath   Able to Gather/Transport Yes   Able to Adjust Water Temperature Yes   Able to Wash/Rinse/Dry (body part) Left Arm;Right Arm;L Upper Leg;R Upper Leg;L Lower Leg/Foot;R Lower Leg/Foot;Chest;Abdomen;Perineal Area;Buttocks   Limitations Noted in ROM   Positioning Seated;Standing   Adaptive Equipment Longhand Sponge;Longhand Reacher   Tub/Shower Transfer   Reason Not Assessed Sponge Bath   Upper Body Dressing   Type of Assistance Needed Independent   Upper Body Dressing CARE Score 6   Lower Body Dressing   Type of Assistance Needed Independent;Adaptive equipment   Lower Body Dressing CARE Score 6   Putting On/Taking Off Footwear   Type of Assistance Needed Independent;Adaptive equipment   Putting On/Taking Off Footwear CARE Score 6   Dressing/Undressing Clothing   Able to  Obtain Clothing;Store removed clothing   Remove UB Clothes Button Shirt   Don UB Clothes Button Shirt   Remove LB Clothes Shorts;Undergarment;Socks   Don LB Clothes Shorts;Undergarment;Socks   Limitations Noted In ROM   Adaptive Equipment Reacher;Dressing Stick   Positioning Supported Sit;Standing   Sit to Stand   Type of Assistance Needed Independent   Comment RW   Sit to Stand CARE Score 6   Toileting Hygiene   Type of Assistance Needed  Independent   Toileting Hygiene CARE Score 6   Toileting   Able to Pull Clothing down yes, up yes.   Able to Manage Clothing Closures Yes   Manage Hygiene Bladder   Limitations Noted In ROM   Adaptive Equipment Grab Bar   Toilet Transfer   Type of Assistance Needed Independent   Toilet Transfer CARE Score 6   Toilet Transfer   Surface Assessed Standard Toilet   Transfer Technique Standard   Limitations Noted In ROM   Adaptive Equipment Grab Bar;Walker   Light Housekeeping   Light Housekeeping Level Walker   Light Housekeeping Level of Assistance Modified independent   Light Housekeeping Pt gathered clothes and loaded washer   Cognition   Overall Cognitive Status WFL   Arousal/Participation Alert;Responsive;Cooperative   Attention Within functional limits   Orientation Level Oriented X4   Memory Within functional limits   Following Commands Follows all commands and directions without difficulty   Additional Activities   Additional Activities Other (Comment)   Additional Activities Comments Pt completed functional mobility with RW with MI with KI in place locked in extension   Assessment   Treatment Assessment Pt receieved resting in WC and agreeable to participation with OT session. Pt completed ADL sponge bath and dressing routine with MI with overall effective use of AD. Functional mobility completed with RW with MI and KI in place. Education and discussion regarding consistent use of overall safety and compensatory techniques and Pt demonstrated effective use of the same with ADL/IADL routines. Pt demonstrated ability to gather/sort clothing items and load washer to complete laundry. Pt would benefit from continued participation with OT services to address barriers and support improved strength and endurace for use with daily routines and support safe discharge.   Plan   Treatment/Interventions ADL retraining;Functional transfer training;Therapeutic exercise;Endurance training;Patient/family training;Equipment  eval/education;Bed mobility;Compensatory technique education   Progress Progressing toward goals   OT Therapy Minutes   OT Time In 1235   OT Time Out 1335   OT Total Time (minutes) 60   OT Mode of treatment - Individual (minutes) 60

## 2025-05-02 NOTE — ASSESSMENT & PLAN NOTE
Patient was evaluated by the rehabilitation team MD and an appropriate candidate for acute inpatient rehabilitation program at this time.  The patient will tolerate 3 hours/day 5 to 7 days/week of intensive physical and  occupational therapy   in order to obtain goals for community discharge  Due to the patient's functional Compared to their baseline level of function in addition to their ongoing medical needs, the patient would benefit from daily supervision from a rehabilitation physician as well as rehabilitation nursing to implement and adjust the medical as well as functional plan of care in order to meet the patient's goals.  Bladder: Monitor closely for urinary incontinence and/or retention. Monitor urine output and encourage spontaneous voids. If unable to void spontaneously, will monitor with PVR bladder scans and initiate CIC regimen.  Skin: Monitor for breakdown, frequent turns, pressure offloading  Sleep: Encourage sleep hygiene (routine that promotes healthy circadian rhythm,avoid caffeine later in the day, quiet/dark room at night, reduce electronic before bedtime)  Anticipated Discharge Date:  May 5, 2025 with [facility] PT and OT

## 2025-05-02 NOTE — PROGRESS NOTES
05/02/25 1132   Pain Assessment   Pain Assessment Tool 0-10   Pain Score No Pain   Restrictions/Precautions   Precautions Fall Risk   Weight Bearing Restrictions Yes   RLE Weight Bearing Per Order WBAT   LLE Weight Bearing Per Order WBAT   Braces or Orthoses Knee immobilizer  (TROM locked in extension with ambulation)   Sit to Stand   Type of Assistance Needed Independent   Comment RW   Sit to Stand CARE Score 6   Exercise Tools   Hand Gripper B/L UE heavy resistance hand gripper, 2x40   Other Exercise Tool 1 B/L UE sup/pro with Red Flexbar, 2x40   Other Exercise Tool 2 Pt completed R and L UE AROM in available planes with 6# bar, 4x10   Cognition   Overall Cognitive Status WFL   Arousal/Participation Alert;Responsive;Cooperative   Attention Within functional limits   Orientation Level Oriented X4   Memory Within functional limits   Following Commands Follows all commands and directions without difficulty   Additional Activities   Additional Activities Other (Comment)   Additional Activities Comments Functional mobility with RW with KI in place with MI within Pts room, therapy area and hallways   Assessment   Treatment Assessment Pt received sitting in chair and agreeable with participation with OT session. Pt with good tolerence with UE exercises to help support increased strength for use with daily routines; Pt completed functional mobiltiy and transfers with MI; KI in place and locked in extension. Pt would benefit from continued participation with OT services to support increased strength and activity tolerence and support progress towards safe discharge.   Plan   Treatment/Interventions ADL retraining;Functional transfer training;Therapeutic exercise;Endurance training;Patient/family training;Equipment eval/education;Bed mobility;Compensatory technique education   Progress Progressing toward goals   OT Therapy Minutes   OT Time In 1132   OT Time Out 1202   OT Total Time (minutes) 30   OT Mode of treatment -  Individual (minutes) 30

## 2025-05-02 NOTE — PROGRESS NOTES
05/02/25 0700   Pain Assessment   Pain Assessment Tool 0-10   Pain Score No Pain   Restrictions/Precautions   Precautions Fall Risk   Weight Bearing Restrictions Yes   RLE Weight Bearing Per Order WBAT   LLE Weight Bearing Per Order WBAT   Braces or Orthoses Knee immobilizer  (TROM locked into extension with ambulation)   Cognition   Overall Cognitive Status WFL   Arousal/Participation Alert;Cooperative   Attention Within functional limits   Orientation Level Oriented X4   Memory Within functional limits   Following Commands Follows all commands and directions without difficulty   Roll Left and Right   Type of Assistance Needed Independent   Roll Left and Right CARE Score 6   Sit to Lying   Type of Assistance Needed Independent   Sit to Lying CARE Score 6   Lying to Sitting on Side of Bed   Type of Assistance Needed Independent   Lying to Sitting on Side of Bed CARE Score 6   Sit to Stand   Type of Assistance Needed Independent   Sit to Stand CARE Score 6   Bed-Chair Transfer   Type of Assistance Needed Independent   Chair/Bed-to-Chair Transfer CARE Score 6   Transfer Bed/Chair/Wheelchair   Limitations Noted In LE Strength   Adaptive Equipment Roller Walker   Sit to Stand Modified Independent   Stand to Sit Modified Independent   Supine to Sit Independent   Sit to Supine Modified Independent   Car Transfer   Reason if not Attempted Environmental limitations   Car Transfer CARE Score 10   Walk 10 Feet   Type of Assistance Needed Independent;Adaptive equipment   Physical Assistance Level No physical assistance   Comment RW   Walk 10 Feet CARE Score 6   Walk 50 Feet with Two Turns   Type of Assistance Needed Independent;Adaptive equipment   Physical Assistance Level No physical assistance   Comment RW   Walk 50 Feet with Two Turns CARE Score 6   Walk 150 Feet   Type of Assistance Needed Independent;Adaptive equipment   Physical Assistance Level No physical assistance   Comment RW   Walk 150 Feet CARE Score 6    Walking 10 Feet on Uneven Surfaces   Type of Assistance Needed Independent;Adaptive equipment   Physical Assistance Level No physical assistance   Comment RW   Walking 10 Feet on Uneven Surfaces CARE Score 6   Ambulation   Primary Mode of Locomotion Prior to Admission Walk   Distance Walked (feet) 250 ft   Assist Device Roller Walker   Gait Pattern Slow Ghazal   Limitations Noted In Balance   Provided Assistance with: Balance   Walk Assist Level Modified Independent   Does the patient walk? 2. Yes   Wheelchair mobility   Does the patient use a wheelchair? 0. No   Curb or Single Stair   Style negotiated Single stair   Type of Assistance Needed Independent   Physical Assistance Level No physical assistance   Comment 14 regular steps in stairwell   1 Step (Curb) CARE Score 6   4 Steps   Type of Assistance Needed Independent   4 Steps CARE Score 6   12 Steps   Type of Assistance Needed Independent   12 Steps CARE Score 6   Stairs   Type Stairs   # of Steps 14   Weight Bearing Precautions WBAT   Assist Devices Single Rail   Findings mod I in stairwell, backward down, forward up.   Therapeutic Interventions   Strengthening seated LE TE   Other gait, stairs, transfers, neustep   Equipment Use   NuStep L6x15 min, B/L UE, LLE only   Assessment   Treatment Assessment Pt seated in bed to start session, able to independently get self dressed and come to therapy room. Pt completed all task with Mod I. Navigated 14 stairs with Mod I, Amb 250' with RW and Mod I, maintainted WBS well. Pt seated in room to end session, call bell in reach and all needs met. Pt will benefit from continued skilled PT to improve over all strength and endurence.   PT Barriers   Physical Impairment Decreased range of motion;Decreased strength;Decreased endurance;Impaired balance;Decreased mobility;Orthopedic restrictions   Plan   Treatment/Interventions Functional transfer training;LE strengthening/ROM;Elevations;Therapeutic exercise;Endurance  training;Bed mobility;Gait training   Progress Progressing toward goals   PT Therapy Minutes   PT Time In 0700   PT Time Out 0830   PT Total Time (minutes) 90   PT Mode of treatment - Individual (minutes) 90   PT Mode of treatment - Concurrent (minutes) 0   PT Mode of treatment - Group (minutes) 0   PT Mode of treatment - Co-treat (minutes) 0   PT Mode of Treatment - Total time(minutes) 90 minutes   PT Cumulative Minutes 2197   Therapy Time missed   Time missed? No

## 2025-05-02 NOTE — ASSESSMENT & PLAN NOTE
Skin breakdown on R heel due to cast. Ortho removed approximately 1 inch of cast at posterior aspect on 3/11. Cast further trimmed on 4/11, revealing a new posterior ankle wound.   Local wound care  Continue to monitor   Present on admission to [facility]   Wound care RN consulted (last seen 4/22):  Skin Care orders:  1-Hydraguard to sacrum, buttocks bid and prn   2-EHOB / waffle  cushion when out of bed in chair.  3-Moisturize skin daily with skin nourishing cream  4-Elevate heels to offload pressure.  5. Hydraguard to bilateral heels bid and prn

## 2025-05-02 NOTE — PROGRESS NOTES
"Progress Note - Tobin Kerns 69 y.o. male MRN: [id number]    Unit/Bed#: [facility] 210-01 Encounter: [id number]        Subjective:   Patient seen and examined at bedside after reviewing the chart and discussing the case with the caring staff.      Patient examined at bedside.  Patient denies any acute symptoms.     Physical Exam   Vitals: Blood pressure 128/70, pulse 65, temperature 97.6 °F (36.4 °C), temperature source Temporal, resp. rate 16, height 6' 5\" (1.956 m), weight (!) 137 kg (302 lb 4 oz), SpO2 93%.,Body mass index is 35.84 kg/m².  Constitutional: Patient in no acute distress.  HEENT: PERR, EOMI, MMM.  Cardiovascular: Normal rate and regular rhythm.    Pulmonary/Chest: Effort normal and breath sounds normal.   Abdomen: Soft, + BS, NT.    Assessment/Plan:  Tobin Kerns is a(n) 69 y.o. year old male who is s/p quadriceps tendon repair.     Cardiac with hx of HTN, HLD.  Patient is on losartan 50 mg daily, pravastatin 40 mg daily.  Impaired fasting glucose.  Hgb a1c 5.4% on 3/12/25.  Continue metformin XR 1500 mg daily with dinner.  Regular diet and d/c accucheks as blood sugar is well controlled.    BPH.  Continue Flomax 0.4 mg daily.  GERD.  Continue Pepcid 20 mg daily.  JOVANY.  On CPAP at bedtime.   Vitamin D deficiency.  Patient is on vitamin D3 5000 units daily.  Pain and bowel management.  As per physiatrist.  Quadriceps tendon repair.  Patient is receiving intensive PT OT as per physiatrist.  Orthopedic surgery follow-up 4/18 - cast was removed and TROM reapplied to allow for 10 degree knee flexion with nonambulatory exercises.  He is to be in full knee extension at all times with ambulation.  Can remove for hygenic purposes if needed.  No forced or aggressive knee flexion.  No straight leg raises.  Ambulate with walker.  He is to follow-up with orthopedic surgery in 3 weeks (5/12/25).    Anticipated date of discharge.  Monday, 5/5/2025    The patient was discussed with Dr. Brito and he is in agreement " with the above note.

## 2025-05-02 NOTE — ASSESSMENT & PLAN NOTE
"HPI:   He initially underwent quad tendon repair on 7/25/24 with Dr. Foster. He was discharged home with outpatient PT.  Later noted to have recurrent high-grade tear with extensor lag on exam and elected for operative management  January 2025 MRI: \"Prior quadriceps insertion repair with high-grade recurrent tear exhibiting up to 2.2 cm of tendon retraction.\"  On 3/6/25, he underwent revision of quad tendon repair with allograft augmentation  3/21: outpatient follow-up with Dr. Foster, new cast applied   3/28: follow-up with Dr. Foster, continue TTWB   4/4: follow-up with Dr. Foster (removed cast and reapplied long leg cast for an additional 2 weeks) and continue TTWB  4/11, no changes in plan  4/18 follow-up: cast removed, TROM with allowance of 10 degrees flexion per ortho, advanced to  WBAT   Plan:   PT and OT for 3 hours a day, 5-6 days a week   WBAT with TROM locked in extension (advanced on 4/18 with ortho, not included in documentation but verified with PA-C via secure chat)   Currently allowed 30 degrees of flexion  Advance to 40 degrees on 5/9 (verified with ortho PA-C on 4/24)   Pain: PRN Tylenol (monitor LFTs) and oxycodone discontinued on 3/17 due to minimal pain   Lovenox d/c on 4/7     "

## 2025-05-02 NOTE — PLAN OF CARE
Problem: PAIN - ADULT  Goal: Verbalizes/displays adequate comfort level or baseline comfort level  Description: Interventions:  - Encourage patient to monitor pain and request assistance  - Assess pain using appropriate pain scale  - Administer analgesics based on type and severity of pain and evaluate response  - Implement non-pharmacological measures as appropriate and evaluate response  - Notify physician/advanced practitioner if interventions unsuccessful or patient reports new pain  Outcome: Progressing     Problem: DISCHARGE PLANNING  Goal: Discharge to home or other facility with appropriate resources  Description: INTERVENTIONS:  - Identify barriers to discharge w/patient and caregiver  - Arrange for needed discharge resources and transportation as appropriate  - Identify discharge learning needs (meds, wound care, etc.)  - Arrange for interpretive services to assist at discharge as needed  - Refer to Case Management Department for coordinating discharge planning if the patient needs post-hospital services based on physician/advanced practitioner order or complex needs related to functional status, cognitive ability, or social support system  Outcome: Progressing

## 2025-05-03 RX ADMIN — B-COMPLEX W/ C & FOLIC ACID TAB 1 TABLET: TAB at 08:14

## 2025-05-03 RX ADMIN — TAMSULOSIN HYDROCHLORIDE 0.4 MG: 0.4 CAPSULE ORAL at 17:24

## 2025-05-03 RX ADMIN — PRAVASTATIN SODIUM 40 MG: 40 TABLET ORAL at 17:24

## 2025-05-03 RX ADMIN — CHOLECALCIFEROL TAB 25 MCG (1000 UNIT) 5000 UNITS: 25 TAB at 08:14

## 2025-05-03 RX ADMIN — LOSARTAN POTASSIUM 50 MG: 50 TABLET, FILM COATED ORAL at 08:14

## 2025-05-03 RX ADMIN — FAMOTIDINE 20 MG: 20 TABLET, FILM COATED ORAL at 08:14

## 2025-05-03 RX ADMIN — OXYCODONE HYDROCHLORIDE AND ACETAMINOPHEN 500 MG: 500 TABLET ORAL at 08:14

## 2025-05-03 RX ADMIN — METFORMIN ER 500 MG 1500 MG: 500 TABLET ORAL at 17:24

## 2025-05-03 NOTE — PLAN OF CARE
Problem: PAIN - ADULT  Goal: Verbalizes/displays adequate comfort level or baseline comfort level  Description: Interventions:  - Encourage patient to monitor pain and request assistance  - Assess pain using appropriate pain scale  - Administer analgesics based on type and severity of pain and evaluate response  - Implement non-pharmacological measures as appropriate and evaluate response  - Consider cultural and social influences on pain and pain management  - Notify physician/advanced practitioner if interventions unsuccessful or patient reports new pain  Outcome: Progressing     Problem: INFECTION - ADULT  Goal: Absence or prevention of progression during hospitalization  Description: INTERVENTIONS:  - Assess and monitor for signs and symptoms of infection  - Monitor lab/diagnostic results  - [place] appropriate cooling/warming therapies per order  - Administer medications as ordered  - Instruct and encourage patient and family to use good hand hygiene technique  -  Outcome: Progressing

## 2025-05-03 NOTE — NURSING NOTE
Pt resting in bed, no s/s of pain or distress. MOD I in room/hallway during shift with RW, TROM locked full extension. Fall precautions maintained. Continuing to monitor and follow plan of care.

## 2025-05-04 RX ADMIN — METFORMIN ER 500 MG 1500 MG: 500 TABLET ORAL at 17:06

## 2025-05-04 RX ADMIN — CHOLECALCIFEROL TAB 25 MCG (1000 UNIT) 5000 UNITS: 25 TAB at 08:55

## 2025-05-04 RX ADMIN — TAMSULOSIN HYDROCHLORIDE 0.4 MG: 0.4 CAPSULE ORAL at 17:06

## 2025-05-04 RX ADMIN — PRAVASTATIN SODIUM 40 MG: 40 TABLET ORAL at 17:06

## 2025-05-04 RX ADMIN — B-COMPLEX W/ C & FOLIC ACID TAB 1 TABLET: TAB at 08:54

## 2025-05-04 RX ADMIN — OXYCODONE HYDROCHLORIDE AND ACETAMINOPHEN 500 MG: 500 TABLET ORAL at 08:55

## 2025-05-04 RX ADMIN — FAMOTIDINE 20 MG: 20 TABLET, FILM COATED ORAL at 08:54

## 2025-05-04 RX ADMIN — LOSARTAN POTASSIUM 50 MG: 50 TABLET, FILM COATED ORAL at 08:54

## 2025-05-04 NOTE — PLAN OF CARE
Problem: SAFETY ADULT  Goal: Patient will remain free of falls  Description: INTERVENTIONS:  - Educate patient/family on patient safety including physical limitations  - Instruct patient to call for assistance with activity   - Consult OT/PT to assist with strengthening/mobility   - Keep Call bell within reach  - Keep bed low and locked with side rails adjusted as appropriate  - Keep care items and personal belongings within reach  - Initiate and maintain comfort rounds  - Make Fall Risk Sign visible to staff  - Apply yellow socks and bracelet for high fall risk patients    Outcome: Progressing     Problem: DISCHARGE PLANNING  Goal: Discharge to home or other facility with appropriate resources  Description: INTERVENTIONS:  - Identify barriers to discharge w/patient and caregiver  - Arrange for needed discharge resources and transportation as appropriate  - Refer to Case Management Department for coordinating discharge planning if the patient needs post-hospital services based on physician/advanced practitioner order or complex needs related to functional status, cognitive ability, or social support system  Outcome: Progressing

## 2025-05-04 NOTE — NURSING NOTE
Pt resting in bed, no s/s of pain or distress. MOD I in room, hallway. RW, TROM in locked to RLE WBAT. Fall precautions maintained, tolerating current diet. Continuing to monitor and follow plan of care, bed controls self regulated.

## 2025-05-05 LAB
ALBUMIN SERPL BCG-MCNC: 3.8 G/DL (ref 3.5–5)
ALP SERPL-CCNC: 48 U/L (ref 34–104)
ALT SERPL W P-5'-P-CCNC: 10 U/L (ref 7–52)
ANION GAP SERPL CALCULATED.3IONS-SCNC: 7 MMOL/L (ref 4–13)
AST SERPL W P-5'-P-CCNC: 12 U/L (ref 13–39)
BASOPHILS # BLD AUTO: 0.05 THOUSANDS/ÂΜL (ref 0–0.1)
BASOPHILS NFR BLD AUTO: 1 % (ref 0–1)
BILIRUB SERPL-MCNC: 0.49 MG/DL (ref 0.2–1)
BUN SERPL-MCNC: 20 MG/DL (ref 5–25)
CALCIUM SERPL-MCNC: 9 MG/DL (ref 8.4–10.2)
CHLORIDE SERPL-SCNC: 105 MMOL/L (ref 96–108)
CO2 SERPL-SCNC: 24 MMOL/L (ref 21–32)
CREAT SERPL-MCNC: 1.21 MG/DL (ref 0.6–1.3)
EOSINOPHIL # BLD AUTO: 0.2 THOUSAND/ÂΜL (ref 0–0.61)
EOSINOPHIL NFR BLD AUTO: 3 % (ref 0–6)
ERYTHROCYTE [DISTWIDTH] IN BLOOD BY AUTOMATED COUNT: 13.2 % (ref 11.6–15.1)
GFR SERPL CREATININE-BSD FRML MDRD: 61 ML/MIN/1.73SQ M
GLUCOSE P FAST SERPL-MCNC: 94 MG/DL (ref 65–99)
GLUCOSE SERPL-MCNC: 94 MG/DL (ref 65–140)
HCT VFR BLD AUTO: 38.5 % (ref 36.5–49.3)
HGB BLD-MCNC: 12.9 G/DL (ref 12–17)
IMM GRANULOCYTES # BLD AUTO: 0.03 THOUSAND/UL (ref 0–0.2)
IMM GRANULOCYTES NFR BLD AUTO: 0 % (ref 0–2)
LYMPHOCYTES # BLD AUTO: 3.25 THOUSANDS/ÂΜL (ref 0.6–4.47)
LYMPHOCYTES NFR BLD AUTO: 41 % (ref 14–44)
MCH RBC QN AUTO: 29.8 PG (ref 26.8–34.3)
MCHC RBC AUTO-ENTMCNC: 33.5 G/DL (ref 31.4–37.4)
MCV RBC AUTO: 89 FL (ref 82–98)
MONOCYTES # BLD AUTO: 0.67 THOUSAND/ÂΜL (ref 0.17–1.22)
MONOCYTES NFR BLD AUTO: 9 % (ref 4–12)
NEUTROPHILS # BLD AUTO: 3.69 THOUSANDS/ÂΜL (ref 1.85–7.62)
NEUTS SEG NFR BLD AUTO: 46 % (ref 43–75)
NRBC BLD AUTO-RTO: 0 /100 WBCS
PLATELET # BLD AUTO: 224 THOUSANDS/UL (ref 149–390)
PMV BLD AUTO: 9.7 FL (ref 8.9–12.7)
POTASSIUM SERPL-SCNC: 4.4 MMOL/L (ref 3.5–5.3)
PROT SERPL-MCNC: 6.3 G/DL (ref 6.4–8.4)
RBC # BLD AUTO: 4.33 MILLION/UL (ref 3.88–5.62)
SODIUM SERPL-SCNC: 136 MMOL/L (ref 135–147)
WBC # BLD AUTO: 7.89 THOUSAND/UL (ref 4.31–10.16)

## 2025-05-05 PROCEDURE — 99232 SBSQ HOSP IP/OBS MODERATE 35: CPT | Performed by: INTERNAL MEDICINE

## 2025-05-05 PROCEDURE — 85025 COMPLETE CBC W/AUTO DIFF WBC: CPT

## 2025-05-05 PROCEDURE — 97116 GAIT TRAINING THERAPY: CPT

## 2025-05-05 PROCEDURE — 97530 THERAPEUTIC ACTIVITIES: CPT

## 2025-05-05 PROCEDURE — 97535 SELF CARE MNGMENT TRAINING: CPT

## 2025-05-05 PROCEDURE — 80053 COMPREHEN METABOLIC PANEL: CPT

## 2025-05-05 PROCEDURE — 97110 THERAPEUTIC EXERCISES: CPT

## 2025-05-05 RX ORDER — POLYETHYLENE GLYCOL 3350 17 G/17G
17 POWDER, FOR SOLUTION ORAL DAILY PRN
Start: 2025-05-05 | End: 2025-05-08 | Stop reason: ALTCHOICE

## 2025-05-05 RX ORDER — SENNOSIDES 8.6 MG
8.6 TABLET ORAL
Qty: 30 TABLET | Refills: 0 | Status: SHIPPED | OUTPATIENT
Start: 2025-05-05 | End: 2025-05-08 | Stop reason: ALTCHOICE

## 2025-05-05 RX ADMIN — OXYCODONE HYDROCHLORIDE AND ACETAMINOPHEN 500 MG: 500 TABLET ORAL at 08:58

## 2025-05-05 RX ADMIN — PRAVASTATIN SODIUM 40 MG: 40 TABLET ORAL at 17:33

## 2025-05-05 RX ADMIN — CHOLECALCIFEROL TAB 25 MCG (1000 UNIT) 5000 UNITS: 25 TAB at 08:58

## 2025-05-05 RX ADMIN — METFORMIN ER 500 MG 1500 MG: 500 TABLET ORAL at 17:33

## 2025-05-05 RX ADMIN — FAMOTIDINE 20 MG: 20 TABLET, FILM COATED ORAL at 08:58

## 2025-05-05 RX ADMIN — LOSARTAN POTASSIUM 50 MG: 50 TABLET, FILM COATED ORAL at 08:58

## 2025-05-05 RX ADMIN — B-COMPLEX W/ C & FOLIC ACID TAB 1 TABLET: TAB at 08:58

## 2025-05-05 RX ADMIN — TAMSULOSIN HYDROCHLORIDE 0.4 MG: 0.4 CAPSULE ORAL at 17:33

## 2025-05-05 NOTE — NUTRITION
05/05/25 1432   Biochemical Data,Medical Tests, and Procedures   Biochemical Data/Medical Tests/Procedures Lab values reviewed;Meds reviewed   Labs (Comment) 5/5 AST:12, pro:6.3, CBC WNL   Meds (Comment) Vit D, Vit D3, pepcid, cozaar, metformin, MVI, pravachol, senna   Nutrition-Focused Physical Exam   Nutrition-Focused Physical Exam Findings RN skin assessment reviewed   Nutrition-Focused Physical Exam Findings Nonpitting BLE edema noted   Medical-Related Concerns PMH reviewed   Adequacy of Intake   Nutrition Modality PO   Feeding Route   PO Independent   Current PO Intake   Current Diet Order Regular diet thin liquids.   Current Meal Intake %   Estimated calorie intake compared to estimated need Nutrient needs met.   PES Statement   Problem No nutrition diagnosis  (previous PES resolved)   Recommendations/Interventions   Malnutrition/BMI Present No  (does not meet criteria)   Summary Regular diet thin liquids. Meal completions 100%. No supplements. 4/16 302#, BMI=35.84; 11#(3.5%) weight loss from admission 3/11 313#. No significant weight change. Medications reviewed. Missing teeth. Nonpitting BLE edema. LBM 5/4. Reports good appetite. Denies any problems with meals.   Interventions/Recommendations Continue current diet order   Education Assessment   Education Education not indicated at this time   Patient Nutrition Goals   Goal Adequate hydration;Adequate intake;Improve skin integrity   Goal Status Met;Extended   Timeframe to complete goal by next f/u   Nutrition Complexity Risk   Nutrition complexity level Sign off   Follow up date 05/19/25

## 2025-05-05 NOTE — PROGRESS NOTES
"Progress Note - PMR   Name: Tobin Kerns 69 y.o. male I MRN: [id number]  Unit/Bed#: -01 I Date of Admission: 3/11/2025   Date of Service: 5/5/2025 I Hospital Day: 55     Assessment & Plan  Quadriceps muscle rupture, right, subsequent encounter  HPI:   He initially underwent quad tendon repair on 7/25/24 with Dr. Foster. He was discharged home with outpatient PT.  Later noted to have recurrent high-grade tear with extensor lag on exam and elected for operative management  January 2025 MRI: \"Prior quadriceps insertion repair with high-grade recurrent tear exhibiting up to 2.2 cm of tendon retraction.\"  On 3/6/25, he underwent revision of quad tendon repair with allograft augmentation  3/21: outpatient follow-up with Dr. Foster, new cast applied   3/28: follow-up with Dr. Foster, continue TTWB   4/4: follow-up with Dr. Foster (removed cast and reapplied long leg cast for an additional 2 weeks) and continue TTWB  4/11, no changes in plan  4/18 follow-up: cast removed, TROM with allowance of 10 degrees flexion per ortho, advanced to  WBAT   Plan:   PT and OT for 3 hours a day, 5-6 days a week   WBAT with TROM locked in extension (advanced on 4/18 with ortho, not included in documentation but verified with PA-C via secure chat)   Currently allowed 30 degrees of flexion  Advance to 40 degrees on 5/9 (verified with ortho PA-C on 4/24)   Pain: PRN Tylenol (monitor LFTs) and oxycodone discontinued on 3/17 due to minimal pain   Lovenox d/c on 4/7     Hypertension  Continue losartan 50 mg daily   Monitor BP   Mixed hyperlipidemia  Continue pravastatin 40 mg daily   Gastro-esophageal reflux disease without esophagitis  Continue famotidine 20 mg daily   Peripheral neuropathy  Patient is not diabetic   Monitor skin for new or worsening wounds   JOVANY on CPAP  Continue CPAP qHS  IFG (impaired fasting glucose)  Continue metformin 1500 mg daily   Seen by RD 3/12, diet adjusted to regular   3/12 A1c: 5.4   Healed ulcer " of left foot  Local wound care  Present on admission   Wound care RN consulted   BPH (benign prostatic hyperplasia)  Continue tamsulosin 0.4 mg daily   Patient currently voiding without difficulty   PRN PVRs if urinary retention is suspected   Impaired mobility and ADLs  Patient was evaluated by the rehabilitation team MD and an appropriate candidate for acute inpatient rehabilitation program at this time.  The patient will tolerate 3 hours/day 5 to 7 days/week of intensive physical and  occupational therapy   in order to obtain goals for community discharge  Due to the patient's functional Compared to their baseline level of function in addition to their ongoing medical needs, the patient would benefit from daily supervision from a rehabilitation physician as well as rehabilitation nursing to implement and adjust the medical as well as functional plan of care in order to meet the patient's goals.  Bladder: Monitor closely for urinary incontinence and/or retention. Monitor urine output and encourage spontaneous voids. If unable to void spontaneously, will monitor with PVR bladder scans and initiate CIC regimen.  Skin: Monitor for breakdown, frequent turns, pressure offloading  Sleep: Encourage sleep hygiene (routine that promotes healthy circadian rhythm,avoid caffeine later in the day, quiet/dark room at night, reduce electronic before bedtime)  Anticipated Discharge Date:  TBD with [facility] PT and OT   Wound of right ankle  Skin breakdown on R heel due to cast. Ortho removed approximately 1 inch of cast at posterior aspect on 3/11. Cast further trimmed on 4/11, revealing a new posterior ankle wound.   Local wound care  Continue to monitor   Present on admission to [facility]   Wound care RN consulted (last seen 4/22):  Skin Care orders:  1-Hydraguard to sacrum, buttocks bid and prn   2-EHOB / waffle  cushion when out of bed in chair.  3-Moisturize skin daily with skin nourishing cream  4-Elevate heels to offload pressure.  5.  Hydraguard to bilateral heels bid and prn       Subjective   Tobin Kerns is a 69 year-old male, with a past medical history of hypertension, hyperlipidemia, JOVANY, impaired fasting glucose, bilateral lower extremity neuropathy, GERD, BPH, presenting to [facility] after an elective right quadricept tendon repair. He initially underwent quad tendon repair on 7/25/24 with Dr. Foster. He was discharged home with outpatient PT. He was noted to have recurrent high-grade tear on repeat MRI with extensor lag on exam and elected for operative management. On 3/6/25, he underwent revision of quad tendon repair with allograft augmentation. He was evaluated by PT and OT and deemed an appropriate candidate for ARC due to functional deficits     Chief Complaint: f/u ambulatory dysfunction    Interval: Seen and evaluated patient in room. He denies any concerns. Last BM 5/4, continent of bowel and bladder. Per RN, patient inquired about discontinuing tamsulosin. Per chart review, patient has been on tamsulosin since 2021 due to BPH. He also had an episode of post-operative urinary retention this past July. Patient is not having any side effects of the Flomax. Educated patient on risks of urinary retention and recommended continuing Flomax, he should follow-up with urology. Patient agreeable. 5/5 labs reviewed.     Objective :  Temp:  [97.5 °F (36.4 °C)-98 °F (36.7 °C)] 97.5 °F (36.4 °C)  HR:  [60-67] 60  BP: (100-156)/(54-67) 156/67  Resp:  [18] 18  SpO2:  [95 %-98 %] 95 %  O2 Device: None (Room air)    Functional Update:  Physical Therapy Occupational Therapy   Weight Bearing Status: Weight Bearing as Tolerated (R LE TROM locked in extension with ambulatory activities, 30 degrees unlocked with nonambulatory activities)  Transfers: Independent  Bed Mobility: Independent  Amulation Distance (ft): 1000 feet  Ambulation: Independent  Assistive Device for Ambulation: Roller Walker  Wheelchair Mobility Distance:  (N/A)  Wheelchair Mobility:   (N/A)  Number of Stairs: 25  Assistive Device for Stairs: Lehft Hand Rail  Stair Assistance: Independent  Ramp: Independent  Assistive Device for Ramp: Roller Walker  Discharge Recommendations: Home with:  DC Home with:: First Floor Setup, Home Physical Therapy   Eating: Independent  Grooming: Independent  Bathing: Independent  Bathing: Independent  Upper Body Dressing: Independent  Lower Body Dressing: Independent  Toileting: Independent  Tub/Shower Transfer: Independent  Toilet Transfer: Independent  Cognition: Within Defined Limits  Orientation: Person, Place, Time, Situation           Physical Exam  Vitals and nursing note reviewed.   Constitutional:       General: He is not in acute distress.     Appearance: He is obese. He is not ill-appearing, toxic-appearing or diaphoretic.   HENT:      Head: Normocephalic and atraumatic.      Nose: Nose normal.      Mouth/Throat:      Mouth: Mucous membranes are moist.   Pulmonary:      Effort: Pulmonary effort is normal. No respiratory distress.   Abdominal:      General: There is no distension.   Skin:     General: Skin is dry.   Neurological:      General: No focal deficit present.      Mental Status: He is alert.   Psychiatric:         Mood and Affect: Mood normal.         Behavior: Behavior normal.         Scheduled Meds:  Current Facility-Administered Medications   Medication Dose Route Frequency Provider Last Rate    acetaminophen  650 mg Oral Q6H PRN Modesta Lee PA-C      ascorbic acid  500 mg Oral Daily Toño Brito MD      Cholecalciferol  5,000 Units Oral Daily Toño Brito MD      famotidine  20 mg Oral Daily Toño Brito MD      losartan  50 mg Oral Daily Toño Brito MD      metFORMIN  1,500 mg Oral Daily With Dinner Toño Brito MD      multivitamin stress formula  1 tablet Oral Daily Toño Brito MD      polyethylene glycol  17 g Oral Daily PRN Toño Brito MD      pravastatin  40 mg Oral Daily With Dinner Toño Brito MD      senna  1 tablet  Oral HS PRN Modesta Lee PA-C      tamsulosin  0.4 mg Oral Daily With Dinner Toño Brito MD           Lab Results: I have reviewed the following results:  Results from last 7 days   Lab Units 05/05/25  0545   HEMOGLOBIN g/dL 12.9   HEMATOCRIT % 38.5   WBC Thousand/uL 7.89   PLATELETS Thousands/uL 224     Results from last 7 days   Lab Units 05/05/25  0545   BUN mg/dL 20   SODIUM mmol/L 136   POTASSIUM mmol/L 4.4   CHLORIDE mmol/L 105   CREATININE mg/dL 1.21   AST U/L 12*   ALT U/L 10            Modesta Lee PA-C  Physical Medicine and Rehabilitation  [facility]

## 2025-05-05 NOTE — PROGRESS NOTES
05/05/25 0700   Pain Assessment   Pain Assessment Tool 0-10   Pain Score No Pain   Restrictions/Precautions   Precautions Fall Risk   RLE Weight Bearing Per Order WBAT   ROM Restrictions   (TROM locked in extension with ambulation, unlocked to 30 degrees with non-ambulatory activities)   Braces or Orthoses Knee immobilizer  (R LE TROM locked in extension with ambulatory activities)   General   Change In Medical/Functional Status TROM brace to allow for 30 degrees knee flexion with nonambulatory exercises. Maintain in full knee extension at all times with ambulation. Can remove for hygenic purposes if needed. No forced or aggressive knee flexion No straight leg raises at this time Continue to ambulate with walker   Cognition   Overall Cognitive Status WFL   Arousal/Participation Alert;Responsive;Cooperative   Attention Within functional limits   Orientation Level Oriented X4   Memory Within functional limits   Following Commands Follows all commands and directions without difficulty   Subjective   Subjective Pt reports he is waiting to hear final confirmation about discharge   Roll Left and Right   Type of Assistance Needed Independent   Physical Assistance Level No physical assistance   Roll Left and Right CARE Score 6   Sit to Lying   Type of Assistance Needed Independent   Physical Assistance Level No physical assistance   Sit to Lying CARE Score 6   Lying to Sitting on Side of Bed   Type of Assistance Needed Independent   Physical Assistance Level No physical assistance   Lying to Sitting on Side of Bed CARE Score 6   Sit to Stand   Type of Assistance Needed Independent   Physical Assistance Level No physical assistance   Comment RW   Sit to Stand CARE Score 6   Bed-Chair Transfer   Type of Assistance Needed Independent   Physical Assistance Level No physical assistance   Comment RW   Chair/Bed-to-Chair Transfer CARE Score 6   Car Transfer   Reason if not Attempted Environmental limitations   Car Transfer CARE  Score 10   Walk 10 Feet   Type of Assistance Needed Independent   Physical Assistance Level No physical assistance   Comment RW   Walk 10 Feet CARE Score 6   Walk 50 Feet with Two Turns   Type of Assistance Needed Independent   Physical Assistance Level No physical assistance   Comment RW   Walk 50 Feet with Two Turns CARE Score 6   Walk 150 Feet   Type of Assistance Needed Independent   Physical Assistance Level No physical assistance   Comment RW   Walk 150 Feet CARE Score 6   Walking 10 Feet on Uneven Surfaces   Type of Assistance Needed Independent   Physical Assistance Level No physical assistance   Comment RW   Walking 10 Feet on Uneven Surfaces CARE Score 6   Ambulation   Primary Mode of Locomotion Prior to Admission Walk   Distance Walked (feet) 180 ft  (180', 120')   Assist Device Roller Walker   Findings mod I level and unlevel surfaces with RW, TROM locked in extension   Does the patient walk? 2. Yes   Wheel 50 Feet with Two Turns   Reason if not Attempted Activity not applicable   Wheel 50 Feet with Two Turns CARE Score 9   Wheel 150 Feet   Reason if not Attempted Activity not applicable   Wheel 150 Feet CARE Score 9   Wheelchair mobility   Findings N/A   Does the patient use a wheelchair? 0. No   Curb or Single Stair   Style negotiated Single stair   Type of Assistance Needed Independent   Physical Assistance Level No physical assistance   Comment 25 FF stairs, single HR, managing RW, non-reciprocal   1 Step (Curb) CARE Score 6   4 Steps   Type of Assistance Needed Independent   Physical Assistance Level No physical assistance   Comment 25 FF stairs, single HR, managing RW, non-reciprocal   4 Steps CARE Score 6   12 Steps   Type of Assistance Needed Independent   Physical Assistance Level No physical assistance   Comment 25 FF stairs, single HR, managing RW, non-reciprocal   12 Steps CARE Score 6   Picking Up Object   Type of Assistance Needed Independent;Adaptive equipment   Physical Assistance Level  No physical assistance   Comment RW, reacher   Picking Up Object CARE Score 6   Toilet Transfer   Type of Assistance Needed Independent   Physical Assistance Level No physical assistance   Comment RW   Toilet Transfer CARE Score 6   Therapeutic Interventions   Strengthening seated LE TE   Other transfer training, gait training, stair training   Equipment Use   NuStep L6, 25 min, B/L UE, L LE   Assessment   Treatment Assessment Pt agreeable to PT this AM, received supine in bed. Pt continues to demonstrate modified independence with functional mobility using RW, TROM locked in extension with ambulatory activities, TROM unlocked up to 30 degrees knee flexion with nonambulatory activities. Continued to challenge LE strength/endurance with seated LE TE and NuStep within orthopedic restrictions with good tolerance. Pt IS SAFE for d/c home to [place] home with RW for functional mobility, maintaining current orthopedic restrictions with TROM brace. Will continue with current PT POC to improve deficits and promote return to PLOF.   Problem List Decreased strength;Decreased range of motion;Impaired balance   PT Barriers   Physical Impairment Decreased range of motion;Decreased strength;Decreased endurance;Impaired balance;Decreased mobility;Orthopedic restrictions   Functional Limitation Car transfers   Plan   Treatment/Interventions Functional transfer training;LE strengthening/ROM;Therapeutic exercise;Endurance training;Patient/family training;Equipment eval/education;Bed mobility;Gait training   Progress Progressing toward goals   PT Therapy Minutes   PT Time In 0700   PT Time Out 0830   PT Total Time (minutes) 90   PT Mode of treatment - Individual (minutes) 90   PT Mode of treatment - Concurrent (minutes) 0   PT Mode of treatment - Group (minutes) 0   PT Mode of treatment - Co-treat (minutes) 0   PT Mode of Treatment - Total time(minutes) 90 minutes   PT Cumulative Minutes 1975     Patient remains OOB in chair, all needs in  reach. Encouraged use of call bell, patient verbalizes understanding.

## 2025-05-05 NOTE — ASSESSMENT & PLAN NOTE
Patient was evaluated by the rehabilitation team MD and an appropriate candidate for acute inpatient rehabilitation program at this time.  The patient will tolerate 3 hours/day 5 to 7 days/week of intensive physical and  occupational therapy   in order to obtain goals for community discharge  Due to the patient's functional Compared to their baseline level of function in addition to their ongoing medical needs, the patient would benefit from daily supervision from a rehabilitation physician as well as rehabilitation nursing to implement and adjust the medical as well as functional plan of care in order to meet the patient's goals.  Bladder: Monitor closely for urinary incontinence and/or retention. Monitor urine output and encourage spontaneous voids. If unable to void spontaneously, will monitor with PVR bladder scans and initiate CIC regimen.  Skin: Monitor for breakdown, frequent turns, pressure offloading  Sleep: Encourage sleep hygiene (routine that promotes healthy circadian rhythm,avoid caffeine later in the day, quiet/dark room at night, reduce electronic before bedtime)  Anticipated Discharge Date:  TBD with [facility] PT and OT

## 2025-05-05 NOTE — PLAN OF CARE
Problem: PAIN - ADULT  Goal: Verbalizes/displays adequate comfort level or baseline comfort level  Description: Interventions:  - Encourage patient to monitor pain and request assistance  - Assess pain using appropriate pain scale  - Administer analgesics based on type and severity of pain and evaluate response  - Implement non-pharmacological measures as appropriate and evaluate response  - Consider cultural and social influences on pain and pain management  - Notify physician/advanced practitioner if interventions unsuccessful or patient reports new pain  Outcome: Progressing     Problem: INFECTION - ADULT  Goal: Absence or prevention of progression during hospitalization  Description: INTERVENTIONS:  - Assess and monitor for signs and symptoms of infection  - Monitor lab/diagnostic results  - Monitor all insertion sites, i.e. indwelling lines, tubes, and drains  - Monitor endotracheal if appropriate and nasal secretions for changes in amount and color  - [place] appropriate cooling/warming therapies per order  - Administer medications as ordered  - Instruct and encourage patient and family to use good hand hygiene technique  - Identify and instruct in appropriate isolation precautions for identified infection/condition  Outcome: Progressing     Problem: SAFETY ADULT  Goal: Maintain or return to baseline ADL function  Description: INTERVENTIONS:  -  Assess patient's ability to carry out ADLs; assess patient's baseline for ADL function and identify physical deficits which impact ability to perform ADLs (bathing, care of mouth/teeth, toileting, grooming, dressing, etc.)  - Assess/evaluate cause of self-care deficits   - Assess range of motion  - Assess patient's mobility; develop plan if impaired  - Assess patient's need for assistive devices and provide as appropriate  - Encourage maximum independence but intervene and supervise when necessary  - Involve family in performance of ADLs  - Assess for home care  needs following discharge   - Consider OT consult to assist with ADL evaluation and planning for discharge  - Provide patient education as appropriate  Outcome: Progressing     Problem: DISCHARGE PLANNING  Goal: Discharge to home or other facility with appropriate resources  Description: INTERVENTIONS:  - Identify barriers to discharge w/patient and caregiver  - Arrange for needed discharge resources and transportation as appropriate  - Identify discharge learning needs (meds, wound care, etc.)  - Arrange for interpretive services to assist at discharge as needed  - Refer to Case Management Department for coordinating discharge planning if the patient needs post-hospital services based on physician/advanced practitioner order or complex needs related to functional status, cognitive ability, or social support system  Outcome: Progressing

## 2025-05-05 NOTE — ASSESSMENT & PLAN NOTE
Continue tamsulosin 0.4 mg daily   Patient currently voiding without difficulty   PRN PVRs if urinary retention is suspected    Detail Level: Zone

## 2025-05-05 NOTE — PROGRESS NOTES
05/05/25 1030   Pain Assessment   Pain Assessment Tool 0-10   Pain Score No Pain   Restrictions/Precautions   Precautions Fall Risk   RLE Weight Bearing Per Order WBAT   LLE Weight Bearing Per Order WBAT   Braces or Orthoses Knee immobilizer  (R LE TROM locked in extension with ambulatory activities)   Oral Hygiene   Type of Assistance Needed Independent   Physical Assistance Level No physical assistance   Oral Hygiene CARE Score 6   Grooming   Able To Initiate Tasks;Acquire Items;Comb/Brush Hair;Wash/Dry Face;Brush/Clean Teeth;Wash/Dry Hands   Findings stance   Shower/Bathe Self   Type of Assistance Needed Independent;Adaptive equipment   Physical Assistance Level No physical assistance   Shower/Bathe Self CARE Score 6   Bathing   Assessed Bath Style Sponge Bath   Anticipated D/C Bath Style Sponge Bath   Able to Gather/Transport Yes   Able to Adjust Water Temperature Yes   Able to Wash/Rinse/Dry (body part) Left Arm;Right Arm;L Upper Leg;R Upper Leg;L Lower Leg/Foot;R Lower Leg/Foot;Chest;Abdomen;Perineal Area;Buttocks   Limitations Noted in ROM   Positioning Seated;Standing   Adaptive Equipment Longhand Sponge   Upper Body Dressing   Type of Assistance Needed Independent   Physical Assistance Level No physical assistance   Upper Body Dressing CARE Score 6   Lower Body Dressing   Type of Assistance Needed Independent;Adaptive equipment   Physical Assistance Level No physical assistance   Lower Body Dressing CARE Score 6   Putting On/Taking Off Footwear   Type of Assistance Needed Independent;Adaptive equipment   Physical Assistance Level No physical assistance   Putting On/Taking Off Footwear CARE Score 6   Picking Up Object   Type of Assistance Needed Independent;Adaptive equipment   Physical Assistance Level No physical assistance   Picking Up Object CARE Score 6   Dressing/Undressing Clothing   Able to  Obtain Clothing;Store removed clothing   Remove UB Clothes Button Shirt   Don UB Clothes Button Shirt    Remove LB Clothes Shorts;Undergarment;Socks   Don LB Clothes Socks;Undergarment;Shorts   Limitations Noted In ROM   Adaptive Equipment Reacher;Dressing Stick   Positioning Supported Sit;Standing   Sit to Stand   Type of Assistance Needed Independent   Physical Assistance Level No physical assistance   Sit to Stand CARE Score 6   Toileting Hygiene   Type of Assistance Needed Independent   Physical Assistance Level No physical assistance   Toileting Hygiene CARE Score 6   Toilet Transfer   Type of Assistance Needed Independent;Adaptive equipment   Physical Assistance Level No physical assistance   Toilet Transfer CARE Score 6   Meal Prep   Meal Prep Level Walker   Meal Prep Level of Assistance Modified independent   Meal Preparation Pt assisted with meal prep during session by cutting items to assist with staff activity   Kitchen Mobility   Kitchen-Mobility Level Walker   Kitchen Activity Retrieve items;Transport items   Light Housekeeping   Light Housekeeping Level Walker   Light Housekeeping Level of Assistance Modified independent   Light Housekeeping Pt gathered clothes and loaded washer all Indep   Cognition   Overall Cognitive Status WFL   Arousal/Participation Alert;Cooperative   Attention Within functional limits   Orientation Level Oriented X4   Memory Within functional limits   Following Commands Follows all commands and directions without difficulty   Activity Tolerance   Activity Tolerance Patient tolerated treatment well   Assessment   Treatment Assessment OT session addressed ADL via sponge bathe level. Pt managed s/u and c/u Indep. Pt managed doff/don of immobilizer Indep. He completed laundry Indep with gathering and placing into wash machine with use of RW. Kitchen mgmt with use of RW at Indep level. D/C planning in prep for d/c to home. Current barriers of ROM deficits in R LE, use of TROM brace which is locked in extension for mobility.  (20 degrees knee flexion with nonambulatory exercises with  PT)  Plan is for d/c to home in [place].  Ortho f/u May 14.   Prognosis Good   Problem List Decreased strength;Decreased range of motion;Impaired balance;Decreased mobility   Plan   Treatment/Interventions ADL retraining;Functional transfer training;Patient/family training;OT   Progress Progressing toward goals   OT Therapy Minutes   OT Time In 1030   OT Time Out 1200   OT Total Time (minutes) 90   OT Mode of treatment - Individual (minutes) 90   OT Mode of treatment - Concurrent (minutes) 0   OT Mode of treatment - Group (minutes) 0   OT Mode of treatment - Co-treat (minutes) 0   OT Mode of Treatment - Total time(minutes) 90 minutes   OT Cumulative Minutes 1698   Therapy Time missed   Time missed? No

## 2025-05-05 NOTE — PROGRESS NOTES
05/04/25 1942   Charting Type   Charting Type Shift assessment   Neurological   Neuro (WDL) X   Level of Consciousness Alert/awake   Orientation Level Oriented X4   Cognition Appropriate judgement   Facial Symmetry Symmetrical   Tongue Deviation Midline   Speech Clear   Swallow Able to swallow solids and liquids without difficulty   [place]ra Coma Scale   Eye Opening 4   Best Verbal Response 5   Best Motor Response 6   Paron Coma Scale Score 15   HEENT   HEENT (WDL) X   Head and Face Symmetrical   R Eye Mildly impaired vision   L Eye Mildly impaired vision   Vision - Corrective Lenses (at bedside) Glasses   Teeth Missing teeth   Respiratory   Respiratory (WDL) WDL   Cardiac   Cardiac (WDL) WDL   Pain Assessment   Pain Assessment Tool 0-10   Pain Score No Pain   Peripheral Vascular   Peripheral Vascular (WDL) WDL   Integumentary   Integumentary (WDL) X   Skin Color Appropriate for ethnicity   Skin Condition/Temp Warm;Dry   Skin Integrity Abrasion   Skin Location scab R ankle   Skin Turgor Non-tenting   Tattoos/Piercings   Does patient have tattoos? No   Piercings Remaining No   Chandler Scale   Sensory Perceptions 3   Moisture 4   Activity 4   Mobility 3   Nutrition 4   Friction and Shear 3   Chandler Scale Score 21   Musculoskeletal   Musculoskeletal (WDL) X   RLE Prior surgery;Prior injury/trauma   Gastrointestinal   Gastrointestinal (WDL) WDL   Last BM Date 05/04/25   Genitourinary   Genitourinary (WDL) WDL   Anal/Rectal   Anal/Rectal (WDL) WDL   Psychosocial   Psychosocial (WDL) WDL

## 2025-05-05 NOTE — ASSESSMENT & PLAN NOTE
Skin breakdown on R heel due to cast. Ortho removed approximately 1 inch of cast at posterior aspect on 3/11. Cast further trimmed on 4/11, revealing a new posterior ankle wound.   Local wound care  Continue to monitor   Present on admission to [facility]   Wound care RN consulted (last seen 4/22):  Skin Care orders:  1-Hydraguard to sacrum, buttocks bid and prn   2-EHOB / waffle  cushion when out of bed in chair.  3-Moisturize skin daily with skin nourishing cream  4-Elevate heels to offload pressure.  5. Hydraguard to bilateral heels bid and prn

## 2025-05-05 NOTE — PROGRESS NOTES
"Progress Note - Tobin Kerns 69 y.o. male MRN: [id number]    Unit/Bed#: [facility] 210-01 Encounter: [id number]        Subjective:   Patient seen and examined at bedside after reviewing the chart and discussing the case with the caring staff.      Patient examined at bedside.  Patient denies any acute symptoms.    Patient's labs were reviewed from 5/5/2025.  Patient's CMP and CBC were found to be within normal limits.    Physical Exam   Vitals: Blood pressure 156/67, pulse 60, temperature 97.5 °F (36.4 °C), temperature source Temporal, resp. rate 18, height 6' 5\" (1.956 m), weight (!) 137 kg (302 lb 4 oz), SpO2 95%.,Body mass index is 35.84 kg/m².  Constitutional: Patient in no acute distress.  HEENT: PERR, EOMI, MMM.  Cardiovascular: Normal rate and regular rhythm.    Pulmonary/Chest: Effort normal and breath sounds normal.   Abdomen: Soft, + BS, NT.    Assessment/Plan:  Tobin Kerns is a(n) 69 y.o. year old male who is s/p quadriceps tendon repair.     Cardiac with hx of HTN, HLD.  Patient is on losartan 50 mg daily, pravastatin 40 mg daily.  Impaired fasting glucose.  Hgb a1c 5.4% on 3/12/25.  Continue metformin XR 1500 mg daily with dinner.  Regular diet and d/c accucheks as blood sugar is well controlled.    BPH.  Continue Flomax 0.4 mg daily.  GERD.  Continue Pepcid 20 mg daily.  JOVANY.  On CPAP at bedtime.   Vitamin D deficiency.  Patient is on vitamin D3 5000 units daily.  Pain and bowel management.  As per physiatrist.  Quadriceps tendon repair.  Patient is receiving intensive PT OT as per physiatrist.  Orthopedic surgery follow-up 4/18 - cast was removed and TROM reapplied to allow for 10 degree knee flexion with nonambulatory exercises.  He is to be in full knee extension at all times with ambulation.  Can remove for hygenic purposes if needed.  No forced or aggressive knee flexion.  No straight leg raises.  Ambulate with walker.  He is to follow-up with orthopedic surgery in 3 weeks (5/12/25).    Anticipated " date of discharge.  TBD

## 2025-05-05 NOTE — DISCHARGE INSTR - AVS FIRST PAGE
DISCHARGE INSTRUCTIONS: [facility] ACUTE REHABILITATION [place]    Bring these instructions with you to your Outpatient Physician appointments so they can order and follow-up any additional lab work or imaging recommended at time of discharge.    Resume follow-up with all your prior providers that you have established care prior to this hospitalization.  Discuss with primary care physician (PCP) if you have additional questions.     It  is you or your caregivers responsibility to obtain follow-up MEDICATION REFILLS  As indicated through your Primary Care Physician (PCP) and other outpatient specialty provider(s) after discharge.  Please follow-up with your PCP as soon as possible after discharge to set-up follow-up management and when appropriate refills.        You remain a fall and injury risk which could be severe.  - Your risk of fall has decreased however since admission to acute rehab.  Caregiver training has been completed with our staff.  - Appropriate supervision +/- assistance as instructed during your rehab course is recommended to decrease risk of fall and injury.    - Continue skilled therapy as discussed after discharge to further decrease this risk    If you (or your health care proxy) have any questions or concerns regarding your acute rehabilitation stay including issues with medications, rehabilitation, and follow-up plan, please call:          [facility] Acute Rehabilitation Unit at [facility] at [phone]    Should you develop fevers, chills, new weakness, changes in sensation, difficulty speaking, facial weakness, confusion, shortness of breath, chest pain, or other concerning symptoms please call 911 and/or obtain transportation to nearest ER immediately.    Should you develop worsening pain, swelling, or drainage notify your surgeon right away or obtain transportation to nearest ER for evaluation.    Should you develop feelings of significant hopelessness,  "severe depression, severe anxiety, or suicide you should call 911 or obtain transportation to nearest ER.    24-7 Nationwide suicide and crisis lifeline call \"534\"  National Suicide Prevention Lifeline is 1-350.413.6106 and is available 24 hrs/7 days a week.   [facility] Crisis [phone] is available 24 hrs/7 days for mental health  [facility] Crisis [phone] is available 24 hrs/7 days for mental health   [facility] Crisis [phone]    PHYSICIANS to see: PCP, orthopedics, and urology   Please see your doctors listed in the follow up providers section of your discharge paperwork, and take the discharge paperwork with you to your appointments.    Home health has been ordered for you:  Your home health agency should reach out to you or your family soon to arrange follow-up.    (If [facility] health is your provider their phone number is [phone])    If you are unable to get in touch with home health you may contact your  at [phone]. [place]    SKIN CARE INSTRUCTIONS to follow:  Monitor incision(s) for increased redness, swelling, pain/tenderness, discharge/pus and promptly notify your surgeon should these develop.  - Should you develop significant pain, swelling, or drainage obtain transportation to nearest emergency room for immediate evaluation if unable to reach your surgeon promptly   - Should you develop uncontrolled pain, fever, chills, sweats, changes in strength, sensation, or color of this area obtain transportation to nearest emergency room for immediate evaluation.      Monitor skin for increased redness or breadown and promptly notify your physician should these develop    If instructed while in [facility] - be sure you stand +/- walk every 1-2 hours and if advised use appropriate supervision/assistance to optimally offload your buttock/sacral region.  While seated or lying in bed shift positions and from side to side often.  Can use " barrier type cream such as Hydragaurd 2 times per day and as needed.    Turn patient (yourself) fully every 2 hours while in bed.      Due to the following you are at increased risk of skin breakdown/wounds/worsening wounds:  - Impaired mobility  - Impaired nutrition/intake/low albumin  - Medical co-morbidities      Buttocks/Sacrum  Turn as full as possible off sacrum/buttocks every 2 hours  Use Cushion while in chair or wheelchair  Weight shift every 10-15 minutes while in chair  Keep skin clean and dry as possible.  Remove wet or soiled clothing/linens promptly  Use barrier cream or similar 2 times per day   Monitor skin for increased breakdown which you are at risk of and notify nursing, PCP, or other physician providers should this occur right away    Heels/bony edges of feet  Float heels off edge of pillow for added pressure relief.  Monitor heels and bony protuberances and notify physician or nursing for any increased redness, bogginess, or breakdown      WEIGHTBEARING/ACTIVITY PRECAUTIONS to follow:    Right lower extremity: Weight bearing as tolerated with TROM brace locked in extension at all times   Currently allowed 30 degrees of flexion  Advance to 40 degrees on 5/9       Driving restrictions:    You are recommended against driving until cleared by an outpatient physician.      Work restrictions:  You should NOT return to work (if working) until cleared by an outpatient physician.    You should not operate heavy machinery (if applicable) until cleared by an outpatient physician.      Alcohol restrictions:  You are recommended to not drink alcohol at this time unless cleared by an outpatient physician.  Drinking alcohol in your current functional condition can increase your risk of injury which could be severe.  Drinking alcohol given your current health problems can lead to increased medical complications which could be severe.    Combining alcohol with your current medications can increase your risk of  injury which could be severe.      Smoking restrictions:  You are recommended to not smoke nicotine.  Smoking increases your risk of heart attack, stroke, emphysema/COPD, and lung cancer.      Cannabis restrictions:  You are recommended to not smoke/ingest/consume/use cannabis/marijuana at this time unless cleared by an outpatient physician.  Cannabis use/intoxication in your current functional condition can increase your risk of injury which could be severe.  Cannabis use/intoxication given your current health problems may lead to increased medical complications and sub-optimal functional recovery    Illicit drug use restrictions:   You should not use cocaine, crack, speed/methamphetamine, heroin, LSD, ecstasy or other illicit substances as they can increase your risk of injury and medical complication which could be severe and lead to sub-optimal functional recovery.    MEDICATIONS:  Please see a full list of your medications outlined in the After Visit Summary that is attached to these Discharge Instructions.  Please note changes may have been made to your medications please refer to your discharge paperwork for your current medications and take this list with you to all your doctors appointments for your doctors to review.  Please do not resume a home medication unless the medication reconciliation sheet indicates to do so, please do not assume that a medication that you were given a prescription for is the same as a medication you have at home based on both medications having the same name as dosages and frequency may have changed.      Unless specifically noted in your medication list provided to you in your discharge paper work do not resume prior vitamins, minerals, or supplements you may have been taking prior to your hospitalization unless instructed by an outpatient physician in the future.  Discuss with your primary care at next visit if applicable.      NSAID Warning:  Please avoid NSAID (including but  not limited to advil, aleve, motrin, naproxen, ibuprofen, mobic, meloxicam, diclofenac etc) medications as NSAID medications may increase your risk of bleeding (which can be life-threatening), stroke, worsening kidney disease, heart attack, developing/worsening GI ulcers, worsening heart failure, worsening liver (cirrhosis) disease, potentially delay bone healing.      MEDICAL MANAGEMENT AT HOME specific to you:    Hypertension Management:  Only take the medications prescribed for you at time of discharge - overly high or low blood pressure increases your risk for health complications    Follow-up with PCP/family doctor regularly to ensure blood pressure remains adequately controlled.      Please check your blood pressure prior to taking your blood pressure medications and keep a log that you will bring with you to your follow-up doctors' appointments.        Urinary dysfunction management:  If you develop increased urinary frequency, burning, cramping, or difficulty urinating this may be a sign of an infection or other acute urologic issue.  Notify your PCP or urologist right away.  If it does not improve, worsen, or you develop fever, chills, sweats, confusion, or other concerning signs or symptoms, please obtain transportation to nearest emergency room.  Urinary retention can lead to significant kidney injury and infection which can be serious.  If you do not urinate for 8 hours or more or you have the urge to urinate and are unable to, please obtain transportation to nearest emergency room (or urologist office), for likely placement of guzman.        Acetaminophen (Tylenol) Dosing Warning:    You may have up to 3000 mg of acetaminophen (Tylenol) from all sources spread out over a 24 hours period.  Do not have more than that, as this can increase your risk of liver injury which can be serious.        Bowel management (constipation risk):  - Ideally you would have 1 well formed BM every 1-2 days.  Adjust bowel  regimen based on that goal or as close to that as possible  - Start with taking miralax 1 time per day and senna twice daily if you become constipated   - If still constipated you can increase miralax to twice per day and if needed take either oral or by rectum dulcolax (but not at the same time)  - If unable to go for more than 4 days notify your physician for additional recommendations    - If you develop significant abdominal pain, nausea/vomiting, or other concerns seek medical attention right away.    [facility] Phone Number:  [phone]

## 2025-05-05 NOTE — NURSING NOTE
Patient continues to be MOD I with walker on unit. Reports no pain. Immobilizer in place to right leg. Incision healed to right knee. Scab to top of right second toe and back of right foot. Patient reports no rubbing. Remains continant. Sitting upright for all meals. Reports he will be picked up at 11 am tomorrow. Requesting shower bench and arm rests for toilet. Therapy made aware of same. Will continue to monitor and follow plan of care.

## 2025-05-06 ENCOUNTER — TELEPHONE (OUTPATIENT)
Age: 70
End: 2025-05-06

## 2025-05-06 VITALS
WEIGHT: 302.25 LBS | HEIGHT: 77 IN | DIASTOLIC BLOOD PRESSURE: 57 MMHG | TEMPERATURE: 97.1 F | RESPIRATION RATE: 16 BRPM | SYSTOLIC BLOOD PRESSURE: 121 MMHG | HEART RATE: 69 BPM | OXYGEN SATURATION: 96 % | BODY MASS INDEX: 35.69 KG/M2

## 2025-05-06 PROCEDURE — 97530 THERAPEUTIC ACTIVITIES: CPT

## 2025-05-06 PROCEDURE — 97535 SELF CARE MNGMENT TRAINING: CPT

## 2025-05-06 RX ORDER — LOSARTAN POTASSIUM 50 MG/1
50 TABLET ORAL DAILY
Qty: 30 TABLET | Refills: 0 | Status: SHIPPED | OUTPATIENT
Start: 2025-05-06 | End: 2025-05-08 | Stop reason: SDUPTHER

## 2025-05-06 RX ORDER — SIMVASTATIN 20 MG
20 TABLET ORAL
Qty: 30 TABLET | Refills: 0 | Status: SHIPPED | OUTPATIENT
Start: 2025-05-06 | End: 2025-05-08 | Stop reason: SDUPTHER

## 2025-05-06 RX ORDER — METFORMIN HYDROCHLORIDE 500 MG/1
1500 TABLET, EXTENDED RELEASE ORAL
Qty: 90 TABLET | Refills: 0 | Status: SHIPPED | OUTPATIENT
Start: 2025-05-06

## 2025-05-06 RX ORDER — FAMOTIDINE 20 MG/1
20 TABLET, FILM COATED ORAL DAILY
Qty: 30 TABLET | Refills: 0 | Status: SHIPPED | OUTPATIENT
Start: 2025-05-07 | End: 2025-05-09 | Stop reason: SDUPTHER

## 2025-05-06 RX ADMIN — LOSARTAN POTASSIUM 50 MG: 50 TABLET, FILM COATED ORAL at 08:33

## 2025-05-06 RX ADMIN — OXYCODONE HYDROCHLORIDE AND ACETAMINOPHEN 500 MG: 500 TABLET ORAL at 08:33

## 2025-05-06 RX ADMIN — FAMOTIDINE 20 MG: 20 TABLET, FILM COATED ORAL at 08:33

## 2025-05-06 RX ADMIN — CHOLECALCIFEROL TAB 25 MCG (1000 UNIT) 5000 UNITS: 25 TAB at 08:33

## 2025-05-06 RX ADMIN — B-COMPLEX W/ C & FOLIC ACID TAB 1 TABLET: TAB at 08:33

## 2025-05-06 NOTE — PLAN OF CARE
Problem: PAIN - ADULT  Goal: Verbalizes/displays adequate comfort level or baseline comfort level  Description: Interventions:  - Encourage patient to monitor pain and request assistance  - Assess pain using appropriate pain scale  - Administer analgesics based on type and severity of pain and evaluate response  - Implement non-pharmacological measures as appropriate and evaluate response  - Consider cultural and social influences on pain and pain management  - Notify physician/advanced practitioner if interventions unsuccessful or patient reports new pain  Outcome: Progressing     Problem: INFECTION - ADULT  Goal: Absence or prevention of progression during hospitalization  Description: INTERVENTIONS:  - Assess and monitor for signs and symptoms of infection  - Monitor lab/diagnostic results  - Monitor all insertion sites, i.e. indwelling lines, tubes, and drains  - Monitor endotracheal if appropriate and nasal secretions for changes in amount and color  - [place] appropriate cooling/warming therapies per order  - Administer medications as ordered  - Instruct and encourage patient and family to use good hand hygiene technique  - Identify and instruct in appropriate isolation precautions for identified infection/condition  Outcome: Progressing     Problem: SAFETY ADULT  Goal: Maintain or return to baseline ADL function  Description: INTERVENTIONS:  -  Assess patient's ability to carry out ADLs; assess patient's baseline for ADL function and identify physical deficits which impact ability to perform ADLs (bathing, care of mouth/teeth, toileting, grooming, dressing, etc.)  - Assess/evaluate cause of self-care deficits   - Assess range of motion  - Assess patient's mobility; develop plan if impaired  - Assess patient's need for assistive devices and provide as appropriate  - Encourage maximum independence but intervene and supervise when necessary  - Involve family in performance of ADLs  - Assess for home care  needs following discharge   - Consider OT consult to assist with ADL evaluation and planning for discharge  - Provide patient education as appropriate  Outcome: Progressing     Problem: DISCHARGE PLANNING  Goal: Discharge to home or other facility with appropriate resources  Description: INTERVENTIONS:  - Identify barriers to discharge w/patient and caregiver  - Arrange for needed discharge resources and transportation as appropriate  - Identify discharge learning needs (meds, wound care, etc.)  - Arrange for interpretive services to assist at discharge as needed  - Refer to Case Management Department for coordinating discharge planning if the patient needs post-hospital services based on physician/advanced practitioner order or complex needs related to functional status, cognitive ability, or social support system  Outcome: Progressing

## 2025-05-06 NOTE — CASE MANAGEMENT
CM called [facility] to speak with Grey regarding transport, spoke with Ave,who reported transported has been scheduled for 1:00 pm today with [email or web address] , phone number [phone].  CM called [employer], and [facility] to get platform commode ordered in time for discharge today. CM sent order through [email or web address] to [employer], and an email to [facility] with request. [email or web address] [facility] accepting, sent to their sister company, for delivery to patient's apt in [place]. CM sent a message through [employer] that patient will be discharged today, but may not reach his apt until approx 5:00 PM, but patient stated to leave the platform commode outside his door. Patient inquired about getting a shower chair, CM was unaware of the request for a shower chair,authorization  was for the commode. , suggested patient to discuss with therapists, regarding getting online. Patient stated he spoke with Su, obtained, links to order the shower chair on line.  CM called [facility] : [phone] EXT. [phone], spoke with Tricia who stated she is still looking for home health serv ices, they will contact patient when they have home PT/OT agency.  CM made patient aware of [employer] delivering the commode, and Yvonnel is still trying to get home PT/OT, they will contact him.  2:15 PM; transport not here, CM called [employer], spoke with Gilbert,who stated the patient called them in the morning, and cancelled transport, due to waiting on DME .  CM made the dispatcher aware, the discharge has not been cancelled, the DME will be delivered to his home in [place], and inquired if they could pick the patient up today.  Gilbert stated they are 1.5 hours away, they will get there as soon as they can.   4:15 pm. Transport here . Patient discharged to home via transport.

## 2025-05-06 NOTE — DISCHARGE SUMMARY
Discharge Summary   Tobin Kerns 69 y.o. male MRN: [id number]  Unit/Bed#: -01 Encounter: [id number]    Admission Date: 3/11/2025     Discharge Date:  5/6/2025    Etiologic/Rehabilitation Diagnosis: Impairment of mobility, safety and Activities of Daily Living (ADLs) due to Orthopedic Disorders:  08.9  Other Orthopedic Rupture of right quadriceps tendon     Date of surgery: 3/6/25 Right - REPAIR TENDON QUADRICEPS- Right knee Revision quad tendon repair with allograft augmentation     HPI:   Tobin Kerns is a(n) 69 y.o. year old male who was admitted from [facility] where he received right knee revision quadriceps tendon repair with allograft augmentation and cast application on 3/6/2025.  Patient reports that initially he tore down his quadriceps tendon in July 2024.  Patient is now being admitted at acute rehabilitation unit on recommendations of PT OT.   During the course of his stay at inpatient rehabilitation unit patient received treatment for the following medical conditions.     Cardiac with hx of HTN, HLD.  Patient is on losartan 50 mg daily, pravastatin 40 mg daily.  Impaired fasting glucose.  Hgb a1c 5.4% on 3/12/25.  Continue metformin XR 1500 mg daily with dinner.  Regular diet and d/c accucheks as blood sugar is well controlled.    BPH.  Continue Flomax 0.4 mg daily.  GERD.  Continue Pepcid 20 mg daily.  JOVANY.  On CPAP at bedtime.   Vitamin D deficiency.  Patient is on vitamin D3 5000 units daily.  Pain and bowel management.  As per physiatrist.  Quadriceps tendon repair.  Patient is receiving intensive PT OT as per physiatrist.  Orthopedic surgery follow-up 4/18 - cast was removed and TROM reapplied to allow for 10 degree knee flexion with nonambulatory exercises.  He is to be in full knee extension at all times with ambulation.  Can remove for hygenic purposes if needed.  No forced or aggressive knee flexion.  No straight leg raises.  Ambulate with walker.  He is to follow-up  "with orthopedic surgery in 3 weeks (5/12/25)    No new Assessment & Plan notes have been filed under this hospital service since the last note was generated.  Service: Geriatric Medicine    Physiatry Additions/Comorbidities:    Quadriceps muscle rupture, right, subsequent encounter  HPI:   He initially underwent quad tendon repair on 7/25/24 with Dr. Foster. He was discharged home with outpatient PT.  Later noted to have recurrent high-grade tear with extensor lag on exam and elected for operative management  January 2025 MRI: \"Prior quadriceps insertion repair with high-grade recurrent tear exhibiting up to 2.2 cm of tendon retraction.\"  On 3/6/25, he underwent revision of quad tendon repair with allograft augmentation  3/21: outpatient follow-up with Dr. Foster, new cast applied   3/28: follow-up with Dr. Foster, continue TTWB   4/4: follow-up with Dr. Foster (removed cast and reapplied long leg cast for an additional 2 weeks) and continue TTWB  4/11 follow-up: no changes in plan  4/18 follow-up: cast removed, TROM with allowance of 10 degrees flexion per ortho, advanced to  WBAT   Plan:   C PT and OT   WBAT with TROM locked in extension (advanced on 4/18 with ortho, not included in documentation but verified with PA-C via secure chat)   Currently allowed 30 degrees of flexion  Advance to 40 degrees on 5/9 (verified with ortho PA-C on 4/24)   Pain: PRN Tylenol   Oxycodone discontinued on 3/17 due to minimal pain   Lovenox d/c on 4/7    Follow-up with orthopedic surgery on 5/12/25     Hypertension  Continue losartan 50 mg daily   Follow-up with PCP  Mixed hyperlipidemia  Continue pravastatin 40 mg daily   Follow-up with PCP  Gastro-esophageal reflux disease without esophagitis  Continue famotidine 20 mg daily   Follow-up with PCP  Peripheral neuropathy  Patient is not diabetic   Skin serially monitored in ARC; stable at time of discharge   Follow-up with PCP  JOVANY on CPAP  Continue CPAP qHS  Follow-up with " PCP  IFG (impaired fasting glucose)  Continue metformin 1500 mg daily   Seen by RD 3/12, diet adjusted to regular   3/12 A1c: 5.4   Follow-up with PCP  Healed ulcer of left foot  Present on admission   Wound care RN consulted; stable   BPH (benign prostatic hyperplasia)  Continue tamsulosin 0.4 mg daily   Voided without difficulty in ARC   Patient advised to follow-up with urology as needed, otherwise follow-up with PCP      Acute Rehabilitation Center Course: Patient participated in a comprehensive interdisciplinary inpatient rehabilitation program which included involvment of Rehab MD, therapies (PT and OT), RN, CM, SW, and dietary services.     Significant Findings, Care, Treatment and Services Provided: Acute comprehensive interdisciplinary inpatient rehabilitation including PT, OT, RN, CM, SW, dietary, etc.    Functional Status Upon Admission to [facility]:  Physical Therapy: transfers min A, ambulation min A with RW      Occupational Therapy: LB bathing mod A, LB dressing Max A, otherwise min A-supervision     Functional Status Upon Discharge from [facility]:   Physical Therapy Occupational Therapy   Weight Bearing Status: Weight Bearing as Tolerated (R LE TROM locked in extension with ambulatory activities, 30 degrees unlocked with nonambulatory activities)  Transfers: Independent  Bed Mobility: Independent  Amulation Distance (ft): 1000 feet  Ambulation: Independent  Assistive Device for Ambulation: Roller Walker  Wheelchair Mobility Distance:  (N/A)  Wheelchair Mobility:  (N/A)  Number of Stairs: 25  Assistive Device for Stairs: Lehft Hand Rail  Stair Assistance: Independent  Ramp: Independent  Assistive Device for Ramp: Roller Walker  Discharge Recommendations: Home with:  DC Home with:: First Floor Setup, Home Physical Therapy   Eating: Independent  Grooming: Independent  Bathing: Independent  Bathing: Independent  Upper Body Dressing: Independent  Lower Body Dressing: Independent  Toileting: Independent  Tub/Shower  Transfer: Independent  Toilet Transfer: Independent  Cognition: Within Defined Limits  Orientation: Person, Time, Situation           Discharge Diagnosis: Impairment of mobility, safety and Activities of Daily Living (ADLs) due to Orthopedic Disorders:  08.9  Other Orthopedic Rupture of right quadriceps tendon     Discharge Medications:   See after visit summary for reconciled discharge medications provided to patient and family.      Condition at Discharge: fair     Discharge instructions/Information to patient and family:   See after visit summary for information provided to patient and family.      Provisions for Follow-Up Care:  See after visit summary for information related to follow-up care and any pertinent home health orders.      Future Appointments   Date Time Provider Department Center   5/12/2025  2:15 PM Fletcher Foster DO ORTHO STR [facility]   6/30/2025  7:00 AM Ann Carmona DO [facility]       Disposition: Home    Planned Readmission: No    Discharge Statement   I spent 45 minutes discharging the patient. This time was spent on the day of discharge. I had direct contact with the patient on the day of discharge. Greater than 50% of the total time was spent examining patient, answering all patient questions, arranging and discussing plan of care with patient as well as directly providing post-discharge instructions. Additional time then spent on discharge activities.    Discharge Medications:  See after visit summary for reconciled discharge medications provided to patient and family.      Facility Administered Medications Prior to Discharge:    Current Facility-Administered Medications   Medication Dose Route Frequency Provider Last Rate    acetaminophen  650 mg Oral Q6H PRN Modesta Lee PA-C      ascorbic acid  500 mg Oral Daily Toño Brito MD      Cholecalciferol  5,000 Units Oral Daily Toño Brito MD      famotidine  20 mg Oral Daily Toño Brito MD      losartan   50 mg Oral Daily Toño Brito MD      metFORMIN  1,500 mg Oral Daily With Dinner Toño Brito MD      multivitamin stress formula  1 tablet Oral Daily Toño Holmaned, MD      polyethylene glycol  17 g Oral Daily PRN Toño Brito MD      pravastatin  40 mg Oral Daily With Dinner Toño Brito MD      senna  1 tablet Oral HS PRN Modesta Lee PA-C      tamsulosin  0.4 mg Oral Daily With Dinner Toño rBito MD

## 2025-05-06 NOTE — TELEPHONE ENCOUNTER
Caller: Self    Doctor: Dr. Foster    Reason for call: Patient wants to know if postop for Monday can be a virtual. If not he will have to cancel. He will be at his home in [place] for about 6 weeks while recovering. States he is doing fine and will be doing therapy at home. Please advise    Call back#: [phone]

## 2025-05-06 NOTE — PLAN OF CARE
Problem: PAIN - ADULT  Goal: Verbalizes/displays adequate comfort level or baseline comfort level  Description: Interventions:  - Encourage patient to monitor pain and request assistance  - Assess pain using appropriate pain scale  - Administer analgesics based on type and severity of pain and evaluate response  - Implement non-pharmacological measures as appropriate and evaluate response  - Consider cultural and social influences on pain and pain management  - Notify physician/advanced practitioner if interventions unsuccessful or patient reports new pain  5/6/2025 1139 by Maren C Schoenberger, RN  Outcome: Adequate for Discharge  5/6/2025 0951 by Maren C Schoenberger, RN  Outcome: Progressing     Problem: INFECTION - ADULT  Goal: Absence or prevention of progression during hospitalization  Description: INTERVENTIONS:  - Assess and monitor for signs and symptoms of infection  - Monitor lab/diagnostic results  - Monitor all insertion sites, i.e. indwelling lines, tubes, and drains  - Monitor endotracheal if appropriate and nasal secretions for changes in amount and color  - [place] appropriate cooling/warming therapies per order  - Administer medications as ordered  - Instruct and encourage patient and family to use good hand hygiene technique  - Identify and instruct in appropriate isolation precautions for identified infection/condition  5/6/2025 1139 by Maren C Schoenberger, RN  Outcome: Adequate for Discharge  5/6/2025 0951 by Maren C Schoenberger, RN  Outcome: Progressing  Goal: Absence of fever/infection during neutropenic period  Description: INTERVENTIONS:  - Monitor WBC    Outcome: Adequate for Discharge     Problem: DISCHARGE PLANNING  Goal: Discharge to home or other facility with appropriate resources  Description: INTERVENTIONS:  - Identify barriers to discharge w/patient and caregiver  - Arrange for needed discharge resources and transportation as appropriate  - Identify discharge learning needs  (meds, wound care, etc.)  - Arrange for interpretive services to assist at discharge as needed  - Refer to Case Management Department for coordinating discharge planning if the patient needs post-hospital services based on physician/advanced practitioner order or complex needs related to functional status, cognitive ability, or social support system  5/6/2025 1139 by Maren C Schoenberger, RN  Outcome: Adequate for Discharge  5/6/2025 0951 by Maren C Schoenberger, RN  Outcome: Progressing     Problem: Nutrition/Hydration-ADULT  Goal: Nutrient/Hydration intake appropriate for improving, restoring or maintaining nutritional needs  Description: Monitor and assess patient's nutrition/hydration status for malnutrition. Collaborate with interdisciplinary team and initiate plan and interventions as ordered.  Monitor patient's weight and dietary intake as ordered or per policy. Utilize nutrition screening tool and intervene as necessary. Determine patient's food preferences and provide high-protein, high-caloric foods as appropriate. INTERVENTIONS:- Monitor oral intake, urinary output, labs, and treatment plans- Assess nutrition and hydration status and recommend course of action- Evaluate amount of meals eaten- Assist patient with eating if necessary - Allow adequate time for meals- Recommend/ encourage appropriate diets, oral nutritional supplements, and vitamin/mineral supplements- Order, calculate, and assess calorie counts as needed- Recommend, monitor, and adjust tube feedings and TPN/PPN based on assessed needs- Assess need for intravenous fluids- Provide specific nutrition/hydration education as appropriate- Include patient/family/caregiver in decisions related to nutrition  Outcome: Adequate for Discharge

## 2025-05-06 NOTE — NURSING NOTE
Patient given all discharge instructions. All follow up appts, instructions, and medications gone over. Patient to have DME delivered to his house per . Also will have home care arranged through workmans comp. As of discharged this nurse did not know which company would be providing. Per  they will follow up with patient. All belongings with patient. Patient assisted down to vehicle via wheelchair by  which interdisciplinary team determined safest means of transportation.

## 2025-05-06 NOTE — OCCUPATIONAL THERAPY NOTE
OT DISCHARGE SUMMARY    Pt has met OT goals, mod I for ADL tasks and fxl transfers with the exception of [place]/supervision LB dressing and footwear management 2* LE brace. D/C to home is warranted. OPT PT services advised. Pt has a RW. 3in1 and shower seat advised and ordered. Pt participated in ADL training, therapeutic exercises and activities, fxl mobility/transfers, and activity tolerance in order to progress towards goals.

## 2025-05-06 NOTE — PROGRESS NOTES
05/06/25 0835   Pain Assessment   Pain Assessment Tool 0-10   Pain Score No Pain   Restrictions/Precautions   Precautions Fall Risk   RLE Weight Bearing Per Order WBAT   LLE Weight Bearing Per Order WBAT   Braces or Orthoses Knee immobilizer  (R LE TROM locked in extension with ambulatory activities)   Oral Hygiene   Type of Assistance Needed Independent   Physical Assistance Level No physical assistance   Comment stance   Oral Hygiene CARE Score 6   Grooming   Able To Initiate Tasks;Acquire Items;Comb/Brush Hair;Wash/Dry Face;Brush/Clean Teeth;Wash/Dry Hands   Shower/Bathe Self   Type of Assistance Needed Independent;Adaptive equipment   Physical Assistance Level No physical assistance   Shower/Bathe Self CARE Score 6   Bathing   Assessed Bath Style Shower   Able to Gather/Transport Yes   Able to Adjust Water Temperature Yes   Able to Wash/Rinse/Dry (body part) Left Arm;Right Arm;L Upper Leg;R Upper Leg;L Lower Leg/Foot;R Lower Leg/Foot;Chest;Abdomen;Buttocks;Perineal Area   Limitations Noted in ROM   Positioning Seated;Standing   Adaptive Equipment Longhand Sponge;Hand Held Shower;Tub Bench;Longhand Reacher;Shower Bars   Findings  Pt manages immobilizer on his own for donning and doffing purposes   Tub/Shower Transfer   Limitations Noted In ROM   Adaptive Equipment Transfer Bench;Grab Bars   Assessed Tub/shower combo   Findings MI   Upper Body Dressing   Type of Assistance Needed Independent   Physical Assistance Level No physical assistance   Upper Body Dressing CARE Score 6   Lower Body Dressing   Type of Assistance Needed Independent;Adaptive equipment   Physical Assistance Level No physical assistance   Lower Body Dressing CARE Score 6   Putting On/Taking Off Footwear   Type of Assistance Needed Adaptive equipment;Independent   Physical Assistance Level No physical assistance   Putting On/Taking Off Footwear CARE Score 6   Picking Up Object   Type of Assistance Needed Independent;Adaptive equipment    Physical Assistance Level No physical assistance   Picking Up Object CARE Score 6   Dressing/Undressing Clothing   Able to  Obtain Clothing;Store removed clothing   Remove UB Clothes Button Shirt   Don UB Clothes Button Shirt   Remove LB Clothes Shorts;Undergarment;Socks   Don LB Clothes Socks;Undergarment;Shorts   Limitations Noted In ROM   Adaptive Equipment Reacher;Dressing Stick;Sock Aide   Positioning Supported Sit;Standing   Roll Left and Right   Type of Assistance Needed Independent   Physical Assistance Level No physical assistance   Roll Left and Right CARE Score 6   Lying to Sitting on Side of Bed   Type of Assistance Needed Independent   Physical Assistance Level No physical assistance   Lying to Sitting on Side of Bed CARE Score 6   Sit to Stand   Type of Assistance Needed Independent   Physical Assistance Level No physical assistance   Sit to Stand CARE Score 6   Toileting Hygiene   Type of Assistance Needed Independent   Physical Assistance Level No physical assistance   Toileting Hygiene CARE Score 6   Toileting   Able to Pull Clothing down yes, up yes.   Able to Manage Clothing Closures Yes   Manage Hygiene Bladder   Limitations Noted In ROM   Adaptive Equipment Grab Bar   Toilet Transfer   Type of Assistance Needed Independent   Physical Assistance Level No physical assistance   Toilet Transfer CARE Score 6   Toilet Transfer   Surface Assessed Standard Commode   Limitations Noted In ROM   Adaptive Equipment Grab Bar;Walker   Light Housekeeping   Light Housekeeping Level Walker   Light Housekeeping Level of Assistance Modified independent   Health Management   Health Management Pt patel Indep with med mgmt, good recog of med schedule and how he will have medication delivered.   Cognition   Overall Cognitive Status WFL   Arousal/Participation Alert;Cooperative   Attention Within functional limits   Orientation Level Oriented X4   Memory Within functional limits   Following Commands Follows all  commands and directions without difficulty   Additional Activities   Additional Activities Comments Pt completed functional mobility with RW with MI with KI in place locked in extension   Activity Tolerance   Activity Tolerance Patient tolerated treatment well   Assessment   Treatment Assessment OT session addressed ADL via shower level with pt managing at MI level. Pt managed s/u and c/u Indep. Pt managed doff/don of immobilizer Indep. He packed all items in prep for d/c to home today Indep. Current barriers of ROM deficits in R LE, use of TROM brace which is locked in extension for mobility.  (20 degrees knee flexion with nonambulatory exercises with PT)  Plan is for d/c to home in [place] today. Awaiting DME. Ortho f/u May 14.   Prognosis Good   Problem List Decreased strength;Impaired balance;Decreased mobility   Assessment Pt participated in 65 minutes of concurrent tx addressing similar goals with a pt with similar deficits.   Plan   Treatment/Interventions ADL retraining;Functional transfer training;Patient/family training;Equipment eval/education;OT   Progress Improving as expected   Discharge Recommendation   Discharge Summary Pt has met OT goals. D/C to home is warranted.OPT PT services advised. Pt has a RW. 3in1 and shower seat advised and ordered.   OT Therapy Minutes   OT Time In 0835   OT Time Out 1005   OT Total Time (minutes) 90   OT Mode of treatment - Individual (minutes) 25   OT Mode of treatment - Concurrent (minutes) 65   OT Mode of treatment - Group (minutes) 0   OT Mode of treatment - Co-treat (minutes) 0   OT Mode of Treatment - Total time(minutes) 90 minutes   OT Cumulative Minutes 1788   Therapy Time missed   Time missed? No      05/06/25 0835   Pain Assessment   Pain Assessment Tool 0-10   Pain Score No Pain   Restrictions/Precautions   Precautions Fall Risk   RLE Weight Bearing Per Order WBAT   LLE Weight Bearing Per Order WBAT   Braces or Orthoses Knee immobilizer  (R LE TROM locked in  extension with ambulatory activities)   Oral Hygiene   Type of Assistance Needed Independent   Physical Assistance Level No physical assistance   Comment stance   Oral Hygiene CARE Score 6   Grooming   Able To Initiate Tasks;Acquire Items;Comb/Brush Hair;Wash/Dry Face;Brush/Clean Teeth;Wash/Dry Hands   Shower/Bathe Self   Type of Assistance Needed Independent;Adaptive equipment   Physical Assistance Level No physical assistance   Shower/Bathe Self CARE Score 6   Bathing   Assessed Bath Style Shower   Able to Gather/Transport Yes   Able to Adjust Water Temperature Yes   Able to Wash/Rinse/Dry (body part) Left Arm;Right Arm;L Upper Leg;R Upper Leg;L Lower Leg/Foot;R Lower Leg/Foot;Chest;Abdomen;Buttocks;Perineal Area   Limitations Noted in ROM   Positioning Seated;Standing   Adaptive Equipment Longhand Sponge;Hand Held Shower;Tub Bench;Longhand Reacher;Shower Bars   Findings  Pt manages immobilizer on his own for donning and doffing purposes   Tub/Shower Transfer   Limitations Noted In ROM   Adaptive Equipment Transfer Bench;Grab Bars   Assessed Tub/shower combo   Findings MI   Upper Body Dressing   Type of Assistance Needed Independent   Physical Assistance Level No physical assistance   Upper Body Dressing CARE Score 6   Lower Body Dressing   Type of Assistance Needed Independent;Adaptive equipment   Physical Assistance Level No physical assistance   Lower Body Dressing CARE Score 6   Putting On/Taking Off Footwear   Type of Assistance Needed Adaptive equipment;Independent   Physical Assistance Level No physical assistance   Putting On/Taking Off Footwear CARE Score 6   Picking Up Object   Type of Assistance Needed Independent;Adaptive equipment   Physical Assistance Level No physical assistance   Picking Up Object CARE Score 6   Dressing/Undressing Clothing   Able to  Obtain Clothing;Store removed clothing   Remove UB Clothes Button Shirt   Don UB Clothes Button Shirt   Remove LB Clothes Shorts;Undergarment;Socks    Don LB Clothes Socks;Undergarment;Shorts   Limitations Noted In ROM   Adaptive Equipment Reacher;Dressing Stick;Sock Aide   Positioning Supported Sit;Standing   Roll Left and Right   Type of Assistance Needed Independent   Physical Assistance Level No physical assistance   Roll Left and Right CARE Score 6   Lying to Sitting on Side of Bed   Type of Assistance Needed Independent   Physical Assistance Level No physical assistance   Lying to Sitting on Side of Bed CARE Score 6   Sit to Stand   Type of Assistance Needed Independent   Physical Assistance Level No physical assistance   Sit to Stand CARE Score 6   Toileting Hygiene   Type of Assistance Needed Independent   Physical Assistance Level No physical assistance   Toileting Hygiene CARE Score 6   Toileting   Able to Pull Clothing down yes, up yes.   Able to Manage Clothing Closures Yes   Manage Hygiene Bladder   Limitations Noted In ROM   Adaptive Equipment Grab Bar   Toilet Transfer   Type of Assistance Needed Independent   Physical Assistance Level No physical assistance   Toilet Transfer CARE Score 6   Toilet Transfer   Surface Assessed Standard Commode   Limitations Noted In ROM   Adaptive Equipment Grab Bar;Walker   Light Housekeeping   Light Housekeeping Level Walker   Light Housekeeping Level of Assistance Modified independent   Health Management   Health Management Pt demon Indep with med mgmt, good recog of med schedule and how he will have medication delivered.   Cognition   Overall Cognitive Status WFL   Arousal/Participation Alert;Cooperative   Attention Within functional limits   Orientation Level Oriented X4   Memory Within functional limits   Following Commands Follows all commands and directions without difficulty   Additional Activities   Additional Activities Comments Pt completed functional mobility with RW with MI with KI in place locked in extension   Activity Tolerance   Activity Tolerance Patient tolerated treatment well   Assessment    Treatment Assessment OT session addressed ADL via shower level with pt managing at MI level. Pt managed s/u and c/u Indep. Pt managed doff/don of immobilizer Indep. He packed all items in prep for d/c to home today Indep. Current barriers of ROM deficits in R LE, use of TROM brace which is locked in extension for mobility.  (20 degrees knee flexion with nonambulatory exercises with PT)  Plan is for d/c to home in [place] today. Awaiting DME. Ortho f/u May 14.   Prognosis Good   Problem List Decreased strength;Impaired balance;Decreased mobility   Assessment Pt participated in 65 minutes of concurrent tx addressing similar goals with a pt with similar deficits.   Plan   Treatment/Interventions ADL retraining;Functional transfer training;Patient/family training;Equipment eval/education;OT   Progress Improving as expected   Discharge Recommendation   Discharge Summary Pt has met OT goals. D/C to home is warranted.OPT PT services advised. Pt has a RW. 3in1 and shower seat advised and ordered.   OT Therapy Minutes   OT Time In 0835   OT Time Out 1005   OT Total Time (minutes) 90   OT Mode of treatment - Individual (minutes) 25   OT Mode of treatment - Concurrent (minutes) 65   OT Mode of treatment - Group (minutes) 0   OT Mode of treatment - Co-treat (minutes) 0   OT Mode of Treatment - Total time(minutes) 90 minutes   OT Cumulative Minutes 1788   Therapy Time missed   Time missed? No

## 2025-05-07 NOTE — PHYSICAL THERAPY NOTE
PT DISCHARGE SUMMARY    Patient HAS met max benefit from inpatient [facility] PT.  Patient mod I Bed mobility; mod I Transfers with RW; mod I Gait with RW on level and unlevel surfaces.  Mod I stairs with single HR while managing RW and personal belongings. Pt maintains orthopedic restrictions well, manages TROM brace inependently. Will benefit from continued PT services post discharge.

## 2025-05-07 NOTE — PROGRESS NOTES
05/07/25 0950   Hello, [Guardian’s Name / Patient’s Name], this is [Caller Name] from [facility], and our clinical care team wanted to check on you / your child after your recent visit to the hospital. It will only take 3-5 minutes. Is this a good time?   Discharge Call Type/ Specific Diagnosis: [facility] General   [facility] Discharge Follow- Up   ARC Follow- Up Time Frame 5 Day follow up   ARC 5 Day General Follow- up Questions   Patient location at time of call Home   Were you/ your loved one's discharge instructions clear and understandable Yes   Have you Filled you/ your loved one's new prescriptions Yes   Do you have questions about your medications? No   Are you/ your loved one taking all medications as prescribed? Yes   Are you/ your loved ones having any unusual symptoms or problems? No   Follow up appointments   Follow up appointment with PCP Future appointment scheduled   Is there anything preventing you from keeping this appointment? No   Healthy Lifestyle   Are you participating in ongoing therapy? Yes   [facility] discharge phone call follow ups and opportunities   How well do you feel the team did preparing you to return home? Very well   Is there a need for follow up? No   Call Complete   Discharge phone call complete? Complete

## 2025-05-08 ENCOUNTER — TELEMEDICINE (OUTPATIENT)
Dept: FAMILY MEDICINE CLINIC | Facility: CLINIC | Age: 70
End: 2025-05-08
Payer: MEDICARE

## 2025-05-08 ENCOUNTER — TELEPHONE (OUTPATIENT)
Dept: FAMILY MEDICINE CLINIC | Facility: CLINIC | Age: 70
End: 2025-05-08

## 2025-05-08 ENCOUNTER — TRANSITIONAL CARE MANAGEMENT (OUTPATIENT)
Dept: FAMILY MEDICINE CLINIC | Facility: CLINIC | Age: 70
End: 2025-05-08

## 2025-05-08 DIAGNOSIS — Z12.11 COLON CANCER SCREENING: ICD-10-CM

## 2025-05-08 DIAGNOSIS — Z86.0100 PERSONAL HISTORY OF COLON POLYPS, UNSPECIFIED: ICD-10-CM

## 2025-05-08 DIAGNOSIS — E78.2 MIXED HYPERLIPIDEMIA: ICD-10-CM

## 2025-05-08 DIAGNOSIS — S76.111S QUADRICEPS TENDON RUPTURE, RIGHT, SEQUELA: Primary | ICD-10-CM

## 2025-05-08 DIAGNOSIS — I10 PRIMARY HYPERTENSION: ICD-10-CM

## 2025-05-08 PROCEDURE — 99215 OFFICE O/P EST HI 40 MIN: CPT | Performed by: STUDENT IN AN ORGANIZED HEALTH CARE EDUCATION/TRAINING PROGRAM

## 2025-05-08 NOTE — ASSESSMENT & PLAN NOTE
s/p Right knee revision quad tendon repair with allograft augmentation   Home now from rehab and wearing knee brace  Weight bearing as tolerated, no issues with performing all ADL's  Denies any pain at this time.   Has follow up with Ortho on 5/12

## 2025-05-09 ENCOUNTER — TELEPHONE (OUTPATIENT)
Age: 70
End: 2025-05-09

## 2025-05-09 DIAGNOSIS — K21.9 GASTRO-ESOPHAGEAL REFLUX DISEASE WITHOUT ESOPHAGITIS: ICD-10-CM

## 2025-05-09 RX ORDER — SIMVASTATIN 20 MG
20 TABLET ORAL
Qty: 90 TABLET | Refills: 1 | Status: SHIPPED | OUTPATIENT
Start: 2025-05-09

## 2025-05-09 NOTE — TELEPHONE ENCOUNTER
Caller: Patient    Doctor: Dr. Foster     Reason for call: Patient asked for virtual link    Call back#: [phone]

## 2025-05-09 NOTE — CASE MANAGEMENT
5/8/25 CM received  phoen call from patient, stating he did not receive the bedside commode. CM checked parachute, saw the platform bedside commode marked cancelled/CM called [employer], left message for Lisa [phone], inquiring about cancellation of order for delivery of bedside commode.  5/9/25 CM called [employer] [phone], inquired about patient 's platform bedside commode order. CM was told the order did go to [facility], that is why it probably said cancelled in [place]. CM was then transferred to [facility], spoke with Aggie, who stated the platform bedside commode was out of stock. Aggie stated she thought they may have come back in stock, she was going to check, Cm gave Aggie [employer]'s contact information, she stated she would call this CM back.  CM called [employer] , left a message for Tessa , inquiring about concurrent review sent to them via the portal, left CM's contact information , with request for return phone call. CM received a phone call from Deya from [facility], returning CM's call. Deya stated the entire [facility] stay has been approved via surgical packet, from 3/6 to 5/5 . The authorization number is [id number]

## 2025-05-09 NOTE — CASE MANAGEMENT
5/8/25 CM received  phone call from patient, stating he did not receive the bedside commode. CM checked parachute, saw the platform bedside commode marked cancelled/CM called "[employer] left message for Lisa [phone], inquiring about cancellation of order for delivery of bedside commode.  5/9/25 CM called "[employer] [phone], inquired about patient 's platform bedside commode order. CM spoke with Amirah.was told the order did go to [facility], that is why it probably said cancelled in [place]. CM was then transferred to [facility], spoke with Aggie, who stated the platform bedside commode was out of stock. Aggie stated she thought they may have come back in stock, she was going to check, CM gave Aggie [employer]'s contact information, she stated she would call this CM back.  CM called [employer] , left a message for Tessa , inquiring about concurrent review sent to them via the portal, left CM's contact information , with request for return phone call. CM received a phone call from Deya from [facility], returning CM's call. Deya stated the entire ARC stay has been approved via surgical packet, from 3/6 to 5/5 . The authorization number is [id number]  5/13/25 CM called "[employer] [phone], spoke with Aggie again, who stated the [employer] never answered her message from Friday, she will send another message,and Call this CM back either way today.   CM called [employer] [phone], left a voice mail message for Urbano, inquiring about ordering a platform commode, with CM 's contact information, and request for return phone call.  5/14/25. No return phone call from [facility]. CM called [facility] again, spoke with Kiana, who stated the commode was not delivered, because they were out of stock. Kiana stated she will message the [employer], and call this CM back by 5:00 PM today.  5/15 CM received phone message from Kiana at [facility], stating [employer] has  no platform bsc in stock, but she found it from another warehouse.

## 2025-05-12 ENCOUNTER — TELEPHONE (OUTPATIENT)
Age: 70
End: 2025-05-12

## 2025-05-12 RX ORDER — FAMOTIDINE 20 MG/1
20 TABLET, FILM COATED ORAL DAILY
Qty: 90 TABLET | Refills: 1 | Status: SHIPPED | OUTPATIENT
Start: 2025-05-12

## 2025-05-12 RX ORDER — LOSARTAN POTASSIUM 50 MG/1
50 TABLET ORAL DAILY
Qty: 90 TABLET | Refills: 0 | Status: SHIPPED | OUTPATIENT
Start: 2025-05-12

## 2025-05-12 NOTE — TELEPHONE ENCOUNTER
Caller: [facility]     Doctor: Dr. Foster    Reason for call: Home Physical Therapist is calling to clarify what type of ROM and therapy she should be doing with the patient    Call back#: [phone]

## 2025-05-13 ENCOUNTER — TELEPHONE (OUTPATIENT)
Dept: OBGYN CLINIC | Facility: HOSPITAL | Age: 70
End: 2025-05-13

## 2025-06-02 ENCOUNTER — OFFICE VISIT (OUTPATIENT)
Dept: OBGYN CLINIC | Facility: CLINIC | Age: 70
End: 2025-06-02

## 2025-06-02 VITALS — WEIGHT: 302 LBS | HEIGHT: 77 IN | BODY MASS INDEX: 35.66 KG/M2

## 2025-06-02 DIAGNOSIS — Z98.890 S/P MUSCULOSKELETAL SYSTEM SURGERY: Primary | ICD-10-CM

## 2025-06-02 DIAGNOSIS — R73.01 IFG (IMPAIRED FASTING GLUCOSE): ICD-10-CM

## 2025-06-02 PROCEDURE — 99024 POSTOP FOLLOW-UP VISIT: CPT | Performed by: ORTHOPAEDIC SURGERY

## 2025-06-02 NOTE — PROGRESS NOTES
Name: Tobin Kerns      : 1955      MRN: [id number]  Encounter Provider: Flecther Foster DO  Encounter Date: 2025   Encounter department: [facility] [place]  :  Assessment & Plan  S/P musculoskeletal system surgery  Approximately 3 month s/p Right knee  quad tendon revision repair with allograft augmentation performed on 2025  Orders:    Ambulatory Referral to Physical Therapy; Future          Approximately 3 month s/p Right knee  quad tendon revision repair with allograft augmentation performed on 2025  Overall, patient doing well s/p operative management  Continue TROM locked in extension for the next 4 weeks. May begin unlocking brace while ambulating after 4 weeks as strength and range of motion allow  Begin outpatient physical therapy  Maximum 100 degrees flexion for 2 weeks, 110 flexion for 2 weeks, then progress as tolerated  No active leg extensions  May perform straight leg raises in brace and without brace as strength progression allows.   May wean from brace when not walking  May begin driving, begin in empty parking lot, progress as comfortable  Follow up 6 weeks    History of the Present Illness     History of Present Illness   HPI   Tobin Kerns is a 70 y.o. male approximately 3 month s/p Right knee  quad tendon revision repair with allograft augmentation performed on 2025. The patient is doing well overall. He has been performed home exercises and physical therapy has been coming to his house. He ambulates with his TROM brace locked in extension with use of his walker for assistance. The patient is not complaining of any pain. He offers no other complaints or concerns.           Review of Systems     Review of Systems   Constitutional:  Negative for chills and fever.   HENT:  Negative for ear pain and sore throat.    Eyes:  Negative for pain and visual disturbance.   Respiratory:  Negative for cough and shortness of breath.   "  Cardiovascular:  Negative for chest pain and palpitations.   Gastrointestinal:  Negative for abdominal pain and vomiting.   Genitourinary:  Negative for dysuria and hematuria.   Musculoskeletal:  Negative for arthralgias and back pain.   Skin:  Negative for color change and rash.   Neurological:  Negative for seizures and syncope.   All other systems reviewed and are negative.      Physical Exam     Objective   Ht 6' 5\" (1.956 m)   Wt (!) 137 kg (302 lb)   BMI 35.81 kg/m²        Right Knee  Surgical incision well healed without signs of infection.  Range of motion from 0 to 75.    There is no effusion.    There no is tenderness over the knee.    The patient is able to perform a straight leg raise    Calf soft, compressible and nontender   The patient is neurovascular intact distally.      Data Review     I have personally reviewed pertinent films in PACS, and my interpretation follows.    No new images today.    Lab Results   Component Value Date/Time    HGBA1C 5.4 2025 05:18 AM       Social History[1]        Procedures  None performed.    Amirah Chavez   Scribe Attestation      I,:  Amirah Chavez am acting as a scribe while in the presence of the attending physician.:       I,:  Fletcher Foster, DO personally performed the services described in this documentation    as scribed in my presence.:                    [1]   Social History  Tobacco Use    Smoking status: Former     Current packs/day: 0.00     Average packs/day: 1 pack/day for 15.0 years (15.0 ttl pk-yrs)     Types: Cigarettes     Start date: 1980     Quit date: 2005     Years since quittin.4     Passive exposure: Never    Smokeless tobacco: Never   Vaping Use    Vaping status: Never Used   Substance Use Topics    Alcohol use: Yes     Alcohol/week: 1.0 standard drink of alcohol     Types: 1 Cans of beer per week    Drug use: Not Currently     Types: Marijuana     Comment: Last Marijuana Use ; Other unknown drugs while in " [place]

## 2025-06-02 NOTE — ASSESSMENT & PLAN NOTE
Approximately 3 month s/p Right knee  quad tendon revision repair with allograft augmentation performed on 03/06/2025  Orders:    Ambulatory Referral to Physical Therapy; Future

## 2025-06-03 RX ORDER — METFORMIN HYDROCHLORIDE 500 MG/1
1500 TABLET, EXTENDED RELEASE ORAL
Qty: 90 TABLET | Refills: 5 | Status: SHIPPED | OUTPATIENT
Start: 2025-06-03

## 2025-06-10 ENCOUNTER — TELEPHONE (OUTPATIENT)
Age: 70
End: 2025-06-10

## 2025-06-10 NOTE — TELEPHONE ENCOUNTER
Patient calling to find out how to setup OP physical therapy. Informed he has a referral so he would contact any physical therapy office that work comp will cover and schedule. Provided names and phone numbers for a few offices in his area.

## 2025-06-12 NOTE — TELEPHONE ENCOUNTER
Caller: Patient- Tobin    Doctor: Dr. Foster     Reason for call: Patient is calling in wanting to get the PT script faxed over to his  at [facility]   at [phone], please fax.    Phone number if needed # [phone] ext [phone]

## 2025-07-07 DIAGNOSIS — R73.01 IFG (IMPAIRED FASTING GLUCOSE): ICD-10-CM

## 2025-07-09 RX ORDER — METFORMIN HYDROCHLORIDE 500 MG/1
1500 TABLET, EXTENDED RELEASE ORAL
Qty: 270 TABLET | Refills: 1 | Status: SHIPPED | OUTPATIENT
Start: 2025-07-09

## 2025-07-09 RX ORDER — METFORMIN HYDROCHLORIDE 500 MG/1
1500 TABLET, EXTENDED RELEASE ORAL
Qty: 90 TABLET | Refills: 0 | OUTPATIENT
Start: 2025-07-09

## 2025-07-14 ENCOUNTER — OFFICE VISIT (OUTPATIENT)
Dept: OBGYN CLINIC | Facility: CLINIC | Age: 70
End: 2025-07-14
Payer: MEDICARE

## 2025-07-14 VITALS — HEIGHT: 77 IN | WEIGHT: 309 LBS | BODY MASS INDEX: 36.48 KG/M2

## 2025-07-14 DIAGNOSIS — Z98.890 S/P MUSCULOSKELETAL SYSTEM SURGERY: Primary | ICD-10-CM

## 2025-07-14 DIAGNOSIS — S76.111D QUADRICEPS TENDON RUPTURE, RIGHT, SUBSEQUENT ENCOUNTER: ICD-10-CM

## 2025-07-14 PROCEDURE — 99213 OFFICE O/P EST LOW 20 MIN: CPT | Performed by: ORTHOPAEDIC SURGERY

## 2025-07-14 NOTE — ASSESSMENT & PLAN NOTE
Continue outpatient PT with new ROM goals  Orders:    Ambulatory Referral to Physical Therapy; Future

## 2025-07-14 NOTE — PROGRESS NOTES
Name: Tobin Kerns      : 1955      MRN: [id number]  Encounter Provider: Fletcher Foster DO  Encounter Date: 2025   Encounter department: [facility] [place]  :  Assessment & Plan  S/P musculoskeletal system surgery  Continue outpatient PT with new ROM goals  Orders:    Ambulatory Referral to Physical Therapy; Future    Quadriceps tendon rupture, right, subsequent encounter  4 months s/p revision repair with allograft augmentation 2025. Continue outpatient PT with new ROM goals  Orders:    Ambulatory Referral to Physical Therapy; Future          Approximately 4 month s/p Right knee quad tendon revision repair with allograft augmentation performed 2025   Overall, patient doing very well s/p surgical intervention without complaint  Maintain TROM brace and unlock to allow for knee flexion during ambulation and use of walker. Brace should be DC prior to follow up appointment as per discretion of therapist and improvement of strength.  At therapy, he may progress with range of motion as tolerated. No aggressive forced passive motion.  No leg extensions for an additional 4 weeks. After 4 weeks, may start with leg extensions starting at 30 degree flexion, not a full knee flexed leg extension. He will be cleared to use leg press with guidance and light weights in 4 weeks  Follow up 6 weeks    History of the Present Illness   History of Present Illness   HPI   Tobin Kerns is a 70 y.o. male approximately 4 month s/p Right knee quad tendon revision repair with allograft augmentation performed 2025. Today, patient reports he is doing well. He occasionally unlocks the brace with ambulation and has tried to walk without the brace at home without issue. He is using a walker for ambulation. Patient reports he has progressed with range of motion with therapy. He has not had any falls. Overall, patient feels very good.            Review of Systems     Review of  "Systems   Constitutional:  Negative for chills and fever.   HENT:  Negative for ear pain and sore throat.    Eyes:  Negative for pain and visual disturbance.   Respiratory:  Negative for cough and shortness of breath.    Cardiovascular:  Negative for chest pain and palpitations.   Gastrointestinal:  Negative for abdominal pain and vomiting.   Genitourinary:  Negative for dysuria and hematuria.   Musculoskeletal:  Negative for arthralgias and back pain.   Skin:  Negative for color change and rash.   Neurological:  Negative for seizures and syncope.   All other systems reviewed and are negative.      Physical Exam   Objective   Ht 6' 5\" (1.956 m)   Wt (!) 140 kg (309 lb)   BMI 36.64 kg/m²        Right Knee  Surgical incision well healed  Range of motion from 0 to 90 degrees.    There is no effusion.    The patient is able to perform a straight leg raise with and without brace  Improved quad contraction appreciated  Calf soft, compressible and nontender  The patient is neurovascular intact distally.      Data Review     I have personally reviewed pertinent films in PACS, and my interpretation follows.    No new images     Lab Results   Component Value Date/Time    HGBA1C 5.4 2025 05:18 AM       Social History[1]        Procedures  None     America Ambrose PA-C          [1]   Social History  Tobacco Use    Smoking status: Former     Current packs/day: 0.00     Average packs/day: 1 pack/day for 15.0 years (15.0 ttl pk-yrs)     Types: Cigarettes     Start date: 1980     Quit date: 2005     Years since quittin.5     Passive exposure: Never    Smokeless tobacco: Never   Vaping Use    Vaping status: Never Used   Substance Use Topics    Alcohol use: Yes     Alcohol/week: 1.0 standard drink of alcohol     Types: 1 Cans of beer per week    Drug use: Not Currently     Types: Marijuana     Comment: Last Marijuana Use ; Other unknown drugs while in [place]     "

## 2025-08-04 ENCOUNTER — TELEPHONE (OUTPATIENT)
Dept: OBGYN CLINIC | Facility: HOSPITAL | Age: 70
End: 2025-08-04

## 2025-08-18 ENCOUNTER — TELEPHONE (OUTPATIENT)
Dept: OBGYN CLINIC | Facility: CLINIC | Age: 70
End: 2025-08-18

## 2025-08-19 DIAGNOSIS — K21.9 GASTRO-ESOPHAGEAL REFLUX DISEASE WITHOUT ESOPHAGITIS: ICD-10-CM

## 2025-08-19 DIAGNOSIS — S76.111D QUADRICEPS TENDON RUPTURE, RIGHT, SUBSEQUENT ENCOUNTER: Primary | ICD-10-CM

## 2025-08-21 RX ORDER — FAMOTIDINE 20 MG/1
20 TABLET, FILM COATED ORAL DAILY
Qty: 90 TABLET | Refills: 1 | Status: SHIPPED | OUTPATIENT
Start: 2025-08-21

## (undated) DEVICE — IMMOBILIZER KNEE UNIVERSAL 22 IN

## (undated) DEVICE — PROXIMATE PLUS MD MULTI-DIRECTIONAL RELEASE SKIN STAPLERS CONTAINS 35 STAINLESS STEEL STAPLES APPROXIMATE CLOSED DIMENSIONS: 6.9MM X 3.9MM WIDE: Brand: PROXIMATE

## (undated) DEVICE — PADDING CAST 4 IN  COTTON STRL

## (undated) DEVICE — 4-PORT MANIFOLD: Brand: NEPTUNE 2

## (undated) DEVICE — GAUZE SPONGES,16 PLY: Brand: CURITY

## (undated) DEVICE — IMPERVIOUS STOCKINETTE: Brand: DEROYAL

## (undated) DEVICE — COBAN 4 IN STERILE

## (undated) DEVICE — 3M™ STERI-STRIP™ REINFORCED ADHESIVE SKIN CLOSURES, R1547, 1/2 IN X 4 IN (12 MM X 100 MM), 6 STRIPS/ENVELOPE: Brand: 3M™ STERI-STRIP™

## (undated) DEVICE — SUT MONOCRYL 4-0 PS-2 27 IN Y426H

## (undated) DEVICE — SUT FIBERWIRE #2 1/2 CIRCLE T-5 38IN AR-7200

## (undated) DEVICE — UNIVERSAL MAJOR EXTREMITY,KIT: Brand: CARDINAL HEALTH

## (undated) DEVICE — SUT VICRYL 2-0 CT-1 27 IN J259H

## (undated) DEVICE — DRAPE EQUIPMENT RF WAND

## (undated) DEVICE — KERLIX BANDAGE ROLL: Brand: KERLIX

## (undated) DEVICE — DRESSING MEPILEX AG BORDER POST-OP 4 X 8 IN

## (undated) DEVICE — INTENDED FOR TISSUE SEPARATION, AND OTHER PROCEDURES THAT REQUIRE A SHARP SURGICAL BLADE TO PUNCTURE OR CUT.: Brand: BARD-PARKER SAFETY BLADES SIZE 10, STERILE

## (undated) DEVICE — U-DRAPE: Brand: CONVERTORS

## (undated) DEVICE — FIBERTAK ROTATOR CUFF DISPOSABLES KIT

## (undated) DEVICE — SUT VICRYL 1 CT-1 27 IN J261H

## (undated) DEVICE — SUT ETHIBOND 2 OS-4 R/C 30 IN X519H

## (undated) DEVICE — INTENDED FOR TISSUE SEPARATION, AND OTHER PROCEDURES THAT REQUIRE A SHARP SURGICAL BLADE TO PUNCTURE OR CUT.: Brand: BARD-PARKER SAFETY BLADES SIZE 15, STERILE

## (undated) DEVICE — GLOVE SRG BIOGEL ORTHOPEDIC 7.5

## (undated) DEVICE — SELF-PUNCHING TRIPLE LOADED FIBERTAK
Type: IMPLANTABLE DEVICE | Site: KNEE | Status: NON-FUNCTIONAL
Brand: ARTHREX®

## (undated) DEVICE — OCCLUSIVE GAUZE STRIP,3% BISMUTH TRIBROMOPHENATE IN PETROLATUM BLEND: Brand: XEROFORM

## (undated) DEVICE — CHLORAPREP HI-LITE 26ML ORANGE

## (undated) DEVICE — ACE WRAP 6 IN STERILE

## (undated) DEVICE — INTENT TO BE USED WITH SUTURE MATERIAL FOR TISSUE CLOSURE: Brand: RICHARD-ALLAN® NEEDLE 1/2 CIRCLE TAPER

## (undated) DEVICE — BANDAGE, ESMARK LF STR 6"X9' (20/CS): Brand: CYPRESS

## (undated) DEVICE — HEWSON SUTURE RETRIEVER: Brand: HEWSON SUTURE RETRIEVER

## (undated) DEVICE — SUT ETHILON 3-0 PS-1 18 IN 1663G

## (undated) DEVICE — GLOVE SRG BIOGEL 7.5

## (undated) DEVICE — PAD CAST 4 IN COTTON NON STERILE

## (undated) DEVICE — NEEDLE 25G X 1 1/2

## (undated) DEVICE — SP FIBERTAK RC, DBLOAD TAPE BL/W, BLK/W
Type: IMPLANTABLE DEVICE | Site: KNEE | Status: NON-FUNCTIONAL
Brand: ARTHREX®

## (undated) DEVICE — SUT ETHIBOND 5 V-37 30 IN MB66G

## (undated) DEVICE — IMPLSYS 2NDRY FIXATN BIOSWVLK 4.75X19.1
Type: IMPLANTABLE DEVICE | Site: FEMUR | Status: NON-FUNCTIONAL
Brand: ARTHREX®

## (undated) DEVICE — ABDOMINAL PAD: Brand: DERMACEA

## (undated) DEVICE — INTENT TO BE USED WITH SUTURE MATERIAL FOR TISSUE CLOSURE: Brand: RICHARD-ALLAN®  NEEDLE 1/2 CIRCLE REVERSE CUTTING

## (undated) DEVICE — STOCKINETTE 6 IN COTTON NS 1 PLY